# Patient Record
Sex: MALE | Race: WHITE | HISPANIC OR LATINO | Employment: OTHER | URBAN - METROPOLITAN AREA
[De-identification: names, ages, dates, MRNs, and addresses within clinical notes are randomized per-mention and may not be internally consistent; named-entity substitution may affect disease eponyms.]

---

## 2018-05-15 LAB
ALBUMIN SERPL BCP-MCNC: 4.1 G/DL (ref 3–5.2)
ALP SERPL-CCNC: 108 U/L (ref 43–122)
ALT SERPL W P-5'-P-CCNC: 40 U/L (ref 9–52)
ANION GAP SERPL CALCULATED.3IONS-SCNC: 9 MMOL/L (ref 5–14)
AST SERPL W P-5'-P-CCNC: 27 U/L (ref 17–59)
BILIRUB SERPL-MCNC: 0.7 MG/DL
BILIRUB UR QL STRIP: NEGATIVE MG/DL
BUN SERPL-MCNC: 12 MG/DL (ref 5–25)
CALCIUM SERPL-MCNC: 9.7 MG/DL (ref 8.4–10.2)
CHLORIDE SERPL-SCNC: 108 MEQ/L (ref 97–108)
CHOLEST SERPL-MCNC: 237 MG/DL
CHOLEST/HDLC SERPL: 5.6 {RATIO}
CLARITY UR: CLEAR
CO2 SERPL-SCNC: 26 MMOL/L (ref 22–30)
COLOR UR: NORMAL
CREATINE, SERUM (HISTORICAL): 0.97 MG/DL (ref 0.7–1.5)
CREATININE, RANDOM URINE (HISTORICAL): 149.2 MG/DL (ref 50–200)
DEPRECATED RDW RBC AUTO: 15 %
EGFR (HISTORICAL): >60 ML/MIN/1.73 M2
GLUCOSE SERPL-MCNC: 132 MG/DL (ref 70–99)
GLUCOSE UR STRIP-MCNC: NEGATIVE MG/DL
HCT VFR BLD AUTO: 49 % (ref 41–53)
HDLC SERPL-MCNC: 42 MG/DL
HGB BLD-MCNC: 16.5 G/DL (ref 13.5–17.5)
HGB UR QL STRIP.AUTO: NEGATIVE
KETONES UR STRIP-MCNC: NEGATIVE MG/DL
LDL/HDL RATIO (HISTORICAL): 3.6
LDLC SERPL CALC-MCNC: 152 MG/DL
LEUKOCYTE ESTERASE UR QL STRIP: NEGATIVE
MCH RBC QN AUTO: 31.7 PG (ref 26–34)
MCHC RBC AUTO-ENTMCNC: 33.6 % (ref 31–36)
MCV RBC AUTO: 94 FL (ref 80–100)
MICROALBUM.,U,RANDOM (HISTORICAL): <0.6 MG/DL
MICROALBUMIN/CREATININE RATIO (HISTORICAL): NORMAL
NITRITE UR QL STRIP: NEGATIVE
PH UR STRIP.AUTO: 6 [PH] (ref 4.5–8)
PLATELET # BLD AUTO: 172 K/MCL (ref 150–450)
POTASSIUM SERPL-SCNC: 5.1 MEQ/L (ref 3.6–5)
PROT UR STRIP-MCNC: NEGATIVE MG/DL
PSA (HISTORICAL): 0.37 NG/ML
RBC # BLD AUTO: 5.2 M/MCL (ref 4.5–5.9)
SODIUM SERPL-SCNC: 144 MEQ/L (ref 137–147)
SP GR UR STRIP.AUTO: 1.01 (ref 1–1.04)
TOTAL PROTEIN (HISTORICAL): 7.4 G/DL (ref 5.9–8.4)
TRIGL SERPL-MCNC: 217 MG/DL
UROBILINOGEN UR QL STRIP.AUTO: NEGATIVE MG/DL (ref 0–1)
VLDLC SERPL CALC-MCNC: 43 MG/DL (ref 0–40)
WBC # BLD AUTO: 7.7 K/MCL (ref 4.5–11)

## 2018-05-17 LAB
ANION GAP SERPL CALCULATED.3IONS-SCNC: 6 MMOL/L (ref 5–14)
BUN SERPL-MCNC: 11 MG/DL (ref 5–25)
CALCIUM SERPL-MCNC: 9.2 MG/DL (ref 8.4–10.2)
CHLORIDE SERPL-SCNC: 108 MEQ/L (ref 97–108)
CO2 SERPL-SCNC: 25 MMOL/L (ref 22–30)
CREATINE, SERUM (HISTORICAL): 0.93 MG/DL (ref 0.7–1.5)
EGFR (HISTORICAL): >60 ML/MIN/1.73 M2
EST. AVERAGE GLUCOSE BLD GHB EST-MCNC: 131 MG/DL
GLUCOSE SERPL-MCNC: 143 MG/DL (ref 70–99)
HBA1C MFR BLD HPLC: 6.2 %
POTASSIUM SERPL-SCNC: 4.8 MEQ/L (ref 3.6–5)
SODIUM SERPL-SCNC: 140 MEQ/L (ref 137–147)

## 2018-08-30 ENCOUNTER — APPOINTMENT (EMERGENCY)
Dept: RADIOLOGY | Facility: HOSPITAL | Age: 53
End: 2018-08-30
Payer: MEDICARE

## 2018-08-30 ENCOUNTER — HOSPITAL ENCOUNTER (EMERGENCY)
Facility: HOSPITAL | Age: 53
Discharge: HOME | End: 2018-08-30
Attending: EMERGENCY MEDICINE | Admitting: EMERGENCY MEDICINE
Payer: MEDICARE

## 2018-08-30 VITALS
OXYGEN SATURATION: 98 % | DIASTOLIC BLOOD PRESSURE: 68 MMHG | HEART RATE: 68 BPM | WEIGHT: 213.41 LBS | RESPIRATION RATE: 16 BRPM | SYSTOLIC BLOOD PRESSURE: 138 MMHG | TEMPERATURE: 97.2 F

## 2018-08-30 DIAGNOSIS — M25.511 ACUTE PAIN OF RIGHT SHOULDER: Primary | ICD-10-CM

## 2018-08-30 LAB
ANION GAP SERPL CALCULATED.3IONS-SCNC: 8 MMOL/L (ref 5–14)
BASOPHILS # BLD AUTO: 0.1 THOUSANDS/ΜL (ref 0–0.1)
BASOPHILS NFR BLD AUTO: 1 % (ref 0–1)
BUN SERPL-MCNC: 16 MG/DL (ref 5–25)
CALCIUM SERPL-MCNC: 9.5 MG/DL (ref 8.4–10.2)
CHLORIDE SERPL-SCNC: 106 MMOL/L (ref 97–108)
CO2 SERPL-SCNC: 27 MMOL/L (ref 22–30)
CREAT SERPL-MCNC: 0.87 MG/DL (ref 0.7–1.5)
EOSINOPHIL # BLD AUTO: 0.1 THOUSAND/ΜL (ref 0–0.4)
EOSINOPHIL NFR BLD AUTO: 2 % (ref 0–6)
ERYTHROCYTE [DISTWIDTH] IN BLOOD BY AUTOMATED COUNT: 14.9 %
GFR SERPL CREATININE-BSD FRML MDRD: 97 ML/MIN/1.73SQ M
GLUCOSE SERPL-MCNC: 133 MG/DL (ref 70–99)
HCT VFR BLD AUTO: 43.8 % (ref 41–53)
HGB BLD-MCNC: 14.7 G/DL (ref 13.5–17.5)
LYMPHOCYTES # BLD AUTO: 2.6 THOUSANDS/ΜL (ref 0.5–4)
LYMPHOCYTES NFR BLD AUTO: 35 % (ref 20–50)
MCH RBC QN AUTO: 32 PG (ref 26–34)
MCHC RBC AUTO-ENTMCNC: 33.5 G/DL (ref 31–36)
MCV RBC AUTO: 95 FL (ref 80–100)
MONOCYTES # BLD AUTO: 0.6 THOUSAND/ΜL (ref 0.2–0.9)
MONOCYTES NFR BLD AUTO: 8 % (ref 1–10)
NEUTROPHILS # BLD AUTO: 4 THOUSANDS/ΜL (ref 1.8–7.8)
NEUTS SEG NFR BLD AUTO: 55 % (ref 45–65)
PLATELET # BLD AUTO: 154 THOUSANDS/UL (ref 150–450)
PMV BLD AUTO: 10.3 FL (ref 8.9–12.7)
POTASSIUM SERPL-SCNC: 4 MMOL/L (ref 3.6–5)
RBC # BLD AUTO: 4.6 MILLION/UL (ref 4.5–5.9)
SODIUM SERPL-SCNC: 141 MMOL/L (ref 137–147)
TROPONIN I SERPL-MCNC: <0.01 NG/ML (ref 0–0.03)
WBC # BLD AUTO: 7.4 THOUSAND/UL (ref 4.5–11)

## 2018-08-30 PROCEDURE — 85025 COMPLETE CBC W/AUTO DIFF WBC: CPT | Performed by: EMERGENCY MEDICINE

## 2018-08-30 PROCEDURE — 84484 ASSAY OF TROPONIN QUANT: CPT | Performed by: EMERGENCY MEDICINE

## 2018-08-30 PROCEDURE — 93005 ELECTROCARDIOGRAM TRACING: CPT

## 2018-08-30 PROCEDURE — 80048 BASIC METABOLIC PNL TOTAL CA: CPT | Performed by: EMERGENCY MEDICINE

## 2018-08-30 PROCEDURE — 96374 THER/PROPH/DIAG INJ IV PUSH: CPT

## 2018-08-30 PROCEDURE — 36415 COLL VENOUS BLD VENIPUNCTURE: CPT | Performed by: EMERGENCY MEDICINE

## 2018-08-30 PROCEDURE — 99285 EMERGENCY DEPT VISIT HI MDM: CPT

## 2018-08-30 PROCEDURE — 71045 X-RAY EXAM CHEST 1 VIEW: CPT

## 2018-08-30 RX ORDER — GABAPENTIN 800 MG/1
TABLET ORAL 3 TIMES DAILY
COMMUNITY
Start: 2018-06-18 | End: 2019-01-16 | Stop reason: SDUPTHER

## 2018-08-30 RX ORDER — CYCLOBENZAPRINE HCL 10 MG
10 TABLET ORAL 3 TIMES DAILY PRN
Qty: 20 TABLET | Refills: 0 | Status: SHIPPED | OUTPATIENT
Start: 2018-08-30 | End: 2018-12-05 | Stop reason: SDUPTHER

## 2018-08-30 RX ORDER — KETOROLAC TROMETHAMINE 30 MG/ML
INJECTION, SOLUTION INTRAMUSCULAR; INTRAVENOUS
Status: COMPLETED
Start: 2018-08-30 | End: 2018-08-30

## 2018-08-30 RX ORDER — ATENOLOL 50 MG/1
TABLET ORAL EVERY 24 HOURS
COMMUNITY
Start: 2018-06-18 | End: 2019-01-16 | Stop reason: SDUPTHER

## 2018-08-30 RX ORDER — KETOROLAC TROMETHAMINE 30 MG/ML
30 INJECTION, SOLUTION INTRAMUSCULAR; INTRAVENOUS ONCE
Status: COMPLETED | OUTPATIENT
Start: 2018-08-30 | End: 2018-08-30

## 2018-08-30 RX ORDER — RANITIDINE 300 MG/1
TABLET ORAL EVERY 24 HOURS
COMMUNITY
Start: 2018-06-18 | End: 2019-03-08 | Stop reason: SDUPTHER

## 2018-08-30 RX ADMIN — KETOROLAC TROMETHAMINE 30 MG: 30 INJECTION, SOLUTION INTRAMUSCULAR; INTRAVENOUS at 16:59

## 2018-08-30 NOTE — ED PROVIDER NOTES
History  Chief Complaint   Patient presents with    Chest Pain     rt shoulder and chest pain for 3 days     Patient is a 77-year-old male who presents with a 3 day history of right shoulder pain that extends to his right chest   States woke up with the pain complaining of a a achy pain that he cannot quantify that is made worse with movement better with rest   Denies any shortness of breath, nausea, vomiting, dizziness  No medicine taken for denies any direct trauma to the area as well  No numbness or tingling in the right upper extremity  Patient is right-handed  Denies any previous pain the past just moved up from [facility] does not have a regular PCP here  Prior to Admission Medications   Prescriptions Last Dose Informant Patient Reported? Taking?   atenolol (TENORMIN) 50 mg tablet   Yes Yes   Sig: every 24 hours   gabapentin (NEURONTIN) 800 mg tablet   Yes Yes   Sig: 3 (three) times a day   ranitidine (ZANTAC) 300 MG tablet   Yes Yes   Sig: every 24 hours      Facility-Administered Medications: None       Past Medical History:   Diagnosis Date    Diverticulitis        History reviewed  No pertinent surgical history  History reviewed  No pertinent family history  I have reviewed and agree with the history as documented  Social History   Substance Use Topics    Smoking status: Current Every Day Smoker     Types: Cigars    Smokeless tobacco: Never Used    Alcohol use No      Comment: social        Review of Systems   Constitutional: Negative  HENT: Negative  Eyes: Negative  Respiratory: Negative  Cardiovascular: Positive for chest pain  Gastrointestinal: Negative  Endocrine: Negative  Genitourinary: Negative  Musculoskeletal: Positive for arthralgias  Negative for back pain  Skin: Negative  Allergic/Immunologic: Negative  Neurological: Negative  Hematological: Negative  Psychiatric/Behavioral: Negative      All other systems reviewed and are negative  Physical Exam  Physical Exam   Constitutional: He is oriented to person, place, and time  He appears well-developed and well-nourished  HENT:   Head: Normocephalic and atraumatic  Right Ear: External ear normal    Left Ear: External ear normal    Nose: Nose normal    Mouth/Throat: Oropharynx is clear and moist    Eyes: Conjunctivae and EOM are normal  Pupils are equal, round, and reactive to light  Neck: Normal range of motion  Neck supple  Cardiovascular: Normal rate, regular rhythm, normal heart sounds and intact distal pulses  Pulmonary/Chest: Effort normal and breath sounds normal    Abdominal: Soft  Bowel sounds are normal    Musculoskeletal: Normal range of motion  He exhibits tenderness  He exhibits no edema or deformity  Tenderness palpation over the right posterior deltoid  Full range of motion but pain with abduction and external rotation  No elbow or wrist pain  No clavicular tenderness palpation  No deformity  No point bony tenderness palpation  Neurological: He is alert and oriented to person, place, and time  Skin: Skin is warm and dry  Capillary refill takes less than 2 seconds  Nursing note and vitals reviewed        Vital Signs  ED Triage Vitals   Temperature Pulse Respirations Blood Pressure SpO2   08/30/18 1624 08/30/18 1624 08/30/18 1624 08/30/18 1624 08/30/18 1624   (!) 97 2 °F (36 2 °C) 62 16 141/65 98 %      Temp Source Heart Rate Source Patient Position - Orthostatic VS BP Location FiO2 (%)   08/30/18 1624 08/30/18 1624 08/30/18 1624 08/30/18 1624 --   Temporal Monitor Sitting Left arm       Pain Score       08/30/18 1659       Worst Possible Pain           Vitals:    08/30/18 1624   BP: 141/65   Pulse: 62   Patient Position - Orthostatic VS: Sitting       Visual Acuity      ED Medications  Medications   ketorolac (TORADOL) injection 30 mg (30 mg Intravenous Given 8/30/18 1659)       Diagnostic Studies  Results Reviewed     Procedure Component Value Units Date/Time    Troponin I [[id number]]  (Normal) Collected:  08/30/18 1654    Lab Status:  Final result Specimen:  Blood from Arm, Right Updated:  08/30/18 1732     Troponin I <0 01 ng/mL     Basic metabolic panel [[id number]]  (Abnormal) Collected:  08/30/18 1654    Lab Status:  Final result Specimen:  Blood from Arm, Right Updated:  08/30/18 1725     Sodium 141 mmol/L      Potassium 4 0 mmol/L      Chloride 106 mmol/L      CO2 27 mmol/L      ANION GAP 8 mmol/L      BUN 16 mg/dL      Creatinine 0 87 mg/dL      Glucose 133 (H) mg/dL      Calcium 9 5 mg/dL      eGFR 97 ml/min/1 73sq m     Narrative:         National Kidney Disease Education Program recommendations are as follows:  GFR calculation is accurate only with a steady state creatinine  Chronic Kidney disease less than 60 ml/min/1 73 sq  meters  Kidney failure less than 15 ml/min/1 73 sq  meters  CBC and differential [[id number]]  (Normal) Collected:  08/30/18 1654    Lab Status:  Final result Specimen:  Blood from Arm, Right Updated:  08/30/18 1710     WBC 7 40 Thousand/uL      RBC 4 60 Million/uL      Hemoglobin 14 7 g/dL      Hematocrit 43 8 %      MCV 95 fL      MCH 32 0 pg      MCHC 33 5 g/dL      RDW 14 9 %      MPV 10 3 fL      Platelets 175 Thousands/uL      Neutrophils Relative 55 %      Lymphocytes Relative 35 %      Monocytes Relative 8 %      Eosinophils Relative 2 %      Basophils Relative 1 %      Neutrophils Absolute 4 00 Thousands/µL      Lymphocytes Absolute 2 60 Thousands/µL      Monocytes Absolute 0 60 Thousand/µL      Eosinophils Absolute 0 10 Thousand/µL      Basophils Absolute 0 10 Thousands/µL                  XR chest portable   Final Result by Andrea Morel DO (08/30 1717)      No acute cardiopulmonary disease              Workstation performed: [id number]                    Procedures  Procedures       Phone Contacts  ED Phone Contact    ED Course  ED Course as of Aug 30 1745   Thu Aug 30, 2018   1743 Patient states mild appear in pain but still pain with movement of right upper extremity  Reviewed blood work x-ray and EKG  MDM  Number of Diagnoses or Management Options  Diagnosis management comments: Patient is a 12-year-old male with a 3 day history of right shoulder pain that radiates to the right chest   EKG blood work and chest x-ray all unremarkable  No signs of any cardiac damage  On pain worse with movement palpation  Most likely musculoskeletal this point will discharge with muscle relaxer follow up with [facility] due to need over local doctor now  Return to the ER for any concerns  Amount and/or Complexity of Data Reviewed  Clinical lab tests: ordered and reviewed  Tests in the radiology section of CPT®: ordered and reviewed  Tests in the medicine section of CPT®: reviewed and ordered      CritCare Time    Disposition  Final diagnoses:   Acute pain of right shoulder     Time reflects when diagnosis was documented in both MDM as applicable and the Disposition within this note     Time User Action Codes Description Comment    8/30/2018  5:44 PM Elias Daniel Add [[id number]] Acute pain of right shoulder       ED Disposition     ED Disposition Condition Comment    Discharge  [place] discharge to home/self care  Condition at discharge: Stable        Follow-up Information     Follow up With Specialties Details Why Contact Suraj REYNOSO  [address] [place] [address]  [phone]            Patient's Medications   Discharge Prescriptions    CYCLOBENZAPRINE (FLEXERIL) 10 MG TABLET    Take 1 tablet (10 mg total) by mouth 3 (three) times a day as needed for muscle spasms       Start Date: 8/30/2018 End Date: --       Order Dose: 10 mg       Quantity: 20 tablet    Refills: 0     No discharge procedures on file      ED Provider  Electronically Signed by           Davey Bobby MD  08/30/18 6622

## 2018-08-30 NOTE — DISCHARGE INSTRUCTIONS
Dolor de hombro, cuidados ambulatorios   INFORMACIÓN GENERAL:   El dolor de hombro  es un problema común y puede afectar nelia actividades diarias  El dolor puede ser causado por un problema dentro de cheney hombro  El dolor de hombro también puede ser causado por el dolor que se propaga hacia cheney hombro de otra parte de cheney cuerpo  Busque atención médica inmediata al presentar los siguientes síntomas:   · Dolor intenso    · Incapacidad de  cheney hombro o brazo    · Entumecimiento o cosquilleo en el hombro o en el brazo  El tratamiento para el dolor de hombro  puede incluir alguno de los siguientes:  · El acetaminofén  disminuye el dolor y la [place]  Está disponible sin receta médica  Pregunte la cantidad que debe heath y con qué frecuencia debe tomarlo  [address] [place] [address] Road  El acetaminofén puede causar daño al hígado si no se angeline correctamente  · Los antiiflamatorios no esteroideos (AINEs) ayudan a reducir inflamación y dolor o [place]  Natividad medicamento esta disponible con o sin stefanie receta médica  Los AINEs podrían causar sangrado estomacal o problemas en los riñones en [place]  Si usted esta tomando un anticoágulante, siempre  pregunte a cheney proveedor de adrian si los AINEs son seguros para usted  Antes de [employer], manju siempre la etiqueta de información y siga nelia indicaciones  · Stefanie inyección de esteroides  puede ayudar a disminuir el dolor y la inflamación  · Puede ser necesaria la cirugía  para el dolor crónico y por la pérdida de función  Controle nelia síntomas:   · Aplique hielo  sobre el hombro alecia 20 a 30 minutos cada 2 horas o según las indicaciones  Use stefanie compresa de hielo o coloque hielo tiffani en stefanie bolsa plástica y cúbrala con stefanie toalla  El hielo ayuda a prevenir el daño a los tejidos y disminuye la inflamación y el dolor  · Aplicar calor si el hielo no ayuda a nelia síntomas    Aplique calor sobre el hombro alecia 20 a 30 minutos cada 2 horas alecia los días indicados  El calor ayuda a disminuir el dolor y los espasmos musculares  · Acuda a rehabilitación o terapia ocupacional prema se le indique  Un fisioterapeuta le enseñará ejercicios para ayudar a mejorar el rango de movimiento y la fuerza con el fin de disminuir el dolor  Un terapeuta ocupacional le enseñará habilidades para ayudar con nelia actividades diarias  Prevenir el dolor de hombro:   · Estirar y fortalecer rodrigez hombro  Use la técnica apropiada alecia los ejercicios y deportes  · Limite las [address] prema se le indique  Trate de evitar los movimientos repetitivos por encima de la martha  Jannice Skeans a rodrigez consulta de control con rodrigez proveedor de adrian o con el ortopedista prema se le indique:  Anote nelia preguntas para que recuerde hacerlas alecia nelia citas médicas  ACUERDOS SOBRE RODRIGEZ CUIDADO:   Usted tiene el derecho de participar en la planificación de rodrigez cuidado  Aprenda todo lo que pueda sobre rodrigez condición y prema darle tratamiento  Discuta con nelia médicos nelia opciones de tratamiento para juntos decidir el cuidado que usted quiere recibir  Usted siempre tiene el derecho a rechazar rodrigez tratamiento  Esta información es sólo para uso en educación  Rodrigez intención no es darle un consejo médico sobre enfermedades o tratamientos  Colsulte con rodrigez Kandy Dodson farmacéutico antes de seguir cualquier régimen médico para saber si es seguro y efectivo para usted  © 2014 [address] is for End User's use only and may not be sold, redistributed or otherwise used for commercial purposes  All illustrations and images included in CareNotes® are the copyrighted property of A D A M , Inc  or Bashir Gonzales

## 2018-08-30 NOTE — ED PROCEDURE NOTE
PROCEDURE  ECG 12 Lead Documentation  Date/Time: 8/30/2018 4:34 PM  Performed by: Ranjana Saeed  Authorized by: Ranjana Saeed     Indications / Diagnosis:  Chest pain  ECG reviewed by me, the ED Provider: yes    Interpretation:     Interpretation: normal    Rate:     ECG rate:  57    ECG rate assessment: bradycardic    Rhythm:     Rhythm: sinus bradycardia    Ectopy:     Ectopy: none    QRS:     QRS axis:  Normal    QRS intervals:  Normal  Conduction:     Conduction: normal    ST segments:     ST segments:  Normal  T waves:     T waves: normal           Gemini Morales MD  08/30/18 [id number]

## 2018-08-31 LAB
ATRIAL RATE: 57 BPM
P AXIS: 49 DEGREES
PR INTERVAL: 158 MS
QRS AXIS: 8 DEGREES
QRSD INTERVAL: 86 MS
QT INTERVAL: 422 MS
QTC INTERVAL: 410 MS
T WAVE AXIS: 9 DEGREES
VENTRICULAR RATE: 57 BPM

## 2018-08-31 PROCEDURE — 93010 ELECTROCARDIOGRAM REPORT: CPT | Performed by: INTERNAL MEDICINE

## 2018-09-20 ENCOUNTER — APPOINTMENT (OUTPATIENT)
Dept: LAB | Facility: HOSPITAL | Age: 53
End: 2018-09-20
Payer: MEDICARE

## 2018-09-20 ENCOUNTER — TRANSCRIBE ORDERS (OUTPATIENT)
Dept: ADMINISTRATIVE | Facility: HOSPITAL | Age: 53
End: 2018-09-20

## 2018-09-20 DIAGNOSIS — E78.5 HYPERLIPIDEMIA, UNSPECIFIED HYPERLIPIDEMIA TYPE: Primary | ICD-10-CM

## 2018-09-20 DIAGNOSIS — R73.09 OTHER ABNORMAL GLUCOSE: ICD-10-CM

## 2018-09-20 DIAGNOSIS — E78.5 HYPERLIPIDEMIA, UNSPECIFIED HYPERLIPIDEMIA TYPE: ICD-10-CM

## 2018-09-20 LAB
CHOLEST SERPL-MCNC: 176 MG/DL
HDLC SERPL-MCNC: 39 MG/DL (ref 40–59)
LDLC SERPL CALC-MCNC: 102 MG/DL
NONHDLC SERPL-MCNC: 137 MG/DL
TRIGL SERPL-MCNC: 176 MG/DL

## 2018-09-20 PROCEDURE — 83036 HEMOGLOBIN GLYCOSYLATED A1C: CPT

## 2018-09-20 PROCEDURE — 36415 COLL VENOUS BLD VENIPUNCTURE: CPT

## 2018-09-20 PROCEDURE — 80061 LIPID PANEL: CPT

## 2018-09-21 LAB
EST. AVERAGE GLUCOSE BLD GHB EST-MCNC: 131 MG/DL
HBA1C MFR BLD: 6.2 % (ref 4.2–6.3)

## 2018-10-03 ENCOUNTER — OFFICE VISIT (OUTPATIENT)
Dept: FAMILY MEDICINE CLINIC | Facility: CLINIC | Age: 53
End: 2018-10-03
Payer: MEDICARE

## 2018-10-03 VITALS
WEIGHT: 222 LBS | HEART RATE: 101 BPM | OXYGEN SATURATION: 96 % | HEIGHT: 72 IN | SYSTOLIC BLOOD PRESSURE: 140 MMHG | BODY MASS INDEX: 30.07 KG/M2 | RESPIRATION RATE: 16 BRPM | TEMPERATURE: 97.8 F | DIASTOLIC BLOOD PRESSURE: 66 MMHG

## 2018-10-03 DIAGNOSIS — I10 ESSENTIAL HYPERTENSION: ICD-10-CM

## 2018-10-03 DIAGNOSIS — M25.511 CHRONIC PAIN OF BOTH SHOULDERS: ICD-10-CM

## 2018-10-03 DIAGNOSIS — Z23 NEED FOR VACCINATION: ICD-10-CM

## 2018-10-03 DIAGNOSIS — Z09 FOLLOW UP: Primary | ICD-10-CM

## 2018-10-03 DIAGNOSIS — E78.2 MIXED HYPERLIPIDEMIA: ICD-10-CM

## 2018-10-03 DIAGNOSIS — G47.09 OTHER INSOMNIA: ICD-10-CM

## 2018-10-03 DIAGNOSIS — G89.29 CHRONIC PAIN OF BOTH SHOULDERS: ICD-10-CM

## 2018-10-03 DIAGNOSIS — M25.512 CHRONIC PAIN OF BOTH SHOULDERS: ICD-10-CM

## 2018-10-03 PROBLEM — K21.9 GASTROESOPHAGEAL REFLUX DISEASE: Status: ACTIVE | Noted: 2018-05-14

## 2018-10-03 PROBLEM — N40.0 BENIGN PROSTATIC HYPERPLASIA: Status: ACTIVE | Noted: 2018-06-18

## 2018-10-03 PROBLEM — E78.5 HYPERLIPIDEMIA: Status: ACTIVE | Noted: 2018-05-14

## 2018-10-03 PROBLEM — Z87.19 HISTORY OF DIVERTICULITIS: Status: ACTIVE | Noted: 2018-05-14

## 2018-10-03 PROBLEM — F17.290 CIGAR SMOKER: Status: ACTIVE | Noted: 2018-05-14

## 2018-10-03 PROBLEM — E66.9 OBESITY: Status: ACTIVE | Noted: 2018-05-14

## 2018-10-03 PROBLEM — R73.03 PREDIABETES: Status: ACTIVE | Noted: 2018-05-18

## 2018-10-03 PROBLEM — Z87.442 HISTORY OF RENAL CALCULI: Status: ACTIVE | Noted: 2018-05-14

## 2018-10-03 PROBLEM — Z90.49 HISTORY OF HEMICOLECTOMY: Status: ACTIVE | Noted: 2018-05-14

## 2018-10-03 PROCEDURE — 99213 OFFICE O/P EST LOW 20 MIN: CPT | Performed by: FAMILY MEDICINE

## 2018-10-03 PROCEDURE — 90732 PPSV23 VACC 2 YRS+ SUBQ/IM: CPT | Performed by: FAMILY MEDICINE

## 2018-10-03 PROCEDURE — G0009 ADMIN PNEUMOCOCCAL VACCINE: HCPCS | Performed by: FAMILY MEDICINE

## 2018-10-03 RX ORDER — IBUPROFEN 600 MG/1
600 TABLET ORAL EVERY 8 HOURS PRN
Qty: 30 TABLET | Refills: 0 | Status: SHIPPED | OUTPATIENT
Start: 2018-10-03 | End: 2018-11-12 | Stop reason: SDUPTHER

## 2018-10-03 RX ORDER — UREA 10 %
1 LOTION (ML) TOPICAL
Qty: 30 TABLET | Refills: 2 | Status: SHIPPED | OUTPATIENT
Start: 2018-10-03 | End: 2019-01-16 | Stop reason: SDUPTHER

## 2018-10-03 RX ORDER — ATORVASTATIN CALCIUM 40 MG/1
40 TABLET, FILM COATED ORAL DAILY
Qty: 30 TABLET | Refills: 2 | Status: SHIPPED | OUTPATIENT
Start: 2018-10-03 | End: 2019-01-16 | Stop reason: SDUPTHER

## 2018-10-03 NOTE — ASSESSMENT & PLAN NOTE
Chronic pain due to old trauma, limiting ADLs  Continue Flexeril, Ibuprofen for pain  Refer for physical therapy

## 2018-10-03 NOTE — ASSESSMENT & PLAN NOTE
Started on Melatonin 1 mg 2 hours before sleep  Advised patient to develop habit to go to bed at the same time each night and get up at the same time each morning  -make sure the bedroom is quiet, dark, relaxing, and at a comfortable temperature -remove electronic devices, such as TVs, computers, and smart phones, from the bedroom  - avoid large meals, caffeine, and alcohol before bedtime   -get some exercise   Being physically active during the day can help fall asleep more easily at night, but don't exercise 1-2 hours before going to bed

## 2018-10-03 NOTE — ASSESSMENT & PLAN NOTE
Blood pressure currently well controlled with current antihypertensive therapy  No complaints of adverse effects, including visual changes, dizziness, headaches or syncope  Will continue current therapy  Encouraged diet and exercise regimen as tolerated

## 2018-10-03 NOTE — PATIENT INSTRUCTIONS
Insomnio   CUIDADO AMBULATORIO:   Insomnio  es colton afección que hace dificil conciliar el sueño o mantenerse dormido  La falta de sueño puede conllevar a problemas de atención o de la memoria alecia el día  Usted también podría estar de malhumor, deprimido, torpe o tener jhonathan de [place]  Pregúntele a cheney Cherylann Bimler vitaminas y minerales son adecuados para usted  · Suzanne síntomas no mejoran o Bernice Rico  · Reatha Lilo a usar drogas o alcohol para conciliar el sueño  · Usted tiene preguntas o inquietudes acerca de cheney condición o cuidado  Medicamentos:   · Medicamentos,  podrían ayudarle a dormir con más regularidad o ayudarlo a sentirse menos ansioso  · [place] suzanne medicamentos prema se le haya indicado  Consulte con cheney médico si usted serina que cheney medicamento no le está ayudando o si presenta efectos secundarios  Infórmele si es alérgico a cualquier medicamento  Mantenga colton lista actualizada de los [employer], las vitaminas y los productos herbales que angeline  Incluya los siguientes datos de los medicamentos: cantidad, frecuencia y motivo de administración  Traiga con usted la lista o los envases de la píldoras a suzanne citas de seguimiento  Lleve la lista de los medicamentos con usted en merari de colton emergencia  Qué puede hacer para mejorar el sueño:   · Establezca un horario para dormir  [place] le ayudará a formar colton rutina de sueño  Mantenga un registro de los patrones de sueño y cualquier problema para dormir que tenga  Lleve el registro a las citas de seguimiento con suzanne médicos  · No tome siestas  Las siestas podrían dificultarle que se quede dormido a la hora de acostarse por la noche  · Mantenga cheney habitación fresca, sin ruidos y [place]  [place] [facility] un ruido o latricia garcia prema el del ventilador, para que lo relaje  No utilice cheney cama para ninguna actividad que lo mantenga despierto  No manju ni rocio ejercicio, ni coma ni sandra televisión en cheney habitación       · [place] de la cama si no se [facility] dormido dentro de 20 minutos  Doree Ratel a otra habitación y rocio algo que lo relaje hasta que le de sueño  · Limite el consumo de cafeína, alcohol y de alimentos muy temprano en el día  Solo tome café en la mañana  No tome alcohol dentro de las 6 horas antes de [place]  No coma alimentos pesados emile antes de acostarse  · Realice actividad física con regularidad  El ejercicio diario podría ayudarlo a dormir mejor  No se ejercite dentro de las 4 horas antes de [place]  Acuda a nelia consultas de control con cheney médico según le indicaron  Cheney médico podría referirlo a terapia cognitiva conductual  Un terapeuta de la conducta podría ayudarlo a encontrar maneras de [place], de disminuir el estrés y de mejorar el sueño  Anote nelia preguntas para que se acuerde de hacerlas alecia nelia visitas  © 2017 [address] [facility] Information is for End User's use only and may not be sold, redistributed or otherwise used for commercial purposes  All illustrations and images included in CareNotes® are the copyrighted property of A D A M , Inc  or Bashir Gonzales  Esta información es sólo para uso en educación  Cheney intención no es darle un consejo médico sobre enfermedades o tratamientos  Colsulte con cheney Travis Jones farmacéutico antes de seguir cualquier régimen médico para saber si es seguro y efectivo para usted

## 2018-10-03 NOTE — PROGRESS NOTES
Assessment/Plan:    Hypertension  Blood pressure currently well controlled with current antihypertensive therapy  No complaints of adverse effects, including visual changes, dizziness, headaches or syncope  Will continue current therapy  Encouraged diet and exercise regimen as tolerated      Hyperlipidemia  Lipid panel reviewed with the patient  Continue Atorvastatin    Other insomnia  Started on Melatonin 1 mg 2 hours before sleep  Advised patient to develop habit to go to bed at the same time each night and get up at the same time each morning  -make sure the bedroom is quiet, dark, relaxing, and at a comfortable temperature -remove electronic devices, such as TVs, computers, and smart phones, from the bedroom  - avoid large meals, caffeine, and alcohol before bedtime   -get some exercise  Being physically active during the day can help fall asleep more easily at night, but don't exercise 1-2 hours before going to bed        Chronic pain of both shoulders  Chronic pain due to old trauma, limiting ADLs  Continue Flexeril, Ibuprofen for pain  Refer for physical therapy       Diagnoses and all orders for this visit:    Follow up    Need for vaccination  -     PNEUMOCOCCAL POLYSACCHARIDE VACCINE 23-VALENT =>1YO SQ IM  -     SYRINGE/SINGLE-DOSE VIAL: influenza vaccine, [id number], quadrivalent, 0 5 mL, preservative-free, for patients 3+ yr (FLUZONE)    Other insomnia  -     Discontinue: Melatonin 5 MG CAPS; Take 1 capsule (5 mg total) by mouth daily at bedtime  -     melatonin 1 mg; Take 1 tablet (1 mg total) by mouth daily at bedtime Take 2 hours before sleep    Essential hypertension    Mixed hyperlipidemia  -     atorvastatin (LIPITOR) 40 mg tablet; Take 1 tablet (40 mg total) by mouth daily    Chronic pain of both shoulders  -     Ambulatory referral to Physical Therapy; Future  -     ibuprofen (MOTRIN) 600 mg tablet;  Take 1 tablet (600 mg total) by mouth every 8 (eight) hours as needed for mild pain Subjective:      Patient ID: Ervin Case is a 52 y o  male  Patient presents to follow-up on chronic conditions including hypertension, hyperlipidemia, chronic pain in both shoulders more on the left  He also complains of insomnia  Shoulder Pain    The pain is present in the neck, left shoulder and right shoulder  This is a chronic problem  The current episode started more than 1 year ago  There has been a history of trauma  The problem occurs daily  The problem has been unchanged  The quality of the pain is described as aching  The pain is moderate  Pertinent negatives include no fever, joint locking, joint swelling, numbness or stiffness  He has tried NSAIDS and rest for the symptoms  The treatment provided no relief  The following portions of the patient's history were reviewed and updated as appropriate: allergies, current medications, past family history, past medical history, past social history, past surgical history and problem list     Review of Systems   Constitutional: Negative for chills, diaphoresis, fatigue and fever  HENT: Negative for congestion  Respiratory: Negative for chest tightness  Cardiovascular: Negative for chest pain, palpitations and leg swelling  Gastrointestinal: Negative for abdominal pain  Genitourinary: Negative for difficulty urinating and frequency  Musculoskeletal: Positive for arthralgias and neck pain  Negative for joint swelling, neck stiffness and stiffness  Skin: Negative for color change, pallor and rash  Neurological: Negative for dizziness, syncope, numbness and headaches  Psychiatric/Behavioral: Positive for sleep disturbance           Objective:      /66 (BP Location: Left arm, Patient Position: Sitting, Cuff Size: Adult)   Pulse 101   Temp 97 8 °F (36 6 °C) (Tympanic)   Resp 16   Ht 6' (1 829 m)   Wt 101 kg (222 lb)   SpO2 96%   BMI 30 11 kg/m²          Physical Exam   Constitutional: He is oriented to person, place, and time  He appears well-developed and well-nourished  No distress  HENT:   Head: Normocephalic and atraumatic  Eyes: Pupils are equal, round, and reactive to light  EOM are normal  No scleral icterus  Neck: Normal range of motion  Neck supple  Cardiovascular: Normal rate, regular rhythm and normal heart sounds  Exam reveals no gallop and no friction rub  No murmur heard  Pulmonary/Chest: Effort normal and breath sounds normal  No respiratory distress  He has no wheezes  He has no rales  He exhibits no tenderness  Abdominal: Soft  Bowel sounds are normal  He exhibits no distension  There is no tenderness  There is no rebound  Musculoskeletal: He exhibits no edema or deformity  Right shoulder: He exhibits decreased range of motion and tenderness  He exhibits no swelling, no effusion, no crepitus and no deformity  Left shoulder: He exhibits decreased range of motion and tenderness  He exhibits no swelling, no effusion, no crepitus and no deformity  Cervical back: He exhibits tenderness and spasm  Lymphadenopathy:     He has no cervical adenopathy  Neurological: He is alert and oriented to person, place, and time  Skin: Skin is warm and dry  No rash noted  No erythema  No pallor  Psychiatric: He has a normal mood and affect

## 2018-10-15 ENCOUNTER — PATIENT OUTREACH (OUTPATIENT)
Dept: FAMILY MEDICINE CLINIC | Facility: CLINIC | Age: 53
End: 2018-10-15

## 2018-10-15 ENCOUNTER — TELEPHONE (OUTPATIENT)
Dept: FAMILY MEDICINE CLINIC | Facility: CLINIC | Age: 53
End: 2018-10-15

## 2018-10-15 DIAGNOSIS — Z74.1 REQUIRES ASSISTANCE WITH ACTIVITIES OF DAILY LIVING (ADL): Primary | ICD-10-CM

## 2018-10-15 NOTE — TELEPHONE ENCOUNTER
Received the form for PA independent enrollment, spoke to the patient explaining that we don't have enough evidence and information to fill out the form at this point  Will follow up on the next appointment

## 2018-10-22 NOTE — PROGRESS NOTES
Dr Thao Powell consulted ADI ALVARADO regarding application for the Waiver program, as pt brought the form to Dr Thao Powell for services  In discussion with Dr Thao Powell, she asked if ADI ALVARADO had a list of diagnoses to get approval with the Waiver program     ADI ALVARADO provided education to Dr Thao Powell that there is no list for these diagnoses  Approval for in home nursing care is determined by PCP  PCP must determine if there is a clinical need based on diagnosis  PCP then completes form and the county determines if there are any qualifying diagnoses or needs for care  PCP can also opt to determine that pt does not meet criteria at this time  ADI ALVARADO is available for additional support as needed via social work ambulatory order

## 2018-11-12 DIAGNOSIS — G89.29 CHRONIC PAIN OF BOTH SHOULDERS: ICD-10-CM

## 2018-11-12 DIAGNOSIS — M25.512 CHRONIC PAIN OF BOTH SHOULDERS: ICD-10-CM

## 2018-11-12 DIAGNOSIS — M25.511 CHRONIC PAIN OF BOTH SHOULDERS: ICD-10-CM

## 2018-11-13 RX ORDER — IBUPROFEN 600 MG/1
TABLET ORAL
Qty: 30 TABLET | Refills: 0 | Status: SHIPPED | OUTPATIENT
Start: 2018-11-13 | End: 2019-07-22 | Stop reason: SDUPTHER

## 2018-12-05 ENCOUNTER — OFFICE VISIT (OUTPATIENT)
Dept: FAMILY MEDICINE CLINIC | Facility: CLINIC | Age: 53
End: 2018-12-05
Payer: MEDICARE

## 2018-12-05 VITALS
TEMPERATURE: 97.7 F | BODY MASS INDEX: 28.85 KG/M2 | SYSTOLIC BLOOD PRESSURE: 120 MMHG | WEIGHT: 213 LBS | HEART RATE: 87 BPM | RESPIRATION RATE: 16 BRPM | HEIGHT: 72 IN | DIASTOLIC BLOOD PRESSURE: 90 MMHG | OXYGEN SATURATION: 95 %

## 2018-12-05 DIAGNOSIS — M25.619 POSTTRAUMATIC STIFFNESS OF SHOULDER JOINT: ICD-10-CM

## 2018-12-05 DIAGNOSIS — G47.09 OTHER INSOMNIA: ICD-10-CM

## 2018-12-05 DIAGNOSIS — J30.9 ALLERGIC RHINITIS, UNSPECIFIED SEASONALITY, UNSPECIFIED TRIGGER: ICD-10-CM

## 2018-12-05 DIAGNOSIS — M25.511 CHRONIC PAIN OF BOTH SHOULDERS: Primary | ICD-10-CM

## 2018-12-05 DIAGNOSIS — M25.511 ACUTE PAIN OF RIGHT SHOULDER: ICD-10-CM

## 2018-12-05 DIAGNOSIS — G89.29 CHRONIC PAIN OF BOTH SHOULDERS: Primary | ICD-10-CM

## 2018-12-05 DIAGNOSIS — L20.84 INTRINSIC ECZEMA: ICD-10-CM

## 2018-12-05 DIAGNOSIS — R73.03 PREDIABETES: ICD-10-CM

## 2018-12-05 DIAGNOSIS — Z09 FOLLOW UP: ICD-10-CM

## 2018-12-05 DIAGNOSIS — M25.512 CHRONIC PAIN OF BOTH SHOULDERS: Primary | ICD-10-CM

## 2018-12-05 DIAGNOSIS — Z23 NEED FOR INFLUENZA VACCINATION: ICD-10-CM

## 2018-12-05 DIAGNOSIS — M54.2 CERVICALGIA: ICD-10-CM

## 2018-12-05 PROCEDURE — 99214 OFFICE O/P EST MOD 30 MIN: CPT | Performed by: FAMILY MEDICINE

## 2018-12-05 RX ORDER — HYDROXYZINE HYDROCHLORIDE 25 MG/1
25 TABLET, FILM COATED ORAL EVERY 6 HOURS PRN
Qty: 30 TABLET | Refills: 2 | Status: SHIPPED | OUTPATIENT
Start: 2018-12-05 | End: 2019-03-08 | Stop reason: ALTCHOICE

## 2018-12-05 RX ORDER — FLUTICASONE PROPIONATE 50 MCG
1 SPRAY, SUSPENSION (ML) NASAL DAILY
Qty: 16 G | Refills: 0 | Status: SHIPPED | OUTPATIENT
Start: 2018-12-05 | End: 2019-03-08 | Stop reason: SDUPTHER

## 2018-12-05 RX ORDER — CYCLOBENZAPRINE HCL 10 MG
10 TABLET ORAL 3 TIMES DAILY PRN
Qty: 20 TABLET | Refills: 0 | Status: SHIPPED | OUTPATIENT
Start: 2018-12-05 | End: 2019-01-16 | Stop reason: SDUPTHER

## 2018-12-05 RX ORDER — DIAPER,BRIEF,INFANT-TODD,DISP
EACH MISCELLANEOUS 2 TIMES DAILY
Qty: 30 G | Refills: 0 | Status: SHIPPED | OUTPATIENT
Start: 2018-12-05 | End: 2019-03-08 | Stop reason: SDUPTHER

## 2018-12-05 RX ORDER — NAPROXEN 500 MG/1
250 TABLET ORAL 2 TIMES DAILY WITH MEALS
Qty: 30 TABLET | Refills: 2 | Status: SHIPPED | OUTPATIENT
Start: 2018-12-05 | End: 2019-01-16 | Stop reason: SDUPTHER

## 2018-12-05 NOTE — ASSESSMENT & PLAN NOTE
Patient was refer her for physical therapy on the previous visit, but states that he was never called   Will follow up on referral  Naproxen ordered as patient states no relief with Ibuprofen

## 2018-12-05 NOTE — ASSESSMENT & PLAN NOTE
Patient states that he has been compliant with the melatonin and sleep hygiene, and sleep hygiene but still not able to fall asleep  Will order Atarax   Follow up in 2-3 months

## 2018-12-05 NOTE — PROGRESS NOTES
Assessment/Plan:    Other insomnia  Patient states that he has been compliant with the melatonin and sleep hygiene, and sleep hygiene but still not able to fall asleep  Will order Atarax   Follow up in 2-3 months      Chronic pain of both shoulders  Patient was refer her for physical therapy on the previous visit, but states that he was never called   Will follow up on referral  Naproxen ordered as patient states no relief with Ibuprofen    Prediabetes  The HgA1C is 6 2  Currently not on medications  Diabetic diet discussed with the patient, will continue to monitor       Diagnoses and all orders for this visit:    Chronic pain of both shoulders  -     naproxen (NAPROSYN) 500 mg tablet; Take 0 5 tablets (250 mg total) by mouth 2 (two) times a day with meals    Follow up  -     fluticasone (FLONASE) 50 mcg/act nasal spray; 1 spray into each nostril daily    Need for influenza vaccination  -     Cancel: influenza vaccine, [id number], quadrivalent, recombinant, PF, 0 5 mL, for patients 18 yr+ (FLUBLOK)    Posttraumatic stiffness of shoulder joint    Cervicalgia    Prediabetes    Intrinsic eczema  -     hydrocortisone 1 % ointment; Apply topically 2 (two) times a day    Allergic rhinitis, unspecified seasonality, unspecified trigger  -     fluticasone (FLONASE) 50 mcg/act nasal spray; 1 spray into each nostril daily    Acute pain of right shoulder  -     cyclobenzaprine (FLEXERIL) 10 mg tablet; Take 1 tablet (10 mg total) by mouth 3 (three) times a day as needed for muscle spasms    Other insomnia  -     hydrOXYzine HCL (ATARAX) 25 mg tablet; Take 1 tablet (25 mg total) by mouth every 6 (six) hours as needed for itching          Subjective:      Patient ID: Jose A Mak is a 52 y o  male  Patient presents to follow up on chronic pain in both shoulders, more on the right  He was sent for PT on the previous visit, but states that he hasn't got any calls  No improvement of pain  He also continue to experience insomnia  He denies any other complains at present including nausea, vomiting, diarrhea, chest pain or other complains  The following portions of the patient's history were reviewed and updated as appropriate: allergies, current medications, past family history, past medical history, past social history, past surgical history and problem list     Review of Systems   Constitutional: Negative for chills, diaphoresis, fatigue and fever  HENT: Negative for congestion, ear discharge, ear pain, mouth sores, rhinorrhea, sore throat and trouble swallowing  Eyes: Negative for photophobia, pain and discharge  Respiratory: Negative for cough, chest tightness, shortness of breath and wheezing  Cardiovascular: Negative for chest pain, palpitations and leg swelling  Gastrointestinal: Negative for abdominal distention, abdominal pain, blood in stool, constipation, diarrhea and nausea  Genitourinary: Negative for difficulty urinating and frequency  Musculoskeletal: Positive for arthralgias  Negative for joint swelling and neck stiffness  Skin: Negative for color change, pallor and rash  Neurological: Negative for dizziness, syncope, numbness and headaches  Psychiatric/Behavioral: Positive for sleep disturbance  Objective:      /90 (BP Location: Right arm, Patient Position: Sitting, Cuff Size: Adult)   Pulse 87   Temp 97 7 °F (36 5 °C) (Tympanic)   Resp 16   Ht 6' (1 829 m)   Wt 96 6 kg (213 lb)   SpO2 95%   BMI 28 89 kg/m²          Physical Exam   Constitutional: He appears well-developed and well-nourished  HENT:   Head: Normocephalic  Neck: Normal range of motion  Neck supple  Cardiovascular: Normal rate, regular rhythm and normal heart sounds  Pulmonary/Chest: Effort normal  No respiratory distress  Abdominal: Soft  Bowel sounds are normal  He exhibits no distension  Musculoskeletal: He exhibits tenderness (posterior surface of the right shoulder)   He exhibits no edema or deformity  Right shoulder: He exhibits decreased range of motion, spasm and decreased strength  He exhibits no bony tenderness, no swelling, no crepitus, no deformity and normal pulse  Cervical back: He exhibits pain and spasm  He exhibits no tenderness, no bony tenderness and no deformity

## 2018-12-05 NOTE — ASSESSMENT & PLAN NOTE
The HgA1C is 6 2  Currently not on medications  Diabetic diet discussed with the patient, will continue to monitor

## 2019-01-16 DIAGNOSIS — G47.09 OTHER INSOMNIA: ICD-10-CM

## 2019-01-16 DIAGNOSIS — M25.511 CHRONIC PAIN OF BOTH SHOULDERS: ICD-10-CM

## 2019-01-16 DIAGNOSIS — I10 ESSENTIAL HYPERTENSION: Primary | ICD-10-CM

## 2019-01-16 DIAGNOSIS — M25.511 ACUTE PAIN OF RIGHT SHOULDER: ICD-10-CM

## 2019-01-16 DIAGNOSIS — E78.2 MIXED HYPERLIPIDEMIA: ICD-10-CM

## 2019-01-16 DIAGNOSIS — M25.512 CHRONIC PAIN OF BOTH SHOULDERS: ICD-10-CM

## 2019-01-16 DIAGNOSIS — G89.29 CHRONIC PAIN OF BOTH SHOULDERS: ICD-10-CM

## 2019-01-16 RX ORDER — ATORVASTATIN CALCIUM 40 MG/1
40 TABLET, FILM COATED ORAL DAILY
Qty: 30 TABLET | Refills: 2 | Status: SHIPPED | OUTPATIENT
Start: 2019-01-16 | End: 2019-03-08 | Stop reason: SDUPTHER

## 2019-01-16 RX ORDER — UREA 10 %
1 LOTION (ML) TOPICAL
Qty: 30 TABLET | Refills: 2 | Status: SHIPPED | OUTPATIENT
Start: 2019-01-16 | End: 2019-03-08 | Stop reason: ALTCHOICE

## 2019-01-16 RX ORDER — ATENOLOL 50 MG/1
50 TABLET ORAL DAILY
Qty: 30 TABLET | Refills: 1 | Status: SHIPPED | OUTPATIENT
Start: 2019-01-16 | End: 2019-05-29 | Stop reason: SDUPTHER

## 2019-01-16 RX ORDER — CYCLOBENZAPRINE HCL 10 MG
10 TABLET ORAL 3 TIMES DAILY PRN
Qty: 20 TABLET | Refills: 0 | Status: SHIPPED | OUTPATIENT
Start: 2019-01-16 | End: 2019-03-08 | Stop reason: ALTCHOICE

## 2019-01-16 RX ORDER — GABAPENTIN 800 MG/1
800 TABLET ORAL 3 TIMES DAILY
Qty: 30 TABLET | Refills: 1 | Status: SHIPPED | OUTPATIENT
Start: 2019-01-16 | End: 2019-03-08 | Stop reason: SDUPTHER

## 2019-01-16 RX ORDER — NAPROXEN 500 MG/1
250 TABLET ORAL 2 TIMES DAILY WITH MEALS
Qty: 30 TABLET | Refills: 2 | Status: SHIPPED | OUTPATIENT
Start: 2019-01-16 | End: 2019-03-08 | Stop reason: SDUPTHER

## 2019-03-08 ENCOUNTER — OFFICE VISIT (OUTPATIENT)
Dept: FAMILY MEDICINE CLINIC | Facility: CLINIC | Age: 54
End: 2019-03-08

## 2019-03-08 VITALS
RESPIRATION RATE: 16 BRPM | WEIGHT: 221 LBS | DIASTOLIC BLOOD PRESSURE: 78 MMHG | TEMPERATURE: 98.2 F | HEART RATE: 108 BPM | SYSTOLIC BLOOD PRESSURE: 140 MMHG | OXYGEN SATURATION: 95 % | BODY MASS INDEX: 29.97 KG/M2

## 2019-03-08 DIAGNOSIS — M25.512 CHRONIC PAIN OF BOTH SHOULDERS: ICD-10-CM

## 2019-03-08 DIAGNOSIS — K21.9 GASTROESOPHAGEAL REFLUX DISEASE WITHOUT ESOPHAGITIS: ICD-10-CM

## 2019-03-08 DIAGNOSIS — Z87.442 HISTORY OF RENAL CALCULI: ICD-10-CM

## 2019-03-08 DIAGNOSIS — M25.619 POSTTRAUMATIC STIFFNESS OF SHOULDER JOINT: ICD-10-CM

## 2019-03-08 DIAGNOSIS — G47.09 OTHER INSOMNIA: Primary | ICD-10-CM

## 2019-03-08 DIAGNOSIS — Z09 FOLLOW UP: ICD-10-CM

## 2019-03-08 DIAGNOSIS — L20.84 INTRINSIC ECZEMA: ICD-10-CM

## 2019-03-08 DIAGNOSIS — M25.511 CHRONIC PAIN OF BOTH SHOULDERS: ICD-10-CM

## 2019-03-08 DIAGNOSIS — J30.9 ALLERGIC RHINITIS, UNSPECIFIED SEASONALITY, UNSPECIFIED TRIGGER: ICD-10-CM

## 2019-03-08 DIAGNOSIS — G89.29 CHRONIC PAIN OF BOTH SHOULDERS: ICD-10-CM

## 2019-03-08 DIAGNOSIS — E78.2 MIXED HYPERLIPIDEMIA: ICD-10-CM

## 2019-03-08 DIAGNOSIS — R06.83 SNORING: ICD-10-CM

## 2019-03-08 PROCEDURE — 99213 OFFICE O/P EST LOW 20 MIN: CPT | Performed by: FAMILY MEDICINE

## 2019-03-08 RX ORDER — TAMSULOSIN HYDROCHLORIDE 0.4 MG/1
1 CAPSULE ORAL EVERY 24 HOURS
COMMUNITY
Start: 2018-06-18 | End: 2019-03-08 | Stop reason: SDUPTHER

## 2019-03-08 RX ORDER — HYDROXYZINE 50 MG/1
50 TABLET, FILM COATED ORAL
Qty: 30 TABLET | Refills: 2 | Status: SHIPPED | OUTPATIENT
Start: 2019-03-08 | End: 2019-05-29 | Stop reason: SDUPTHER

## 2019-03-08 RX ORDER — TAMSULOSIN HYDROCHLORIDE 0.4 MG/1
0.4 CAPSULE ORAL
Qty: 30 CAPSULE | Refills: 3 | Status: SHIPPED | OUTPATIENT
Start: 2019-03-08 | End: 2019-05-29 | Stop reason: SDUPTHER

## 2019-03-08 RX ORDER — ATORVASTATIN CALCIUM 40 MG/1
40 TABLET, FILM COATED ORAL DAILY
Qty: 30 TABLET | Refills: 2 | Status: SHIPPED | OUTPATIENT
Start: 2019-03-08 | End: 2019-04-11 | Stop reason: SDUPTHER

## 2019-03-08 RX ORDER — METHOCARBAMOL 750 MG/1
750 TABLET, FILM COATED ORAL EVERY 8 HOURS SCHEDULED
Qty: 90 TABLET | Refills: 2 | Status: SHIPPED | OUTPATIENT
Start: 2019-03-08 | End: 2021-03-10

## 2019-03-08 RX ORDER — FLUTICASONE PROPIONATE 50 MCG
1 SPRAY, SUSPENSION (ML) NASAL DAILY
Qty: 16 G | Refills: 0 | Status: SHIPPED | OUTPATIENT
Start: 2019-03-08 | End: 2019-04-25 | Stop reason: SDUPTHER

## 2019-03-08 RX ORDER — DIAPER,BRIEF,INFANT-TODD,DISP
EACH MISCELLANEOUS 2 TIMES DAILY
Qty: 30 G | Refills: 0 | Status: SHIPPED | OUTPATIENT
Start: 2019-03-08 | End: 2020-03-24 | Stop reason: SDUPTHER

## 2019-03-08 RX ORDER — NAPROXEN 500 MG/1
250 TABLET ORAL 2 TIMES DAILY WITH MEALS
Qty: 30 TABLET | Refills: 2 | Status: SHIPPED | OUTPATIENT
Start: 2019-03-08 | End: 2020-03-02 | Stop reason: SDUPTHER

## 2019-03-08 RX ORDER — GABAPENTIN 800 MG/1
800 TABLET ORAL 3 TIMES DAILY
Qty: 30 TABLET | Refills: 1 | Status: SHIPPED | OUTPATIENT
Start: 2019-03-08 | End: 2019-04-25 | Stop reason: SDUPTHER

## 2019-03-08 RX ORDER — RANITIDINE 300 MG/1
300 TABLET ORAL EVERY 24 HOURS
Qty: 30 TABLET | Refills: 3 | Status: SHIPPED | OUTPATIENT
Start: 2019-03-08 | End: 2019-08-05 | Stop reason: SDUPTHER

## 2019-03-08 NOTE — ASSESSMENT & PLAN NOTE
Patient reports no improvement on Atarax 25 mg, he has trouble falling asleep and wakes up night, he is also noted to snore  Will increase the dose of Atarax to 50 mg  Will refer to Sleep Medicine to evaluate, rule out sleep apnea

## 2019-03-08 NOTE — PROGRESS NOTES
Assessment/Plan:    Other insomnia  Patient reports no improvement on Atarax 25 mg, he has trouble falling asleep and wakes up night, he is also noted to snore  Will increase the dose of Atarax to 50 mg  Will refer to Sleep Medicine to evaluate, rule out sleep apnea         Diagnoses and all orders for this visit:    Other insomnia  -     hydrOXYzine HCL (ATARAX) 50 mg tablet; Take 1 tablet (50 mg total) by mouth daily at bedtime  -     Ambulatory referral to Sleep Medicine; Future    Mixed hyperlipidemia  -     atorvastatin (LIPITOR) 40 mg tablet; Take 1 tablet (40 mg total) by mouth daily    Chronic pain of both shoulders  -     gabapentin (NEURONTIN) 800 mg tablet; Take 1 tablet (800 mg total) by mouth 3 (three) times a day  -     naproxen (NAPROSYN) 500 mg tablet; Take 0 5 tablets (250 mg total) by mouth 2 (two) times a day with meals    Intrinsic eczema  -     hydrocortisone 1 % ointment; Apply topically 2 (two) times a day    Follow up  -     fluticasone (FLONASE) 50 mcg/act nasal spray; 1 spray into each nostril daily    Allergic rhinitis, unspecified seasonality, unspecified trigger  -     fluticasone (FLONASE) 50 mcg/act nasal spray; 1 spray into each nostril daily    Gastroesophageal reflux disease without esophagitis  -     ranitidine (ZANTAC) 300 MG tablet; Take 1 tablet (300 mg total) by mouth every 24 hours    Posttraumatic stiffness of shoulder joint  -     methocarbamol (ROBAXIN) 750 mg tablet; Take 1 tablet (750 mg total) by mouth every 8 (eight) hours    History of renal calculi  -     tamsulosin (FLOMAX) 0 4 mg; Take 1 capsule (0 4 mg total) by mouth daily with dinner    Snoring  -     Ambulatory referral to Sleep Medicine; Future    Other orders  -     Discontinue: tamsulosin (FLOMAX) 0 4 mg; 1 capsule every 24 hours          Subjective:      Patient ID: Radha Tam is a 62 y o  male  Patient presented to follow up on insomnia   He states, he has no improvement on current medications and has trouble falling and staying asleep  He also continues to complains of chronic right shoulder pain that is partially relieved with current medications  Patient denies any other complains at present  He also requests refill of his medications  The following portions of the patient's history were reviewed and updated as appropriate: allergies, current medications, past family history, past medical history, past social history, past surgical history and problem list     Review of Systems   Constitutional: Negative for chills, diaphoresis, fatigue and fever  HENT: Negative for congestion, ear discharge, ear pain, mouth sores, rhinorrhea, sore throat and trouble swallowing  Eyes: Negative for photophobia, pain and discharge  Respiratory: Negative for cough, chest tightness, shortness of breath and wheezing  Cardiovascular: Negative for chest pain, palpitations and leg swelling  Gastrointestinal: Negative for abdominal distention, abdominal pain, blood in stool, constipation, diarrhea and nausea  Genitourinary: Negative for difficulty urinating and frequency  Musculoskeletal: Positive for arthralgias (shoulder pain)  Negative for joint swelling and neck stiffness  Skin: Negative for color change, pallor and rash  Neurological: Negative for dizziness, syncope, numbness and headaches  Psychiatric/Behavioral: Positive for decreased concentration and sleep disturbance  Objective:      /78 (BP Location: Left arm, Patient Position: Sitting, Cuff Size: Standard)   Pulse (!) 108   Temp 98 2 °F (36 8 °C)   Resp 16   Wt 100 kg (221 lb)   SpO2 95%   BMI 29 97 kg/m²          Physical Exam   Constitutional: He is oriented to person, place, and time  He appears well-developed and well-nourished  No distress  HENT:   Head: Normocephalic and atraumatic  Eyes: Pupils are equal, round, and reactive to light  EOM are normal    Neck: Normal range of motion  Neck supple     Cardiovascular: Normal rate, regular rhythm and normal heart sounds  Exam reveals no gallop and no friction rub  No murmur heard  Pulmonary/Chest: Effort normal and breath sounds normal  No respiratory distress  Abdominal: Soft  Bowel sounds are normal  He exhibits no distension  Musculoskeletal: He exhibits no edema, tenderness or deformity  Neurological: He is alert and oriented to person, place, and time  Skin: Skin is warm and dry  Psychiatric: He has a normal mood and affect

## 2019-04-11 DIAGNOSIS — E78.2 MIXED HYPERLIPIDEMIA: ICD-10-CM

## 2019-04-12 RX ORDER — ATORVASTATIN CALCIUM 40 MG/1
40 TABLET, FILM COATED ORAL DAILY
Qty: 90 TABLET | Refills: 1 | Status: SHIPPED | OUTPATIENT
Start: 2019-04-12 | End: 2019-08-05 | Stop reason: SDUPTHER

## 2019-04-25 ENCOUNTER — APPOINTMENT (EMERGENCY)
Dept: CT IMAGING | Facility: HOSPITAL | Age: 54
End: 2019-04-25
Payer: MEDICARE

## 2019-04-25 ENCOUNTER — TELEPHONE (OUTPATIENT)
Dept: FAMILY MEDICINE CLINIC | Facility: CLINIC | Age: 54
End: 2019-04-25

## 2019-04-25 ENCOUNTER — HOSPITAL ENCOUNTER (EMERGENCY)
Facility: HOSPITAL | Age: 54
Discharge: HOME | End: 2019-04-25
Attending: EMERGENCY MEDICINE | Admitting: EMERGENCY MEDICINE
Payer: MEDICARE

## 2019-04-25 VITALS
TEMPERATURE: 97.4 F | HEIGHT: 72 IN | SYSTOLIC BLOOD PRESSURE: 130 MMHG | OXYGEN SATURATION: 96 % | BODY MASS INDEX: 30.85 KG/M2 | HEART RATE: 76 BPM | RESPIRATION RATE: 18 BRPM | DIASTOLIC BLOOD PRESSURE: 66 MMHG | WEIGHT: 227.74 LBS

## 2019-04-25 DIAGNOSIS — G89.29 CHRONIC PAIN OF BOTH SHOULDERS: ICD-10-CM

## 2019-04-25 DIAGNOSIS — J30.9 ALLERGIC RHINITIS, UNSPECIFIED SEASONALITY, UNSPECIFIED TRIGGER: ICD-10-CM

## 2019-04-25 DIAGNOSIS — J34.9 SINUS DISEASE: Primary | ICD-10-CM

## 2019-04-25 DIAGNOSIS — R73.9 HYPERGLYCEMIA: ICD-10-CM

## 2019-04-25 DIAGNOSIS — M25.512 CHRONIC PAIN OF BOTH SHOULDERS: ICD-10-CM

## 2019-04-25 DIAGNOSIS — Z09 FOLLOW UP: ICD-10-CM

## 2019-04-25 DIAGNOSIS — M25.511 CHRONIC PAIN OF BOTH SHOULDERS: ICD-10-CM

## 2019-04-25 LAB
ANION GAP SERPL CALCULATED.3IONS-SCNC: 8 MMOL/L (ref 5–14)
APTT PPP: 28 SECONDS (ref 23–34)
BASOPHILS # BLD AUTO: 0.1 THOUSANDS/ΜL (ref 0–0.1)
BASOPHILS NFR BLD AUTO: 1 % (ref 0–1)
BUN SERPL-MCNC: 11 MG/DL (ref 5–25)
CALCIUM SERPL-MCNC: 9.4 MG/DL (ref 8.4–10.2)
CHLORIDE SERPL-SCNC: 105 MMOL/L (ref 97–108)
CO2 SERPL-SCNC: 24 MMOL/L (ref 22–30)
CREAT SERPL-MCNC: 0.69 MG/DL (ref 0.7–1.5)
EOSINOPHIL # BLD AUTO: 0.1 THOUSAND/ΜL (ref 0–0.4)
EOSINOPHIL NFR BLD AUTO: 2 % (ref 0–6)
ERYTHROCYTE [DISTWIDTH] IN BLOOD BY AUTOMATED COUNT: 14.4 %
GFR SERPL CREATININE-BSD FRML MDRD: 106 ML/MIN/1.73SQ M
GLUCOSE SERPL-MCNC: 210 MG/DL (ref 70–99)
HCT VFR BLD AUTO: 44.7 % (ref 41–53)
HGB BLD-MCNC: 15.2 G/DL (ref 13.5–17.5)
INR PPP: 0.95 (ref 0.89–1.1)
LYMPHOCYTES # BLD AUTO: 2.2 THOUSANDS/ΜL (ref 0.5–4)
LYMPHOCYTES NFR BLD AUTO: 29 % (ref 25–45)
MCH RBC QN AUTO: 31.6 PG (ref 26–34)
MCHC RBC AUTO-ENTMCNC: 34 G/DL (ref 31–36)
MCV RBC AUTO: 93 FL (ref 80–100)
MONOCYTES # BLD AUTO: 0.5 THOUSAND/ΜL (ref 0.2–0.9)
MONOCYTES NFR BLD AUTO: 6 % (ref 1–10)
NEUTROPHILS # BLD AUTO: 4.8 THOUSANDS/ΜL (ref 1.8–7.8)
NEUTS SEG NFR BLD AUTO: 63 % (ref 45–65)
PLATELET # BLD AUTO: 174 THOUSANDS/UL (ref 150–450)
PMV BLD AUTO: 9.4 FL (ref 8.9–12.7)
POTASSIUM SERPL-SCNC: 3.8 MMOL/L (ref 3.6–5)
PROTHROMBIN TIME: 10.1 SECONDS (ref 9.5–11.6)
RBC # BLD AUTO: 4.81 MILLION/UL (ref 4.5–5.9)
S PYO AG THROAT QL: NEGATIVE
SODIUM SERPL-SCNC: 137 MMOL/L (ref 137–147)
WBC # BLD AUTO: 7.6 THOUSAND/UL (ref 4.5–11)

## 2019-04-25 PROCEDURE — 70491 CT SOFT TISSUE NECK W/DYE: CPT

## 2019-04-25 PROCEDURE — 85610 PROTHROMBIN TIME: CPT | Performed by: EMERGENCY MEDICINE

## 2019-04-25 PROCEDURE — 87430 STREP A AG IA: CPT | Performed by: EMERGENCY MEDICINE

## 2019-04-25 PROCEDURE — 99285 EMERGENCY DEPT VISIT HI MDM: CPT

## 2019-04-25 PROCEDURE — 85025 COMPLETE CBC W/AUTO DIFF WBC: CPT | Performed by: EMERGENCY MEDICINE

## 2019-04-25 PROCEDURE — 36415 COLL VENOUS BLD VENIPUNCTURE: CPT | Performed by: EMERGENCY MEDICINE

## 2019-04-25 PROCEDURE — 85730 THROMBOPLASTIN TIME PARTIAL: CPT | Performed by: EMERGENCY MEDICINE

## 2019-04-25 PROCEDURE — 99284 EMERGENCY DEPT VISIT MOD MDM: CPT | Performed by: EMERGENCY MEDICINE

## 2019-04-25 PROCEDURE — 93005 ELECTROCARDIOGRAM TRACING: CPT

## 2019-04-25 PROCEDURE — 80048 BASIC METABOLIC PNL TOTAL CA: CPT | Performed by: EMERGENCY MEDICINE

## 2019-04-25 PROCEDURE — 70486 CT MAXILLOFACIAL W/O DYE: CPT

## 2019-04-25 RX ORDER — ASPIRIN 81 MG/1
81 TABLET ORAL DAILY
COMMUNITY
End: 2020-02-27 | Stop reason: SDUPTHER

## 2019-04-25 RX ORDER — FLUTICASONE PROPIONATE 50 MCG
1 SPRAY, SUSPENSION (ML) NASAL DAILY
Qty: 16 G | Refills: 0 | Status: SHIPPED | OUTPATIENT
Start: 2019-04-25 | End: 2020-02-27 | Stop reason: SDUPTHER

## 2019-04-25 RX ORDER — GABAPENTIN 800 MG/1
800 TABLET ORAL 3 TIMES DAILY
Qty: 30 TABLET | Refills: 0 | Status: SHIPPED | OUTPATIENT
Start: 2019-04-25 | End: 2019-05-29 | Stop reason: SDUPTHER

## 2019-04-25 RX ADMIN — IOHEXOL 100 ML: 350 INJECTION, SOLUTION INTRAVENOUS at 13:41

## 2019-04-26 LAB
ATRIAL RATE: 93 BPM
P AXIS: 22 DEGREES
PR INTERVAL: 152 MS
QRS AXIS: -6 DEGREES
QRSD INTERVAL: 78 MS
QT INTERVAL: 366 MS
QTC INTERVAL: 455 MS
T WAVE AXIS: 4 DEGREES
VENTRICULAR RATE: 93 BPM

## 2019-04-26 PROCEDURE — 93010 ELECTROCARDIOGRAM REPORT: CPT | Performed by: INTERNAL MEDICINE

## 2019-05-03 DIAGNOSIS — M25.511 CHRONIC PAIN OF BOTH SHOULDERS: ICD-10-CM

## 2019-05-03 DIAGNOSIS — E78.2 MIXED HYPERLIPIDEMIA: ICD-10-CM

## 2019-05-03 DIAGNOSIS — G89.29 CHRONIC PAIN OF BOTH SHOULDERS: ICD-10-CM

## 2019-05-03 DIAGNOSIS — M25.512 CHRONIC PAIN OF BOTH SHOULDERS: ICD-10-CM

## 2019-05-09 ENCOUNTER — TELEPHONE (OUTPATIENT)
Dept: FAMILY MEDICINE CLINIC | Facility: CLINIC | Age: 54
End: 2019-05-09

## 2019-05-10 ENCOUNTER — TELEPHONE (OUTPATIENT)
Dept: FAMILY MEDICINE CLINIC | Facility: CLINIC | Age: 54
End: 2019-05-10

## 2019-05-29 ENCOUNTER — OFFICE VISIT (OUTPATIENT)
Dept: FAMILY MEDICINE CLINIC | Facility: CLINIC | Age: 54
End: 2019-05-29

## 2019-05-29 VITALS
OXYGEN SATURATION: 97 % | HEIGHT: 72 IN | WEIGHT: 220.8 LBS | SYSTOLIC BLOOD PRESSURE: 130 MMHG | TEMPERATURE: 98.7 F | DIASTOLIC BLOOD PRESSURE: 86 MMHG | HEART RATE: 111 BPM | RESPIRATION RATE: 16 BRPM | BODY MASS INDEX: 29.91 KG/M2

## 2019-05-29 DIAGNOSIS — G47.09 OTHER INSOMNIA: Primary | ICD-10-CM

## 2019-05-29 DIAGNOSIS — I10 ESSENTIAL HYPERTENSION: ICD-10-CM

## 2019-05-29 DIAGNOSIS — Z87.442 HISTORY OF RENAL CALCULI: ICD-10-CM

## 2019-05-29 DIAGNOSIS — M25.511 CHRONIC PAIN OF BOTH SHOULDERS: ICD-10-CM

## 2019-05-29 DIAGNOSIS — M25.512 CHRONIC PAIN OF BOTH SHOULDERS: ICD-10-CM

## 2019-05-29 DIAGNOSIS — G89.29 CHRONIC PAIN OF BOTH SHOULDERS: ICD-10-CM

## 2019-05-29 PROCEDURE — 99213 OFFICE O/P EST LOW 20 MIN: CPT | Performed by: FAMILY MEDICINE

## 2019-05-29 RX ORDER — HYDROXYZINE 50 MG/1
50 TABLET, FILM COATED ORAL
Qty: 30 TABLET | Refills: 3 | Status: SHIPPED | OUTPATIENT
Start: 2019-05-29 | End: 2019-08-05 | Stop reason: SDUPTHER

## 2019-05-29 RX ORDER — ATENOLOL 50 MG/1
50 TABLET ORAL DAILY
Qty: 30 TABLET | Refills: 3 | Status: SHIPPED | OUTPATIENT
Start: 2019-05-29 | End: 2019-08-05 | Stop reason: SDUPTHER

## 2019-05-29 RX ORDER — TAMSULOSIN HYDROCHLORIDE 0.4 MG/1
0.4 CAPSULE ORAL
Qty: 30 CAPSULE | Refills: 3 | Status: SHIPPED | OUTPATIENT
Start: 2019-05-29 | End: 2020-03-24 | Stop reason: SDUPTHER

## 2019-05-29 RX ORDER — BENZONATATE 100 MG/1
1 CAPSULE ORAL 3 TIMES DAILY
COMMUNITY
Start: 2018-06-18 | End: 2019-06-06

## 2019-05-29 RX ORDER — GABAPENTIN 800 MG/1
800 TABLET ORAL 3 TIMES DAILY
Qty: 90 TABLET | Refills: 3 | Status: SHIPPED | OUTPATIENT
Start: 2019-05-29 | End: 2019-08-05 | Stop reason: SDUPTHER

## 2019-06-06 ENCOUNTER — HOSPITAL ENCOUNTER (EMERGENCY)
Facility: HOSPITAL | Age: 54
Discharge: HOME | End: 2019-06-06
Attending: EMERGENCY MEDICINE | Admitting: EMERGENCY MEDICINE
Payer: MEDICARE

## 2019-06-06 ENCOUNTER — APPOINTMENT (EMERGENCY)
Dept: RADIOLOGY | Facility: HOSPITAL | Age: 54
End: 2019-06-06
Payer: MEDICARE

## 2019-06-06 VITALS
RESPIRATION RATE: 18 BRPM | BODY MASS INDEX: 30.35 KG/M2 | HEART RATE: 73 BPM | TEMPERATURE: 98.2 F | OXYGEN SATURATION: 97 % | SYSTOLIC BLOOD PRESSURE: 109 MMHG | DIASTOLIC BLOOD PRESSURE: 68 MMHG | WEIGHT: 223.77 LBS

## 2019-06-06 DIAGNOSIS — M54.50 ACUTE LOW BACK PAIN: ICD-10-CM

## 2019-06-06 DIAGNOSIS — M54.9 BACK PAIN: Primary | ICD-10-CM

## 2019-06-06 PROCEDURE — 72100 X-RAY EXAM L-S SPINE 2/3 VWS: CPT

## 2019-06-06 PROCEDURE — 99283 EMERGENCY DEPT VISIT LOW MDM: CPT

## 2019-06-06 PROCEDURE — 99284 EMERGENCY DEPT VISIT MOD MDM: CPT | Performed by: EMERGENCY MEDICINE

## 2019-06-06 PROCEDURE — 96372 THER/PROPH/DIAG INJ SC/IM: CPT

## 2019-06-06 RX ORDER — KETOROLAC TROMETHAMINE 30 MG/ML
30 INJECTION, SOLUTION INTRAMUSCULAR; INTRAVENOUS ONCE
Status: COMPLETED | OUTPATIENT
Start: 2019-06-06 | End: 2019-06-06

## 2019-06-06 RX ADMIN — KETOROLAC TROMETHAMINE 30 MG: 30 INJECTION, SOLUTION INTRAMUSCULAR; INTRAVENOUS at 11:04

## 2019-06-13 ENCOUNTER — NURSE TRIAGE (OUTPATIENT)
Dept: PHYSICAL THERAPY | Facility: OTHER | Age: 54
End: 2019-06-13

## 2019-06-13 DIAGNOSIS — M54.50 ACUTE LEFT-SIDED LOW BACK PAIN WITHOUT SCIATICA: Primary | ICD-10-CM

## 2019-06-24 ENCOUNTER — EVALUATION (OUTPATIENT)
Dept: PHYSICAL THERAPY | Facility: CLINIC | Age: 54
End: 2019-06-24
Payer: MEDICARE

## 2019-06-24 VITALS — DIASTOLIC BLOOD PRESSURE: 90 MMHG | HEART RATE: 96 BPM | SYSTOLIC BLOOD PRESSURE: 146 MMHG | TEMPERATURE: 98.4 F

## 2019-06-24 DIAGNOSIS — M54.50 ACUTE LEFT-SIDED LOW BACK PAIN WITHOUT SCIATICA: Primary | ICD-10-CM

## 2019-06-24 PROCEDURE — 97110 THERAPEUTIC EXERCISES: CPT | Performed by: PHYSICAL THERAPIST

## 2019-06-24 PROCEDURE — 97161 PT EVAL LOW COMPLEX 20 MIN: CPT | Performed by: PHYSICAL THERAPIST

## 2019-06-26 ENCOUNTER — APPOINTMENT (OUTPATIENT)
Dept: PHYSICAL THERAPY | Facility: CLINIC | Age: 54
End: 2019-06-26
Payer: MEDICARE

## 2019-07-22 ENCOUNTER — HOSPITAL ENCOUNTER (EMERGENCY)
Facility: HOSPITAL | Age: 54
Discharge: HOME | End: 2019-07-22
Attending: EMERGENCY MEDICINE | Admitting: EMERGENCY MEDICINE
Payer: MEDICARE

## 2019-07-22 VITALS
WEIGHT: 220.68 LBS | HEART RATE: 100 BPM | OXYGEN SATURATION: 98 % | BODY MASS INDEX: 29.93 KG/M2 | DIASTOLIC BLOOD PRESSURE: 94 MMHG | SYSTOLIC BLOOD PRESSURE: 143 MMHG | TEMPERATURE: 98.3 F | RESPIRATION RATE: 18 BRPM

## 2019-07-22 DIAGNOSIS — K08.89 PAIN, DENTAL: Primary | ICD-10-CM

## 2019-07-22 DIAGNOSIS — K05.219 PERIODONTAL ABSCESS: ICD-10-CM

## 2019-07-22 PROCEDURE — 99283 EMERGENCY DEPT VISIT LOW MDM: CPT

## 2019-07-22 PROCEDURE — 99283 EMERGENCY DEPT VISIT LOW MDM: CPT | Performed by: PHYSICIAN ASSISTANT

## 2019-07-22 RX ORDER — CLINDAMYCIN HYDROCHLORIDE 150 MG/1
300 CAPSULE ORAL ONCE
Status: COMPLETED | OUTPATIENT
Start: 2019-07-22 | End: 2019-07-22

## 2019-07-22 RX ORDER — IBUPROFEN 400 MG/1
800 TABLET ORAL ONCE
Status: COMPLETED | OUTPATIENT
Start: 2019-07-22 | End: 2019-07-22

## 2019-07-22 RX ORDER — CLINDAMYCIN HYDROCHLORIDE 300 MG/1
300 CAPSULE ORAL
Qty: 30 CAPSULE | Refills: 0 | Status: SHIPPED | OUTPATIENT
Start: 2019-07-22 | End: 2019-08-01

## 2019-07-22 RX ORDER — IBUPROFEN 600 MG/1
600 TABLET ORAL EVERY 6 HOURS PRN
Qty: 30 TABLET | Refills: 0 | Status: SHIPPED | OUTPATIENT
Start: 2019-07-22 | End: 2020-12-01

## 2019-07-22 RX ADMIN — CLINDAMYCIN HYDROCHLORIDE 300 MG: 150 CAPSULE ORAL at 18:17

## 2019-07-22 RX ADMIN — IBUPROFEN 800 MG: 400 TABLET ORAL at 18:17

## 2019-07-22 NOTE — ED PROVIDER NOTES
History  Chief Complaint   Patient presents with    Dental Pain     3-4 month lower right tooth loose, began to hurt this morning,  This is a 63 y/o M who presents today for Lower molar discomfort and pain x 3-4 months  The patient reports increased discomfort today  He denies any fevers/chills  Reports noted swelling on L side  Did not call dentist        History provided by:  Patient  Dental Pain   Location:  Lower  Lower teeth location:  19/LL 1st molar  Quality:  Aching and constant  Severity:  Mild  Onset quality:  Sudden  Duration:  3 months  Timing:  Constant  Progression:  Worsening  Context: dental caries and poor dentition    Context: not abscess, cap still on, not crown fracture, not dental fracture, not enamel fracture, filling intact, not intrusion, not malocclusion, not recent dental surgery and not trauma    Relieved by:  NSAIDs  Worsened by:  Nothing  Ineffective treatments:  None tried  Associated symptoms: facial swelling and gum swelling    Associated symptoms: no congestion, no difficulty swallowing, no drooling, no facial pain, no fever, no headaches, no neck pain, no neck swelling, no oral bleeding, no oral lesions and no trismus    Risk factors: no alcohol problem, no cancer, no chewing tobacco use, no diabetes, no immunosuppression, sufficient dental care, no periodontal disease and no smoking        Prior to Admission Medications   Prescriptions Last Dose Informant Patient Reported?  Taking?   aspirin (ECOTRIN LOW STRENGTH) 81 mg EC tablet   Yes No   Sig: Take 81 mg by mouth daily   atenolol (TENORMIN) 50 mg tablet   No No   Sig: Take 1 tablet (50 mg total) by mouth daily   atorvastatin (LIPITOR) 40 mg tablet   No No   Sig: Take 1 tablet (40 mg total) by mouth daily   fluticasone (FLONASE) 50 mcg/act nasal spray   No No   Si spray into each nostril daily   gabapentin (NEURONTIN) 800 mg tablet   No No   Sig: Take 1 tablet (800 mg total) by mouth 3 (three) times a day   hydrOXYzine HCL (ATARAX) 50 mg tablet   No No   Sig: Take 1 tablet (50 mg total) by mouth daily at bedtime   hydrocortisone 1 % ointment   No No   Sig: Apply topically 2 (two) times a day   metFORMIN (GLUCOPHAGE) 500 mg tablet   Yes No   Si tablet Every 12 hours   methocarbamol (ROBAXIN) 750 mg tablet   No No   Sig: Take 1 tablet (750 mg total) by mouth every 8 (eight) hours   naproxen (NAPROSYN) 500 mg tablet   No No   Sig: Take 0 5 tablets (250 mg total) by mouth 2 (two) times a day with meals   ranitidine (ZANTAC) 300 MG tablet   No No   Sig: Take 1 tablet (300 mg total) by mouth every 24 hours   tamsulosin (FLOMAX) 0 4 mg   No No   Sig: Take 1 capsule (0 4 mg total) by mouth daily with dinner      Facility-Administered Medications: None       Past Medical History:   Diagnosis Date    Diverticulitis     Hypertension        Past Surgical History:   Procedure Laterality Date    COLON SURGERY      NECK SURGERY         History reviewed  No pertinent family history  I have reviewed and agree with the history as documented  Social History     Tobacco Use    Smoking status: Current Every Day Smoker     Types: Cigars    Smokeless tobacco: Never Used    Tobacco comment: 1 cigar/daily   Substance Use Topics    Alcohol use: Yes     Frequency: 2-3 times a week     Drinks per session: 5 or 6     Binge frequency: Less than monthly     Comment: socially    Drug use: No        Review of Systems   Constitutional: Negative  Negative for fever  HENT: Positive for facial swelling  Negative for congestion, drooling and mouth sores  Eyes: Negative  Respiratory: Negative  Cardiovascular: Negative  Gastrointestinal: Negative  Endocrine: Negative  Genitourinary: Negative  Musculoskeletal: Negative  Negative for neck pain  Skin: Negative  Allergic/Immunologic: Negative  Neurological: Negative  Negative for headaches  Hematological: Negative  Psychiatric/Behavioral: Negative          Physical Exam  Physical Exam   Constitutional: He appears well-developed and well-nourished  HENT:   Mouth/Throat: Uvula is midline  Noted swelling of L posterior column and gingiva  No abscess appreciated  Cardiovascular: Normal rate, regular rhythm and normal heart sounds  Pulmonary/Chest: Effort normal and breath sounds normal        Vital Signs  ED Triage Vitals [07/22/19 1734]   Temperature Pulse Respirations Blood Pressure SpO2   98 3 °F (36 8 °C) 100 18 143/94 98 %      Temp Source Heart Rate Source Patient Position - Orthostatic VS BP Location FiO2 (%)   Tympanic Monitor Sitting Left arm --      Pain Score       8           Vitals:    07/22/19 1734   BP: 143/94   Pulse: 100   Patient Position - Orthostatic VS: Sitting         Visual Acuity      ED Medications  Medications   clindamycin (CLEOCIN) capsule 300 mg (300 mg Oral Given 7/22/19 1817)   ibuprofen (MOTRIN) tablet 800 mg (800 mg Oral Given 7/22/19 1817)       Diagnostic Studies  Results Reviewed     None                 No orders to display              Procedures  Procedures       ED Course                               MDM  Number of Diagnoses or Management Options  Pain, dental:   Periodontal abscess:   Diagnosis management comments: D/c home with clindamycin and recommendations to follow up with dentist  No abscess appreciated on exam        Disposition  Final diagnoses:   Pain, dental   Periodontal abscess     Time reflects when diagnosis was documented in both MDM as applicable and the Disposition within this note     Time User Action Codes Description Comment    7/22/2019  6:06 PM Jessica BOURGEOIS Add [K08 89] Pain, dental     7/22/2019  6:06 PM Jeanie Prince Add [R20 304] Periodontal abscess       ED Disposition     ED Disposition Condition Date/Time Comment    Discharge Stable Mon Jul 22, 2019  6:06 PM Zaira Galicia discharge to home/self care              Follow-up Information     Follow up With Specialties Details Why Contact [address] Oral Surgery Call in 1 day Call tomorrow for an appointment [address] [facility]  [phone]            Discharge Medication List as of 7/22/2019  6:08 PM      START taking these medications    Details   clindamycin (CLEOCIN) 300 MG capsule Take 1 capsule (300 mg total) by mouth 3 (three) times a day for 10 days, Starting Mon 7/22/2019, Until Thu 8/1/2019, Print      ibuprofen (MOTRIN) 600 mg tablet Take 1 tablet (600 mg total) by mouth every 6 (six) hours as needed for moderate pain, Starting Mon 7/22/2019, Print         CONTINUE these medications which have NOT CHANGED    Details   aspirin (ECOTRIN LOW STRENGTH) 81 mg EC tablet Take 81 mg by mouth daily, Historical Med      atenolol (TENORMIN) 50 mg tablet Take 1 tablet (50 mg total) by mouth daily, Starting Wed 5/29/2019, Normal      atorvastatin (LIPITOR) 40 mg tablet Take 1 tablet (40 mg total) by mouth daily, Starting Fri 4/12/2019, Normal      fluticasone (FLONASE) 50 mcg/act nasal spray 1 spray into each nostril daily, Starting Thu 4/25/2019, Normal      gabapentin (NEURONTIN) 800 mg tablet Take 1 tablet (800 mg total) by mouth 3 (three) times a day, Starting Wed 5/29/2019, Normal      hydrocortisone 1 % ointment Apply topically 2 (two) times a day, Starting Fri 3/8/2019, Normal      hydrOXYzine HCL (ATARAX) 50 mg tablet Take 1 tablet (50 mg total) by mouth daily at bedtime, Starting Wed 5/29/2019, Normal      metFORMIN (GLUCOPHAGE) 500 mg tablet 1 tablet Every 12 hours, Starting Mon 6/18/2018, Historical Med      methocarbamol (ROBAXIN) 750 mg tablet Take 1 tablet (750 mg total) by mouth every 8 (eight) hours, Starting Fri 3/8/2019, Normal      naproxen (NAPROSYN) 500 mg tablet Take 0 5 tablets (250 mg total) by mouth 2 (two) times a day with meals, Starting Fri 3/8/2019, Normal      ranitidine (ZANTAC) 300 MG tablet Take 1 tablet (300 mg total) by mouth every 24 hours, Starting Fri 3/8/2019, Normal      tamsulosin (FLOMAX) 0 4 mg Take 1 capsule (0 4 mg total) by mouth daily with dinner, Starting Wed 5/29/2019, Normal           No discharge procedures on file      ED Provider  Electronically Signed by           King Garner PA-C  07/22/19 1958

## 2019-07-24 ENCOUNTER — APPOINTMENT (EMERGENCY)
Dept: CT IMAGING | Facility: HOSPITAL | Age: 54
End: 2019-07-24
Payer: MEDICARE

## 2019-07-24 ENCOUNTER — APPOINTMENT (EMERGENCY)
Dept: RADIOLOGY | Facility: HOSPITAL | Age: 54
End: 2019-07-24
Payer: MEDICARE

## 2019-07-24 ENCOUNTER — HOSPITAL ENCOUNTER (EMERGENCY)
Facility: HOSPITAL | Age: 54
Discharge: HOME | End: 2019-07-24
Attending: EMERGENCY MEDICINE | Admitting: EMERGENCY MEDICINE
Payer: MEDICARE

## 2019-07-24 VITALS
SYSTOLIC BLOOD PRESSURE: 142 MMHG | HEART RATE: 86 BPM | RESPIRATION RATE: 17 BRPM | OXYGEN SATURATION: 97 % | DIASTOLIC BLOOD PRESSURE: 85 MMHG | TEMPERATURE: 98.8 F

## 2019-07-24 DIAGNOSIS — R11.0 NAUSEA: ICD-10-CM

## 2019-07-24 DIAGNOSIS — R30.0 DYSURIA: ICD-10-CM

## 2019-07-24 DIAGNOSIS — R10.13 EPIGASTRIC PAIN: Primary | ICD-10-CM

## 2019-07-24 LAB
ALBUMIN SERPL BCP-MCNC: 3.9 G/DL (ref 3–5.2)
ALP SERPL-CCNC: 103 U/L (ref 43–122)
ALT SERPL W P-5'-P-CCNC: 38 U/L (ref 9–52)
ANION GAP SERPL CALCULATED.3IONS-SCNC: 8 MMOL/L (ref 5–14)
AST SERPL W P-5'-P-CCNC: 29 U/L (ref 17–59)
BASOPHILS # BLD AUTO: 0.1 THOUSANDS/ΜL (ref 0–0.1)
BASOPHILS NFR BLD AUTO: 1 % (ref 0–1)
BILIRUB SERPL-MCNC: 0.5 MG/DL
BILIRUB UR QL STRIP: NEGATIVE
BUN SERPL-MCNC: 6 MG/DL (ref 5–25)
CALCIUM SERPL-MCNC: 9 MG/DL (ref 8.4–10.2)
CHLORIDE SERPL-SCNC: 101 MMOL/L (ref 97–108)
CLARITY UR: CLEAR
CO2 SERPL-SCNC: 26 MMOL/L (ref 22–30)
COLOR UR: ABNORMAL
CREAT SERPL-MCNC: 0.6 MG/DL (ref 0.7–1.5)
EOSINOPHIL # BLD AUTO: 0.1 THOUSAND/ΜL (ref 0–0.4)
EOSINOPHIL NFR BLD AUTO: 1 % (ref 0–6)
ERYTHROCYTE [DISTWIDTH] IN BLOOD BY AUTOMATED COUNT: 14.4 %
GFR SERPL CREATININE-BSD FRML MDRD: 112 ML/MIN/1.73SQ M
GLUCOSE SERPL-MCNC: 200 MG/DL (ref 70–99)
GLUCOSE UR STRIP-MCNC: ABNORMAL MG/DL
HCT VFR BLD AUTO: 42.3 % (ref 41–53)
HGB BLD-MCNC: 14.4 G/DL (ref 13.5–17.5)
HGB UR QL STRIP.AUTO: NEGATIVE
KETONES UR STRIP-MCNC: NEGATIVE MG/DL
LEUKOCYTE ESTERASE UR QL STRIP: NEGATIVE
LIPASE SERPL-CCNC: 266 U/L (ref 23–300)
LYMPHOCYTES # BLD AUTO: 1.8 THOUSANDS/ΜL (ref 0.5–4)
LYMPHOCYTES NFR BLD AUTO: 16 % (ref 25–45)
MCH RBC QN AUTO: 31.2 PG (ref 26–34)
MCHC RBC AUTO-ENTMCNC: 34 G/DL (ref 31–36)
MCV RBC AUTO: 92 FL (ref 80–100)
MONOCYTES # BLD AUTO: 0.6 THOUSAND/ΜL (ref 0.2–0.9)
MONOCYTES NFR BLD AUTO: 5 % (ref 1–10)
NEUTROPHILS # BLD AUTO: 8.6 THOUSANDS/ΜL (ref 1.8–7.8)
NEUTS SEG NFR BLD AUTO: 78 % (ref 45–65)
NITRITE UR QL STRIP: NEGATIVE
NT-PROBNP SERPL-MCNC: 54.8 PG/ML (ref 0–299)
PH UR STRIP.AUTO: 6.5 [PH]
PLATELET # BLD AUTO: 161 THOUSANDS/UL (ref 150–450)
PMV BLD AUTO: 9.6 FL (ref 8.9–12.7)
POTASSIUM SERPL-SCNC: 3.9 MMOL/L (ref 3.6–5)
PROT SERPL-MCNC: 7.3 G/DL (ref 5.9–8.4)
PROT UR STRIP-MCNC: NEGATIVE MG/DL
RBC # BLD AUTO: 4.61 MILLION/UL (ref 4.5–5.9)
SODIUM SERPL-SCNC: 135 MMOL/L (ref 137–147)
SP GR UR STRIP.AUTO: 1.01 (ref 1–1.04)
TROPONIN I SERPL-MCNC: <0.01 NG/ML (ref 0–0.03)
UROBILINOGEN UA: NEGATIVE MG/DL
WBC # BLD AUTO: 11.2 THOUSAND/UL (ref 4.5–11)

## 2019-07-24 PROCEDURE — 80053 COMPREHEN METABOLIC PANEL: CPT | Performed by: PHYSICIAN ASSISTANT

## 2019-07-24 PROCEDURE — 83880 ASSAY OF NATRIURETIC PEPTIDE: CPT | Performed by: PHYSICIAN ASSISTANT

## 2019-07-24 PROCEDURE — 93005 ELECTROCARDIOGRAM TRACING: CPT

## 2019-07-24 PROCEDURE — 71046 X-RAY EXAM CHEST 2 VIEWS: CPT

## 2019-07-24 PROCEDURE — 83690 ASSAY OF LIPASE: CPT | Performed by: PHYSICIAN ASSISTANT

## 2019-07-24 PROCEDURE — 96374 THER/PROPH/DIAG INJ IV PUSH: CPT

## 2019-07-24 PROCEDURE — 36415 COLL VENOUS BLD VENIPUNCTURE: CPT | Performed by: PHYSICIAN ASSISTANT

## 2019-07-24 PROCEDURE — 81003 URINALYSIS AUTO W/O SCOPE: CPT | Performed by: PHYSICIAN ASSISTANT

## 2019-07-24 PROCEDURE — 99285 EMERGENCY DEPT VISIT HI MDM: CPT

## 2019-07-24 PROCEDURE — 99284 EMERGENCY DEPT VISIT MOD MDM: CPT | Performed by: PHYSICIAN ASSISTANT

## 2019-07-24 PROCEDURE — 96361 HYDRATE IV INFUSION ADD-ON: CPT

## 2019-07-24 PROCEDURE — 96375 TX/PRO/DX INJ NEW DRUG ADDON: CPT

## 2019-07-24 PROCEDURE — 74177 CT ABD & PELVIS W/CONTRAST: CPT

## 2019-07-24 PROCEDURE — 85025 COMPLETE CBC W/AUTO DIFF WBC: CPT | Performed by: PHYSICIAN ASSISTANT

## 2019-07-24 PROCEDURE — 84484 ASSAY OF TROPONIN QUANT: CPT | Performed by: PHYSICIAN ASSISTANT

## 2019-07-24 RX ORDER — POLYETHYLENE GLYCOL 3350 17 G/17G
17 POWDER, FOR SOLUTION ORAL ONCE
Status: COMPLETED | OUTPATIENT
Start: 2019-07-24 | End: 2019-07-24

## 2019-07-24 RX ORDER — SIMETHICONE 125 MG
125 TABLET,CHEWABLE ORAL EVERY 6 HOURS PRN
Qty: 30 TABLET | Refills: 0 | Status: SHIPPED | OUTPATIENT
Start: 2019-07-24 | End: 2019-08-05 | Stop reason: SDUPTHER

## 2019-07-24 RX ORDER — CEPHALEXIN 500 MG/1
500 CAPSULE ORAL EVERY 8 HOURS SCHEDULED
Qty: 30 CAPSULE | Refills: 0 | Status: SHIPPED | OUTPATIENT
Start: 2019-07-24 | End: 2019-08-03

## 2019-07-24 RX ORDER — POLYETHYLENE GLYCOL 3350 17 G/17G
17 POWDER, FOR SOLUTION ORAL DAILY
Qty: 14 EACH | Refills: 0 | Status: SHIPPED | OUTPATIENT
Start: 2019-07-24 | End: 2020-06-10 | Stop reason: SDUPTHER

## 2019-07-24 RX ORDER — KETOROLAC TROMETHAMINE 30 MG/ML
15 INJECTION, SOLUTION INTRAMUSCULAR; INTRAVENOUS ONCE
Status: COMPLETED | OUTPATIENT
Start: 2019-07-24 | End: 2019-07-24

## 2019-07-24 RX ORDER — ONDANSETRON 4 MG/1
4 TABLET, ORALLY DISINTEGRATING ORAL EVERY 8 HOURS PRN
Qty: 20 TABLET | Refills: 0 | Status: SHIPPED | OUTPATIENT
Start: 2019-07-24 | End: 2019-08-03

## 2019-07-24 RX ORDER — ONDANSETRON 2 MG/ML
4 INJECTION INTRAMUSCULAR; INTRAVENOUS ONCE
Status: COMPLETED | OUTPATIENT
Start: 2019-07-24 | End: 2019-07-24

## 2019-07-24 RX ADMIN — POLYETHYLENE GLYCOL 3350 17 G: 17 POWDER, FOR SOLUTION ORAL at 17:53

## 2019-07-24 RX ADMIN — IOHEXOL 100 ML: 350 INJECTION, SOLUTION INTRAVENOUS at 16:48

## 2019-07-24 RX ADMIN — ONDANSETRON 4 MG: 2 INJECTION, SOLUTION INTRAMUSCULAR; INTRAVENOUS at 16:09

## 2019-07-24 RX ADMIN — KETOROLAC TROMETHAMINE 15 MG: 30 INJECTION, SOLUTION INTRAMUSCULAR; INTRAVENOUS at 16:07

## 2019-07-24 RX ADMIN — SODIUM CHLORIDE 1000 ML: 0.9 INJECTION, SOLUTION INTRAVENOUS at 16:07

## 2019-07-24 NOTE — ED PROVIDER NOTES
History  Chief Complaint   Patient presents with    Abdominal Pain     Patient reports that he started taking an abx yesterday for a dental procedure and since then has had abd pain and diarrhea  Denies fevers or chills   Diarrhea     Patient is a 68-year-old male presents today with chief complaint of epigastric abdominal pain, chest pain and diarrhea  Patient reports that since yesterday he began having multiple episodes of watery diarrhea which is nonbloody in nature associated with some nausea  Patient denies any vomiting  Patient denies any fevers or chills and notes he did recently start antibiotic yesterday for dental infection  Patient denies any positive sick contacts  Patient reports a past medical history significant for hypertension, hyperlipidemia and reports he does smoke on a daily basis noting 1 cigar per day  Patient reports dysuria as well however denies any hematuria or flank pain  Patient reports he had a previous colectomy due to polyps  Patient denies any other abdominal surgical history  Abdominal Pain   Pain location:  Epigastric  Pain quality: aching    Pain radiates to:  Chest  Pain severity:  Moderate  Onset quality:  Gradual  Duration:  2 days  Timing:  Constant  Progression:  Unchanged  Chronicity:  New  Relieved by:  None tried  Worsened by:  Nothing  Ineffective treatments:  None tried  Associated symptoms: diarrhea and nausea    Associated symptoms: no chest pain, no chills, no dysuria, no fatigue, no fever, no hematuria, no shortness of breath, no sore throat and no vomiting        Prior to Admission Medications   Prescriptions Last Dose Informant Patient Reported?  Taking?   aspirin (ECOTRIN LOW STRENGTH) 81 mg EC tablet 7/23/2019 at Unknown time  Yes Yes   Sig: Take 81 mg by mouth daily   atenolol (TENORMIN) 50 mg tablet Not Taking at Unknown time  No No   Sig: Take 1 tablet (50 mg total) by mouth daily   Patient not taking: Reported on 7/24/2019   atorvastatin (LIPITOR) 40 mg tablet 2019 at Unknown time  No Yes   Sig: Take 1 tablet (40 mg total) by mouth daily   clindamycin (CLEOCIN) 300 MG capsule 2019 at Unknown time  No Yes   Sig: Take 1 capsule (300 mg total) by mouth 3 (three) times a day for 10 days   fluticasone (FLONASE) 50 mcg/act nasal spray 2019 at Unknown time  No Yes   Si spray into each nostril daily   gabapentin (NEURONTIN) 800 mg tablet 2019 at Unknown time  No Yes   Sig: Take 1 tablet (800 mg total) by mouth 3 (three) times a day   hydrOXYzine HCL (ATARAX) 50 mg tablet 2019 at Unknown time  No Yes   Sig: Take 1 tablet (50 mg total) by mouth daily at bedtime   hydrocortisone 1 % ointment 2019 at Unknown time  No Yes   Sig: Apply topically 2 (two) times a day   ibuprofen (MOTRIN) 600 mg tablet 2019 at Unknown time  No Yes   Sig: Take 1 tablet (600 mg total) by mouth every 6 (six) hours as needed for moderate pain   metFORMIN (GLUCOPHAGE) 500 mg tablet Not Taking at Unknown time  Yes No   Si tablet Every 12 hours   methocarbamol (ROBAXIN) 750 mg tablet Not Taking at Unknown time  No No   Sig: Take 1 tablet (750 mg total) by mouth every 8 (eight) hours   Patient not taking: Reported on 2019   naproxen (NAPROSYN) 500 mg tablet 2019 at Unknown time  No Yes   Sig: Take 0 5 tablets (250 mg total) by mouth 2 (two) times a day with meals   ranitidine (ZANTAC) 300 MG tablet 2019 at Unknown time  No Yes   Sig: Take 1 tablet (300 mg total) by mouth every 24 hours   tamsulosin (FLOMAX) 0 4 mg 2019 at Unknown time  No Yes   Sig: Take 1 capsule (0 4 mg total) by mouth daily with dinner      Facility-Administered Medications: None       Past Medical History:   Diagnosis Date    Diverticulitis     Hypertension        Past Surgical History:   Procedure Laterality Date    COLON SURGERY      NECK SURGERY         History reviewed  No pertinent family history    I have reviewed and agree with the history as documented  Social History     Tobacco Use    Smoking status: Current Every Day Smoker     Types: Cigars    Smokeless tobacco: Never Used    Tobacco comment: 1 cigar/daily   Substance Use Topics    Alcohol use: Yes     Frequency: 2-3 times a week     Drinks per session: 5 or 6     Binge frequency: Less than monthly     Comment: drinks only on weekends    Drug use: No        Review of Systems   Constitutional: Negative for chills, fatigue and fever  HENT: Negative for congestion, ear pain, rhinorrhea and sore throat  Eyes: Negative for redness  Respiratory: Negative for chest tightness and shortness of breath  Cardiovascular: Negative for chest pain and palpitations  Gastrointestinal: Positive for abdominal distention, abdominal pain, diarrhea and nausea  Negative for blood in stool and vomiting  Genitourinary: Negative for dysuria and hematuria  Musculoskeletal: Negative  Skin: Negative for rash  Neurological: Negative for dizziness, syncope, light-headedness and numbness  Physical Exam  Physical Exam   Constitutional: He is oriented to person, place, and time  He appears well-developed and well-nourished  HENT:   Head: Normocephalic and atraumatic  Eyes: Pupils are equal, round, and reactive to light  Neck: Normal range of motion  Cardiovascular: Normal rate, regular rhythm and normal heart sounds  Pulmonary/Chest: Effort normal and breath sounds normal    Abdominal: Soft  He exhibits distension  Bowel sounds are increased  There is tenderness in the right upper quadrant, epigastric area, suprapubic area and left upper quadrant  There is no guarding and no CVA tenderness  Lymphadenopathy:     He has no cervical adenopathy  Neurological: He is alert and oriented to person, place, and time  Skin: Skin is warm and dry  Capillary refill takes less than 2 seconds  Psychiatric: He has a normal mood and affect  Nursing note and vitals reviewed        Vital Signs  ED Triage Vitals [07/24/19 1538]   Temperature Pulse Respirations Blood Pressure SpO2   99 1 °F (37 3 °C) 93 16 155/89 98 %      Temp Source Heart Rate Source Patient Position - Orthostatic VS BP Location FiO2 (%)   Oral Monitor Lying Left arm --      Pain Score       8           Vitals:    07/24/19 1538 07/24/19 1739   BP: 155/89 142/85   Pulse: 93 86   Patient Position - Orthostatic VS: Lying Lying         Visual Acuity      ED Medications  Medications   polyethylene glycol (MIRALAX) packet 17 g (has no administration in time range)   sodium chloride 0 9 % bolus 1,000 mL (0 mL Intravenous Stopped 7/24/19 1735)   ondansetron (ZOFRAN) injection 4 mg (4 mg Intravenous Given 7/24/19 1609)   ketorolac (TORADOL) injection 15 mg (15 mg Intravenous Given 7/24/19 1607)   iohexol (OMNIPAQUE) 350 MG/ML injection (MULTI-DOSE) 100 mL (100 mL Intravenous Given 7/24/19 1648)       Diagnostic Studies  Results Reviewed     Procedure Component Value Units Date/Time    UA w Reflex to Microscopic w Reflex to Culture [[id number]]  (Abnormal) Collected:  07/24/19 1710    Lab Status:  Final result Specimen:  Urine, Clean Catch Updated:  07/24/19 1740     Color, UA Straw     Clarity, UA Clear     Specific Gravity, UA 1 010     pH, UA 6 5     Leukocytes, UA Negative     Nitrite, UA Negative     Protein, UA Negative mg/dl      Glucose,  (1/10%) mg/dl      Ketones, UA Negative mg/dl      Bilirubin, UA Negative     Blood, UA Negative     UROBILINOGEN UA Negative mg/dL     B-type natriuretic peptide [[id number]]  (Normal) Collected:  07/24/19 1601    Lab Status:  Final result Specimen:  Blood from Arm, Right Updated:  07/24/19 1641     NT-proBNP 54 8 pg/mL     Troponin I [[id number]]  (Normal) Collected:  07/24/19 1601    Lab Status:  Final result Specimen:  Blood from Arm, Right Updated:  07/24/19 1636     Troponin I <0 01 ng/mL     Lipase [[id number]]  (Normal) Collected:  07/24/19 1601    Lab Status:  Final result Specimen:  Blood from Arm, Right Updated:  07/24/19 1635     Lipase 266 u/L     Comprehensive metabolic panel [[id number]]  (Abnormal) Collected:  07/24/19 1601    Lab Status:  Final result Specimen:  Blood from Arm, Right Updated:  07/24/19 1635     Sodium 135 mmol/L      Potassium 3 9 mmol/L      Chloride 101 mmol/L      CO2 26 mmol/L      ANION GAP 8 mmol/L      BUN 6 mg/dL      Creatinine 0 60 mg/dL      Glucose 200 mg/dL      Calcium 9 0 mg/dL      AST 29 U/L      ALT 38 U/L      Alkaline Phosphatase 103 U/L      Total Protein 7 3 g/dL      Albumin 3 9 g/dL      Total Bilirubin 0 50 mg/dL      eGFR 112 ml/min/1 73sq m     Narrative:       National Kidney Disease Foundation guidelines for Chronic Kidney Disease (CKD):     Stage 1 with normal or high GFR (GFR > 90 mL/min/1 73 square meters)    Stage 2 Mild CKD (GFR = 60-89 mL/min/1 73 square meters)    Stage 3A Moderate CKD (GFR = 45-59 mL/min/1 73 square meters)    Stage 3B Moderate CKD (GFR = 30-44 mL/min/1 73 square meters)    Stage 4 Severe CKD (GFR = 15-29 mL/min/1 73 square meters)    Stage 5 End Stage CKD (GFR <15 mL/min/1 73 square meters)  Note: GFR calculation is accurate only with a steady state creatinine    CBC and differential [[id number]]  (Abnormal) Collected:  07/24/19 1601    Lab Status:  Final result Specimen:  Blood from Arm, Right Updated:  07/24/19 1620     WBC 11 20 Thousand/uL      RBC 4 61 Million/uL      Hemoglobin 14 4 g/dL      Hematocrit 42 3 %      MCV 92 fL      MCH 31 2 pg      MCHC 34 0 g/dL      RDW 14 4 %      MPV 9 6 fL      Platelets 601 Thousands/uL      Neutrophils Relative 78 %      Lymphocytes Relative 16 %      Monocytes Relative 5 %      Eosinophils Relative 1 %      Basophils Relative 1 %      Neutrophils Absolute 8 60 Thousands/µL      Lymphocytes Absolute 1 80 Thousands/µL      Monocytes Absolute 0 60 Thousand/µL      Eosinophils Absolute 0 10 Thousand/µL      Basophils Absolute 0 10 Thousands/µL                  CT abdomen pelvis with contrast   Final Result by Lisbeth Rojas DO (07/24 1718)      6 mm pulmonary nodule seen on series 2 image 1 may extend above the scope of this examination  No previous imaging of this region of the chest  Based on current Fleischner Society 2017 Guidelines on incidental pulmonary nodule, followup non-contrast CT    of the chest is recommended at 6-12 months from the initial examination and, if stable at that time, an additional followup is recommended for 18-24 months from the initial examination  Mild hepatic steatosis  Small simple cyst arising from the posterior midportion of the left kidney  Mild fecal stasis within the large bowel with no signs of bowel obstruction or inflammation  The study was marked in [facility] for immediate notification  Workstation performed: [id number]         XR chest 2 views   Final Result by Robbin Owens MD (07/24 1657)      No acute cardiopulmonary disease  Workstation performed: [id number]                    Procedures  ECG 12 Lead Documentation Only  Date/Time: 7/24/2019 3:58 PM  Performed by: Dora Chan PA-C  Authorized by: Dora Chan PA-C     Indications / Diagnosis:  Chest / epigastric  ECG reviewed by me, the ED Provider: yes    Patient location:  ED  Previous ECG:     Previous ECG:  Compared to current    Comparison ECG info:  4/26/19    Similarity:  No change    Comparison to cardiac monitor: Yes    Interpretation:     Interpretation: normal    Rate:     ECG rate:  90    ECG rate assessment: normal    Rhythm:     Rhythm: sinus rhythm    Ectopy:     Ectopy: none    QRS:     QRS axis:  Normal    QRS intervals:  Normal  Conduction:     Conduction: normal    ST segments:     ST segments:  Normal  T waves:     T waves: normal             ED Course  ED Course as of Jul 24 1747 Wed Jul 24, 2019   1734 Patient reports significant improvement in symptoms and reports he feels ready for discharge    Findings from the CT scan were reviewed with the patient who verbalized understanding of the need to follow up with the family care physician for follow up imaging of the lung                                  MDM  Number of Diagnoses or Management Options  Dysuria:   Epigastric pain:   Nausea:   Diagnosis management comments: Patient began clindamycin yesterday and had diarrhea with abdominal pain today reporting pain just prior to flatten  Will change clindamycin to Keflex as patient is reporting dysuria symptoms as well and will prescribed simethicone to help with potential gas pains  Fecal stasis noted on CT scan will prescribe MiraLax and Colace so as to soften the stool and Zofran to assist with nausea  Close follow-up encouraged, patient informed of 6 mm lung nodule which will require further imaging in approximately 12 months, patient verbalized understanding  Disposition  Final diagnoses:   Epigastric pain   Nausea   Dysuria     Time reflects when diagnosis was documented in both MDM as applicable and the Disposition within this note     Time User Action Codes Description Comment    7/24/2019  5:40 PM Rodney Banister Add [R10 13] Epigastric pain     7/24/2019  5:41 PM Rodney Banister Add [R11 0] Nausea     7/24/2019  5:47 PM Rodney Banister Add [R30 0] Dysuria       ED Disposition     ED Disposition Condition Date/Time Comment    Discharge Good Wed Jul 24, 2019  5:39 PM Cuco Emanuel discharge to home/self care              Follow-up Information     Follow up With Specialties Details Why [id number] Alexis Mistry MD Family Medicine Schedule an appointment as soon as possible for a visit in 2 days  [id number] [id number]            Patient's Medications   Discharge Prescriptions    CEPHALEXIN (KEFLEX) 500 MG CAPSULE    Take 1 capsule (500 mg total) by mouth every 8 (eight) hours for 10 days       Start Date: 7/24/2019 End Date: 8/3/2019       Order Dose: 500 mg       Quantity: 30 capsule    Refills: 0    DOCUSATE (COLACE) 60 MG/15ML SYRUP    Take 15 mL (60 mg total) by mouth daily       Start Date: 7/24/2019 End Date: --       Order Dose: 60 mg       Quantity: 100 mL    Refills: 0    ONDANSETRON (ZOFRAN-ODT) 4 MG DISINTEGRATING TABLET    Take 1 tablet (4 mg total) by mouth every 8 (eight) hours as needed for nausea or vomiting for up to 10 days       Start Date: 7/24/2019 End Date: 8/3/2019       Order Dose: 4 mg       Quantity: 20 tablet    Refills: 0    POLYETHYLENE GLYCOL (MIRALAX) 17 G PACKET    Take 17 g by mouth daily for 3 days       Start Date: 7/24/2019 End Date: 7/27/2019       Order Dose: 17 g       Quantity: 14 each    Refills: 0    SIMETHICONE (MYLICON) 010 MG CHEWABLE TABLET    Chew 1 tablet (125 mg total) every 6 (six) hours as needed for flatulence       Start Date: 7/24/2019 End Date: --       Order Dose: 125 mg       Quantity: 30 tablet    Refills: 0     No discharge procedures on file      ED Provider  Electronically Signed by           Cassandra Jhaveri PA-C  07/24/19 3850

## 2019-07-25 LAB
ATRIAL RATE: 90 BPM
P AXIS: 62 DEGREES
PR INTERVAL: 150 MS
QRS AXIS: 1 DEGREES
QRSD INTERVAL: 82 MS
QT INTERVAL: 374 MS
QTC INTERVAL: 457 MS
T WAVE AXIS: -6 DEGREES
VENTRICULAR RATE: 90 BPM

## 2019-07-25 PROCEDURE — 93010 ELECTROCARDIOGRAM REPORT: CPT | Performed by: INTERNAL MEDICINE

## 2019-08-05 ENCOUNTER — OFFICE VISIT (OUTPATIENT)
Dept: FAMILY MEDICINE CLINIC | Facility: CLINIC | Age: 54
End: 2019-08-05

## 2019-08-05 VITALS
WEIGHT: 219.8 LBS | DIASTOLIC BLOOD PRESSURE: 80 MMHG | BODY MASS INDEX: 29.77 KG/M2 | HEIGHT: 72 IN | RESPIRATION RATE: 16 BRPM | HEART RATE: 91 BPM | TEMPERATURE: 97.8 F | SYSTOLIC BLOOD PRESSURE: 110 MMHG | OXYGEN SATURATION: 96 %

## 2019-08-05 DIAGNOSIS — G47.09 OTHER INSOMNIA: Primary | ICD-10-CM

## 2019-08-05 DIAGNOSIS — E78.2 MIXED HYPERLIPIDEMIA: ICD-10-CM

## 2019-08-05 DIAGNOSIS — M25.511 CHRONIC PAIN OF BOTH SHOULDERS: ICD-10-CM

## 2019-08-05 DIAGNOSIS — I10 ESSENTIAL HYPERTENSION: ICD-10-CM

## 2019-08-05 DIAGNOSIS — M25.512 CHRONIC PAIN OF BOTH SHOULDERS: ICD-10-CM

## 2019-08-05 DIAGNOSIS — R10.13 EPIGASTRIC PAIN: ICD-10-CM

## 2019-08-05 DIAGNOSIS — R73.03 PREDIABETES: ICD-10-CM

## 2019-08-05 DIAGNOSIS — G89.29 CHRONIC PAIN OF BOTH SHOULDERS: ICD-10-CM

## 2019-08-05 DIAGNOSIS — Z87.442 HISTORY OF RENAL CALCULI: ICD-10-CM

## 2019-08-05 DIAGNOSIS — K21.9 GASTROESOPHAGEAL REFLUX DISEASE WITHOUT ESOPHAGITIS: ICD-10-CM

## 2019-08-05 PROCEDURE — 99213 OFFICE O/P EST LOW 20 MIN: CPT | Performed by: INTERNAL MEDICINE

## 2019-08-05 RX ORDER — RANITIDINE 300 MG/1
300 TABLET ORAL EVERY 24 HOURS
Qty: 30 TABLET | Refills: 3 | Status: SHIPPED | OUTPATIENT
Start: 2019-08-05 | End: 2020-02-27 | Stop reason: SDUPTHER

## 2019-08-05 RX ORDER — ATORVASTATIN CALCIUM 40 MG/1
40 TABLET, FILM COATED ORAL DAILY
Qty: 90 TABLET | Refills: 1 | Status: SHIPPED | OUTPATIENT
Start: 2019-08-05 | End: 2020-02-26 | Stop reason: SDUPTHER

## 2019-08-05 RX ORDER — GABAPENTIN 800 MG/1
800 TABLET ORAL 3 TIMES DAILY
Qty: 90 TABLET | Refills: 3 | Status: SHIPPED | OUTPATIENT
Start: 2019-08-05 | End: 2020-02-28 | Stop reason: SDUPTHER

## 2019-08-05 RX ORDER — HYDROXYZINE 50 MG/1
50 TABLET, FILM COATED ORAL
Qty: 30 TABLET | Refills: 3 | Status: SHIPPED | OUTPATIENT
Start: 2019-08-05 | End: 2019-12-02 | Stop reason: ALTCHOICE

## 2019-08-05 RX ORDER — SIMETHICONE 125 MG
125 TABLET,CHEWABLE ORAL EVERY 6 HOURS PRN
Qty: 30 TABLET | Refills: 1 | Status: SHIPPED | OUTPATIENT
Start: 2019-08-05 | End: 2020-03-24 | Stop reason: SDUPTHER

## 2019-08-05 RX ORDER — ATENOLOL 50 MG/1
50 TABLET ORAL DAILY
Qty: 90 TABLET | Refills: 3 | Status: SHIPPED | OUTPATIENT
Start: 2019-08-05 | End: 2020-02-26 | Stop reason: SDUPTHER

## 2019-08-05 NOTE — ASSESSMENT & PLAN NOTE
Patient continues to have episodes of insomnia with mild improvement on Atarax    He missed his appointment in July to Sleep Medicine due to being out of town  Instructed patient to schedule a new appointment to Sleep Medicine and call to cancel if not able to make instead of not showing to the appointment  Continue Atarax 50 mg  Sleep hygiene discussed with the patient

## 2019-08-05 NOTE — PROGRESS NOTES
Assessment/Plan:    Other insomnia  Patient continues to have episodes of insomnia with mild improvement on Atarax  He missed his appointment in July to Sleep Medicine due to being out of town  Instructed patient to schedule a new appointment to Sleep Medicine and call to cancel if not able to make instead of not showing to the appointment  Continue Atarax 50 mg  Sleep hygiene discussed with the patient       Diagnoses and all orders for this visit:    Other insomnia  -     hydrOXYzine HCL (ATARAX) 50 mg tablet; Take 1 tablet (50 mg total) by mouth daily at bedtime    Gastroesophageal reflux disease without esophagitis  -     ranitidine (ZANTAC) 300 MG tablet; Take 1 tablet (300 mg total) by mouth every 24 hours    History of renal calculi    Mixed hyperlipidemia  -     atorvastatin (LIPITOR) 40 mg tablet; Take 1 tablet (40 mg total) by mouth daily    Essential hypertension  -     atenolol (TENORMIN) 50 mg tablet; Take 1 tablet (50 mg total) by mouth daily    Epigastric pain  -     simethicone (MYLICON) 244 MG chewable tablet; Chew 1 tablet (125 mg total) every 6 (six) hours as needed for flatulence    Chronic pain of both shoulders  -     gabapentin (NEURONTIN) 800 mg tablet; Take 1 tablet (800 mg total) by mouth 3 (three) times a day    Prediabetes  -     metFORMIN (GLUCOPHAGE) 500 mg tablet; Take 1 tablet (500 mg total) by mouth daily with breakfast          Subjective:      Patient ID: Rich Joseph is a 62 y o  male  Patient presented to follow up on insomnia  He reports mild improvement in symptoms  Patient was sent to Sleep Medicine on the previous visit, but he missed apointment  No new complains at this visit  He also requests refill on his medications        The following portions of the patient's history were reviewed and updated as appropriate: allergies, current medications, past family history, past medical history, past social history, past surgical history and problem list     Review of Systems Constitutional: Negative for chills, diaphoresis, fatigue and fever  HENT: Negative for congestion, ear discharge, ear pain, mouth sores, rhinorrhea, sore throat and trouble swallowing  Eyes: Negative for photophobia, pain and discharge  Respiratory: Negative for cough, chest tightness, shortness of breath and wheezing  Cardiovascular: Negative for chest pain, palpitations and leg swelling  Gastrointestinal: Negative for abdominal distention, abdominal pain, blood in stool, constipation, diarrhea and nausea  Genitourinary: Negative for difficulty urinating and frequency  Musculoskeletal: Positive for arthralgias (shoulder pain)  Negative for joint swelling and neck stiffness  Skin: Negative for color change, pallor and rash  Neurological: Negative for dizziness, syncope, numbness and headaches  Psychiatric/Behavioral: Positive for decreased concentration and sleep disturbance  Objective:      /80 (BP Location: Left arm, Patient Position: Sitting, Cuff Size: Adult)   Pulse 91   Temp 97 8 °F (36 6 °C) (Tympanic)   Resp 16   Ht 6' (1 829 m)   Wt 99 7 kg (219 lb 12 8 oz)   SpO2 96%   BMI 29 81 kg/m²          Physical Exam   Constitutional: He is oriented to person, place, and time  He appears well-developed and well-nourished  No distress  HENT:   Head: Normocephalic and atraumatic  Cardiovascular: Normal rate, regular rhythm and normal heart sounds  Exam reveals no gallop and no friction rub  No murmur heard  Pulmonary/Chest: Effort normal and breath sounds normal  No respiratory distress  Abdominal: Soft  Bowel sounds are normal  He exhibits no distension  Musculoskeletal: He exhibits no edema or deformity  Neurological: He is alert and oriented to person, place, and time  Skin: Skin is warm and dry  Psychiatric: He has a normal mood and affect

## 2019-09-19 ENCOUNTER — HOSPITAL ENCOUNTER (EMERGENCY)
Facility: HOSPITAL | Age: 54
Discharge: HOME | End: 2019-09-19
Attending: EMERGENCY MEDICINE | Admitting: EMERGENCY MEDICINE
Payer: MEDICARE

## 2019-09-19 VITALS
DIASTOLIC BLOOD PRESSURE: 91 MMHG | SYSTOLIC BLOOD PRESSURE: 154 MMHG | RESPIRATION RATE: 15 BRPM | TEMPERATURE: 97.4 F | BODY MASS INDEX: 30.79 KG/M2 | HEART RATE: 90 BPM | OXYGEN SATURATION: 96 % | WEIGHT: 227 LBS

## 2019-09-19 DIAGNOSIS — K08.89 DENTALGIA: Primary | ICD-10-CM

## 2019-09-19 PROCEDURE — 99282 EMERGENCY DEPT VISIT SF MDM: CPT

## 2019-09-19 PROCEDURE — 96372 THER/PROPH/DIAG INJ SC/IM: CPT

## 2019-09-19 PROCEDURE — 99284 EMERGENCY DEPT VISIT MOD MDM: CPT | Performed by: PHYSICIAN ASSISTANT

## 2019-09-19 RX ORDER — KETOROLAC TROMETHAMINE 30 MG/ML
15 INJECTION, SOLUTION INTRAMUSCULAR; INTRAVENOUS ONCE
Status: COMPLETED | OUTPATIENT
Start: 2019-09-19 | End: 2019-09-19

## 2019-09-19 RX ORDER — NAPROXEN 500 MG/1
500 TABLET ORAL 2 TIMES DAILY WITH MEALS
Qty: 30 TABLET | Refills: 0 | Status: SHIPPED | OUTPATIENT
Start: 2019-09-19 | End: 2019-12-02 | Stop reason: SDUPTHER

## 2019-09-19 RX ORDER — PENICILLIN V POTASSIUM 500 MG/1
500 TABLET ORAL 4 TIMES DAILY
Qty: 28 TABLET | Refills: 0 | Status: SHIPPED | OUTPATIENT
Start: 2019-09-19 | End: 2019-09-26

## 2019-09-19 RX ADMIN — KETOROLAC TROMETHAMINE 15 MG: 30 INJECTION, SOLUTION INTRAMUSCULAR at 14:37

## 2019-09-19 NOTE — ED PROVIDER NOTES
History  Chief Complaint   Patient presents with    Dental Pain     dental pain to right upper molar x 1 month, pt c/o swelling to  upper lip     49-year-old male with past medical history of hypertension presenting for evaluation of toothache  Patient reports right lower toothache for the past month  He states that the tooth now feels loose and feels that he is having swelling on the right side of his face  Patient denies taking anything for pain at home  Denies any trismus or difficulty eating  He reports it has been a long time since he was last seen at the dentist   Denies any fevers chills or sweats  Prior to Admission Medications   Prescriptions Last Dose Informant Patient Reported?  Taking?   aspirin (ECOTRIN LOW STRENGTH) 81 mg EC tablet   Yes No   Sig: Take 81 mg by mouth daily   atenolol (TENORMIN) 50 mg tablet   No No   Sig: Take 1 tablet (50 mg total) by mouth daily   atorvastatin (LIPITOR) 40 mg tablet   No No   Sig: Take 1 tablet (40 mg total) by mouth daily   docusate (COLACE) 60 MG/15ML syrup   No No   Sig: Take 15 mL (60 mg total) by mouth daily   Patient not taking: Reported on 2019   fluticasone (FLONASE) 50 mcg/act nasal spray   No No   Si spray into each nostril daily   gabapentin (NEURONTIN) 800 mg tablet   No No   Sig: Take 1 tablet (800 mg total) by mouth 3 (three) times a day   hydrOXYzine HCL (ATARAX) 50 mg tablet   No No   Sig: Take 1 tablet (50 mg total) by mouth daily at bedtime   hydrocortisone 1 % ointment   No No   Sig: Apply topically 2 (two) times a day   ibuprofen (MOTRIN) 600 mg tablet   No No   Sig: Take 1 tablet (600 mg total) by mouth every 6 (six) hours as needed for moderate pain   metFORMIN (GLUCOPHAGE) 500 mg tablet   No No   Sig: Take 1 tablet (500 mg total) by mouth daily with breakfast   methocarbamol (ROBAXIN) 750 mg tablet   No No   Sig: Take 1 tablet (750 mg total) by mouth every 8 (eight) hours   Patient not taking: Reported on 2019 naproxen (NAPROSYN) 500 mg tablet   No No   Sig: Take 0 5 tablets (250 mg total) by mouth 2 (two) times a day with meals   Patient not taking: Reported on 8/5/2019   ondansetron (ZOFRAN-ODT) 4 mg disintegrating tablet   No No   Sig: Take 1 tablet (4 mg total) by mouth every 8 (eight) hours as needed for nausea or vomiting for up to 10 days   polyethylene glycol (MIRALAX) 17 g packet   No No   Sig: Take 17 g by mouth daily for 3 days   ranitidine (ZANTAC) 300 MG tablet   No No   Sig: Take 1 tablet (300 mg total) by mouth every 24 hours   simethicone (MYLICON) 428 MG chewable tablet   No No   Sig: Chew 1 tablet (125 mg total) every 6 (six) hours as needed for flatulence   tamsulosin (FLOMAX) 0 4 mg   No No   Sig: Take 1 capsule (0 4 mg total) by mouth daily with dinner   Patient not taking: Reported on 8/5/2019      Facility-Administered Medications: None       Past Medical History:   Diagnosis Date    Diverticulitis     Hypertension        Past Surgical History:   Procedure Laterality Date    COLON SURGERY      NECK SURGERY         History reviewed  No pertinent family history  I have reviewed and agree with the history as documented  Social History     Tobacco Use    Smoking status: Current Every Day Smoker     Types: Cigars    Smokeless tobacco: Never Used    Tobacco comment: 1 cigar/daily   Substance Use Topics    Alcohol use: Yes     Frequency: 2-3 times a week     Drinks per session: 5 or 6     Binge frequency: Less than monthly     Comment: drinks only on weekends    Drug use: Never        Review of Systems   All other systems reviewed and are negative  Physical Exam  Physical Exam   Constitutional: He is oriented to person, place, and time  He appears well-developed and well-nourished  No distress  HENT:   Head: Normocephalic and atraumatic  Mouth/Throat:       Eyes: Conjunctivae are normal    EOM grossly intact   Neck: Normal range of motion  Neck supple  No JVD present  Cardiovascular: Normal rate  Pulmonary/Chest: Effort normal    Abdominal: Soft  Musculoskeletal:   FROM, steady gait, cap refill brisk, strength and sensation grossly intact throughout   Neurological: He is alert and oriented to person, place, and time  Skin: Skin is warm and dry  Capillary refill takes less than 2 seconds  Psychiatric: He has a normal mood and affect  His behavior is normal    Nursing note and vitals reviewed  Vital Signs  ED Triage Vitals [09/19/19 1408]   Temperature Pulse Respirations Blood Pressure SpO2   (!) 97 4 °F (36 3 °C) 90 15 154/91 96 %      Temp Source Heart Rate Source Patient Position - Orthostatic VS BP Location FiO2 (%)   Tympanic Monitor Sitting Right arm --      Pain Score       9           Vitals:    09/19/19 1408   BP: 154/91   Pulse: 90   Patient Position - Orthostatic VS: Sitting         Visual Acuity      ED Medications  Medications   ketorolac (TORADOL) injection 15 mg (has no administration in time range)       Diagnostic Studies  Results Reviewed     None                 No orders to display              Procedures  Procedures       ED Course                               MDM  Number of Diagnoses or Management Options  Diagnosis management comments: 66-year-old male presenting for evaluation of right-sided toothache for the past month, will give pt IM toradol in the ED and send home with pcn, advised he will need to f/u with dentist as an outpatient, he has no overlying facial swelling or signs of cellulitis, afebrile, non toxic, no acute distress, f/u with pcp and dentist as an outpatient    strict return to ED precautions discussed  Pt verbalizes understanding and agrees with plan  Pt is stable for discharge    Portions of the record may have been created with voice recognition software  Occasional wrong word or "sound a like" substitutions may have occurred due to the inherent limitations of voice recognition software   Read the chart carefully and recognize, using context, where substitutions have occurred  Disposition  Final diagnoses:   Usman Gray     Time reflects when diagnosis was documented in both MDM as applicable and the Disposition within this note     Time User Action Codes Description Comment    9/19/2019  2:21 PM James Umanzor Nae [K08 [id number]] Usman Gray       ED Disposition     ED Disposition Condition Date/Time Comment    Discharge Stable Thu Sep 19, 2019  2:21 PM Rosana Paulino discharge to home/self care  Follow-up Information     Follow up With Specialties Details Why Contact Info Additional [address] Family Medicine Call in 1 day  [address], [address] [facility] [id number]  [address], [address], [address] [place], [place], [address] [place] [address]  Call in 1 day  [address] [address] [phone]           Patient's Medications   Discharge Prescriptions    NAPROXEN (NAPROSYN) 500 MG TABLET    Take 1 tablet (500 mg total) by mouth 2 (two) times a day with meals       Start Date: 9/19/2019 End Date: --       Order Dose: 500 mg       Quantity: 30 tablet    Refills: 0    PENICILLIN V POTASSIUM (VEETID) 500 MG TABLET    Take 1 tablet (500 mg total) by mouth 4 (four) times a day for 7 days       Start Date: 9/19/2019 End Date: 9/26/2019       Order Dose: 500 mg       Quantity: 28 tablet    Refills: 0     No discharge procedures on file      ED Provider  Electronically Signed by           Federica Hester PA-C  09/19/19 8997

## 2019-12-02 ENCOUNTER — OFFICE VISIT (OUTPATIENT)
Dept: FAMILY MEDICINE CLINIC | Facility: CLINIC | Age: 54
End: 2019-12-02

## 2019-12-02 VITALS
HEART RATE: 110 BPM | BODY MASS INDEX: 31.17 KG/M2 | RESPIRATION RATE: 16 BRPM | OXYGEN SATURATION: 96 % | DIASTOLIC BLOOD PRESSURE: 60 MMHG | SYSTOLIC BLOOD PRESSURE: 120 MMHG | WEIGHT: 230.1 LBS | HEIGHT: 72 IN | TEMPERATURE: 97.3 F

## 2019-12-02 DIAGNOSIS — R05.9 COUGH: ICD-10-CM

## 2019-12-02 DIAGNOSIS — Z23 NEED FOR VACCINATION: ICD-10-CM

## 2019-12-02 DIAGNOSIS — Z11.4 SCREENING FOR HIV (HUMAN IMMUNODEFICIENCY VIRUS): ICD-10-CM

## 2019-12-02 DIAGNOSIS — R73.03 PREDIABETES: ICD-10-CM

## 2019-12-02 DIAGNOSIS — E78.2 MIXED HYPERLIPIDEMIA: ICD-10-CM

## 2019-12-02 DIAGNOSIS — G47.09 OTHER INSOMNIA: Primary | ICD-10-CM

## 2019-12-02 DIAGNOSIS — Z74.8 ASSISTANCE WITH TRANSPORTATION: ICD-10-CM

## 2019-12-02 DIAGNOSIS — Z11.59 ENCOUNTER FOR HEPATITIS C SCREENING TEST FOR LOW RISK PATIENT: ICD-10-CM

## 2019-12-02 PROCEDURE — 99213 OFFICE O/P EST LOW 20 MIN: CPT | Performed by: FAMILY MEDICINE

## 2019-12-02 RX ORDER — DIPHENHYDRAMINE HCL 25 MG
25 TABLET ORAL
Qty: 30 TABLET | Refills: 4 | Status: SHIPPED | OUTPATIENT
Start: 2019-12-02 | End: 2020-03-24 | Stop reason: SDUPTHER

## 2019-12-02 RX ORDER — GUAIFENESIN/DEXTROMETHORPHAN 100-10MG/5
5 SYRUP ORAL 3 TIMES DAILY PRN
Qty: 118 ML | Refills: 0 | Status: SHIPPED | OUTPATIENT
Start: 2019-12-02 | End: 2020-12-01

## 2019-12-02 NOTE — ASSESSMENT & PLAN NOTE
Patient continues to have episodes of insomnia with mild improvement on Atarax   Patient states that he wants to try Benadryl as he was on it couple years ago in [place], he requests 50 mg dose, will start on 25 mg and reassess the need to adjust the dose  Sleep hygiene discussed with the patient  Referred to Sleep medicine for evaluation as he was not able to establish care with them based on previous referal

## 2019-12-02 NOTE — PROGRESS NOTES
Assessment/Plan:    Prediabetes  Last HgA1C from 9/2018 was 6 2  Diabetic diet discussed with patient, written instructions given to the patient  Will recheck HgA1C    Other insomnia  Patient continues to have episodes of insomnia with mild improvement on Atarax  Patient states that he wants to try Benadryl as he was on it couple years ago in [facility], he requests 50 mg dose, will start on 25 mg and reassess the need to adjust the dose  Sleep hygiene discussed with the patient  Referred to Sleep medicine for evaluation as he was not able to establish care with them based on previous referal       Diagnoses and all orders for this visit:    Other insomnia  -     diphenhydrAMINE (BENADRYL) 25 mg tablet; Take 1 tablet (25 mg total) by mouth daily at bedtime as needed for itching  -     Ambulatory referral to Sleep Medicine; Future  -     TSH, 3rd generation with Free T4 reflex; Future    Need for vaccination  -     influenza vaccine, [id number], quadrivalent, recombinant, PF, 0 5 mL, for patients 18 yr+ (FLUBLOK)    Assistance with transportation  -     Ambulatory referral to social work care management program; Future    Prediabetes  -     HEMOGLOBIN A1C W/ EAG ESTIMATION; Future    Screening for HIV (human immunodeficiency virus)  -     HIV 1/2 AG-AB combo; Future    Encounter for hepatitis C screening test for low risk patient  -     Hepatitis C antibody; Future    Mixed hyperlipidemia  -     Lipid panel; Future    Cough  -     dextromethorphan-guaiFENesin (ROBITUSSIN DM)  mg/5 mL syrup; Take 5 mL by mouth 3 (three) times a day as needed for cough          Subjective:      Patient ID: Saturnino Nyhan is a 62 y o  male  Patient presented to follow up on insomnia  He reports minimal improvement in symptoms  Patient was sent to Sleep Medicine on the previous visit, but he missed appointment and was not able to establish care  No new complains at this visit  He also requests refill on his medications        The following portions of the patient's history were reviewed and updated as appropriate: allergies, current medications, past family history, past medical history, past social history, past surgical history and problem list     Review of Systems   Constitutional: Negative for chills, diaphoresis, fatigue and fever  Eyes: Negative for photophobia, pain and discharge  Respiratory: Negative for cough, chest tightness, shortness of breath and wheezing  Cardiovascular: Negative for chest pain, palpitations and leg swelling  Gastrointestinal: Negative for abdominal distention, abdominal pain, blood in stool, constipation, diarrhea and nausea  Genitourinary: Negative for difficulty urinating and frequency  Musculoskeletal: Negative for joint swelling and neck stiffness  Skin: Negative for color change, pallor and rash  Neurological: Negative for dizziness, syncope, numbness and headaches  Psychiatric/Behavioral: Positive for decreased concentration and sleep disturbance  Objective:      /60 (BP Location: Left arm, Patient Position: Sitting, Cuff Size: Adult)   Pulse (!) 110   Temp (!) 97 3 °F (36 3 °C) (Tympanic)   Resp 16   Ht 6' (1 829 m)   Wt 104 kg (230 lb 1 6 oz)   SpO2 96%   BMI 31 21 kg/m²          Physical Exam   Constitutional: He is oriented to person, place, and time  He appears well-developed and well-nourished  No distress  HENT:   Head: Normocephalic and atraumatic  Cardiovascular: Normal rate, regular rhythm and normal heart sounds  Exam reveals no gallop and no friction rub  No murmur heard  Pulmonary/Chest: Effort normal and breath sounds normal  No respiratory distress  Abdominal: Soft  Bowel sounds are normal  He exhibits no distension  Musculoskeletal: He exhibits no edema or deformity  Neurological: He is alert and oriented to person, place, and time  Skin: Skin is warm and dry  Psychiatric: He has a normal mood and affect

## 2019-12-02 NOTE — PATIENT INSTRUCTIONS
Go for blood work on 12/03/19 on empty stomach    Lifestyle Medicine Tip Sheet    1  Eat predominantly less processed foods such as fast food, T V  dinners, and groves  2  Eat Close to [employer], [employer] or [employer]    3  Eat a predominantly plant based diet   a  Dark Leafy Greens  b  Fruits/Vegetables  c  Whole Grains: Whole wheat, barely, wheat berries, quinoa, steel cut oats, brown rice, whole wheat pasta  d  Legumes: kidney beans, nixon beans, white beans, black beans, garbanzo beans (chickpeas), lima beans (mature, dried), split peas, lentils, and edamame (green soybeans)      4  At least half of the plate should contain fruits or vegetables        5  Liquid should be predominantly water  (limit soda and juice)    6  Watch portion size  7  Foods you should avoid or limit?  - Fats - Specifically saturated and trans-fats  They are found in margarines, many fast foods, and some store-bought baked goods  Saturated and Trans-fats can raise your cholesterol level and your chance of getting heart disease    - When you cook, it's best to use no oils but if needed try to limit the amount of oil used as oil contains many calories per volume and is very unhealthy when heated during cooking    - Sugar -Limit or avoid sugar, sweets, and refined grains  Refined grains are found in white bread, white rice, most forms of pasta, and most packaged "snack" foods    - Try not to cook with salt and avoid  adding extra salt to your  meals   - Meat - Studies have shown that eating a lot of red meat and poultry can increase your risk of certain health problems, including heart disease, diabetes, obesity and cancer  So try to limit the intake of it  8  Practice good sleep hygiene by getting 7-9 hours of sleep a night    9  Daily exercise minimum of 30 minutes (walking around the block)    10  Socialization (friends and family)   - Explore your neighborhood   Go to the park, spend time at the [place]  - Consider taking a class or volunteering to connect with new people    If you are interested you can read more about healthy food choices at the following websites:  a  N[employer]rition[employer]  org  b  Home cooking recipes: https://ConsortiEX/  c  http://sheila info/  d  Familydoctor  org    Lifestyle Medicine Tip Sheet- Polish    11  Coma alimentos predominantemente menos procesados, prema comida rápida, cenas de televisión y tocino  12  Coma cerca de la naturaleza (mercados de agricultores, productos frescos o congelados)    13  Coma colton dieta predominantemente basada en plantas  a  Verdes frondosos oscuros pato   b  Frutas y vegetales  c   Granos integrales: nichelle integral, apenas, bayas de nichelle, quinua, virginia cortada en marti, arroz integral, pasta integral   d  Legumbres: frijoles, frijoles pintos, frijoles blancos, frijoles negros, garbanzos (garbanzos), frijoles lima (maduros, secos), arvejas, lentejas y edamame (frijoles de soya verdes)      15  Al menos la mitad del plato debe contener frutas o verduras  13  El líquido debe ser predominantemente agua (limite el refresco y [place])    12  Shala el tamaño de la porción  17  ¿Qué alimentos wagner evitar o limitar? - Grasas: Específicamente saturadas y grasas trans  Se encuentran en margarinas, muchas comidas rápidas y algunos productos horneados comprados en la joanna  Las grasas saturadas y grasas trans pueden elevar cheney nivel de colesterol y cheney probabilidad de contraer enfermedades cardíacas  - Cuando cocine, es mejor no usar aceites, annelise si es necesario, trate de limitar la cantidad de aceite utilizado, ya que el aceite contiene muchas calorías por volumen y es muy poco saludable cuando se calienta alecia la cocción   - Azúcar: limite o evite el azúcar, los dulces y los granos refinados   Los granos refinados se encuentran en el pan garcia, el arroz garcia, la [place] de las formas de pasta y la mayoría de los alimentos "aperitivos" envasados  - Intente no cocinar con jenni y evite agregar jenni adicional a nelia comidas  - Letty Andrea: los estudios callahan demostrado que comer mucha mikey Ross riesgo de ciertos problemas de [place], prema enfermedades cardíacas y cáncer  18  7-9 horas de sueño    19  Ejercicio diario mínimo de 30 minutos (caminando alrededor de la jean)    20  Socialización (amigos y familiares)    - Explora tu vecindario  Ve al parque, pasa tiempo en la biblioteca  Si está interesado, puede leer más sobre las opciones de alimentos saludables en los siguientes sitios web:  e  Nutritionfacts  org  f  Home cooking recipes: https://www gardeniaSensegonisaiah com/  g  http://sheila info/  h  Familydoctor  org

## 2019-12-11 ENCOUNTER — APPOINTMENT (EMERGENCY)
Dept: RADIOLOGY | Facility: HOSPITAL | Age: 54
End: 2019-12-11
Payer: MEDICARE

## 2019-12-11 ENCOUNTER — HOSPITAL ENCOUNTER (EMERGENCY)
Facility: HOSPITAL | Age: 54
Discharge: HOME | End: 2019-12-11
Attending: EMERGENCY MEDICINE | Admitting: EMERGENCY MEDICINE
Payer: MEDICARE

## 2019-12-11 VITALS
SYSTOLIC BLOOD PRESSURE: 127 MMHG | OXYGEN SATURATION: 95 % | BODY MASS INDEX: 30.32 KG/M2 | RESPIRATION RATE: 18 BRPM | WEIGHT: 223.55 LBS | DIASTOLIC BLOOD PRESSURE: 85 MMHG | TEMPERATURE: 96.8 F | HEART RATE: 85 BPM

## 2019-12-11 DIAGNOSIS — J40 BRONCHITIS: Primary | ICD-10-CM

## 2019-12-11 PROCEDURE — 99283 EMERGENCY DEPT VISIT LOW MDM: CPT

## 2019-12-11 PROCEDURE — 71046 X-RAY EXAM CHEST 2 VIEWS: CPT

## 2019-12-11 PROCEDURE — 99284 EMERGENCY DEPT VISIT MOD MDM: CPT | Performed by: EMERGENCY MEDICINE

## 2019-12-11 RX ORDER — PREDNISONE 20 MG/1
40 TABLET ORAL DAILY
Qty: 10 TABLET | Refills: 0 | Status: SHIPPED | OUTPATIENT
Start: 2019-12-11 | End: 2019-12-16

## 2019-12-11 RX ORDER — ALBUTEROL SULFATE 90 UG/1
2 AEROSOL, METERED RESPIRATORY (INHALATION) ONCE
Status: COMPLETED | OUTPATIENT
Start: 2019-12-11 | End: 2019-12-11

## 2019-12-11 RX ORDER — BENZONATATE 100 MG/1
100 CAPSULE ORAL ONCE
Status: COMPLETED | OUTPATIENT
Start: 2019-12-11 | End: 2019-12-11

## 2019-12-11 RX ORDER — BENZONATATE 100 MG/1
100 CAPSULE ORAL EVERY 8 HOURS
Qty: 21 CAPSULE | Refills: 0 | Status: SHIPPED | OUTPATIENT
Start: 2019-12-11 | End: 2020-03-24 | Stop reason: SDUPTHER

## 2019-12-11 RX ORDER — ALBUTEROL SULFATE 90 UG/1
1-2 AEROSOL, METERED RESPIRATORY (INHALATION) EVERY 6 HOURS PRN
Qty: 1 INHALER | Refills: 0 | Status: SHIPPED | OUTPATIENT
Start: 2019-12-11 | End: 2020-02-27 | Stop reason: SDUPTHER

## 2019-12-11 RX ADMIN — ALBUTEROL SULFATE 2 PUFF: 90 AEROSOL, METERED RESPIRATORY (INHALATION) at 13:23

## 2019-12-11 RX ADMIN — BENZONATATE 100 MG: 100 CAPSULE ORAL at 13:23

## 2019-12-11 NOTE — ED PROVIDER NOTES
History  Chief Complaint   Patient presents with    Cold Like Symptoms     1 weeks of cough and fever with head congestion with stuffy nose; wife sick for week also        History provided by:  Patient  Cough   Cough characteristics:  Non-productive  Sputum characteristics:  Nondescript  Severity:  Moderate  Onset quality:  Gradual  Timing:  Constant  Progression:  Worsening  Chronicity:  New  Relieved by:  Nothing  Worsened by:  Nothing  Ineffective treatments:  None tried  Associated symptoms: rhinorrhea, shortness of breath, sinus congestion and wheezing    Associated symptoms: no chest pain, no chills, no fever, no headaches, no myalgias, no rash and no sore throat        Prior to Admission Medications   Prescriptions Last Dose Informant Patient Reported?  Taking?   aspirin (ECOTRIN LOW STRENGTH) 81 mg EC tablet   Yes No   Sig: Take 81 mg by mouth daily   atenolol (TENORMIN) 50 mg tablet   No No   Sig: Take 1 tablet (50 mg total) by mouth daily   atorvastatin (LIPITOR) 40 mg tablet   No No   Sig: Take 1 tablet (40 mg total) by mouth daily   dextromethorphan-guaiFENesin (ROBITUSSIN DM)  mg/5 mL syrup   No No   Sig: Take 5 mL by mouth 3 (three) times a day as needed for cough   diphenhydrAMINE (BENADRYL) 25 mg tablet   No No   Sig: Take 1 tablet (25 mg total) by mouth daily at bedtime as needed for itching   docusate (COLACE) 60 MG/15ML syrup   No No   Sig: Take 15 mL (60 mg total) by mouth daily   Patient not taking: Reported on 2019   fluticasone (FLONASE) 50 mcg/act nasal spray   No No   Si spray into each nostril daily   gabapentin (NEURONTIN) 800 mg tablet   No No   Sig: Take 1 tablet (800 mg total) by mouth 3 (three) times a day   hydrocortisone 1 % ointment   No No   Sig: Apply topically 2 (two) times a day   ibuprofen (MOTRIN) 600 mg tablet   No No   Sig: Take 1 tablet (600 mg total) by mouth every 6 (six) hours as needed for moderate pain   metFORMIN (GLUCOPHAGE) 500 mg tablet   No No   Sig: Take 1 tablet (500 mg total) by mouth daily with breakfast   methocarbamol (ROBAXIN) 750 mg tablet   No No   Sig: Take 1 tablet (750 mg total) by mouth every 8 (eight) hours   naproxen (NAPROSYN) 500 mg tablet   No No   Sig: Take 0 5 tablets (250 mg total) by mouth 2 (two) times a day with meals   ondansetron (ZOFRAN-ODT) 4 mg disintegrating tablet   No No   Sig: Take 1 tablet (4 mg total) by mouth every 8 (eight) hours as needed for nausea or vomiting for up to 10 days   polyethylene glycol (MIRALAX) 17 g packet   No No   Sig: Take 17 g by mouth daily for 3 days   ranitidine (ZANTAC) 300 MG tablet   No No   Sig: Take 1 tablet (300 mg total) by mouth every 24 hours   simethicone (MYLICON) 731 MG chewable tablet   No No   Sig: Chew 1 tablet (125 mg total) every 6 (six) hours as needed for flatulence   tamsulosin (FLOMAX) 0 4 mg   No No   Sig: Take 1 capsule (0 4 mg total) by mouth daily with dinner      Facility-Administered Medications: None       Past Medical History:   Diagnosis Date    Diverticulitis     Hypertension        Past Surgical History:   Procedure Laterality Date    COLON SURGERY      NECK SURGERY         History reviewed  No pertinent family history  I have reviewed and agree with the history as documented  Social History     Tobacco Use    Smoking status: Current Every Day Smoker     Types: Cigars    Smokeless tobacco: Never Used    Tobacco comment: 1 cigar/daily   Substance Use Topics    Alcohol use: Yes     Frequency: 2-3 times a week     Drinks per session: 5 or 6     Binge frequency: Less than monthly     Comment: drinks only on weekends    Drug use: Never        Review of Systems   Constitutional: Negative for chills and fever  HENT: Positive for rhinorrhea  Negative for sore throat and trouble swallowing  Eyes: Negative for pain  Respiratory: Positive for cough, shortness of breath and wheezing  Negative for stridor      Cardiovascular: Negative for chest pain and leg swelling  Gastrointestinal: Negative for abdominal pain, diarrhea and nausea  Endocrine: Negative for polyuria  Genitourinary: Negative for dysuria, flank pain and urgency  Musculoskeletal: Negative for joint swelling, myalgias and neck stiffness  Skin: Negative for rash  Allergic/Immunologic: Negative for immunocompromised state  Neurological: Negative for dizziness, syncope, weakness, numbness and headaches  Psychiatric/Behavioral: Negative for confusion and suicidal ideas  All other systems reviewed and are negative  Physical Exam  Physical Exam   Constitutional: He is oriented to person, place, and time  He appears well-developed and well-nourished  HENT:   Head: Normocephalic and atraumatic  Eyes: Pupils are equal, round, and reactive to light  EOM are normal    Neck: Normal range of motion  Neck supple  Cardiovascular: Normal rate and regular rhythm  Exam reveals no friction rub  No murmur heard  Pulmonary/Chest: Breath sounds normal  No respiratory distress  He has no wheezes  He has no rales  Abdominal: Soft  Bowel sounds are normal  He exhibits no distension  There is no tenderness  Musculoskeletal: Normal range of motion  He exhibits no edema or tenderness  Neurological: He is alert and oriented to person, place, and time  Skin: Skin is warm  No rash noted  Psychiatric: He has a normal mood and affect  Nursing note and vitals reviewed        Vital Signs  ED Triage Vitals [12/11/19 1245]   Temperature Pulse Respirations Blood Pressure SpO2   (!) 96 8 °F (36 °C) 85 18 127/85 95 %      Temp Source Heart Rate Source Patient Position - Orthostatic VS BP Location FiO2 (%)   Tympanic Monitor Standing Left arm --      Pain Score       7           Vitals:    12/11/19 1245   BP: 127/85   Pulse: 85   Patient Position - Orthostatic VS: Standing         Visual Acuity      ED Medications  Medications   benzonatate (TESSALON PERLES) capsule 100 mg (100 mg Oral Given 12/11/19 1323)   albuterol (PROVENTIL HFA,VENTOLIN HFA) inhaler 2 puff (2 puffs Inhalation Given 12/11/19 1323)       Diagnostic Studies  Results Reviewed     None                 XR chest 2 views   ED Interpretation by Sp Villegas DO (12/11 1319)   NAD      Final Result by Karrie Burgess MD (12/11 2677)      No acute cardiopulmonary disease  Workstation performed: [id number]                    Procedures  Procedures         ED Course                               MDM  Number of Diagnoses or Management Options  Bronchitis: new and requires workup  Diagnosis management comments: A 78-year-old male who presents emergency department with noted symptoms of bronchitis  The patient has noted upper respiratory tract infection symptoms with noted rhinorrhea, cough, mild shortness of breath associated with walking  The patient has noted history of smoking  On examination he has as a mild expiratory wheeze  Chest x-ray performed shows viral bronchiolitis and bronchitis  Treatment as such with supportive care  Albuterol Tessalon Perles and steroids  Given strict instructions when to return back to the emergency department    No evidence of hypoxia not tachycardic in the emergency department resting comfortably    Pt re-examined and evaluated after testing and treatment  Spoke with the patient and feeling improved and sxs have resolved  Will discharge home with close f/u with pcp and instructed to return to the ED if sxs worsen or continue  Pt agrees with the plan for discharge and feels comfortable to go home with proper f/u  Advised to return for worsening or additional problems  Diagnostic tests were reviewed and questions answered  Diagnosis, care plan and treatment options were discussed  The patient understand instructions and will follow up as directed      Counseling: I had a detailed discussion with the patient and/or guardian regarding: the historical points, exam findings, and any diagnostic results supporting the discharge diagnosis, lab results, radiology results, discharge instructions reviewed with patient and/or family/caregiver and understanding was verbalized  Instructions given to return to the emergency department if symptoms worsen or persist, or if there are any questions or concerns that arise at home  All labs reviewed and utilized in the medical decision making process    All radiology studies independently viewed by me and interpreted by the radiologist        Amount and/or Complexity of Data Reviewed  Independent visualization of images, tracings, or specimens: yes          Disposition  Final diagnoses:   Bronchitis     Time reflects when diagnosis was documented in both MDM as applicable and the Disposition within this note     Time User Action Codes Description Comment    12/11/2019  1:32 PM Burgess Rausch Add [J40] Bronchitis       ED Disposition     ED Disposition Condition Date/Time Comment    Discharge Stable Wed Dec 11, 2019  1:32 PM Harjeet Moreno discharge to home/self care              Follow-up Information     Follow up With Specialties Details Why Contact Info Additional [address] Family Medicine Schedule an appointment as soon as possible for a visit   [address], [address] [facility] [id number]  [address], [address], [address] [place], [place], [address] [place] [address]          Discharge Medication List as of 12/11/2019  1:35 PM      START taking these medications    Details   albuterol (PROVENTIL HFA,VENTOLIN HFA) 90 mcg/act inhaler Inhale 1-2 puffs every 6 (six) hours as needed for wheezing, Starting Wed 12/11/2019, Normal      benzonatate (TESSALON PERLES) 100 mg capsule Take 1 capsule (100 mg total) by mouth every 8 (eight) hours, Starting Wed 12/11/2019, Normal      predniSONE 20 mg tablet Take 2 tablets (40 mg total) by mouth daily for 5 days, Starting Wed 12/11/2019, Until Mon 12/16/2019, Normal         CONTINUE these medications which have NOT CHANGED    Details   aspirin (ECOTRIN LOW STRENGTH) 81 mg EC tablet Take 81 mg by mouth daily, Historical Med      atenolol (TENORMIN) 50 mg tablet Take 1 tablet (50 mg total) by mouth daily, Starting Mon 8/5/2019, Normal      atorvastatin (LIPITOR) 40 mg tablet Take 1 tablet (40 mg total) by mouth daily, Starting Mon 8/5/2019, Normal      dextromethorphan-guaiFENesin (ROBITUSSIN DM)  mg/5 mL syrup Take 5 mL by mouth 3 (three) times a day as needed for cough, Starting Mon 12/2/2019, Normal      diphenhydrAMINE (BENADRYL) 25 mg tablet Take 1 tablet (25 mg total) by mouth daily at bedtime as needed for itching, Starting Mon 12/2/2019, Normal      docusate (COLACE) 60 MG/15ML syrup Take 15 mL (60 mg total) by mouth daily, Starting Wed 7/24/2019, Print      fluticasone (FLONASE) 50 mcg/act nasal spray 1 spray into each nostril daily, Starting Thu 4/25/2019, Normal      gabapentin (NEURONTIN) 800 mg tablet Take 1 tablet (800 mg total) by mouth 3 (three) times a day, Starting Mon 8/5/2019, Normal      hydrocortisone 1 % ointment Apply topically 2 (two) times a day, Starting Fri 3/8/2019, Normal      ibuprofen (MOTRIN) 600 mg tablet Take 1 tablet (600 mg total) by mouth every 6 (six) hours as needed for moderate pain, Starting Mon 7/22/2019, Print      metFORMIN (GLUCOPHAGE) 500 mg tablet Take 1 tablet (500 mg total) by mouth daily with breakfast, Starting Mon 8/5/2019, Normal      methocarbamol (ROBAXIN) 750 mg tablet Take 1 tablet (750 mg total) by mouth every 8 (eight) hours, Starting Fri 3/8/2019, Normal      naproxen (NAPROSYN) 500 mg tablet Take 0 5 tablets (250 mg total) by mouth 2 (two) times a day with meals, Starting Fri 3/8/2019, Normal      ondansetron (ZOFRAN-ODT) 4 mg disintegrating tablet Take 1 tablet (4 mg total) by mouth every 8 (eight) hours as needed for nausea or vomiting for up to 10 days, Starting HORACE Herrmann 7/24/2019, Until Sat 8/3/2019, Print      polyethylene glycol (MIRALAX) 17 g packet Take 17 g by mouth daily for 3 days, Starting Wed 7/24/2019, Until Sat 7/27/2019, Print      ranitidine (ZANTAC) 300 MG tablet Take 1 tablet (300 mg total) by mouth every 24 hours, Starting Mon 8/5/2019, Normal      simethicone (MYLICON) 059 MG chewable tablet Chew 1 tablet (125 mg total) every 6 (six) hours as needed for flatulence, Starting Mon 8/5/2019, Normal      tamsulosin (FLOMAX) 0 4 mg Take 1 capsule (0 4 mg total) by mouth daily with dinner, Starting Wed 5/29/2019, Normal           No discharge procedures on file      ED Provider  Electronically Signed by           Harsh Park DO  12/12/19 8889

## 2019-12-16 ENCOUNTER — PATIENT OUTREACH (OUTPATIENT)
Dept: FAMILY MEDICINE CLINIC | Facility: CLINIC | Age: 54
End: 2019-12-16

## 2019-12-16 DIAGNOSIS — Z74.8 ASSISTANCE NEEDED WITH TRANSPORTATION: Primary | ICD-10-CM

## 2019-12-17 NOTE — PROGRESS NOTES
ADI ALVARADO received a referral from patient's PCP regarding patient's need for transportation assistance and mental health resources  ADI ALVARADO contacted patient to assess  ADI ALVARADO communicated with patient in his native language, [place]  Patient reports challenges with lack of transportation to his medical appointments  Patient was agreeable to having a CHW conduct a home visit to assist with completion of a [place] application  Patient also reports his wife, who is also a [facility] patient, is  in need of transportation assistance  Patient also expressed interest in establishing mental health services in the community, specifically with Adrienne Boone  Patient requested the assistance of ADI ALVARADO with scheduling an appointment  ADI ALVARADO has agreed to schedule an appointment for patient  Patient states he is available for an appointment any day of the week after 10:00 AM  ADI ALVARADO will notify patient when appointment has been scheduled  ADI ALVARADO will remain available for additional assistance/support as needed

## 2019-12-19 ENCOUNTER — PATIENT OUTREACH (OUTPATIENT)
Dept: FAMILY MEDICINE CLINIC | Facility: CLINIC | Age: 54
End: 2019-12-19

## 2019-12-19 NOTE — PROGRESS NOTES
ADI ALVARADO contacted [facility] to schedule an appointment for patient  ADI ALVARADO informed patient would not be able to be scheduled as Medicare is not an accepted insurance  ADI ALVARADO understood  --    ADI ALVARADO contacted patient to inform him of information stated above  ADI ALVARADO communicated with patient in his native language, [place]  Patient was agreeable to being established with mental health at a different provider who accepts his insurance  ADI ALVARADO suggested [facility] as they accept Medicare, have [place] speaking therapists, and are in close proximity  Patient was agreeable to getting an appointment through [facility]  --    ADI ALVARADO contacted [facility] to schedule an intake appointment for patient  An appointment was successfully scheduled for Monday 12/23/19 at 11:00 AM  Patient instructed to bring his insurance card and ID     --    ADI ALVARADO contacted patient to inform him of appointment date and time listed above  Patient instructed to bring his insurance card and ID  Patient was also provided with the address and phone number for [facility]  Patient expressed gratitude  ADI ALVARADO will remain available for additional assistance/support as needed

## 2019-12-23 ENCOUNTER — PATIENT OUTREACH (OUTPATIENT)
Dept: FAMILY MEDICINE CLINIC | Facility: CLINIC | Age: 54
End: 2019-12-23

## 2019-12-23 NOTE — PROGRESS NOTES
Outgoing Call  12/23/2019    Community Health Worker called [facility] Cedeno regarding referral from Sherin JOSHI to assist patient with transportation assistance  Clinton Cedeno agrees services, and home visit was scheduled on 12/27/2019

## 2019-12-23 NOTE — PROGRESS NOTES
ADI ALVARADO received a voicemail from patient stating he was scheduled for an appointment today and would like a call back to reschedule  --    ADI ALVARADO contacted patient regarding voicemail that was left  ADI ALVARADO requested a phone call in return

## 2019-12-27 ENCOUNTER — PATIENT OUTREACH (OUTPATIENT)
Dept: FAMILY MEDICINE CLINIC | Facility: CLINIC | Age: 54
End: 2019-12-27

## 2019-12-30 NOTE — PROGRESS NOTES
Home Visit  12/27/2019    Community Health Worker went to visit [facility] at his Home to assist patient with [employer] Appilication; and [facility] wasn't home at the time scheduled  CHW called [facility] and tried to rescheduled a Home Visit, and [facility] wanted me to visit him at another address  CHW asked [facility] to call whenever he is available to scheduled a new Home Visit or see him at [facility]  No home visit scheduled

## 2020-01-02 DIAGNOSIS — R73.03 PREDIABETES: ICD-10-CM

## 2020-01-07 ENCOUNTER — PATIENT OUTREACH (OUTPATIENT)
Dept: FAMILY MEDICINE CLINIC | Facility: CLINIC | Age: 55
End: 2020-01-07

## 2020-01-09 NOTE — PROGRESS NOTES
Outgoing Call    CHW called [facility] to scheduled home visit  Hang Chavez did not answer his phone, CHW left voicemail asking [facility] Chavez to called back [phone]    CHW will mail Hang Chavez a letter on 1/17/2020 asking to contact me regarding a referral to assist with [place] application

## 2020-01-13 ENCOUNTER — HOSPITAL ENCOUNTER (EMERGENCY)
Facility: HOSPITAL | Age: 55
Discharge: HOME | End: 2020-01-13
Attending: EMERGENCY MEDICINE | Admitting: EMERGENCY MEDICINE
Payer: MEDICARE

## 2020-01-13 ENCOUNTER — APPOINTMENT (EMERGENCY)
Dept: CT IMAGING | Facility: HOSPITAL | Age: 55
End: 2020-01-13
Payer: MEDICARE

## 2020-01-13 ENCOUNTER — APPOINTMENT (EMERGENCY)
Dept: RADIOLOGY | Facility: HOSPITAL | Age: 55
End: 2020-01-13
Payer: MEDICARE

## 2020-01-13 VITALS
SYSTOLIC BLOOD PRESSURE: 121 MMHG | RESPIRATION RATE: 18 BRPM | BODY MASS INDEX: 29.45 KG/M2 | OXYGEN SATURATION: 97 % | HEART RATE: 74 BPM | TEMPERATURE: 97.3 F | DIASTOLIC BLOOD PRESSURE: 61 MMHG | WEIGHT: 217.15 LBS

## 2020-01-13 DIAGNOSIS — R42 DIZZINESS: Primary | ICD-10-CM

## 2020-01-13 LAB
ALBUMIN SERPL BCP-MCNC: 4.2 G/DL (ref 3–5.2)
ALP SERPL-CCNC: 98 U/L (ref 43–122)
ALT SERPL W P-5'-P-CCNC: 38 U/L (ref 9–52)
ANION GAP SERPL CALCULATED.3IONS-SCNC: 13 MMOL/L (ref 5–14)
AST SERPL W P-5'-P-CCNC: 27 U/L (ref 17–59)
BASOPHILS # BLD AUTO: 0 THOUSANDS/ΜL (ref 0–0.1)
BASOPHILS NFR BLD AUTO: 1 % (ref 0–1)
BILIRUB SERPL-MCNC: 0.5 MG/DL
BUN SERPL-MCNC: 11 MG/DL (ref 5–25)
CALCIUM SERPL-MCNC: 9.3 MG/DL (ref 8.4–10.2)
CHLORIDE SERPL-SCNC: 104 MMOL/L (ref 97–108)
CO2 SERPL-SCNC: 21 MMOL/L (ref 22–30)
CREAT SERPL-MCNC: 0.76 MG/DL (ref 0.7–1.5)
D DIMER PPP FEU-MCNC: 0.36 UG/ML FEU
EOSINOPHIL # BLD AUTO: 0.1 THOUSAND/ΜL (ref 0–0.4)
EOSINOPHIL NFR BLD AUTO: 1 % (ref 0–6)
ERYTHROCYTE [DISTWIDTH] IN BLOOD BY AUTOMATED COUNT: 15.2 %
GFR SERPL CREATININE-BSD FRML MDRD: 102 ML/MIN/1.73SQ M
GLUCOSE SERPL-MCNC: 223 MG/DL (ref 70–99)
HCT VFR BLD AUTO: 44 % (ref 41–53)
HGB BLD-MCNC: 14.9 G/DL (ref 13.5–17.5)
LYMPHOCYTES # BLD AUTO: 2.2 THOUSANDS/ΜL (ref 0.5–4)
LYMPHOCYTES NFR BLD AUTO: 30 % (ref 25–45)
MCH RBC QN AUTO: 30.9 PG (ref 26–34)
MCHC RBC AUTO-ENTMCNC: 33.9 G/DL (ref 31–36)
MCV RBC AUTO: 91 FL (ref 80–100)
MONOCYTES # BLD AUTO: 0.6 THOUSAND/ΜL (ref 0.2–0.9)
MONOCYTES NFR BLD AUTO: 8 % (ref 1–10)
NEUTROPHILS # BLD AUTO: 4.5 THOUSANDS/ΜL (ref 1.8–7.8)
NEUTS SEG NFR BLD AUTO: 61 % (ref 45–65)
PLATELET # BLD AUTO: 202 THOUSANDS/UL (ref 150–450)
PMV BLD AUTO: 10.2 FL (ref 8.9–12.7)
POTASSIUM SERPL-SCNC: 4.1 MMOL/L (ref 3.6–5)
PROT SERPL-MCNC: 8 G/DL (ref 5.9–8.4)
RBC # BLD AUTO: 4.82 MILLION/UL (ref 4.5–5.9)
SODIUM SERPL-SCNC: 138 MMOL/L (ref 137–147)
TROPONIN I SERPL-MCNC: <0.01 NG/ML (ref 0–0.03)
TROPONIN I SERPL-MCNC: <0.01 NG/ML (ref 0–0.03)
WBC # BLD AUTO: 7.4 THOUSAND/UL (ref 4.5–11)

## 2020-01-13 PROCEDURE — 70450 CT HEAD/BRAIN W/O DYE: CPT

## 2020-01-13 PROCEDURE — 71046 X-RAY EXAM CHEST 2 VIEWS: CPT

## 2020-01-13 PROCEDURE — 80053 COMPREHEN METABOLIC PANEL: CPT | Performed by: EMERGENCY MEDICINE

## 2020-01-13 PROCEDURE — 96360 HYDRATION IV INFUSION INIT: CPT

## 2020-01-13 PROCEDURE — 36415 COLL VENOUS BLD VENIPUNCTURE: CPT | Performed by: EMERGENCY MEDICINE

## 2020-01-13 PROCEDURE — 99285 EMERGENCY DEPT VISIT HI MDM: CPT

## 2020-01-13 PROCEDURE — 85025 COMPLETE CBC W/AUTO DIFF WBC: CPT | Performed by: EMERGENCY MEDICINE

## 2020-01-13 PROCEDURE — 99284 EMERGENCY DEPT VISIT MOD MDM: CPT | Performed by: EMERGENCY MEDICINE

## 2020-01-13 PROCEDURE — 84484 ASSAY OF TROPONIN QUANT: CPT | Performed by: EMERGENCY MEDICINE

## 2020-01-13 PROCEDURE — 93005 ELECTROCARDIOGRAM TRACING: CPT

## 2020-01-13 PROCEDURE — 96361 HYDRATE IV INFUSION ADD-ON: CPT

## 2020-01-13 PROCEDURE — 85379 FIBRIN DEGRADATION QUANT: CPT | Performed by: EMERGENCY MEDICINE

## 2020-01-13 RX ORDER — MECLIZINE HYDROCHLORIDE 25 MG/1
25 TABLET ORAL 3 TIMES DAILY PRN
Qty: 30 TABLET | Refills: 0 | Status: SHIPPED | OUTPATIENT
Start: 2020-01-13 | End: 2020-03-24 | Stop reason: SDUPTHER

## 2020-01-13 RX ORDER — MECLIZINE HCL 12.5 MG/1
25 TABLET ORAL ONCE
Status: COMPLETED | OUTPATIENT
Start: 2020-01-13 | End: 2020-01-13

## 2020-01-13 RX ORDER — AMOXICILLIN AND CLAVULANATE POTASSIUM 875; 125 MG/1; MG/1
1 TABLET, FILM COATED ORAL EVERY 12 HOURS
Qty: 20 TABLET | Refills: 0 | Status: SHIPPED | OUTPATIENT
Start: 2020-01-13 | End: 2020-01-23

## 2020-01-13 RX ADMIN — MECLIZINE HYDROCHLORIDE 25 MG: 12.5 TABLET ORAL at 11:15

## 2020-01-13 RX ADMIN — SODIUM CHLORIDE 1000 ML: 0.9 INJECTION, SOLUTION INTRAVENOUS at 11:29

## 2020-01-13 NOTE — ED PROVIDER NOTES
History  Chief Complaint   Patient presents with    Dizziness     Patient complaining of dizziness that started yesterday, also stating, "I'm losing my air", has cough and chest feels tight  61 yo male with no significant past medical history presents with dizziness x 1 day  The patient reports intermittent episodes of "the room spinning" since yesterday afternoon  No falls or syncopal events  He is also experiencing anterior chest tightness, a cough productive of "yellow" sputum, and mild exertional shortness of breath x 3-4 days  No LE swelling/pain  No hemoptysis  He denies N/V/D  No diaphoresis  No earache or headache  No other specific complaints  Prior to Admission Medications   Prescriptions Last Dose Informant Patient Reported? Taking?    albuterol (PROVENTIL HFA,VENTOLIN HFA) 90 mcg/act inhaler   No No   Sig: Inhale 1-2 puffs every 6 (six) hours as needed for wheezing   aspirin (ECOTRIN LOW STRENGTH) 81 mg EC tablet   Yes No   Sig: Take 81 mg by mouth daily   atenolol (TENORMIN) 50 mg tablet   No No   Sig: Take 1 tablet (50 mg total) by mouth daily   atorvastatin (LIPITOR) 40 mg tablet   No No   Sig: Take 1 tablet (40 mg total) by mouth daily   benzonatate (TESSALON PERLES) 100 mg capsule   No No   Sig: Take 1 capsule (100 mg total) by mouth every 8 (eight) hours   dextromethorphan-guaiFENesin (ROBITUSSIN DM)  mg/5 mL syrup   No No   Sig: Take 5 mL by mouth 3 (three) times a day as needed for cough   diphenhydrAMINE (BENADRYL) 25 mg tablet   No No   Sig: Take 1 tablet (25 mg total) by mouth daily at bedtime as needed for itching   docusate (COLACE) 60 MG/15ML syrup   No No   Sig: Take 15 mL (60 mg total) by mouth daily   Patient not taking: Reported on 2019   fluticasone (FLONASE) 50 mcg/act nasal spray   No No   Si spray into each nostril daily   gabapentin (NEURONTIN) 800 mg tablet   No No   Sig: Take 1 tablet (800 mg total) by mouth 3 (three) times a day   hydrocortisone 1 % ointment   No No   Sig: Apply topically 2 (two) times a day   ibuprofen (MOTRIN) 600 mg tablet   No No   Sig: Take 1 tablet (600 mg total) by mouth every 6 (six) hours as needed for moderate pain   metFORMIN (GLUCOPHAGE) 500 mg tablet   No No   Sig: take 1 tablet by mouth once daily WITH BREAKFAST   methocarbamol (ROBAXIN) 750 mg tablet   No No   Sig: Take 1 tablet (750 mg total) by mouth every 8 (eight) hours   naproxen (NAPROSYN) 500 mg tablet   No No   Sig: Take 0 5 tablets (250 mg total) by mouth 2 (two) times a day with meals   ondansetron (ZOFRAN-ODT) 4 mg disintegrating tablet   No No   Sig: Take 1 tablet (4 mg total) by mouth every 8 (eight) hours as needed for nausea or vomiting for up to 10 days   polyethylene glycol (MIRALAX) 17 g packet   No No   Sig: Take 17 g by mouth daily for 3 days   ranitidine (ZANTAC) 300 MG tablet   No No   Sig: Take 1 tablet (300 mg total) by mouth every 24 hours   simethicone (MYLICON) 153 MG chewable tablet   No No   Sig: Chew 1 tablet (125 mg total) every 6 (six) hours as needed for flatulence   tamsulosin (FLOMAX) 0 4 mg   No No   Sig: Take 1 capsule (0 4 mg total) by mouth daily with dinner      Facility-Administered Medications: None       Past Medical History:   Diagnosis Date    Diverticulitis     Hypertension        Past Surgical History:   Procedure Laterality Date    COLON SURGERY      NECK SURGERY         History reviewed  No pertinent family history  I have reviewed and agree with the history as documented  Social History     Tobacco Use    Smoking status: Current Every Day Smoker     Types: Cigars    Smokeless tobacco: Never Used    Tobacco comment: 1 cigar/daily   Substance Use Topics    Alcohol use: Yes     Frequency: 2-3 times a week     Drinks per session: 5 or 6     Binge frequency: Less than monthly     Comment: drinks only on weekends    Drug use: Never        Review of Systems   Constitutional: Negative for chills and fever     HENT: Positive for congestion, postnasal drip and rhinorrhea  Negative for sinus pressure, sinus pain and sore throat  Respiratory: Positive for cough, chest tightness and shortness of breath  Negative for wheezing  Cardiovascular: Positive for chest pain  Negative for palpitations  Gastrointestinal: Negative for abdominal pain, diarrhea, nausea and vomiting  Endocrine: Negative for cold intolerance and heat intolerance  Genitourinary: Negative for dysuria and flank pain  Musculoskeletal: Negative for back pain  Skin: Negative for rash  Allergic/Immunologic: Negative for immunocompromised state  Neurological: Positive for dizziness  Negative for syncope, light-headedness, numbness and headaches  Hematological: Negative for adenopathy  Psychiatric/Behavioral: The patient is not nervous/anxious  Physical Exam  Physical Exam   Constitutional: He is oriented to person, place, and time  He appears well-developed and well-nourished  No distress  HENT:   Head: Normocephalic and atraumatic  Eyes: Pupils are equal, round, and reactive to light  Conjunctivae and EOM are normal    Neck: Normal range of motion  Neck supple  Cardiovascular: Normal rate and regular rhythm  Pulmonary/Chest: Effort normal and breath sounds normal  No respiratory distress  Abdominal: Soft  He exhibits no distension  There is no tenderness  Musculoskeletal: Normal range of motion  He exhibits no edema  Neurological: He is alert and oriented to person, place, and time  He has normal strength and normal reflexes  No cranial nerve deficit or sensory deficit  He displays a negative Romberg sign  Skin: Skin is warm and dry  Psychiatric: He has a normal mood and affect         Vital Signs  ED Triage Vitals   Temperature Pulse Respirations Blood Pressure SpO2   01/13/20 1034 01/13/20 1034 01/13/20 1034 01/13/20 1034 01/13/20 1034   (!) 97 1 °F (36 2 °C) 84 18 123/86 97 %      Temp Source Heart Rate Source Patient Position - Orthostatic VS BP Location FiO2 (%)   01/13/20 1034 01/13/20 1034 01/13/20 1034 01/13/20 1034 --   Tympanic Monitor Sitting Left arm       Pain Score       01/13/20 1331       No Pain           Vitals:    01/13/20 1034 01/13/20 1331   BP: 123/86 121/61   Pulse: 84 74   Patient Position - Orthostatic VS: Sitting Lying         Visual Acuity  Visual Acuity      Most Recent Value   L Pupil Size (mm)  2   R Pupil Size (mm)  2          ED Medications  Medications   sodium chloride 0 9 % bolus 1,000 mL (0 mL Intravenous Stopped 1/13/20 1312)   meclizine (ANTIVERT) tablet 25 mg (25 mg Oral Given 1/13/20 1115)       Diagnostic Studies  Results Reviewed     Procedure Component Value Units Date/Time    Troponin I [[id number]]  (Normal) Collected:  01/13/20 1500    Lab Status:  Final result Specimen:  Blood from Arm, Right Updated:  01/13/20 1536     Troponin I <0 01 ng/mL     D-Dimer [[id number]]  (Normal) Collected:  01/13/20 1048    Lab Status:  Final result Specimen:  Blood from Arm, Right Updated:  01/13/20 1148     D-Dimer, Quant 0 36 ug/ml FEU     Troponin I [[id number]]  (Normal) Collected:  01/13/20 1048    Lab Status:  Final result Specimen:  Blood from Arm, Right Updated:  01/13/20 1145     Troponin I <0 01 ng/mL     CBC and differential [[id number]]  (Normal) Collected:  01/13/20 1048    Lab Status:  Final result Specimen:  Blood from Arm, Right Updated:  01/13/20 1138     WBC 7 40 Thousand/uL      RBC 4 82 Million/uL      Hemoglobin 14 9 g/dL      Hematocrit 44 0 %      MCV 91 fL      MCH 30 9 pg      MCHC 33 9 g/dL      RDW 15 2 %      MPV 10 2 fL      Platelets 178 Thousands/uL      Neutrophils Relative 61 %      Lymphocytes Relative 30 %      Monocytes Relative 8 %      Eosinophils Relative 1 %      Basophils Relative 1 %      Neutrophils Absolute 4 50 Thousands/µL      Lymphocytes Absolute 2 20 Thousands/µL      Monocytes Absolute 0 60 Thousand/µL      Eosinophils Absolute 0 10 Thousand/µL      Basophils Absolute 0 00 Thousands/µL     Comprehensive metabolic panel [[id number]]  (Abnormal) Collected:  01/13/20 1048    Lab Status:  Final result Specimen:  Blood from Arm, Right Updated:  01/13/20 1134     Sodium 138 mmol/L      Potassium 4 1 mmol/L      Chloride 104 mmol/L      CO2 21 mmol/L      ANION GAP 13 mmol/L      BUN 11 mg/dL      Creatinine 0 76 mg/dL      Glucose 223 mg/dL      Calcium 9 3 mg/dL      AST 27 U/L      ALT 38 U/L      Alkaline Phosphatase 98 U/L      Total Protein 8 0 g/dL      Albumin 4 2 g/dL      Total Bilirubin 0 50 mg/dL      eGFR 102 ml/min/1 73sq m     Narrative:       [place] guidelines for Chronic Kidney Disease (CKD):     Stage 1 with normal or high GFR (GFR > 90 mL/min/1 73 square meters)    Stage 2 Mild CKD (GFR = 60-89 mL/min/1 73 square meters)    Stage 3A Moderate CKD (GFR = 45-59 mL/min/1 73 square meters)    Stage 3B Moderate CKD (GFR = 30-44 mL/min/1 73 square meters)    Stage 4 Severe CKD (GFR = 15-29 mL/min/1 73 square meters)    Stage 5 End Stage CKD (GFR <15 mL/min/1 73 square meters)  Note: GFR calculation is accurate only with a steady state creatinine                 XR chest 2 views   Final Result by Nancy Ayala MD (01/13 1422)      No acute cardiopulmonary disease  Workstation performed: [id number]         CT head without contrast   Final Result by Susi Hester MD (01/13 1257)      No acute intracranial abnormality  Left mastoid effusion              Workstation performed: [id number]                    Procedures  ECG 12 Lead Documentation Only  Date/Time: 1/13/2020 10:54 AM  Performed by: Ryland Napoles MD  Authorized by: Ryland Napoles MD     Indications / Diagnosis:  Chest tightness, sob, dizziness  ECG reviewed by me, the ED Provider: yes    Patient location:  ED  Interpretation:     Interpretation: normal    Rate:     ECG rate:  75 bpm    ECG rate assessment: normal    Rhythm:     Rhythm: sinus rhythm Ectopy:     Ectopy: none    QRS:     QRS axis:  Normal  Conduction:     Conduction: normal    ST segments:     ST segments:  Normal  T waves:     T waves: normal               ED Course         HEART Risk Score      Most Recent Value   History  0 Filed at: 01/13/2020 1229   ECG  0 Filed at: 01/13/2020 1229   Age  1 Filed at: 01/13/2020 1229   Risk Factors  0 Filed at: 01/13/2020 1229   Troponin  0 Filed at: 01/13/2020 1229   Heart Score Risk Calculator   History  0 Filed at: 01/13/2020 1229   ECG  0 Filed at: 01/13/2020 1229   Age  1 Filed at: 01/13/2020 1229   Risk Factors  0 Filed at: 01/13/2020 1229   Troponin  0 Filed at: 01/13/2020 1229   HEART Score  1 Filed at: 01/13/2020 1229   HEART Score  1 Filed at: 01/13/2020 1229                            MDM  Number of Diagnoses or Management Options  Dizziness:   Diagnosis management comments: The patient is well appearing with a benign exam and stable vital signs  Unclear etiology of his symptoms  Will check EKG, CXR, basic labs, troponin, d dimer, and head CT  IVFs/Meclizine administered, will continue to monitor in the ED  16:00 All symptoms resolved  Workup only remarkable for a mastoid effusion  The patient says he feels "much better" and is requesting discharge  Plan for meclizine prn and a course of Augmentin for a possible sinusitis  The patient was instructed to follow up with his PCP later this week  He is agreeable to this plan  Strict return precautions provided         Amount and/or Complexity of Data Reviewed  Clinical lab tests: reviewed and ordered  Tests in the radiology section of CPT®: ordered and reviewed  Tests in the medicine section of CPT®: ordered and reviewed    Patient Progress  Patient progress: improved        Disposition  Final diagnoses:   Dizziness     Time reflects when diagnosis was documented in both MDM as applicable and the Disposition within this note     Time User Action Codes Description Comment    1/13/2020  4:03 PM Domonique Piña Add [R42] Dizziness       ED Disposition     ED Disposition Condition Date/Time Comment    Discharge Stable Mon Jan 13, 2020  4:03 PM Erika Kwon discharge to home/self care  Follow-up Information     Follow up With Specialties Details Why Contact Info    your family doctor  Schedule an appointment as soon as possible for a visit             Patient's Medications   Discharge Prescriptions    AMOXICILLIN-CLAVULANATE (AUGMENTIN) 875-125 MG PER TABLET    Take 1 tablet by mouth every 12 (twelve) hours for 10 days       Start Date: 1/13/2020 End Date: 1/23/2020       Order Dose: 1 tablet       Quantity: 20 tablet    Refills: 0    MECLIZINE (ANTIVERT) 25 MG TABLET    Take 1 tablet (25 mg total) by mouth 3 (three) times a day as needed for dizziness       Start Date: 1/13/2020 End Date: --       Order Dose: 25 mg       Quantity: 30 tablet    Refills: 0     No discharge procedures on file      ED Provider  Electronically Signed by           Amber Rosales MD  01/13/20 5906

## 2020-01-13 NOTE — ED NOTES
Pt up to the restroom with normal ambulation   No complaints at this time       Precious Morales, RN  01/13/20 6744

## 2020-01-14 ENCOUNTER — PATIENT OUTREACH (OUTPATIENT)
Dept: FAMILY MEDICINE CLINIC | Facility: CLINIC | Age: 55
End: 2020-01-14

## 2020-01-14 LAB
ATRIAL RATE: 75 BPM
P AXIS: 55 DEGREES
PR INTERVAL: 156 MS
QRS AXIS: 1 DEGREES
QRSD INTERVAL: 82 MS
QT INTERVAL: 388 MS
QTC INTERVAL: 433 MS
T WAVE AXIS: 17 DEGREES
VENTRICULAR RATE: 75 BPM

## 2020-01-14 PROCEDURE — 93010 ELECTROCARDIOGRAM REPORT: CPT | Performed by: INTERNAL MEDICINE

## 2020-01-16 NOTE — PROGRESS NOTES
Assessment/Plan:    Decreased hearing of left ear  -Patient has been experiencing hearing loss for several months  -Referral to ENT for evaluation of hearing loss    Disorder of middle ear or mastoid, left  -left mastoid effusion noted on CT on 1/13/2020  -Advised to take Augmentin as ordered from emergency room      Need for vaccination   I have recommended that this patient have a flu shot but he declines at this time  I have discussed the risks and benefits of this vaccination with him  The patient verbalizes understanding  Advised patient to complete blood work ordered previously by PCP  Patient is aware to RTC for worsening sx, fever, ear pain, or discharge  Return in about 4 weeks (around 2/14/2020) for FU hearing loss   Patient Instructions   Hearing Loss   WHAT YOU NEED TO KNOW:   Hearing loss means you have trouble hearing or you cannot hear at all in one or both ears  Hearing loss can happen suddenly or slowly over time  DISCHARGE INSTRUCTIONS:   Return to the emergency department if:   · You have fluid, pus, or blood leaking from your ear  · You have sudden, severe hearing loss  Contact your healthcare provider if:   · You have a fever  · You have ear pain that is getting worse  · You have ringing in your ears or dizziness that will not go away  · You have questions or concerns about your condition or care  Manage your hearing loss:   · Protect your hearing  Use ear plugs or ear protectors if you do activities that are very loud  These include using a lawnmower and power tools or going to a concert that has loud music  Use well-fitting foam earplugs that completely block your ear canal  Do not listen to loud music through headphones or earphones  · If you have hearing aids, wear them regularly  Talk to your healthcare provider if you are having trouble using your hearing aid  · Ask about cochlear implants    If hearing aids do not help you, talk to your healthcare provider  Cochlear implants may help you hear better  A cochlear implant is a tiny device that is put into your cochlea (part of your inner ear) during surgery  · Assistive listening devices  (ALDs) pic up sound and send it through earphones or a headset  ALDs can help you hear better when you are in a place with background noise  Examples include theaters, classrooms, or auditoriums  ALDs are also available for phones  ALDs can be used alone or with hearing aids or cochlear implants  · Tell people that you have hearing loss  Ask people to face you directly when they speak to you, and to slow down if they are speaking too fast  When you are in a group setting, sit in a location where you can clearly see the faces of the people who are speaking  Ask people not to speak loudly or shout when they are speaking to you  Try to talk with others in a quiet place  Background noise makes it harder for you to hear  · Pay close attention to your surroundings when you drive  Do not talk to people in your car while you are driving  Watch for problems on the road or approaching emergency vehicles  Follow up with your healthcare provider or audiologist as directed:  Write down your questions so you remember to ask them during your visits  © 2017 [employer] Information is for End User's use only and may not be sold, redistributed or otherwise used for commercial purposes  All illustrations and images included in CareNotes® are the copyrighted property of A D [employer] M [employer]  or Bashir Gonzales  The above information is an  only  It is not intended as medical advice for individual conditions or treatments  Talk to your doctor, nurse or pharmacist before following any medical regimen to see if it is safe and effective for you            Diagnoses and all orders for this visit:    Need for vaccination  -     Cancel: influenza vaccine, 5608-4040, quadrivalent, recombinant, PF, 0 5 mL, for patients 18 yr+ (FLUBLOK)    Decreased hearing of left ear  -     Ambulatory Referral to Otolaryngology; Future    Disorder of middle ear or mastoid, left  -     Ambulatory Referral to Otolaryngology; Future    Other orders  -     hydrOXYzine HCL (ATARAX) 50 mg tablet          Subjective:     Elray Homans is a 62 y o  male who  has a past medical history of Diverticulitis and Hypertension  who presented to the office today for follow up  HPI  Patient presents for follow up after emergency room visit on 1/13/2020 for dizziness  ACS workup was negative for cardiac issue  CT of head showed left mastoid effusion  Patient was given meclizine and discharged with antibx for tx of possible sinusitis  The patient reports today that he is not having any dizziness  He is experiencing decreased hearing to right left ear  He notes that he has been experiencing this for several months  He is unsure of exactly how long  He reports that he is having difficulty hearing higher pitched voices  He feels like he has constant "popping" in the left ear  He denies tinnitus, he denies ear drainage, or ear pain  He denies fevers  He denies congestion  He denies sinus pressure or pain  He does report that he has not taken the antibiotic given to him by the emergency department       The following portions of the patient's history were reviewed and updated as appropriate: allergies, current medications, past family history, past medical history, past social history, past surgical history and problem list     Current Outpatient Medications on File Prior to Visit   Medication Sig Dispense Refill    albuterol (PROVENTIL HFA,VENTOLIN HFA) 90 mcg/act inhaler Inhale 1-2 puffs every 6 (six) hours as needed for wheezing 1 Inhaler 0    amoxicillin-clavulanate (AUGMENTIN) 875-125 mg per tablet Take 1 tablet by mouth every 12 (twelve) hours for 10 days 20 tablet 0    aspirin (ECOTRIN LOW STRENGTH) 81 mg EC tablet Take 81 mg by mouth daily      atenolol (TENORMIN) 50 mg tablet Take 1 tablet (50 mg total) by mouth daily 90 tablet 3    atorvastatin (LIPITOR) 40 mg tablet Take 1 tablet (40 mg total) by mouth daily 90 tablet 1    benzonatate (TESSALON PERLES) 100 mg capsule Take 1 capsule (100 mg total) by mouth every 8 (eight) hours 21 capsule 0    dextromethorphan-guaiFENesin (ROBITUSSIN DM)  mg/5 mL syrup Take 5 mL by mouth 3 (three) times a day as needed for cough 118 mL 0    diphenhydrAMINE (BENADRYL) 25 mg tablet Take 1 tablet (25 mg total) by mouth daily at bedtime as needed for itching 30 tablet 4    docusate (COLACE) 60 MG/15ML syrup Take 15 mL (60 mg total) by mouth daily (Patient not taking: Reported on 8/5/2019) 100 mL 0    fluticasone (FLONASE) 50 mcg/act nasal spray 1 spray into each nostril daily 16 g 0    gabapentin (NEURONTIN) 800 mg tablet Take 1 tablet (800 mg total) by mouth 3 (three) times a day 90 tablet 3    hydrocortisone 1 % ointment Apply topically 2 (two) times a day 30 g 0    hydrOXYzine HCL (ATARAX) 50 mg tablet       ibuprofen (MOTRIN) 600 mg tablet Take 1 tablet (600 mg total) by mouth every 6 (six) hours as needed for moderate pain 30 tablet 0    meclizine (ANTIVERT) 25 mg tablet Take 1 tablet (25 mg total) by mouth 3 (three) times a day as needed for dizziness 30 tablet 0    metFORMIN (GLUCOPHAGE) 500 mg tablet take 1 tablet by mouth once daily WITH BREAKFAST 30 tablet 1    methocarbamol (ROBAXIN) 750 mg tablet Take 1 tablet (750 mg total) by mouth every 8 (eight) hours 90 tablet 2    naproxen (NAPROSYN) 500 mg tablet Take 0 5 tablets (250 mg total) by mouth 2 (two) times a day with meals 30 tablet 2    ondansetron (ZOFRAN-ODT) 4 mg disintegrating tablet Take 1 tablet (4 mg total) by mouth every 8 (eight) hours as needed for nausea or vomiting for up to 10 days 20 tablet 0    polyethylene glycol (MIRALAX) 17 g packet Take 17 g by mouth daily for 3 days 14 each 0    ranitidine (ZANTAC) 300 MG tablet Take 1 tablet (300 mg total) by mouth every 24 hours 30 tablet 3    simethicone (MYLICON) 137 MG chewable tablet Chew 1 tablet (125 mg total) every 6 (six) hours as needed for flatulence 30 tablet 1    tamsulosin (FLOMAX) 0 4 mg Take 1 capsule (0 4 mg total) by mouth daily with dinner 30 capsule 3     No current facility-administered medications on file prior to visit  Review of Systems   Constitutional: Negative for activity change, appetite change, chills, fatigue, fever and unexpected weight change  HENT: Positive for hearing loss  Negative for congestion, ear discharge, ear pain, facial swelling, nosebleeds, postnasal drip, rhinorrhea, sinus pain, sneezing, sore throat and trouble swallowing  Eyes: Negative for photophobia and visual disturbance  Respiratory: Negative for cough, chest tightness, shortness of breath and wheezing  Cardiovascular: Negative for chest pain, palpitations and leg swelling  Gastrointestinal: Negative for abdominal pain, constipation, nausea and vomiting  Genitourinary: Negative for decreased urine volume, difficulty urinating, dysuria, flank pain and urgency  Musculoskeletal: Negative for back pain and gait problem  Skin: Negative for rash and wound  Neurological: Negative for dizziness, seizures, syncope, weakness, numbness and headaches  Hematological: Negative for adenopathy  Does not bruise/bleed easily  Objective:    /68 (BP Location: Left arm, Patient Position: Sitting, Cuff Size: Large)   Pulse 84   Temp 97 6 °F (36 4 °C) (Temporal)   Resp 18   Ht 6' (1 829 m)   Wt 99 3 kg (219 lb)   SpO2 98%   BMI 29 70 kg/m²     Physical Exam   Constitutional: He is oriented to person, place, and time  He appears well-developed and well-nourished  No distress  HENT:   Head: Normocephalic and atraumatic  Right Ear: Tympanic membrane normal  No decreased hearing is noted  Left Ear: Tympanic membrane is injected  Decreased hearing is noted  Nose: Nose normal    Mouth/Throat: Oropharynx is clear and moist  No oropharyngeal exudate  Eyes: Pupils are equal, round, and reactive to light  EOM are normal    Neck: Normal range of motion  Neck supple  Cardiovascular: Normal rate, regular rhythm, normal heart sounds and intact distal pulses  No murmur heard  Pulmonary/Chest: Effort normal and breath sounds normal  No respiratory distress  He has no wheezes  Abdominal: Soft  Bowel sounds are normal  He exhibits no distension  There is no tenderness  Musculoskeletal: Normal range of motion  He exhibits no edema or deformity  Lymphadenopathy:     He has no cervical adenopathy  Neurological: He is alert and oriented to person, place, and time  He displays normal reflexes  No sensory deficit  Coordination normal    Skin: Skin is warm and dry  Capillary refill takes less than 2 seconds  No rash noted  He is not diaphoretic  Psychiatric: He has a normal mood and affect  His behavior is normal    Nursing note and vitals reviewed        RAMO Doe  01/19/20  11:49 AM

## 2020-01-17 ENCOUNTER — OFFICE VISIT (OUTPATIENT)
Dept: FAMILY MEDICINE CLINIC | Facility: CLINIC | Age: 55
End: 2020-01-17

## 2020-01-17 ENCOUNTER — PATIENT OUTREACH (OUTPATIENT)
Dept: FAMILY MEDICINE CLINIC | Facility: CLINIC | Age: 55
End: 2020-01-17

## 2020-01-17 VITALS
TEMPERATURE: 97.6 F | SYSTOLIC BLOOD PRESSURE: 120 MMHG | OXYGEN SATURATION: 98 % | HEIGHT: 72 IN | WEIGHT: 219 LBS | BODY MASS INDEX: 29.66 KG/M2 | RESPIRATION RATE: 18 BRPM | HEART RATE: 84 BPM | DIASTOLIC BLOOD PRESSURE: 68 MMHG

## 2020-01-17 DIAGNOSIS — H91.92 DECREASED HEARING OF LEFT EAR: ICD-10-CM

## 2020-01-17 DIAGNOSIS — Z23 NEED FOR VACCINATION: Primary | ICD-10-CM

## 2020-01-17 DIAGNOSIS — H74.92: ICD-10-CM

## 2020-01-17 PROCEDURE — 99214 OFFICE O/P EST MOD 30 MIN: CPT | Performed by: NURSE PRACTITIONER

## 2020-01-17 RX ORDER — HYDROXYZINE 50 MG/1
TABLET, FILM COATED ORAL
COMMUNITY
Start: 2020-01-02 | End: 2020-12-01

## 2020-01-17 NOTE — PROGRESS NOTES
Incoming Call  01/14/2020    CHW received phone call from [facility] on this day  Visit was scheduled on 01/17/2020 at 11:00am [facility]

## 2020-01-17 NOTE — PATIENT INSTRUCTIONS
Hearing Loss   WHAT YOU NEED TO KNOW:   Hearing loss means you have trouble hearing or you cannot hear at all in one or both ears  Hearing loss can happen suddenly or slowly over time  DISCHARGE INSTRUCTIONS:   Return to the emergency department if:   · You have fluid, pus, or blood leaking from your ear  · You have sudden, severe hearing loss  Contact your healthcare provider if:   · You have a fever  · You have ear pain that is getting worse  · You have ringing in your ears or dizziness that will not go away  · You have questions or concerns about your condition or care  Manage your hearing loss:   · Protect your hearing  Use ear plugs or ear protectors if you do activities that are very loud  These include using a lawnmower and power tools or going to a concert that has loud music  Use well-fitting foam earplugs that completely block your ear canal  Do not listen to loud music through headphones or earphones  · If you have hearing aids, wear them regularly  Talk to your healthcare provider if you are having trouble using your hearing aid  · Ask about cochlear implants  If hearing aids do not help you, talk to your healthcare provider  Cochlear implants may help you hear better  A cochlear implant is a tiny device that is put into your cochlea (part of your inner ear) during surgery  · Assistive listening devices  (ALDs) pic up sound and send it through earphones or a headset  ALDs can help you hear better when you are in a place with background noise  Examples include theaters, classrooms, or auditoriums  ALDs are also available for phones  ALDs can be used alone or with hearing aids or cochlear implants  · Tell people that you have hearing loss  Ask people to face you directly when they speak to you, and to slow down if they are speaking too fast  When you are in a group setting, sit in a location where you can clearly see the faces of the people who are speaking   Ask people not to speak loudly or shout when they are speaking to you  Try to talk with others in a quiet place  Background noise makes it harder for you to hear  · Pay close attention to your surroundings when you drive  Do not talk to people in your car while you are driving  Watch for problems on the road or approaching emergency vehicles  Follow up with your healthcare provider or audiologist as directed:  Write down your questions so you remember to ask them during your visits  © 2017 [facility] Information is for End User's use only and may not be sold, redistributed or otherwise used for commercial purposes  All illustrations and images included in CareNotes® are the copyrighted property of A D A M , Inc  or Bashir Christian  The above information is an  only  It is not intended as medical advice for individual conditions or treatments  Talk to your doctor, nurse or pharmacist before following any medical regimen to see if it is safe and effective for you

## 2020-01-19 NOTE — ASSESSMENT & PLAN NOTE
-left mastoid effusion noted on CT on 1/13/2020  -Advised to take Augmentin as ordered from emergency room

## 2020-01-19 NOTE — ASSESSMENT & PLAN NOTE
-Patient has been experiencing hearing loss for several months  -Referral to ENT for evaluation of hearing loss

## 2020-01-20 NOTE — PROGRESS NOTES
Office Visit  01/17/2020    Community Health Worker met with Kee Anton at the time of his appointment with PCP on this day regarding referral from Jesus Manuel JOSHI to assist patient with Jarod Rodriguez application  CHW called Jarod Rodriguez, [phone], application to start patient on 30 days MATP  CHW completed a [employer] application on this day; and explained Kee Anton about the process of this application, Kee Anton seen understood  Fernando asked CHW to help him to apply for [facility]  CHW completed a referral on this day, and also explained Kee Anton about long process and asked Kee Anton to work together and cooperate with CHW, Kee Anton seen understanding  CHW would send Physician Form to PCP  Kee Anton also asked CHW to schedule an appointment with a Therapist at [address] [place] [address] stated he is not sleeping well at night, and have not medications  He have not seen a therapist in a long time  CHW called [facility] and talked to a  about Fernando's concern, she suggested that Kee Anton should go to [facility] today, and they will give him an appointment  Kee Anton agreed to go on this day      Next meeting with Kee Anton is scheduled on 1/31/2020 at 11:00am

## 2020-01-31 ENCOUNTER — PATIENT OUTREACH (OUTPATIENT)
Dept: FAMILY MEDICINE CLINIC | Facility: CLINIC | Age: 55
End: 2020-01-31

## 2020-02-04 ENCOUNTER — TELEPHONE (OUTPATIENT)
Dept: FAMILY MEDICINE CLINIC | Facility: CLINIC | Age: 55
End: 2020-02-04

## 2020-02-04 NOTE — TELEPHONE ENCOUNTER
SIGNATURES NEEDED FOR [place] Independent Enrollment  FORM RECEIVED VIA FAX  WILL BE PLACED IN YOUR BIN AT ASSIGNED DELIVERY TIMES

## 2020-02-06 NOTE — TELEPHONE ENCOUNTER
Form was completed on 1/20 and given to [employer], she is currently involved in the case  No need to fill out the new form at this time

## 2020-02-10 NOTE — PROGRESS NOTES
Home Visit  01/31/2020    CHW visited [place] on this day  CHW called [place] to follow up with application  [place] has scheduled Physical Evaluation on Monday 02/03/2020  CHW called Independent Enrollment [place] to scheduled Intake Home Visit   CHW would be present at this visit by Zachariah Sullivan request     CHW and IEB visit would take place on February 18, 2020 from 9:00am to 11:00

## 2020-02-18 ENCOUNTER — PATIENT OUTREACH (OUTPATIENT)
Dept: FAMILY MEDICINE CLINIC | Facility: CLINIC | Age: 55
End: 2020-02-18

## 2020-02-21 NOTE — PROGRESS NOTES
Home Visit  02/18/2020    CHW visited [place] on this day to meet with IEB  [facility] had first visit with IEB and Intake was completed  Franklyn Sandifer was not approved for [place]  CHW called [place] to enroll [facility] does qualifies, (Confirmation Number [id number]) and would be effective on 03/01/2020  CHW will explain Kina and [place] how to scheduled rides for medical appointments  [place] would have 25 roundtrip at year  CHW completed Fenrando's renewal [employer] and will be send on the mail       Today CHW completed CAP Application for [place], returning appointment was scheduled on 02/21/2020 at 2:00pm at [address]     Next home visit was scheduled on 02/25/2020 at 1:30pm

## 2020-02-25 ENCOUNTER — PATIENT OUTREACH (OUTPATIENT)
Dept: FAMILY MEDICINE CLINIC | Facility: CLINIC | Age: 55
End: 2020-02-25

## 2020-02-26 ENCOUNTER — OFFICE VISIT (OUTPATIENT)
Dept: FAMILY MEDICINE CLINIC | Facility: CLINIC | Age: 55
End: 2020-02-26

## 2020-02-26 VITALS
OXYGEN SATURATION: 94 % | SYSTOLIC BLOOD PRESSURE: 138 MMHG | DIASTOLIC BLOOD PRESSURE: 76 MMHG | RESPIRATION RATE: 20 BRPM | HEIGHT: 72 IN | TEMPERATURE: 96.2 F | HEART RATE: 102 BPM | WEIGHT: 219 LBS | BODY MASS INDEX: 29.66 KG/M2

## 2020-02-26 DIAGNOSIS — K21.9 GASTROESOPHAGEAL REFLUX DISEASE WITHOUT ESOPHAGITIS: ICD-10-CM

## 2020-02-26 DIAGNOSIS — Z09 FOLLOW UP: ICD-10-CM

## 2020-02-26 DIAGNOSIS — M25.511 CHRONIC PAIN OF BOTH SHOULDERS: ICD-10-CM

## 2020-02-26 DIAGNOSIS — E78.2 MIXED HYPERLIPIDEMIA: ICD-10-CM

## 2020-02-26 DIAGNOSIS — J40 BRONCHITIS: ICD-10-CM

## 2020-02-26 DIAGNOSIS — J30.9 ALLERGIC RHINITIS, UNSPECIFIED SEASONALITY, UNSPECIFIED TRIGGER: ICD-10-CM

## 2020-02-26 DIAGNOSIS — M25.512 CHRONIC PAIN OF BOTH SHOULDERS: ICD-10-CM

## 2020-02-26 DIAGNOSIS — G89.29 CHRONIC PAIN OF BOTH SHOULDERS: ICD-10-CM

## 2020-02-26 DIAGNOSIS — M25.562 CHRONIC PAIN OF LEFT KNEE: Primary | ICD-10-CM

## 2020-02-26 DIAGNOSIS — R73.03 PREDIABETES: ICD-10-CM

## 2020-02-26 DIAGNOSIS — G89.29 CHRONIC PAIN OF LEFT KNEE: Primary | ICD-10-CM

## 2020-02-26 DIAGNOSIS — I10 ESSENTIAL HYPERTENSION: ICD-10-CM

## 2020-02-26 PROCEDURE — 99214 OFFICE O/P EST MOD 30 MIN: CPT | Performed by: NURSE PRACTITIONER

## 2020-02-26 PROCEDURE — 3078F DIAST BP <80 MM HG: CPT | Performed by: NURSE PRACTITIONER

## 2020-02-26 PROCEDURE — 3008F BODY MASS INDEX DOCD: CPT | Performed by: NURSE PRACTITIONER

## 2020-02-26 PROCEDURE — 3075F SYST BP GE 130 - 139MM HG: CPT | Performed by: NURSE PRACTITIONER

## 2020-02-26 PROCEDURE — 3008F BODY MASS INDEX DOCD: CPT | Performed by: FAMILY MEDICINE

## 2020-02-26 NOTE — PROGRESS NOTES
Assessment/Plan:    Chronic pain of left knee  Patient presents with left knee pain affecting gait and mobility   he was previously seen by orthopedics for this 1 week ago and given  A steroid injection   he reports that his pain is not decreased at all since having the injection   advised patient to contact the orthopedic office as soon as possible for return visit   will prescribe topical lidocaine p r n    advised that the patient wear a knee brace as needed to provide stability   apply ice to the affected area p r n  Order placed for left knee MRI         Hypertension  Blood pressure is currently at goal  Continue current regimen     Hyperlipidemia  Continue atorvastatin   Encouraged the patient to complete outstanding blood work including lipid panel     Prediabetes  Continue metformin   Encouraged to complete outstanding blood work including hemoglobin A1C    Gastroesophageal reflux disease  omeprazole daily  -Discussed diet and lifestyle interventions to improve sx including: avoidance of common triggers (chocolate, caffeine, tomatoes, citrus), eat small meals frequently, remain upright after meals       Refills provided for requested medications   Return in about 3 months (around 5/26/2020) for FU htn   Patient Instructions   Knee Pain   WHAT YOU NEED TO KNOW:   Knee pain may start suddenly, or it may be a long-term problem  You may have pain on the side, front, or back of your knee  You may have knee stiffness and swelling  You may hear popping sounds or feel like your knee is giving way or locking up as you walk  You may feel pain when you sit, stand, walk, or climb up and down stairs  Knee pain can be caused by conditions such as obesity, inflammation, or strains or tears in ligaments or tendons  DISCHARGE INSTRUCTIONS:   Follow up with your healthcare provider within 24 hours or as directed: You may need follow-up treatments, such as steroid injections to decrease pain   Write down your questions so you remember to ask them during your visits  Self-care:   · Rest  your knee so it can heal  Limit activities that increase your pain  · Ice  can help reduce swelling  Wrap ice in a towel and put it on your knee for as long and as often as directed  · Compression  with a brace or bandage can help reduce swelling  Use a brace or bandage only as directed  · Elevation  helps decrease pain and swelling  Elevate your knee while you are sitting or lying down  Prop your leg on pillows to keep your knee above the level of your heart  Medicines:   · NSAIDs  help decrease swelling and pain or fever  This medicine is available with or without a doctor's order  NSAIDs can cause stomach bleeding or kidney problems in certain people  If you take blood thinner medicine, always ask your healthcare provider if NSAIDs are safe for you  Always read the medicine label and follow directions  · Acetaminophen  decreases pain and fever  It is available without a doctor's order  Ask how much to take and when to take it  Follow directions  Acetaminophen can cause liver damage if not taken correctly  · Take your medicine as directed  Contact your healthcare provider if you think your medicine is not helping or if you have side effects  Tell him or her if you are allergic to any medicine  Keep a list of the medicines, vitamins, and herbs you take  Include the amounts, and when and why you take them  Bring the list or the pill bottles to follow-up visits  Carry your medicine list with you in case of an emergency  Exercise as directed: You may need to see a physical therapist or do recommended exercises to improve movement and decrease your pain  You may be directed to walk, swim, or ride a bike  Follow your exercise plan exactly as directed to avoid further injury  Contact your healthcare provider if:   · You have questions or concerns about your condition or care       Return to the emergency department if:   · Your pain is worse, even after treatment  · You cannot bend or straighten your leg completely  · The swelling around your knee does not go down even with treatment  · Your knee is painful and hot to the touch  © 2017 [address] [facility] Information is for End User's use only and may not be sold, redistributed or otherwise used for commercial purposes  All illustrations and images included in CareNotes® are the copyrighted property of A D A M , Inc  or Bashir Gonzales  The above information is an  only  It is not intended as medical advice for individual conditions or treatments  Talk to your doctor, nurse or pharmacist before following any medical regimen to see if it is safe and effective for you  Diagnoses and all orders for this visit:    Chronic pain of left knee  -     lidocaine (XYLOCAINE) 5 % ointment; Apply topically as needed for mild pain  -     Medial  Knee Brace  -     MRI knee left  wo contrast; Future    Essential hypertension  -     Discontinue: atenolol (TENORMIN) 50 mg tablet; Take 1 tablet (50 mg total) by mouth daily  -     atenolol (TENORMIN) 50 mg tablet; Take 1 tablet (50 mg total) by mouth daily    Mixed hyperlipidemia  -     Discontinue: atorvastatin (LIPITOR) 40 mg tablet; Take 1 tablet (40 mg total) by mouth daily  -     aspirin (ECOTRIN LOW STRENGTH) 81 mg EC tablet; Take 1 tablet (81 mg total) by mouth daily  -     atorvastatin (LIPITOR) 40 mg tablet; Take 1 tablet (40 mg total) by mouth daily    Bronchitis  -     albuterol (PROVENTIL HFA,VENTOLIN HFA) 90 mcg/act inhaler;  Inhale 1-2 puffs every 6 (six) hours as needed for wheezing    Follow up  -     fluticasone (FLONASE) 50 mcg/act nasal spray; 1 spray into each nostril daily    Allergic rhinitis, unspecified seasonality, unspecified trigger  -     fluticasone (FLONASE) 50 mcg/act nasal spray; 1 spray into each nostril daily    Chronic pain of both shoulders    Prediabetes  - metFORMIN (GLUCOPHAGE) 500 mg tablet; Take 1 tablet (500 mg total) by mouth daily with breakfast    Gastroesophageal reflux disease without esophagitis  -     Discontinue: ranitidine (ZANTAC) 300 MG tablet; Take 0 5 tablets (150 mg total) by mouth every 24 hours  -     omeprazole (PriLOSEC) 40 MG capsule; Take 1 capsule (40 mg total) by mouth daily    Other orders  -     Cancel: Ambulatory referral to Orthopedic Surgery; Future          Subjective:     Elray Homans is a 60 y o  male who  has a past medical history of Diverticulitis and Hypertension  who presented to the office today for knee pain  HPI  Patient presents today for evaluation of left knee pain  The pain is chronic  He is not having any numbness or tingling to the left leg or foot  He is not having weakness to the extremity or foot  He reports that the pain is to the medial and lateral joint lines  He rate the pain as severe and constant  He has not tried physical therapy  He is following with orthopedics of [facility] for this  He was seen last week on 2/17/2020 in the orthopedic office and was given a steroid injection to the left knee  He reports today that the pain is not decreased at all  He continues with  difficulty with ambulation secondary to pain  The patient had left knee x-ray done on 1/26/20 after a fall which revealed:     History: Left knee pain  4 views of the left knee  Bone mineral density is satisfactory  No acute fracture or malalignment  Joint spaces are grossly preserved  No significant joint effusion  Impression: No acute fracture or malalignment  He has not called the orthopedic office to discuss with provider there  He has a follow up appointment with ortho currently scheduled for 3/16/2020       The following portions of the patient's history were reviewed and updated as appropriate: allergies, current medications, past family history, past medical history, past social history, past surgical history and problem list     Current Outpatient Medications on File Prior to Visit   Medication Sig Dispense Refill    benzonatate (TESSALON PERLES) 100 mg capsule Take 1 capsule (100 mg total) by mouth every 8 (eight) hours 21 capsule 0    dextromethorphan-guaiFENesin (ROBITUSSIN DM)  mg/5 mL syrup Take 5 mL by mouth 3 (three) times a day as needed for cough 118 mL 0    diphenhydrAMINE (BENADRYL) 25 mg tablet Take 1 tablet (25 mg total) by mouth daily at bedtime as needed for itching 30 tablet 4    docusate (COLACE) 60 MG/15ML syrup Take 15 mL (60 mg total) by mouth daily (Patient not taking: Reported on 8/5/2019) 100 mL 0    gabapentin (NEURONTIN) 800 mg tablet Take 1 tablet (800 mg total) by mouth 3 (three) times a day 90 tablet 3    hydrocortisone 1 % ointment Apply topically 2 (two) times a day 30 g 0    hydrOXYzine HCL (ATARAX) 50 mg tablet       ibuprofen (MOTRIN) 600 mg tablet Take 1 tablet (600 mg total) by mouth every 6 (six) hours as needed for moderate pain 30 tablet 0    meclizine (ANTIVERT) 25 mg tablet Take 1 tablet (25 mg total) by mouth 3 (three) times a day as needed for dizziness 30 tablet 0    methocarbamol (ROBAXIN) 750 mg tablet Take 1 tablet (750 mg total) by mouth every 8 (eight) hours 90 tablet 2    naproxen (NAPROSYN) 500 mg tablet Take 0 5 tablets (250 mg total) by mouth 2 (two) times a day with meals 30 tablet 2    ondansetron (ZOFRAN-ODT) 4 mg disintegrating tablet Take 1 tablet (4 mg total) by mouth every 8 (eight) hours as needed for nausea or vomiting for up to 10 days 20 tablet 0    polyethylene glycol (MIRALAX) 17 g packet Take 17 g by mouth daily for 3 days 14 each 0    simethicone (MYLICON) 868 MG chewable tablet Chew 1 tablet (125 mg total) every 6 (six) hours as needed for flatulence 30 tablet 1    tamsulosin (FLOMAX) 0 4 mg Take 1 capsule (0 4 mg total) by mouth daily with dinner 30 capsule 3    [DISCONTINUED] albuterol (PROVENTIL HFA,VENTOLIN HFA) 90 mcg/act inhaler Inhale 1-2 puffs every 6 (six) hours as needed for wheezing 1 Inhaler 0    [DISCONTINUED] aspirin (ECOTRIN LOW STRENGTH) 81 mg EC tablet Take 81 mg by mouth daily      [DISCONTINUED] fluticasone (FLONASE) 50 mcg/act nasal spray 1 spray into each nostril daily 16 g 0    [DISCONTINUED] metFORMIN (GLUCOPHAGE) 500 mg tablet take 1 tablet by mouth once daily WITH BREAKFAST 30 tablet 1    [DISCONTINUED] ranitidine (ZANTAC) 300 MG tablet Take 1 tablet (300 mg total) by mouth every 24 hours 30 tablet 3     No current facility-administered medications on file prior to visit  Review of Systems   Constitutional: Positive for activity change  Negative for fatigue and fever  Respiratory: Negative for shortness of breath and wheezing  Cardiovascular: Negative for chest pain, palpitations and leg swelling  Musculoskeletal: Positive for arthralgias and gait problem  Skin: Negative for color change and wound  Neurological: Negative for dizziness and weakness  Objective:    /76 (BP Location: Left arm, Patient Position: Sitting, Cuff Size: Large)   Pulse 102   Temp (!) 96 2 °F (35 7 °C) (Temporal)   Resp 20   Ht 6' (1 829 m)   Wt 99 3 kg (219 lb)   SpO2 94%   BMI 29 70 kg/m²     Physical Exam   Constitutional: He is oriented to person, place, and time  He appears well-developed and well-nourished  No distress  HENT:   Head: Normocephalic and atraumatic  Eyes: Pupils are equal, round, and reactive to light  EOM are normal    Neck: Normal range of motion  Neck supple  Cardiovascular: Normal rate, regular rhythm, normal heart sounds and intact distal pulses  Pulmonary/Chest: Effort normal and breath sounds normal  No respiratory distress  He has no wheezes  Abdominal: Soft  Bowel sounds are normal  He exhibits no distension  There is no tenderness  Musculoskeletal:        Left knee: He exhibits decreased range of motion   He exhibits no swelling, no effusion, no ecchymosis, no laceration, no erythema, no LCL laxity and no MCL laxity  Tenderness found  Medial joint line and lateral joint line tenderness noted  Lymphadenopathy:     He has no cervical adenopathy  Neurological: He is alert and oriented to person, place, and time  Skin: Skin is warm and dry  Capillary refill takes less than 2 seconds  No rash noted  He is not diaphoretic  Psychiatric: He has a normal mood and affect  His behavior is normal    Nursing note and vitals reviewed        RAMO Cramer  02/27/20  11:47 AM

## 2020-02-26 NOTE — PATIENT INSTRUCTIONS

## 2020-02-27 PROBLEM — G89.29 CHRONIC PAIN OF LEFT KNEE: Status: ACTIVE | Noted: 2020-02-27

## 2020-02-27 PROBLEM — M25.562 CHRONIC PAIN OF LEFT KNEE: Status: ACTIVE | Noted: 2020-02-27

## 2020-02-27 RX ORDER — OMEPRAZOLE 40 MG/1
40 CAPSULE, DELAYED RELEASE ORAL DAILY
Qty: 90 CAPSULE | Refills: 2 | Status: SHIPPED | OUTPATIENT
Start: 2020-02-27 | End: 2020-03-20 | Stop reason: SDUPTHER

## 2020-02-27 RX ORDER — FLUTICASONE PROPIONATE 50 MCG
1 SPRAY, SUSPENSION (ML) NASAL DAILY
Qty: 16 G | Refills: 0 | Status: SHIPPED | OUTPATIENT
Start: 2020-02-27 | End: 2020-03-24 | Stop reason: SDUPTHER

## 2020-02-27 RX ORDER — ATENOLOL 50 MG/1
50 TABLET ORAL DAILY
Qty: 90 TABLET | Refills: 3 | Status: SHIPPED | OUTPATIENT
Start: 2020-02-27 | End: 2020-03-24 | Stop reason: SDUPTHER

## 2020-02-27 RX ORDER — RANITIDINE 300 MG/1
150 TABLET ORAL EVERY 24 HOURS
Qty: 30 TABLET | Refills: 3 | Status: SHIPPED | OUTPATIENT
Start: 2020-02-27 | End: 2020-02-27

## 2020-02-27 RX ORDER — ATENOLOL 50 MG/1
50 TABLET ORAL DAILY
Qty: 90 TABLET | Refills: 3 | Status: SHIPPED | OUTPATIENT
Start: 2020-02-27 | End: 2020-02-27 | Stop reason: SDUPTHER

## 2020-02-27 RX ORDER — ASPIRIN 81 MG/1
81 TABLET ORAL DAILY
Qty: 30 TABLET | Refills: 2 | Status: SHIPPED | OUTPATIENT
Start: 2020-02-27 | End: 2020-03-24 | Stop reason: SDUPTHER

## 2020-02-27 RX ORDER — ALBUTEROL SULFATE 90 UG/1
1-2 AEROSOL, METERED RESPIRATORY (INHALATION) EVERY 6 HOURS PRN
Qty: 1 INHALER | Refills: 0 | Status: SHIPPED | OUTPATIENT
Start: 2020-02-27 | End: 2020-03-24 | Stop reason: SDUPTHER

## 2020-02-27 RX ORDER — LIDOCAINE 50 MG/G
OINTMENT TOPICAL AS NEEDED
Qty: 35.44 G | Refills: 0 | Status: SHIPPED | OUTPATIENT
Start: 2020-02-27 | End: 2020-03-24 | Stop reason: SDUPTHER

## 2020-02-27 RX ORDER — ATORVASTATIN CALCIUM 40 MG/1
40 TABLET, FILM COATED ORAL DAILY
Qty: 90 TABLET | Refills: 1 | Status: SHIPPED | OUTPATIENT
Start: 2020-02-27 | End: 2020-03-24 | Stop reason: SDUPTHER

## 2020-02-27 RX ORDER — ATORVASTATIN CALCIUM 40 MG/1
40 TABLET, FILM COATED ORAL DAILY
Qty: 90 TABLET | Refills: 1 | Status: SHIPPED | OUTPATIENT
Start: 2020-02-27 | End: 2020-02-27 | Stop reason: SDUPTHER

## 2020-02-27 NOTE — ASSESSMENT & PLAN NOTE
Continue atorvastatin   Encouraged the patient to complete outstanding blood work including lipid panel

## 2020-02-27 NOTE — ASSESSMENT & PLAN NOTE
Patient presents with left knee pain affecting gait and mobility   he was previously seen by orthopedics for this 1 week ago and given  A steroid injection   he reports that his pain is not decreased at all since having the injection   advised patient to contact the orthopedic office as soon as possible for return visit   will prescribe topical lidocaine p r n    advised that the patient wear a knee brace as needed to provide stability   apply ice to the affected area p r n    Order placed for left knee MRI

## 2020-02-27 NOTE — ASSESSMENT & PLAN NOTE
omeprazole daily  -Discussed diet and lifestyle interventions to improve sx including: avoidance of common triggers (chocolate, caffeine, tomatoes, citrus), eat small meals frequently, remain upright after meals

## 2020-02-28 DIAGNOSIS — M25.511 CHRONIC PAIN OF BOTH SHOULDERS: ICD-10-CM

## 2020-02-28 DIAGNOSIS — G89.29 CHRONIC PAIN OF BOTH SHOULDERS: ICD-10-CM

## 2020-02-28 DIAGNOSIS — M25.512 CHRONIC PAIN OF BOTH SHOULDERS: ICD-10-CM

## 2020-02-28 RX ORDER — GABAPENTIN 800 MG/1
TABLET ORAL
Qty: 90 TABLET | Refills: 3 | Status: SHIPPED | OUTPATIENT
Start: 2020-02-28 | End: 2020-03-24 | Stop reason: SDUPTHER

## 2020-02-28 NOTE — PROGRESS NOTES
Home Visit  02/25/2020    CHW visited [place] on this day  Today Rad corbett has second visit from [employer]  CHW will call on next home visit to follow up with OLGA Leroy daisys got mail from [employer]  Jamar Derek was approved and [employer] payment will be $78 monthly  Rad corbett got mail from CAP program  CAP was approved and Fernando's gas bill payment will be $76 monthly  Today CHW call [facility] to apply for a Lever for [place] to establish credit to buy a house  Application was denied and answer would be send to [place] by mail  CHW suggested [place] to apply for a [employer] with Anne Beasleyl will go in person for the application  CHW gave Rad corbett information about [employer], CHW will enroll [place] on next home visit      Next Home Visit was scheduled on 03/05/2020 at 1:30pm

## 2020-03-02 DIAGNOSIS — M25.511 CHRONIC PAIN OF BOTH SHOULDERS: ICD-10-CM

## 2020-03-02 DIAGNOSIS — G89.29 CHRONIC PAIN OF BOTH SHOULDERS: ICD-10-CM

## 2020-03-02 DIAGNOSIS — M25.512 CHRONIC PAIN OF BOTH SHOULDERS: ICD-10-CM

## 2020-03-02 RX ORDER — NAPROXEN 500 MG/1
250 TABLET ORAL 2 TIMES DAILY WITH MEALS
Qty: 30 TABLET | Refills: 2 | Status: SHIPPED | OUTPATIENT
Start: 2020-03-02 | End: 2020-03-20 | Stop reason: SDUPTHER

## 2020-03-02 NOTE — TELEPHONE ENCOUNTER
Last OV was today but was cancelled  SW clerical, can you please attempt to contact pt and reschedule today's cancelled appt? Thanks so much

## 2020-03-09 ENCOUNTER — PATIENT OUTREACH (OUTPATIENT)
Dept: FAMILY MEDICINE CLINIC | Facility: CLINIC | Age: 55
End: 2020-03-09

## 2020-03-09 NOTE — PROGRESS NOTES
Outgoing Call  03/09/2020    CHW called Sohail Bernardo on this day, and home visit was rescheduled on 03/10/2020

## 2020-03-10 ENCOUNTER — PATIENT OUTREACH (OUTPATIENT)
Dept: FAMILY MEDICINE CLINIC | Facility: CLINIC | Age: 55
End: 2020-03-10

## 2020-03-13 NOTE — PROGRESS NOTES
Home Visit  03/10/2020    CHW visited [place] on this day  CHW called [employer] to follow up application  Application was approved by [employer]B and [employer]; [place] needs the financial approval from the [place]  CHW is working to obtain [employer] from [place]   This bank is charging [employer] $5 00 per page printed out  Fernando's needs 36 months from 2015, 2016 and 2017  [place] stated he can't afford it  CHW called Mr Catrina Spears from the [place] office to explained situation and left voicemail to call back CHW  CHW will ask Mr Catrina Spears for any other alternative Income Proof  [place] will go to [email or web address] and ask for bank statements from 2018 and 2019      Next home visit was scheduled on 03/19/2020

## 2020-03-20 DIAGNOSIS — G89.29 CHRONIC PAIN OF BOTH SHOULDERS: ICD-10-CM

## 2020-03-20 DIAGNOSIS — K21.9 GASTROESOPHAGEAL REFLUX DISEASE WITHOUT ESOPHAGITIS: ICD-10-CM

## 2020-03-20 DIAGNOSIS — M25.511 CHRONIC PAIN OF BOTH SHOULDERS: ICD-10-CM

## 2020-03-20 DIAGNOSIS — M25.512 CHRONIC PAIN OF BOTH SHOULDERS: ICD-10-CM

## 2020-03-20 RX ORDER — OMEPRAZOLE 40 MG/1
40 CAPSULE, DELAYED RELEASE ORAL DAILY
Qty: 90 CAPSULE | Refills: 2 | Status: SHIPPED | OUTPATIENT
Start: 2020-03-20 | End: 2020-03-24 | Stop reason: SDUPTHER

## 2020-03-20 RX ORDER — NAPROXEN 500 MG/1
250 TABLET ORAL 2 TIMES DAILY WITH MEALS
Qty: 30 TABLET | Refills: 2 | Status: SHIPPED | OUTPATIENT
Start: 2020-03-20 | End: 2020-03-24 | Stop reason: SDUPTHER

## 2020-03-20 NOTE — TELEPHONE ENCOUNTER
Pt stated that he needs the following meds:    ranitidine (ZANTAC) 300 MG tablet    naproxen (NAPROSYN) 500 mg tablet

## 2020-03-24 ENCOUNTER — TELEPHONE (OUTPATIENT)
Dept: FAMILY MEDICINE CLINIC | Facility: CLINIC | Age: 55
End: 2020-03-24

## 2020-03-24 ENCOUNTER — PATIENT OUTREACH (OUTPATIENT)
Dept: FAMILY MEDICINE CLINIC | Facility: CLINIC | Age: 55
End: 2020-03-24

## 2020-03-24 ENCOUNTER — TELEMEDICINE (OUTPATIENT)
Dept: FAMILY MEDICINE CLINIC | Facility: CLINIC | Age: 55
End: 2020-03-24

## 2020-03-24 DIAGNOSIS — K08.89 PAIN, DENTAL: Primary | ICD-10-CM

## 2020-03-24 DIAGNOSIS — K21.9 GASTROESOPHAGEAL REFLUX DISEASE WITHOUT ESOPHAGITIS: ICD-10-CM

## 2020-03-24 DIAGNOSIS — E78.2 MIXED HYPERLIPIDEMIA: ICD-10-CM

## 2020-03-24 DIAGNOSIS — J30.9 ALLERGIC RHINITIS, UNSPECIFIED SEASONALITY, UNSPECIFIED TRIGGER: ICD-10-CM

## 2020-03-24 DIAGNOSIS — Z09 FOLLOW UP: ICD-10-CM

## 2020-03-24 DIAGNOSIS — R11.0 NAUSEA: ICD-10-CM

## 2020-03-24 DIAGNOSIS — G89.29 CHRONIC PAIN OF LEFT KNEE: ICD-10-CM

## 2020-03-24 DIAGNOSIS — M25.511 CHRONIC PAIN OF BOTH SHOULDERS: ICD-10-CM

## 2020-03-24 DIAGNOSIS — Z87.442 HISTORY OF RENAL CALCULI: ICD-10-CM

## 2020-03-24 DIAGNOSIS — G47.09 OTHER INSOMNIA: ICD-10-CM

## 2020-03-24 DIAGNOSIS — R42 DIZZINESS: ICD-10-CM

## 2020-03-24 DIAGNOSIS — G89.29 CHRONIC PAIN OF BOTH SHOULDERS: ICD-10-CM

## 2020-03-24 DIAGNOSIS — R73.03 PREDIABETES: ICD-10-CM

## 2020-03-24 DIAGNOSIS — I10 ESSENTIAL HYPERTENSION: ICD-10-CM

## 2020-03-24 DIAGNOSIS — M25.619 POSTTRAUMATIC STIFFNESS OF SHOULDER JOINT: ICD-10-CM

## 2020-03-24 DIAGNOSIS — M25.562 CHRONIC PAIN OF LEFT KNEE: ICD-10-CM

## 2020-03-24 DIAGNOSIS — J40 BRONCHITIS: ICD-10-CM

## 2020-03-24 DIAGNOSIS — R10.13 EPIGASTRIC PAIN: ICD-10-CM

## 2020-03-24 DIAGNOSIS — L20.84 INTRINSIC ECZEMA: ICD-10-CM

## 2020-03-24 DIAGNOSIS — M25.512 CHRONIC PAIN OF BOTH SHOULDERS: ICD-10-CM

## 2020-03-24 PROCEDURE — 99443 PR PHYS/QHP TELEPHONE EVALUATION 21-30 MIN: CPT | Performed by: FAMILY MEDICINE

## 2020-03-24 RX ORDER — FLUTICASONE PROPIONATE 50 MCG
1 SPRAY, SUSPENSION (ML) NASAL DAILY
Qty: 16 G | Refills: 0 | Status: SHIPPED | OUTPATIENT
Start: 2020-03-24 | End: 2020-09-09 | Stop reason: SDUPTHER

## 2020-03-24 RX ORDER — MECLIZINE HYDROCHLORIDE 25 MG/1
25 TABLET ORAL 3 TIMES DAILY PRN
Qty: 30 TABLET | Refills: 0 | Status: SHIPPED | OUTPATIENT
Start: 2020-03-24 | End: 2020-12-01

## 2020-03-24 RX ORDER — ASPIRIN 81 MG/1
81 TABLET ORAL DAILY
Qty: 30 TABLET | Refills: 2 | Status: SHIPPED | OUTPATIENT
Start: 2020-03-24 | End: 2020-09-09 | Stop reason: SDUPTHER

## 2020-03-24 RX ORDER — CHLORHEXIDINE GLUCONATE 0.12 MG/ML
15 RINSE ORAL 2 TIMES DAILY
Qty: 120 ML | Refills: 0 | Status: SHIPPED | OUTPATIENT
Start: 2020-03-24 | End: 2020-09-09 | Stop reason: SDUPTHER

## 2020-03-24 RX ORDER — LIDOCAINE 50 MG/G
OINTMENT TOPICAL AS NEEDED
Qty: 35.44 G | Refills: 0 | Status: SHIPPED | OUTPATIENT
Start: 2020-03-24 | End: 2021-03-10 | Stop reason: SDUPTHER

## 2020-03-24 RX ORDER — NAPROXEN 500 MG/1
250 TABLET ORAL 2 TIMES DAILY WITH MEALS
Qty: 30 TABLET | Refills: 2 | Status: SHIPPED | OUTPATIENT
Start: 2020-03-24 | End: 2020-12-01

## 2020-03-24 RX ORDER — ATENOLOL 50 MG/1
50 TABLET ORAL DAILY
Qty: 90 TABLET | Refills: 3 | Status: SHIPPED | OUTPATIENT
Start: 2020-03-24 | End: 2020-09-09 | Stop reason: SDUPTHER

## 2020-03-24 RX ORDER — DIAPER,BRIEF,INFANT-TODD,DISP
EACH MISCELLANEOUS 2 TIMES DAILY
Qty: 30 G | Refills: 0 | Status: SHIPPED | OUTPATIENT
Start: 2020-03-24 | End: 2020-09-09 | Stop reason: SDUPTHER

## 2020-03-24 RX ORDER — SIMETHICONE 125 MG
125 TABLET,CHEWABLE ORAL EVERY 6 HOURS PRN
Qty: 30 TABLET | Refills: 1 | Status: SHIPPED | OUTPATIENT
Start: 2020-03-24 | End: 2020-12-01

## 2020-03-24 RX ORDER — OMEPRAZOLE 40 MG/1
40 CAPSULE, DELAYED RELEASE ORAL DAILY
Qty: 90 CAPSULE | Refills: 2 | Status: SHIPPED | OUTPATIENT
Start: 2020-03-24 | End: 2020-09-09 | Stop reason: SDUPTHER

## 2020-03-24 RX ORDER — GABAPENTIN 800 MG/1
800 TABLET ORAL 3 TIMES DAILY
Qty: 90 TABLET | Refills: 3 | Status: SHIPPED | OUTPATIENT
Start: 2020-03-24 | End: 2020-09-09 | Stop reason: SDUPTHER

## 2020-03-24 RX ORDER — ACETAMINOPHEN 325 MG/1
650 TABLET ORAL EVERY 6 HOURS PRN
Qty: 90 TABLET | Refills: 0 | Status: SHIPPED | OUTPATIENT
Start: 2020-03-24 | End: 2022-06-16

## 2020-03-24 RX ORDER — DIPHENHYDRAMINE HCL 25 MG
25 TABLET ORAL
Qty: 30 TABLET | Refills: 4 | Status: SHIPPED | OUTPATIENT
Start: 2020-03-24 | End: 2020-12-01

## 2020-03-24 RX ORDER — ATORVASTATIN CALCIUM 40 MG/1
40 TABLET, FILM COATED ORAL DAILY
Qty: 90 TABLET | Refills: 1 | Status: SHIPPED | OUTPATIENT
Start: 2020-03-24 | End: 2020-09-09 | Stop reason: SDUPTHER

## 2020-03-24 RX ORDER — ALBUTEROL SULFATE 90 UG/1
1-2 AEROSOL, METERED RESPIRATORY (INHALATION) EVERY 6 HOURS PRN
Qty: 1 INHALER | Refills: 0 | Status: SHIPPED | OUTPATIENT
Start: 2020-03-24 | End: 2020-09-09 | Stop reason: SDUPTHER

## 2020-03-24 RX ORDER — TAMSULOSIN HYDROCHLORIDE 0.4 MG/1
0.4 CAPSULE ORAL
Qty: 30 CAPSULE | Refills: 3 | Status: SHIPPED | OUTPATIENT
Start: 2020-03-24 | End: 2020-09-09 | Stop reason: SDUPTHER

## 2020-03-24 RX ORDER — AMOXICILLIN 500 MG/1
500 CAPSULE ORAL EVERY 8 HOURS SCHEDULED
Qty: 30 CAPSULE | Refills: 0 | Status: SHIPPED | OUTPATIENT
Start: 2020-03-24 | End: 2020-04-03

## 2020-03-24 RX ORDER — BENZONATATE 100 MG/1
100 CAPSULE ORAL EVERY 8 HOURS
Qty: 21 CAPSULE | Refills: 0 | Status: SHIPPED | OUTPATIENT
Start: 2020-03-24 | End: 2020-06-24 | Stop reason: ALTCHOICE

## 2020-03-24 NOTE — PROGRESS NOTES
Incoming Call  03/24/2020    CHW received a call from [place] ([facility]) on this day  [place]raine called to informed about Fernando's approval but still needs financial part approved  [place]raine recommended CHW to sent only bank statements [facility] and ask the Pending sale to [facility] for any others income resources  CHW will fax bank statements I already have from [employer] [place]      CHW had called [place] and gives him an update of the [facility]     Next Phone Rhina Georges was scheduled on 03/30/2020

## 2020-03-24 NOTE — PROGRESS NOTES
Virtual Regular Visit    Problem List Items Addressed This Visit        Digestive    Gastroesophageal reflux disease    Relevant Medications    omeprazole (PriLOSEC) 40 MG capsule       Respiratory    Allergic rhinitis    Relevant Medications    fluticasone (FLONASE) 50 mcg/act nasal spray    naproxen (NAPROSYN) 500 mg tablet       Cardiovascular and Mediastinum    Hypertension    Relevant Medications    atenolol (TENORMIN) 50 mg tablet       Musculoskeletal and Integument    Intrinsic eczema    Relevant Medications    hydrocortisone 1 % ointment    lidocaine (XYLOCAINE) 5 % ointment       Other    History of renal calculi    Relevant Medications    tamsulosin (FLOMAX) 0 4 mg    Hyperlipidemia    Relevant Medications    aspirin (ECOTRIN LOW STRENGTH) 81 mg EC tablet    atorvastatin (LIPITOR) 40 mg tablet    Prediabetes    Relevant Medications    metFORMIN (GLUCOPHAGE) 500 mg tablet    Other insomnia    Relevant Medications    diphenhydrAMINE (BENADRYL) 25 mg tablet    Chronic pain of both shoulders    Relevant Medications    gabapentin (NEURONTIN) 800 mg tablet    naproxen (NAPROSYN) 500 mg tablet    Posttraumatic stiffness of shoulder joint    Chronic pain of left knee    Relevant Medications    lidocaine (XYLOCAINE) 5 % ointment    acetaminophen (TYLENOL) 325 mg tablet    Other Relevant Orders    Ambulatory referral to Orthopedic Surgery      Other Visit Diagnoses     Pain, dental    -  Primary    Relevant Medications    amoxicillin (AMOXIL) 500 mg capsule    chlorhexidine (PERIDEX) 0 12 % solution    Bronchitis        Relevant Medications    albuterol (PROVENTIL HFA,VENTOLIN HFA) 90 mcg/act inhaler    diphenhydrAMINE (BENADRYL) 25 mg tablet    benzonatate (TESSALON PERLES) 100 mg capsule    fluticasone (FLONASE) 50 mcg/act nasal spray    Follow up        Relevant Medications    fluticasone (FLONASE) 50 mcg/act nasal spray    Dizziness        Relevant Medications    meclizine (ANTIVERT) 25 mg tablet Epigastric pain        Relevant Medications    simethicone (MYLICON) 379 MG chewable tablet    Nausea                   Reason for visit is medication refills  Encounter provider Arina Capellan, [facility]    [facility] [address] [address] [phone]      Recent Visits  No visits were found meeting these conditions  Showing recent visits within past 7 days and meeting all other requirements     Future Appointments  No visits were found meeting these conditions  Showing future appointments within next 150 days and meeting all other requirements        After connecting through telephone, the patient was identified by name and date of birth  Brittni Rodriguez was informed that this is a telemedicine visit and that the visit is being conducted through telephone which may not be secure and therefore, might not be HIPAA-compliant  My office door was closed  No one else was in the room  He acknowledged consent and understanding of privacy and security of the video platform  The patient has agreed to participate and understands they can discontinue the visit at any time  Subjective  Brittni Rodriguez is a 60 y o  male w/ PMH of GERD, allergic rhinitis, hypertension, BPH, chronic pain of both shoulders and left knee, and eczema  The patient has a COVID19 risk score of 0  The patient was last seen on 2/26/2020 for left knee pain s/p left knee arthrocentesis and steroid injection  He was directed to follow up with ortho and directed to try conservative measures for treatment of pain  He reports today that pain persists to the left knee  He has not followed up with orthopedics yet  He did not receive the previously ordered knee brace to provide knee stability  He is also reporting that he has dental pain tot he back of his right top jaw  He thinks he may have an abscess as he feels a tender lump   He denies fever, chills, difficulty with chewing or swallowing  He is eating and drinking as usual  He is not having any nausea or vomiting  He is not having difficulty with bowel or bladder       Past Medical History:   Diagnosis Date    Diverticulitis     Hypertension        Past Surgical History:   Procedure Laterality Date    COLON SURGERY      NECK SURGERY         Current Outpatient Medications   Medication Sig Dispense Refill    acetaminophen (TYLENOL) 325 mg tablet Take 2 tablets (650 mg total) by mouth every 6 (six) hours as needed for mild pain 90 tablet 0    albuterol (PROVENTIL HFA,VENTOLIN HFA) 90 mcg/act inhaler Inhale 1-2 puffs every 6 (six) hours as needed for wheezing 1 Inhaler 0    amoxicillin (AMOXIL) 500 mg capsule Take 1 capsule (500 mg total) by mouth every 8 (eight) hours for 10 days 30 capsule 0    aspirin (ECOTRIN LOW STRENGTH) 81 mg EC tablet Take 1 tablet (81 mg total) by mouth daily 30 tablet 2    atenolol (TENORMIN) 50 mg tablet Take 1 tablet (50 mg total) by mouth daily 90 tablet 3    atorvastatin (LIPITOR) 40 mg tablet Take 1 tablet (40 mg total) by mouth daily 90 tablet 1    benzonatate (TESSALON PERLES) 100 mg capsule Take 1 capsule (100 mg total) by mouth every 8 (eight) hours 21 capsule 0    chlorhexidine (PERIDEX) 0 12 % solution Apply 15 mL to the mouth or throat 2 (two) times a day 120 mL 0    dextromethorphan-guaiFENesin (ROBITUSSIN DM)  mg/5 mL syrup Take 5 mL by mouth 3 (three) times a day as needed for cough 118 mL 0    diphenhydrAMINE (BENADRYL) 25 mg tablet Take 1 tablet (25 mg total) by mouth daily at bedtime as needed for itching 30 tablet 4    docusate (COLACE) 60 MG/15ML syrup Take 15 mL (60 mg total) by mouth daily (Patient not taking: Reported on 8/5/2019) 100 mL 0    fluticasone (FLONASE) 50 mcg/act nasal spray 1 spray into each nostril daily 16 g 0    gabapentin (NEURONTIN) 800 mg tablet Take 1 tablet (800 mg total) by mouth 3 (three) times a day 90 tablet 3    hydrocortisone 1 % ointment Apply topically 2 (two) times a day 30 g 0    hydrOXYzine HCL (ATARAX) 50 mg tablet       ibuprofen (MOTRIN) 600 mg tablet Take 1 tablet (600 mg total) by mouth every 6 (six) hours as needed for moderate pain 30 tablet 0    lidocaine (XYLOCAINE) 5 % ointment Apply topically as needed for mild pain 35 44 g 0    meclizine (ANTIVERT) 25 mg tablet Take 1 tablet (25 mg total) by mouth 3 (three) times a day as needed for dizziness 30 tablet 0    metFORMIN (GLUCOPHAGE) 500 mg tablet Take 1 tablet (500 mg total) by mouth daily with breakfast 30 tablet 1    methocarbamol (ROBAXIN) 750 mg tablet Take 1 tablet (750 mg total) by mouth every 8 (eight) hours 90 tablet 2    naproxen (NAPROSYN) 500 mg tablet Take 0 5 tablets (250 mg total) by mouth 2 (two) times a day with meals 30 tablet 2    omeprazole (PriLOSEC) 40 MG capsule Take 1 capsule (40 mg total) by mouth daily 90 capsule 2    ondansetron (ZOFRAN-ODT) 4 mg disintegrating tablet Take 1 tablet (4 mg total) by mouth every 8 (eight) hours as needed for nausea or vomiting for up to 10 days 20 tablet 0    polyethylene glycol (MIRALAX) 17 g packet Take 17 g by mouth daily for 3 days 14 each 0    simethicone (MYLICON) 568 MG chewable tablet Chew 1 tablet (125 mg total) every 6 (six) hours as needed for flatulence 30 tablet 1    tamsulosin (FLOMAX) 0 4 mg Take 1 capsule (0 4 mg total) by mouth daily with dinner 30 capsule 3     No current facility-administered medications for this visit  No Known Allergies    Review of Systems   Constitutional: Negative for activity change, appetite change, chills, fatigue, fever and unexpected weight change  HENT: Positive for dental problem  Negative for congestion, ear pain, postnasal drip, sinus pressure, sinus pain, sore throat and trouble swallowing  Respiratory: Negative for cough, chest tightness and shortness of breath  Cardiovascular: Negative for chest pain, palpitations and leg swelling  Gastrointestinal: Negative for abdominal distention, blood in stool, constipation, diarrhea, nausea and vomiting  Genitourinary: Negative for difficulty urinating and flank pain  Musculoskeletal: Positive for arthralgias  Negative for gait problem, joint swelling and myalgias  Skin: Negative for rash and wound  Neurological: Negative for dizziness, speech difficulty and headaches  Gisella Moreno was seen today for virtual brief visit  Diagnoses and all orders for this visit:    Pain, dental  -     amoxicillin (AMOXIL) 500 mg capsule; Take 1 capsule (500 mg total) by mouth every 8 (eight) hours for 10 days  -     chlorhexidine (PERIDEX) 0 12 % solution; Apply 15 mL to the mouth or throat 2 (two) times a day    Bronchitis  -     albuterol (PROVENTIL HFA,VENTOLIN HFA) 90 mcg/act inhaler; Inhale 1-2 puffs every 6 (six) hours as needed for wheezing  -     benzonatate (TESSALON PERLES) 100 mg capsule; Take 1 capsule (100 mg total) by mouth every 8 (eight) hours    Mixed hyperlipidemia  -     aspirin (ECOTRIN LOW STRENGTH) 81 mg EC tablet; Take 1 tablet (81 mg total) by mouth daily  -     atorvastatin (LIPITOR) 40 mg tablet; Take 1 tablet (40 mg total) by mouth daily    Essential hypertension  -     atenolol (TENORMIN) 50 mg tablet; Take 1 tablet (50 mg total) by mouth daily    Other insomnia  -     diphenhydrAMINE (BENADRYL) 25 mg tablet; Take 1 tablet (25 mg total) by mouth daily at bedtime as needed for itching    Follow up  -     fluticasone (FLONASE) 50 mcg/act nasal spray; 1 spray into each nostril daily    Allergic rhinitis, unspecified seasonality, unspecified trigger  -     fluticasone (FLONASE) 50 mcg/act nasal spray; 1 spray into each nostril daily    Chronic pain of both shoulders  -     gabapentin (NEURONTIN) 800 mg tablet; Take 1 tablet (800 mg total) by mouth 3 (three) times a day  -     naproxen (NAPROSYN) 500 mg tablet;  Take 0 5 tablets (250 mg total) by mouth 2 (two) times a day with meals    Intrinsic eczema  -     hydrocortisone 1 % ointment; Apply topically 2 (two) times a day    Chronic pain of left knee  -     lidocaine (XYLOCAINE) 5 % ointment; Apply topically as needed for mild pain  -     Ambulatory referral to Orthopedic Surgery; Future  -     acetaminophen (TYLENOL) 325 mg tablet; Take 2 tablets (650 mg total) by mouth every 6 (six) hours as needed for mild pain    Dizziness  -     meclizine (ANTIVERT) 25 mg tablet; Take 1 tablet (25 mg total) by mouth 3 (three) times a day as needed for dizziness    Prediabetes  -     metFORMIN (GLUCOPHAGE) 500 mg tablet; Take 1 tablet (500 mg total) by mouth daily with breakfast    Posttraumatic stiffness of shoulder joint    Gastroesophageal reflux disease without esophagitis  -     omeprazole (PriLOSEC) 40 MG capsule; Take 1 capsule (40 mg total) by mouth daily    History of renal calculi  -     tamsulosin (FLOMAX) 0 4 mg; Take 1 capsule (0 4 mg total) by mouth daily with dinner    Epigastric pain  -     simethicone (MYLICON) 891 MG chewable tablet; Chew 1 tablet (125 mg total) every 6 (six) hours as needed for flatulence    Nausea      Medication refills for chronic issues and conditions sent to the patient's preferred pharmacy  Recommend that the patient follow up with orthopedics as his left knee pain has not improved since receiving the steroid injection on 2/17/2020  Will also reorder knee brace and advise that patient continue with naproxen bid with food and topical lidocaine to affected area PRN  He should continue elevating the extremity when at rest  For the dental pain I recommend that the patient take a course of amoxicillin as the dental clinics are not seeing patients currently except for emergencies  Will also send chlorhexidine mouthwash to be used BID   Discussed in depth with the patient that if he experiences fever or difficulty with swallowing or chewing I would like him to have in person evaluation and he should present to urgent care or ED  I spent 30 minutes with the patient during this visit

## 2020-03-26 ENCOUNTER — PATIENT OUTREACH (OUTPATIENT)
Dept: FAMILY MEDICINE CLINIC | Facility: CLINIC | Age: 55
End: 2020-03-26

## 2020-03-26 NOTE — PROGRESS NOTES
Outgoing Mail    CHW sent on this day bank statements to [facility]      CHW will follow up with financial part approval for [facility]

## 2020-04-08 ENCOUNTER — PATIENT OUTREACH (OUTPATIENT)
Dept: FAMILY MEDICINE CLINIC | Facility: CLINIC | Age: 55
End: 2020-04-08

## 2020-04-28 ENCOUNTER — TELEPHONE (OUTPATIENT)
Dept: FAMILY MEDICINE CLINIC | Facility: CLINIC | Age: 55
End: 2020-04-28

## 2020-04-28 DIAGNOSIS — R91.1 LUNG NODULE: Primary | ICD-10-CM

## 2020-05-05 ENCOUNTER — HOSPITAL ENCOUNTER (OUTPATIENT)
Dept: CT IMAGING | Facility: HOSPITAL | Age: 55
Discharge: HOME | End: 2020-05-05
Payer: COMMERCIAL

## 2020-05-05 DIAGNOSIS — R91.1 LUNG NODULE: ICD-10-CM

## 2020-05-05 PROCEDURE — 71250 CT THORAX DX C-: CPT

## 2020-05-06 ENCOUNTER — TELEPHONE (OUTPATIENT)
Dept: FAMILY MEDICINE CLINIC | Facility: CLINIC | Age: 55
End: 2020-05-06

## 2020-05-07 ENCOUNTER — PATIENT OUTREACH (OUTPATIENT)
Dept: FAMILY MEDICINE CLINIC | Facility: CLINIC | Age: 55
End: 2020-05-07

## 2020-05-14 ENCOUNTER — TELEPHONE (OUTPATIENT)
Dept: FAMILY MEDICINE CLINIC | Facility: CLINIC | Age: 55
End: 2020-05-14

## 2020-05-19 ENCOUNTER — PATIENT OUTREACH (OUTPATIENT)
Dept: FAMILY MEDICINE CLINIC | Facility: CLINIC | Age: 55
End: 2020-05-19

## 2020-05-26 ENCOUNTER — PATIENT OUTREACH (OUTPATIENT)
Dept: FAMILY MEDICINE CLINIC | Facility: CLINIC | Age: 55
End: 2020-05-26

## 2020-06-01 ENCOUNTER — PATIENT OUTREACH (OUTPATIENT)
Dept: FAMILY MEDICINE CLINIC | Facility: CLINIC | Age: 55
End: 2020-06-01

## 2020-06-09 ENCOUNTER — PATIENT OUTREACH (OUTPATIENT)
Dept: FAMILY MEDICINE CLINIC | Facility: CLINIC | Age: 55
End: 2020-06-09

## 2020-06-10 ENCOUNTER — TELEMEDICINE (OUTPATIENT)
Dept: FAMILY MEDICINE CLINIC | Facility: CLINIC | Age: 55
End: 2020-06-10

## 2020-06-10 DIAGNOSIS — R10.9 RIGHT SIDED ABDOMINAL PAIN: Primary | ICD-10-CM

## 2020-06-10 DIAGNOSIS — R10.13 EPIGASTRIC PAIN: ICD-10-CM

## 2020-06-10 DIAGNOSIS — E66.3 OVERWEIGHT WITH BODY MASS INDEX (BMI) OF 29 TO 29.9 IN ADULT: ICD-10-CM

## 2020-06-10 DIAGNOSIS — R91.8 MULTIPLE LUNG NODULES ON CT: ICD-10-CM

## 2020-06-10 PROCEDURE — 99213 OFFICE O/P EST LOW 20 MIN: CPT | Performed by: FAMILY MEDICINE

## 2020-06-10 RX ORDER — POLYETHYLENE GLYCOL 3350 17 G/17G
17 POWDER, FOR SOLUTION ORAL DAILY
Qty: 14 EACH | Refills: 0 | Status: SHIPPED | OUTPATIENT
Start: 2020-06-10 | End: 2020-06-10 | Stop reason: SDUPTHER

## 2020-06-10 RX ORDER — POLYETHYLENE GLYCOL 3350 17 G/17G
17 POWDER, FOR SOLUTION ORAL DAILY
Qty: 14 EACH | Refills: 0 | Status: SHIPPED | OUTPATIENT
Start: 2020-06-10 | End: 2021-04-16

## 2020-06-23 ENCOUNTER — TELEPHONE (OUTPATIENT)
Dept: FAMILY MEDICINE CLINIC | Facility: CLINIC | Age: 55
End: 2020-06-23

## 2020-06-24 DIAGNOSIS — R73.03 PREDIABETES: ICD-10-CM

## 2020-07-01 ENCOUNTER — PATIENT OUTREACH (OUTPATIENT)
Dept: FAMILY MEDICINE CLINIC | Facility: CLINIC | Age: 55
End: 2020-07-01

## 2020-07-10 ENCOUNTER — TELEPHONE (OUTPATIENT)
Dept: BARIATRICS | Facility: CLINIC | Age: 55
End: 2020-07-10

## 2020-07-10 NOTE — TELEPHONE ENCOUNTER
Call placed to patient [facility] to call our office regarding referral for [facility] consult appointment  Awaiting call back from patient to schedule appointment

## 2020-07-14 NOTE — TELEPHONE ENCOUNTER
Patient stated he doesn't have transportation at this time and will call back to schedule appointment

## 2020-08-06 ENCOUNTER — APPOINTMENT (EMERGENCY)
Dept: ULTRASOUND IMAGING | Facility: HOSPITAL | Age: 55
End: 2020-08-06
Payer: COMMERCIAL

## 2020-08-06 ENCOUNTER — APPOINTMENT (EMERGENCY)
Dept: CT IMAGING | Facility: HOSPITAL | Age: 55
End: 2020-08-06
Payer: COMMERCIAL

## 2020-08-06 ENCOUNTER — HOSPITAL ENCOUNTER (EMERGENCY)
Facility: HOSPITAL | Age: 55
Discharge: HOME | End: 2020-08-06
Attending: EMERGENCY MEDICINE
Payer: COMMERCIAL

## 2020-08-06 VITALS
SYSTOLIC BLOOD PRESSURE: 161 MMHG | OXYGEN SATURATION: 96 % | WEIGHT: 218.92 LBS | RESPIRATION RATE: 20 BRPM | BODY MASS INDEX: 29.69 KG/M2 | TEMPERATURE: 98.2 F | DIASTOLIC BLOOD PRESSURE: 114 MMHG | HEART RATE: 108 BPM

## 2020-08-06 DIAGNOSIS — N50.811 TESTICULAR PAIN, RIGHT: ICD-10-CM

## 2020-08-06 DIAGNOSIS — R11.0 NAUSEA: ICD-10-CM

## 2020-08-06 DIAGNOSIS — R73.9 HYPERGLYCEMIA: ICD-10-CM

## 2020-08-06 DIAGNOSIS — N20.0 NEPHROLITHIASIS: ICD-10-CM

## 2020-08-06 DIAGNOSIS — E87.1 HYPONATREMIA: Primary | ICD-10-CM

## 2020-08-06 DIAGNOSIS — N28.1 RENAL CYST: ICD-10-CM

## 2020-08-06 LAB
ALBUMIN SERPL BCP-MCNC: 4 G/DL (ref 3–5.2)
ALP SERPL-CCNC: 108 U/L (ref 43–122)
ALT SERPL W P-5'-P-CCNC: 31 U/L (ref 9–52)
ANION GAP SERPL CALCULATED.3IONS-SCNC: 8 MMOL/L (ref 5–14)
AST SERPL W P-5'-P-CCNC: 29 U/L (ref 17–59)
BASOPHILS # BLD AUTO: 0.1 THOUSANDS/ΜL (ref 0–0.1)
BASOPHILS NFR BLD AUTO: 1 % (ref 0–1)
BILIRUB SERPL-MCNC: 0.4 MG/DL
BILIRUB UR QL STRIP: NEGATIVE
BUN SERPL-MCNC: 15 MG/DL (ref 5–25)
CALCIUM SERPL-MCNC: 9.5 MG/DL (ref 8.4–10.2)
CHLORIDE SERPL-SCNC: 102 MMOL/L (ref 97–108)
CLARITY UR: CLEAR
CO2 SERPL-SCNC: 24 MMOL/L (ref 22–30)
COLOR UR: YELLOW
CREAT SERPL-MCNC: 0.79 MG/DL (ref 0.7–1.5)
EOSINOPHIL # BLD AUTO: 0.1 THOUSAND/ΜL (ref 0–0.4)
EOSINOPHIL NFR BLD AUTO: 1 % (ref 0–6)
ERYTHROCYTE [DISTWIDTH] IN BLOOD BY AUTOMATED COUNT: 14.3 %
GFR SERPL CREATININE-BSD FRML MDRD: 100 ML/MIN/1.73SQ M
GLUCOSE SERPL-MCNC: 231 MG/DL (ref 70–99)
GLUCOSE UR STRIP-MCNC: ABNORMAL MG/DL
HCT VFR BLD AUTO: 45.1 % (ref 41–53)
HGB BLD-MCNC: 15.5 G/DL (ref 13.5–17.5)
HGB UR QL STRIP.AUTO: NEGATIVE
KETONES UR STRIP-MCNC: NEGATIVE MG/DL
LEUKOCYTE ESTERASE UR QL STRIP: NEGATIVE
LIPASE SERPL-CCNC: 198 U/L (ref 23–300)
LYMPHOCYTES # BLD AUTO: 3.2 THOUSANDS/ΜL (ref 0.5–4)
LYMPHOCYTES NFR BLD AUTO: 32 % (ref 25–45)
MCH RBC QN AUTO: 32.3 PG (ref 26–34)
MCHC RBC AUTO-ENTMCNC: 34.3 G/DL (ref 31–36)
MCV RBC AUTO: 94 FL (ref 80–100)
MONOCYTES # BLD AUTO: 0.6 THOUSAND/ΜL (ref 0.2–0.9)
MONOCYTES NFR BLD AUTO: 6 % (ref 1–10)
NEUTROPHILS # BLD AUTO: 6 THOUSANDS/ΜL (ref 1.8–7.8)
NEUTS SEG NFR BLD AUTO: 60 % (ref 45–65)
NITRITE UR QL STRIP: NEGATIVE
PH UR STRIP.AUTO: 6 [PH]
PLATELET # BLD AUTO: 169 THOUSANDS/UL (ref 150–450)
PMV BLD AUTO: 10.2 FL (ref 8.9–12.7)
POTASSIUM SERPL-SCNC: 3.8 MMOL/L (ref 3.6–5)
PROT SERPL-MCNC: 7.4 G/DL (ref 5.9–8.4)
PROT UR STRIP-MCNC: NEGATIVE MG/DL
RBC # BLD AUTO: 4.8 MILLION/UL (ref 4.5–5.9)
SODIUM SERPL-SCNC: 134 MMOL/L (ref 137–147)
SP GR UR STRIP.AUTO: 1.02 (ref 1–1.04)
UROBILINOGEN UA: NEGATIVE MG/DL
WBC # BLD AUTO: 10.1 THOUSAND/UL (ref 4.5–11)

## 2020-08-06 PROCEDURE — 81003 URINALYSIS AUTO W/O SCOPE: CPT | Performed by: PHYSICIAN ASSISTANT

## 2020-08-06 PROCEDURE — 96372 THER/PROPH/DIAG INJ SC/IM: CPT

## 2020-08-06 PROCEDURE — 85025 COMPLETE CBC W/AUTO DIFF WBC: CPT | Performed by: PHYSICIAN ASSISTANT

## 2020-08-06 PROCEDURE — 36415 COLL VENOUS BLD VENIPUNCTURE: CPT | Performed by: PHYSICIAN ASSISTANT

## 2020-08-06 PROCEDURE — 74176 CT ABD & PELVIS W/O CONTRAST: CPT

## 2020-08-06 PROCEDURE — 83690 ASSAY OF LIPASE: CPT | Performed by: PHYSICIAN ASSISTANT

## 2020-08-06 PROCEDURE — 80053 COMPREHEN METABOLIC PANEL: CPT | Performed by: PHYSICIAN ASSISTANT

## 2020-08-06 PROCEDURE — G1004 CDSM NDSC: HCPCS

## 2020-08-06 PROCEDURE — 99285 EMERGENCY DEPT VISIT HI MDM: CPT | Performed by: PHYSICIAN ASSISTANT

## 2020-08-06 PROCEDURE — 96361 HYDRATE IV INFUSION ADD-ON: CPT

## 2020-08-06 PROCEDURE — 99285 EMERGENCY DEPT VISIT HI MDM: CPT

## 2020-08-06 PROCEDURE — 96376 TX/PRO/DX INJ SAME DRUG ADON: CPT

## 2020-08-06 PROCEDURE — 76870 US EXAM SCROTUM: CPT

## 2020-08-06 PROCEDURE — 96374 THER/PROPH/DIAG INJ IV PUSH: CPT

## 2020-08-06 RX ORDER — MORPHINE SULFATE 4 MG/ML
4 INJECTION, SOLUTION INTRAMUSCULAR; INTRAVENOUS ONCE
Status: COMPLETED | OUTPATIENT
Start: 2020-08-06 | End: 2020-08-06

## 2020-08-06 RX ORDER — ONDANSETRON 4 MG/1
4 TABLET, ORALLY DISINTEGRATING ORAL EVERY 6 HOURS PRN
Qty: 20 TABLET | Refills: 0 | Status: SHIPPED | OUTPATIENT
Start: 2020-08-06 | End: 2020-12-01

## 2020-08-06 RX ORDER — KETOROLAC TROMETHAMINE 30 MG/ML
1 INJECTION, SOLUTION INTRAMUSCULAR; INTRAVENOUS ONCE
Status: DISCONTINUED | OUTPATIENT
Start: 2020-08-06 | End: 2020-08-07 | Stop reason: HOSPADM

## 2020-08-06 RX ORDER — KETOROLAC TROMETHAMINE 10 MG/1
10 TABLET, FILM COATED ORAL EVERY 6 HOURS PRN
Qty: 20 TABLET | Refills: 0 | Status: SHIPPED | OUTPATIENT
Start: 2020-08-06 | End: 2020-12-01

## 2020-08-06 RX ORDER — KETOROLAC TROMETHAMINE 30 MG/ML
15 INJECTION, SOLUTION INTRAMUSCULAR; INTRAVENOUS ONCE
Status: COMPLETED | OUTPATIENT
Start: 2020-08-06 | End: 2020-08-06

## 2020-08-06 RX ADMIN — SODIUM CHLORIDE 1000 ML: 0.9 INJECTION, SOLUTION INTRAVENOUS at 19:56

## 2020-08-06 RX ADMIN — MORPHINE SULFATE 4 MG: 4 INJECTION INTRAVENOUS at 19:56

## 2020-08-06 RX ADMIN — MORPHINE SULFATE 4 MG: 4 INJECTION INTRAVENOUS at 20:55

## 2020-08-06 RX ADMIN — KETOROLAC TROMETHAMINE 15 MG: 30 INJECTION, SOLUTION INTRAMUSCULAR; INTRAVENOUS at 22:52

## 2020-08-06 NOTE — ED PROVIDER NOTES
History  Chief Complaint   Patient presents with    Flank Pain     flank pain that radiated to the right testicle hx of kidney stone x 3 years ago  IV zofran and 15 mg toradol IV administered via EMS     58-year-old male with past history of diverticulitis, hypertension, kidney stone presenting for evaluation of right-sided flank pain  Patient reports he has had right-sided flank pain radiating to the right testicle for the last 3 days  EMS gave 4mg zofran and 15 mg toradol prior to arrival  He reports the last time this occurred was 3 years ago when he was diagnosed with kidney stone  He states that this feels exactly the same  No testicular swelling or testicular pain  Reports nausea but no vomiting  States that he has had urinary frequency but only goes a small amount  No dysuria hematuria penile discharge diarrhea or constipation  No fevers chills or sweats  Prior to Admission Medications   Prescriptions Last Dose Informant Patient Reported?  Taking?   acetaminophen (TYLENOL) 325 mg tablet More than a month at Unknown time  No No   Sig: Take 2 tablets (650 mg total) by mouth every 6 (six) hours as needed for mild pain   albuterol (PROVENTIL HFA,VENTOLIN HFA) 90 mcg/act inhaler More than a month at Unknown time  No No   Sig: Inhale 1-2 puffs every 6 (six) hours as needed for wheezing   aspirin (ECOTRIN LOW STRENGTH) 81 mg EC tablet More than a month at Unknown time  No No   Sig: Take 1 tablet (81 mg total) by mouth daily   atenolol (TENORMIN) 50 mg tablet More than a month at Unknown time  No No   Sig: Take 1 tablet (50 mg total) by mouth daily   atorvastatin (LIPITOR) 40 mg tablet More than a month at Unknown time  No No   Sig: Take 1 tablet (40 mg total) by mouth daily   chlorhexidine (PERIDEX) 0 12 % solution   No No   Sig: Apply 15 mL to the mouth or throat 2 (two) times a day   dextromethorphan-guaiFENesin (ROBITUSSIN DM)  mg/5 mL syrup   No No   Sig: Take 5 mL by mouth 3 (three) times a day as needed for cough   diphenhydrAMINE (BENADRYL) 25 mg tablet   No No   Sig: Take 1 tablet (25 mg total) by mouth daily at bedtime as needed for itching   docusate (COLACE) 60 MG/15ML syrup   No No   Sig: Take 15 mL (60 mg total) by mouth daily   Patient not taking: Reported on 2019   fluticasone (FLONASE) 50 mcg/act nasal spray   No No   Si spray into each nostril daily   gabapentin (NEURONTIN) 800 mg tablet 2020 at Unknown time  No Yes   Sig: Take 1 tablet (800 mg total) by mouth 3 (three) times a day   hydrOXYzine HCL (ATARAX) 50 mg tablet More than a month at Unknown time  Yes No   hydrocortisone 1 % ointment   No No   Sig: Apply topically 2 (two) times a day   ibuprofen (MOTRIN) 600 mg tablet   No No   Sig: Take 1 tablet (600 mg total) by mouth every 6 (six) hours as needed for moderate pain   lidocaine (XYLOCAINE) 5 % ointment   No No   Sig: Apply topically as needed for mild pain   meclizine (ANTIVERT) 25 mg tablet More than a month at Unknown time  No No   Sig: Take 1 tablet (25 mg total) by mouth 3 (three) times a day as needed for dizziness   metFORMIN (GLUCOPHAGE) 500 mg tablet More than a month at Unknown time  No No   Sig: Take 1 tablet (500 mg total) by mouth daily with breakfast   methocarbamol (ROBAXIN) 750 mg tablet More than a month at Unknown time  No No   Sig: Take 1 tablet (750 mg total) by mouth every 8 (eight) hours   naproxen (NAPROSYN) 500 mg tablet   No No   Sig: Take 0 5 tablets (250 mg total) by mouth 2 (two) times a day with meals   omeprazole (PriLOSEC) 40 MG capsule More than a month at Unknown time  No No   Sig: Take 1 capsule (40 mg total) by mouth daily   ondansetron (ZOFRAN-ODT) 4 mg disintegrating tablet   No No   Sig: Take 1 tablet (4 mg total) by mouth every 8 (eight) hours as needed for nausea or vomiting for up to 10 days   polyethylene glycol (MIRALAX) 17 g packet   No No   Sig: Take 17 g by mouth daily for 3 days   simethicone (MYLICON) 468 MG chewable tablet   No No   Sig: Chew 1 tablet (125 mg total) every 6 (six) hours as needed for flatulence   tamsulosin (FLOMAX) 0 4 mg More than a month at Unknown time  No No   Sig: Take 1 capsule (0 4 mg total) by mouth daily with dinner      Facility-Administered Medications: None       Past Medical History:   Diagnosis Date    Diverticulitis     Hypertension        Past Surgical History:   Procedure Laterality Date    COLON SURGERY      NECK SURGERY         History reviewed  No pertinent family history  I have reviewed and agree with the history as documented  E-Cigarette/Vaping     E-Cigarette/Vaping Substances     Social History     Tobacco Use    Smoking status: Current Every Day Smoker     Types: Cigars    Smokeless tobacco: Never Used    Tobacco comment: 1 cigar/daily   Substance Use Topics    Alcohol use: Yes     Frequency: 2-3 times a week     Drinks per session: 5 or 6     Binge frequency: Less than monthly     Comment: drinks only on weekends    Drug use: Never       Review of Systems   All other systems reviewed and are negative  Physical Exam  Physical Exam  Vitals signs and nursing note reviewed  Constitutional:       General: He is not in acute distress  Appearance: He is well-developed  He is not ill-appearing, toxic-appearing or diaphoretic  Comments: Overweight, appears uncomfortable   HENT:      Head: Normocephalic and atraumatic  Eyes:      Conjunctiva/sclera: Conjunctivae normal       Comments: EOM grossly intact   Neck:      Musculoskeletal: Normal range of motion and neck supple  Vascular: No JVD  Cardiovascular:      Rate and Rhythm: Normal rate  Pulmonary:      Effort: Pulmonary effort is normal       Breath sounds: No stridor  No wheezing or rhonchi  Abdominal:      Palpations: Abdomen is soft  Tenderness: There is abdominal tenderness (RUQ tenderness, no RLQ tenderness)  There is right CVA tenderness     Genitourinary:     Comments: No testicular tenderness to palpation  Musculoskeletal:      Comments: FROM, steady gait, cap refill brisk, strength and sensation grossly intact throughout   Skin:     General: Skin is warm and dry  Capillary Refill: Capillary refill takes less than 2 seconds  Neurological:      Mental Status: He is alert and oriented to person, place, and time     Psychiatric:         Behavior: Behavior normal          Vital Signs  ED Triage Vitals [08/06/20 1940]   Temperature Pulse Respirations Blood Pressure SpO2   98 2 °F (36 8 °C) (!) 108 20 (!) 161/114 96 %      Temp Source Heart Rate Source Patient Position - Orthostatic VS BP Location FiO2 (%)   Tympanic Monitor Sitting Left arm --      Pain Score       8           Vitals:    08/06/20 1940   BP: (!) 161/114   Pulse: (!) 108   Patient Position - Orthostatic VS: Sitting         Visual Acuity      ED Medications  Medications   sodium chloride 0 9 % bolus 1,000 mL (0 mL Intravenous Stopped 8/6/20 2258)   morphine (PF) 4 mg/mL injection 4 mg (4 mg Intravenous Given 8/6/20 1956)   morphine (PF) 4 mg/mL injection 4 mg (4 mg Intravenous Given 8/6/20 2055)   ketorolac (TORADOL) injection 15 mg (15 mg Intramuscular Given 8/6/20 2252)       Diagnostic Studies  Results Reviewed     Procedure Component Value Units Date/Time    UA w Reflex to Microscopic w Reflex to Culture [[id number]]  (Abnormal) Collected:  08/06/20 2048    Lab Status:  Final result Specimen:  Urine, Other Updated:  08/06/20 2104     Color, UA Yellow     Clarity, UA Clear     Specific Stacy, UA 1 020     pH, UA 6 0     Leukocytes, UA Negative     Nitrite, UA Negative     Protein, UA Negative mg/dl      Glucose,  (1/4%) mg/dl      Ketones, UA Negative mg/dl      Bilirubin, UA Negative     Blood, UA Negative     UROBILINOGEN UA Negative mg/dL     Lipase [[id number]]  (Normal) Collected:  08/06/20 1949    Lab Status:  Final result Specimen:  Blood from Arm, Right Updated:  08/06/20 2012     Lipase 198 u/L Comprehensive metabolic panel [[id number]]  (Abnormal) Collected:  08/06/20 1949    Lab Status:  Final result Specimen:  Blood from Arm, Right Updated:  08/06/20 2012     Sodium 134 mmol/L      Potassium 3 8 mmol/L      Chloride 102 mmol/L      CO2 24 mmol/L      ANION GAP 8 mmol/L      BUN 15 mg/dL      Creatinine 0 79 mg/dL      Glucose 231 mg/dL      Calcium 9 5 mg/dL      AST 29 U/L      ALT 31 U/L      Alkaline Phosphatase 108 U/L      Total Protein 7 4 g/dL      Albumin 4 0 g/dL      Total Bilirubin 0 40 mg/dL      eGFR 100 ml/min/1 73sq m     Narrative:       National Kidney Disease Foundation guidelines for Chronic Kidney Disease (CKD):     Stage 1 with normal or high GFR (GFR > 90 mL/min/1 73 square meters)    Stage 2 Mild CKD (GFR = 60-89 mL/min/1 73 square meters)    Stage 3A Moderate CKD (GFR = 45-59 mL/min/1 73 square meters)    Stage 3B Moderate CKD (GFR = 30-44 mL/min/1 73 square meters)    Stage 4 Severe CKD (GFR = 15-29 mL/min/1 73 square meters)    Stage 5 End Stage CKD (GFR <15 mL/min/1 73 square meters)  Note: GFR calculation is accurate only with a steady state creatinine    CBC and differential [[id number]]  (Normal) Collected:  08/06/20 1949    Lab Status:  Final result Specimen:  Blood from Arm, Right Updated:  08/06/20 1956     WBC 10 10 Thousand/uL      RBC 4 80 Million/uL      Hemoglobin 15 5 g/dL      Hematocrit 45 1 %      MCV 94 fL      MCH 32 3 pg      MCHC 34 3 g/dL      RDW 14 3 %      MPV 10 2 fL      Platelets 498 Thousands/uL      Neutrophils Relative 60 %      Lymphocytes Relative 32 %      Monocytes Relative 6 %      Eosinophils Relative 1 %      Basophils Relative 1 %      Neutrophils Absolute 6 00 Thousands/µL      Lymphocytes Absolute 3 20 Thousands/µL      Monocytes Absolute 0 60 Thousand/µL      Eosinophils Absolute 0 10 Thousand/µL      Basophils Absolute 0 10 Thousands/µL                   scrotum and testicles   Final Result by Erna Sanchez MD (08/06 2230) No evidence of torsion  Asymmetric small size and heterogeneous appearance of left testicle consistent with patient's reported history of prior left testicular trauma  Workstation performed: [id number]         CT renal stone study abdomen pelvis wo contrast   Final Result by Carlitos Jean-Baptiste MD (08/06 2021)      No evidence of obstructive uropathy  Tiny left intrarenal calculus  Colonic diverticulosis without diverticulitis  Fatty liver               Workstation performed: [id number]                    Procedures  Procedures         ED Course  ED Course as of Aug 11 1312   Thu Aug 06, 2020   2035 Pt providing urine sample now, advised him of 3mm kidney stone, he is feeling better      2108 Pt now stating that he has R testicular pain and swelling, stating 'im always admitted for this pain!', I advised he has an nonobstructed 3mm stone, no uti, no need for admission      2120 Will sign out to NONI Stiles PA-C at this time pending scrotum u/s                                                MDM  Number of Diagnoses or Management Options  Hyperglycemia:   Hyponatremia:   Nausea:   Nephrolithiasis:   Renal cyst:         Disposition  Final diagnoses:   Hyponatremia   Hyperglycemia   Nausea   Renal cyst   Nephrolithiasis   Testicular pain, right     Time reflects when diagnosis was documented in both MDM as applicable and the Disposition within this note     Time User Action Codes Description Comment    8/6/2020  8:18 PM Tam Guise Add [E87 1] Hyponatremia     8/6/2020  8:25 PM Tam Guise Add [R73 9] Hyperglycemia     8/6/2020  8:25 PM Laura Angst [R11 0] Nausea     8/6/2020  8:25 PM Tam Guise Add [N28 1] Renal cyst     8/6/2020  8:25 PM Bronx Delay C Add [N20 0] Nephrolithiasis     8/6/2020  9:21 PM Tam Guise Add [N50 811] Testicular pain, right       ED Disposition     ED Disposition Condition Date/Time Comment    Discharge Stable Thu Aug 6, 2020 9:21 PM Clarita Chavez discharge to home/self care              Follow-up Information     Follow up With Specialties Details Why Contact Info Additional [address] Family Medicine Call in 1 day  [address], [address] [address]  [address], [address], [address] [place], [place], [address]          Discharge Medication List as of 8/6/2020  9:21 PM      START taking these medications    Details   ketorolac (TORADOL) 10 mg tablet Take 1 tablet (10 mg total) by mouth every 6 (six) hours as needed for moderate pain, Starting u 8/6/2020, Normal         CONTINUE these medications which have CHANGED    Details   ondansetron (ZOFRAN-ODT) 4 mg disintegrating tablet Take 1 tablet (4 mg total) by mouth every 6 (six) hours as needed for nausea or vomiting, Starting u 8/6/2020, Normal         CONTINUE these medications which have NOT CHANGED    Details   acetaminophen (TYLENOL) 325 mg tablet Take 2 tablets (650 mg total) by mouth every 6 (six) hours as needed for mild pain, Starting Tue 3/24/2020, Normal      albuterol (PROVENTIL HFA,VENTOLIN HFA) 90 mcg/act inhaler Inhale 1-2 puffs every 6 (six) hours as needed for wheezing, Starting Tue 3/24/2020, Normal      aspirin (ECOTRIN LOW STRENGTH) 81 mg EC tablet Take 1 tablet (81 mg total) by mouth daily, Starting Tue 3/24/2020, Normal      atenolol (TENORMIN) 50 mg tablet Take 1 tablet (50 mg total) by mouth daily, Starting Tue 3/24/2020, Normal      atorvastatin (LIPITOR) 40 mg tablet Take 1 tablet (40 mg total) by mouth daily, Starting Tue 3/24/2020, Normal      chlorhexidine (PERIDEX) 0 12 % solution Apply 15 mL to the mouth or throat 2 (two) times a day, Starting Tue 3/24/2020, Normal      dextromethorphan-guaiFENesin (ROBITUSSIN DM)  mg/5 mL syrup Take 5 mL by mouth 3 (three) times a day as needed for cough, Starting Mon 12/2/2019, Normal      diphenhydrAMINE (BENADRYL) 25 mg tablet Take 1 tablet (25 mg total) by mouth daily at bedtime as needed for itching, Starting Tue 3/24/2020, Normal      docusate (COLACE) 60 MG/15ML syrup Take 15 mL (60 mg total) by mouth daily, Starting Wed 7/24/2019, Print      fluticasone (FLONASE) 50 mcg/act nasal spray 1 spray into each nostril daily, Starting Tue 3/24/2020, Normal      gabapentin (NEURONTIN) 800 mg tablet Take 1 tablet (800 mg total) by mouth 3 (three) times a day, Starting Tue 3/24/2020, Normal      hydrocortisone 1 % ointment Apply topically 2 (two) times a day, Starting Tue 3/24/2020, Normal      hydrOXYzine HCL (ATARAX) 50 mg tablet Starting Thu 1/2/2020, Historical Med      ibuprofen (MOTRIN) 600 mg tablet Take 1 tablet (600 mg total) by mouth every 6 (six) hours as needed for moderate pain, Starting Mon 7/22/2019, Print      lidocaine (XYLOCAINE) 5 % ointment Apply topically as needed for mild pain, Starting Tue 3/24/2020, Normal      meclizine (ANTIVERT) 25 mg tablet Take 1 tablet (25 mg total) by mouth 3 (three) times a day as needed for dizziness, Starting Tue 3/24/2020, Print      metFORMIN (GLUCOPHAGE) 500 mg tablet Take 1 tablet (500 mg total) by mouth daily with breakfast, Starting Wed 6/24/2020, Normal      methocarbamol (ROBAXIN) 750 mg tablet Take 1 tablet (750 mg total) by mouth every 8 (eight) hours, Starting Fri 3/8/2019, Normal      naproxen (NAPROSYN) 500 mg tablet Take 0 5 tablets (250 mg total) by mouth 2 (two) times a day with meals, Starting Tue 3/24/2020, Normal      omeprazole (PriLOSEC) 40 MG capsule Take 1 capsule (40 mg total) by mouth daily, Starting Tue 3/24/2020, Normal      polyethylene glycol (MIRALAX) 17 g packet Take 17 g by mouth daily for 3 days, Starting Wed 6/10/2020, Until Sat 6/13/2020, Normal      simethicone (MYLICON) 442 MG chewable tablet Chew 1 tablet (125 mg total) every 6 (six) hours as needed for flatulence, Starting Tue 3/24/2020, Normal tamsulosin (FLOMAX) 0 4 mg Take 1 capsule (0 4 mg total) by mouth daily with dinner, Starting Tue 3/24/2020, Normal           No discharge procedures on file      PDMP Review     None          ED Provider  Electronically Signed by           Agnes Khoury PA-C  08/11/20 4579

## 2020-08-07 NOTE — ED NOTES
Patient transported to [address] [place] [address],[address], via kerline Ahmadi  08/06/20 7531

## 2020-08-07 NOTE — ED CARE HANDOFF
Emergency Department Sign Out Note        Sign out and transfer of care from [facility]  See Separate Emergency Department note  The patient, Mayela Rangel, was evaluated by the previous provider for abdominal and scrotal pain  Workup Completed:  Pending scrotal/ testicular US    ED Course / Workup Pending (followup): Workup benign in the emergency department  US shows no acute findings  Stable for discharge                             Procedures  MDM    Disposition  Final diagnoses:   Hyponatremia   Hyperglycemia   Nausea   Renal cyst   Nephrolithiasis   Testicular pain, right     Time reflects when diagnosis was documented in both MDM as applicable and the Disposition within this note     Time User Action Codes Description Comment    8/6/2020  8:18 PM Adriana Blotter Add [E87 1] Hyponatremia     8/6/2020  8:25 PM Eston Sheerer [R73 9] Hyperglycemia     8/6/2020  8:25 PM Adriana Blotter Add [R11 0] Nausea     8/6/2020  8:25 PM Adriana Blotter Add [N28 1] Renal cyst     8/6/2020  8:25 PM Adriana Blotter Add [N20 0] Nephrolithiasis     8/6/2020  9:21 PM Sweetie Ruiz Testicular pain, right       ED Disposition     ED Disposition Condition Date/Time Comment    Discharge Stable Thu Aug 6, 2020  9:21 PM Mayela Rangel discharge to home/self care              Follow-up Information     Follow up With Specialties Details Why Contact Info Additional [address] Family Medicine Call in 1 day  [address], [address] [facility] [phone]  [address], [address], [address] [place], [place], [address]        Patient's Medications   Discharge Prescriptions    KETOROLAC (TORADOL) 10 MG TABLET    Take 1 tablet (10 mg total) by mouth every 6 (six) hours as needed for moderate pain       Start Date: 8/6/2020  End Date: --       Order Dose: 10 mg Quantity: 20 tablet    Refills: 0    ONDANSETRON (ZOFRAN-ODT) 4 MG DISINTEGRATING TABLET    Take 1 tablet (4 mg total) by mouth every 6 (six) hours as needed for nausea or vomiting       Start Date: 8/6/2020  End Date: --       Order Dose: 4 mg       Quantity: 20 tablet    Refills: 0     No discharge procedures on file         ED Provider  Electronically Signed by     Piyush Brown PA-C  08/06/20 3289

## 2020-09-09 ENCOUNTER — OFFICE VISIT (OUTPATIENT)
Dept: FAMILY MEDICINE CLINIC | Facility: CLINIC | Age: 55
End: 2020-09-09

## 2020-09-09 VITALS
OXYGEN SATURATION: 96 % | TEMPERATURE: 98 F | HEART RATE: 98 BPM | RESPIRATION RATE: 18 BRPM | SYSTOLIC BLOOD PRESSURE: 128 MMHG | DIASTOLIC BLOOD PRESSURE: 86 MMHG | BODY MASS INDEX: 28.99 KG/M2 | HEIGHT: 72 IN | WEIGHT: 214 LBS

## 2020-09-09 DIAGNOSIS — J30.9 ALLERGIC RHINITIS, UNSPECIFIED SEASONALITY, UNSPECIFIED TRIGGER: ICD-10-CM

## 2020-09-09 DIAGNOSIS — L20.84 INTRINSIC ECZEMA: ICD-10-CM

## 2020-09-09 DIAGNOSIS — J40 BRONCHITIS: ICD-10-CM

## 2020-09-09 DIAGNOSIS — K08.89 PAIN, DENTAL: ICD-10-CM

## 2020-09-09 DIAGNOSIS — M25.511 CHRONIC PAIN OF BOTH SHOULDERS: ICD-10-CM

## 2020-09-09 DIAGNOSIS — I10 ESSENTIAL HYPERTENSION: ICD-10-CM

## 2020-09-09 DIAGNOSIS — E78.2 MIXED HYPERLIPIDEMIA: ICD-10-CM

## 2020-09-09 DIAGNOSIS — M25.512 CHRONIC PAIN OF BOTH SHOULDERS: ICD-10-CM

## 2020-09-09 DIAGNOSIS — K21.9 GASTROESOPHAGEAL REFLUX DISEASE WITHOUT ESOPHAGITIS: ICD-10-CM

## 2020-09-09 DIAGNOSIS — E11.9 TYPE 2 DIABETES MELLITUS WITHOUT COMPLICATION, WITHOUT LONG-TERM CURRENT USE OF INSULIN (HCC): ICD-10-CM

## 2020-09-09 DIAGNOSIS — Z87.442 HISTORY OF RENAL CALCULI: ICD-10-CM

## 2020-09-09 DIAGNOSIS — R73.03 PREDIABETES: ICD-10-CM

## 2020-09-09 DIAGNOSIS — Z09 FOLLOW UP: ICD-10-CM

## 2020-09-09 DIAGNOSIS — D22.9 CHANGE IN MULTIPLE NEVI: ICD-10-CM

## 2020-09-09 DIAGNOSIS — G89.29 CHRONIC PAIN OF BOTH SHOULDERS: ICD-10-CM

## 2020-09-09 DIAGNOSIS — N20.0 NEPHROLITHIASIS: Primary | ICD-10-CM

## 2020-09-09 PROBLEM — R10.9 RIGHT SIDED ABDOMINAL PAIN: Status: RESOLVED | Noted: 2020-06-10 | Resolved: 2020-09-09

## 2020-09-09 PROBLEM — G89.4 CHRONIC PAIN SYNDROME: Status: ACTIVE | Noted: 2020-09-09

## 2020-09-09 PROBLEM — E66.9 OBESITY: Status: RESOLVED | Noted: 2018-05-14 | Resolved: 2020-09-09

## 2020-09-09 LAB — SL AMB POCT HEMOGLOBIN AIC: 8.7 (ref ?–6.5)

## 2020-09-09 PROCEDURE — 3008F BODY MASS INDEX DOCD: CPT | Performed by: NURSE PRACTITIONER

## 2020-09-09 PROCEDURE — 3725F SCREEN DEPRESSION PERFORMED: CPT | Performed by: NURSE PRACTITIONER

## 2020-09-09 PROCEDURE — 3052F HG A1C>EQUAL 8.0%<EQUAL 9.0%: CPT | Performed by: NURSE PRACTITIONER

## 2020-09-09 PROCEDURE — 99214 OFFICE O/P EST MOD 30 MIN: CPT | Performed by: NURSE PRACTITIONER

## 2020-09-09 PROCEDURE — 83036 HEMOGLOBIN GLYCOSYLATED A1C: CPT | Performed by: NURSE PRACTITIONER

## 2020-09-09 RX ORDER — CHLORHEXIDINE GLUCONATE 0.12 MG/ML
15 RINSE ORAL 2 TIMES DAILY
Qty: 120 ML | Refills: 0 | Status: SHIPPED | OUTPATIENT
Start: 2020-09-09 | End: 2020-12-01

## 2020-09-09 RX ORDER — ATENOLOL 50 MG/1
50 TABLET ORAL DAILY
Qty: 90 TABLET | Refills: 3 | Status: SHIPPED | OUTPATIENT
Start: 2020-09-09 | End: 2021-02-17 | Stop reason: SDUPTHER

## 2020-09-09 RX ORDER — ATORVASTATIN CALCIUM 40 MG/1
40 TABLET, FILM COATED ORAL DAILY
Qty: 90 TABLET | Refills: 1 | Status: SHIPPED | OUTPATIENT
Start: 2020-09-09 | End: 2021-02-17 | Stop reason: SDUPTHER

## 2020-09-09 RX ORDER — TAMSULOSIN HYDROCHLORIDE 0.4 MG/1
0.4 CAPSULE ORAL
Qty: 30 CAPSULE | Refills: 3 | Status: SHIPPED | OUTPATIENT
Start: 2020-09-09 | End: 2021-06-10 | Stop reason: SDUPTHER

## 2020-09-09 RX ORDER — ALBUTEROL SULFATE 90 UG/1
1-2 AEROSOL, METERED RESPIRATORY (INHALATION) EVERY 6 HOURS PRN
Qty: 1 INHALER | Refills: 0 | Status: SHIPPED | OUTPATIENT
Start: 2020-09-09 | End: 2020-12-01 | Stop reason: SDUPTHER

## 2020-09-09 RX ORDER — FLUTICASONE PROPIONATE 50 MCG
1 SPRAY, SUSPENSION (ML) NASAL DAILY
Qty: 16 G | Refills: 0 | Status: SHIPPED | OUTPATIENT
Start: 2020-09-09 | End: 2021-06-10 | Stop reason: SDUPTHER

## 2020-09-09 RX ORDER — OMEPRAZOLE 40 MG/1
40 CAPSULE, DELAYED RELEASE ORAL DAILY
Qty: 90 CAPSULE | Refills: 2 | Status: SHIPPED | OUTPATIENT
Start: 2020-09-09 | End: 2021-03-10 | Stop reason: SDUPTHER

## 2020-09-09 RX ORDER — DIAPER,BRIEF,INFANT-TODD,DISP
EACH MISCELLANEOUS 2 TIMES DAILY
Qty: 30 G | Refills: 0 | Status: SHIPPED | OUTPATIENT
Start: 2020-09-09 | End: 2021-03-10 | Stop reason: SDUPTHER

## 2020-09-09 RX ORDER — ASPIRIN 81 MG/1
81 TABLET ORAL DAILY
Qty: 30 TABLET | Refills: 2 | Status: SHIPPED | OUTPATIENT
Start: 2020-09-09 | End: 2020-12-01 | Stop reason: SDUPTHER

## 2020-09-09 RX ORDER — GABAPENTIN 800 MG/1
800 TABLET ORAL 3 TIMES DAILY
Qty: 90 TABLET | Refills: 3 | Status: SHIPPED | OUTPATIENT
Start: 2020-09-09 | End: 2021-01-06 | Stop reason: SDUPTHER

## 2020-09-09 NOTE — ASSESSMENT & PLAN NOTE
Patient currently out of his medications  Will restart the patient on gabapentin 100 mg t i d  Patient can also continue to take naproxen b i d    Will send the patient diclofenac to use topically

## 2020-09-09 NOTE — PROGRESS NOTES
Assessment/Plan:    Gastroesophageal reflux disease   Stable   Continue omeprazole 40 mg daily      Allergic rhinitis   Reordered cetirizine and Flonase    Hypertension   Blood pressure noted to be significantly elevated in the ED recently on 08/06/2020    is controlled today in the office at 128/86- continue current regimen atenolol 50 mg daily        History of renal calculi   Noted to have a 3 mm left renal calculus on 08/06/2020   patient reports continuous recurrence of nephrolithiasis with difficulty passing   referral placed to Urology for further evaluation    Hyperlipidemia   Continue with atorvastatin 40 mg daily  Lipid panel ordered    Multiple lung nodules on CT   Due for repeat chest CT 05/2021    Type 2 diabetes mellitus without complication, without long-term current use of insulin ([facility] [address] )    Lab Results   Component Value Date    HGBA1C 8 7 (A) 09/09/2020      previous A1c was noted to be 6 2 when checked in 2018   patient was supposed to be taking metformin 500 mg daily that reports noncompliance with this   in-depth discussion importance of medication compliance and side effects of uncontrolled diabetes   at this time will restart the patient on metformin at 1000 mg b i d  Will order the patient a glucometer to check blood sugars once daily with fasting blood glucose level goal between     Change in multiple nevi   Referral to dermatology    Chronic pain syndrome   Patient currently out of his medications  Will restart the patient on gabapentin 100 mg t i d  Patient can also continue to take naproxen b i d  Will send the patient diclofenac to use topically        Return in about 3 months (around 12/9/2020) for Recheck blood pressure   Patient Instructions     Hypertension   AMBULATORY CARE:   Hypertension  is high blood pressure (BP)  Your BP is the force of your blood moving against the walls of your arteries  Normal BP is less than 120/80  Prehypertension is between 120/80 and 139/89  Hypertension is 140/90 or higher  Hypertension causes your BP to get so high that your heart has to work much harder than normal  This can damage your heart  You can control hypertension with a healthy lifestyle or medicines  A controlled blood pressure helps protect your organs, such as your heart, lungs, brain, and kidneys  Common symptoms include the following:   · Headache     · Blurred vision     · Chest pain     · Dizziness or weakness     · Trouble breathing    · Nosebleeds  Call 911 for any of the following:   · You have discomfort in your chest that feels like squeezing, pressure, fullness, or pain  · You become confused or have difficulty speaking  · You suddenly feel lightheaded or have trouble breathing  · You have pain or discomfort in your back, neck, jaw, stomach, or arm  Seek care immediately if:   · You have a severe headache or vision loss  · You have weakness in an arm or leg  Contact your healthcare provider if:   · You feel faint, dizzy, confused, or drowsy  · You have been taking your BP medicine and your BP is still higher than your healthcare provider says it should be  · You have questions or concerns about your condition or care  Treatment for hypertension  may include medicine to lower your BP and lower your cholesterol level  A low cholesterol level helps prevent heart disease and makes it easier to control your blood pressure  You may also need to make lifestyle changes  Take your medicine exactly as directed  Manage hypertension:  Talk with your healthcare provider about these and other ways to manage hypertension:  · Check your BP at home  Sit and rest for 5 minutes before you take your BP  Extend your arm and support it on a flat surface  Your arm should be at the same level as your heart  Follow the directions that came with your BP monitor  If possible, take at least 2 BP readings each time   Take your BP at least twice a day at the same times each day, such as morning and evening  Keep a record of your BP readings and bring it to your follow-up visits  Ask your healthcare provider what your BP should be  · Limit sodium (salt) as directed  Too much sodium can affect your fluid balance  Check labels to find low-sodium or no-salt-added foods  Some low-sodium foods use potassium salts for flavor  Too much potassium can also cause health problems  Your healthcare provider will tell you how much sodium and potassium are safe for you to have in a day  He or she may recommend that you limit sodium to 2,300 mg a day  · Follow the meal plan recommended by your healthcare provider  A dietitian or your provider can give you more information on low-sodium plans or the DASH (Dietary Approaches to Stop Hypertension) eating plan  The DASH plan is low in sodium, unhealthy fats, and total fat  It is high in potassium, calcium, and fiber  · Exercise to maintain a healthy weight  Exercise at least 30 minutes per day, on most days of the week  This will help decrease your blood pressure  Ask your healthcare provider about the best exercise plan for you  · Decrease stress  This may help lower your BP  Learn ways to relax, such as deep breathing or listening to music  · Limit alcohol  Women should limit alcohol to 1 drink a day  Men should limit alcohol to 2 drinks a day  A drink of alcohol is 12 ounces of beer, 5 ounces of wine, or 1½ ounces of liquor  · Do not smoke  Nicotine and other chemicals in cigarettes and cigars can increase your BP and also cause lung damage  Ask your healthcare provider for information if you currently smoke and need help to quit  E-cigarettes or smokeless tobacco still contain nicotine  Talk to your healthcare provider before you use these products  · Manage any other health conditions you have  Health conditions such as diabetes can increase your risk for hypertension   Follow your healthcare provider's instructions and take all your medicines as directed  Follow up with your healthcare provider as directed: You will need to return to have your BP checked and to have other lab tests done  Write down your questions so you remember to ask them during your visits  © 2017 [address] [employer] [address] [employer] Information is for End User's use only and may not be sold, redistributed or otherwise used for commercial purposes  All illustrations and images included in CareNotes® are the copyrighted property of [employer] M [employer]  or Bashir Gonzales  The above information is an  only  It is not intended as medical advice for individual conditions or treatments  Talk to your doctor, nurse or pharmacist before following any medical regimen to see if it is safe and effective for you  Diagnoses and all orders for this visit:    Nephrolithiasis  -     Ambulatory referral to Urology; Future    Bronchitis  -     albuterol (PROVENTIL HFA,VENTOLIN HFA) 90 mcg/act inhaler; Inhale 1-2 puffs every 6 (six) hours as needed for wheezing    Mixed hyperlipidemia  -     aspirin (ECOTRIN LOW STRENGTH) 81 mg EC tablet; Take 1 tablet (81 mg total) by mouth daily  -     atorvastatin (LIPITOR) 40 mg tablet; Take 1 tablet (40 mg total) by mouth daily  -     Ambulatory referral to complex care management program; Future    Essential hypertension  -     atenolol (TENORMIN) 50 mg tablet; Take 1 tablet (50 mg total) by mouth daily  -     Ambulatory referral to complex care management program; Future    Pain, dental  -     chlorhexidine (PERIDEX) 0 12 % solution;  Apply 15 mL to the mouth or throat 2 (two) times a day    Follow up  -     fluticasone (FLONASE) 50 mcg/act nasal spray; 1 spray into each nostril daily    Allergic rhinitis, unspecified seasonality, unspecified trigger  -     fluticasone (FLONASE) 50 mcg/act nasal spray; 1 spray into each nostril daily    Chronic pain of both shoulders  -     gabapentin (NEURONTIN) 800 mg tablet; Take 1 tablet (800 mg total) by mouth 3 (three) times a day    Intrinsic eczema  -     hydrocortisone 1 % ointment; Apply topically 2 (two) times a day    Prediabetes  -     POCT hemoglobin A1c  -     Discontinue: metFORMIN (GLUCOPHAGE) 500 mg tablet; Take 1 tablet (500 mg total) by mouth daily with breakfast    Gastroesophageal reflux disease without esophagitis  -     omeprazole (PriLOSEC) 40 MG capsule; Take 1 capsule (40 mg total) by mouth daily    History of renal calculi  -     tamsulosin (FLOMAX) 0 4 mg; Take 1 capsule (0 4 mg total) by mouth daily with dinner    Change in multiple nevi  -     Ambulatory referral to Dermatology; Future    Type 2 diabetes mellitus without complication, without long-term current use of insulin (HCC)  -     metFORMIN (GLUCOPHAGE) 1000 MG tablet; Take 1 tablet (1,000 mg total) by mouth 2 (two) times a day with meals  -     Ambulatory referral to complex care management program; Future  -     Glucometer  -     Glucometer test strips  -     Lancets          Subjective:     Ash Herrera is a 60 y o  male who  has a past medical history of Diverticulitis and Hypertension  who presented to the office today for ED follow up  The patient was seen in the emergency department on 08/06/2020 due to right flank pain radiating to the right testicle  Imaging noted a 3 mm calculus to the left kidney that was nonobstructing  Testicular ultrasound showed left testicle asymmetrical due to known history of trauma  The patient reports today that he does not believe he passed the stone yet  He does continue with some left back pain  He denies any difficulty with urination, hematuria, or fever  Patient also continues with chronic pain to the knees, shoulders, and back  He is currently out of his medication  The patient denies any chest pain, shortness of breath, dizziness, weakness, visual changes, paresthesias, abdominal pain, nausea, vomiting, diarrhea      The following portions of the patient's history were reviewed and updated as appropriate: allergies, current medications, past family history, past medical history, past social history, past surgical history and problem list     Current Outpatient Medications on File Prior to Visit   Medication Sig Dispense Refill    acetaminophen (TYLENOL) 325 mg tablet Take 2 tablets (650 mg total) by mouth every 6 (six) hours as needed for mild pain 90 tablet 0    dextromethorphan-guaiFENesin (ROBITUSSIN DM)  mg/5 mL syrup Take 5 mL by mouth 3 (three) times a day as needed for cough (Patient not taking: Reported on 9/9/2020) 118 mL 0    diphenhydrAMINE (BENADRYL) 25 mg tablet Take 1 tablet (25 mg total) by mouth daily at bedtime as needed for itching (Patient not taking: Reported on 9/9/2020) 30 tablet 4    docusate (COLACE) 60 MG/15ML syrup Take 15 mL (60 mg total) by mouth daily (Patient not taking: Reported on 8/5/2019) 100 mL 0    hydrOXYzine HCL (ATARAX) 50 mg tablet       ibuprofen (MOTRIN) 600 mg tablet Take 1 tablet (600 mg total) by mouth every 6 (six) hours as needed for moderate pain (Patient not taking: Reported on 9/9/2020) 30 tablet 0    ketorolac (TORADOL) 10 mg tablet Take 1 tablet (10 mg total) by mouth every 6 (six) hours as needed for moderate pain (Patient not taking: Reported on 9/9/2020) 20 tablet 0    lidocaine (XYLOCAINE) 5 % ointment Apply topically as needed for mild pain (Patient not taking: Reported on 9/9/2020) 35 44 g 0    meclizine (ANTIVERT) 25 mg tablet Take 1 tablet (25 mg total) by mouth 3 (three) times a day as needed for dizziness (Patient not taking: Reported on 9/9/2020) 30 tablet 0    methocarbamol (ROBAXIN) 750 mg tablet Take 1 tablet (750 mg total) by mouth every 8 (eight) hours (Patient not taking: Reported on 9/9/2020) 90 tablet 2    naproxen (NAPROSYN) 500 mg tablet Take 0 5 tablets (250 mg total) by mouth 2 (two) times a day with meals (Patient not taking: Reported on 9/9/2020) 30 tablet 2  ondansetron (ZOFRAN-ODT) 4 mg disintegrating tablet Take 1 tablet (4 mg total) by mouth every 6 (six) hours as needed for nausea or vomiting (Patient not taking: Reported on 9/9/2020) 20 tablet 0    polyethylene glycol (MIRALAX) 17 g packet Take 17 g by mouth daily for 3 days 14 each 0    simethicone (MYLICON) 798 MG chewable tablet Chew 1 tablet (125 mg total) every 6 (six) hours as needed for flatulence (Patient not taking: Reported on 9/9/2020) 30 tablet 1    [DISCONTINUED] albuterol (PROVENTIL HFA,VENTOLIN HFA) 90 mcg/act inhaler Inhale 1-2 puffs every 6 (six) hours as needed for wheezing (Patient not taking: Reported on 9/9/2020) 1 Inhaler 0    [DISCONTINUED] aspirin (ECOTRIN LOW STRENGTH) 81 mg EC tablet Take 1 tablet (81 mg total) by mouth daily (Patient not taking: Reported on 9/9/2020) 30 tablet 2    [DISCONTINUED] atenolol (TENORMIN) 50 mg tablet Take 1 tablet (50 mg total) by mouth daily (Patient not taking: Reported on 9/9/2020) 90 tablet 3    [DISCONTINUED] atorvastatin (LIPITOR) 40 mg tablet Take 1 tablet (40 mg total) by mouth daily (Patient not taking: Reported on 9/9/2020) 90 tablet 1    [DISCONTINUED] chlorhexidine (PERIDEX) 0 12 % solution Apply 15 mL to the mouth or throat 2 (two) times a day (Patient not taking: Reported on 9/9/2020) 120 mL 0    [DISCONTINUED] fluticasone (FLONASE) 50 mcg/act nasal spray 1 spray into each nostril daily (Patient not taking: Reported on 9/9/2020) 16 g 0    [DISCONTINUED] gabapentin (NEURONTIN) 800 mg tablet Take 1 tablet (800 mg total) by mouth 3 (three) times a day (Patient not taking: Reported on 9/9/2020) 90 tablet 3    [DISCONTINUED] hydrocortisone 1 % ointment Apply topically 2 (two) times a day (Patient not taking: Reported on 9/9/2020) 30 g 0    [DISCONTINUED] metFORMIN (GLUCOPHAGE) 500 mg tablet Take 1 tablet (500 mg total) by mouth daily with breakfast (Patient not taking: Reported on 9/9/2020) 30 tablet 1    [DISCONTINUED] omeprazole (PriLOSEC) 40 MG capsule Take 1 capsule (40 mg total) by mouth daily (Patient not taking: Reported on 9/9/2020) 90 capsule 2    [DISCONTINUED] tamsulosin (FLOMAX) 0 4 mg Take 1 capsule (0 4 mg total) by mouth daily with dinner (Patient not taking: Reported on 9/9/2020) 30 capsule 3     No current facility-administered medications on file prior to visit  Review of Systems   Constitutional: Negative for activity change, appetite change, chills, fatigue and fever  HENT: Negative for congestion and sore throat  Eyes: Negative for visual disturbance  Respiratory: Negative for cough and shortness of breath  Cardiovascular: Negative for chest pain and palpitations  Gastrointestinal: Negative for abdominal pain, diarrhea, nausea and vomiting  Genitourinary: Positive for flank pain  Negative for difficulty urinating, dysuria, frequency and hematuria  Musculoskeletal: Positive for arthralgias, back pain and myalgias  Negative for gait problem  Skin: Negative for rash  Neurological: Negative for dizziness and weakness  Objective:    /86 (BP Location: Right arm, Patient Position: Sitting, Cuff Size: Standard)   Pulse 98   Temp 98 °F (36 7 °C) (Temporal)   Resp 18   Ht 6' (1 829 m)   Wt 97 1 kg (214 lb)   SpO2 96%   BMI 29 02 kg/m²     Physical Exam  Vitals signs and nursing note reviewed  Constitutional:       General: He is not in acute distress  Appearance: He is well-developed  He is not diaphoretic  HENT:      Head: Normocephalic and atraumatic  Right Ear: External ear normal       Left Ear: External ear normal    Eyes:      General:         Right eye: No discharge  Left eye: No discharge  Conjunctiva/sclera: Conjunctivae normal    Neck:      Musculoskeletal: Normal range of motion and neck supple  Cardiovascular:      Rate and Rhythm: Normal rate and regular rhythm  Pulses: Normal pulses  Heart sounds: Normal heart sounds  Pulmonary:      Effort: Pulmonary effort is normal  No respiratory distress  Breath sounds: Normal breath sounds  No wheezing  Abdominal:      General: Bowel sounds are normal  There is no distension  Palpations: Abdomen is soft  Tenderness: There is no abdominal tenderness  There is left CVA tenderness (mild)  Musculoskeletal: Normal range of motion  General: No deformity  Lymphadenopathy:      Cervical: No cervical adenopathy  Skin:     General: Skin is warm and dry  Capillary Refill: Capillary refill takes less than 2 seconds  Findings: No rash  Neurological:      Mental Status: He is alert and oriented to person, place, and time  Mental status is at baseline     Psychiatric:         Mood and Affect: Mood normal          Behavior: Behavior normal          RAMO Herndon  09/09/20  4:34 PM

## 2020-09-09 NOTE — PATIENT INSTRUCTIONS
Hypertension   AMBULATORY CARE:   Hypertension  is high blood pressure (BP)  Your BP is the force of your blood moving against the walls of your arteries  Normal BP is less than 120/80  Prehypertension is between 120/80 and 139/89  Hypertension is 140/90 or higher  Hypertension causes your BP to get so high that your heart has to work much harder than normal  This can damage your heart  You can control hypertension with a healthy lifestyle or medicines  A controlled blood pressure helps protect your organs, such as your heart, lungs, brain, and kidneys  Common symptoms include the following:   · Headache     · Blurred vision     · Chest pain     · Dizziness or weakness     · Trouble breathing    · Nosebleeds  Call 911 for any of the following:   · You have discomfort in your chest that feels like squeezing, pressure, fullness, or pain  · You become confused or have difficulty speaking  · You suddenly feel lightheaded or have trouble breathing  · You have pain or discomfort in your back, neck, jaw, stomach, or arm  Seek care immediately if:   · You have a severe headache or vision loss  · You have weakness in an arm or leg  Contact your healthcare provider if:   · You feel faint, dizzy, confused, or drowsy  · You have been taking your BP medicine and your BP is still higher than your healthcare provider says it should be  · You have questions or concerns about your condition or care  Treatment for hypertension  may include medicine to lower your BP and lower your cholesterol level  A low cholesterol level helps prevent heart disease and makes it easier to control your blood pressure  You may also need to make lifestyle changes  Take your medicine exactly as directed  Manage hypertension:  Talk with your healthcare provider about these and other ways to manage hypertension:  · Check your BP at home  Sit and rest for 5 minutes before you take your BP   Extend your arm and support it on a flat surface  Your arm should be at the same level as your heart  Follow the directions that came with your BP monitor  If possible, take at least 2 BP readings each time  Take your BP at least twice a day at the same times each day, such as morning and evening  Keep a record of your BP readings and bring it to your follow-up visits  Ask your healthcare provider what your BP should be  · Limit sodium (salt) as directed  Too much sodium can affect your fluid balance  Check labels to find low-sodium or no-salt-added foods  Some low-sodium foods use potassium salts for flavor  Too much potassium can also cause health problems  Your healthcare provider will tell you how much sodium and potassium are safe for you to have in a day  He or she may recommend that you limit sodium to 2,300 mg a day  · Follow the meal plan recommended by your healthcare provider  A dietitian or your provider can give you more information on low-sodium plans or the DASH (Dietary Approaches to Stop Hypertension) eating plan  The DASH plan is low in sodium, unhealthy fats, and total fat  It is high in potassium, calcium, and fiber  · Exercise to maintain a healthy weight  Exercise at least 30 minutes per day, on most days of the week  This will help decrease your blood pressure  Ask your healthcare provider about the best exercise plan for you  · Decrease stress  This may help lower your BP  Learn ways to relax, such as deep breathing or listening to music  · Limit alcohol  Women should limit alcohol to 1 drink a day  Men should limit alcohol to 2 drinks a day  A drink of alcohol is 12 ounces of beer, 5 ounces of wine, or 1½ ounces of liquor  · Do not smoke  Nicotine and other chemicals in cigarettes and cigars can increase your BP and also cause lung damage  Ask your healthcare provider for information if you currently smoke and need help to quit  E-cigarettes or smokeless tobacco still contain nicotine  Talk to your healthcare provider before you use these products  · Manage any other health conditions you have  Health conditions such as diabetes can increase your risk for hypertension  Follow your healthcare provider's instructions and take all your medicines as directed  Follow up with your healthcare provider as directed: You will need to return to have your BP checked and to have other lab tests done  Write down your questions so you remember to ask them during your visits  © 2017 [address] [employer] Information is for End User's use only and may not be sold, redistributed or otherwise used for commercial purposes  All illustrations and images included in CareNotes® are the copyrighted property of [employer]  or Bashir Gonzales  The above information is an  only  It is not intended as medical advice for individual conditions or treatments  Talk to your doctor, nurse or pharmacist before following any medical regimen to see if it is safe and effective for you

## 2020-09-09 NOTE — ASSESSMENT & PLAN NOTE
Blood pressure noted to be significantly elevated in the ED recently on 08/06/2020    is controlled today in the office at 128/86- continue current regimen atenolol 50 mg daily

## 2020-09-09 NOTE — ASSESSMENT & PLAN NOTE
Noted to have a 3 mm left renal calculus on 08/06/2020   patient reports continuous recurrence of nephrolithiasis with difficulty passing   referral placed to Urology for further evaluation

## 2020-09-09 NOTE — ASSESSMENT & PLAN NOTE
Lab Results   Component Value Date    HGBA1C 8 7 (A) 09/09/2020      previous A1c was noted to be 6 2 when checked in 2018   patient was supposed to be taking metformin 500 mg daily that reports noncompliance with this   in-depth discussion importance of medication compliance and side effects of uncontrolled diabetes   at this time will restart the patient on metformin at 1000 mg b i d    Will order the patient a glucometer to check blood sugars once daily with fasting blood glucose level goal between

## 2020-09-10 ENCOUNTER — PATIENT OUTREACH (OUTPATIENT)
Dept: FAMILY MEDICINE CLINIC | Facility: CLINIC | Age: 55
End: 2020-09-10

## 2020-09-10 NOTE — PROGRESS NOTES
Called patient for the first time as RN CHRISTIANO, explained my role and he accepts my assistance  Patient could not talk today and asked that I call him back tomorrow  He did state he did not receive his glucometer/supplies from the pharmacy and asked me to check on this  I re-faxed the glucometer/supplies to patient's pharmacy, [facility] with fax confirmation  I will follow up with patient tomorrow  Chart reviewed

## 2020-09-11 ENCOUNTER — TELEPHONE (OUTPATIENT)
Dept: UROLOGY | Facility: MEDICAL CENTER | Age: 55
End: 2020-09-11

## 2020-09-11 ENCOUNTER — PATIENT OUTREACH (OUTPATIENT)
Dept: FAMILY MEDICINE CLINIC | Facility: CLINIC | Age: 55
End: 2020-09-11

## 2020-09-11 NOTE — TELEPHONE ENCOUNTER
Called and spoke to pt and set him up for next available with PA in [facility]  Pt confirmed his appointment but asked me to call him back on Monday to remind him so he can write it down      11/6 10:45 with Digna Asher at the [facility]

## 2020-09-11 NOTE — PROGRESS NOTES
Called patient to follow up  He states he has not picked up his glucometer from the pharmacy but plans on doing so today  He states he did not start metformin because he read the package insert and it stated not to take it if the patient is experiencing pain or nausea  Patient has both of these symptoms because of his kidney stones noting his pain level is currently 8/10 located in his back and flank area  Patient is taking the pain medications prescribed for him  Patient states he is taking his blood pressure medications and watches his salt intake  I reviewed with the patient the importance of cutting down on carbs  Patient reported he wants to lose weight which I encouraged, stating this will greatly affect his blood pressure and blood sugar  I asked that he try to start walking everyday 15-20 minutes and slowly increase as tolerated  Patient notes he drinks a lot of water and denies drinking soda  I will continue to support and educate on diet/exercise  I will follow up with patient next week to see if he obtained the glucometer and what his numbers are as well as doing his assessment  Patient was appreciative of the call

## 2020-09-11 NOTE — TELEPHONE ENCOUNTER
Please Triage -   New Patient-     What is the reason for the patients appointment? Nephrolithiasis     Do we accept the patient's insurance or is the patient Self-Pay? Provider:  Ivelisse Brizuela [facility] REP  Plan:   Member ID#: [id number]  Group#:  Subscriber:   Phone#:  Location:      Has the patient had any previous urologist(s)? no       Have patient records been requested? no       Has the patient had any outside testing done? no       What is the patients location preference for an office visit? [place]      Does the patient have a personal history of cancer? no      (If no cancer hx   ) Is the patient ok with seeing Advanced Practitioner? yes      Patient can be reached at : [phone]

## 2020-09-16 DIAGNOSIS — N50.819 TESTES PAIN: Primary | ICD-10-CM

## 2020-09-16 NOTE — PROGRESS NOTES
Pt states before he took the metformin he had dizziness, nausea, and felt unbalanced  So he was afraid to take it because in the directions it stated if he had those symptoms not to take medication  He states he has been taking though since a nurse told him to try it and see how he does, he states it is not giving him any problems  But,he has been having testicular pain and when he walks the pain gets worse on the left side  So he would like to know if it is still ok to take the medication

## 2020-09-17 ENCOUNTER — PATIENT OUTREACH (OUTPATIENT)
Dept: FAMILY MEDICINE CLINIC | Facility: CLINIC | Age: 55
End: 2020-09-17

## 2020-09-17 NOTE — PROGRESS NOTES
Called patient today for his assessment but this was deferred as we had a lengthy discussion on improving his diet and changes he can make  Patient seems very motivated to get healthy and reduce his blood sugars  Patient picked up his glucometer from the pharmacy and is checking his blood sugar; today's reading was 293  He states he is taking his medications as prescribed  Patient reported his diet as follows:  Breakfast is 2 cups of coffee with sugar; lunch consists of any of the following: rice/chicken/beans/pork/potatoes; snacks at night with chips or cake  I suggested some changes: reducing portion sizes of his carbs, stopping cakes/sweets, changing from white to brown rice, no sugar in coffee  I am sending the patient log sheets to keep track of his sugars as well as information on dietary changes  I asked the patient to check his feet every day for skin breakdown and to be sure his feet are completely dry especially between his toes before putting on socks  I also informed patient to have yearly eye exams and he reported he has an appointment next week  Patient states he goes to bed at 4am and wakes up by noon  He notes this is because his psych changed his medications  Patient states he has a follow up appointment 9/24  I asked that he inform his psych of his sleep patterns and hopefully meds can be adjusted so his sleep hygiene improves  Patient stated he had a headache at the time of this call but will not take anything for it because he does not want "so many medications" in his body  He states the pain is in the back of his head  I asked that if the pain worsens to go to the ED and he agreed  I will follow up next week with patient to see if he has made changes and to check his sugar levels  I will continue to support and educate  Patient was very appreciative of my call

## 2020-09-22 ENCOUNTER — HOSPITAL ENCOUNTER (OUTPATIENT)
Dept: ULTRASOUND IMAGING | Facility: HOSPITAL | Age: 55
Discharge: HOME | End: 2020-09-22
Payer: COMMERCIAL

## 2020-09-22 ENCOUNTER — PATIENT OUTREACH (OUTPATIENT)
Dept: FAMILY MEDICINE CLINIC | Facility: CLINIC | Age: 55
End: 2020-09-22

## 2020-09-22 DIAGNOSIS — N50.819 TESTES PAIN: ICD-10-CM

## 2020-09-22 PROCEDURE — 76870 US EXAM SCROTUM: CPT

## 2020-09-22 NOTE — PROGRESS NOTES
Called patient to follow up  He states he is doing "beautifully"  He reports his blood sugar today was 160 and I congratulated him on this  Patient notes he continues to make dietary changes and has been eating a lot more salad and fish  He has also increased his exercise by doing a lot of walking  I encouraged him to continue with these changes  Patient notes he had an eye exam today and was reportedly negative  Patient reports he had his ultrasound done earlier today  He also notes he saw his psychiatrist yesterday who prescribed something to help him sleep but he did not pick it up from the pharmacy yet  I will follow up with the patient next week to check on his sugars as well start his assessment  Patient was grateful for the call

## 2020-09-29 ENCOUNTER — PATIENT OUTREACH (OUTPATIENT)
Dept: FAMILY MEDICINE CLINIC | Facility: CLINIC | Age: 55
End: 2020-09-29

## 2020-09-29 NOTE — PROGRESS NOTES
Called patient for his assessment; the call went through but no one was on the other end  Will make further attempts to reach the patient

## 2020-09-30 ENCOUNTER — PATIENT OUTREACH (OUTPATIENT)
Dept: FAMILY MEDICINE CLINIC | Facility: CLINIC | Age: 55
End: 2020-09-30

## 2020-09-30 DIAGNOSIS — N20.0 NEPHROLITHIASIS: Primary | ICD-10-CM

## 2020-09-30 RX ORDER — TRAMADOL HYDROCHLORIDE 50 MG/1
50 TABLET ORAL 3 TIMES DAILY PRN
Qty: 9 TABLET | Refills: 0 | Status: SHIPPED | OUTPATIENT
Start: 2020-09-30 | End: 2020-12-01

## 2020-09-30 NOTE — PROGRESS NOTES
I called patient for his assessment today  He states he feels "beautiful" and was happy to report his blood sugar this morning was 139 and yesterday's reading was 136  I congratulated him and asked that he keep up the good work  Patient reports he believes he passed a kidney stone this morning as he had some bleeding and pain with urination  He notes he does not want to go to the hospital and would rather remove it himself using a Q-tip and Vaseline  Patient also reports pain in his testicles  Patient notes he is taking gabapentin and Tylenol for pain but this only provides little relief; will send note to PCP  Patient Uro appointment is not until November  I reminded patient of his Derm appointment on Friday, 10/2 which patient was unaware  I provided their phone number and address  I also reminded patient of upcoming PCP appointment on 10/12 but patient would like me to call him prior as another reminder

## 2020-09-30 NOTE — PROGRESS NOTES
I called the patient to inform him pain medications were sent to his pharmacy  Patient was very grateful

## 2020-10-09 ENCOUNTER — PATIENT OUTREACH (OUTPATIENT)
Dept: FAMILY MEDICINE CLINIC | Facility: CLINIC | Age: 55
End: 2020-10-09

## 2020-10-28 ENCOUNTER — PATIENT OUTREACH (OUTPATIENT)
Dept: FAMILY MEDICINE CLINIC | Facility: CLINIC | Age: 55
End: 2020-10-28

## 2020-10-30 ENCOUNTER — PATIENT OUTREACH (OUTPATIENT)
Dept: FAMILY MEDICINE CLINIC | Facility: CLINIC | Age: 55
End: 2020-10-30

## 2020-11-06 DIAGNOSIS — E11.9 TYPE 2 DIABETES MELLITUS WITHOUT COMPLICATION, WITHOUT LONG-TERM CURRENT USE OF INSULIN (HCC): Primary | ICD-10-CM

## 2020-11-06 RX ORDER — BLOOD-GLUCOSE METER
KIT MISCELLANEOUS
Qty: 100 EACH | Refills: 3 | Status: SHIPPED | OUTPATIENT
Start: 2020-11-06 | End: 2020-12-01 | Stop reason: SDUPTHER

## 2020-11-30 ENCOUNTER — PATIENT OUTREACH (OUTPATIENT)
Dept: FAMILY MEDICINE CLINIC | Facility: CLINIC | Age: 55
End: 2020-11-30

## 2020-11-30 DIAGNOSIS — N20.0 NEPHROLITHIASIS: ICD-10-CM

## 2020-11-30 RX ORDER — TRAMADOL HYDROCHLORIDE 50 MG/1
TABLET ORAL
Qty: 9 TABLET | OUTPATIENT
Start: 2020-11-30

## 2020-12-01 ENCOUNTER — OFFICE VISIT (OUTPATIENT)
Dept: FAMILY MEDICINE CLINIC | Facility: CLINIC | Age: 55
End: 2020-12-01

## 2020-12-01 VITALS
HEART RATE: 80 BPM | WEIGHT: 214 LBS | TEMPERATURE: 97.2 F | DIASTOLIC BLOOD PRESSURE: 82 MMHG | OXYGEN SATURATION: 98 % | HEIGHT: 72 IN | RESPIRATION RATE: 18 BRPM | SYSTOLIC BLOOD PRESSURE: 130 MMHG | BODY MASS INDEX: 28.99 KG/M2

## 2020-12-01 DIAGNOSIS — N20.0 NEPHROLITHIASIS: ICD-10-CM

## 2020-12-01 DIAGNOSIS — E11.9 ENCOUNTER FOR DIABETIC FOOT EXAM (HCC): ICD-10-CM

## 2020-12-01 DIAGNOSIS — E78.2 MIXED HYPERLIPIDEMIA: ICD-10-CM

## 2020-12-01 DIAGNOSIS — Z11.59 ENCOUNTER FOR HEPATITIS C SCREENING TEST FOR LOW RISK PATIENT: Primary | ICD-10-CM

## 2020-12-01 DIAGNOSIS — E11.9 TYPE 2 DIABETES MELLITUS WITHOUT COMPLICATION, WITHOUT LONG-TERM CURRENT USE OF INSULIN (HCC): ICD-10-CM

## 2020-12-01 DIAGNOSIS — Z78.9 NEED FOR FOLLOW-UP BY SOCIAL WORKER: ICD-10-CM

## 2020-12-01 DIAGNOSIS — Z11.4 ENCOUNTER FOR SCREENING FOR HIV: ICD-10-CM

## 2020-12-01 DIAGNOSIS — J40 BRONCHITIS: ICD-10-CM

## 2020-12-01 PROBLEM — R06.83 SNORING: Status: RESOLVED | Noted: 2019-03-08 | Resolved: 2020-12-01

## 2020-12-01 PROBLEM — E11.42 DIABETIC POLYNEUROPATHY ASSOCIATED WITH TYPE 2 DIABETES MELLITUS (HCC): Status: ACTIVE | Noted: 2020-12-01

## 2020-12-01 LAB — SL AMB POCT HEMOGLOBIN AIC: 8.1 (ref ?–6.5)

## 2020-12-01 PROCEDURE — 3052F HG A1C>EQUAL 8.0%<EQUAL 9.0%: CPT | Performed by: NURSE PRACTITIONER

## 2020-12-01 PROCEDURE — 3008F BODY MASS INDEX DOCD: CPT | Performed by: NURSE PRACTITIONER

## 2020-12-01 PROCEDURE — 99214 OFFICE O/P EST MOD 30 MIN: CPT | Performed by: NURSE PRACTITIONER

## 2020-12-01 PROCEDURE — 3079F DIAST BP 80-89 MM HG: CPT | Performed by: NURSE PRACTITIONER

## 2020-12-01 PROCEDURE — 3075F SYST BP GE 130 - 139MM HG: CPT | Performed by: NURSE PRACTITIONER

## 2020-12-01 PROCEDURE — 83036 HEMOGLOBIN GLYCOSYLATED A1C: CPT | Performed by: NURSE PRACTITIONER

## 2020-12-01 RX ORDER — BLOOD-GLUCOSE METER
KIT MISCELLANEOUS
COMMUNITY
Start: 2020-09-14 | End: 2020-12-01 | Stop reason: SDUPTHER

## 2020-12-01 RX ORDER — MELOXICAM 7.5 MG/1
7.5 TABLET ORAL DAILY
Qty: 30 TABLET | Refills: 0 | Status: SHIPPED | OUTPATIENT
Start: 2020-12-01 | End: 2021-03-10

## 2020-12-01 RX ORDER — LANCETS 28 GAUGE
EACH MISCELLANEOUS
Qty: 100 EACH | Refills: 5 | Status: SHIPPED | OUTPATIENT
Start: 2020-12-01

## 2020-12-01 RX ORDER — BLOOD-GLUCOSE METER
KIT MISCELLANEOUS DAILY
Qty: 1 EACH | Refills: 0 | Status: SHIPPED | OUTPATIENT
Start: 2020-12-01

## 2020-12-01 RX ORDER — TEMAZEPAM 30 MG/1
30 CAPSULE ORAL
COMMUNITY
Start: 2020-09-21 | End: 2022-05-25

## 2020-12-01 RX ORDER — ZOLPIDEM TARTRATE 10 MG/1
10 TABLET ORAL
COMMUNITY
Start: 2020-11-06

## 2020-12-01 RX ORDER — LANCETS 28 GAUGE
EACH MISCELLANEOUS
COMMUNITY
Start: 2020-09-14 | End: 2020-12-01 | Stop reason: SDUPTHER

## 2020-12-01 RX ORDER — ASPIRIN 81 MG/1
81 TABLET ORAL DAILY
Qty: 30 TABLET | Refills: 2 | Status: SHIPPED | OUTPATIENT
Start: 2020-12-01 | End: 2021-02-17 | Stop reason: SDUPTHER

## 2020-12-01 RX ORDER — BUPROPION HYDROCHLORIDE 75 MG/1
75 TABLET ORAL DAILY
COMMUNITY
Start: 2020-11-30 | End: 2021-10-27

## 2020-12-01 RX ORDER — BUSPIRONE HYDROCHLORIDE 15 MG/1
15 TABLET ORAL DAILY
COMMUNITY
Start: 2020-11-30 | End: 2022-06-16

## 2020-12-01 RX ORDER — BLOOD-GLUCOSE METER
KIT MISCELLANEOUS
Qty: 100 EACH | Refills: 3 | Status: SHIPPED | OUTPATIENT
Start: 2020-12-01 | End: 2020-12-17 | Stop reason: SDUPTHER

## 2020-12-01 RX ORDER — HYDROXYZINE PAMOATE 50 MG/1
50 CAPSULE ORAL
COMMUNITY
Start: 2020-09-21 | End: 2022-01-26

## 2020-12-01 RX ORDER — ALBUTEROL SULFATE 90 UG/1
1-2 AEROSOL, METERED RESPIRATORY (INHALATION) EVERY 6 HOURS PRN
Qty: 1 INHALER | Refills: 0 | Status: SHIPPED | OUTPATIENT
Start: 2020-12-01 | End: 2021-02-17 | Stop reason: SDUPTHER

## 2020-12-17 ENCOUNTER — PATIENT OUTREACH (OUTPATIENT)
Dept: FAMILY MEDICINE CLINIC | Facility: CLINIC | Age: 55
End: 2020-12-17

## 2020-12-17 DIAGNOSIS — E11.9 TYPE 2 DIABETES MELLITUS WITHOUT COMPLICATION, WITHOUT LONG-TERM CURRENT USE OF INSULIN (HCC): ICD-10-CM

## 2020-12-18 ENCOUNTER — PATIENT OUTREACH (OUTPATIENT)
Dept: FAMILY MEDICINE CLINIC | Facility: CLINIC | Age: 55
End: 2020-12-18

## 2020-12-18 DIAGNOSIS — U07.1 COVID-19: Primary | ICD-10-CM

## 2020-12-18 RX ORDER — BLOOD-GLUCOSE METER
KIT MISCELLANEOUS
Qty: 100 EACH | Refills: 3 | Status: SHIPPED | OUTPATIENT
Start: 2020-12-18 | End: 2021-02-17 | Stop reason: SDUPTHER

## 2020-12-23 ENCOUNTER — PATIENT OUTREACH (OUTPATIENT)
Dept: FAMILY MEDICINE CLINIC | Facility: CLINIC | Age: 55
End: 2020-12-23

## 2020-12-30 ENCOUNTER — PATIENT OUTREACH (OUTPATIENT)
Dept: FAMILY MEDICINE CLINIC | Facility: CLINIC | Age: 55
End: 2020-12-30

## 2021-01-06 ENCOUNTER — PATIENT OUTREACH (OUTPATIENT)
Dept: FAMILY MEDICINE CLINIC | Facility: CLINIC | Age: 56
End: 2021-01-06

## 2021-01-06 DIAGNOSIS — G89.29 CHRONIC PAIN OF BOTH SHOULDERS: ICD-10-CM

## 2021-01-06 DIAGNOSIS — E11.9 TYPE 2 DIABETES MELLITUS WITHOUT COMPLICATION, WITHOUT LONG-TERM CURRENT USE OF INSULIN (HCC): Primary | ICD-10-CM

## 2021-01-06 DIAGNOSIS — M25.511 CHRONIC PAIN OF BOTH SHOULDERS: ICD-10-CM

## 2021-01-06 DIAGNOSIS — M25.512 CHRONIC PAIN OF BOTH SHOULDERS: ICD-10-CM

## 2021-01-06 RX ORDER — GABAPENTIN 800 MG/1
800 TABLET ORAL 3 TIMES DAILY
Qty: 90 TABLET | Refills: 3 | Status: SHIPPED | OUTPATIENT
Start: 2021-01-06 | End: 2021-02-17 | Stop reason: SDUPTHER

## 2021-01-06 NOTE — PROGRESS NOTES
Called patient to follow up  He states he was visiting family in [place] over the holidays and this was the reason I could not reach him on previous outreaches  Patient reports his blood sugars are much improved with today's fasting reading of 164  He notes they have all been <200  He states he is watching diet and also checking his feet for skin breakdown daily  I congratulated him on doing a great job and asked that he continue  Patient is asking for medication refills; will submit to PCP  Patient notes meloxicam does not work for him  I reminded patient he is in need of a Podiatry appointment per last chart note  Patient had an eye exam 9/20,  received new eyeglasses and is not due until 9/21  I also reminded patient he needs labs drawn and to have them done at his convenience  Patient was grateful for this call  I will continue to follow

## 2021-01-29 ENCOUNTER — PATIENT OUTREACH (OUTPATIENT)
Dept: FAMILY MEDICINE CLINIC | Facility: CLINIC | Age: 56
End: 2021-01-29

## 2021-01-29 NOTE — PROGRESS NOTES
I called the patient to follow up  He states he is feeling well  He requested medication refills but after reviewing, he has refills remaining  I asked him to contact the pharmacy for the refills  Patient reports his blood sugars are stable 148-165  He asked how to improve them  I told him these numbers are encouraging and to continue strict watch of his carbs and try to incorporate some sort of exercise  Patient spoke in detail about issues he and his wife are having with his 32year old son  He told his son he is no longer welcome in his home  This is causing stress in the home  I offered supportive listening and encouraged walking/exercise to help with the stress  Patient was appreciative of this call  I will continue to follow

## 2021-02-17 ENCOUNTER — PATIENT OUTREACH (OUTPATIENT)
Dept: FAMILY MEDICINE CLINIC | Facility: CLINIC | Age: 56
End: 2021-02-17

## 2021-02-17 DIAGNOSIS — M25.511 CHRONIC PAIN OF BOTH SHOULDERS: ICD-10-CM

## 2021-02-17 DIAGNOSIS — E11.9 TYPE 2 DIABETES MELLITUS WITHOUT COMPLICATION, WITHOUT LONG-TERM CURRENT USE OF INSULIN (HCC): ICD-10-CM

## 2021-02-17 DIAGNOSIS — G89.29 CHRONIC PAIN OF BOTH SHOULDERS: ICD-10-CM

## 2021-02-17 DIAGNOSIS — J40 BRONCHITIS: ICD-10-CM

## 2021-02-17 DIAGNOSIS — M25.512 CHRONIC PAIN OF BOTH SHOULDERS: ICD-10-CM

## 2021-02-17 DIAGNOSIS — E78.2 MIXED HYPERLIPIDEMIA: ICD-10-CM

## 2021-02-17 DIAGNOSIS — I10 ESSENTIAL HYPERTENSION: ICD-10-CM

## 2021-02-17 RX ORDER — GABAPENTIN 800 MG/1
800 TABLET ORAL 3 TIMES DAILY
Qty: 90 TABLET | Refills: 3 | Status: SHIPPED | OUTPATIENT
Start: 2021-02-17 | End: 2021-06-02 | Stop reason: SDUPTHER

## 2021-02-17 RX ORDER — ASPIRIN 81 MG/1
81 TABLET ORAL DAILY
Qty: 30 TABLET | Refills: 2 | Status: CANCELLED | OUTPATIENT
Start: 2021-02-17

## 2021-02-17 RX ORDER — GABAPENTIN 800 MG/1
800 TABLET ORAL 3 TIMES DAILY
Qty: 90 TABLET | Refills: 3 | Status: CANCELLED | OUTPATIENT
Start: 2021-02-17

## 2021-02-17 RX ORDER — ASPIRIN 81 MG/1
81 TABLET ORAL DAILY
Qty: 30 TABLET | Refills: 2 | Status: SHIPPED | OUTPATIENT
Start: 2021-02-17 | End: 2021-04-15 | Stop reason: SDUPTHER

## 2021-02-17 RX ORDER — ATORVASTATIN CALCIUM 40 MG/1
40 TABLET, FILM COATED ORAL DAILY
Qty: 90 TABLET | Refills: 1 | Status: CANCELLED | OUTPATIENT
Start: 2021-02-17

## 2021-02-17 RX ORDER — ALBUTEROL SULFATE 90 UG/1
1-2 AEROSOL, METERED RESPIRATORY (INHALATION) EVERY 6 HOURS PRN
Qty: 1 INHALER | Refills: 0 | Status: SHIPPED | OUTPATIENT
Start: 2021-02-17 | End: 2021-06-10 | Stop reason: SDUPTHER

## 2021-02-17 RX ORDER — ATORVASTATIN CALCIUM 40 MG/1
40 TABLET, FILM COATED ORAL DAILY
Qty: 90 TABLET | Refills: 1 | Status: SHIPPED | OUTPATIENT
Start: 2021-02-17 | End: 2021-06-10 | Stop reason: SDUPTHER

## 2021-02-17 RX ORDER — ATENOLOL 50 MG/1
50 TABLET ORAL DAILY
Qty: 90 TABLET | Refills: 3 | Status: SHIPPED | OUTPATIENT
Start: 2021-02-17 | End: 2021-06-10 | Stop reason: SDUPTHER

## 2021-02-17 RX ORDER — BLOOD-GLUCOSE METER
KIT MISCELLANEOUS
Qty: 100 EACH | Refills: 3 | Status: SHIPPED | OUTPATIENT
Start: 2021-02-17 | End: 2022-01-06 | Stop reason: SDUPTHER

## 2021-03-10 ENCOUNTER — PATIENT OUTREACH (OUTPATIENT)
Dept: FAMILY MEDICINE CLINIC | Facility: CLINIC | Age: 56
End: 2021-03-10

## 2021-03-10 ENCOUNTER — HOSPITAL ENCOUNTER (OUTPATIENT)
Dept: RADIOLOGY | Facility: HOSPITAL | Age: 56
Discharge: HOME | End: 2021-03-10
Payer: COMMERCIAL

## 2021-03-10 ENCOUNTER — OFFICE VISIT (OUTPATIENT)
Dept: FAMILY MEDICINE CLINIC | Facility: CLINIC | Age: 56
End: 2021-03-10

## 2021-03-10 VITALS
HEIGHT: 72 IN | RESPIRATION RATE: 20 BRPM | HEART RATE: 85 BPM | OXYGEN SATURATION: 97 % | BODY MASS INDEX: 29.59 KG/M2 | TEMPERATURE: 97.9 F | DIASTOLIC BLOOD PRESSURE: 60 MMHG | WEIGHT: 218.5 LBS | SYSTOLIC BLOOD PRESSURE: 110 MMHG

## 2021-03-10 DIAGNOSIS — M54.2 CERVICALGIA: ICD-10-CM

## 2021-03-10 DIAGNOSIS — N20.0 NEPHROLITHIASIS: ICD-10-CM

## 2021-03-10 DIAGNOSIS — L20.84 INTRINSIC ECZEMA: ICD-10-CM

## 2021-03-10 DIAGNOSIS — E11.9 TYPE 2 DIABETES MELLITUS WITHOUT COMPLICATION, WITHOUT LONG-TERM CURRENT USE OF INSULIN (HCC): Primary | ICD-10-CM

## 2021-03-10 DIAGNOSIS — G47.00 INSOMNIA, UNSPECIFIED TYPE: ICD-10-CM

## 2021-03-10 DIAGNOSIS — M54.2 NECK PAIN: ICD-10-CM

## 2021-03-10 DIAGNOSIS — K21.9 GASTROESOPHAGEAL REFLUX DISEASE WITHOUT ESOPHAGITIS: ICD-10-CM

## 2021-03-10 DIAGNOSIS — G89.29 CHRONIC PAIN OF LEFT KNEE: ICD-10-CM

## 2021-03-10 DIAGNOSIS — E11.42 DIABETIC POLYNEUROPATHY ASSOCIATED WITH TYPE 2 DIABETES MELLITUS (HCC): ICD-10-CM

## 2021-03-10 DIAGNOSIS — M25.562 CHRONIC PAIN OF LEFT KNEE: ICD-10-CM

## 2021-03-10 DIAGNOSIS — E66.3 OVERWEIGHT WITH BODY MASS INDEX (BMI) OF 29 TO 29.9 IN ADULT: ICD-10-CM

## 2021-03-10 DIAGNOSIS — I10 ESSENTIAL HYPERTENSION: ICD-10-CM

## 2021-03-10 DIAGNOSIS — F33.9 DEPRESSION, RECURRENT (HCC): ICD-10-CM

## 2021-03-10 LAB — SL AMB POCT HEMOGLOBIN AIC: 7.7 (ref ?–6.5)

## 2021-03-10 PROCEDURE — 99214 OFFICE O/P EST MOD 30 MIN: CPT | Performed by: NURSE PRACTITIONER

## 2021-03-10 PROCEDURE — 3078F DIAST BP <80 MM HG: CPT | Performed by: NURSE PRACTITIONER

## 2021-03-10 PROCEDURE — 83036 HEMOGLOBIN GLYCOSYLATED A1C: CPT | Performed by: NURSE PRACTITIONER

## 2021-03-10 PROCEDURE — 3051F HG A1C>EQUAL 7.0%<8.0%: CPT | Performed by: NURSE PRACTITIONER

## 2021-03-10 PROCEDURE — 72050 X-RAY EXAM NECK SPINE 4/5VWS: CPT

## 2021-03-10 PROCEDURE — 3074F SYST BP LT 130 MM HG: CPT | Performed by: NURSE PRACTITIONER

## 2021-03-10 PROCEDURE — 3008F BODY MASS INDEX DOCD: CPT | Performed by: NURSE PRACTITIONER

## 2021-03-10 RX ORDER — OMEPRAZOLE 40 MG/1
40 CAPSULE, DELAYED RELEASE ORAL DAILY
Qty: 90 CAPSULE | Refills: 2 | Status: SHIPPED | OUTPATIENT
Start: 2021-03-10 | End: 2021-06-10 | Stop reason: SDUPTHER

## 2021-03-10 RX ORDER — BACLOFEN 10 MG/1
10 TABLET ORAL 3 TIMES DAILY
Qty: 90 TABLET | Refills: 0 | Status: SHIPPED | OUTPATIENT
Start: 2021-03-10 | End: 2021-06-10 | Stop reason: SDUPTHER

## 2021-03-10 RX ORDER — LIDOCAINE 50 MG/G
OINTMENT TOPICAL AS NEEDED
Qty: 35.44 G | Refills: 0 | Status: SHIPPED | OUTPATIENT
Start: 2021-03-10 | End: 2022-01-26

## 2021-03-10 RX ORDER — MELOXICAM 15 MG/1
15 TABLET ORAL DAILY
Qty: 30 TABLET | Refills: 1 | Status: SHIPPED | OUTPATIENT
Start: 2021-03-10 | End: 2021-06-10 | Stop reason: SDUPTHER

## 2021-03-10 RX ORDER — DIAPER,BRIEF,INFANT-TODD,DISP
EACH MISCELLANEOUS 2 TIMES DAILY
Qty: 30 G | Refills: 0 | Status: SHIPPED | OUTPATIENT
Start: 2021-03-10 | End: 2021-10-27 | Stop reason: SDUPTHER

## 2021-03-10 NOTE — ASSESSMENT & PLAN NOTE
Lab Results   Component Value Date    HGBA1C 7 7 (A) 03/10/2021     Improved as compared to last check - continue with metformin 1,000 mg bid and Tradjenta 5 mg daily

## 2021-03-10 NOTE — ASSESSMENT & PLAN NOTE
Lab Results   Component Value Date    HGBA1C 7 7 (A) 03/10/2021     To lower extremities R>L  Continue with gabapentin 800 mg tid

## 2021-03-10 NOTE — PROGRESS NOTES
Assessment/Plan:    Type 2 diabetes mellitus without complication, without long-term current use of insulin (HCC)    Lab Results   Component Value Date    HGBA1C 7 7 (A) 03/10/2021     Improved as compared to last check - continue with metformin 1,000 mg bid and Tradjenta 5 mg daily     Gastroesophageal reflux disease  Continue with daily omeprazole   -Discussed diet and lifestyle interventions to improve sx including: avoidance of common triggers (chocolate, caffeine, tomatoes, citrus), eat small meals frequently, remain upright after meals       Diabetic polyneuropathy associated with type 2 diabetes mellitus ([facility] [address] )    Lab Results   Component Value Date    HGBA1C 7 7 (A) 03/10/2021     To lower extremities R>L  Continue with gabapentin 800 mg tid     Hypertension  Blood pressure controlled in the office today at 110/60  Current regimen: atenolol 50 mg daily    consider the addition of lisinopril at next visit     Cervicalgia  Exacerbation of chronic neck pain - check xray   Take meloxicam, baclofen     Overweight with body mass index (BMI) of 29 to 29 9 in adult  -Encouraged diet and lifestyle changes: decrease processed foods (cakes, cookies, chips, soda), decrease total carbohydrate intake, decrease fried/fatty foods, increase fruits and vegetables, increase lean proteins (chicken, turkey), increase healthy fats (avocado, fish, nuts), drink plenty of water (at least four 16 oz bottles per day)      Sohail Limondavis was seen today for follow-up and medication refill  Diagnoses and all orders for this visit:    Type 2 diabetes mellitus without complication, without long-term current use of insulin (HCC)  -     POCT hemoglobin A1c  -     IRIS Diabetic eye exam  -     metFORMIN (GLUCOPHAGE) 1000 MG tablet;  Take 1 tablet (1,000 mg total) by mouth 2 (two) times a day with meals  -     linaGLIPtin 5 MG TABS; Take 5 mg by mouth daily    Depression, recurrent (HCC)    Diabetic polyneuropathy associated with type 2 diabetes mellitus ([address] )    Gastroesophageal reflux disease without esophagitis  -     omeprazole (PriLOSEC) 40 MG capsule; Take 1 capsule (40 mg total) by mouth daily    Nephrolithiasis  -     meloxicam (MOBIC) 15 mg tablet; Take 1 tablet (15 mg total) by mouth daily    Chronic pain of left knee  -     lidocaine (XYLOCAINE) 5 % ointment; Apply topically as needed for mild pain    Intrinsic eczema  -     hydrocortisone 1 % ointment; Apply topically 2 (two) times a day    Neck pain  -     Cancel: XR spine cervical 2 or 3 vw injury; Future  -     baclofen 10 mg tablet; Take 1 tablet (10 mg total) by mouth 3 (three) times a day    Insomnia, unspecified type  -     Ambulatory referral to Sleep Medicine; Future    Overweight with body mass index (BMI) of 29 to 29 9 in adult    Cervicalgia    Essential hypertension        Return in about 3 months (around 6/10/2021) for Recheck  Patient Instructions     Heart Healthy Diet   WHAT YOU NEED TO KNOW:   A heart healthy diet is an eating plan low in unhealthy fats and sodium (salt)  The plan is high in healthy fats and fiber  A heart healthy diet helps improve your cholesterol levels and lowers your risk for heart disease and stroke  A dietitian will teach you how to read and understand food labels  DISCHARGE INSTRUCTIONS:   Heart healthy diet guidelines to follow:   · Choose foods that contain healthy fats  ? Unsaturated fats  include monounsaturated and polyunsaturated fats  Unsaturated fat is found in foods such as soybean, canola, olive, corn, and safflower oils  It is also found in soft tub margarine that is made with liquid vegetable oil  ? Omega-3 fat  is found in certain fish, such as salmon, tuna, and trout, and in walnuts and flaxseed  Eat fish high in omega-3 fats at least 2 times a week  · Get 20 to 30 grams of fiber each day  Fruits, vegetables, whole-grain foods, and legumes (cooked beans) are good sources of fiber           · Limit or do not have unhealthy fats  ? Cholesterol  is found in animal foods, such as eggs and lobster, and in dairy products made from whole milk  Limit cholesterol to less than 200 mg each day  ? Saturated fat  is found in meats, such as groves and hamburger  It is also found in chicken or turkey skin, whole milk, and butter  Limit saturated fat to less than 7% of your total daily calories  ? Trans fat  is found in packaged foods, such as potato chips and cookies  It is also in hard margarine, some fried foods, and shortening  Do not eat foods that contain trans fats  · Limit sodium as directed  You may be told to limit sodium to 2,000 to 2,300 mg each day  Choose low-sodium or no-salt-added foods  Add little or no salt to food you prepare  Use herbs and spices in place of salt  Include the following in your heart healthy plan:  Ask your dietitian or healthcare provider how many servings to have from each of the following food groups:  · Grains:      ? Whole-wheat breads, cereals, and pastas, and brown rice    ? Low-fat, low-sodium crackers and chips    · Vegetables:      ? Broccoli, green beans, green peas, and spinach    ? Collards, kale, and lima beans    ? Carrots, sweet potatoes, tomatoes, and peppers    ? Canned vegetables with no salt added    · Fruits:      ? Bananas, peaches, pears, and pineapple    ? Grapes, raisins, and dates    ? Oranges, tangerines, grapefruit, orange juice, and grapefruit juice    ? Apricots, mangoes, melons, and papaya    ? Raspberries and strawberries    ? Canned fruit with no added sugar    · Low-fat dairy:      ? Nonfat (skim) milk, 1% milk, and low-fat almond, cashew, or soy milks fortified with calcium    ? Low-fat cheese, regular or frozen yogurt, and cottage cheese    · Meats and proteins:      ? Lean cuts of beef and pork (loin, leg, round), skinless chicken and turkey    ?  Legumes, soy products, egg whites, or nuts    Limit or do not include the following in your heart healthy plan:   · Unhealthy fats and oils:      ? Whole or 2% milk, cream cheese, sour cream, or cheese    ? High-fat cuts of beef (T-bone steaks, ribs), chicken or turkey with skin, and organ meats such as liver    ? Butter, stick margarine, shortening, and cooking oils such as coconut or palm oil    · Foods and liquids high in sodium:      ? Packaged foods, such as frozen dinners, cookies, macaroni and cheese, and cereals with more than 300 mg of sodium per serving    ? Vegetables with added sodium, such as instant potatoes, vegetables with added sauces, or regular canned vegetables    ? Cured or smoked meats, such as hot dogs, groves, and sausage    ? High-sodium ketchup, barbecue sauce, salad dressing, pickles, olives, soy sauce, or miso    · Foods and liquids high in sugar:      ? Candy, cake, cookies, pies, or doughnuts    ? Soft drinks (soda), sports drinks, or sweetened tea    ? Canned or dry mixes for cakes, soups, sauces, or gravies    Other healthy heart guidelines:   · Do not smoke  Nicotine and other chemicals in cigarettes and cigars can cause lung and heart damage  Ask your healthcare provider for information if you currently smoke and need help to quit  E-cigarettes or smokeless tobacco still contain nicotine  Talk to your healthcare provider before you use these products  · Limit or do not drink alcohol as directed  Alcohol can damage your heart and raise your blood pressure  Your healthcare provider may give you specific daily and weekly limits  The general recommended limit is 1 drink a day for women 21 or older and for men 72 or older  Do not have more than 3 drinks in a day or 7 in a week  The recommended limit is 2 drinks a day for men 24to 59years of age  Do not have more than 4 drinks in a day or 14 in a week  A drink of alcohol is 12 ounces of beer, 5 ounces of wine, or 1½ ounces of liquor  · Exercise regularly    Exercise can help you maintain a healthy weight and improve your blood pressure and cholesterol levels  Regular exercise can also decrease your risk for heart problems  Ask your healthcare provider about the best exercise plan for you  Do not start an exercise program without asking your healthcare provider  Follow up with your doctor or cardiologist as directed:  Write down your questions so you remember to ask them during your visits  © Copyright [address] Information is for End User's use only and may not be sold, redistributed or otherwise used for commercial purposes  All illustrations and images included in CareNotes® are the copyrighted property of A D A M , [employer]  or [address] [place], [place] [address]khan[address]   The above information is an  only  It is not intended as medical advice for individual conditions or treatments  Talk to your doctor, nurse or pharmacist before following any medical regimen to see if it is safe and effective for you  Subjective:     Anne Marie Silva is a 60 y o  male who  has a past medical history of Diverticulitis and Hypertension  who presented to the office today for follow up  Patient states that for one week he has been having neck pain worse on the left  He does have hx of injury to the neck in the past but no new injury or trauma  He does have full ROM but notices some tenderness in certain directions  He also c/o occasional numbness and tingling down the left arm       The following portions of the patient's history were reviewed and updated as appropriate: allergies, current medications, past family history, past medical history, past social history, past surgical history and problem list     Current Outpatient Medications on File Prior to Visit   Medication Sig Dispense Refill    acetaminophen (TYLENOL) 325 mg tablet Take 2 tablets (650 mg total) by mouth every 6 (six) hours as needed for mild pain 90 tablet 0    albuterol (PROVENTIL HFA,VENTOLIN HFA) 90 mcg/act inhaler Inhale 1-2 puffs every 6 (six) hours as needed for wheezing 1 Inhaler 0    aspirin (ECOTRIN LOW STRENGTH) 81 mg EC tablet Take 1 tablet (81 mg total) by mouth daily 30 tablet 2    atenolol (TENORMIN) 50 mg tablet Take 1 tablet (50 mg total) by mouth daily 90 tablet 3    atorvastatin (LIPITOR) 40 mg tablet Take 1 tablet (40 mg total) by mouth daily 90 tablet 1    Blood Glucose Monitoring Suppl (FreeStyle Lite) SHANELL Use daily Use as directed 1 each 0    buPROPion (WELLBUTRIN) 75 mg tablet       busPIRone (BUSPAR) 15 mg tablet       fluticasone (FLONASE) 50 mcg/act nasal spray 1 spray into each nostril daily 16 g 0    gabapentin (NEURONTIN) 800 mg tablet Take 1 tablet (800 mg total) by mouth 3 (three) times a day 90 tablet 3    glucose blood (FREESTYLE LITE) test strip Testing sugars once a day 100 each 3    hydrOXYzine pamoate (VISTARIL) 50 mg capsule Take 50 mg by mouth daily at bedtime      Lancets (freestyle) lancets Daily 100 each 5    tamsulosin (FLOMAX) 0 4 mg Take 1 capsule (0 4 mg total) by mouth daily with dinner 30 capsule 3    temazepam (RESTORIL) 30 mg capsule Take 30 mg by mouth daily at bedtime      zolpidem (AMBIEN) 10 mg tablet Take 10 mg by mouth daily at bedtime      [DISCONTINUED] hydrocortisone 1 % ointment Apply topically 2 (two) times a day 30 g 0    [DISCONTINUED] linaGLIPtin 5 MG TABS Take 5 mg by mouth daily 30 tablet 1    [DISCONTINUED] meloxicam (MOBIC) 7 5 mg tablet Take 1 tablet (7 5 mg total) by mouth daily 30 tablet 0    [DISCONTINUED] metFORMIN (GLUCOPHAGE) 1000 MG tablet Take 1 tablet (1,000 mg total) by mouth 2 (two) times a day with meals 60 tablet 3    [DISCONTINUED] omeprazole (PriLOSEC) 40 MG capsule Take 1 capsule (40 mg total) by mouth daily 90 capsule 2    polyethylene glycol (MIRALAX) 17 g packet Take 17 g by mouth daily for 3 days 14 each 0    [DISCONTINUED] lidocaine (XYLOCAINE) 5 % ointment Apply topically as needed for mild pain (Patient not taking: Reported on 9/9/2020) 35 44 g 0    [DISCONTINUED] methocarbamol (ROBAXIN) 750 mg tablet Take 1 tablet (750 mg total) by mouth every 8 (eight) hours (Patient not taking: Reported on 9/9/2020) 90 tablet 2     No current facility-administered medications on file prior to visit  Review of Systems   Constitutional: Negative for chills and fever  HENT: Negative for ear pain and sore throat  Eyes: Negative for pain and visual disturbance  Respiratory: Negative for cough and shortness of breath  Cardiovascular: Negative for chest pain and palpitations  Gastrointestinal: Negative for abdominal pain and vomiting  Genitourinary: Negative for dysuria and hematuria  Musculoskeletal: Positive for arthralgias, myalgias and neck pain  Negative for back pain  Skin: Negative for color change and rash  Neurological: Positive for numbness  Negative for seizures and syncope  All other systems reviewed and are negative  BMI Counseling: Body mass index is 29 63 kg/m²  The BMI is above normal  Nutrition recommendations include decreasing portion sizes and consuming healthier snacks  Exercise recommendations include exercising 3-5 times per week  Objective:    /60 (BP Location: Right arm, Patient Position: Sitting, Cuff Size: Large)   Pulse 85   Temp 97 9 °F (36 6 °C) (Temporal)   Resp 20   Ht 6' (1 829 m)   Wt 99 1 kg (218 lb 8 oz)   SpO2 97%   BMI 29 63 kg/m²     Physical Exam  Vitals signs and nursing note reviewed  Constitutional:       General: He is not in acute distress  Appearance: He is well-developed  He is not diaphoretic  HENT:      Head: Normocephalic and atraumatic  Right Ear: External ear normal       Left Ear: External ear normal    Eyes:      General:         Right eye: No discharge  Left eye: No discharge  Conjunctiva/sclera: Conjunctivae normal       Pupils: Pupils are equal, round, and reactive to light  Neck:      Musculoskeletal: Normal range of motion and neck supple  Cardiovascular:      Rate and Rhythm: Normal rate and regular rhythm  Pulses: Normal pulses  Heart sounds: Normal heart sounds  Pulmonary:      Effort: Pulmonary effort is normal  No respiratory distress  Breath sounds: Normal breath sounds  No wheezing  Abdominal:      General: Bowel sounds are normal  There is no distension  Palpations: Abdomen is soft  Tenderness: There is no abdominal tenderness  Musculoskeletal: Normal range of motion  General: No deformity  Cervical back: He exhibits tenderness  He exhibits normal range of motion  Back:    Lymphadenopathy:      Cervical: No cervical adenopathy  Skin:     General: Skin is warm and dry  Capillary Refill: Capillary refill takes less than 2 seconds  Findings: No rash  Neurological:      Mental Status: He is alert and oriented to person, place, and time  Sensory: No sensory deficit        Coordination: Coordination normal       Deep Tendon Reflexes: Reflexes normal    Psychiatric:         Mood and Affect: Mood normal          Behavior: Behavior normal          RAMO Bright  03/10/21  4:59 PM

## 2021-03-10 NOTE — PATIENT INSTRUCTIONS
Heart Healthy Diet   WHAT YOU NEED TO KNOW:   A heart healthy diet is an eating plan low in unhealthy fats and sodium (salt)  The plan is high in healthy fats and fiber  A heart healthy diet helps improve your cholesterol levels and lowers your risk for heart disease and stroke  A dietitian will teach you how to read and understand food labels  DISCHARGE INSTRUCTIONS:   Heart healthy diet guidelines to follow:   · Choose foods that contain healthy fats  ? Unsaturated fats  include monounsaturated and polyunsaturated fats  Unsaturated fat is found in foods such as soybean, canola, olive, corn, and safflower oils  It is also found in soft tub margarine that is made with liquid vegetable oil  ? Omega-3 fat  is found in certain fish, such as salmon, tuna, and trout, and in walnuts and flaxseed  Eat fish high in omega-3 fats at least 2 times a week  · Get 20 to 30 grams of fiber each day  Fruits, vegetables, whole-grain foods, and legumes (cooked beans) are good sources of fiber  · Limit or do not have unhealthy fats  ? Cholesterol  is found in animal foods, such as eggs and lobster, and in dairy products made from whole milk  Limit cholesterol to less than 200 mg each day  ? Saturated fat  is found in meats, such as groves and hamburger  It is also found in chicken or turkey skin, whole milk, and butter  Limit saturated fat to less than 7% of your total daily calories  ? Trans fat  is found in packaged foods, such as potato chips and cookies  It is also in hard margarine, some fried foods, and shortening  Do not eat foods that contain trans fats  · Limit sodium as directed  You may be told to limit sodium to 2,000 to 2,300 mg each day  Choose low-sodium or no-salt-added foods  Add little or no salt to food you prepare  Use herbs and spices in place of salt         Include the following in your heart healthy plan:  Ask your dietitian or healthcare provider how many servings to have from each of the following food groups:  · Grains:      ? Whole-wheat breads, cereals, and pastas, and brown rice    ? Low-fat, low-sodium crackers and chips    · Vegetables:      ? Broccoli, green beans, green peas, and spinach    ? Collards, kale, and lima beans    ? Carrots, sweet potatoes, tomatoes, and peppers    ? Canned vegetables with no salt added    · Fruits:      ? Bananas, peaches, pears, and pineapple    ? Grapes, raisins, and dates    ? Oranges, tangerines, grapefruit, orange juice, and grapefruit juice    ? Apricots, mangoes, melons, and papaya    ? Raspberries and strawberries    ? Canned fruit with no added sugar    · Low-fat dairy:      ? Nonfat (skim) milk, 1% milk, and low-fat almond, cashew, or soy milks fortified with calcium    ? Low-fat cheese, regular or frozen yogurt, and cottage cheese    · Meats and proteins:      ? Lean cuts of beef and pork (loin, leg, round), skinless chicken and turkey    ? Legumes, soy products, egg whites, or nuts    Limit or do not include the following in your heart healthy plan:   · Unhealthy fats and oils:      ? Whole or 2% milk, cream cheese, sour cream, or cheese    ? High-fat cuts of beef (T-bone steaks, ribs), chicken or turkey with skin, and organ meats such as liver    ? Butter, stick margarine, shortening, and cooking oils such as coconut or palm oil    · Foods and liquids high in sodium:      ? Packaged foods, such as frozen dinners, cookies, macaroni and cheese, and cereals with more than 300 mg of sodium per serving    ? Vegetables with added sodium, such as instant potatoes, vegetables with added sauces, or regular canned vegetables    ? Cured or smoked meats, such as hot dogs, groves, and sausage    ? High-sodium ketchup, barbecue sauce, salad dressing, pickles, olives, soy sauce, or miso    · Foods and liquids high in sugar:      ? Candy, cake, cookies, pies, or doughnuts    ? Soft drinks (soda), sports drinks, or sweetened tea    ?  Canned or dry mixes for cakes, soups, sauces, or gravies    Other healthy heart guidelines:   · Do not smoke  Nicotine and other chemicals in cigarettes and cigars can cause lung and heart damage  Ask your healthcare provider for information if you currently smoke and need help to quit  E-cigarettes or smokeless tobacco still contain nicotine  Talk to your healthcare provider before you use these products  · Limit or do not drink alcohol as directed  Alcohol can damage your heart and raise your blood pressure  Your healthcare provider may give you specific daily and weekly limits  The general recommended limit is 1 drink a day for women 21 or older and for men 72 or older  Do not have more than 3 drinks in a day or 7 in a week  The recommended limit is 2 drinks a day for men 24to 59years of age  Do not have more than 4 drinks in a day or 14 in a week  A drink of alcohol is 12 ounces of beer, 5 ounces of wine, or 1½ ounces of liquor  · Exercise regularly  Exercise can help you maintain a healthy weight and improve your blood pressure and cholesterol levels  Regular exercise can also decrease your risk for heart problems  Ask your healthcare provider about the best exercise plan for you  Do not start an exercise program without asking your healthcare provider  Follow up with your doctor or cardiologist as directed:  Write down your questions so you remember to ask them during your visits  © Copyright [address] Information is for End User's use only and may not be sold, redistributed or otherwise used for commercial purposes  All illustrations and images included in CareNotes® are the copyrighted property of A D A M [employer]  or [address] [place] [address]  The above information is an  only  It is not intended as medical advice for individual conditions or treatments  Talk to your doctor, nurse or pharmacist before following any medical regimen to see if it is safe and effective for you

## 2021-03-10 NOTE — ASSESSMENT & PLAN NOTE
-Encouraged diet and lifestyle changes: decrease processed foods (cakes, cookies, chips, soda), decrease total carbohydrate intake, decrease fried/fatty foods, increase fruits and vegetables, increase lean proteins (chicken, turkey), increase healthy fats (avocado, fish, nuts), drink plenty of water (at least four 16 oz bottles per day)

## 2021-03-10 NOTE — ASSESSMENT & PLAN NOTE
Blood pressure controlled in the office today at 110/60  Current regimen: atenolol 50 mg daily    consider the addition of lisinopril at next visit

## 2021-03-10 NOTE — PROGRESS NOTES
I met the patient after his PCP appointment today so he can put a face with the voice when I call him on my outreaches  He stated the appointment today went well  I provided blood sugar log sheets for him as well the Diabetes Zone Tool sheet  I will continue to follow

## 2021-03-10 NOTE — PROGRESS NOTES
I called the patient to remind him of his PCP appointment this morning and he plans on attending  I will continue to follow

## 2021-03-10 NOTE — ASSESSMENT & PLAN NOTE
Continue with daily omeprazole   -Discussed diet and lifestyle interventions to improve sx including: avoidance of common triggers (chocolate, caffeine, tomatoes, citrus), eat small meals frequently, remain upright after meals

## 2021-03-16 ENCOUNTER — TELEPHONE (OUTPATIENT)
Dept: UROLOGY | Facility: CLINIC | Age: 56
End: 2021-03-16

## 2021-03-16 NOTE — TELEPHONE ENCOUNTER
Called and left VM for pt for him to call back and schedule for next available     Pt is a new pt with [facility]

## 2021-03-31 DIAGNOSIS — M47.812 OSTEOARTHRITIS OF CERVICAL SPINE, UNSPECIFIED SPINAL OSTEOARTHRITIS COMPLICATION STATUS: ICD-10-CM

## 2021-03-31 DIAGNOSIS — M54.2 CERVICALGIA: Primary | ICD-10-CM

## 2021-04-12 NOTE — TELEPHONE ENCOUNTER
Another attempt to contact patient, letter sent asking patient to contact our office to schedule an appointment

## 2021-04-14 ENCOUNTER — PATIENT OUTREACH (OUTPATIENT)
Dept: FAMILY MEDICINE CLINIC | Facility: CLINIC | Age: 56
End: 2021-04-14

## 2021-04-15 ENCOUNTER — PATIENT OUTREACH (OUTPATIENT)
Dept: FAMILY MEDICINE CLINIC | Facility: CLINIC | Age: 56
End: 2021-04-15

## 2021-04-15 DIAGNOSIS — E78.2 MIXED HYPERLIPIDEMIA: ICD-10-CM

## 2021-04-15 RX ORDER — ASPIRIN 81 MG/1
81 TABLET ORAL DAILY
Qty: 30 TABLET | Refills: 3 | Status: SHIPPED | OUTPATIENT
Start: 2021-04-15 | End: 2021-06-10 | Stop reason: SDUPTHER

## 2021-04-15 NOTE — PROGRESS NOTES
I called the patient to follow up  He states he is feeling "beautiful"  He reports his blood sugars are stable  His fasting sugar yesterday was 98  He notes he was vomiting and not feeling well  He was able to eat oatmeal and coffee which brought his sugar up to 138 and he felt better  Today he feels well without complaints  The patient states he continues watching his diet and checking his feet daily for skin breakdown  He denies any issues at the current time  I congratulated him on decreasing his A1C and asking him to continue on this path  The patient states he is not interested in receiving the Covid vaccine  He is requesting a medication refill; will submit to PCP  I reminded the patient of his NeuroSurg appointment tomorrow at 230  I will continue to follow

## 2021-04-16 ENCOUNTER — CONSULT (OUTPATIENT)
Dept: NEUROSURGERY | Facility: CLINIC | Age: 56
End: 2021-04-16
Payer: COMMERCIAL

## 2021-04-16 VITALS
WEIGHT: 218 LBS | SYSTOLIC BLOOD PRESSURE: 114 MMHG | DIASTOLIC BLOOD PRESSURE: 53 MMHG | RESPIRATION RATE: 16 BRPM | HEART RATE: 71 BPM | HEIGHT: 72 IN | TEMPERATURE: 97.4 F | BODY MASS INDEX: 29.53 KG/M2

## 2021-04-16 DIAGNOSIS — M47.812 OSTEOARTHRITIS OF CERVICAL SPINE, UNSPECIFIED SPINAL OSTEOARTHRITIS COMPLICATION STATUS: ICD-10-CM

## 2021-04-16 DIAGNOSIS — M54.2 CERVICALGIA: ICD-10-CM

## 2021-04-16 DIAGNOSIS — M62.81 MUSCLE WEAKNESS (GENERALIZED): Primary | ICD-10-CM

## 2021-04-16 DIAGNOSIS — M54.12 RADICULOPATHY, CERVICAL: ICD-10-CM

## 2021-04-16 PROBLEM — R29.898 WEAKNESS OF BOTH HANDS: Status: ACTIVE | Noted: 2021-04-16

## 2021-04-16 PROCEDURE — 3074F SYST BP LT 130 MM HG: CPT | Performed by: PHYSICIAN ASSISTANT

## 2021-04-16 PROCEDURE — 3008F BODY MASS INDEX DOCD: CPT | Performed by: PHYSICIAN ASSISTANT

## 2021-04-16 PROCEDURE — 3008F BODY MASS INDEX DOCD: CPT | Performed by: NURSE PRACTITIONER

## 2021-04-16 PROCEDURE — 99203 OFFICE O/P NEW LOW 30 MIN: CPT | Performed by: PHYSICIAN ASSISTANT

## 2021-04-16 PROCEDURE — 3078F DIAST BP <80 MM HG: CPT | Performed by: PHYSICIAN ASSISTANT

## 2021-04-16 NOTE — PROGRESS NOTES
Neurosurgery Office Note  Kojo Mao 60 y o  male MRN: [id number]      Assessment/Plan     Cervicalgia  Patient presents as new patient for workup of neck pain  · Patient complaining of severe left-sided neck pain with bilateral arm paresthesias, weakness in his hands and dropping objects     Imaging:   · Cervical spine x-rays 3/10/2021:  Anatomical  Alignment maintained without subluxation  Multilevel degenerative changes most noted at C4-5 and C5-6 with height loss and anterior osteophytes  Plan:  · Continue monitor for any new or progressive neurological symptoms   · Discussed etiology for neck pain to include musculoskeletal and osteoarthritis  Patient does have pain in his left shoulder which may be a C5 radiculopathy versus shoulder etiology  · Given significant weakness in the patient's script along with complains of dropping objects, MRI cervical spine without contrast was ordered  · We discussed consideration for further conservative management to include additional physical therapy and pain management  · Recommend continuing gabapentin which is providing some improvement his pain  · Discuss consideration for muscle relaxers but patient states these have not helped in the past   · Will plan outpatient after MRI completed to review image and further delineate plan of care  Left shoulder pain  Patient admits to severe left shoulder pain over the last 4-5 months  · Given shoulder pain with passive range of motion, would recommend further evaluation with orthopedics  Weakness of both hands  See plan as above       Diagnoses and all orders for this visit:    Muscle weakness (generalized)  -     MRI cervical spine without contrast; Future  -     Ambulatory referral to Physical Therapy; Future    Cervicalgia  -     Ambulatory referral to Neurosurgery  -     MRI cervical spine without contrast; Future  -     Ambulatory referral to Physical Therapy;  Future    Osteoarthritis of cervical spine, unspecified spinal osteoarthritis complication status  -     Ambulatory referral to Neurosurgery  -     MRI cervical spine without contrast; Future  -     Ambulatory referral to Physical Therapy; Future    Radiculopathy, cervical  -     MRI cervical spine without contrast; Future  -     Ambulatory referral to Physical Therapy; Future          CHIEF COMPLAINT    Chief Complaint   Patient presents with   Lungodora Frieda [address]  X RAY SL 3/10/21       HISTORY    History of Present Illness      58-year-old male with HTN, HLD, DM with A1C 7 7 ramin presents today as a new patient workup for neck pain  Patient admits to history of cervical fractures C3, C4 and C5 (possibly 25 years ago) for which he describes treatment with medications a soft collar  He denies any surgical intervention required  Patient complains of significant left neck and shoulder pain over the last 4-5 months  He states he was quickly trying to cross the street and caught himself against a wall with outstretched arms and his head turned laterally  He states that set time he had noted significant constant neck pain rated 9/10 with radiation into his left shoulder  Denies any radicular pain below the shoulder but does note paresthesias diffusely throughout his bilateral upper extremities  He states he is unable to carry any heavy objects and has been dropping things better lytes such as coffee cups  He denies any additional difficulty with fine motor function of his hands but does note hand weakness  Admits to difficulty walking which may be related to chronic left knee problems possibly gout for which he had injections prior  He admits to unsteadiness with ambulation but does not use an assistive device  He denies any current bowel or bladder incontinence  Patient describes a fall over 10 years ago injuring his right shoulder for which he ahd conservative management including PT       REVIEW OF SYSTEMS    Review of Systems Constitutional: Positive for activity change (unable to perform usual activities)  HENT: Negative  Eyes: Negative  Respiratory: Negative  Cardiovascular: Negative  Gastrointestinal: Negative  Endocrine: Negative  Genitourinary: Negative  Hx of kidney stone   Musculoskeletal: Positive for gait problem (left leg) and neck pain (radiating to L>R shoulder and arm)  Skin: Negative  Allergic/Immunologic: Negative  Neurological: Positive for numbness (n/t almost everywhere in his body, mainly arms and legs)  Hematological: Negative  Psychiatric/Behavioral: Negative  All other systems reviewed and are negative          Meds/Allergies     Current Outpatient Medications   Medication Sig Dispense Refill    acetaminophen (TYLENOL) 325 mg tablet Take 2 tablets (650 mg total) by mouth every 6 (six) hours as needed for mild pain 90 tablet 0    albuterol (PROVENTIL HFA,VENTOLIN HFA) 90 mcg/act inhaler Inhale 1-2 puffs every 6 (six) hours as needed for wheezing 1 Inhaler 0    aspirin (ECOTRIN LOW STRENGTH) 81 mg EC tablet Take 1 tablet (81 mg total) by mouth daily 30 tablet 3    atenolol (TENORMIN) 50 mg tablet Take 1 tablet (50 mg total) by mouth daily 90 tablet 3    atorvastatin (LIPITOR) 40 mg tablet Take 1 tablet (40 mg total) by mouth daily 90 tablet 1    baclofen 10 mg tablet Take 1 tablet (10 mg total) by mouth 3 (three) times a day 90 tablet 0    Blood Glucose Monitoring Suppl (FreeStyle Lite) SHANELL Use daily Use as directed 1 each 0    buPROPion (WELLBUTRIN) 75 mg tablet Take 75 mg by mouth daily       busPIRone (BUSPAR) 15 mg tablet Take 15 mg by mouth daily       fluticasone (FLONASE) 50 mcg/act nasal spray 1 spray into each nostril daily 16 g 0    gabapentin (NEURONTIN) 800 mg tablet Take 1 tablet (800 mg total) by mouth 3 (three) times a day 90 tablet 3    glucose blood (FREESTYLE LITE) test strip Testing sugars once a day 100 each 3    hydrocortisone 1 % ointment Apply topically 2 (two) times a day 30 g 0    hydrOXYzine pamoate (VISTARIL) 50 mg capsule Take 50 mg by mouth daily at bedtime      Lancets (freestyle) lancets Daily 100 each 5    lidocaine (XYLOCAINE) 5 % ointment Apply topically as needed for mild pain 35 44 g 0    linaGLIPtin 5 MG TABS Take 5 mg by mouth daily 30 tablet 1    meloxicam (MOBIC) 15 mg tablet Take 1 tablet (15 mg total) by mouth daily 30 tablet 1    metFORMIN (GLUCOPHAGE) 1000 MG tablet Take 1 tablet (1,000 mg total) by mouth 2 (two) times a day with meals (Patient taking differently: Take 1,000 mg by mouth daily with breakfast ) 60 tablet 3    omeprazole (PriLOSEC) 40 MG capsule Take 1 capsule (40 mg total) by mouth daily 90 capsule 2    tamsulosin (FLOMAX) 0 4 mg Take 1 capsule (0 4 mg total) by mouth daily with dinner 30 capsule 3    temazepam (RESTORIL) 30 mg capsule Take 30 mg by mouth daily at bedtime      zolpidem (AMBIEN) 10 mg tablet Take 10 mg by mouth daily at bedtime       No current facility-administered medications for this visit  No Known Allergies    PAST HISTORY    Past Medical History:   Diagnosis Date    Diverticulitis     Hypertension     Neck pain        Past Surgical History:   Procedure Laterality Date    COLON SURGERY      NECK SURGERY         Social History     Tobacco Use    Smoking status: Current Every Day Smoker     Types: Cigars    Smokeless tobacco: Never Used    Tobacco comment: 1 cigar/daily   Substance Use Topics    Alcohol use: Yes     Frequency: 2-3 times a week     Drinks per session: 5 or 6     Binge frequency: Less than monthly     Comment: drinks only on weekends    Drug use: Never       History reviewed  No pertinent family history  Above history personally reviewed  EXAM    Vitals:Blood pressure 114/53, pulse 71, temperature (!) 97 4 °F (36 3 °C), temperature source Probe, resp  rate 16, height 6' (1 829 m), weight 98 9 kg (218 lb)  ,Body mass index is 29 57 kg/m²  Physical Exam  Constitutional:       General: He is not in acute distress  Appearance: Normal appearance  He is well-developed  He is not ill-appearing  HENT:      Head: Normocephalic and atraumatic  Right Ear: External ear normal       Left Ear: External ear normal       Nose: Nose normal       Mouth/Throat:      Mouth: Mucous membranes are moist       Pharynx: Oropharynx is clear  Eyes:      General: No scleral icterus  Right eye: No discharge  Left eye: No discharge  Extraocular Movements: EOM normal       Conjunctiva/sclera: Conjunctivae normal    Neck:      Musculoskeletal: Muscular tenderness (c5 and L>R paraspinal region  ) present  Cardiovascular:      Rate and Rhythm: Normal rate  Pulmonary:      Effort: Pulmonary effort is normal  No respiratory distress  Abdominal:      General: There is no distension  Musculoskeletal:         General: Tenderness present  Skin:     General: Skin is warm and dry  Neurological:      Mental Status: He is alert  Deep Tendon Reflexes:      Reflex Scores:       Bicep reflexes are 2+ on the right side and 2+ on the left side  Brachioradialis reflexes are 2+ on the right side and 2+ on the left side  Psychiatric:         Mood and Affect: Mood normal          Speech: Speech normal          Behavior: Behavior normal          Thought Content: Thought content normal          Judgment: Judgment normal          Neurologic Exam     Mental Status   Follows 3 step commands  Attention: normal  Concentration: normal    Speech: speech is normal   Level of consciousness: alert  Knowledge: good  Normal comprehension  Cranial Nerves     CN III, IV, VI   Extraocular motions are normal    Nystagmus: none   Upgaze: normal  Conjugate gaze: present    CN VII   Facial expression full, symmetric       CN VIII   Hearing: intact    CN XI   Right trapezius strength: normal  Left trapezius strength: normal    CN XII   Tongue: not atrophic  Fasciculations: absent    Motor Exam   Muscle bulk: normal  Overall muscle tone: normal    Strength   Strength 5/5 except as noted   4/5 bilateral  IO 4+/5 bilateral  Shoulder abduction 4/5     Sensory Exam   Light touch normal      Gait, Coordination, and Reflexes     Tremor   Resting tremor: absent  Intention tremor: absent  Action tremor: absent    Reflexes   Right brachioradialis: 2+  Left brachioradialis: 2+  Right biceps: 2+  Left biceps: 2+  Right Stock: absent  Left Stock: absent  Right ankle clonus: absent  Left ankle clonus: absent        MEDICAL DECISION MAKING    Imaging Studies:     Cervical spine x-rays 3/2021    I have personally reviewed pertinent reports     and I have personally reviewed pertinent films in PACS

## 2021-04-16 NOTE — ASSESSMENT & PLAN NOTE
Patient admits to severe left shoulder pain over the last 4-5 months  · Given shoulder pain with passive range of motion, would recommend further evaluation with orthopedics

## 2021-04-16 NOTE — ASSESSMENT & PLAN NOTE
Patient presents as new patient for workup of neck pain  · Patient complaining of severe left-sided neck pain with bilateral arm paresthesias, weakness in his hands and dropping objects     Imaging:   · Cervical spine x-rays 3/10/2021:  Anatomical  Alignment maintained without subluxation  Multilevel degenerative changes most noted at C4-5 and C5-6 with height loss and anterior osteophytes  Plan:  · Continue monitor for any new or progressive neurological symptoms   · Discussed etiology for neck pain to include musculoskeletal and osteoarthritis  Patient does have pain in his left shoulder which may be a C5 radiculopathy versus shoulder etiology  · Given significant weakness in the patient's script along with complains of dropping objects, MRI cervical spine without contrast was ordered  · We discussed consideration for further conservative management to include additional physical therapy and pain management  · Recommend continuing gabapentin which is providing some improvement his pain  · Discuss consideration for muscle relaxers but patient states these have not helped in the past   · Will plan outpatient after MRI completed to review image and further delineate plan of care

## 2021-05-13 ENCOUNTER — PATIENT OUTREACH (OUTPATIENT)
Dept: FAMILY MEDICINE CLINIC | Facility: CLINIC | Age: 56
End: 2021-05-13

## 2021-05-13 NOTE — PROGRESS NOTES
Called the patient to follow up but no option to leave a message  I will continue further outreach attempts

## 2021-05-18 ENCOUNTER — PATIENT OUTREACH (OUTPATIENT)
Dept: FAMILY MEDICINE CLINIC | Facility: CLINIC | Age: 56
End: 2021-05-18

## 2021-05-18 NOTE — PROGRESS NOTES
I called the patient to follow up but received voicemail  Message was left asking for a return call  I will continue further outreach attempts

## 2021-05-21 ENCOUNTER — PATIENT OUTREACH (OUTPATIENT)
Dept: FAMILY MEDICINE CLINIC | Facility: CLINIC | Age: 56
End: 2021-05-21

## 2021-05-21 NOTE — PROGRESS NOTES
I called the patient to follow up but his voicemail was full  This was my third unsuccessful attempt in reaching the patient  I will be mailing him the 'unable to reach' letter

## 2021-05-25 ENCOUNTER — PATIENT OUTREACH (OUTPATIENT)
Dept: FAMILY MEDICINE CLINIC | Facility: CLINIC | Age: 56
End: 2021-05-25

## 2021-05-25 NOTE — LETTER
Fecha: 05/25/21    Estimado/a Kevin Fox:  Mi nombre es Carin Guerrero un enfermera registrada administrador [facility]  No pude comunicarme con usted y me gustaría programar un horario para que hablemos por teléfono  Me dedico a ayudar a los pacientes que tienen afecciones médicas complejas a obtener la atención que necesitan  [place] comprende a pacientes que pueden estar en el hospital o en colton maddi de emergencias  Si tiene preguntas, no dude en llamarme  Espero cheney llamado  Atentamente    Carin Ramírez, [facility]  [phone] [address] [facility]

## 2021-06-02 ENCOUNTER — PATIENT OUTREACH (OUTPATIENT)
Dept: FAMILY MEDICINE CLINIC | Facility: CLINIC | Age: 56
End: 2021-06-02

## 2021-06-02 ENCOUNTER — OFFICE VISIT (OUTPATIENT)
Dept: FAMILY MEDICINE CLINIC | Facility: CLINIC | Age: 56
End: 2021-06-02

## 2021-06-02 VITALS
TEMPERATURE: 97.6 F | SYSTOLIC BLOOD PRESSURE: 110 MMHG | RESPIRATION RATE: 16 BRPM | HEART RATE: 64 BPM | OXYGEN SATURATION: 99 % | HEIGHT: 72 IN | DIASTOLIC BLOOD PRESSURE: 60 MMHG | BODY MASS INDEX: 29.53 KG/M2 | WEIGHT: 218 LBS

## 2021-06-02 DIAGNOSIS — L72.0 EPIDERMAL CYST: Primary | ICD-10-CM

## 2021-06-02 DIAGNOSIS — M25.512 CHRONIC PAIN OF BOTH SHOULDERS: ICD-10-CM

## 2021-06-02 DIAGNOSIS — M25.511 CHRONIC PAIN OF BOTH SHOULDERS: ICD-10-CM

## 2021-06-02 DIAGNOSIS — G89.29 CHRONIC PAIN OF BOTH SHOULDERS: ICD-10-CM

## 2021-06-02 PROCEDURE — 3725F SCREEN DEPRESSION PERFORMED: CPT | Performed by: FAMILY MEDICINE

## 2021-06-02 PROCEDURE — 99213 OFFICE O/P EST LOW 20 MIN: CPT | Performed by: FAMILY MEDICINE

## 2021-06-02 RX ORDER — GABAPENTIN 800 MG/1
800 TABLET ORAL 3 TIMES DAILY
Qty: 90 TABLET | Refills: 3 | Status: SHIPPED | OUTPATIENT
Start: 2021-06-02 | End: 2021-08-25 | Stop reason: SDUPTHER

## 2021-06-02 NOTE — PROGRESS NOTES
Assessment/Plan:    Epidermal cyst  Will send patient to general surgery, per his request        Diagnoses and all orders for this visit:    Epidermal cyst          Subjective:      Patient ID: Aurelia Hugo is a 60 y o  male  This is a very pleasant 53-year-old gentleman who presents today as a sick visit  Patient is concerned with a mass he noticed on his left upper extremity  This is concerning the patient he would like it to be further evaluated, ideally removed and sent to pathology  Patient denies of any personal or family history of cancer  Patient denies any alarm symptoms including unintentional weight loss, or night sweats  Patient is reassured that this is most likely benign either a lipoma or epidural cyst however he would like it removed as that it causes him pain at times and also causing him stress  The following portions of the patient's history were reviewed and updated as appropriate: allergies, current medications, past family history, past medical history, past social history, past surgical history and problem list     Review of Systems   Constitutional: Negative for chills and fever  HENT: Negative for congestion and sore throat  Eyes: Negative for visual disturbance  Respiratory: Negative for shortness of breath and wheezing  Cardiovascular: Negative for chest pain and palpitations  Gastrointestinal: Negative for nausea and vomiting  Endocrine: Negative for polydipsia and polyphagia  Genitourinary: Negative for discharge and dysuria  Musculoskeletal: Negative for arthralgias and myalgias  Skin: Negative for rash and wound  Mass in arm   Neurological: Negative for syncope and facial asymmetry  Psychiatric/Behavioral: Negative for agitation           Objective:      /60 (BP Location: Left arm, Patient Position: Sitting, Cuff Size: Standard)   Pulse 64   Temp 97 6 °F (36 4 °C) (Temporal)   Resp 16   Ht 6' (1 829 m)   Wt 98 9 kg (218 lb)   SpO2 99%   BMI 29 57 kg/m²          Physical Exam  Vitals signs reviewed  Constitutional:       General: He is not in acute distress  Appearance: He is well-developed  HENT:      Head: Normocephalic and atraumatic  Eyes:      General:         Right eye: No discharge  Left eye: No discharge  Conjunctiva/sclera: Conjunctivae normal       Pupils: Pupils are equal, round, and reactive to light  Neck:      Musculoskeletal: Normal range of motion and neck supple  Cardiovascular:      Rate and Rhythm: Normal rate and regular rhythm  Heart sounds: Normal heart sounds  No murmur  No friction rub  No gallop  Pulmonary:      Effort: Pulmonary effort is normal       Breath sounds: Normal breath sounds  Chest:      Chest wall: No tenderness  Abdominal:      General: Bowel sounds are normal       Palpations: Abdomen is soft  Tenderness: There is no abdominal tenderness  Musculoskeletal: Normal range of motion  General: No tenderness or deformity  Skin:     General: Skin is warm and dry  Findings: Lesion present  No erythema or rash  Comments: 3 mm x 2 mm soft mobile mass noted on the dorsal aspect of patient's upper arm slightly below his deltoid   Neurological:      Mental Status: He is alert and oriented to person, place, and time     Psychiatric:         Behavior: Behavior normal

## 2021-06-02 NOTE — PROGRESS NOTES
I returned the patient's call  He stated he has bumps on both arms  He is unsure what they are and would like them checked; sent a note to clerical to see if patient can be scheduled for a same day appointment  The patient states he received my calls in the past but could not answer because he was driving  He did state his blood sugar this morning was 160 but this was after coffee with sugar  The patient is aware of his PCP appointment next week and plans on attending; I will follow up with him afterwards  The patient is also requesting a medication refill; will submit to PCP

## 2021-06-09 ENCOUNTER — PATIENT OUTREACH (OUTPATIENT)
Dept: FAMILY MEDICINE CLINIC | Facility: CLINIC | Age: 56
End: 2021-06-09

## 2021-06-09 NOTE — PROGRESS NOTES
I called the patient to remind him of his PCP appointment for tomorrow with time confirmed; he plans on attending  At his request I will call him on Monday to remind him of his General Surgeon appointment on 6/15

## 2021-06-10 ENCOUNTER — OFFICE VISIT (OUTPATIENT)
Dept: FAMILY MEDICINE CLINIC | Facility: CLINIC | Age: 56
End: 2021-06-10

## 2021-06-10 VITALS
RESPIRATION RATE: 20 BRPM | TEMPERATURE: 98.1 F | HEIGHT: 72 IN | WEIGHT: 222.3 LBS | OXYGEN SATURATION: 97 % | HEART RATE: 61 BPM | DIASTOLIC BLOOD PRESSURE: 64 MMHG | BODY MASS INDEX: 30.11 KG/M2 | SYSTOLIC BLOOD PRESSURE: 116 MMHG

## 2021-06-10 DIAGNOSIS — J30.9 ALLERGIC RHINITIS, UNSPECIFIED SEASONALITY, UNSPECIFIED TRIGGER: ICD-10-CM

## 2021-06-10 DIAGNOSIS — M25.532 PAIN IN BOTH WRISTS: ICD-10-CM

## 2021-06-10 DIAGNOSIS — E11.9 TYPE 2 DIABETES MELLITUS WITHOUT COMPLICATION, WITHOUT LONG-TERM CURRENT USE OF INSULIN (HCC): Primary | ICD-10-CM

## 2021-06-10 DIAGNOSIS — Z09 FOLLOW UP: ICD-10-CM

## 2021-06-10 DIAGNOSIS — M54.2 NECK PAIN: ICD-10-CM

## 2021-06-10 DIAGNOSIS — M25.531 PAIN IN BOTH WRISTS: ICD-10-CM

## 2021-06-10 DIAGNOSIS — Z87.442 HISTORY OF RENAL CALCULI: ICD-10-CM

## 2021-06-10 DIAGNOSIS — E78.2 MIXED HYPERLIPIDEMIA: ICD-10-CM

## 2021-06-10 DIAGNOSIS — N20.0 NEPHROLITHIASIS: ICD-10-CM

## 2021-06-10 DIAGNOSIS — N20.0 KIDNEY STONES: ICD-10-CM

## 2021-06-10 DIAGNOSIS — J40 BRONCHITIS: ICD-10-CM

## 2021-06-10 DIAGNOSIS — I10 ESSENTIAL HYPERTENSION: ICD-10-CM

## 2021-06-10 DIAGNOSIS — K21.9 GASTROESOPHAGEAL REFLUX DISEASE WITHOUT ESOPHAGITIS: ICD-10-CM

## 2021-06-10 DIAGNOSIS — R91.8 LUNG NODULE, MULTIPLE: ICD-10-CM

## 2021-06-10 LAB — SL AMB POCT HEMOGLOBIN AIC: 6.9 (ref ?–6.5)

## 2021-06-10 PROCEDURE — 3044F HG A1C LEVEL LT 7.0%: CPT | Performed by: NURSE PRACTITIONER

## 2021-06-10 PROCEDURE — 3044F HG A1C LEVEL LT 7.0%: CPT | Performed by: SURGERY

## 2021-06-10 PROCEDURE — 99214 OFFICE O/P EST MOD 30 MIN: CPT | Performed by: NURSE PRACTITIONER

## 2021-06-10 PROCEDURE — 83036 HEMOGLOBIN GLYCOSYLATED A1C: CPT | Performed by: NURSE PRACTITIONER

## 2021-06-10 RX ORDER — ATENOLOL 50 MG/1
50 TABLET ORAL DAILY
Qty: 90 TABLET | Refills: 3 | Status: SHIPPED | OUTPATIENT
Start: 2021-06-10 | End: 2022-01-06 | Stop reason: SDUPTHER

## 2021-06-10 RX ORDER — ATORVASTATIN CALCIUM 40 MG/1
40 TABLET, FILM COATED ORAL DAILY
Qty: 90 TABLET | Refills: 1 | Status: SHIPPED | OUTPATIENT
Start: 2021-06-10 | End: 2022-01-20 | Stop reason: SDUPTHER

## 2021-06-10 RX ORDER — FLUTICASONE PROPIONATE 50 MCG
1 SPRAY, SUSPENSION (ML) NASAL DAILY
Qty: 16 G | Refills: 0 | Status: SHIPPED | OUTPATIENT
Start: 2021-06-10 | End: 2022-06-16 | Stop reason: SDUPTHER

## 2021-06-10 RX ORDER — OMEPRAZOLE 40 MG/1
40 CAPSULE, DELAYED RELEASE ORAL DAILY
Qty: 90 CAPSULE | Refills: 2 | Status: SHIPPED | OUTPATIENT
Start: 2021-06-10 | End: 2022-01-26 | Stop reason: SDUPTHER

## 2021-06-10 RX ORDER — MELOXICAM 15 MG/1
15 TABLET ORAL DAILY
Qty: 30 TABLET | Refills: 1 | Status: SHIPPED | OUTPATIENT
Start: 2021-06-10 | End: 2022-05-25

## 2021-06-10 RX ORDER — ASPIRIN 81 MG/1
81 TABLET ORAL DAILY
Qty: 30 TABLET | Refills: 3 | Status: SHIPPED | OUTPATIENT
Start: 2021-06-10 | End: 2021-10-27 | Stop reason: SDUPTHER

## 2021-06-10 RX ORDER — TAMSULOSIN HYDROCHLORIDE 0.4 MG/1
0.4 CAPSULE ORAL
Qty: 30 CAPSULE | Refills: 3 | Status: SHIPPED | OUTPATIENT
Start: 2021-06-10 | End: 2022-06-16 | Stop reason: SDUPTHER

## 2021-06-10 RX ORDER — ALBUTEROL SULFATE 90 UG/1
1-2 AEROSOL, METERED RESPIRATORY (INHALATION) EVERY 6 HOURS PRN
Qty: 18 G | Refills: 1 | Status: SHIPPED | OUTPATIENT
Start: 2021-06-10 | End: 2022-01-26 | Stop reason: SDUPTHER

## 2021-06-10 RX ORDER — BACLOFEN 10 MG/1
10 TABLET ORAL 3 TIMES DAILY
Qty: 90 TABLET | Refills: 0 | Status: SHIPPED | OUTPATIENT
Start: 2021-06-10 | End: 2022-06-16
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
## 2021-06-10 NOTE — PATIENT INSTRUCTIONS
Hypertension and Diabetes   WHAT YOU NEED TO KNOW:   Hypertension is high blood pressure (BP)  Hypertension is common in persons with diabetes  This type of hypertension is called secondary hypertension  A normal BP is 119/79 or lower  You can control hypertension and diabetes with a healthy lifestyle, or a combination of lifestyle and medicine  Controlled BP and blood sugar levels help prevent certain complications from diabetes  Examples include retinopathy (eye damage) and kidney damage  DISCHARGE INSTRUCTIONS:   Call or have someone call your local emergency number (911 in the [address] [facility]) for any of the following: You have any of the following signs of a heart attack:   · Squeezing, pressure, or pain in your chest    · You may  also have any of the following:     ? Discomfort or pain in your back, neck, jaw, stomach, or arm    ? Shortness of breath    ? Nausea or vomiting    ? Lightheadedness or a sudden cold sweat  You have any of the following signs of a stroke:   · Numbness or drooping on one side of your face     · Weakness in an arm or leg    · Confusion or difficulty speaking    · Dizziness, a severe headache, or vision loss    Return to the emergency department if:   · You have a severe headache or vision loss  · You feel faint, dizzy, confused, or drowsy  Call your doctor or diabetes care team if:   · You have been taking your BP medicine and your BP is still higher than your healthcare provider says it should be  · You have questions or concerns about your condition or care  Medicines: You may  need any of the following:  · Medicine  may be used to help lower your BP  You may need more than one type of medicine  Take the medicine exactly as directed  · Diuretics  help decrease extra fluid that collects in your body  This will help lower your BP  You may urinate more often while you take this medicine  · Cholesterol medicine  helps lower your cholesterol level   A low cholesterol level helps prevent heart disease and makes it easier to control your BP  · Take your medicine as directed  Contact your healthcare provider if you think your medicine is not helping or if you have side effects  Tell him or her if you are allergic to any medicine  Keep a list of the medicines, vitamins, and herbs you take  Include the amounts, and when and why you take them  Bring the list or the pill bottles to follow-up visits  Carry your medicine list with you in case of an emergency  Manage hypertension and diabetes:  Talk with your healthcare provider about these and other ways to manage hypertension and diabetes:  · Check your BP at home  Avoid smoking, caffeine, and exercise at least 30 minutes before checking your BP  Sit and rest for 5 minutes before you take your blood pressure  Extend your arm and support it on a flat surface  Your arm should be at the same level as your heart  Follow the directions that came with your BP monitor  Check your BP 2 times, 1 minute apart, before you take your medicine in the morning  Also check your BP before your evening meal  Keep a record of your readings and bring it to your follow-up visits  Ask your healthcare provider what your BP should be  · Check your blood sugar level at home  Follow your healthcare provider's instructions and check your blood sugar level as directed  Keep a record of your blood sugar level readings and bring it to your follow-up visits  Ask your healthcare provider what your blood sugar levels should be  · Manage any other health conditions you have  Health conditions such as kidney disease, thyroid disease, or adrenal gland disorder can increase your BP and blood sugar levels  Follow your healthcare provider's instructions and take all your medicines as directed      Lifestyle changes you can make:  Talk with your healthcare provider about these and other lifestyle changes for hypertension and diabetes:  · Limit sodium (salt) as directed  Too much sodium can affect your fluid balance  Check labels to find low-sodium or no-salt-added foods  Some low-sodium foods use potassium salts for flavor  Too much potassium can also cause health problems  Your healthcare provider will tell you how much sodium and potassium are safe for you to have in a day  He or she may recommend that you limit sodium to 2,300 mg a day  · Follow the meal plan recommended by your healthcare provider  A dietitian or your provider can help you create healthy meal plans  The plans will help you control sodium, carbohydrates, and fats in your meals  This can help you control both your blood sugar and BP levels  The plans usually include eating more fruits, vegetables, and low-fat dairy products  Your provider may talk to you about a Mediterranean style and Dietary Approaches to Stop Hypertension (DASH) eating plans  These eating plans can help you with weight loss and lowering your cholesterol  · Get regular physical activity  Physical activity can help decrease your blood sugar level  It can also help to decrease your risk for heart disease and help you lose weight  Adults should have moderate intensity physical activity for at least 150 minutes every week  Spread the amount of activity over at least 3 days a week  Do not skip more than 2 days in a row  Children should get at least 60 minutes of moderate physical activity on most days of the week  Examples of moderate physical activity include brisk walking, running, and swimming  Do not sit for longer than 30 minutes  Work with your healthcare provider to create a plan for physical activity  · Decrease stress  This may help lower your BP  Learn ways to relax, such as deep breathing or listening to music  Yoga and meditation may also help  Talk to your healthcare provider about ways to decrease stress  · Know the risks if you choose to drink alcohol    Alcohol can cause your blood sugar levels to be low if you use insulin  Alcohol can cause high blood sugar and BP levels, and weight gain if you drink too much  Women 21 years or older and men 72 years or older should limit alcohol to 1 drink a day  Men aged 24 to 59 years should limit alcohol to 2 drinks a day  A drink of alcohol is 12 ounces of beer, 5 ounces of wine, or 1½ ounces of liquor  · Do not smoke  Nicotine and other chemicals in cigarettes and cigars can increase your BP and make your blood sugar levels harder to control  Ask your healthcare provider for information if you currently smoke and need help to quit  E-cigarettes or smokeless tobacco still contain nicotine  Talk to your healthcare provider before you use these products  Follow up with your healthcare provider as directed: You will need to return to have your BP checked  You will also need other lab tests done, including an A1C to monitor your overall blood sugar control  Write down your questions so you remember to ask them during your visits  © Copyright [address] Information is for End User's use only and may not be sold, redistributed or otherwise used for commercial purposes  All illustrations and images included in CareNotes® are the copyrighted property of A D A M , Inc  or [facility] Saira Miller   The above information is an  only  It is not intended as medical advice for individual conditions or treatments  Talk to your doctor, nurse or pharmacist before following any medical regimen to see if it is safe and effective for you

## 2021-06-10 NOTE — ASSESSMENT & PLAN NOTE
Lab Results   Component Value Date    HGBA1C 6 9 (A) 06/10/2021     Improved, continue with current regimen metformin 1,000 bid Tradjenta 5 mg daily  Encouraged to continue with diet and lifestyle changes to further improve glucose control   Continue with asa, statin

## 2021-06-10 NOTE — ASSESSMENT & PLAN NOTE
Ct chest on 5/2020 showed:   IMPRESSION:  Several right lung nodules, largest measuring 4 mm  Based on current Fleischner Society 2017 Guidelines on incidental pulmonary nodule, because the patient is considered high risk for lung cancer, 12 month follow-up non-contrast chest CT is recommended      Discussed with patient and repeat CT ordered today

## 2021-06-14 ENCOUNTER — PATIENT OUTREACH (OUTPATIENT)
Dept: FAMILY MEDICINE CLINIC | Facility: CLINIC | Age: 56
End: 2021-06-14

## 2021-06-14 NOTE — PROGRESS NOTES
I called the patient to remind him of his General Surgery appointment for tomorrow with time confirmed; he was aware and plans on attending  He was currently at the hospital with is wife who is being seen  He asked about the wrist brace that was ordered for him recently  I called the pharmacy and was told they cannot bill for this; the patient would need to pay out of pocket or the order can faxed to a DME  The order was faxed to [facility] and the patient informed  I will continue to follow

## 2021-06-15 ENCOUNTER — TELEPHONE (OUTPATIENT)
Dept: FAMILY MEDICINE CLINIC | Facility: CLINIC | Age: 56
End: 2021-06-15

## 2021-06-15 NOTE — TELEPHONE ENCOUNTER
SIGNATURES NEEDED FOR [employer] assured  FORM RECEIVED VIA FAX  WILL BE PLACED IN FORM BIN FOR MA PICKUP      Case # [id number]

## 2021-06-15 NOTE — TELEPHONE ENCOUNTER
Form picked up by clinical 06/15/21  Patient will be notified within 5 business days of completion of forms/if we are unable to complete

## 2021-06-17 ENCOUNTER — CONSULT (OUTPATIENT)
Dept: SURGERY | Facility: CLINIC | Age: 56
End: 2021-06-17
Payer: COMMERCIAL

## 2021-06-17 VITALS
DIASTOLIC BLOOD PRESSURE: 72 MMHG | TEMPERATURE: 98.2 F | HEIGHT: 72 IN | HEART RATE: 54 BPM | SYSTOLIC BLOOD PRESSURE: 122 MMHG | BODY MASS INDEX: 30.07 KG/M2 | WEIGHT: 222 LBS

## 2021-06-17 DIAGNOSIS — L72.0 EPIDERMAL CYST: ICD-10-CM

## 2021-06-17 DIAGNOSIS — R22.33 ARM MASS, BILATERAL: Primary | ICD-10-CM

## 2021-06-17 PROCEDURE — 3078F DIAST BP <80 MM HG: CPT | Performed by: SURGERY

## 2021-06-17 PROCEDURE — 3074F SYST BP LT 130 MM HG: CPT | Performed by: SURGERY

## 2021-06-17 PROCEDURE — 3008F BODY MASS INDEX DOCD: CPT | Performed by: SURGERY

## 2021-06-17 PROCEDURE — 99214 OFFICE O/P EST MOD 30 MIN: CPT | Performed by: SURGERY

## 2021-06-17 PROCEDURE — 3008F BODY MASS INDEX DOCD: CPT | Performed by: NURSE PRACTITIONER

## 2021-06-17 RX ORDER — BUPROPION HYDROCHLORIDE 150 MG/1
150 TABLET, EXTENDED RELEASE ORAL EVERY MORNING
COMMUNITY
Start: 2021-05-18 | End: 2022-01-26

## 2021-06-17 RX ORDER — CLONIDINE HYDROCHLORIDE 0.2 MG/1
0.2 TABLET ORAL
COMMUNITY
Start: 2021-05-18

## 2021-06-17 NOTE — PROGRESS NOTES
Assessment/Plan:    Bilateral upper extremity intramuscular masses, could be bilateral upper extremity intramuscular lipomas versus other pathology  Given how deep and within the muscle it is will check an ultrasound of bilateral upper extremities to better characterize the masses  Unclear if these are intramuscular abnormalities verses other pathology  If the ultrasound does show separate mass will have him return to the office to discuss surgical excision under anesthesia  No problem-specific Assessment & Plan notes found for this encounter  Diagnoses and all orders for this visit:    Arm mass, bilateral  -     US MSK limited; Future    Epidermal cyst  -     Ambulatory referral to General Surgery    Other orders  -     buPROPion (ZYBAN) 150 MG 12 hr tablet; Take 150 mg by mouth every morning  -     cloNIDine (CATAPRES) 0 2 mg tablet; Take 0 2 mg by mouth daily at bedtime          Subjective:      Patient ID: Dominic Barrios is a 60 y o  male  He presents with bilateral upper extremity masses that he says her painful  He says they have been present for some time, cause pain mainly at night when he is lying on his side to sleep  He says that he suffered a work injury 25 years ago of his right shoulder and now has limited movement of his right shoulder  He has not pursued physical therapy or any other avenues for evaluation and does not want to pursue any sports medicine orthopedic evaluation at this time  He would like these masses excised  A chart review was performed and previous primary care visit notes were reviewed  All applicable imaging studies were reviewed and images were reviewed personally  All applicable laboratory studies were reviewed personally  Care everywhere review was performed if  available and all pertinent notes were reviewed      The following portions of the patient's history were reviewed and updated as appropriate:   He  has a past medical history of Diverticulitis, Hypertension, and Neck pain  He   Patient Active Problem List    Diagnosis Date Noted    Epidermal cyst 06/02/2021    Weakness of both hands 04/16/2021    Depression, recurrent ([facility] ) 03/10/2021    Diabetic polyneuropathy associated with type 2 diabetes mellitus ([facility] ) 12/01/2020    Change in multiple nevi 09/09/2020    Type 2 diabetes mellitus without complication, without long-term current use of insulin ([place] [address] ) 09/09/2020    Chronic pain syndrome 09/09/2020    Overweight with body mass index (BMI) of 29 to 29 9 in adult 06/10/2020    Multiple lung nodules on CT 06/10/2020    Chronic pain of left knee 02/27/2020    Disorder of middle ear or mastoid, left 01/17/2020    Decreased hearing of left ear 01/17/2020    Cervicalgia 12/05/2018    Posttraumatic stiffness of shoulder joint 12/05/2018    Intrinsic eczema 12/05/2018    Allergic rhinitis 12/05/2018    Other insomnia 10/03/2018    Left shoulder pain 10/03/2018    Benign prostatic hyperplasia 06/18/2018    Cigar smoker 05/14/2018    Gastroesophageal reflux disease 05/14/2018    History of diverticulitis 05/14/2018    History of renal calculi 05/14/2018    History of hemicolectomy 05/14/2018    Hypertension 05/14/2018    Hyperlipidemia 05/14/2018     He  has a past surgical history that includes Colon surgery and Neck surgery  His family history is not on file  He  reports that he has been smoking cigars  He has never used smokeless tobacco  He reports current alcohol use  He reports that he does not use drugs    Current Outpatient Medications   Medication Sig Dispense Refill    acetaminophen (TYLENOL) 325 mg tablet Take 2 tablets (650 mg total) by mouth every 6 (six) hours as needed for mild pain 90 tablet 0    albuterol (PROVENTIL HFA,VENTOLIN HFA) 90 mcg/act inhaler Inhale 1-2 puffs every 6 (six) hours as needed for wheezing 18 g 1    aspirin (ECOTRIN LOW STRENGTH) 81 mg EC tablet Take 1 tablet (81 mg total) by mouth daily 30 tablet 3    atenolol (TENORMIN) 50 mg tablet Take 1 tablet (50 mg total) by mouth daily 90 tablet 3    atorvastatin (LIPITOR) 40 mg tablet Take 1 tablet (40 mg total) by mouth daily 90 tablet 1    baclofen 10 mg tablet Take 1 tablet (10 mg total) by mouth 3 (three) times a day 90 tablet 0    Blood Glucose Monitoring Suppl (FreeStyle Lite) SHANELL Use daily Use as directed 1 each 0    buPROPion (WELLBUTRIN) 75 mg tablet Take 75 mg by mouth daily       buPROPion (ZYBAN) 150 MG 12 hr tablet Take 150 mg by mouth every morning      busPIRone (BUSPAR) 15 mg tablet Take 15 mg by mouth daily       cloNIDine (CATAPRES) 0 2 mg tablet Take 0 2 mg by mouth daily at bedtime      fluticasone (FLONASE) 50 mcg/act nasal spray 1 spray into each nostril daily 16 g 0    gabapentin (NEURONTIN) 800 mg tablet Take 1 tablet (800 mg total) by mouth 3 (three) times a day 90 tablet 3    glucose blood (FREESTYLE LITE) test strip Testing sugars once a day 100 each 3    hydrocortisone 1 % ointment Apply topically 2 (two) times a day 30 g 0    hydrOXYzine pamoate (VISTARIL) 50 mg capsule Take 50 mg by mouth daily at bedtime      Lancets (freestyle) lancets Daily 100 each 5    lidocaine (XYLOCAINE) 5 % ointment Apply topically as needed for mild pain 35 44 g 0    linaGLIPtin 5 MG TABS Take 5 mg by mouth daily 30 tablet 1    meloxicam (MOBIC) 15 mg tablet Take 1 tablet (15 mg total) by mouth daily 30 tablet 1    metFORMIN (GLUCOPHAGE) 1000 MG tablet Take 1 tablet (1,000 mg total) by mouth 2 (two) times a day with meals 60 tablet 3    Misc   Devices (Wrist Brace) MISC Use daily 2 each 0    omeprazole (PriLOSEC) 40 MG capsule Take 1 capsule (40 mg total) by mouth daily 90 capsule 2    tamsulosin (FLOMAX) 0 4 mg Take 1 capsule (0 4 mg total) by mouth daily with dinner 30 capsule 3    temazepam (RESTORIL) 30 mg capsule Take 30 mg by mouth daily at bedtime      zolpidem (AMBIEN) 10 mg tablet Take 10 mg by mouth daily at bedtime       No current facility-administered medications for this visit  He has No Known Allergies       Review of Systems   Musculoskeletal: Positive for arthralgias and myalgias  All other systems reviewed and are negative  Objective:      /72 (BP Location: Left arm, Patient Position: Sitting, Cuff Size: Adult)   Pulse (!) 54   Temp 98 2 °F (36 8 °C) (Tympanic)   Ht 6' (1 829 m)   Wt 101 kg (222 lb)   BMI 30 11 kg/m²          Physical Exam  Vitals reviewed  Constitutional:       Appearance: Normal appearance  He is obese  HENT:      Head: Normocephalic and atraumatic  Eyes:      Extraocular Movements: Extraocular movements intact  Conjunctiva/sclera: Conjunctivae normal       Pupils: Pupils are equal, round, and reactive to light  Cardiovascular:      Rate and Rhythm: Normal rate and regular rhythm  Pulmonary:      Effort: Pulmonary effort is normal  No respiratory distress  Breath sounds: Normal breath sounds  Abdominal:      General: Abdomen is flat  There is no distension  Palpations: Abdomen is soft  Musculoskeletal:      Right shoulder: Tenderness present  Decreased range of motion  Right upper arm: Tenderness present  Left upper arm: Tenderness present  Arms:       Cervical back: Normal range of motion and neck supple  Skin:     General: Skin is warm and dry  Neurological:      General: No focal deficit present  Mental Status: He is alert and oriented to person, place, and time  Psychiatric:         Mood and Affect: Mood normal          Behavior: Behavior normal          Thought Content:  Thought content normal          Judgment: Judgment normal

## 2021-06-18 ENCOUNTER — HOSPITAL ENCOUNTER (OUTPATIENT)
Dept: ULTRASOUND IMAGING | Facility: HOSPITAL | Age: 56
Discharge: HOME | End: 2021-06-18
Attending: SURGERY
Payer: COMMERCIAL

## 2021-06-18 ENCOUNTER — TELEPHONE (OUTPATIENT)
Dept: UROLOGY | Facility: MEDICAL CENTER | Age: 56
End: 2021-06-18

## 2021-06-18 DIAGNOSIS — R22.33 ARM MASS, BILATERAL: ICD-10-CM

## 2021-06-18 PROCEDURE — 76882 US LMTD JT/FCL EVL NVASC XTR: CPT

## 2021-06-18 NOTE — TELEPHONE ENCOUNTER
Patient has been contacted several times since August 2020 to schedule an appointment for CT showing nonobstructing 3 mm stone on the left       Patient can be scheduled next available with AP

## 2021-06-18 NOTE — TELEPHONE ENCOUNTER
Please Triage - [place]  New Patient-     What is the reason for the patients appointment? Mariah Caro from [facility] called stating patient referred for kidney stones  Patient can be contacted directly     Imaging/Lab Results:in epic      Do we accept the patient's insurance or is the patient Self-Pay? Provider & Plan: [employer]   Member ID#:       Has the patient had any previous urologist(s)?no        Have patient records been requested?in epic       Has the patient had any outside testing done?in [facility]      Does the patient have a personal history of cancer?no     Patient can be reached at :[phone] (L)

## 2021-06-22 ENCOUNTER — TELEPHONE (OUTPATIENT)
Dept: SURGERY | Facility: CLINIC | Age: 56
End: 2021-06-22

## 2021-06-24 ENCOUNTER — HOSPITAL ENCOUNTER (OUTPATIENT)
Dept: CT IMAGING | Facility: HOSPITAL | Age: 56
Discharge: HOME | End: 2021-06-24
Payer: COMMERCIAL

## 2021-06-24 DIAGNOSIS — R91.8 LUNG NODULE, MULTIPLE: ICD-10-CM

## 2021-06-24 PROCEDURE — 71250 CT THORAX DX C-: CPT

## 2021-06-25 ENCOUNTER — PATIENT OUTREACH (OUTPATIENT)
Dept: FAMILY MEDICINE CLINIC | Facility: CLINIC | Age: 56
End: 2021-06-25

## 2021-06-25 NOTE — PROGRESS NOTES
I received a call from the patient stating he was at [place] with his wife for her appointment  He reports his left knee is swollen  I asked that he ask the staff if he could schedule a same day appointment to have his knee examined and he agreed

## 2021-06-28 ENCOUNTER — APPOINTMENT (EMERGENCY)
Dept: RADIOLOGY | Facility: HOSPITAL | Age: 56
End: 2021-06-28
Payer: COMMERCIAL

## 2021-06-28 ENCOUNTER — HOSPITAL ENCOUNTER (EMERGENCY)
Facility: HOSPITAL | Age: 56
Discharge: HOME | End: 2021-06-28
Attending: EMERGENCY MEDICINE
Payer: COMMERCIAL

## 2021-06-28 VITALS
HEART RATE: 75 BPM | WEIGHT: 222.44 LBS | OXYGEN SATURATION: 99 % | SYSTOLIC BLOOD PRESSURE: 109 MMHG | RESPIRATION RATE: 18 BRPM | DIASTOLIC BLOOD PRESSURE: 65 MMHG | BODY MASS INDEX: 30.17 KG/M2 | TEMPERATURE: 96.6 F

## 2021-06-28 DIAGNOSIS — M25.562 LEFT KNEE PAIN: Primary | ICD-10-CM

## 2021-06-28 PROCEDURE — 73562 X-RAY EXAM OF KNEE 3: CPT

## 2021-06-28 PROCEDURE — 99283 EMERGENCY DEPT VISIT LOW MDM: CPT

## 2021-06-28 PROCEDURE — 96372 THER/PROPH/DIAG INJ SC/IM: CPT

## 2021-06-28 PROCEDURE — 99284 EMERGENCY DEPT VISIT MOD MDM: CPT | Performed by: EMERGENCY MEDICINE

## 2021-06-28 RX ORDER — KETOROLAC TROMETHAMINE 30 MG/ML
30 INJECTION, SOLUTION INTRAMUSCULAR; INTRAVENOUS ONCE
Status: COMPLETED | OUTPATIENT
Start: 2021-06-28 | End: 2021-06-28

## 2021-06-28 RX ORDER — NAPROXEN 500 MG/1
500 TABLET ORAL 2 TIMES DAILY WITH MEALS
Qty: 14 TABLET | Refills: 0 | Status: SHIPPED | OUTPATIENT
Start: 2021-06-28 | End: 2022-01-26

## 2021-06-28 RX ADMIN — KETOROLAC TROMETHAMINE 30 MG: 30 INJECTION, SOLUTION INTRAMUSCULAR; INTRAVENOUS at 13:56

## 2021-06-28 NOTE — ED PROVIDER NOTES
History  Chief Complaint   Patient presents with    Knee Pain     Pt states left knee pain and swelling began last Wednesday     60year-old gentleman presents with complaint of left knee pain and swelling  He states that he has had this occur several times in the past but is unsure what was going on with those episodes  He does report that at some point he has had injections in that knee provided by his primary care clinician  He states that the knee was dramatically swollen several days ago and that has since been improving  He continues to have some ongoing pain complaints  He denies any new trauma or injury  Pain is worse with flexion of the knee and palpation of the area and also with attempts at ambulation  Knee Pain  Location:  Knee  Injury: no    Knee location:  L knee  Pain details:     Quality:  Dull and aching    Radiates to:  Does not radiate    Severity:  Moderate    Onset quality:  Gradual    Timing:  Constant    Progression:  Partially resolved  Chronicity:  Recurrent  Dislocation: no    Prior injury to area:  Yes  Relieved by:  Nothing  Worsened by:  Bearing weight and flexion  Ineffective treatments:  None tried  Associated symptoms: stiffness and swelling    Associated symptoms: no back pain, no decreased ROM, no fatigue, no fever, no itching, no muscle weakness, no neck pain, no numbness and no tingling        Prior to Admission Medications   Prescriptions Last Dose Informant Patient Reported? Taking? Blood Glucose Monitoring Suppl (FreeStyle Lite) SHANELL  Self No No   Sig: Use daily Use as directed   Lancets (freestyle) lancets  Self No No   Sig: Daily   Misc   Devices (Wrist Brace) MISC   No No   Sig: Use daily   acetaminophen (TYLENOL) 325 mg tablet  Self No No   Sig: Take 2 tablets (650 mg total) by mouth every 6 (six) hours as needed for mild pain   albuterol (PROVENTIL HFA,VENTOLIN HFA) 90 mcg/act inhaler   No No   Sig: Inhale 1-2 puffs every 6 (six) hours as needed for wheezing aspirin (ECOTRIN LOW STRENGTH) 81 mg EC tablet   No No   Sig: Take 1 tablet (81 mg total) by mouth daily   atenolol (TENORMIN) 50 mg tablet   No No   Sig: Take 1 tablet (50 mg total) by mouth daily   atorvastatin (LIPITOR) 40 mg tablet   No No   Sig: Take 1 tablet (40 mg total) by mouth daily   baclofen 10 mg tablet   No No   Sig: Take 1 tablet (10 mg total) by mouth 3 (three) times a day   buPROPion (WELLBUTRIN) 75 mg tablet  Self Yes No   Sig: Take 75 mg by mouth daily    buPROPion (ZYBAN) 150 MG 12 hr tablet   Yes No   Sig: Take 150 mg by mouth every morning   busPIRone (BUSPAR) 15 mg tablet  Self Yes No   Sig: Take 15 mg by mouth daily    cloNIDine (CATAPRES) 0 2 mg tablet   Yes No   Sig: Take 0 2 mg by mouth daily at bedtime   fluticasone (FLONASE) 50 mcg/act nasal spray   No No   Si spray into each nostril daily   gabapentin (NEURONTIN) 800 mg tablet   No No   Sig: Take 1 tablet (800 mg total) by mouth 3 (three) times a day   glucose blood (FREESTYLE LITE) test strip  Self No No   Sig: Testing sugars once a day   hydrOXYzine pamoate (VISTARIL) 50 mg capsule  Self Yes No   Sig: Take 50 mg by mouth daily at bedtime   hydrocortisone 1 % ointment  Self No No   Sig: Apply topically 2 (two) times a day   lidocaine (XYLOCAINE) 5 % ointment  Self No No   Sig: Apply topically as needed for mild pain   linaGLIPtin 5 MG TABS   No No   Sig: Take 5 mg by mouth daily   meloxicam (MOBIC) 15 mg tablet   No No   Sig: Take 1 tablet (15 mg total) by mouth daily   metFORMIN (GLUCOPHAGE) 1000 MG tablet   No No   Sig: Take 1 tablet (1,000 mg total) by mouth 2 (two) times a day with meals   omeprazole (PriLOSEC) 40 MG capsule   No No   Sig: Take 1 capsule (40 mg total) by mouth daily   tamsulosin (FLOMAX) 0 4 mg   No No   Sig: Take 1 capsule (0 4 mg total) by mouth daily with dinner   temazepam (RESTORIL) 30 mg capsule  Self Yes No   Sig: Take 30 mg by mouth daily at bedtime   zolpidem (AMBIEN) 10 mg tablet  Self Yes No   Sig: Take 10 mg by mouth daily at bedtime      Facility-Administered Medications: None       Past Medical History:   Diagnosis Date    Diverticulitis     Hypertension     Neck pain        Past Surgical History:   Procedure Laterality Date    COLON SURGERY      NECK SURGERY         History reviewed  No pertinent family history  I have reviewed and agree with the history as documented  E-Cigarette/Vaping    E-Cigarette Use Never User      E-Cigarette/Vaping Substances     Social History     Tobacco Use    Smoking status: Current Every Day Smoker     Packs/day: 0 25     Types: Cigarettes, Cigars    Smokeless tobacco: Never Used    Tobacco comment: 1 cigar/daily   Vaping Use    Vaping Use: Never used   Substance Use Topics    Alcohol use: Yes     Comment: drinks only on weekends    Drug use: Never       Review of Systems   Constitutional: Negative for fatigue and fever  Musculoskeletal: Positive for arthralgias, gait problem, joint swelling and stiffness  Negative for back pain, myalgias and neck pain  Skin: Negative for color change, itching, rash and wound  Neurological: Negative for weakness and numbness  Hematological: Does not bruise/bleed easily  All other systems reviewed and are negative  Physical Exam  Physical Exam  Vitals and nursing note reviewed  Constitutional:       General: He is not in acute distress  Appearance: Normal appearance  He is well-developed  He is not ill-appearing, toxic-appearing or diaphoretic  HENT:      Head: Normocephalic and atraumatic  Right Ear: External ear normal       Left Ear: External ear normal       Nose: Nose normal    Eyes:      General: No scleral icterus  Pulmonary:      Effort: Pulmonary effort is normal  No respiratory distress  Musculoskeletal:      Cervical back: Normal range of motion and neck supple  Left knee: Swelling present  No erythema, ecchymosis, bony tenderness or crepitus  Tenderness (diffusely) present   Normal alignment and normal patellar mobility  Skin:     General: Skin is warm and dry  Capillary Refill: Capillary refill takes less than 2 seconds  Neurological:      General: No focal deficit present  Mental Status: He is alert and oriented to person, place, and time  Sensory: No sensory deficit  Motor: No weakness  Psychiatric:         Mood and Affect: Mood normal          Behavior: Behavior normal          Vital Signs  ED Triage Vitals [06/28/21 1332]   Temperature Pulse Respirations Blood Pressure SpO2   (!) 96 6 °F (35 9 °C) 75 18 109/65 99 %      Temp Source Heart Rate Source Patient Position - Orthostatic VS BP Location FiO2 (%)   Tympanic Monitor Sitting Left arm --      Pain Score       9           Vitals:    06/28/21 1332   BP: 109/65   Pulse: 75   Patient Position - Orthostatic VS: Sitting         Visual Acuity      ED Medications  Medications   ketorolac (TORADOL) injection 30 mg (30 mg Intramuscular Given 6/28/21 1356)       Diagnostic Studies  Results Reviewed     None                 XR knee 3 views left non injury   Final Result by Leanna Figueredo MD (06/28 1536)      No acute osseous abnormality  Degenerative changes as described  Workstation performed: [id number]                    Procedures  Procedures         ED Course  ED Course as of Jun 29 0850   Mon Jun 28, 2021   1446 Patient is now refusing crutches and is simply requesting an Ace wrap  SBIRT 22yo+      Most Recent Value   SBIRT (24 yo +)   In order to provide better care to our patients, we are screening all of our patients for alcohol and drug use  Would it be okay to ask you these screening questions?   No Filed at: 06/28/2021 1430                    MDM    Disposition  Final diagnoses:   Left knee pain     Time reflects when diagnosis was documented in both MDM as applicable and the Disposition within this note     Time User Action Codes Description Comment    6/28/2021 1:51 PM Kalyn Ma Nae [H87 [id number]] Left knee pain       ED Disposition     ED Disposition Condition Date/Time Comment    Discharge Stable Mon Jun 28, 2021  2:47 PM Lucile Meckel discharge to home/self care              Follow-up Information     Follow up With Specialties Details Why Contact Info Additional [address]  If symptoms worsen [address]  [address]  [address]  [address] [facility], [address], [address] [facility], [facility], [place], [address]   [address], [address] [facility], Nurse Practitioner Call   [address]  [facility]  [address]  [phone]             Discharge Medication List as of 6/28/2021  2:48 PM      START taking these medications    Details   naproxen (NAPROSYN) 500 mg tablet Take 1 tablet (500 mg total) by mouth 2 (two) times a day with meals for 14 doses, Starting Mon 6/28/2021, Until Mon 7/5/2021, Print         CONTINUE these medications which have NOT CHANGED    Details   acetaminophen (TYLENOL) 325 mg tablet Take 2 tablets (650 mg total) by mouth every 6 (six) hours as needed for mild pain, Starting Tue 3/24/2020, Normal      albuterol (PROVENTIL HFA,VENTOLIN HFA) 90 mcg/act inhaler Inhale 1-2 puffs every 6 (six) hours as needed for wheezing, Starting Thu 6/10/2021, Normal      aspirin (ECOTRIN LOW STRENGTH) 81 mg EC tablet Take 1 tablet (81 mg total) by mouth daily, Starting Thu 6/10/2021, Normal      atenolol (TENORMIN) 50 mg tablet Take 1 tablet (50 mg total) by mouth daily, Starting Thu 6/10/2021, Normal      atorvastatin (LIPITOR) 40 mg tablet Take 1 tablet (40 mg total) by mouth daily, Starting Thu 6/10/2021, Normal      baclofen 10 mg tablet Take 1 tablet (10 mg total) by mouth 3 (three) times a day, Starting Thu 6/10/2021, Normal      Blood Glucose Monitoring Suppl (FreeStyle Lite) SHANELL Use daily Use as directed, Starting Tue 12/1/2020, Normal      buPROPion (WELLBUTRIN) 75 mg tablet Take 75 mg by mouth daily , Starting Mon 11/30/2020, Historical Med      buPROPion (ZYBAN) 150 MG 12 hr tablet Take 150 mg by mouth every morning, Starting Tue 5/18/2021, Historical Med      busPIRone (BUSPAR) 15 mg tablet Take 15 mg by mouth daily , Starting Mon 11/30/2020, Historical Med      cloNIDine (CATAPRES) 0 2 mg tablet Take 0 2 mg by mouth daily at bedtime, Starting Tue 5/18/2021, Historical Med      fluticasone (FLONASE) 50 mcg/act nasal spray 1 spray into each nostril daily, Starting Thu 6/10/2021, Normal      gabapentin (NEURONTIN) 800 mg tablet Take 1 tablet (800 mg total) by mouth 3 (three) times a day, Starting Wed 6/2/2021, Normal      glucose blood (FREESTYLE LITE) test strip Testing sugars once a day, Normal      hydrocortisone 1 % ointment Apply topically 2 (two) times a day, Starting Wed 3/10/2021, Normal      hydrOXYzine pamoate (VISTARIL) 50 mg capsule Take 50 mg by mouth daily at bedtime, Starting Mon 9/21/2020, Historical Med      Lancets (freestyle) lancets Daily, Normal      lidocaine (XYLOCAINE) 5 % ointment Apply topically as needed for mild pain, Starting Wed 3/10/2021, Normal      linaGLIPtin 5 MG TABS Take 5 mg by mouth daily, Starting Thu 6/10/2021, Normal      meloxicam (MOBIC) 15 mg tablet Take 1 tablet (15 mg total) by mouth daily, Starting Thu 6/10/2021, Normal      metFORMIN (GLUCOPHAGE) 1000 MG tablet Take 1 tablet (1,000 mg total) by mouth 2 (two) times a day with meals, Starting Thu 6/10/2021, Normal      Misc   Devices (Wrist Brace) MISC Use daily, Starting Thu 6/10/2021, Normal      omeprazole (PriLOSEC) 40 MG capsule Take 1 capsule (40 mg total) by mouth daily, Starting Thu 6/10/2021, Normal      tamsulosin (FLOMAX) 0 4 mg Take 1 capsule (0 4 mg total) by mouth daily with dinner, Starting Thu 6/10/2021, Normal      temazepam (RESTORIL) 30 mg capsule Take 30 mg by mouth daily at bedtime, Starting Mon 9/21/2020, Historical Med      zolpidem (AMBIEN) 10 mg tablet Take 10 mg by mouth daily at bedtime, Starting Fri 11/6/2020, Historical Med           No discharge procedures on file      PDMP Review     None          ED Provider  Electronically Signed by           Betty Syed DO  06/29/21 5391

## 2021-06-28 NOTE — ED NOTES
Pt states he doesn't need crutches and is asking for an ace wrap instead       Elisabet Augustin RN  06/28/21 0566

## 2021-06-29 ENCOUNTER — PATIENT OUTREACH (OUTPATIENT)
Dept: FAMILY MEDICINE CLINIC | Facility: CLINIC | Age: 56
End: 2021-06-29

## 2021-06-29 ENCOUNTER — OFFICE VISIT (OUTPATIENT)
Dept: SURGERY | Facility: CLINIC | Age: 56
End: 2021-06-29
Payer: COMMERCIAL

## 2021-06-29 VITALS
TEMPERATURE: 97.7 F | SYSTOLIC BLOOD PRESSURE: 112 MMHG | BODY MASS INDEX: 30.07 KG/M2 | WEIGHT: 222 LBS | HEIGHT: 72 IN | HEART RATE: 74 BPM | DIASTOLIC BLOOD PRESSURE: 68 MMHG

## 2021-06-29 DIAGNOSIS — M79.602 PAIN IN BOTH UPPER EXTREMITIES: Primary | ICD-10-CM

## 2021-06-29 DIAGNOSIS — M79.601 PAIN IN BOTH UPPER EXTREMITIES: Primary | ICD-10-CM

## 2021-06-29 PROCEDURE — 3008F BODY MASS INDEX DOCD: CPT | Performed by: SURGERY

## 2021-06-29 PROCEDURE — 3074F SYST BP LT 130 MM HG: CPT | Performed by: SURGERY

## 2021-06-29 PROCEDURE — 99213 OFFICE O/P EST LOW 20 MIN: CPT | Performed by: SURGERY

## 2021-06-29 PROCEDURE — 3078F DIAST BP <80 MM HG: CPT | Performed by: SURGERY

## 2021-06-29 PROCEDURE — 3008F BODY MASS INDEX DOCD: CPT | Performed by: NURSE PRACTITIONER

## 2021-06-29 RX ORDER — HYDROXYZINE 50 MG/1
TABLET, FILM COATED ORAL
COMMUNITY
Start: 2021-06-28 | End: 2021-08-31 | Stop reason: SDUPTHER

## 2021-06-29 NOTE — PROGRESS NOTES
I called the patient to follow up after his ED visit  He states he continues with pain and swelling of his knee and notes the medication he was given "helped a little bit"  He denies any injury to the knee or falling  The patient stated he has an appointment today for the masses in his arms and could not talk any longer  I will continue to follow

## 2021-06-29 NOTE — PROGRESS NOTES
Assessment/Plan:   ultrasound reviewed and showed no abnormality  Likely the areas he is referring to hypertrophied muscle as there were no deeper masses within the muscle identified on ultrasound  Discussed possible remedies including NSAIDs and heating pad as well as repositioning while sleeping  No surgical intervention necessary  No problem-specific Assessment & Plan notes found for this encounter  Diagnoses and all orders for this visit:    Pain in both upper extremities    Other orders  -     hydrOXYzine HCL (ATARAX) 50 mg tablet          Subjective:      Patient ID: Lian Leal is a 60 y o  male  He returns to discuss the results of his ultrasound  He says that he can still feel the lumps in his arms and says that the left-sided arm is painful after he sleeps on it for long period of time he will have some burning type pain  Discussed the ultrasound returned as normal there were no abnormalities identified  Discussed that likely the bulge is he is feeling   Arehypertrophied muscle  The following portions of the patient's history were reviewed and updated as appropriate: allergies, current medications, past family history, past medical history, past social history, past surgical history and problem list     Review of Systems   Musculoskeletal: Positive for joint swelling  All other systems reviewed and are negative  Objective:      /68 (BP Location: Left arm, Patient Position: Sitting, Cuff Size: Large)   Pulse 74   Temp 97 7 °F (36 5 °C) (Tympanic)   Ht 6' (1 829 m)   Wt 101 kg (222 lb)   BMI 30 11 kg/m²          Physical Exam  Vitals reviewed  Constitutional:       Appearance: Normal appearance  He is obese  Eyes:      Extraocular Movements: Extraocular movements intact  Conjunctiva/sclera: Conjunctivae normal       Pupils: Pupils are equal, round, and reactive to light  Pulmonary:      Effort: Pulmonary effort is normal  No respiratory distress  Breath sounds: Normal breath sounds  Musculoskeletal:         General: Swelling and tenderness present  Right upper arm: Tenderness present  Left upper arm: Tenderness present  Cervical back: Normal range of motion and neck supple  Left knee: Swelling present  Comments:  Tenderness over bilateral biceps, area of concern does not have any subcutaneous abnormality   Skin:     General: Skin is warm and dry  Neurological:      General: No focal deficit present  Mental Status: He is alert and oriented to person, place, and time  Psychiatric:         Mood and Affect: Mood normal          Behavior: Behavior normal          Thought Content:  Thought content normal          Judgment: Judgment normal

## 2021-07-07 ENCOUNTER — PATIENT OUTREACH (OUTPATIENT)
Dept: FAMILY MEDICINE CLINIC | Facility: CLINIC | Age: 56
End: 2021-07-07

## 2021-07-07 NOTE — PROGRESS NOTES
I called the patient to follow up  He reports his blood sugars are stable with this morning's reading of 166  He checks his feet daily for skin breakdown and currently denies any ulcerations  The patient continues with knee pain to the point he feels unsteady going up and down stairs  He notes the knee at times will become swollen but denies any swelling now  I asked if the knee becomes edematous again to call the office as he may need it aspirated  The patient is asking if a knee brace would be beneficial  He had knee x-rays performed at a recent ED visit on 6/28  The patient notes the lumps in his arms do not require surgery  He notes they continue to be painful and he is tolerating the pain  The patient is requesting a back brace  He reports he has chronic back pain  He currently takes gabapentin and will contact the pharmacy for a refill  The patient states he received a call from a nurse stating she will be coming to his home and he was unsure about this  I informed him this could possibly be from his insurance company but to confirm if he receives another call or if someone shows up  I will continue to follow

## 2021-07-13 ENCOUNTER — PATIENT OUTREACH (OUTPATIENT)
Dept: FAMILY MEDICINE CLINIC | Facility: CLINIC | Age: 56
End: 2021-07-13

## 2021-08-02 ENCOUNTER — TELEPHONE (OUTPATIENT)
Dept: NEUROSURGERY | Facility: CLINIC | Age: 56
End: 2021-08-02

## 2021-08-12 ENCOUNTER — PATIENT OUTREACH (OUTPATIENT)
Dept: FAMILY MEDICINE CLINIC | Facility: CLINIC | Age: 56
End: 2021-08-12

## 2021-08-12 NOTE — PROGRESS NOTES
I called the patient to follow up  He states he is doing well except for leg pain  He notes he is wearing a knee brace to support him when he walks  He states he can only walk short distances  The patient reports a fasting blood sugar from yesterday of 162  He notes he is watching his diet and no longer eating candy  He states he is checking his feet daily for skin breakdown  The patient has a c-spine MRI appointment on Monday, 8/16 and he requests a reminder call for this  I will continue to follow

## 2021-08-16 ENCOUNTER — PATIENT OUTREACH (OUTPATIENT)
Dept: FAMILY MEDICINE CLINIC | Facility: CLINIC | Age: 56
End: 2021-08-16

## 2021-08-16 NOTE — PROGRESS NOTES
I called the patient to remind him of his MRI appointment this morning and Ortho appointment tomorrow with times provided  The patient is due for his AWV next month per PCP notes; will send note to clerical to schedule  I will continue to follow

## 2021-08-17 ENCOUNTER — OFFICE VISIT (OUTPATIENT)
Dept: OBGYN CLINIC | Facility: MEDICAL CENTER | Age: 56
End: 2021-08-17
Payer: COMMERCIAL

## 2021-08-17 VITALS
HEIGHT: 72 IN | SYSTOLIC BLOOD PRESSURE: 112 MMHG | HEART RATE: 69 BPM | BODY MASS INDEX: 29.8 KG/M2 | WEIGHT: 220 LBS | DIASTOLIC BLOOD PRESSURE: 65 MMHG

## 2021-08-17 DIAGNOSIS — M25.562 CHRONIC PAIN OF LEFT KNEE: Primary | ICD-10-CM

## 2021-08-17 DIAGNOSIS — G89.29 CHRONIC PAIN OF LEFT KNEE: Primary | ICD-10-CM

## 2021-08-17 DIAGNOSIS — M17.12 PRIMARY OSTEOARTHRITIS OF LEFT KNEE: ICD-10-CM

## 2021-08-17 PROCEDURE — 99204 OFFICE O/P NEW MOD 45 MIN: CPT | Performed by: STUDENT IN AN ORGANIZED HEALTH CARE EDUCATION/TRAINING PROGRAM

## 2021-08-17 NOTE — PROGRESS NOTES
1  Chronic pain of left knee  MRI knee left  wo contrast    CANCELED: Knee Sleeves   2  Primary osteoarthritis of left knee  MRI knee left  wo contrast    Durable Medical Equipment    CANCELED: Knee Sleeves     Orders Placed This Encounter   Procedures    Durable Medical Equipment    MRI knee left  wo contrast        Imaging Studies (I personally reviewed images in PACS and report):    Prior imagin  X-ray left knee 2021: Mild osteoarthritis with narrowing of the medial tibiofemoral joint and osteophytic changes noted  Otherwise no acute osseous abnormalities  Small joint effusion  IMPRESSION:  Chronic left knee pain - patient reports has been ongoing issue for over a year but worsening in the past 2 months without a precipitating injury  Primary osteoarthritis of the left knee (DDx: meniscal/ligamentous injury)    Other factors:  Type 2 diabetes - last A1c was 6 9 on 06/10/2021  BMI 29    Interventions: cortisone injection of his knee by [facility] on 2021 (pt reportedly had no relief); Reports no relief from home exercises given during that time as well (has not attended formal PT though); no relief with NSAIDs, acetaminophen    PLAN:  - Clinical exam and radiographic imaging reviewed with patient today, with impression as per above  I have discussed with the patient the pathophysiology of this diagnosis and reviewed how the examination correlates with this diagnosis  - Due to patient's reported chronicity of pain worsening in the past 2 months without a precipitating injury in addition to no relief with interventions as noted above I have ordered a MRI of his left knee without contrast for further evaluation  I discussed that based on results of the MRI will determine treatment going forward  -  In the interim I prescribed him a brace: Short hinged knee brace sport w/open pop L; to be used while active  -  I recommended continued home exercises    I did offer formal PT referral but patient prefers to do his home exercises at this time  Return for Follow up after MRI  CHIEF COMPLAINT:  Left knee pain    HPI:  Hung Christianson is a 60 y o  male  who presents for       Visit 8/17/2021 :  Initial evaluation of left knee pain: Ongoing for approximately 1 years but worsening in past 2 months without a precipitating injury  Patient had previously been seen for this issue on 06/28/2021 in the ER and had imaging done as noted above  In addition, after reviewing prior documentation from [address] on 02/07/2020 complaining of left knee pain, he was given cortisone injection  He was also referred to physical therapy and given home exercises (patient states not seeing formal PT)  There was a plan to have him get an MRI to rule out medial meniscus tear if he did not improve  Currently, he reports pain over the medial aspect of his knee that he describes as aching/sharp, constant, non-radiating  Reports that it will intermittently swell/bruise  Reports decreased sensation over the medial aspect of his knee  Reports his knee would initially click/pop during range of motion but this has resolved  Reports knee pain would be aggravated with ascending stairs, prolonged walking/standing, and climbing ladders  In regards to pain control he has been taking naproxen and gabapentin without relief  He states having no relief from cortisone injection given on 02/07/2020 by [facility]  Reports using an OTC brace but would prefer another brace as he feels his knee would "give out " Denies sensation of knee getting stuck in extension  Reports full knee ROM  States he has not seen formal physical therapy for this issue but will have been given home exercises back on 02/07/2020 and reports compliance with these exercises without relief of his knee pain  Review of Systems   Constitutional: Negative for chills, diaphoresis and fever  Respiratory: Negative for cough and shortness of breath  Gastrointestinal: Negative for abdominal pain, nausea and vomiting  Musculoskeletal:        As per HPI   Skin: Negative for rash  Neurological:        As per HPI         Medical, Surgical, Family, and Social History    Past Medical History:   Diagnosis Date    Diverticulitis     Hypertension     Neck pain      Past Surgical History:   Procedure Laterality Date    COLON SURGERY      NECK SURGERY       Social History   Social History     Substance and Sexual Activity   Alcohol Use Yes    Comment: drinks only on weekends     Social History     Substance and Sexual Activity   Drug Use Never     Social History     Tobacco Use   Smoking Status Current Every Day Smoker    Packs/day: 0 25    Types: Cigarettes, Cigars   Smokeless Tobacco Never Used   Tobacco Comment    1 cigar/daily     History reviewed  No pertinent family history  No Known Allergies       Physical Exam  /65   Pulse 69   Ht 6' (1 829 m)   Wt 99 8 kg (220 lb)   BMI 29 84 kg/m²     Constitutional:  see vital signs  Gen: well-developed, normocephalic/atraumatic, well-groomed  Eyes: No inflammation or discharge of conjunctiva or lids; sclera clear   Pharynx: no inflammation, lesion, or mass of lips  Neck: supple, no masses, non-distended  MSK: no inflammation, lesion, mass, or clubbing of nails and digits except for other than mentioned below  SKIN: no visible rashes or skin lesions  Pulmonary/Chest: Effort normal  No respiratory distress     NEURO: cranial nerves grossly intact  PSYCH:  Alert and oriented to person, place, and time; recent and remote memory intact; mood normal, no depression, anxiety, or agitation, judgment and insight good and intact     Ortho Exam  Left Knee Exam:  Erythema: no  Swelling: +1  Increased Warmth: no  Tenderness: +MJL  ROM: 0-130  ension: negative  Patellar Grind: negative  Lachman's: negative  Anterior Drawer: negative  Varus laxity: negative  Valgus laxity: negative  Debra: negative for clicking, but aggravates medial knee pain  Thessaly Test: negative    LE NV Exam: +2 DP/PT pulses   Sensation intact to light touch L2-S1 bilaterally     Right hip ROM demonstrates no pain actively or passively    No calf tenderness to palpation

## 2021-08-24 ENCOUNTER — APPOINTMENT (EMERGENCY)
Dept: NON INVASIVE DIAGNOSTICS | Facility: HOSPITAL | Age: 56
End: 2021-08-24
Payer: COMMERCIAL

## 2021-08-24 ENCOUNTER — HOSPITAL ENCOUNTER (EMERGENCY)
Facility: HOSPITAL | Age: 56
Discharge: HOME | End: 2021-08-24
Attending: EMERGENCY MEDICINE
Payer: COMMERCIAL

## 2021-08-24 ENCOUNTER — APPOINTMENT (EMERGENCY)
Dept: RADIOLOGY | Facility: HOSPITAL | Age: 56
End: 2021-08-24
Payer: COMMERCIAL

## 2021-08-24 VITALS
WEIGHT: 222.66 LBS | TEMPERATURE: 95.9 F | OXYGEN SATURATION: 99 % | RESPIRATION RATE: 16 BRPM | SYSTOLIC BLOOD PRESSURE: 113 MMHG | BODY MASS INDEX: 30.2 KG/M2 | DIASTOLIC BLOOD PRESSURE: 67 MMHG | HEART RATE: 68 BPM

## 2021-08-24 DIAGNOSIS — S93.402A SPRAIN OF LEFT ANKLE, UNSPECIFIED LIGAMENT, INITIAL ENCOUNTER: ICD-10-CM

## 2021-08-24 DIAGNOSIS — M71.22 SYNOVIAL CYST OF LEFT POPLITEAL SPACE: Primary | ICD-10-CM

## 2021-08-24 PROCEDURE — 93971 EXTREMITY STUDY: CPT

## 2021-08-24 PROCEDURE — 73630 X-RAY EXAM OF FOOT: CPT

## 2021-08-24 PROCEDURE — 73590 X-RAY EXAM OF LOWER LEG: CPT

## 2021-08-24 PROCEDURE — 99284 EMERGENCY DEPT VISIT MOD MDM: CPT | Performed by: PHYSICIAN ASSISTANT

## 2021-08-24 PROCEDURE — 99284 EMERGENCY DEPT VISIT MOD MDM: CPT

## 2021-08-24 RX ORDER — IBUPROFEN 600 MG/1
600 TABLET ORAL EVERY 6 HOURS PRN
Qty: 30 TABLET | Refills: 0 | Status: SHIPPED | OUTPATIENT
Start: 2021-08-24 | End: 2022-01-26

## 2021-08-24 NOTE — ED NOTES
Pt's L knee and ankle were wrapped w/ an ace bandage per request and order by Bertha Holloway, VERNON Flores RN  08/24/21 8456

## 2021-08-24 NOTE — ED PROVIDER NOTES
History  Chief Complaint   Patient presents with    Leg Swelling     left leg pain and swelling from knee to ankle for a few months     Pt is seeing orthopedist for left knee problem,  Pt ih getting and mri of left knee , pt has knee brace,  Pt now with lower leg ankle and foot pain and swelling for several days no trauma to lower leg       Leg Pain  Location:  Leg  Time since incident:  1 week  Injury: no    Leg location:  L leg  Pain details:     Quality:  Aching    Radiates to:  Does not radiate    Severity:  Mild    Onset quality:  Gradual    Duration:  1 week    Timing:  Constant    Progression:  Worsening  Chronicity:  New  Dislocation: no    Foreign body present:  No foreign bodies  Prior injury to area:  No  Relieved by:  Nothing  Worsened by:  Nothing  Ineffective treatments:  None tried  Associated symptoms: no back pain    Risk factors: no concern for non-accidental trauma        Prior to Admission Medications   Prescriptions Last Dose Informant Patient Reported? Taking? Blood Glucose Monitoring Suppl (FreeStyle Lite) SHANELL  Self No No   Sig: Use daily Use as directed   Lancets (freestyle) lancets  Self No No   Sig: Daily   Misc   Devices (Wrist Brace) MISC   No No   Sig: Use daily   acetaminophen (TYLENOL) 325 mg tablet  Self No No   Sig: Take 2 tablets (650 mg total) by mouth every 6 (six) hours as needed for mild pain   albuterol (PROVENTIL HFA,VENTOLIN HFA) 90 mcg/act inhaler   No No   Sig: Inhale 1-2 puffs every 6 (six) hours as needed for wheezing   aspirin (ECOTRIN LOW STRENGTH) 81 mg EC tablet   No No   Sig: Take 1 tablet (81 mg total) by mouth daily   atenolol (TENORMIN) 50 mg tablet   No No   Sig: Take 1 tablet (50 mg total) by mouth daily   atorvastatin (LIPITOR) 40 mg tablet   No No   Sig: Take 1 tablet (40 mg total) by mouth daily   baclofen 10 mg tablet   No No   Sig: Take 1 tablet (10 mg total) by mouth 3 (three) times a day   buPROPion (WELLBUTRIN) 75 mg tablet  Self Yes No   Sig: Take 75 mg by mouth daily    buPROPion (ZYBAN) 150 MG 12 hr tablet   Yes No   Sig: Take 150 mg by mouth every morning   busPIRone (BUSPAR) 15 mg tablet  Self Yes No   Sig: Take 15 mg by mouth daily    cloNIDine (CATAPRES) 0 2 mg tablet   Yes No   Sig: Take 0 2 mg by mouth daily at bedtime   fluticasone (FLONASE) 50 mcg/act nasal spray   No No   Si spray into each nostril daily   gabapentin (NEURONTIN) 800 mg tablet   No No   Sig: Take 1 tablet (800 mg total) by mouth 3 (three) times a day   glucose blood (FREESTYLE LITE) test strip  Self No No   Sig: Testing sugars once a day   hydrOXYzine HCL (ATARAX) 50 mg tablet   Yes No   hydrOXYzine pamoate (VISTARIL) 50 mg capsule  Self Yes No   Sig: Take 50 mg by mouth daily at bedtime   hydrocortisone 1 % ointment  Self No No   Sig: Apply topically 2 (two) times a day   lidocaine (XYLOCAINE) 5 % ointment  Self No No   Sig: Apply topically as needed for mild pain   linaGLIPtin 5 MG TABS   No No   Sig: Take 5 mg by mouth daily   meloxicam (MOBIC) 15 mg tablet   No No   Sig: Take 1 tablet (15 mg total) by mouth daily   metFORMIN (GLUCOPHAGE) 1000 MG tablet   No No   Sig: Take 1 tablet (1,000 mg total) by mouth 2 (two) times a day with meals   naproxen (NAPROSYN) 500 mg tablet   No No   Sig: Take 1 tablet (500 mg total) by mouth 2 (two) times a day with meals for 14 doses   omeprazole (PriLOSEC) 40 MG capsule   No No   Sig: Take 1 capsule (40 mg total) by mouth daily   tamsulosin (FLOMAX) 0 4 mg   No No   Sig: Take 1 capsule (0 4 mg total) by mouth daily with dinner   temazepam (RESTORIL) 30 mg capsule  Self Yes No   Sig: Take 30 mg by mouth daily at bedtime   zolpidem (AMBIEN) 10 mg tablet  Self Yes No   Sig: Take 10 mg by mouth daily at bedtime      Facility-Administered Medications: None       Past Medical History:   Diagnosis Date    Diverticulitis     Hypertension     Neck pain        Past Surgical History:   Procedure Laterality Date    COLON SURGERY      NECK SURGERY History reviewed  No pertinent family history  I have reviewed and agree with the history as documented  E-Cigarette/Vaping    E-Cigarette Use Never User      E-Cigarette/Vaping Substances     Social History     Tobacco Use    Smoking status: Current Every Day Smoker     Packs/day: 0 25     Types: Cigarettes, Cigars    Smokeless tobacco: Never Used    Tobacco comment: 1 cigar/daily   Vaping Use    Vaping Use: Never used   Substance Use Topics    Alcohol use: Yes     Comment: drinks only on weekends    Drug use: Never       Review of Systems   Constitutional: Negative  HENT: Negative  Eyes: Negative  Respiratory: Negative  Cardiovascular: Negative  Gastrointestinal: Negative  Endocrine: Negative  Genitourinary: Negative  Musculoskeletal: Negative  Negative for back pain  Skin: Negative  Allergic/Immunologic: Negative  Neurological: Negative  Hematological: Negative  Psychiatric/Behavioral: Negative  All other systems reviewed and are negative  Physical Exam  Physical Exam  Vitals and nursing note reviewed  Constitutional:       Appearance: Normal appearance  He is normal weight  HENT:      Head: Normocephalic and atraumatic  Right Ear: Tympanic membrane, ear canal and external ear normal       Left Ear: Tympanic membrane, ear canal and external ear normal       Nose: Nose normal       Mouth/Throat:      Mouth: Mucous membranes are moist       Pharynx: Oropharynx is clear  Eyes:      Extraocular Movements: Extraocular movements intact  Conjunctiva/sclera: Conjunctivae normal       Pupils: Pupils are equal, round, and reactive to light  Cardiovascular:      Rate and Rhythm: Normal rate and regular rhythm  Pulses: Normal pulses  Heart sounds: Normal heart sounds  Pulmonary:      Effort: Pulmonary effort is normal       Breath sounds: Normal breath sounds  Abdominal:      General: Abdomen is flat   Bowel sounds are normal  Palpations: Abdomen is soft  Musculoskeletal:         General: Normal range of motion  Cervical back: Normal range of motion and neck supple  Comments: Left knee minor pain  Left calf minor swelling no erythema distal ankle medial and lateral malleolus tender and swelling  Left foot swelling toes from  dp pulse strong    Skin:     General: Skin is warm  Capillary Refill: Capillary refill takes less than 2 seconds  Neurological:      General: No focal deficit present  Mental Status: He is alert  Vital Signs  ED Triage Vitals   Temperature Pulse Respirations Blood Pressure SpO2   08/24/21 1205 08/24/21 1205 08/24/21 1205 08/24/21 1206 08/24/21 1205   (!) 95 9 °F (35 5 °C) 68 16 113/67 99 %      Temp Source Heart Rate Source Patient Position - Orthostatic VS BP Location FiO2 (%)   08/24/21 1205 08/24/21 1205 08/24/21 1205 08/24/21 1205 --   Tympanic Monitor Sitting Left arm       Pain Score       08/24/21 1205       8           Vitals:    08/24/21 1205 08/24/21 1206   BP:  113/67   Pulse: 68    Patient Position - Orthostatic VS: Sitting          Visual Acuity      ED Medications  Medications - No data to display    Diagnostic Studies  Results Reviewed     None                 XR tibia fibula 2 views LEFT    (Results Pending)   VAS lower limb venous duplex study, unilateral/limited    (Results Pending)   XR foot 3+ vw left    (Results Pending)              Procedures  Procedures         ED Course                             SBIRT 22yo+      Most Recent Value   SBIRT (25 yo +)   In order to provide better care to our patients, we are screening all of our patients for alcohol and drug use  Would it be okay to ask you these screening questions?   No Filed at: 08/24/2021 1213                    [facility]    Disposition  Final diagnoses:   Synovial cyst of left popliteal space   Sprain of left ankle, unspecified ligament, initial encounter     Time reflects when diagnosis was documented in both MDM as applicable and the Disposition within this note     Time User Action Codes Description Comment    8/24/2021  1:56 PM Maynor Winston  Add [M71 22] Synovial cyst of left popliteal space     8/24/2021  1:57 PM Maynor Winston  Add [S93 402A] Sprain of left ankle, unspecified ligament, initial encounter       ED Disposition     ED Disposition Condition Date/Time Comment    Discharge Stable Tue Aug 24, 2021  1:56 PM Concepcion Kohler discharge to home/self care              Follow-up Information     Follow up With Specialties Details Why Contact Info Additional Information    [address] [facility]   [address] [facility] [address]  [facility] [address], [address] [place] [address], [id number] [phone]          Discharge Medication List as of 8/24/2021  1:58 PM      START taking these medications    Details   ibuprofen (MOTRIN) 600 mg tablet Take 1 tablet (600 mg total) by mouth every 6 (six) hours as needed for mild pain, Starting Tue 8/24/2021, Print         CONTINUE these medications which have NOT CHANGED    Details   acetaminophen (TYLENOL) 325 mg tablet Take 2 tablets (650 mg total) by mouth every 6 (six) hours as needed for mild pain, Starting Tue 3/24/2020, Normal      albuterol (PROVENTIL HFA,VENTOLIN HFA) 90 mcg/act inhaler Inhale 1-2 puffs every 6 (six) hours as needed for wheezing, Starting Thu 6/10/2021, Normal      aspirin (ECOTRIN LOW STRENGTH) 81 mg EC tablet Take 1 tablet (81 mg total) by mouth daily, Starting Thu 6/10/2021, Normal      atenolol (TENORMIN) 50 mg tablet Take 1 tablet (50 mg total) by mouth daily, Starting Thu 6/10/2021, Normal      atorvastatin (LIPITOR) 40 mg tablet Take 1 tablet (40 mg total) by mouth daily, Starting Thu 6/10/2021, Normal      baclofen 10 mg tablet Take 1 tablet (10 mg total) by mouth 3 (three) times a day, Starting Thu 6/10/2021, Normal      Blood Glucose Monitoring Suppl (FreeStyle Lite) SHANELL Use daily Use as directed, Starting Tue 12/1/2020, Normal      buPROPion (WELLBUTRIN) 75 mg tablet Take 75 mg by mouth daily , Starting Mon 11/30/2020, Historical Med      buPROPion (ZYBAN) 150 MG 12 hr tablet Take 150 mg by mouth every morning, Starting Tue 5/18/2021, Historical Med      busPIRone (BUSPAR) 15 mg tablet Take 15 mg by mouth daily , Starting Mon 11/30/2020, Historical Med      cloNIDine (CATAPRES) 0 2 mg tablet Take 0 2 mg by mouth daily at bedtime, Starting Tue 5/18/2021, Historical Med      fluticasone (FLONASE) 50 mcg/act nasal spray 1 spray into each nostril daily, Starting Thu 6/10/2021, Normal      gabapentin (NEURONTIN) 800 mg tablet Take 1 tablet (800 mg total) by mouth 3 (three) times a day, Starting Wed 6/2/2021, Normal      glucose blood (FREESTYLE LITE) test strip Testing sugars once a day, Normal      hydrocortisone 1 % ointment Apply topically 2 (two) times a day, Starting Wed 3/10/2021, Normal      hydrOXYzine HCL (ATARAX) 50 mg tablet Starting Mon 6/28/2021, Historical Med      hydrOXYzine pamoate (VISTARIL) 50 mg capsule Take 50 mg by mouth daily at bedtime, Starting Mon 9/21/2020, Historical Med      Lancets (freestyle) lancets Daily, Normal      lidocaine (XYLOCAINE) 5 % ointment Apply topically as needed for mild pain, Starting Wed 3/10/2021, Normal      linaGLIPtin 5 MG TABS Take 5 mg by mouth daily, Starting Thu 6/10/2021, Normal      meloxicam (MOBIC) 15 mg tablet Take 1 tablet (15 mg total) by mouth daily, Starting Thu 6/10/2021, Normal      metFORMIN (GLUCOPHAGE) 1000 MG tablet Take 1 tablet (1,000 mg total) by mouth 2 (two) times a day with meals, Starting Thu 6/10/2021, Normal      Misc   Devices (Wrist Brace) MISC Use daily, Starting Thu 6/10/2021, Normal      naproxen (NAPROSYN) 500 mg tablet Take 1 tablet (500 mg total) by mouth 2 (two) times a day with meals for 14 doses, Starting Mon 6/28/2021, Until Mon 7/5/2021, Print      omeprazole (PriLOSEC) 40 MG capsule Take 1 capsule (40 mg total) by mouth daily, Starting Thu 6/10/2021, Normal      tamsulosin (FLOMAX) 0 4 mg Take 1 capsule (0 4 mg total) by mouth daily with dinner, Starting Thu 6/10/2021, Normal      temazepam (RESTORIL) 30 mg capsule Take 30 mg by mouth daily at bedtime, Starting Mon 9/21/2020, Historical Med      zolpidem (AMBIEN) 10 mg tablet Take 10 mg by mouth daily at bedtime, Starting Fri 11/6/2020, Historical Med           No discharge procedures on file      PDMP Review     None          ED Provider  Electronically Signed by           Olivia Boyce PA-C  08/24/21 9211

## 2021-08-25 ENCOUNTER — PATIENT OUTREACH (OUTPATIENT)
Dept: FAMILY MEDICINE CLINIC | Facility: CLINIC | Age: 56
End: 2021-08-25

## 2021-08-25 DIAGNOSIS — M25.512 CHRONIC PAIN OF BOTH SHOULDERS: ICD-10-CM

## 2021-08-25 DIAGNOSIS — G89.29 CHRONIC PAIN OF BOTH SHOULDERS: ICD-10-CM

## 2021-08-25 DIAGNOSIS — M25.511 CHRONIC PAIN OF BOTH SHOULDERS: ICD-10-CM

## 2021-08-25 PROCEDURE — 93971 EXTREMITY STUDY: CPT | Performed by: SURGERY

## 2021-08-25 NOTE — PROGRESS NOTES
I called Central Scheduling confirming the patient's 2 upcoming MRI appointments: 8/26 C-spine and 8/27 knee  I called the patient to follow up after his recent ED visit  He states he continues with knee pain/swelling and Ibuprofen that was prescribed does not provide relief  The patient requests gabapentin refill; will submit to PCP  The patient notes he continues to wear the knee brace  The patient reports he had x-rays and an ultrasound which revealed "2 lumps" (synovial cysts)  The patient states he was told he would need the fluid aspirated and an injection  I reminded the patient of his 2 upcoming MRI appointments which he plans on attending  I will call the patient next week for other appointment reminders  Chart reviewed

## 2021-08-26 ENCOUNTER — HOSPITAL ENCOUNTER (OUTPATIENT)
Dept: MRI IMAGING | Facility: HOSPITAL | Age: 56
Discharge: HOME | End: 2021-08-26
Payer: COMMERCIAL

## 2021-08-26 DIAGNOSIS — M54.12 RADICULOPATHY, CERVICAL: ICD-10-CM

## 2021-08-26 DIAGNOSIS — M54.2 CERVICALGIA: ICD-10-CM

## 2021-08-26 DIAGNOSIS — M47.812 OSTEOARTHRITIS OF CERVICAL SPINE, UNSPECIFIED SPINAL OSTEOARTHRITIS COMPLICATION STATUS: ICD-10-CM

## 2021-08-26 DIAGNOSIS — M62.81 MUSCLE WEAKNESS (GENERALIZED): ICD-10-CM

## 2021-08-26 PROCEDURE — G1004 CDSM NDSC: HCPCS

## 2021-08-26 PROCEDURE — 72141 MRI NECK SPINE W/O DYE: CPT

## 2021-08-26 RX ORDER — GABAPENTIN 800 MG/1
800 TABLET ORAL 3 TIMES DAILY
Qty: 90 TABLET | Refills: 3 | Status: SHIPPED | OUTPATIENT
Start: 2021-08-26 | End: 2022-01-20 | Stop reason: SDUPTHER

## 2021-08-27 ENCOUNTER — HOSPITAL ENCOUNTER (OUTPATIENT)
Dept: MRI IMAGING | Facility: HOSPITAL | Age: 56
Discharge: HOME | End: 2021-08-27
Payer: COMMERCIAL

## 2021-08-27 DIAGNOSIS — G89.29 CHRONIC PAIN OF LEFT KNEE: ICD-10-CM

## 2021-08-27 DIAGNOSIS — M25.562 CHRONIC PAIN OF LEFT KNEE: ICD-10-CM

## 2021-08-27 DIAGNOSIS — M17.12 PRIMARY OSTEOARTHRITIS OF LEFT KNEE: ICD-10-CM

## 2021-08-27 PROCEDURE — G1004 CDSM NDSC: HCPCS

## 2021-08-27 PROCEDURE — 73721 MRI JNT OF LWR EXTRE W/O DYE: CPT

## 2021-08-30 ENCOUNTER — PATIENT OUTREACH (OUTPATIENT)
Dept: FAMILY MEDICINE CLINIC | Facility: CLINIC | Age: 56
End: 2021-08-30

## 2021-08-30 NOTE — ASSESSMENT & PLAN NOTE
Patient presents for further evaluation and workup of neck pain  · Patient complaining of severe right-sided neck pain with bilateral hand paresthesias     Imaging reviewed personally and reviewed with Dr Price Meza:   · Cervical spine x-rays 3/10/2021:  Anatomical  Alignment maintained without subluxation  Multilevel degenerative changes most noted at C4-5 and C5-6 with height loss and anterior osteophytes  · MRI cervical spine wo 8/26/21:Spondylotic degenerative changes are noted as described above most significantly at C4-5 where there is moderate central stenosis  No cord compression or cord signal abnormality  Plan:  · Reviewed imaging with patient  · Continue monitor for any new or progressive neurological symptoms   · Discussed etiology for neck pain to include musculoskeletal and osteoarthritis  Patient does have pain in his right shoulder which may be a C5 radiculopathy versus shoulder etiology  · Dr Price Meza  Discussed with patient that his pain and symptoms is likely a pulled trapezius muscle  · No neurosurgical intervention warranted at this time  · Recommended stretches which were shown to patient as well as deep tissue massage  · Discuss consideration for muscle relaxers but patient states these have not helped in the past   · Patient will follow-up prn or if symptoms worsen  · Patient made aware if he develops any new or worsening neck pain, arm pain, arm weakness, paresthesias, difficulty with fine motor skills, or ambulatory dysfunction seek care sooner  · Patient made aware to contact Neurosurgery with any further questions or concerns

## 2021-08-30 NOTE — PROGRESS NOTES
I called the patient to remind him of his Neurosurg appointment for tomorrow and his Ortho appointment on Friday, 9/3; he was aware and plans on attending both  The patient thought he had a PCP appointment this afternoon but I do not see anything scheduled  The patient would like an appointment scheduled; will send a note to the clerical team     I will continue to follow

## 2021-08-31 ENCOUNTER — OFFICE VISIT (OUTPATIENT)
Dept: NEUROSURGERY | Facility: CLINIC | Age: 56
End: 2021-08-31
Payer: COMMERCIAL

## 2021-08-31 VITALS
SYSTOLIC BLOOD PRESSURE: 112 MMHG | BODY MASS INDEX: 30.07 KG/M2 | DIASTOLIC BLOOD PRESSURE: 72 MMHG | TEMPERATURE: 97.7 F | HEART RATE: 68 BPM | HEIGHT: 72 IN | WEIGHT: 222 LBS

## 2021-08-31 DIAGNOSIS — M54.2 CERVICALGIA: Primary | ICD-10-CM

## 2021-08-31 PROCEDURE — 99214 OFFICE O/P EST MOD 30 MIN: CPT | Performed by: NURSE PRACTITIONER

## 2021-08-31 PROCEDURE — 3008F BODY MASS INDEX DOCD: CPT | Performed by: NURSE PRACTITIONER

## 2021-08-31 PROCEDURE — 3078F DIAST BP <80 MM HG: CPT | Performed by: NURSE PRACTITIONER

## 2021-08-31 PROCEDURE — 3074F SYST BP LT 130 MM HG: CPT | Performed by: NURSE PRACTITIONER

## 2021-08-31 NOTE — PROGRESS NOTES
Neurosurgery Office Note  Shilpa Ramachandran 60 y o  male MRN: [id number]      Assessment/Plan     Cervicalgia  Patient presents for further evaluation and workup of neck pain  · Patient complaining of severe right-sided neck pain with bilateral hand paresthesias     Imaging reviewed personally and reviewed with Dr Nathaly Winslow:   · Cervical spine x-rays 3/10/2021:  Anatomical  Alignment maintained without subluxation  Multilevel degenerative changes most noted at C4-5 and C5-6 with height loss and anterior osteophytes  · MRI cervical spine wo 8/26/21:Spondylotic degenerative changes are noted as described above most significantly at C4-5 where there is moderate central stenosis  No cord compression or cord signal abnormality  Plan:  · Reviewed imaging with patient  · Continue monitor for any new or progressive neurological symptoms   · Discussed etiology for neck pain to include musculoskeletal and osteoarthritis  Patient does have pain in his right shoulder which may be a C5 radiculopathy versus shoulder etiology  · Dr Nathaly Winslow  Discussed with patient that his pain and symptoms is likely a pulled trapezius muscle  · No neurosurgical intervention warranted at this time  · Recommended stretches which were shown to patient as well as deep tissue massage  · Discuss consideration for muscle relaxers but patient states these have not helped in the past   · Patient will follow-up prn or if symptoms worsen  · Patient made aware if he develops any new or worsening neck pain, arm pain, arm weakness, paresthesias, difficulty with fine motor skills, or ambulatory dysfunction seek care sooner  · Patient made aware to contact Neurosurgery with any further questions or concerns  There are no diagnoses linked to this encounter          CHIEF COMPLAINT    Chief Complaint   Patient presents with    Follow-up    Neck Pain     Radiculopathy, cervical       HISTORY    History of Present Illness     60y o  year old male with past medical history significant for hypertension, hyperlipidemia, diabetes, GERD, BPH, and depression  Patient seen in outpatient office today for further evaluation workup of neck pain and to review MRI cervical spine  Of note patient has history of cervical fractures C3, C4, and C5, patient states injury was possibly 25 years ago for which he describes treatment with medication and soft collar  He denies any surgical intervention required  Patient continues to complain of significant right neck and shoulder pain over the last 8 months  Patient states he was quickly trying to cross the street and caught himself against a wall with outstretched arms and his head turned laterally  He states at that time he noted significant constant neck pain  Patient also reports he was "drunk" and loading up but truck lifting heavy objects and noticed neck and right shoulder pain  Patient reports his pain has continued since  Patient currently rates his neck and right shoulder pain 8/10 which is sharp and constant  Patient reports his right neck pain radiates into his right posterior shoulder and shoulder blade  Patient reports range of motion of his right arm as well as certain movements of his neck will worsen his pain  Patient reports his current pain regimen helps with his pain  He denies any radicular pain below the shoulder but does note numbness in bilateral palms  Patient does report he is unable to carry heavy objects secondary to pain but denies any difficulty with fine motor skills  Patient does admit to having difficulty walking secondary to left knee injury  He denies use of assistive devices  He also reports dizziness at times which has been ongoing  He denies any headaches, blurry vision, chest pain, shortness of breath, abdominal pain, nausea, vomiting, diarrhea, no problems bowel or bladder, no new weakness or numbness/tingling  Patient denies any recent falls or traumas    Patient states he has not seen physical therapy in many years which made his pain and symptoms worse  Patient denies seeing pain management or receiving injections in his neck in the past   Patient also reports a fall over 10 years ago injuring his right shoulder  Imaging was reviewed with patient by myself and by [facility]  patient does have some spondylitic degenerative changes most significantly at C4-5  After reviewing case and examining patient, Dr Suhas Steiner believes patient has likely pulled his right trapezius muscle  He recommended stretches which were showed to patient as well as deep tissue massage  Patient will follow-up prn or if symptoms worsen  Patient made aware to contact Neurosurgery with any further questions or concerns  Of note, patient is following with Orthopedics for left knee injury  HPI    See Discussion    REVIEW OF SYSTEMS    Review of Systems   Constitutional: Positive for activity change (unable to perform usual activities)  HENT: Negative  Eyes: Negative  Respiratory: Negative  Cardiovascular: Negative  Gastrointestinal: Negative  Endocrine: Negative  Genitourinary: Negative  Hx of kidney stone   Musculoskeletal: Positive for arthralgias (left knee and ankle x2 months), back pain, gait problem (left leg) and neck pain (radiating to L>R shoulder and arm)  Skin: Negative  Allergic/Immunologic: Negative  Neurological: Positive for numbness (n/t almost everywhere in his body, mainly arms and legs)  Hematological: Negative  Psychiatric/Behavioral: Negative  All other systems reviewed and are negative  ROS reviewed with patient and agree and changes were made as needed      Meds/Allergies     Current Outpatient Medications   Medication Sig Dispense Refill    acetaminophen (TYLENOL) 325 mg tablet Take 2 tablets (650 mg total) by mouth every 6 (six) hours as needed for mild pain 90 tablet 0    albuterol (PROVENTIL HFA,VENTOLIN HFA) 90 mcg/act inhaler Inhale 1-2 puffs every 6 (six) hours as needed for wheezing 18 g 1    aspirin (ECOTRIN LOW STRENGTH) 81 mg EC tablet Take 1 tablet (81 mg total) by mouth daily 30 tablet 3    atenolol (TENORMIN) 50 mg tablet Take 1 tablet (50 mg total) by mouth daily 90 tablet 3    atorvastatin (LIPITOR) 40 mg tablet Take 1 tablet (40 mg total) by mouth daily 90 tablet 1    baclofen 10 mg tablet Take 1 tablet (10 mg total) by mouth 3 (three) times a day 90 tablet 0    Blood Glucose Monitoring Suppl (FreeStyle Lite) SHANELL Use daily Use as directed 1 each 0    buPROPion (WELLBUTRIN) 75 mg tablet Take 75 mg by mouth daily       buPROPion (ZYBAN) 150 MG 12 hr tablet Take 150 mg by mouth every morning      busPIRone (BUSPAR) 15 mg tablet Take 15 mg by mouth daily       cloNIDine (CATAPRES) 0 2 mg tablet Take 0 2 mg by mouth daily at bedtime      fluticasone (FLONASE) 50 mcg/act nasal spray 1 spray into each nostril daily 16 g 0    gabapentin (NEURONTIN) 800 mg tablet Take 1 tablet (800 mg total) by mouth 3 (three) times a day 90 tablet 3    glucose blood (FREESTYLE LITE) test strip Testing sugars once a day 100 each 3    hydrocortisone 1 % ointment Apply topically 2 (two) times a day 30 g 0    hydrOXYzine pamoate (VISTARIL) 50 mg capsule Take 50 mg by mouth daily at bedtime      ibuprofen (MOTRIN) 600 mg tablet Take 1 tablet (600 mg total) by mouth every 6 (six) hours as needed for mild pain 30 tablet 0    Lancets (freestyle) lancets Daily 100 each 5    lidocaine (XYLOCAINE) 5 % ointment Apply topically as needed for mild pain 35 44 g 0    linaGLIPtin 5 MG TABS Take 5 mg by mouth daily 30 tablet 1    meloxicam (MOBIC) 15 mg tablet Take 1 tablet (15 mg total) by mouth daily 30 tablet 1    metFORMIN (GLUCOPHAGE) 1000 MG tablet Take 1 tablet (1,000 mg total) by mouth 2 (two) times a day with meals 60 tablet 3    Misc   Devices (Wrist Brace) MISC Use daily 2 each 0    omeprazole (PriLOSEC) 40 MG capsule Take 1 capsule (40 mg total) by mouth daily 90 capsule 2    tamsulosin (FLOMAX) 0 4 mg Take 1 capsule (0 4 mg total) by mouth daily with dinner 30 capsule 3    temazepam (RESTORIL) 30 mg capsule Take 30 mg by mouth daily at bedtime      zolpidem (AMBIEN) 10 mg tablet Take 10 mg by mouth daily at bedtime      naproxen (NAPROSYN) 500 mg tablet Take 1 tablet (500 mg total) by mouth 2 (two) times a day with meals for 14 doses 14 tablet 0     No current facility-administered medications for this visit  No Known Allergies    PAST HISTORY    Past Medical History:   Diagnosis Date    Diverticulitis     Hypertension     Neck pain        Past Surgical History:   Procedure Laterality Date    COLON SURGERY      NECK SURGERY         Social History     Tobacco Use    Smoking status: Current Every Day Smoker     Packs/day: 0 25     Types: Cigarettes, Cigars    Smokeless tobacco: Never Used    Tobacco comment: 1 cigar/daily   Vaping Use    Vaping Use: Never used   Substance Use Topics    Alcohol use: Yes     Comment: drinks only on weekends    Drug use: Never       History reviewed  No pertinent family history  Above history personally reviewed  EXAM    Vitals:Blood pressure 112/72, pulse 68, temperature 97 7 °F (36 5 °C), temperature source Temporal, height 6' (1 829 m), weight 101 kg (222 lb)  ,Body mass index is 30 11 kg/m²  Physical Exam  Vitals reviewed  Constitutional:       General: He is awake  He is not in acute distress  Appearance: Normal appearance  He is not ill-appearing  HENT:      Head: Normocephalic and atraumatic  Eyes:      Extraocular Movements: Extraocular movements intact and EOM normal       Conjunctiva/sclera: Conjunctivae normal       Pupils: Pupils are equal, round, and reactive to light  Neck:     Cardiovascular:      Rate and Rhythm: Normal rate  Pulmonary:      Effort: Pulmonary effort is normal  No respiratory distress  Chest:      Chest wall: No tenderness     Abdominal: General: There is no distension  Palpations: Abdomen is soft  Tenderness: There is no abdominal tenderness  Musculoskeletal:      Cervical back: Normal range of motion and neck supple  Tenderness present  Spinous process tenderness and muscular tenderness present  Comments: ROM WNL except limited to LLE 2/2 knee pain   Skin:     General: Skin is warm and dry  Neurological:      Mental Status: He is alert and oriented to person, place, and time  Coordination: Finger-Nose-Finger Test normal       Gait: Gait is intact  Deep Tendon Reflexes:      Reflex Scores:       Tricep reflexes are 2+ on the right side and 2+ on the left side  Bicep reflexes are 2+ on the right side and 2+ on the left side  Brachioradialis reflexes are 2+ on the right side and 2+ on the left side  Patellar reflexes are 2+ on the right side  Psychiatric:         Attention and Perception: Attention and perception normal          Mood and Affect: Mood and affect normal          Speech: Speech normal          Behavior: Behavior normal  Behavior is cooperative  Thought Content: Thought content normal          Cognition and Memory: Cognition and memory normal          Judgment: Judgment normal          Neurologic Exam     Mental Status   Oriented to person, place, and time  Follows 2 step commands  Attention: normal  Concentration: normal    Speech: speech is normal   Level of consciousness: alert  Knowledge: good  Able to perform simple calculations  Able to name object  Able to repeat  Normal comprehension  Cranial Nerves     CN III, IV, VI   Pupils are equal, round, and reactive to light  Extraocular motions are normal    CN III: no CN III palsy  CN VI: no CN VI palsy  Nystagmus: none   Diplopia: none  Conjugate gaze: present    CN V   Facial sensation intact  CN VII   Facial expression full, symmetric       CN VIII   CN VIII normal    Hearing: intact    CN IX, X   CN IX normal      CN XI   CN XI normal      CN XII   CN XII normal      Motor Exam   Muscle bulk: normal  Overall muscle tone: normal  Right arm pronator drift: absent  Left arm pronator drift: absent    Strength   Strength 5/5 except as noted  B/L deltoid 4/5 with giveaway weakness secondary to pain  R  4/5    B/L IOs 4+/5     Unable to assess LLE 2/2 pain and knee injury but patient able to ambulate without difficulty         Sensory Exam   Light touch normal    Proprioception normal    JPS and DST intact      Gait, Coordination, and Reflexes     Gait  Gait: normal    Coordination   Finger to nose coordination: normal    Tremor   Resting tremor: absent  Intention tremor: absent  Action tremor: absent    Reflexes   Right brachioradialis: 2+  Left brachioradialis: 2+  Right biceps: 2+  Left biceps: 2+  Right triceps: 2+  Left triceps: 2+  Right patellar: 2+  Right Stock: absent  Left Stock: absent  Right ankle clonus: absent  Left ankle clonus: absent Unable to assess left lower extremity reflexes secondary to knee injury and pain  MEDICAL DECISION MAKING    Imaging Studies:     XR tibia fibula 2 views LEFT    Result Date: 8/25/2021  Narrative: LEFT TIBIA AND FIBULA INDICATION:   pain  COMPARISON:  None VIEWS:  XR TIBIA FIBULA 2 VW LEFT FINDINGS: There is no acute fracture or dislocation  Calcaneal spur(s) noted  No lytic or blastic osseous lesion  Soft tissues are unremarkable  Impression: No acute osseous abnormality  Workstation performed: [id number]     XR foot 3+ vw left    Result Date: 8/25/2021  Narrative: LEFT FOOT INDICATION:   pain  COMPARISON:  None VIEWS:  XR FOOT 3+ VW LEFT FINDINGS: There is no acute fracture or dislocation  Calcaneal spur(s) noted  No lytic or blastic osseous lesion  Soft tissues are unremarkable  Impression: No acute osseous abnormality   Workstation performed: [id number]     MRI cervical spine without contrast    Result Date: 8/30/2021  Narrative: MRI CERVICAL SPINE WITHOUT CONTRAST INDICATION: M54 2: Cervicalgia M47 812: Spondylosis without myelopathy or radiculopathy, cervical region M62 81: Muscle weakness (generalized) M54 12: Radiculopathy, cervical region  COMPARISON:  None  TECHNIQUE:  Sagittal T1, sagittal T2, sagittal inversion recovery, axial T2, axial  2D merge IMAGE QUALITY:  Diagnostic FINDINGS: ALIGNMENT:  Normal alignment of the cervical spine  No compression fracture  No subluxation  No scoliosis  MARROW SIGNAL:  Normal marrow signal is identified within the visualized bony structures  No discrete marrow lesion  CERVICAL AND VISUALIZED THORACIC CORD:  Normal signal within the visualized cord  PREVERTEBRAL AND PARASPINAL SOFT TISSUES:  Normal  VISUALIZED POSTERIOR FOSSA:  The visualized posterior fossa demonstrates no abnormal signal  CERVICAL DISC SPACES: C2-C3:  Normal  C3-C4:  Small marginal osteophytes  No significant central or foraminal narrowing  C4-C5:  Moderate to severe facet hypertrophy  Degenerative disc osteophyte complex with marginal osteophytes results in moderate central stenosis  Mild left greater than right foraminal narrowing  C5-C6:  Small marginal osteophyte on the right  Minimal right foraminal narrowing  Central canal patent  C6-C7:  Mild facet hypertrophy  Small marginal osteophyte are noted  Mild central stenosis without foraminal narrowing  C7-T1:  Normal  UPPER THORACIC DISC SPACES:  Normal      Impression: Spondylotic degenerative changes are noted as described above most significantly at C4-5 where there is moderate central stenosis  No cord compression or cord signal abnormality  Workstation performed: [id number]     MRI knee left  wo contrast    Result Date: 8/30/2021  Narrative: MRI LEFT KNEE INDICATION:   M25 562: Pain in left knee G89 29: Other chronic pain M17 12: Unilateral primary osteoarthritis, left knee   COMPARISON:  Left knee radiographs 6/28/2021 TECHNIQUE:    MR sequences were obtained of the left knee including:  Localizer, axial T2 fat sat, coronal T1/T2 fat sat, sagittal PD/T2 fat sat  Gadolinium was not used  FINDINGS: SUBCUTANEOUS TISSUES: Normal  JOINT EFFUSION: There is a moderate joint effusion  BAKER'S CYST: There is a moderate Baker's cyst  MENISCI: Complex tear of the medial meniscus posterior horn and body with a horizontal tibial-surface component in the posterior horn (image #401/18) and a near-complete versus complete radial tear of the body (image #201/14)  Intact lateral meniscus  CRUCIATE LIGAMENTS: Intact  EXTENSOR APPARATUS: Intact  COLLATERAL LIGAMENTS: Intact  ARTICULAR SURFACES: Medial tibiofemoral compartment: Mild osteoarthritis, evidenced by partial-thickness articular cartilage defects and small marginal osteophytes on both sides of the joint  Lateral tibiofemoral compartment: Mild osteoarthritis, evidenced by partial-thickness articular cartilage defects and small marginal osteophytes on both sides of the joint  Patellofemoral compartment: Mild osteoarthritis, evidenced by partial-thickness articular cartilage defects on both sides of the joint and small patellar marginal osteophytes  BONES: Small subchondral insufficiency fracture on the medial femoral condyle (image #501/15)  Otherwise normal marrow signal  MUSCULATURE: Intact  Impression: Complex tear of the medial meniscus posterior horn and body with a horizontal tibial-surface component in the posterior horn (image #401/18) and a near-complete versus complete radial tear of the body (image #201/14)  Small subchondral insufficiency fracture on the medial femoral condyle  Mild tricompartmental osteoarthritis    Moderate knee joint effusion and moderate-sized Baker's cyst  Workstation performed: [id number]     VAS lower limb venous duplex study, unilateral/limited    Result Date: 8/25/2021  Narrative:  [facility] REPORT [facility]INICAL: Indications: Patient presents with Left lower extremity pain and swelling in knee and ankle    CONCLUSION: Impression: RIGHT LOWER LIMB LIMITED: Evaluation shows no evidence of thrombus in the common femoral vein  Doppler evaluation shows a normal response to augmentation maneuvers  LEFT LOWER LIMB: No evidence of acute or chronic deep vein thrombosis No evidence of superficial thrombophlebitis noted  Doppler evaluation shows a normal response to augmentation maneuvers  Popliteal, posterior tibial and anterior tibial arterial Doppler waveforms are triphasic  There is a well defined hypoechoic non-vascularized cystic-type structure noted in the popliteal fossa  SIGNATURE: Electronically Signed by: Emmanuelle Bowman MD, [address] on 2021-08-25 03:29:53 PM      I have personally reviewed pertinent reports     and I have personally reviewed pertinent films in PACS

## 2021-09-02 ENCOUNTER — VBI (OUTPATIENT)
Dept: ADMINISTRATIVE | Facility: OTHER | Age: 56
End: 2021-09-02

## 2021-09-07 NOTE — PROGRESS NOTES
FIRST ATTEMPT 09/07/21:     Called pt and No Answer  Left a Message for PT to contact office to Schedule for the DM PROJECT  Will try again Later and Tomorrow 09/08/21

## 2021-09-08 NOTE — PROGRESS NOTES
SECOND ATTEMPT 09/08/21:      Called pt AGAIN and No Answer  Left another Message for PT to contact office to Schedule for the DM PROJECT  Will try again Later and Tomorrow 09/09/21

## 2021-09-09 NOTE — PROGRESS NOTES
THIRD AND FINAL ATTEMPT 09/09/21:    Called pt again today, and still wasn't able to reach the pt  Left another detailed message of the reason of OUR / MY call      Sent a letter for pt to contact office to schedule appt for DM PROGRAM

## 2021-09-10 DIAGNOSIS — E11.9 TYPE 2 DIABETES MELLITUS WITHOUT COMPLICATION, WITHOUT LONG-TERM CURRENT USE OF INSULIN (HCC): ICD-10-CM

## 2021-09-10 RX ORDER — LINAGLIPTIN 5 MG/1
TABLET, FILM COATED ORAL
Qty: 30 TABLET | Refills: 1 | Status: SHIPPED | OUTPATIENT
Start: 2021-09-10 | End: 2022-01-26 | Stop reason: SDUPTHER

## 2021-09-17 ENCOUNTER — PATIENT OUTREACH (OUTPATIENT)
Dept: FAMILY MEDICINE CLINIC | Facility: CLINIC | Age: 56
End: 2021-09-17

## 2021-09-17 NOTE — PROGRESS NOTES
I received a call from the patient stating he is having severe pain of his leg and requests a gabapentin refill  A refill was placed 8/26 with 3 refills  The patient stated he was taking 2 pills twice a day instead of 1 tid; will send note to PCP  He did note that PCP told him he could take 2 pills at night if needed  I will continue to follow

## 2021-09-21 ENCOUNTER — PATIENT OUTREACH (OUTPATIENT)
Dept: FAMILY MEDICINE CLINIC | Facility: CLINIC | Age: 56
End: 2021-09-21

## 2021-09-21 NOTE — PROGRESS NOTES
I called the patient but received voicemail  Message was left reminding him of his Ortho appointment for tomorrow at 21 [phone]  I will continue further outreach

## 2021-09-21 NOTE — PROGRESS NOTES
The patient returned my call  I again reminded him of his Ortho appointment for tomorrow at 21 [phone] and he plans on attending  I will continue to follow

## 2021-09-22 ENCOUNTER — OFFICE VISIT (OUTPATIENT)
Dept: OBGYN CLINIC | Facility: MEDICAL CENTER | Age: 56
End: 2021-09-22
Payer: COMMERCIAL

## 2021-09-22 VITALS
WEIGHT: 217 LBS | DIASTOLIC BLOOD PRESSURE: 72 MMHG | HEIGHT: 71 IN | SYSTOLIC BLOOD PRESSURE: 120 MMHG | HEART RATE: 77 BPM | BODY MASS INDEX: 30.38 KG/M2

## 2021-09-22 DIAGNOSIS — M17.12 PRIMARY OSTEOARTHRITIS OF LEFT KNEE: ICD-10-CM

## 2021-09-22 DIAGNOSIS — S83.232D COMPLEX TEAR OF MEDIAL MENISCUS OF LEFT KNEE AS CURRENT INJURY, SUBSEQUENT ENCOUNTER: ICD-10-CM

## 2021-09-22 DIAGNOSIS — M25.562 CHRONIC PAIN OF LEFT KNEE: Primary | ICD-10-CM

## 2021-09-22 DIAGNOSIS — G89.29 CHRONIC PAIN OF LEFT KNEE: Primary | ICD-10-CM

## 2021-09-22 PROCEDURE — 99213 OFFICE O/P EST LOW 20 MIN: CPT | Performed by: STUDENT IN AN ORGANIZED HEALTH CARE EDUCATION/TRAINING PROGRAM

## 2021-09-22 NOTE — PROGRESS NOTES
1  Chronic pain of left knee  Ambulatory referral to Orthopedic Surgery   2  Complex tear of medial meniscus of left knee as current injury, subsequent encounter  Ambulatory referral to Orthopedic Surgery   3  Primary osteoarthritis of left knee  Ambulatory referral to Orthopedic Surgery     Orders Placed This Encounter   Procedures    Ambulatory referral to Orthopedic Surgery        Imaging Studies (I personally reviewed images in PACS and report):      MRI left knee 08/27/2021:  Complex tear of the medial meniscus posterior horn and body with a horizontal tibial-surface component in the posterior horn (image #[id number]) and a near-complete versus complete radial tear of the body (image #[id number])     Small subchondral insufficiency fracture on the medial femoral condyle      Mild tricompartmental osteoarthritis        Moderate knee joint effusion and moderate-sized Baker's cyst     IMPRESSION:  Chronic left knee pain - patient reports has been ongoing issue for over a year but worsening in the past 2 months without a precipitating injury  -  MRI noting complex tear of the medial meniscus,  Subchondral insufficiency fracture of the medial femoral condyle, tricompartmental osteoarthritis      Other factors:  Type 2 diabetes - last A1c was 6 9 on 06/10/2021  BMI 30     Interventions: cortisone injection of his knee by [facility] on 02/07/2021 (pt reportedly had no relief); Reports no relief from home exercises given during that time as well (has not attended formal PT though); no relief with NSAIDs, acetaminophen    PLAN:     Clinical exam and radiographic imaging reviewed with patient today, with impression as per above  I have discussed with the patient the pathophysiology of this diagnosis and reviewed how the examination correlates with this diagnosis        MRI reviewed as noted above noting a complex medial meniscus tear; at this time patient does not wish to pursue viscosupplementation injection and wants to see a surgeon which is reasonable  Referral placed orthopedic surgery     The in the interim, he can continue using his left knee brace as well as p r n  use of NSAIDs, acetaminophen, icing 20 minutes on/ off  Return for   Follow-up with orthopedic surgery for further treatment options  CHIEF COMPLAINT:   follow-up left knee pain and MRI review    HPI:  Olesya Mattson is a 60 y o  male  who presents for       Visit  09/22/2021 :   follow-up left knee pain and MRI review:  MRI reviewed as noted above  Patient continues to report medial and posterior knee pain that is worse with activities  Describes pain as aching/sharp, constant, and worse when transitioning from sitting to standing or with ambulation  He reports swelling of his knee by the end of the day  He has been compliant with his left brace which provides support per patient  After reviewing his MRI, patient prefers to pursue surgical intervention for this issue  Regards to pain control, he continues to use NSAIDs, acetaminophen, gabapentin which provides minimal to no relief of his knee pain  Review of Systems   Constitutional: Negative for chills, diaphoresis and fever  Respiratory: Negative for cough and shortness of breath  Gastrointestinal: Negative for abdominal pain, nausea and vomiting  Musculoskeletal:        As per HPI   Skin: Negative for rash     Neurological:        As per HPI         Medical, Surgical, Family, and Social History    Past Medical History:   Diagnosis Date    Diverticulitis     Hypertension     Neck pain      Past Surgical History:   Procedure Laterality Date    COLON SURGERY      NECK SURGERY       Social History   Social History     Substance and Sexual Activity   Alcohol Use Yes    Comment: drinks only on weekends     Social History     Substance and Sexual Activity   Drug Use Never     Social History     Tobacco Use   Smoking Status Current Every Day Smoker    Packs/day: 0 25    Types: Cigarettes, Cigars   Smokeless Tobacco Never Used   Tobacco Comment    1 cigar/daily     History reviewed  No pertinent family history  No Known Allergies       Physical Exam  /72   Pulse 77   Ht 5' 11" (1 803 m)   Wt 98 4 kg (217 lb)   BMI 30 27 kg/m²     Constitutional:  see vital signs  Gen: well-developed, normocephalic/atraumatic, well-groomed  Eyes: No inflammation or discharge of conjunctiva or lids; sclera clear   Pharynx: no inflammation, lesion, or mass of lips  Neck: supple, no masses, non-distended  MSK: no inflammation, lesion, mass, or clubbing of nails and digits except for other than mentioned below  SKIN: no visible rashes or skin lesions  Pulmonary/Chest: Effort normal  No respiratory distress       Procedures

## 2021-09-23 ENCOUNTER — OFFICE VISIT (OUTPATIENT)
Dept: OBGYN CLINIC | Facility: MEDICAL CENTER | Age: 56
End: 2021-09-23
Payer: COMMERCIAL

## 2021-09-23 VITALS
WEIGHT: 217.6 LBS | BODY MASS INDEX: 30.46 KG/M2 | HEART RATE: 78 BPM | DIASTOLIC BLOOD PRESSURE: 95 MMHG | SYSTOLIC BLOOD PRESSURE: 128 MMHG | HEIGHT: 71 IN

## 2021-09-23 DIAGNOSIS — M17.12 PRIMARY OSTEOARTHRITIS OF LEFT KNEE: ICD-10-CM

## 2021-09-23 DIAGNOSIS — G89.29 CHRONIC PAIN OF LEFT KNEE: ICD-10-CM

## 2021-09-23 DIAGNOSIS — M25.562 CHRONIC PAIN OF LEFT KNEE: ICD-10-CM

## 2021-09-23 DIAGNOSIS — S83.232D COMPLEX TEAR OF MEDIAL MENISCUS OF LEFT KNEE AS CURRENT INJURY, SUBSEQUENT ENCOUNTER: ICD-10-CM

## 2021-09-23 PROCEDURE — 3008F BODY MASS INDEX DOCD: CPT | Performed by: ORTHOPAEDIC SURGERY

## 2021-09-23 PROCEDURE — 99213 OFFICE O/P EST LOW 20 MIN: CPT | Performed by: ORTHOPAEDIC SURGERY

## 2021-09-23 PROCEDURE — 3074F SYST BP LT 130 MM HG: CPT | Performed by: ORTHOPAEDIC SURGERY

## 2021-09-23 PROCEDURE — 3080F DIAST BP >= 90 MM HG: CPT | Performed by: ORTHOPAEDIC SURGERY

## 2021-09-23 NOTE — PROGRESS NOTES
Ortho Sports Medicine Knee New Patient Visit     Assesment:   60 y o  male left knee medial meniscus tear and medial joint moderate osteoarthritis    Plan:    Conservative treatment:    Ice to knee for 20 minutes at least 1-2 times daily  Hinged knee brace ordered  Let pain guide gradual return activities  Viscosupplementation was offered to the patient today however he is not interested in any injections at this time  I would like him to follow-up with Dr Shivam Quezada  To discuss possible left knee arthroplasty  If he would like to move forward with meniscus surgery after he speaks with Dr Shivam Quezada, he may return to my office and we will consent for surgery  I explained to the patient that he does have a medial meniscus tear but he also has a moderate amount of medial joint cartilage wear on his MRI and his meniscus is likely not all of his pain  I feel a medial meniscectomy may relieve some pain but he is interested in considering a final solution for his knee pain  He can discussed risks and benefits to arthroplasty and if he is requesting to undergo a meniscectomy and understands or may not be full relief of his pain we can discuss this further after his office visit with Dr Edwin Romero: All imaging from today was reviewed by myself and explained to the patient  Injection:    No Injection planned at this time  Surgery:     No surgery is recommended at this point, continue with conservative treatment plan as noted  Follow up:    Return if symptoms worsen or fail to improve, for Recheck with Dr Shivam Quezada  Chief Complaint   Patient presents with    Left Knee - Pain       History of Present Illness: 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, referred to me by Dr Abbey Davis, seen in clinic for consultation of left knee pain  Pain is located posterior  The patient rates the pain as a 8/10  The pain has been present for 1 a year        The patient sustained an injury  A little over a year ago  The mechanism of injury was   Stepping up on a curb and felt a painful crack  The pain is characterized as sharp, stabbing  The pain is present at all times  Pain is improved by rest, bracing and gabapentin  Pain is aggravated by weight bearing and walking  Symptoms include pain  The patient has tried rest,  Activity modification, ice, bracing and injection  He had no relief of his symptoms with cortisone injection which was provided to him in February 2021 [facility]        Knee Surgical History:  None    Past Medical, Social and Family History:  Past Medical History:   Diagnosis Date    Diverticulitis     Hypertension     Neck pain      Past Surgical History:   Procedure Laterality Date    COLON SURGERY      NECK SURGERY       No Known Allergies  Current Outpatient Medications on File Prior to Visit   Medication Sig Dispense Refill    acetaminophen (TYLENOL) 325 mg tablet Take 2 tablets (650 mg total) by mouth every 6 (six) hours as needed for mild pain 90 tablet 0    albuterol (PROVENTIL HFA,VENTOLIN HFA) 90 mcg/act inhaler Inhale 1-2 puffs every 6 (six) hours as needed for wheezing 18 g 1    aspirin (ECOTRIN LOW STRENGTH) 81 mg EC tablet Take 1 tablet (81 mg total) by mouth daily 30 tablet 3    atenolol (TENORMIN) 50 mg tablet Take 1 tablet (50 mg total) by mouth daily 90 tablet 3    atorvastatin (LIPITOR) 40 mg tablet Take 1 tablet (40 mg total) by mouth daily 90 tablet 1    baclofen 10 mg tablet Take 1 tablet (10 mg total) by mouth 3 (three) times a day 90 tablet 0    Blood Glucose Monitoring Suppl (FreeStyle Lite) SHANELL Use daily Use as directed 1 each 0    buPROPion (WELLBUTRIN) 75 mg tablet Take 75 mg by mouth daily       buPROPion (ZYBAN) 150 MG 12 hr tablet Take 150 mg by mouth every morning      busPIRone (BUSPAR) 15 mg tablet Take 15 mg by mouth daily       cloNIDine (CATAPRES) 0 2 mg tablet Take 0 2 mg by mouth daily at bedtime      fluticasone (FLONASE) 50 mcg/act nasal spray 1 spray into each nostril daily 16 g 0    gabapentin (NEURONTIN) 800 mg tablet Take 1 tablet (800 mg total) by mouth 3 (three) times a day 90 tablet 3    glucose blood (FREESTYLE LITE) test strip Testing sugars once a day 100 each 3    hydrocortisone 1 % ointment Apply topically 2 (two) times a day 30 g 0    hydrOXYzine pamoate (VISTARIL) 50 mg capsule Take 50 mg by mouth daily at bedtime      ibuprofen (MOTRIN) 600 mg tablet Take 1 tablet (600 mg total) by mouth every 6 (six) hours as needed for mild pain 30 tablet 0    Lancets (freestyle) lancets Daily 100 each 5    lidocaine (XYLOCAINE) 5 % ointment Apply topically as needed for mild pain 35 44 g 0    meloxicam (MOBIC) 15 mg tablet Take 1 tablet (15 mg total) by mouth daily 30 tablet 1    metFORMIN (GLUCOPHAGE) 1000 MG tablet Take 1 tablet (1,000 mg total) by mouth 2 (two) times a day with meals 60 tablet 3    Misc  Devices (Wrist Brace) MISC Use daily 2 each 0    omeprazole (PriLOSEC) 40 MG capsule Take 1 capsule (40 mg total) by mouth daily 90 capsule 2    tamsulosin (FLOMAX) 0 4 mg Take 1 capsule (0 4 mg total) by mouth daily with dinner 30 capsule 3    temazepam (RESTORIL) 30 mg capsule Take 30 mg by mouth daily at bedtime      Tradjenta 5 MG TABS take 1 tablet by mouth once daily 30 tablet 1    zolpidem (AMBIEN) 10 mg tablet Take 10 mg by mouth daily at bedtime      naproxen (NAPROSYN) 500 mg tablet Take 1 tablet (500 mg total) by mouth 2 (two) times a day with meals for 14 doses 14 tablet 0     No current facility-administered medications on file prior to visit       Social History     Socioeconomic History    Marital status: /Civil Union     Spouse name: Not on file    Number of children: Not on file    Years of education: Not on file    Highest education level: Not on file   Occupational History    Not on file   Tobacco Use    Smoking status: Current Every Day Smoker Packs/day: 0 25     Types: Cigarettes, Cigars    Smokeless tobacco: Never Used    Tobacco comment: 1 cigar/daily   Vaping Use    Vaping Use: Never used   Substance and Sexual Activity    Alcohol use: Yes     Comment: drinks only on weekends    Drug use: Never    Sexual activity: Not on file   Other Topics Concern    Not on file   Social History Narrative    Not on file     Social Determinants of Health     Financial Resource Strain:     Difficulty of Paying Living Expenses:    Food Insecurity:     Worried About Running Out of Food in the Last Year:     Ran Out of Food in the Last Year:    Transportation Needs:     Lack of Transportation (Medical):  Lack of Transportation (Non-Medical):    Physical Activity:     Days of Exercise per Week:     Minutes of Exercise per Session:    Stress:     Feeling of Stress :    Social Connections:     Frequency of Communication with Friends and Family:     Frequency of Social Gatherings with Friends and Family:     Attends Yazidism Services:     Active Member of Clubs or Organizations:     Attends Club or Organization Meetings:     Marital Status:    Intimate Partner Violence:     Fear of Current or Ex-Partner:     Emotionally Abused:     Physically Abused:     Sexually Abused:          I have reviewed the past medical, surgical, social and family history, medications and allergies as documented in the EMR  Review of systems: ROS is negative other than that noted in the HPI  Constitutional: Negative for fatigue and fever  HENT: Negative for sore throat  Respiratory: Negative for shortness of breath  Cardiovascular: Negative for chest pain  Gastrointestinal: Negative for abdominal pain  Endocrine: Negative for cold intolerance and heat intolerance  Genitourinary: Negative for flank pain  Musculoskeletal: Negative for back pain  Skin: Negative for rash  Allergic/Immunologic: Negative for immunocompromised state     Neurological: Negative for dizziness  Psychiatric/Behavioral: Negative for agitation  Physical Exam:    Blood pressure 128/95, pulse 78, height 5' 11" (1 803 m), weight 98 7 kg (217 lb 9 6 oz)  General/Constitutional: NAD, well developed, well nourished  HENT: Normocephalic, atraumatic  CV: Intact distal pulses, regular rate  Resp: No respiratory distress or labored breathing  Lymphatic: No lymphadenopathy palpated  Neuro: Alert and Oriented x 3, no focal deficits  Psych: Normal mood, normal affect, normal judgement, normal behavior  Skin: Warm, dry, no rashes, no erythema      Knee Exam (focused): RIGHT LEFT   ROM:   0-130 0-120   Palpation: Effusion negative negative     MJL tenderness Negative Positive     LJL tenderness Negative Negative   Meniscus: Miquel Negative Positive    Apley's Compression Negative Negative   Instability: Varus stable stable     Valgus stable stable   Special Tests: Lachman Negative Negative     Posterior drawer Negative Negative     Anterior drawer Negative Negative     Pivot shift not tested not tested     Dial not tested not tested   Patella: Palpation no tenderness no tenderness     Mobility 1/4 1/4     Apprehension Negative Negative   Other: Single leg 1/4 squat not tested not tested      LE NV Exam: +2 DP/PT pulses bilaterally  Sensation intact to light touch L2-S1 bilaterally     Bilateral hip ROM demonstrates no pain actively or passively    No calf tenderness to palpation bilaterally    Knee Imaging    X-rays of the left knee were reviewed, which demonstrate  mild osteoarthritis  I have reviewed the radiology report and agree with their impression  MRI of the left knee were reviewed, which demonstrate  A complex medial meniscus tear and moderate osteoarthritis  I have reviewed the radiology report and agree with their impression        Scribe Attestation    I,:  Sary Vargas am acting as a scribe while in the presence of the attending physician :       I,: Misty Daugherty DO personally performed the services described in this documentation    as scribed in my presence :

## 2021-09-23 NOTE — LETTER
September 23, 2021     Jairo Carrillo MD  [place] [address]    Patient: Girish Madison   YOB: 1965   Date of Visit: 9/23/2021       Dear Dr Huan Morel: Thank you for referring Shnae Mccormick to me for evaluation  Below are my notes for this consultation  If you have questions, please do not hesitate to call me  I look forward to following your patient along with you  Sincerely,        Itzel Alfaro DO        CC: RAMO Villareal DO  9/23/2021  3:51 PM  Sign when Signing Visit  Ortho Sports Medicine Knee New Patient Visit     Assesment:   60 y o  male left knee medial meniscus tear and medial joint moderate osteoarthritis    Plan:    Conservative treatment:    Ice to knee for 20 minutes at least 1-2 times daily  Hinged knee brace ordered  Let pain guide gradual return activities  Viscosupplementation was offered to the patient today however he is not interested in any injections at this time  I would like him to follow-up with Dr Bette Salas  To discuss possible left knee arthroplasty  If he would like to move forward with meniscus surgery after he speaks with Dr Bette Salas, he may return to my office and we will consent for surgery  I explained to the patient that he does have a medial meniscus tear but he also has a moderate amount of medial joint cartilage wear on his MRI and his meniscus is likely not all of his pain  I feel a medial meniscectomy may relieve some pain but he is interested in considering a final solution for his knee pain  He can discussed risks and benefits to arthroplasty and if he is requesting to undergo a meniscectomy and understands or may not be full relief of his pain we can discuss this further after his office visit with Dr Una Morales: All imaging from today was reviewed by myself and explained to the patient  Injection:    No Injection planned at this time        Surgery:     No surgery is recommended at this point, continue with conservative treatment plan as noted  Follow up:    Return if symptoms worsen or fail to improve, for Recheck with Dr Bette Salas  Chief Complaint   Patient presents with    Left Knee - Pain       History of Present Illness: 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, referred to me by Dr Huan Morel, seen in clinic for consultation of left knee pain  Pain is located posterior  The patient rates the pain as a 8/10  The pain has been present for 1 a year  The patient sustained an injury  A little over a year ago  The mechanism of injury was   Stepping up on a curb and felt a painful crack  The pain is characterized as sharp, stabbing  The pain is present at all times  Pain is improved by rest, bracing and gabapentin  Pain is aggravated by weight bearing and walking  Symptoms include pain  The patient has tried rest,  Activity modification, ice, bracing and injection  He had no relief of his symptoms with cortisone injection which was provided to him in February 2021 at [facility]        Knee Surgical History:  None    Past Medical, Social and Family History:  Past Medical History:   Diagnosis Date    Diverticulitis     Hypertension     Neck pain      Past Surgical History:   Procedure Laterality Date    COLON SURGERY      NECK SURGERY       No Known Allergies  Current Outpatient Medications on File Prior to Visit   Medication Sig Dispense Refill    acetaminophen (TYLENOL) 325 mg tablet Take 2 tablets (650 mg total) by mouth every 6 (six) hours as needed for mild pain 90 tablet 0    albuterol (PROVENTIL HFA,VENTOLIN HFA) 90 mcg/act inhaler Inhale 1-2 puffs every 6 (six) hours as needed for wheezing 18 g 1    aspirin (ECOTRIN LOW STRENGTH) 81 mg EC tablet Take 1 tablet (81 mg total) by mouth daily 30 tablet 3    atenolol (TENORMIN) 50 mg tablet Take 1 tablet (50 mg total) by mouth daily 90 tablet 3    atorvastatin (LIPITOR) 40 mg tablet Take 1 tablet (40 mg total) by mouth daily 90 tablet 1    baclofen 10 mg tablet Take 1 tablet (10 mg total) by mouth 3 (three) times a day 90 tablet 0    Blood Glucose Monitoring Suppl (FreeStyle Lite) SHANELL Use daily Use as directed 1 each 0    buPROPion (WELLBUTRIN) 75 mg tablet Take 75 mg by mouth daily       buPROPion (ZYBAN) 150 MG 12 hr tablet Take 150 mg by mouth every morning      busPIRone (BUSPAR) 15 mg tablet Take 15 mg by mouth daily       cloNIDine (CATAPRES) 0 2 mg tablet Take 0 2 mg by mouth daily at bedtime      fluticasone (FLONASE) 50 mcg/act nasal spray 1 spray into each nostril daily 16 g 0    gabapentin (NEURONTIN) 800 mg tablet Take 1 tablet (800 mg total) by mouth 3 (three) times a day 90 tablet 3    glucose blood (FREESTYLE LITE) test strip Testing sugars once a day 100 each 3    hydrocortisone 1 % ointment Apply topically 2 (two) times a day 30 g 0    hydrOXYzine pamoate (VISTARIL) 50 mg capsule Take 50 mg by mouth daily at bedtime      ibuprofen (MOTRIN) 600 mg tablet Take 1 tablet (600 mg total) by mouth every 6 (six) hours as needed for mild pain 30 tablet 0    Lancets (freestyle) lancets Daily 100 each 5    lidocaine (XYLOCAINE) 5 % ointment Apply topically as needed for mild pain 35 44 g 0    meloxicam (MOBIC) 15 mg tablet Take 1 tablet (15 mg total) by mouth daily 30 tablet 1    metFORMIN (GLUCOPHAGE) 1000 MG tablet Take 1 tablet (1,000 mg total) by mouth 2 (two) times a day with meals 60 tablet 3    Misc   Devices (Wrist Brace) MISC Use daily 2 each 0    omeprazole (PriLOSEC) 40 MG capsule Take 1 capsule (40 mg total) by mouth daily 90 capsule 2    tamsulosin (FLOMAX) 0 4 mg Take 1 capsule (0 4 mg total) by mouth daily with dinner 30 capsule 3    temazepam (RESTORIL) 30 mg capsule Take 30 mg by mouth daily at bedtime      Tradjenta 5 MG TABS take 1 tablet by mouth once daily 30 tablet 1    zolpidem (AMBIEN) 10 mg tablet Take 10 mg by mouth daily at bedtime      naproxen (NAPROSYN) 500 mg tablet Take 1 tablet (500 mg total) by mouth 2 (two) times a day with meals for 14 doses 14 tablet 0     No current facility-administered medications on file prior to visit  Social History     Socioeconomic History    Marital status: /Civil Union     Spouse name: Not on file    Number of children: Not on file    Years of education: Not on file    Highest education level: Not on file   Occupational History    Not on file   Tobacco Use    Smoking status: Current Every Day Smoker     Packs/day: 0 25     Types: Cigarettes, Cigars    Smokeless tobacco: Never Used    Tobacco comment: 1 cigar/daily   Vaping Use    Vaping Use: Never used   Substance and Sexual Activity    Alcohol use: Yes     Comment: drinks only on weekends    Drug use: Never    Sexual activity: Not on file   Other Topics Concern    Not on file   Social History Narrative    Not on file     Social Determinants of Health     Financial Resource Strain:     Difficulty of Paying Living Expenses:    Food Insecurity:     Worried About Running Out of Food in the Last Year:     [place] of Food in the Last Year:    Transportation Needs:     Lack of Transportation (Medical):  Lack of Transportation (Non-Medical):    Physical Activity:     Days of Exercise per Week:     Minutes of Exercise per Session:    Stress:     Feeling of Stress :    Social Connections:     Frequency of Communication with Friends and Family:     Frequency of Social Gatherings with Friends and Family:     Attends Yazidi Services:     Active Member of Clubs or Organizations:     Attends Club or Organization Meetings:     Marital Status:    Intimate Partner Violence:     Fear of Current or Ex-Partner:     Emotionally Abused:     Physically Abused:     Sexually Abused:          I have reviewed the past medical, surgical, social and family history, medications and allergies as documented in the EMR      Review of systems: ROS is negative other than that noted in the HPI  Constitutional: Negative for fatigue and fever  HENT: Negative for sore throat  Respiratory: Negative for shortness of breath  Cardiovascular: Negative for chest pain  Gastrointestinal: Negative for abdominal pain  Endocrine: Negative for cold intolerance and heat intolerance  Genitourinary: Negative for flank pain  Musculoskeletal: Negative for back pain  Skin: Negative for rash  Allergic/Immunologic: Negative for immunocompromised state  Neurological: Negative for dizziness  Psychiatric/Behavioral: Negative for agitation  Physical Exam:    Blood pressure 128/95, pulse 78, height 5' 11" (1 803 m), weight 98 7 kg (217 lb 9 6 oz)  General/Constitutional: NAD, well developed, well nourished  HENT: Normocephalic, atraumatic  CV: Intact distal pulses, regular rate  Resp: No respiratory distress or labored breathing  Lymphatic: No lymphadenopathy palpated  Neuro: Alert and Oriented x 3, no focal deficits  Psych: Normal mood, normal affect, normal judgement, normal behavior  Skin: Warm, dry, no rashes, no erythema      Knee Exam (focused): RIGHT LEFT   ROM:   0-130 0-120   Palpation: Effusion negative negative     MJL tenderness Negative Positive     LJL tenderness Negative Negative   Meniscus:  Miquel Negative Positive    Apley's Compression Negative Negative   Instability: Varus stable stable     Valgus stable stable   Special Tests: Lachman Negative Negative     Posterior drawer Negative Negative     Anterior drawer Negative Negative     Pivot shift not tested not tested     Dial not tested not tested   Patella: Palpation no tenderness no tenderness     Mobility 1/4 1/4     Apprehension Negative Negative   Other: Single leg 1/4 squat not tested not tested      LE NV Exam: +2 DP/PT pulses bilaterally  Sensation intact to light touch L2-S1 bilaterally     Bilateral hip ROM demonstrates no pain actively or passively    No calf tenderness to palpation bilaterally    Knee Imaging    X-rays of the left knee were reviewed, which demonstrate  mild osteoarthritis  I have reviewed the radiology report and agree with their impression  MRI of the left knee were reviewed, which demonstrate  A complex medial meniscus tear and moderate osteoarthritis  I have reviewed the radiology report and agree with their impression        Scribe Attestation    I,:  Consuela Castleman am acting as a scribe while in the presence of the attending physician :       I,:  Claribel Stringer DO personally performed the services described in this documentation    as scribed in my presence :

## 2021-10-07 ENCOUNTER — PATIENT OUTREACH (OUTPATIENT)
Dept: FAMILY MEDICINE CLINIC | Facility: CLINIC | Age: 56
End: 2021-10-07

## 2021-10-11 ENCOUNTER — APPOINTMENT (OUTPATIENT)
Dept: RADIOLOGY | Facility: MEDICAL CENTER | Age: 56
End: 2021-10-11
Payer: COMMERCIAL

## 2021-10-11 ENCOUNTER — OFFICE VISIT (OUTPATIENT)
Dept: OBGYN CLINIC | Facility: MEDICAL CENTER | Age: 56
End: 2021-10-11
Payer: COMMERCIAL

## 2021-10-11 VITALS
BODY MASS INDEX: 31.08 KG/M2 | HEIGHT: 71 IN | DIASTOLIC BLOOD PRESSURE: 69 MMHG | SYSTOLIC BLOOD PRESSURE: 109 MMHG | WEIGHT: 222 LBS | HEART RATE: 65 BPM

## 2021-10-11 DIAGNOSIS — S83.232D COMPLEX TEAR OF MEDIAL MENISCUS OF LEFT KNEE AS CURRENT INJURY, SUBSEQUENT ENCOUNTER: ICD-10-CM

## 2021-10-11 DIAGNOSIS — E66.3 OVERWEIGHT: Primary | ICD-10-CM

## 2021-10-11 DIAGNOSIS — F17.200 SMOKER: ICD-10-CM

## 2021-10-11 DIAGNOSIS — M17.12 PRIMARY OSTEOARTHRITIS OF LEFT KNEE: ICD-10-CM

## 2021-10-11 PROCEDURE — 73560 X-RAY EXAM OF KNEE 1 OR 2: CPT

## 2021-10-11 PROCEDURE — 99214 OFFICE O/P EST MOD 30 MIN: CPT | Performed by: ORTHOPAEDIC SURGERY

## 2021-10-11 PROCEDURE — 3008F BODY MASS INDEX DOCD: CPT | Performed by: NURSE PRACTITIONER

## 2021-10-11 PROCEDURE — 3008F BODY MASS INDEX DOCD: CPT | Performed by: ORTHOPAEDIC SURGERY

## 2021-10-20 ENCOUNTER — PATIENT OUTREACH (OUTPATIENT)
Dept: FAMILY MEDICINE CLINIC | Facility: CLINIC | Age: 56
End: 2021-10-20

## 2021-10-21 ENCOUNTER — PATIENT OUTREACH (OUTPATIENT)
Dept: FAMILY MEDICINE CLINIC | Facility: CLINIC | Age: 56
End: 2021-10-21

## 2021-10-25 ENCOUNTER — OFFICE VISIT (OUTPATIENT)
Dept: DENTISTRY | Facility: CLINIC | Age: 56
End: 2021-10-25

## 2021-10-25 VITALS — TEMPERATURE: 96.6 F | DIASTOLIC BLOOD PRESSURE: 84 MMHG | SYSTOLIC BLOOD PRESSURE: 118 MMHG | HEART RATE: 73 BPM

## 2021-10-25 DIAGNOSIS — K05.313: Primary | ICD-10-CM

## 2021-10-25 DIAGNOSIS — Z01.21 ENCOUNTER FOR DENTAL EXAMINATION AND CLEANING WITH ABNORMAL FINDINGS: ICD-10-CM

## 2021-10-25 PROCEDURE — D7140 EXTRACTION, ERUPTED TOOTH OR EXPOSED ROOT (ELEVATION AND/OR FORCEPS REMOVAL): HCPCS | Performed by: DENTIST

## 2021-10-25 PROCEDURE — D0140 LIMITED ORAL EVALUATION - PROBLEM FOCUSED: HCPCS | Performed by: DENTIST

## 2021-10-25 PROCEDURE — D0220 INTRAORAL - PERIAPICAL FIRST RADIOGRAPHIC IMAGE: HCPCS | Performed by: DENTIST

## 2021-10-26 ENCOUNTER — PATIENT OUTREACH (OUTPATIENT)
Dept: FAMILY MEDICINE CLINIC | Facility: CLINIC | Age: 56
End: 2021-10-26

## 2021-10-27 ENCOUNTER — OFFICE VISIT (OUTPATIENT)
Dept: FAMILY MEDICINE CLINIC | Facility: CLINIC | Age: 56
End: 2021-10-27

## 2021-10-27 VITALS
RESPIRATION RATE: 16 BRPM | HEART RATE: 73 BPM | BODY MASS INDEX: 30.27 KG/M2 | TEMPERATURE: 97.6 F | SYSTOLIC BLOOD PRESSURE: 100 MMHG | DIASTOLIC BLOOD PRESSURE: 60 MMHG | OXYGEN SATURATION: 96 % | WEIGHT: 217 LBS

## 2021-10-27 DIAGNOSIS — Z72.0 TOBACCO ABUSE: ICD-10-CM

## 2021-10-27 DIAGNOSIS — R10.30 LOWER ABDOMINAL PAIN: ICD-10-CM

## 2021-10-27 DIAGNOSIS — E78.2 MIXED HYPERLIPIDEMIA: ICD-10-CM

## 2021-10-27 DIAGNOSIS — E11.9 TYPE 2 DIABETES MELLITUS WITHOUT COMPLICATION, WITHOUT LONG-TERM CURRENT USE OF INSULIN (HCC): Primary | ICD-10-CM

## 2021-10-27 DIAGNOSIS — L20.84 INTRINSIC ECZEMA: ICD-10-CM

## 2021-10-27 DIAGNOSIS — R07.89 CHEST TIGHTNESS: ICD-10-CM

## 2021-10-27 DIAGNOSIS — Z12.11 COLON CANCER SCREENING: ICD-10-CM

## 2021-10-27 DIAGNOSIS — I10 PRIMARY HYPERTENSION: ICD-10-CM

## 2021-10-27 PROCEDURE — 83036 HEMOGLOBIN GLYCOSYLATED A1C: CPT | Performed by: NURSE PRACTITIONER

## 2021-10-27 PROCEDURE — 3078F DIAST BP <80 MM HG: CPT | Performed by: NURSE PRACTITIONER

## 2021-10-27 PROCEDURE — 99214 OFFICE O/P EST MOD 30 MIN: CPT | Performed by: NURSE PRACTITIONER

## 2021-10-27 PROCEDURE — 3074F SYST BP LT 130 MM HG: CPT | Performed by: NURSE PRACTITIONER

## 2021-10-27 RX ORDER — ARIPIPRAZOLE 10 MG/1
10 TABLET ORAL
COMMUNITY
Start: 2021-09-30

## 2021-10-27 RX ORDER — HYDROXYZINE 50 MG/1
50 TABLET, FILM COATED ORAL
COMMUNITY
Start: 2021-09-30

## 2021-10-27 RX ORDER — DIAPER,BRIEF,INFANT-TODD,DISP
EACH MISCELLANEOUS 2 TIMES DAILY
Qty: 30 G | Refills: 0 | Status: SHIPPED | OUTPATIENT
Start: 2021-10-27 | End: 2022-06-16 | Stop reason: SDUPTHER

## 2021-10-27 RX ORDER — ASPIRIN 81 MG/1
81 TABLET ORAL DAILY
Qty: 30 TABLET | Refills: 3 | Status: SHIPPED | OUTPATIENT
Start: 2021-10-27 | End: 2022-01-20 | Stop reason: SDUPTHER

## 2021-10-27 RX ORDER — NICOTINE 21 MG/24HR
1 PATCH, TRANSDERMAL 24 HOURS TRANSDERMAL EVERY 24 HOURS
Qty: 28 PATCH | Refills: 0 | Status: SHIPPED | OUTPATIENT
Start: 2021-10-27 | End: 2022-05-25

## 2021-10-27 RX ORDER — BUPROPION HYDROCHLORIDE 150 MG/1
150 TABLET, EXTENDED RELEASE ORAL EVERY MORNING
COMMUNITY
Start: 2021-09-30

## 2021-10-28 PROBLEM — Z72.0 TOBACCO ABUSE: Status: ACTIVE | Noted: 2021-10-28

## 2021-10-28 LAB — SL AMB POCT HEMOGLOBIN AIC: 7.5 (ref ?–6.5)

## 2021-10-28 PROCEDURE — 3051F HG A1C>EQUAL 7.0%<8.0%: CPT | Performed by: NURSE PRACTITIONER

## 2021-10-28 PROCEDURE — 3051F HG A1C>EQUAL 7.0%<8.0%: CPT | Performed by: ORTHOPAEDIC SURGERY

## 2021-11-18 ENCOUNTER — VBI (OUTPATIENT)
Dept: ADMINISTRATIVE | Facility: OTHER | Age: 56
End: 2021-11-18

## 2021-12-02 ENCOUNTER — PATIENT OUTREACH (OUTPATIENT)
Dept: FAMILY MEDICINE CLINIC | Facility: CLINIC | Age: 56
End: 2021-12-02

## 2021-12-03 ENCOUNTER — HOSPITAL ENCOUNTER (OUTPATIENT)
Dept: ULTRASOUND IMAGING | Facility: HOSPITAL | Age: 56
Discharge: HOME | End: 2021-12-03
Payer: COMMERCIAL

## 2021-12-03 ENCOUNTER — APPOINTMENT (OUTPATIENT)
Dept: LAB | Facility: HOSPITAL | Age: 56
End: 2021-12-03
Payer: COMMERCIAL

## 2021-12-03 DIAGNOSIS — E11.9 TYPE 2 DIABETES MELLITUS WITHOUT COMPLICATION, WITHOUT LONG-TERM CURRENT USE OF INSULIN (HCC): ICD-10-CM

## 2021-12-03 DIAGNOSIS — R10.30 LOWER ABDOMINAL PAIN: ICD-10-CM

## 2021-12-03 LAB
ALBUMIN SERPL BCP-MCNC: 3.9 G/DL (ref 3–5.2)
ALP SERPL-CCNC: 96 U/L (ref 43–122)
ALT SERPL W P-5'-P-CCNC: 42 U/L
ANION GAP SERPL CALCULATED.3IONS-SCNC: 3 MMOL/L (ref 5–14)
AST SERPL W P-5'-P-CCNC: 31 U/L (ref 17–59)
BASOPHILS # BLD AUTO: 0 THOUSANDS/ΜL (ref 0–0.1)
BASOPHILS NFR BLD AUTO: 1 % (ref 0–1)
BILIRUB SERPL-MCNC: 0.39 MG/DL
BUN SERPL-MCNC: 14 MG/DL (ref 5–25)
CALCIUM SERPL-MCNC: 9.3 MG/DL (ref 8.4–10.2)
CHLORIDE SERPL-SCNC: 105 MMOL/L (ref 97–108)
CHOLEST SERPL-MCNC: 184 MG/DL
CO2 SERPL-SCNC: 32 MMOL/L (ref 22–30)
CREAT SERPL-MCNC: 0.91 MG/DL (ref 0.7–1.5)
EOSINOPHIL # BLD AUTO: 0.1 THOUSAND/ΜL (ref 0–0.4)
EOSINOPHIL NFR BLD AUTO: 2 % (ref 0–6)
ERYTHROCYTE [DISTWIDTH] IN BLOOD BY AUTOMATED COUNT: 14.5 %
GFR SERPL CREATININE-BSD FRML MDRD: 93 ML/MIN/1.73SQ M
GLUCOSE P FAST SERPL-MCNC: 286 MG/DL (ref 70–99)
HCT VFR BLD AUTO: 44.4 % (ref 41–53)
HDLC SERPL-MCNC: 32 MG/DL
HGB BLD-MCNC: 14.7 G/DL (ref 13.5–17.5)
LDLC SERPL CALC-MCNC: 93 MG/DL
LYMPHOCYTES # BLD AUTO: 2.5 THOUSANDS/ΜL (ref 0.5–4)
LYMPHOCYTES NFR BLD AUTO: 35 % (ref 25–45)
MCH RBC QN AUTO: 30.3 PG (ref 26–34)
MCHC RBC AUTO-ENTMCNC: 33.1 G/DL (ref 31–36)
MCV RBC AUTO: 92 FL (ref 80–100)
MONOCYTES # BLD AUTO: 0.4 THOUSAND/ΜL (ref 0.2–0.9)
MONOCYTES NFR BLD AUTO: 5 % (ref 1–10)
NEUTROPHILS # BLD AUTO: 4 THOUSANDS/ΜL (ref 1.8–7.8)
NEUTS SEG NFR BLD AUTO: 57 % (ref 45–65)
NONHDLC SERPL-MCNC: 152 MG/DL
PLATELET # BLD AUTO: 162 THOUSANDS/UL (ref 150–450)
PMV BLD AUTO: 10.9 FL (ref 8.9–12.7)
POTASSIUM SERPL-SCNC: 4.2 MMOL/L (ref 3.6–5)
PROT SERPL-MCNC: 7.5 G/DL (ref 5.9–8.4)
RBC # BLD AUTO: 4.85 MILLION/UL (ref 4.5–5.9)
SODIUM SERPL-SCNC: 140 MMOL/L (ref 137–147)
TRIGL SERPL-MCNC: 294 MG/DL
TSH SERPL DL<=0.05 MIU/L-ACNC: 0.65 UIU/ML (ref 0.47–4.68)
WBC # BLD AUTO: 7 THOUSAND/UL (ref 4.5–11)

## 2021-12-03 PROCEDURE — 85025 COMPLETE CBC W/AUTO DIFF WBC: CPT

## 2021-12-03 PROCEDURE — 80061 LIPID PANEL: CPT

## 2021-12-03 PROCEDURE — 80053 COMPREHEN METABOLIC PANEL: CPT

## 2021-12-03 PROCEDURE — 36415 COLL VENOUS BLD VENIPUNCTURE: CPT

## 2021-12-03 PROCEDURE — 76705 ECHO EXAM OF ABDOMEN: CPT

## 2021-12-03 PROCEDURE — 84443 ASSAY THYROID STIM HORMONE: CPT

## 2021-12-14 ENCOUNTER — OFFICE VISIT (OUTPATIENT)
Dept: DENTISTRY | Facility: CLINIC | Age: 56
End: 2021-12-14

## 2021-12-14 VITALS — TEMPERATURE: 96 F

## 2021-12-14 DIAGNOSIS — Z01.21 ENCOUNTER FOR DENTAL EXAMINATION AND CLEANING WITH ABNORMAL FINDINGS: Primary | ICD-10-CM

## 2021-12-14 PROCEDURE — D0150 COMPREHENSIVE ORAL EVALUATION - NEW OR ESTABLISHED PATIENT: HCPCS | Performed by: DENTIST

## 2021-12-14 PROCEDURE — D0210 INTRAORAL - COMPLETE SERIES OF RADIOGRAPHIC IMAGES: HCPCS | Performed by: DENTIST

## 2021-12-16 ENCOUNTER — OFFICE VISIT (OUTPATIENT)
Dept: DENTISTRY | Facility: CLINIC | Age: 56
End: 2021-12-16

## 2021-12-16 VITALS — HEART RATE: 67 BPM | TEMPERATURE: 96.7 F | SYSTOLIC BLOOD PRESSURE: 114 MMHG | DIASTOLIC BLOOD PRESSURE: 78 MMHG

## 2021-12-16 DIAGNOSIS — K05.6 PERIODONTAL DISEASE: ICD-10-CM

## 2021-12-16 DIAGNOSIS — Z01.20 ENCOUNTER FOR DENTAL EXAMINATION: Primary | ICD-10-CM

## 2021-12-16 PROCEDURE — D0220 INTRAORAL - PERIAPICAL FIRST RADIOGRAPHIC IMAGE: HCPCS

## 2021-12-16 PROCEDURE — D0230 INTRAORAL - PERIAPICAL EACH ADDITIONAL RADIOGRAPHIC IMAGE: HCPCS

## 2021-12-16 RX ORDER — AMOXICILLIN 500 MG/1
500 CAPSULE ORAL EVERY 8 HOURS SCHEDULED
Qty: 21 CAPSULE | Refills: 0 | Status: SHIPPED | OUTPATIENT
Start: 2021-12-16 | End: 2021-12-23

## 2021-12-30 ENCOUNTER — PATIENT OUTREACH (OUTPATIENT)
Dept: FAMILY MEDICINE CLINIC | Facility: CLINIC | Age: 56
End: 2021-12-30

## 2022-01-06 ENCOUNTER — PATIENT OUTREACH (OUTPATIENT)
Dept: FAMILY MEDICINE CLINIC | Facility: CLINIC | Age: 57
End: 2022-01-06

## 2022-01-06 DIAGNOSIS — E11.9 TYPE 2 DIABETES MELLITUS WITHOUT COMPLICATION, WITHOUT LONG-TERM CURRENT USE OF INSULIN (HCC): ICD-10-CM

## 2022-01-06 DIAGNOSIS — I10 ESSENTIAL HYPERTENSION: ICD-10-CM

## 2022-01-06 RX ORDER — ATENOLOL 50 MG/1
50 TABLET ORAL DAILY
Qty: 90 TABLET | Refills: 3 | Status: ON HOLD | OUTPATIENT
Start: 2022-01-06 | End: 2022-05-25 | Stop reason: SDUPTHER

## 2022-01-06 RX ORDER — BLOOD-GLUCOSE METER
KIT MISCELLANEOUS
Qty: 100 EACH | Refills: 3 | Status: SHIPPED | OUTPATIENT
Start: 2022-01-06

## 2022-01-06 NOTE — PROGRESS NOTES
I called the patient but received voicemail  Message was left stating his refills were sent to the pharmacy and he can get his Covid test at the drive through on [address]

## 2022-01-06 NOTE — PROGRESS NOTES
I received a call from the patient stating he has symptoms of a cough, fever, sore throat for 2 days  He still has sense of smell and taste  He denies being around anyone with Covid  He states his wife is also ill  The patient notes he received his flu shot  The patient also needs medication refills which I will submit to the PCP  The patient has not been checking his blood sugars because he ran out of test strips  I will continue to follow

## 2022-01-20 ENCOUNTER — PATIENT OUTREACH (OUTPATIENT)
Dept: FAMILY MEDICINE CLINIC | Facility: CLINIC | Age: 57
End: 2022-01-20

## 2022-01-20 DIAGNOSIS — G89.29 CHRONIC PAIN OF BOTH SHOULDERS: ICD-10-CM

## 2022-01-20 DIAGNOSIS — M25.512 CHRONIC PAIN OF BOTH SHOULDERS: ICD-10-CM

## 2022-01-20 DIAGNOSIS — M25.511 CHRONIC PAIN OF BOTH SHOULDERS: ICD-10-CM

## 2022-01-20 DIAGNOSIS — E78.2 MIXED HYPERLIPIDEMIA: ICD-10-CM

## 2022-01-20 DIAGNOSIS — E11.9 TYPE 2 DIABETES MELLITUS WITHOUT COMPLICATION, WITHOUT LONG-TERM CURRENT USE OF INSULIN (HCC): ICD-10-CM

## 2022-01-20 NOTE — PROGRESS NOTES
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

## 2022-01-21 RX ORDER — ATORVASTATIN CALCIUM 40 MG/1
40 TABLET, FILM COATED ORAL DAILY
Qty: 90 TABLET | Refills: 1 | Status: SHIPPED | OUTPATIENT
Start: 2022-01-21 | End: 2022-06-16 | Stop reason: SDUPTHER

## 2022-01-21 RX ORDER — ASPIRIN 81 MG/1
81 TABLET ORAL DAILY
Qty: 30 TABLET | Refills: 3 | Status: SHIPPED | OUTPATIENT
Start: 2022-01-21 | End: 2022-04-29 | Stop reason: SDUPTHER

## 2022-01-21 RX ORDER — GABAPENTIN 800 MG/1
800 TABLET ORAL 3 TIMES DAILY
Qty: 90 TABLET | Refills: 3 | Status: SHIPPED | OUTPATIENT
Start: 2022-01-21 | End: 2022-05-10

## 2022-01-25 ENCOUNTER — PATIENT OUTREACH (OUTPATIENT)
Dept: FAMILY MEDICINE CLINIC | Facility: CLINIC | Age: 57
End: 2022-01-25

## 2022-01-25 NOTE — PROGRESS NOTES
I called the patient but received voicemail  Message was left reminding him of his PCP appointment for tomorrow at [address] WILSON will continue further outreach

## 2022-01-26 ENCOUNTER — OFFICE VISIT (OUTPATIENT)
Dept: FAMILY MEDICINE CLINIC | Facility: CLINIC | Age: 57
End: 2022-01-26

## 2022-01-26 VITALS
DIASTOLIC BLOOD PRESSURE: 84 MMHG | SYSTOLIC BLOOD PRESSURE: 130 MMHG | OXYGEN SATURATION: 97 % | TEMPERATURE: 98.2 F | WEIGHT: 224.2 LBS | RESPIRATION RATE: 18 BRPM | BODY MASS INDEX: 31.27 KG/M2 | HEART RATE: 83 BPM

## 2022-01-26 DIAGNOSIS — E11.42 DIABETIC POLYNEUROPATHY ASSOCIATED WITH TYPE 2 DIABETES MELLITUS (HCC): ICD-10-CM

## 2022-01-26 DIAGNOSIS — Z11.59 ENCOUNTER FOR HEPATITIS C SCREENING TEST FOR LOW RISK PATIENT: ICD-10-CM

## 2022-01-26 DIAGNOSIS — J40 BRONCHITIS: ICD-10-CM

## 2022-01-26 DIAGNOSIS — J42 CHRONIC BRONCHITIS, UNSPECIFIED CHRONIC BRONCHITIS TYPE (HCC): ICD-10-CM

## 2022-01-26 DIAGNOSIS — Z11.4 SCREENING FOR HIV (HUMAN IMMUNODEFICIENCY VIRUS): ICD-10-CM

## 2022-01-26 DIAGNOSIS — K21.9 GASTROESOPHAGEAL REFLUX DISEASE WITHOUT ESOPHAGITIS: ICD-10-CM

## 2022-01-26 DIAGNOSIS — Z12.5 SCREENING FOR PROSTATE CANCER: ICD-10-CM

## 2022-01-26 DIAGNOSIS — F33.9 DEPRESSION, RECURRENT (HCC): ICD-10-CM

## 2022-01-26 DIAGNOSIS — E11.9 TYPE 2 DIABETES MELLITUS WITHOUT COMPLICATION, WITHOUT LONG-TERM CURRENT USE OF INSULIN (HCC): Primary | ICD-10-CM

## 2022-01-26 DIAGNOSIS — R05.3 CHRONIC COUGH: ICD-10-CM

## 2022-01-26 DIAGNOSIS — I10 PRIMARY HYPERTENSION: ICD-10-CM

## 2022-01-26 LAB — SL AMB POCT HEMOGLOBIN AIC: 9.5 (ref ?–6.5)

## 2022-01-26 PROCEDURE — 83036 HEMOGLOBIN GLYCOSYLATED A1C: CPT | Performed by: NURSE PRACTITIONER

## 2022-01-26 PROCEDURE — U0003 INFECTIOUS AGENT DETECTION BY NUCLEIC ACID (DNA OR RNA); SEVERE ACUTE RESPIRATORY SYNDROME CORONAVIRUS 2 (SARS-COV-2) (CORONAVIRUS DISEASE [COVID-19]), AMPLIFIED PROBE TECHNIQUE, MAKING USE OF HIGH THROUGHPUT TECHNOLOGIES AS DESCRIBED BY CMS-2020-01-R: HCPCS | Performed by: NURSE PRACTITIONER

## 2022-01-26 PROCEDURE — U0005 INFEC AGEN DETEC AMPLI PROBE: HCPCS | Performed by: NURSE PRACTITIONER

## 2022-01-26 PROCEDURE — 99214 OFFICE O/P EST MOD 30 MIN: CPT | Performed by: NURSE PRACTITIONER

## 2022-01-26 RX ORDER — LINAGLIPTIN 5 MG/1
5 TABLET, FILM COATED ORAL DAILY
Qty: 30 TABLET | Refills: 1 | Status: SHIPPED | OUTPATIENT
Start: 2022-01-26 | End: 2022-03-23

## 2022-01-26 RX ORDER — OMEPRAZOLE 40 MG/1
40 CAPSULE, DELAYED RELEASE ORAL DAILY
Qty: 90 CAPSULE | Refills: 2 | Status: SHIPPED | OUTPATIENT
Start: 2022-01-26 | End: 2022-06-16 | Stop reason: SDUPTHER

## 2022-01-26 RX ORDER — ALBUTEROL SULFATE 90 UG/1
1-2 AEROSOL, METERED RESPIRATORY (INHALATION) EVERY 6 HOURS PRN
Qty: 18 G | Refills: 1 | Status: SHIPPED | OUTPATIENT
Start: 2022-01-26 | End: 2022-06-16 | Stop reason: SDUPTHER

## 2022-01-26 NOTE — LETTER
January 26, 2022     Patient: Robinson William   YOB: 1965   Date of Visit: 1/26/2022       To Whom it May Concern:    Ervin Manpreet was seen in my clinic on 1/26/2022  He has a past medical history of hypertension and diabetes and is on medication for both  If you have any questions or concerns, please don't hesitate to call           Sincerely,          RAMO Miles        CC: No Recipients

## 2022-01-26 NOTE — ASSESSMENT & PLAN NOTE
Lab Results   Component Value Date    HGBA1C 9 5 (A) 01/26/2022     A1c has increased further - patient has not been taking metformin twice per day- discussed importance of medication compliance   Patient to begin taking metformin 1,000 mg bid and start Lisa Blackburn

## 2022-01-26 NOTE — PROGRESS NOTES
Assessment/Plan:    Type 2 diabetes mellitus without complication, without long-term current use of insulin (HCC)    Lab Results   Component Value Date    HGBA1C 9 5 (A) 01/26/2022     A1c has increased further - patient has not been taking metformin twice per day- discussed importance of medication compliance   Patient to begin taking metformin 1,000 mg bid and start Tradjenta     Hypertension  Blood pressure w/in goal   Continue with atenolol 50 mg daily     Chronic bronchitis ([facility] [address] )  Continue with albuterol PRN  Encourage smoking cessation     Tico Wang was seen today for follow-up and needs letter to see doctors  Diagnoses and all orders for this visit:    Type 2 diabetes mellitus without complication, without long-term current use of insulin ([facility] [address] )  -     POCT hemoglobin A1c  -     Ambulatory Referral to Ophthalmology; Future  -     linaGLIPtin (Tradjenta) 5 MG TABS; Take 5 mg by mouth daily  -     Lipid panel; Future    Diabetic polyneuropathy associated with type 2 diabetes mellitus (HCC)    Primary hypertension    Bronchitis  -     albuterol (PROVENTIL HFA,VENTOLIN HFA) 90 mcg/act inhaler; Inhale 1-2 puffs every 6 (six) hours as needed for wheezing    Gastroesophageal reflux disease without esophagitis  -     omeprazole (PriLOSEC) 40 MG capsule; Take 1 capsule (40 mg total) by mouth daily    Screening for prostate cancer  -     PSA, total and free; Future    Screening for HIV (human immunodeficiency virus)  -     HIV 1/2 Antigen/Antibody (4th Generation) w Reflex [facility]; Future    Encounter for hepatitis C screening test for low risk patient  -     Hepatitis C antibody; Future    Depression, recurrent (HCC)    Chronic cough  -     COVID Only- Office Collect    Chronic bronchitis, unspecified chronic bronchitis type ([facility] [address] )            Return in about 3 months (around 4/26/2022) for diabetes         Patient Instructions     Diabetes and Nutrition   AMBULATORY CARE:   Nutrition plans  help with healthy eating patterns that improve health  Nutrition plans and regular exercise help keep your blood sugar levels steady  They also help delay or prevent complications of diabetes, such as diabetic kidney disease  Call your local emergency number [phone] in the [address]) if:   · You have any of the following signs of a heart attack:      ? Squeezing, pressure, or pain in your chest    ? You may  also have any of the following:     § Discomfort or pain in your back, neck, jaw, stomach, or arm    § Shortness of breath    § Nausea or vomiting    § Lightheadedness or a sudden cold sweat      Seek care immediately if:   · You have a low blood sugar level and it does not improve with treatment  Symptoms are trouble thinking, a pounding heartbeat, and sweating  · Your blood sugar level is above 240 mg/dL and does not come down within 15 minutes of treatment  · You have ketones in your blood or urine  · You have nausea or are vomiting and cannot keep any food or liquid down  · You have blurred or double vision  · Your breath has a fruity, sweet smell, or your breathing is shallow  Call your doctor or diabetes care team if:   · Your blood sugar levels are higher than your target goals  · You often have low blood sugar levels  · You have trouble coping with diabetes, or you feel anxious or depressed  · You have questions or concerns about your condition or care  A dietitian will help you create a nutrition plan  to meet your needs and your family's needs  The goal is for you to reach or maintain healthy weight, blood sugar, blood pressure, and lipid levels  You should meet with the dietitian at least 1 time each year   You will learn the following:  · How food affects your blood sugar levels    · How to create healthy eating habits    · How to make food choices based on your activity level, weight, and glucose levels    · How your favorite foods may fit into your plan    · How to keep track of carbohydrates    · Correct portion sizes for each food    · Changes you can make to your plan if you get pregnant or are breastfeeding    What you can do before you meet with the dietitian:   · Do not skip meals  The goal is to keep your blood sugar level steady  Blood sugar levels may drop too low if you have received insulin and do not eat  · Eat more high-fiber foods, such as fresh or frozen fruits and vegetables, whole-grain breads, and beans  Fiber helps control or lower blood sugar and cholesterol levels  Choose whole fruits instead of fruit juice as much as possible  Sugar may be added to juice, and fiber may be removed  · Choose heart-healthy fats  Foods high in heart-healthy fats include olive oil, nuts, avocados, and fatty fish, such as salmon and tuna  Foods high in unhealthy fats include red meat, full-fat dairy products, and soft margarine  Unhealthy fats can increase your risk for heart disease, increase bad cholesterol, and lower good cholesterol  · Choose complex carbohydrates  Foods with complex carbohydrates include brown rice, whole-grain breads and cereals, and cooked beans  Foods with simple carbohydrates include white bread, white rice, most cold cereals, and snack foods  Your plan will include the amount of carbohydrate to have at one time or in a day  Your blood sugar level can get too high if you eat too much carbohydrate at one time  Blood sugar levels do not spike as high or drop as quickly with complex carbohydrates as with simple carbohydrates  Choose complex carbohydrates whenever possible  · Have less sodium (salt)  The risk for high blood pressure (BP) increases with high-sodium foods  Limit high-sodium foods, such as soy sauce, potato chips, and canned soup  Do not add salt to food you cook  Limit your use of table salt  Read labels to have no more than 2,300 milligrams of sodium in one day  · Limit artificial sweeteners    These may be found in food or drinks, such as diet soft drinks or other low-calorie beverages  Artificial sweeteners are low in calories  They may help you lower your overall calories and carbohydrates  It is important not to have more calories from other foods to make up for the calories saved  Artificial sweeteners do not have any nutrition  Eat whole foods and drink water as much as possible  Your plan may include beverages with artificial sweeteners for a short time  These can help you transition from high-sugar beverages to water  · Use the plate method for each meal   This method can help you eat the right amount of carbohydrates and keep your blood sugar levels under control  ? Draw an imaginary line down the middle of a 9-inch dinner plate  On one side, draw another line to divide that section in half  Your plate will have one large section and 2 small sections  ? Fill the largest section with non-starchy vegetables  These include broccoli, spinach, cucumbers, peppers, cauliflower, and tomatoes  ? Add a starch to one of the small sections  Starches include pasta, rice, whole-grain bread, tortillas, corn, potatoes, and beans  ? Add meat or another source of protein to the other small section  Examples include chicken or turkey without skin, fish, lean beef or pork, low-fat cheese, tofu, and eggs  ? Add dairy products or fruit next to your plate if your meal plan allows  Examples of dairy include skim or 1% milk and low-fat yogurt  If you do not drink milk or eat dairy products, you may be able to add another serving of starchy food instead  ? Have a low-calorie or calorie-free drink with your meal  Examples include water or unsweetened tea or coffee  Know the risks if you choose to drink alcohol:  Alcohol can cause hypoglycemia (low blood sugar level), especially if you use insulin  Alcohol can cause high blood sugar and BP levels, and weight gain if you drink too much   Women 21 years or older and men 72 years or older should limit alcohol to 1 drink a day  Men aged 24 to 59 years should limit alcohol to 2 drinks a day  A drink of alcohol is 12 ounces of beer, 5 ounces of wine, or 1½ ounces of liquor  Hypoglycemia can happen hours after you drink alcohol  Check your blood sugar level for several hours after you drink alcohol  Have a source of fast-acting carbohydrates with you in case your level goes too low  You need immediate care if you have signs or symptoms of hypoglycemia, such as sweating, confusion, or fainting  Maintain a healthy weight:  A healthy weight can help you control your diabetes  You can maintain a healthy weight with a nutrition plan and exercise  Ask your healthcare provider how much you should weigh  Ask him or her to help you create a weight loss plan if you are overweight  Together you can set weight loss and maintenance goals  Follow up with your diabetes team as directed:  Write down your questions so you remember to ask them during your visits  © [employer] 2021 Information is for End User's use only and may not be sold, redistributed or otherwise used for commercial purposes  All illustrations and images included in CareNotes® are the copyrighted property of A D A M , Inc  or [facility] [place] [employer]   The above information is an  only  It is not intended as medical advice for individual conditions or treatments  Talk to your doctor, nurse or pharmacist before following any medical regimen to see if it is safe and effective for you  Subjective:     Marcio Liang is a 59 y o  male who  has a past medical history of Diverticulitis, Hypertension, and Neck pain  who presented to the office today for follow up of chronic conditions   Reports that he has been unable to go see ortho or dental because he has a chronic cough and needs COVID test        The following portions of the patient's history were reviewed and updated as appropriate: allergies, current medications, past family history, past medical history, past social history, past surgical history and problem list     Current Outpatient Medications on File Prior to Visit   Medication Sig Dispense Refill    acetaminophen (TYLENOL) 325 mg tablet Take 2 tablets (650 mg total) by mouth every 6 (six) hours as needed for mild pain 90 tablet 0    ARIPiprazole (ABILIFY) 10 mg tablet Take 10 mg by mouth daily at bedtime      aspirin (ECOTRIN LOW STRENGTH) 81 mg EC tablet Take 1 tablet (81 mg total) by mouth daily 30 tablet 3    atenolol (TENORMIN) 50 mg tablet Take 1 tablet (50 mg total) by mouth daily 90 tablet 3    atorvastatin (LIPITOR) 40 mg tablet Take 1 tablet (40 mg total) by mouth daily 90 tablet 1    baclofen 10 mg tablet Take 1 tablet (10 mg total) by mouth 3 (three) times a day 90 tablet 0    Blood Glucose Monitoring Suppl (FreeStyle Lite) SHANELL Use daily Use as directed 1 each 0    buPROPion (WELLBUTRIN SR) 150 mg 12 hr tablet Take 150 mg by mouth every morning      buPROPion (ZYBAN) 150 MG 12 hr tablet Take 150 mg by mouth every morning      busPIRone (BUSPAR) 15 mg tablet Take 15 mg by mouth daily       cloNIDine (CATAPRES) 0 2 mg tablet Take 0 2 mg by mouth daily at bedtime      diclofenac sodium (VOLTAREN) 50 mg EC tablet Take 1 tablet (50 mg total) by mouth 2 (two) times a day as needed (knee pain) 60 tablet 1    fluticasone (FLONASE) 50 mcg/act nasal spray 1 spray into each nostril daily 16 g 0    gabapentin (NEURONTIN) 800 mg tablet Take 1 tablet (800 mg total) by mouth 3 (three) times a day 90 tablet 3    glucose blood (FREESTYLE LITE) test strip Testing sugars once a day 100 each 3    hydrocortisone 1 % ointment Apply topically 2 (two) times a day 30 g 0    hydrOXYzine HCL (ATARAX) 50 mg tablet Take 50 mg by mouth daily at bedtime      hydrOXYzine pamoate (VISTARIL) 50 mg capsule Take 50 mg by mouth daily at bedtime      ibuprofen (MOTRIN) 600 mg tablet Take 1 tablet (600 mg total) by mouth every 6 (six) hours as needed for mild pain 30 tablet 0    Lancets (freestyle) lancets Daily 100 each 5    lidocaine (XYLOCAINE) 5 % ointment Apply topically as needed for mild pain 35 44 g 0    meloxicam (MOBIC) 15 mg tablet Take 1 tablet (15 mg total) by mouth daily 30 tablet 1    metFORMIN (GLUCOPHAGE) 1000 MG tablet Take 1 tablet (1,000 mg total) by mouth 2 (two) times a day with meals 60 tablet 3    Misc  Devices (Wrist Brace) MISC Use daily 2 each 0    naproxen (NAPROSYN) 500 mg tablet Take 1 tablet (500 mg total) by mouth 2 (two) times a day with meals for 14 doses 14 tablet 0    nicotine (NICODERM CQ) 21 mg/24 hr TD 24 hr patch Place 1 patch on the skin every 24 hours 28 patch 0    tamsulosin (FLOMAX) 0 4 mg Take 1 capsule (0 4 mg total) by mouth daily with dinner 30 capsule 3    temazepam (RESTORIL) 30 mg capsule Take 30 mg by mouth daily at bedtime      zolpidem (AMBIEN) 10 mg tablet Take 10 mg by mouth daily at bedtime      [DISCONTINUED] albuterol (PROVENTIL HFA,VENTOLIN HFA) 90 mcg/act inhaler Inhale 1-2 puffs every 6 (six) hours as needed for wheezing 18 g 1    [DISCONTINUED] omeprazole (PriLOSEC) 40 MG capsule Take 1 capsule (40 mg total) by mouth daily 90 capsule 2    [DISCONTINUED] Tradjenta 5 MG TABS take 1 tablet by mouth once daily 30 tablet 1     No current facility-administered medications on file prior to visit  Review of Systems   Constitutional: Negative for chills and fever  HENT: Negative for ear pain and sore throat  Eyes: Negative for pain and visual disturbance  Respiratory: Negative for cough and shortness of breath  Cardiovascular: Negative for chest pain and palpitations  Gastrointestinal: Negative for abdominal pain and vomiting  Genitourinary: Negative for dysuria and hematuria  Musculoskeletal: Positive for arthralgias  Negative for back pain  Skin: Negative for color change and rash  Neurological: Negative for seizures and syncope     All other systems reviewed and are negative  BMI Counseling: Body mass index is 31 27 kg/m²  The BMI is above normal  Nutrition recommendations include decreasing portion sizes, encouraging healthy choices of fruits and vegetables, decreasing fast food intake, consuming healthier snacks and limiting drinks that contain sugar  Rationale for BMI follow-up plan is due to patient being overweight or obese  Objective:    /84 (BP Location: Left arm, Patient Position: Sitting, Cuff Size: Standard)   Pulse 83   Temp 98 2 °F (36 8 °C) (Temporal)   Resp 18   Wt 102 kg (224 lb 3 2 oz)   SpO2 97%   BMI 31 27 kg/m²     Physical Exam  Vitals and nursing note reviewed  Constitutional:       General: He is not in acute distress  Appearance: He is well-developed  He is not diaphoretic  HENT:      Head: Normocephalic and atraumatic  Right Ear: External ear normal       Left Ear: External ear normal    Eyes:      Conjunctiva/sclera: Conjunctivae normal       Pupils: Pupils are equal, round, and reactive to light  Cardiovascular:      Rate and Rhythm: Normal rate and regular rhythm  Pulses: Normal pulses  Dorsalis pedis pulses are 2+ on the right side and 2+ on the left side  Heart sounds: Normal heart sounds  Pulmonary:      Effort: Pulmonary effort is normal  No respiratory distress  Breath sounds: Normal breath sounds  No wheezing  Abdominal:      General: Bowel sounds are normal  There is no distension  Palpations: Abdomen is soft  Tenderness: There is no abdominal tenderness  Musculoskeletal:      Cervical back: Normal range of motion and neck supple  Right lower leg: No edema  Left lower leg: No edema  Feet:      Right foot:      Skin integrity: No ulcer, skin breakdown, erythema, warmth, callus or dry skin  Left foot:      Skin integrity: No ulcer, skin breakdown, erythema, warmth, callus or dry skin     Lymphadenopathy:      Cervical: No cervical adenopathy  Skin:     General: Skin is warm and dry  Capillary Refill: Capillary refill takes less than 2 seconds  Findings: No rash  Neurological:      General: No focal deficit present  Mental Status: He is alert and oriented to person, place, and time     Psychiatric:         Mood and Affect: Mood normal          Behavior: Behavior normal          RAMO Mackey  01/26/22  1:22 PM

## 2022-01-26 NOTE — PATIENT INSTRUCTIONS
Diabetes and Nutrition   AMBULATORY CARE:   Nutrition plans  help with healthy eating patterns that improve health  Nutrition plans and regular exercise help keep your blood sugar levels steady  They also help delay or prevent complications of diabetes, such as diabetic kidney disease  Call your local emergency number [phone] in the [address]) if:   · You have any of the following signs of a heart attack:      ? Squeezing, pressure, or pain in your chest    ? You may  also have any of the following:     § Discomfort or pain in your back, neck, jaw, stomach, or arm    § Shortness of breath    § Nausea or vomiting    § Lightheadedness or a sudden cold sweat      Seek care immediately if:   · You have a low blood sugar level and it does not improve with treatment  Symptoms are trouble thinking, a pounding heartbeat, and sweating  · Your blood sugar level is above 240 mg/dL and does not come down within 15 minutes of treatment  · You have ketones in your blood or urine  · You have nausea or are vomiting and cannot keep any food or liquid down  · You have blurred or double vision  · Your breath has a fruity, sweet smell, or your breathing is shallow  Call your doctor or diabetes care team if:   · Your blood sugar levels are higher than your target goals  · You often have low blood sugar levels  · You have trouble coping with diabetes, or you feel anxious or depressed  · You have questions or concerns about your condition or care  A dietitian will help you create a nutrition plan  to meet your needs and your family's needs  The goal is for you to reach or maintain healthy weight, blood sugar, blood pressure, and lipid levels  You should meet with the dietitian at least 1 time each year   You will learn the following:  · How food affects your blood sugar levels    · How to create healthy eating habits    · How to make food choices based on your activity level, weight, and glucose levels    · How your favorite foods may fit into your plan    · How to keep track of carbohydrates    · Correct portion sizes for each food    · Changes you can make to your plan if you get pregnant or are breastfeeding    What you can do before you meet with the dietitian:   · Do not skip meals  The goal is to keep your blood sugar level steady  Blood sugar levels may drop too low if you have received insulin and do not eat  · Eat more high-fiber foods, such as fresh or frozen fruits and vegetables, whole-grain breads, and beans  Fiber helps control or lower blood sugar and cholesterol levels  Choose whole fruits instead of fruit juice as much as possible  Sugar may be added to juice, and fiber may be removed  · Choose heart-healthy fats  Foods high in heart-healthy fats include olive oil, nuts, avocados, and fatty fish, such as salmon and tuna  Foods high in unhealthy fats include red meat, full-fat dairy products, and soft margarine  Unhealthy fats can increase your risk for heart disease, increase bad cholesterol, and lower good cholesterol  · Choose complex carbohydrates  Foods with complex carbohydrates include brown rice, whole-grain breads and cereals, and cooked beans  Foods with simple carbohydrates include white bread, white rice, most cold cereals, and snack foods  Your plan will include the amount of carbohydrate to have at one time or in a day  Your blood sugar level can get too high if you eat too much carbohydrate at one time  Blood sugar levels do not spike as high or drop as quickly with complex carbohydrates as with simple carbohydrates  Choose complex carbohydrates whenever possible  · Have less sodium (salt)  The risk for high blood pressure (BP) increases with high-sodium foods  Limit high-sodium foods, such as soy sauce, potato chips, and canned soup  Do not add salt to food you cook  Limit your use of table salt  Read labels to have no more than 2,300 milligrams of sodium in one day  · Limit artificial sweeteners  These may be found in food or drinks, such as diet soft drinks or other low-calorie beverages  Artificial sweeteners are low in calories  They may help you lower your overall calories and carbohydrates  It is important not to have more calories from other foods to make up for the calories saved  Artificial sweeteners do not have any nutrition  Eat whole foods and drink water as much as possible  Your plan may include beverages with artificial sweeteners for a short time  These can help you transition from high-sugar beverages to water  · Use the plate method for each meal   This method can help you eat the right amount of carbohydrates and keep your blood sugar levels under control  ? Draw an imaginary line down the middle of a 9-inch dinner plate  On one side, draw another line to divide that section in half  Your plate will have one large section and 2 small sections  ? Fill the largest section with non-starchy vegetables  These include broccoli, spinach, cucumbers, peppers, cauliflower, and tomatoes  ? Add a starch to one of the small sections  Starches include pasta, rice, whole-grain bread, tortillas, corn, potatoes, and beans  ? Add meat or another source of protein to the other small section  Examples include chicken or turkey without skin, fish, lean beef or pork, low-fat cheese, tofu, and eggs  ? Add dairy products or fruit next to your plate if your meal plan allows  Examples of dairy include skim or 1% milk and low-fat yogurt  If you do not drink milk or eat dairy products, you may be able to add another serving of starchy food instead  ? Have a low-calorie or calorie-free drink with your meal  Examples include water or unsweetened tea or coffee  Know the risks if you choose to drink alcohol:  Alcohol can cause hypoglycemia (low blood sugar level), especially if you use insulin   Alcohol can cause high blood sugar and BP levels, and weight gain if you drink too much  Women 21 years or older and men 72 years or older should limit alcohol to 1 drink a day  Men aged 24 to 59 years should limit alcohol to 2 drinks a day  A drink of alcohol is 12 ounces of beer, 5 ounces of wine, or 1½ ounces of liquor  Hypoglycemia can happen hours after you drink alcohol  Check your blood sugar level for several hours after you drink alcohol  Have a source of fast-acting carbohydrates with you in case your level goes too low  You need immediate care if you have signs or symptoms of hypoglycemia, such as sweating, confusion, or fainting  Maintain a healthy weight:  A healthy weight can help you control your diabetes  You can maintain a healthy weight with a nutrition plan and exercise  Ask your healthcare provider how much you should weigh  Ask him or her to help you create a weight loss plan if you are overweight  Together you can set weight loss and maintenance goals  Follow up with your diabetes team as directed:  Write down your questions so you remember to ask them during your visits  © Copyright [employer] 2021 Information is for End User's use only and may not be sold, redistributed or otherwise used for commercial purposes  All illustrations and images included in CareNotes® are the copyrighted property of [employer] Inc  or [facility] Saira Miller   The above information is an  only  It is not intended as medical advice for individual conditions or treatments  Talk to your doctor, nurse or pharmacist before following any medical regimen to see if it is safe and effective for you

## 2022-01-27 LAB — SARS-COV-2 RNA RESP QL NAA+PROBE: NEGATIVE

## 2022-02-01 ENCOUNTER — CLINICAL SUPPORT (OUTPATIENT)
Dept: NUTRITION | Facility: HOSPITAL | Age: 57
End: 2022-02-01
Attending: ORTHOPAEDIC SURGERY
Payer: MEDICARE

## 2022-02-01 DIAGNOSIS — E11.9 TYPE 2 DIABETES MELLITUS WITHOUT COMPLICATION, WITHOUT LONG-TERM CURRENT USE OF INSULIN (HCC): ICD-10-CM

## 2022-02-01 DIAGNOSIS — E66.3 OVERWEIGHT: ICD-10-CM

## 2022-02-01 PROCEDURE — 97802 MEDICAL NUTRITION INDIV IN: CPT | Performed by: DIETITIAN, REGISTERED

## 2022-02-01 NOTE — PROGRESS NOTES
Initial Nutrition Assessment Form    Patient Name: Ash Herrera    YOB: 1965    Sex: Male     Assessment Date: 2/1/2022  Start Time: 10:50 Stop Time: 11:35 Total Minutes: 39     Data:  Present at session: self and wife   Parent/Patient Concerns:  "doctor sent me here, I dont know"   Medical Dx/Reason for Referral:  E66 3 Overweight    Pt has T2DM, requested referral adjustment from ordering provider which has not yet occurred      Past Medical History:   Diagnosis Date    Diverticulitis     Hypertension     Neck pain        Current Outpatient Medications   Medication Sig Dispense Refill    acetaminophen (TYLENOL) 325 mg tablet Take 2 tablets (650 mg total) by mouth every 6 (six) hours as needed for mild pain 90 tablet 0    albuterol (PROVENTIL HFA,VENTOLIN HFA) 90 mcg/act inhaler Inhale 1-2 puffs every 6 (six) hours as needed for wheezing 18 g 1    ARIPiprazole (ABILIFY) 10 mg tablet Take 10 mg by mouth daily at bedtime      aspirin (ECOTRIN LOW STRENGTH) 81 mg EC tablet Take 1 tablet (81 mg total) by mouth daily 30 tablet 3    atenolol (TENORMIN) 50 mg tablet Take 1 tablet (50 mg total) by mouth daily 90 tablet 3    atorvastatin (LIPITOR) 40 mg tablet Take 1 tablet (40 mg total) by mouth daily 90 tablet 1    baclofen 10 mg tablet Take 1 tablet (10 mg total) by mouth 3 (three) times a day 90 tablet 0    Blood Glucose Monitoring Suppl (FreeStyle Lite) SHANELL Use daily Use as directed 1 each 0    buPROPion (WELLBUTRIN SR) 150 mg 12 hr tablet Take 150 mg by mouth every morning      busPIRone (BUSPAR) 15 mg tablet Take 15 mg by mouth daily       cloNIDine (CATAPRES) 0 2 mg tablet Take 0 2 mg by mouth daily at bedtime      diclofenac sodium (VOLTAREN) 50 mg EC tablet Take 1 tablet (50 mg total) by mouth 2 (two) times a day as needed (knee pain) 60 tablet 1    fluticasone (FLONASE) 50 mcg/act nasal spray 1 spray into each nostril daily 16 g 0    gabapentin (NEURONTIN) 800 mg tablet Take 1 tablet (800 mg total) by mouth 3 (three) times a day 90 tablet 3    glucose blood (FREESTYLE LITE) test strip Testing sugars once a day 100 each 3    hydrocortisone 1 % ointment Apply topically 2 (two) times a day 30 g 0    hydrOXYzine HCL (ATARAX) 50 mg tablet Take 50 mg by mouth daily at bedtime      Lancets (freestyle) lancets Daily 100 each 5    linaGLIPtin (Tradjenta) 5 MG TABS Take 5 mg by mouth daily 30 tablet 1    meloxicam (MOBIC) 15 mg tablet Take 1 tablet (15 mg total) by mouth daily 30 tablet 1    metFORMIN (GLUCOPHAGE) 1000 MG tablet Take 1 tablet (1,000 mg total) by mouth 2 (two) times a day with meals 60 tablet 3    Misc  Devices (Wrist Brace) MISC Use daily 2 each 0    nicotine (NICODERM CQ) 21 mg/24 hr TD 24 hr patch Place 1 patch on the skin every 24 hours 28 patch 0    omeprazole (PriLOSEC) 40 MG capsule Take 1 capsule (40 mg total) by mouth daily 90 capsule 2    tamsulosin (FLOMAX) 0 4 mg Take 1 capsule (0 4 mg total) by mouth daily with dinner 30 capsule 3    temazepam (RESTORIL) 30 mg capsule Take 30 mg by mouth daily at bedtime      zolpidem (AMBIEN) 10 mg tablet Take 10 mg by mouth daily at bedtime       No current facility-administered medications for this visit  Additional Meds/Supplements:  none   Special Learning Needs:  none   Height:  6'0"   Weight:      224# at home    224 3# today in office    No set weight to obtain, would just like to lose belly fat Wt Readings from Last 10 Encounters:   01/26/22 102 kg (224 lb 3 2 oz)   10/27/21 98 4 kg (217 lb)   10/11/21 101 kg (222 lb)   09/23/21 98 7 kg (217 lb 9 6 oz)   09/22/21 98 4 kg (217 lb)   08/31/21 101 kg (222 lb)   08/24/21 101 kg (222 lb 10 6 oz)   08/17/21 99 8 kg (220 lb)   06/29/21 101 kg (222 lb)   06/28/21 101 kg (222 lb 7 1 oz)     Estimated body mass index is 31 27 kg/m² as calculated from the following:    Height as of 10/11/21: 5' 11" (1 803 m)  Weight as of 1/26/22: 102 kg (224 lb 3 2 oz)     Recent Weight Change: []Yes     [x]No  Amount:  weight has been stable throughout quarantine      Energy Needs: No calculations performed for this visit   No Known Allergies  NKFA   Social History     Substance and Sexual Activity   Alcohol Use Yes    Comment: drinks only on weekends      Alcohol: only on weekends- no preference on drinks   Social History     Tobacco Use   Smoking Status Current Every Day Smoker    Packs/day: 0 25    Types: Cigarettes, Cigars   Smokeless Tobacco Never Used   Tobacco Comment    1 cigar/daily       Who shops? patient and spouse; [employer]; price right   Who cooks? spouse   Exercise:  none   Prior Counseling? []Yes     [x]No  When:      Why:         Diet Hx:  Breakfast:    Once in a while; wakes up at around 11A    Scrambled eggs  Oatmeal  Pancakes  [place] with ham and cheese (italian bread)  Coffee- milk and sugar (1-1 5 spoons) Diet:   Lunch:    No fastfood  Sometimes will have leftovers  Not specific on what he eats    No set time         Dinner:    Rice and beans  Pork chops, chicken, beef  Lasagna  Big variety      no set time     No snacks in between meals    Beverages: water, no soda, iced tea, juice (no particular kind)  Sleep Habits: goes to sleep very late 3-4A and stays in bed until around 11A           Nutrition Diagnosis:   Excess carbohydrate intake  related to Food and nutrition related knowledge deficit concerning appropriate amount of carbohydrate intake as  evidenced by Hemoglobin A1C >6%       Medical Nutrition Therapy Intervention:  [x]Individualized Meal Plan: 45-75g CHO at each meal []Understanding Lab Values   []Basic Pathophysiology of Disease []Food/Medication Interactions   []Food Diary [x]Exercise: Recommend 150 minutes of moderate intensity exercise per week (or 30 minutes of moderate intensity exercise x5 days per week)   [x]Lifestyle/Behavior Modification Techniques:  Reviewed WFPB diet: Choose fiber-filled, nutrient-dense whole plant foods to fill half to 3/4 of your plate, including Vegetables and fruits, Legumes, whole grains, nuts/seeds; Limit/avoid sugary drinks/beverages, processed meats and snacks, red meats/poultry/eggs/high-fat dairy    Healthier food options: Increase fruits and vegetables; try new food items at least 12-16 times to assess acceptance; remove unhealthy snack items from the home so fruits and vegetables and other healthy items are available as snack choices  Mindful eating techniques: slowing down rate of eating to 30 minutes per meal; waiting 20 minutes and drinking a glass of water before getting 2nd helping of food item; focus on protein source and vegetable when getting 2nd helping vs carbohydrate  Portion control: Measure portions as often as possible for accurate calorie counting using measuring spoons/cups as able, use hands to estimate portions if kitchen utensils are not available; download EBIQUOUS for calorie tracking; half of plate should be nonstarchy vegetables; reduce CHO servings to 1/3-1/4 cup  [x]Medication, Mechanism of Action: still taking Metformin BID   [x]Label Reading: label reading for CHO intake [x]Self Blood Glucose Monitoring: has not been checking blood sugar for the past 1 month due to inability to afford testing strips  They used to be free and are now costing him around $13, which he does not have extra income for; -180, usually over 200  [x]Weight/BMI Goals: no set weight loss goal, only wishes to reduce the size of his abdomen []Other -    Other Notes/Assessment:       Comprehension: []Excellent  []Very Good  []Good  [x]Fair   []Poor    Receptivity: []Excellent  []Very Good  [x]Good  []Fair   []Poor    Expected Compliance: []Excellent  []Very Good  []Good  [x]Fair   []Poor        Goals:  1  Abel Hernandez will reduce his portion sizes of CHO foods to 1/2-1c per serving   2  Abel Hernandez will reduce his frequency of potatoes at mealtimes, switching to a higher fiber CHO as discussed with RD   3   Abel Hernandez will attempt to reduce his mindless eating habits in front of the TV by pre-portioning his movie snacks before sitting down  4  Vincent Cool will begin to test his BG at least 3x daily  5  Vincent Cool will reduce his A1c to 9% by next bloodwork in three months time       No follow-ups on file    Labs:  CMP  Lab Results   Component Value Date     05/17/2018    K 4 2 12/03/2021     12/03/2021    CO2 32 (H) 12/03/2021    ANIONGAP 6 05/17/2018    BUN 14 12/03/2021    CREATININE 0 91 12/03/2021    GLUCOSE 143 (H) 05/17/2018    GLUF 286 (H) 12/03/2021    CALCIUM 9 3 12/03/2021    AST 31 12/03/2021    ALT 42 12/03/2021    ALKPHOS 96 12/03/2021    PROT 7 4 05/15/2018    BILITOT 0 7 05/15/2018    EGFR 93 12/03/2021       BMP  Lab Results   Component Value Date    GLUCOSE 143 (H) 05/17/2018    CALCIUM 9 3 12/03/2021     05/17/2018    K 4 2 12/03/2021    CO2 32 (H) 12/03/2021     12/03/2021    BUN 14 12/03/2021    CREATININE 0 91 12/03/2021       Lipids  Lab Results   Component Value Date    CHOL 237 (H) 05/15/2018     Lab Results   Component Value Date    HDL 32 (L) 12/03/2021    HDL 39 (L) 09/20/2018    HDL 42 05/15/2018     Lab Results   Component Value Date    LDLCALC 93 12/03/2021    LDLCALC 102 09/20/2018    LDLCALC 152 (H) 05/15/2018     Lab Results   Component Value Date    TRIG 294 (H) 12/03/2021    TRIG 176 (H) 09/20/2018    TRIG 217 (H) 05/15/2018     No results found for: CHOLHDL    Hemoglobin A1C  Lab Results   Component Value Date    HGBA1C 9 5 (A) 01/26/2022       Fasting Glucose  Lab Results   Component Value Date    GLUF 286 (H) 12/03/2021       Insulin     Thyroid  No results found for: TSH, L0UKJCV, M6COVXN, THYROIDAB    Hepatic Function Panel  Lab Results   Component Value Date    ALT 42 12/03/2021    AST 31 12/03/2021    ALKPHOS 96 12/03/2021    BILITOT 0 7 05/15/2018       Celiac Disease Antibody Panel  No results found for: ENDOMYSIAL IGA, GLIADIN IGA, GLIADIN IGG, IGA, TISSUE TRANSGLUT AB, TTG IGA Iron  No results found for: IRON, TIBC, FERRITIN         [unfilled]  [address] [facility]  [address] [facility] [id number] [phone]

## 2022-02-11 ENCOUNTER — OFFICE VISIT (OUTPATIENT)
Dept: OBGYN CLINIC | Facility: MEDICAL CENTER | Age: 57
End: 2022-02-11
Payer: COMMERCIAL

## 2022-02-11 VITALS
WEIGHT: 226 LBS | HEIGHT: 71 IN | DIASTOLIC BLOOD PRESSURE: 66 MMHG | TEMPERATURE: 97.8 F | SYSTOLIC BLOOD PRESSURE: 103 MMHG | HEART RATE: 67 BPM | BODY MASS INDEX: 31.64 KG/M2

## 2022-02-11 DIAGNOSIS — M17.12 PRIMARY OSTEOARTHRITIS OF LEFT KNEE: Primary | ICD-10-CM

## 2022-02-11 PROCEDURE — 99213 OFFICE O/P EST LOW 20 MIN: CPT | Performed by: ORTHOPAEDIC SURGERY

## 2022-02-11 RX ORDER — DICLOFENAC SODIUM 75 MG/1
75 TABLET, DELAYED RELEASE ORAL 2 TIMES DAILY
Qty: 60 TABLET | Refills: 0 | Status: SHIPPED | OUTPATIENT
Start: 2022-02-11 | End: 2022-05-25

## 2022-02-11 NOTE — PROGRESS NOTES
Assessment/Plan:  1  Primary osteoarthritis of left knee      Orders Placed This Encounter   Procedures    HEMOGLOBIN A1C W/ EAG ESTIMATION     Left knee DJD    Patient is not currently a candidate for total knee arthroplasty or intra-articular corticosteroid injection at this time due to his uncontrolled diabetes with his hemoglobin A1c of 9 5 on 1/26/22  Patient understands and will be more compliant with his diabetes medications  Patient was provided with a hinged knee brace in the office today  Prescription for diclofenac  Follow-up in three months for repeat evaluation  Hemoglobin A1c lab work was ordered to be drawn prior to his appointment in three months  Return in about 3 months (around 5/11/2022)  I answered all of the patient's questions during the visit and provided education of the patient's condition during the visit  The patient verbalized understanding of the information given and agrees with the plan  This note was dictated using [employer] software  It may contain errors including improperly dictated words  Please contact physician directly for any questions  Subjective   Chief Complaint:  Left knee pain    HPI  Jody Chen is a 59 y o  male who presents for follow up with regards to his left knee DJD  He was last seen on 10/11/21  At that time patient declined any intra-articular corticosteroid injection  He was interested in surgical intervention however his diabetes needed better control  He returns to the office today with persistent left knee pain  Pain is generalized in nature  Pain is worse with prolonged standing and walking  He notes associated stiffness and swelling  He denies any weakness or instability  He has taken meloxicam in the past without relief  He currently takes gabapentin with mild improvement  No numbness or tingling  No fevers or chills  Patient did recently have a hemoglobin A1c level drawn 1/26/22 which was 9 5      Review of Systems  ROS:    See HPI for musculoskeletal review  All other systems reviewed are negative     History:  Past Medical History:   Diagnosis Date    Diverticulitis     Hypertension     Neck pain      Past Surgical History:   Procedure Laterality Date    COLON SURGERY      NECK SURGERY       Social History   Social History     Substance and Sexual Activity   Alcohol Use Yes    Comment: drinks only on weekends     Social History     Substance and Sexual Activity   Drug Use Never     Social History     Tobacco Use   Smoking Status Current Every Day Smoker    Packs/day: 0 25    Types: Cigarettes, Cigars   Smokeless Tobacco Never Used   Tobacco Comment    1 cigar/daily     Family History: No family history on file      Current Outpatient Medications on File Prior to Visit   Medication Sig Dispense Refill    acetaminophen (TYLENOL) 325 mg tablet Take 2 tablets (650 mg total) by mouth every 6 (six) hours as needed for mild pain 90 tablet 0    albuterol (PROVENTIL HFA,VENTOLIN HFA) 90 mcg/act inhaler Inhale 1-2 puffs every 6 (six) hours as needed for wheezing 18 g 1    ARIPiprazole (ABILIFY) 10 mg tablet Take 10 mg by mouth daily at bedtime      aspirin (ECOTRIN LOW STRENGTH) 81 mg EC tablet Take 1 tablet (81 mg total) by mouth daily 30 tablet 3    atenolol (TENORMIN) 50 mg tablet Take 1 tablet (50 mg total) by mouth daily 90 tablet 3    atorvastatin (LIPITOR) 40 mg tablet Take 1 tablet (40 mg total) by mouth daily 90 tablet 1    baclofen 10 mg tablet Take 1 tablet (10 mg total) by mouth 3 (three) times a day 90 tablet 0    Blood Glucose Monitoring Suppl (FreeStyle Lite) SHANELL Use daily Use as directed 1 each 0    buPROPion (WELLBUTRIN SR) 150 mg 12 hr tablet Take 150 mg by mouth every morning      busPIRone (BUSPAR) 15 mg tablet Take 15 mg by mouth daily       cloNIDine (CATAPRES) 0 2 mg tablet Take 0 2 mg by mouth daily at bedtime      diclofenac sodium (VOLTAREN) 50 mg EC tablet Take 1 tablet (50 mg total) by mouth 2 (two) times a day as needed (knee pain) 60 tablet 1    fluticasone (FLONASE) 50 mcg/act nasal spray 1 spray into each nostril daily 16 g 0    gabapentin (NEURONTIN) 800 mg tablet Take 1 tablet (800 mg total) by mouth 3 (three) times a day 90 tablet 3    glucose blood (FREESTYLE LITE) test strip Testing sugars once a day 100 each 3    hydrocortisone 1 % ointment Apply topically 2 (two) times a day 30 g 0    hydrOXYzine HCL (ATARAX) 50 mg tablet Take 50 mg by mouth daily at bedtime      Lancets (freestyle) lancets Daily 100 each 5    linaGLIPtin (Tradjenta) 5 MG TABS Take 5 mg by mouth daily 30 tablet 1    meloxicam (MOBIC) 15 mg tablet Take 1 tablet (15 mg total) by mouth daily 30 tablet 1    metFORMIN (GLUCOPHAGE) 1000 MG tablet Take 1 tablet (1,000 mg total) by mouth 2 (two) times a day with meals 60 tablet 3    Misc  Devices (Wrist Brace) MISC Use daily 2 each 0    nicotine (NICODERM CQ) 21 mg/24 hr TD 24 hr patch Place 1 patch on the skin every 24 hours 28 patch 0    omeprazole (PriLOSEC) 40 MG capsule Take 1 capsule (40 mg total) by mouth daily 90 capsule 2    tamsulosin (FLOMAX) 0 4 mg Take 1 capsule (0 4 mg total) by mouth daily with dinner 30 capsule 3    temazepam (RESTORIL) 30 mg capsule Take 30 mg by mouth daily at bedtime      zolpidem (AMBIEN) 10 mg tablet Take 10 mg by mouth daily at bedtime       No current facility-administered medications on file prior to visit  No Known Allergies     Objective     /66   Pulse 67   Temp 97 8 °F (36 6 °C)   Ht 5' 11" (1 803 m)   Wt 103 kg (226 lb)   BMI 31 52 kg/m²      PE:  AAOx 3  WDWN  Hearing intact, no drainage from eyes  no audible wheezing  no abdominal distension  LE compartments soft, skin intact    Ortho Exam:  left Knee:   Skin intact  No gross deformity  No erythema ecchymosis or swelling  Trace effusion  No joint line tenderness  Range of motion 0-120 without pain  Stable to varus and valgus stress  Stable Lachman test   Negative posterior drawer test   Negative Miquel's test   Sensation is intact distally  Skin warm and well perfused

## 2022-02-15 DIAGNOSIS — M17.12 PRIMARY OSTEOARTHRITIS OF LEFT KNEE: Primary | ICD-10-CM

## 2022-02-28 ENCOUNTER — PATIENT OUTREACH (OUTPATIENT)
Dept: FAMILY MEDICINE CLINIC | Facility: CLINIC | Age: 57
End: 2022-02-28

## 2022-02-28 NOTE — PROGRESS NOTES
I called the patient to follow up  He states he has been doing well  He notes he has not been checking his blood sugars because he is being charged $13 for the test strips  He states he is watching his diet and taking his medications as prescribed which I reviewed with him  He notes he is checking his feet daily and currently denies any skin breakdown  The patient also reports he has cut down on his smoking and stopped drinking for 2 months, both of which I congratulated him on  I called the pharmacy regarding the cost of the test strips and was told this is part of the patient's deductible  I called the patient back but received voicemail  Message was left informing him of this and if he cannot pay I asked him to call me back  I will continue to follow

## 2022-03-23 DIAGNOSIS — E11.9 TYPE 2 DIABETES MELLITUS WITHOUT COMPLICATION, WITHOUT LONG-TERM CURRENT USE OF INSULIN (HCC): ICD-10-CM

## 2022-03-23 RX ORDER — LINAGLIPTIN 5 MG/1
TABLET, FILM COATED ORAL
Qty: 30 TABLET | Refills: 1 | Status: SHIPPED | OUTPATIENT
Start: 2022-03-23 | End: 2022-05-16

## 2022-03-30 ENCOUNTER — VBI (OUTPATIENT)
Dept: ADMINISTRATIVE | Facility: OTHER | Age: 57
End: 2022-03-30

## 2022-04-01 ENCOUNTER — PATIENT OUTREACH (OUTPATIENT)
Dept: FAMILY MEDICINE CLINIC | Facility: CLINIC | Age: 57
End: 2022-04-01

## 2022-04-01 NOTE — PROGRESS NOTES
I called the patient to follow up  He states he is doing well  He notes his blood sugars are running ~240  I informed him we would like them lower and he responded if they go any lower he gets "agitated"  He states he is watching his diet and taking his medications as prescribed  He will be picking up more test strips and his ASA refill at the pharmacy  The patient notes he continues checking his feet daily and denies any skin breakdown  The patient reports he continues to abstain from drinking and is down to 3 cigarettes per day; I congratulated him on both  I reminded the patient of his GI appointment on Monday but he would like to change the time; phone number provided  The patient will call with any questions or concerns  I will continue to follow

## 2022-04-04 ENCOUNTER — CONSULT (OUTPATIENT)
Dept: GASTROENTEROLOGY | Facility: CLINIC | Age: 57
End: 2022-04-04
Payer: MEDICARE

## 2022-04-04 VITALS
WEIGHT: 214.2 LBS | TEMPERATURE: 96.4 F | HEART RATE: 70 BPM | BODY MASS INDEX: 29.87 KG/M2 | DIASTOLIC BLOOD PRESSURE: 65 MMHG | SYSTOLIC BLOOD PRESSURE: 108 MMHG

## 2022-04-04 DIAGNOSIS — K76.0 HEPATIC STEATOSIS: Primary | ICD-10-CM

## 2022-04-04 DIAGNOSIS — K21.9 CHRONIC GERD: ICD-10-CM

## 2022-04-04 DIAGNOSIS — Z12.11 COLON CANCER SCREENING: ICD-10-CM

## 2022-04-04 DIAGNOSIS — R79.89 ABNORMAL LFTS: ICD-10-CM

## 2022-04-04 PROCEDURE — 99204 OFFICE O/P NEW MOD 45 MIN: CPT | Performed by: INTERNAL MEDICINE

## 2022-04-04 NOTE — PROGRESS NOTES
[facility] - Outpatient Consultation  Erika Kwon 59 y o  male MRN: [id number]  Encounter: [id number]          ASSESSMENT AND PLAN:      53-year-old with diabetes, hypertension, GERD, abdominal pain presents for colon cancer screening evaluation  1  Colon cancer screening    Patient had inadequate bowel prep during procedure in 2018  Repeat was recommended in 3 years  He is due for colonoscopy  Patient agreeable to get procedure done  Will order procedure with 2 day prep  2  Chronic GERD   Patient has multiple risk factors for Barretts  He needs EGD for Larsen's screening as well as to investigate abdominal pain  Patient agreeable to get procedure done  EGD ordered  3  Left lower quadrant abdominal pain   Unsure of etiology  Could be due to constipation, diverticulitis, peptic ulcer disease, H pylori infection  Will investigate with EGD and colonoscopy  4  Abnormal LFTs  5  Hepatic steatosis     Patient has mild elevation of transaminases  He has multiple risk factors for nonalcoholic fatty liver disease  Hepatitis-C screening test is pending  Will check hepatitis a and B status as well along with elastography  Further management plan based on investigation results  6  Preventative healthcare   Hepatitis-C antibodies pending  RTC 4 months     Jose Ingram MD  Gastroenterology Fellow  [address]  Date: April 4, 2022    - Ambulatory referral to Gastroenterology  - bisacodyl (DULCOLAX) 5 mg EC tablet; Take 4 tablets (20 mg total) by mouth once for 1 dose Colonoscopy prep  Dispense: 4 tablet; Refill: 0  - polyethylene glycol (GOLYTELY) 4000 mL solution; Take 4,000 mL by mouth once for 1 dose Colonoscopy prep  Dispense: 4000 mL; Refill: 0  - Hepatitis B surface antibody; Future  - Hepatitis B surface antigen; Future  - Hepatitis A antibody, total; Future  - Hepatitis B core antibody, total; Future  - US elastography;  Future  - EGD; Future  - Colonoscopy; Future      ______________________________________________________________________    HPI:  61-year-old with diabetes, hypertension, GERD, abdominal pain presents for colon cancer screening evaluation  Patient denies any nausea, vomiting  He has left lower quadrant abdominal pain intermittently for the last 1-2 months  He says he has had heartburn intermittently for years  He takes omeprazole 40 mg daily as needed to control the symptoms  He denies any constipation, diarrhea or blood in stools  Patient reports weight loss about 6 lb in the last 1-2 months she attributes to lifestyle modification including exercise and watching his diet  He is trying to get knee surgery so needs to lose weight prior to getting the procedure done  No family history of colon cancer  He takes alcohol occasionally and is a current smoker  He had colonoscopy in 2018 which was aborted due to poor bowel prep and repeat was recommended in 3 years  Cecum was reached during the procedure  Labs from December 2021 shows CBC normal, ALT 42 chronically elevated other LFTs normal     CT in 2020 showed hepatic steatosis  REVIEW OF SYSTEMS:    CONSTITUTIONAL: Denies any fever, chills, rigors, and weight loss  HEENT: No earache or tinnitus  Denies hearing loss or visual disturbances  CARDIOVASCULAR: No chest pain or palpitations  RESPIRATORY: Denies any cough, hemoptysis, shortness of breath or dyspnea on exertion  GASTROINTESTINAL: As noted in the History of Present Illness  GENITOURINARY: No problems with urination  Denies any hematuria or dysuria  NEUROLOGIC: No dizziness or vertigo, denies headaches  MUSCULOSKELETAL: Denies any muscle or joint pain  SKIN: Denies skin rashes or itching  ENDOCRINE: Denies excessive thirst  Denies intolerance to heat or cold  PSYCHOSOCIAL: Denies depression or anxiety  Denies any recent memory loss         Historical Information   Past Medical History:   Diagnosis Date    Diverticulitis     Hypertension     Neck pain      Past Surgical History:   Procedure Laterality Date    COLON SURGERY      NECK SURGERY       Social History   Social History     Substance and Sexual Activity   Alcohol Use Yes    Comment: drinks only on weekends     Social History     Substance and Sexual Activity   Drug Use Never     Social History     Tobacco Use   Smoking Status Current Every Day Smoker    Packs/day: 0 25    Types: Cigarettes, Cigars   Smokeless Tobacco Never Used   Tobacco Comment    1 cigar/daily     History reviewed  No pertinent family history  Meds/Allergies       Current Outpatient Medications:     acetaminophen (TYLENOL) 325 mg tablet    albuterol (PROVENTIL HFA,VENTOLIN HFA) 90 mcg/act inhaler    ARIPiprazole (ABILIFY) 10 mg tablet    aspirin (ECOTRIN LOW STRENGTH) 81 mg EC tablet    atenolol (TENORMIN) 50 mg tablet    atorvastatin (LIPITOR) 40 mg tablet    baclofen 10 mg tablet    Blood Glucose Monitoring Suppl (FreeStyle Lite) SHNAELL    buPROPion (WELLBUTRIN SR) 150 mg 12 hr tablet    busPIRone (BUSPAR) 15 mg tablet    cloNIDine (CATAPRES) 0 2 mg tablet    diclofenac (VOLTAREN) 75 mg EC tablet    diclofenac sodium (VOLTAREN) 50 mg EC tablet    fluticasone (FLONASE) 50 mcg/act nasal spray    gabapentin (NEURONTIN) 800 mg tablet    glucose blood (FREESTYLE LITE) test strip    hydrocortisone 1 % ointment    hydrOXYzine HCL (ATARAX) 50 mg tablet    Lancets (freestyle) lancets    meloxicam (MOBIC) 15 mg tablet    metFORMIN (GLUCOPHAGE) 1000 MG tablet    Misc   Devices (Wrist Brace) MISC    nicotine (NICODERM CQ) 21 mg/24 hr TD 24 hr patch    omeprazole (PriLOSEC) 40 MG capsule    tamsulosin (FLOMAX) 0 4 mg    temazepam (RESTORIL) 30 mg capsule    Tradjenta 5 MG TABS    zolpidem (AMBIEN) 10 mg tablet    bisacodyl (DULCOLAX) 5 mg EC tablet    polyethylene glycol (GOLYTELY) 4000 mL solution    No Known Allergies        Objective Blood pressure 108/65, pulse 70, temperature (!) 96 4 °F (35 8 °C), weight 97 2 kg (214 lb 3 2 oz)  Body mass index is 29 87 kg/m²  PHYSICAL EXAM:      General Appearance:   Alert, cooperative, no distress   HEENT:   Normocephalic, atraumatic, anicteric      Neck:  Supple, symmetrical, trachea midline   Lungs:   Clear to auscultation bilaterally; no rales, rhonchi or wheezing; respirations unlabored    Heart[de-identified]   Regular rate and rhythm; no murmur, rub, or gallop  Abdomen:   Soft, non-tender, non-distended; normal bowel sounds; no masses, no organomegaly    Genitalia:   Deferred    Rectal:   Deferred    Extremities:  No cyanosis, clubbing or edema    Pulses:  2+ and symmetric    Skin:  No jaundice, rashes, or lesions    Lymph nodes:  No palpable cervical lymphadenopathy        Lab Results:   No visits with results within 1 Day(s) from this visit  Latest known visit with results is:   Office Visit on 01/26/2022   Component Date Value    Hemoglobin A1C 01/26/2022 9 5*    SARS-CoV-2 01/26/2022 Negative          Radiology Results:   No results found

## 2022-04-04 NOTE — PATIENT INSTRUCTIONS
Scheduled date of Colon/EGD (as of today): 10/03/2022  Physician performing: Dr Mu Hopper  Location of procedure: [facility]  Bowel prep reviewed with patient: Daniela/Dulcolax  Instructions reviewed with patient by: MA  Clearances: SOPHIE diabetic

## 2022-04-29 ENCOUNTER — PATIENT OUTREACH (OUTPATIENT)
Dept: FAMILY MEDICINE CLINIC | Facility: CLINIC | Age: 57
End: 2022-04-29

## 2022-04-29 DIAGNOSIS — E78.2 MIXED HYPERLIPIDEMIA: ICD-10-CM

## 2022-04-29 NOTE — PROGRESS NOTES
I called the patient to remind him of his PCP appointment on Monday, 5/2 at [address]; he plans on attending  He is requesting a medication refill which I will submit to the PCP  I will continue to follow

## 2022-05-02 RX ORDER — ASPIRIN 81 MG/1
81 TABLET ORAL DAILY
Qty: 30 TABLET | Refills: 3 | Status: SHIPPED | OUTPATIENT
Start: 2022-05-02 | End: 2022-06-16 | Stop reason: SDUPTHER

## 2022-05-10 DIAGNOSIS — M25.511 CHRONIC PAIN OF BOTH SHOULDERS: ICD-10-CM

## 2022-05-10 DIAGNOSIS — G89.29 CHRONIC PAIN OF BOTH SHOULDERS: ICD-10-CM

## 2022-05-10 DIAGNOSIS — M25.512 CHRONIC PAIN OF BOTH SHOULDERS: ICD-10-CM

## 2022-05-10 RX ORDER — GABAPENTIN 800 MG/1
TABLET ORAL
Qty: 90 TABLET | Refills: 3 | Status: SHIPPED | OUTPATIENT
Start: 2022-05-10 | End: 2022-06-16 | Stop reason: SDUPTHER

## 2022-05-12 ENCOUNTER — PATIENT OUTREACH (OUTPATIENT)
Dept: FAMILY MEDICINE CLINIC | Facility: CLINIC | Age: 57
End: 2022-05-12

## 2022-05-12 NOTE — PROGRESS NOTES
I called the patient but received voicemail  Message was left reminding him of his Ortho appointment for tomorrow at 130pm and his ultrasound appointment 5/18 @0800  I will continue further outreach

## 2022-05-16 DIAGNOSIS — E11.9 TYPE 2 DIABETES MELLITUS WITHOUT COMPLICATION, WITHOUT LONG-TERM CURRENT USE OF INSULIN (HCC): ICD-10-CM

## 2022-05-16 RX ORDER — LINAGLIPTIN 5 MG/1
TABLET, FILM COATED ORAL
Qty: 30 TABLET | Refills: 1 | Status: SHIPPED | OUTPATIENT
Start: 2022-05-16 | End: 2022-06-16 | Stop reason: SDUPTHER

## 2022-05-24 ENCOUNTER — HOSPITAL ENCOUNTER (INPATIENT)
Facility: HOSPITAL | Age: 57
LOS: 1 days | Discharge: HOME | DRG: 149 | End: 2022-05-25
Attending: EMERGENCY MEDICINE | Admitting: FAMILY MEDICINE
Payer: MEDICARE

## 2022-05-24 ENCOUNTER — APPOINTMENT (EMERGENCY)
Dept: RADIOLOGY | Facility: HOSPITAL | Age: 57
DRG: 149 | End: 2022-05-24
Payer: MEDICARE

## 2022-05-24 ENCOUNTER — APPOINTMENT (EMERGENCY)
Dept: CT IMAGING | Facility: HOSPITAL | Age: 57
DRG: 149 | End: 2022-05-24
Payer: MEDICARE

## 2022-05-24 DIAGNOSIS — R26.2 AMBULATORY DYSFUNCTION: ICD-10-CM

## 2022-05-24 DIAGNOSIS — R42 DIZZINESS: Primary | ICD-10-CM

## 2022-05-24 DIAGNOSIS — I10 ESSENTIAL HYPERTENSION: ICD-10-CM

## 2022-05-24 DIAGNOSIS — R42 VERTIGO: ICD-10-CM

## 2022-05-24 DIAGNOSIS — E11.9 TYPE 2 DIABETES MELLITUS WITHOUT COMPLICATION, WITHOUT LONG-TERM CURRENT USE OF INSULIN (HCC): ICD-10-CM

## 2022-05-24 LAB
ALBUMIN SERPL BCP-MCNC: 3.9 G/DL (ref 3–5.2)
ALP SERPL-CCNC: 90 U/L (ref 43–122)
ALT SERPL W P-5'-P-CCNC: 33 U/L
ANION GAP SERPL CALCULATED.3IONS-SCNC: 8 MMOL/L (ref 5–14)
AST SERPL W P-5'-P-CCNC: 24 U/L (ref 17–59)
ATRIAL RATE: 63 BPM
BASOPHILS # BLD AUTO: 0.05 THOUSANDS/ΜL (ref 0–0.1)
BASOPHILS NFR BLD AUTO: 1 % (ref 0–1)
BILIRUB SERPL-MCNC: 0.56 MG/DL
BILIRUB UR QL STRIP: NEGATIVE
BUN SERPL-MCNC: 8 MG/DL (ref 5–25)
CALCIUM SERPL-MCNC: 9.1 MG/DL (ref 8.4–10.2)
CARDIAC TROPONIN I PNL SERPL HS: 3 NG/L (ref 8–18)
CHLORIDE SERPL-SCNC: 108 MMOL/L (ref 97–108)
CLARITY UR: CLEAR
CO2 SERPL-SCNC: 25 MMOL/L (ref 22–30)
COLOR UR: NORMAL
CREAT SERPL-MCNC: 0.82 MG/DL (ref 0.7–1.5)
EOSINOPHIL # BLD AUTO: 0.28 THOUSAND/ΜL (ref 0–0.61)
EOSINOPHIL NFR BLD AUTO: 3 % (ref 0–6)
ERYTHROCYTE [DISTWIDTH] IN BLOOD BY AUTOMATED COUNT: 15.7 % (ref 11.6–15.1)
EST. AVERAGE GLUCOSE BLD GHB EST-MCNC: 137 MG/DL
FLUAV RNA RESP QL NAA+PROBE: NEGATIVE
FLUBV RNA RESP QL NAA+PROBE: NEGATIVE
GFR SERPL CREATININE-BSD FRML MDRD: 96 ML/MIN/1.73SQ M
GLUCOSE SERPL-MCNC: 159 MG/DL (ref 70–99)
GLUCOSE SERPL-MCNC: 183 MG/DL (ref 65–140)
GLUCOSE SERPL-MCNC: 84 MG/DL (ref 65–140)
GLUCOSE SERPL-MCNC: 91 MG/DL (ref 65–140)
GLUCOSE UR STRIP-MCNC: NEGATIVE MG/DL
HBA1C MFR BLD: 6.4 %
HCT VFR BLD AUTO: 43.4 % (ref 36.5–49.3)
HGB BLD-MCNC: 14.5 G/DL (ref 12–17)
HGB UR QL STRIP.AUTO: NEGATIVE
IMM GRANULOCYTES # BLD AUTO: 0.04 THOUSAND/UL (ref 0–0.2)
IMM GRANULOCYTES NFR BLD AUTO: 1 % (ref 0–2)
KETONES UR STRIP-MCNC: NEGATIVE MG/DL
LEUKOCYTE ESTERASE UR QL STRIP: NEGATIVE
LYMPHOCYTES # BLD AUTO: 2.13 THOUSANDS/ΜL (ref 0.6–4.47)
LYMPHOCYTES NFR BLD AUTO: 25 % (ref 14–44)
MAGNESIUM SERPL-MCNC: 2 MG/DL (ref 1.6–2.3)
MCH RBC QN AUTO: 30.9 PG (ref 26.8–34.3)
MCHC RBC AUTO-ENTMCNC: 33.4 G/DL (ref 31.4–37.4)
MCV RBC AUTO: 93 FL (ref 82–98)
MONOCYTES # BLD AUTO: 0.51 THOUSAND/ΜL (ref 0.17–1.22)
MONOCYTES NFR BLD AUTO: 6 % (ref 4–12)
NEUTROPHILS # BLD AUTO: 5.38 THOUSANDS/ΜL (ref 1.85–7.62)
NEUTS SEG NFR BLD AUTO: 64 % (ref 43–75)
NITRITE UR QL STRIP: NEGATIVE
NRBC BLD AUTO-RTO: 0 /100 WBCS
NT-PROBNP SERPL-MCNC: 120 PG/ML (ref 0–299)
P AXIS: 19 DEGREES
PH UR STRIP.AUTO: 7 [PH]
PLATELET # BLD AUTO: 159 THOUSANDS/UL (ref 149–390)
PMV BLD AUTO: 12.3 FL (ref 8.9–12.7)
POTASSIUM SERPL-SCNC: 3.9 MMOL/L (ref 3.6–5)
PR INTERVAL: 162 MS
PROT SERPL-MCNC: 7.2 G/DL (ref 5.9–8.4)
PROT UR STRIP-MCNC: NEGATIVE MG/DL
QRS AXIS: 14 DEGREES
QRSD INTERVAL: 84 MS
QT INTERVAL: 426 MS
QTC INTERVAL: 435 MS
RBC # BLD AUTO: 4.69 MILLION/UL (ref 3.88–5.62)
RSV RNA RESP QL NAA+PROBE: NEGATIVE
SARS-COV-2 RNA RESP QL NAA+PROBE: NEGATIVE
SODIUM SERPL-SCNC: 141 MMOL/L (ref 137–147)
SP GR UR STRIP.AUTO: 1.01 (ref 1–1.04)
T WAVE AXIS: 28 DEGREES
TSH SERPL DL<=0.05 MIU/L-ACNC: 0.46 UIU/ML (ref 0.45–4.5)
UROBILINOGEN UA: NEGATIVE MG/DL
VENTRICULAR RATE: 63 BPM
WBC # BLD AUTO: 8.39 THOUSAND/UL (ref 4.31–10.16)

## 2022-05-24 PROCEDURE — 82948 REAGENT STRIP/BLOOD GLUCOSE: CPT

## 2022-05-24 PROCEDURE — 80053 COMPREHEN METABOLIC PANEL: CPT | Performed by: EMERGENCY MEDICINE

## 2022-05-24 PROCEDURE — 70450 CT HEAD/BRAIN W/O DYE: CPT

## 2022-05-24 PROCEDURE — 0241U HB NFCT DS VIR RESP RNA 4 TRGT: CPT | Performed by: EMERGENCY MEDICINE

## 2022-05-24 PROCEDURE — 36415 COLL VENOUS BLD VENIPUNCTURE: CPT | Performed by: EMERGENCY MEDICINE

## 2022-05-24 PROCEDURE — 83735 ASSAY OF MAGNESIUM: CPT | Performed by: EMERGENCY MEDICINE

## 2022-05-24 PROCEDURE — 82746 ASSAY OF FOLIC ACID SERUM: CPT | Performed by: STUDENT IN AN ORGANIZED HEALTH CARE EDUCATION/TRAINING PROGRAM

## 2022-05-24 PROCEDURE — 84443 ASSAY THYROID STIM HORMONE: CPT | Performed by: STUDENT IN AN ORGANIZED HEALTH CARE EDUCATION/TRAINING PROGRAM

## 2022-05-24 PROCEDURE — 93005 ELECTROCARDIOGRAM TRACING: CPT

## 2022-05-24 PROCEDURE — 96361 HYDRATE IV INFUSION ADD-ON: CPT

## 2022-05-24 PROCEDURE — 99285 EMERGENCY DEPT VISIT HI MDM: CPT | Performed by: EMERGENCY MEDICINE

## 2022-05-24 PROCEDURE — 85025 COMPLETE CBC W/AUTO DIFF WBC: CPT | Performed by: EMERGENCY MEDICINE

## 2022-05-24 PROCEDURE — 82607 VITAMIN B-12: CPT | Performed by: STUDENT IN AN ORGANIZED HEALTH CARE EDUCATION/TRAINING PROGRAM

## 2022-05-24 PROCEDURE — 84484 ASSAY OF TROPONIN QUANT: CPT | Performed by: EMERGENCY MEDICINE

## 2022-05-24 PROCEDURE — 93010 ELECTROCARDIOGRAM REPORT: CPT | Performed by: INTERNAL MEDICINE

## 2022-05-24 PROCEDURE — 99285 EMERGENCY DEPT VISIT HI MDM: CPT

## 2022-05-24 PROCEDURE — 96360 HYDRATION IV INFUSION INIT: CPT

## 2022-05-24 PROCEDURE — 71045 X-RAY EXAM CHEST 1 VIEW: CPT

## 2022-05-24 PROCEDURE — 83880 ASSAY OF NATRIURETIC PEPTIDE: CPT | Performed by: EMERGENCY MEDICINE

## 2022-05-24 PROCEDURE — 83036 HEMOGLOBIN GLYCOSYLATED A1C: CPT | Performed by: STUDENT IN AN ORGANIZED HEALTH CARE EDUCATION/TRAINING PROGRAM

## 2022-05-24 PROCEDURE — G1004 CDSM NDSC: HCPCS

## 2022-05-24 RX ORDER — ASPIRIN 81 MG/1
81 TABLET ORAL DAILY
Status: DISCONTINUED | OUTPATIENT
Start: 2022-05-24 | End: 2022-05-25 | Stop reason: HOSPADM

## 2022-05-24 RX ORDER — ALBUTEROL SULFATE 90 UG/1
2 AEROSOL, METERED RESPIRATORY (INHALATION) EVERY 6 HOURS PRN
Status: DISCONTINUED | OUTPATIENT
Start: 2022-05-24 | End: 2022-05-25 | Stop reason: HOSPADM

## 2022-05-24 RX ORDER — GABAPENTIN 400 MG/1
800 CAPSULE ORAL 3 TIMES DAILY
Status: DISCONTINUED | OUTPATIENT
Start: 2022-05-24 | End: 2022-05-25 | Stop reason: HOSPADM

## 2022-05-24 RX ORDER — FLUTICASONE PROPIONATE 50 MCG
1 SPRAY, SUSPENSION (ML) NASAL DAILY
Status: DISCONTINUED | OUTPATIENT
Start: 2022-05-24 | End: 2022-05-24

## 2022-05-24 RX ORDER — FLUTICASONE PROPIONATE 50 MCG
2 SPRAY, SUSPENSION (ML) NASAL DAILY
Status: DISCONTINUED | OUTPATIENT
Start: 2022-05-24 | End: 2022-05-25 | Stop reason: HOSPADM

## 2022-05-24 RX ORDER — NICOTINE 21 MG/24HR
1 PATCH, TRANSDERMAL 24 HOURS TRANSDERMAL DAILY
Status: DISCONTINUED | OUTPATIENT
Start: 2022-05-24 | End: 2022-05-25 | Stop reason: HOSPADM

## 2022-05-24 RX ORDER — MECLIZINE HCL 12.5 MG/1
50 TABLET ORAL ONCE
Status: COMPLETED | OUTPATIENT
Start: 2022-05-24 | End: 2022-05-24

## 2022-05-24 RX ORDER — ARIPIPRAZOLE 10 MG/1
10 TABLET ORAL
Status: DISCONTINUED | OUTPATIENT
Start: 2022-05-24 | End: 2022-05-25 | Stop reason: HOSPADM

## 2022-05-24 RX ORDER — ATENOLOL 50 MG/1
50 TABLET ORAL DAILY
Status: DISCONTINUED | OUTPATIENT
Start: 2022-05-25 | End: 2022-05-25

## 2022-05-24 RX ORDER — CLONIDINE HYDROCHLORIDE 0.1 MG/1
0.2 TABLET ORAL
Status: DISCONTINUED | OUTPATIENT
Start: 2022-05-24 | End: 2022-05-25 | Stop reason: HOSPADM

## 2022-05-24 RX ORDER — MECLIZINE HCL 12.5 MG/1
25 TABLET ORAL EVERY 8 HOURS SCHEDULED
Status: DISCONTINUED | OUTPATIENT
Start: 2022-05-24 | End: 2022-05-25 | Stop reason: HOSPADM

## 2022-05-24 RX ORDER — INSULIN LISPRO 100 [IU]/ML
1-6 INJECTION, SOLUTION INTRAVENOUS; SUBCUTANEOUS
Status: DISCONTINUED | OUTPATIENT
Start: 2022-05-24 | End: 2022-05-25 | Stop reason: HOSPADM

## 2022-05-24 RX ORDER — SODIUM CHLORIDE 9 MG/ML
75 INJECTION, SOLUTION INTRAVENOUS CONTINUOUS
Status: DISCONTINUED | OUTPATIENT
Start: 2022-05-24 | End: 2022-05-25

## 2022-05-24 RX ORDER — TAMSULOSIN HYDROCHLORIDE 0.4 MG/1
0.4 CAPSULE ORAL
Status: DISCONTINUED | OUTPATIENT
Start: 2022-05-24 | End: 2022-05-25 | Stop reason: HOSPADM

## 2022-05-24 RX ORDER — PANTOPRAZOLE SODIUM 40 MG/1
40 TABLET, DELAYED RELEASE ORAL
Status: DISCONTINUED | OUTPATIENT
Start: 2022-05-25 | End: 2022-05-25 | Stop reason: HOSPADM

## 2022-05-24 RX ORDER — ZOLPIDEM TARTRATE 5 MG/1
10 TABLET ORAL
Status: DISCONTINUED | OUTPATIENT
Start: 2022-05-24 | End: 2022-05-25 | Stop reason: HOSPADM

## 2022-05-24 RX ORDER — INSULIN LISPRO 100 [IU]/ML
1-6 INJECTION, SOLUTION INTRAVENOUS; SUBCUTANEOUS
Status: DISCONTINUED | OUTPATIENT
Start: 2022-05-24 | End: 2022-05-24

## 2022-05-24 RX ORDER — ATORVASTATIN CALCIUM 40 MG/1
40 TABLET, FILM COATED ORAL DAILY
Status: DISCONTINUED | OUTPATIENT
Start: 2022-05-25 | End: 2022-05-25 | Stop reason: HOSPADM

## 2022-05-24 RX ADMIN — METFORMIN HYDROCHLORIDE 1000 MG: 500 TABLET, FILM COATED ORAL at 16:28

## 2022-05-24 RX ADMIN — MECLIZINE 25 MG: 12.5 TABLET ORAL at 21:23

## 2022-05-24 RX ADMIN — TAMSULOSIN HYDROCHLORIDE 0.4 MG: 0.4 CAPSULE ORAL at 16:28

## 2022-05-24 RX ADMIN — SODIUM CHLORIDE 75 ML/HR: 0.9 INJECTION, SOLUTION INTRAVENOUS at 16:55

## 2022-05-24 RX ADMIN — SODIUM CHLORIDE 1000 ML: 0.9 INJECTION, SOLUTION INTRAVENOUS at 10:16

## 2022-05-24 RX ADMIN — MECLIZINE 50 MG: 12.5 TABLET ORAL at 10:20

## 2022-05-24 RX ADMIN — GABAPENTIN 800 MG: 400 CAPSULE ORAL at 21:16

## 2022-05-24 RX ADMIN — CLONIDINE HYDROCHLORIDE 0.2 MG: 0.1 TABLET ORAL at 21:18

## 2022-05-24 RX ADMIN — ASPIRIN 81 MG: 81 TABLET, COATED ORAL at 16:28

## 2022-05-24 RX ADMIN — NICOTINE 1 PATCH: 14 PATCH, EXTENDED RELEASE TRANSDERMAL at 16:28

## 2022-05-24 RX ADMIN — ARIPIPRAZOLE 10 MG: 10 TABLET ORAL at 21:16

## 2022-05-24 RX ADMIN — MECLIZINE 25 MG: 12.5 TABLET ORAL at 16:28

## 2022-05-24 RX ADMIN — GABAPENTIN 800 MG: 400 CAPSULE ORAL at 16:29

## 2022-05-24 RX ADMIN — ZOLPIDEM TARTRATE 10 MG: 5 TABLET ORAL at 21:16

## 2022-05-24 RX ADMIN — FLUTICASONE PROPIONATE 2 SPRAY: 50 SPRAY, METERED NASAL at 16:29

## 2022-05-24 NOTE — ASSESSMENT & PLAN NOTE
Admx with worsening room spinning sensation with onset 2 weeks prior associated with nausea and intermittent blurred vision  Workup benign bar sinusitis including MRI Brain:   1) White matter changes suggestive of chronic microangiopathy  No acute intracranial  pathology  2) Fairly extensive paranasal sinus disease with frothy secretions seen in the maxillary sinuses  Acute sinusitis should be considered in the right clinical setting       Symptoms subsided and patient was mobilizing without discomfort/visual changes/nausea with meclizine and flonase for medical management    - Meclizine 25 mg Q8H   - Reduce Atenolol to 25mg po qd from tomorrow  - Flonase BID  - O/P Vestibular rehab/PT

## 2022-05-24 NOTE — H&P
History and Physical - Neri Olson    Patient Information: Moisés Raya 59 y o  male MRN: [id number]  Unit/Bed#: 7T [facility] 713[facility]01 Encounter: [id number]  Admitting Physician: Neville Morales MD  PCP: RAMO Chang  Date of Admission:  05/24/22    Assessment and Plan    * Vertigo  Assessment & Plan  Patient with worsening room spinning sensation with onset 2 weeks prior  Ddx: BPPV, electrolyte disturbance, metabolic induced vertigo (diabetes, hypothyroidism), anemia  Less likely neurological given no focal deficits, hearing loss, or vomiting  Less likely cardiac in nature given no pre-syncopal or syncopal attacks  BP stable and not on the lower side ruling out orthostatic hypotension at this time  - Will obtain labs: CBC, CMP, HbA1c, TSH, b12, folate, vitamin D def, lipid panel   - Order Meclizine 25 mg Q8H  - Up with assistance / fall precautions in place  - Gentle hydration with IVF NS @ 75 cc/hr   - Advised to refrain from sudden positional changes and take time when switching from a seated to a standing position    - Will assess patient with Jefferson Sender maneuver in AM  - If all labs come back reassuring, consider outpatient follow up with ENT and/or vestibular rehabilitation exercises and PT     - Will consider neurology consult if symptoms worsen or persist despite therapy and overnight monitoring     Type 2 diabetes mellitus without complication, without long-term current use of insulin [facility])  Assessment & Plan  Lab Results   Component Value Date    HGBA1C 9 5 (A) 01/26/2022       Recent Labs     05/24/22  0950 05/24/22  1544   POCGLU 183* 84       Blood Sugar Average: Last 72 hrs:  (P) 133 5     Uncontrolled  Most recent A1c increased to 9 5 from 7 5 on prior (10/28/21)  Patient reports to only taking Metformin once daily, despite being counseled by PCP at last visit (1/26/22) to take it BID   Patient also initiated on Tradjenta 5 mg QD at last PCP visit - reports compliance  Patient with associated diabetic peripheral neuropathy to lower extremities (R>L) managed with gabapentin 800 mg tid    - Continue Metformin at 1000 mg BID with meals, patient counseled on proper dose and use  - Continue Gabapentin   - ISS with ACCU checks QID  - F/u A1C  - Diabetic diet    Hypertension  Assessment & Plan  Blood Pressure: 142/91 , hovering above goal  Managed at home on Atenolol 50 mg QD    - Will continue home med    Hyperlipidemia  Assessment & Plan  Managed on Lipitor 40 mg QD  Last lipid panel over 6 months ago    - Will repeat lipid panel at this time  - Continue home med    Tobacco abuse  Assessment & Plan  Patient with 40+ pack year smoking history  Currently admits to smoking 5 cigarettes per day  Patient in pre-contemplative stage of cessation     - smoking cessation counseling   - NRT provided: 14 mg/24 hr TD patch QD    Depression, recurrent ([facility] [address] )  Assessment & Plan  Managed at home on Buspar 165 mg QD however patient reports non-compliance since running out one month prior  Patient reports associated insomnia managed on Abilify 10 mg QHS, Ambien 10 mg QHS, and Clonidine 0 2 mg HS  Currently denies SI/HI/AH/VH    - Continue Abilify, Ambien, and Clonidine at bedtime   - Patient to follow up with psychiatry/PCP for management of depression    Gastroesophageal reflux disease  Assessment & Plan  Managed at home on Omeprazole 40 mg Qd    - Continue with hospital formulary equivalent, Protonix 40 mg QD     Benign prostatic hyperplasia  Assessment & Plan  Managed on home Flomax 0 4 mg QD  - will continue       VTE Prophylaxis: Patient does not meet criteria for DVT prophylaxis with pharmacotherapy  Code Status: Level 1 - Full Code  Anticipated Length of Stay:  Patient will be admitted on an Observation basis with an anticipated length of stay of  less than 2 midnights       Justification for Hospital Stay: Dizziness non-responsive to therapy  Total Time for Visit, including Counseling / Coordination of Care: 45 mins  Greater than 50% of this total time spent on direct patient counseling and coordination of care  Chief Complaint:     Chief Complaint   Patient presents with    Dizziness     States has been having dizziness off and on for the past few months-hasn't been able to check his blood sugars because he has no strips-thinks it might be related to this   Shoulder Pain     States left shoulder pain for months     History of Present Illness:    Aubree Garvin is a 59 y o  male w/ a PMH of uncontrolled DM, GERD, HTN, HLD, renal stones, and Tobacco abuse who presents with  dizziness described as "room spinning around him" sensation with onset 2 weeks prior  Patient reports intermittent episodes since onset  Also reports associated orthopnea and postural lightheadedness with onset of vertigo  Patient reports symptoms worsened this morning, which prompted him to present to ED    Patient reports associated blurry vison and reports visit with eye doctor yesterday  Patient also reports to runnning out of most of his medication approximatley one month prior  Tobacco hx: began smoking 1 ppd at age 13 a ppd, recently 5 cut down to 5 cigarettes per day  Also admits to smoking cigars for 20 years  ED course: head CT negative for intracranial abnormalities, positive for signs of sinusitis, labs grossly normal    Patient was admitted to [facility] FM service for overnight monitoring and neuro checks in the setting of intractable dizziness  Review of Systems:  Review of Systems   Constitutional: Negative for chills and fever  HENT: Negative for congestion, rhinorrhea and sinus pressure  Dysphagia with onset 2 weeks ago   Eyes: Positive for visual disturbance (blurry vision with onset this morning)  Respiratory: Positive for shortness of breath (mild with onset 2 weeks ago)  Negative for chest tightness and wheezing      Cardiovascular: Negative for chest pain and palpitations  Gastrointestinal: Positive for nausea (with onset this am)  Negative for abdominal pain, constipation, diarrhea and vomiting  Endocrine: Negative for polyuria  Genitourinary: Negative for difficulty urinating, dysuria, frequency, hematuria and urgency  Musculoskeletal: Negative for myalgias  Skin: Negative for rash  Neurological: Positive for dizziness (as per hpi), tremors, light-headedness and headaches (mild, yesterday evening, now resolved)  Negative for syncope and weakness  Psychiatric/Behavioral: Negative for dysphoric mood  Past Medical and Surgical History:   Past Medical History:   Diagnosis Date    Diabetes mellitus ([facility] [address] )     Diverticulitis     Hypertension     Neck pain      Past Surgical History:   Procedure Laterality Date    COLON SURGERY      NECK SURGERY       Meds/Allergies: Allergies: No Known Allergies  Prior to Admission Medications   Prescriptions Last Dose Informant Patient Reported? Taking? ARIPiprazole (ABILIFY) 10 mg tablet More than a month at Unknown time  Yes No   Sig: Take 10 mg by mouth daily at bedtime   Blood Glucose Monitoring Suppl (FreeStyle Lite) SHANELL Unknown at Unknown time Self No No   Sig: Use daily Use as directed   Lancets (freestyle) lancets Unknown at Unknown time Self No No   Sig: Daily   Misc   Devices (Wrist Brace) MISC Unknown at Unknown time Self No No   Sig: Use daily   Tradjenta 5 MG TABS 5/24/2022 at Unknown time  No Yes   Sig: take 1 tablet by mouth once daily   acetaminophen (TYLENOL) 325 mg tablet Not Taking at Unknown time Self No No   Sig: Take 2 tablets (650 mg total) by mouth every 6 (six) hours as needed for mild pain   Patient not taking: Reported on 5/24/2022   albuterol (PROVENTIL HFA,VENTOLIN HFA) 90 mcg/act inhaler More than a month at Unknown time  No No   Sig: Inhale 1-2 puffs every 6 (six) hours as needed for wheezing   aspirin (ECOTRIN LOW STRENGTH) 81 mg EC tablet More than a month at Unknown time  No No Sig: Take 1 tablet (81 mg total) by mouth daily   atenolol (TENORMIN) 50 mg tablet 2022 at Unknown time  No Yes   Sig: Take 1 tablet (50 mg total) by mouth daily   atorvastatin (LIPITOR) 40 mg tablet 2022 at Unknown time  No Yes   Sig: Take 1 tablet (40 mg total) by mouth daily   baclofen 10 mg tablet More than a month at Unknown time Self No No   Sig: Take 1 tablet (10 mg total) by mouth 3 (three) times a day   bisacodyl (DULCOLAX) 5 mg EC tablet   No No   Sig: Take 4 tablets (20 mg total) by mouth once for 1 dose Colonoscopy prep   buPROPion (WELLBUTRIN SR) 150 mg 12 hr tablet More than a month at Unknown time  Yes No   Sig: Take 150 mg by mouth every morning   busPIRone (BUSPAR) 15 mg tablet More than a month at Unknown time Self Yes No   Sig: Take 15 mg by mouth daily    cloNIDine (CATAPRES) 0 2 mg tablet More than a month at Unknown time Self Yes No   Sig: Take 0 2 mg by mouth daily at bedtime   diclofenac (VOLTAREN) 75 mg EC tablet Not Taking at Unknown time  No No   Sig: Take 1 tablet (75 mg total) by mouth 2 (two) times a day   Patient not taking: Reported on 2022   diclofenac sodium (VOLTAREN) 50 mg EC tablet Not Taking at Unknown time  No No   Sig: Take 1 tablet (50 mg total) by mouth 2 (two) times a day as needed (knee pain)   Patient not taking: Reported on 2022   fluticasone (FLONASE) 50 mcg/act nasal spray Past Month at Unknown time Self No Yes   Si spray into each nostril daily   gabapentin (NEURONTIN) 800 mg tablet 2022 at Unknown time  No Yes   Sig: take 1 tablet by mouth three times a day   glucose blood (FREESTYLE LITE) test strip Unknown at Unknown time  No No   Sig: Testing sugars once a day   hydrOXYzine HCL (ATARAX) 50 mg tablet More than a month at Unknown time  Yes No   Sig: Take 50 mg by mouth daily at bedtime   hydrocortisone 1 % ointment Unknown at Unknown time  No No   Sig: Apply topically 2 (two) times a day   meloxicam (MOBIC) 15 mg tablet Not Taking at Unknown time Self No No   Sig: Take 1 tablet (15 mg total) by mouth daily   Patient not taking: Reported on 5/24/2022   metFORMIN (GLUCOPHAGE) 1000 MG tablet 5/24/2022 at Unknown time  No Yes   Sig: take 1 tablet by mouth twice a day with meals   nicotine (NICODERM CQ) 21 mg/24 hr TD 24 hr patch Not Taking at Unknown time  No No   Sig: Place 1 patch on the skin every 24 hours   Patient not taking: Reported on 5/24/2022   omeprazole (PriLOSEC) 40 MG capsule More than a month at Unknown time  No No   Sig: Take 1 capsule (40 mg total) by mouth daily   polyethylene glycol (GOLYTELY) 4000 mL solution   No No   Sig: Take 4,000 mL by mouth once for 1 dose Colonoscopy prep   tamsulosin (FLOMAX) 0 4 mg Past Month at Unknown time Self No Yes   Sig: Take 1 capsule (0 4 mg total) by mouth daily with dinner   temazepam (RESTORIL) 30 mg capsule Not Taking at Unknown time Self Yes No   Sig: Take 30 mg by mouth daily at bedtime   Patient not taking: Reported on 5/24/2022   zolpidem (AMBIEN) 10 mg tablet 5/23/2022 at Unknown time Self Yes Yes   Sig: Take 10 mg by mouth daily at bedtime      Facility-Administered Medications: None     Social History:     Social History     Socioeconomic History    Marital status: /Civil Union     Spouse name: Not on file    Number of children: Not on file    Years of education: Not on file    Highest education level: Not on file   Occupational History    Not on file   Tobacco Use    Smoking status: Current Every Day Smoker     Packs/day: 0 25     Types: Cigarettes, Cigars    Smokeless tobacco: Never Used    Tobacco comment: 1 cigar/daily   Vaping Use    Vaping Use: Never used   Substance and Sexual Activity    Alcohol use: Yes     Comment: drinks only on weekends    Drug use: Never    Sexual activity: Not on file   Other Topics Concern    Not on file   Social History Narrative    Not on file     Social Determinants of Health     Financial Resource Strain: High Risk    Difficulty of Paying Living Expenses: Hard   Food Insecurity: Food Insecurity Present    Worried About Running Out of Food in the Last Year: Sometimes true    [place] of Food in the Last Year: Sometimes true   Transportation Needs: No Transportation Needs    Lack of Transportation (Medical): No    Lack of Transportation (Non-Medical): No   Physical Activity: Not on file   Stress: Not on file   Social Connections: Not on file   Intimate Partner Violence: Not on file   Housing Stability: Not on file     Patient Pre-hospital Living Situation: home  Patient Pre-hospital Level of Mobility: ambulates  Patient Pre-hospital Diet Restrictions: none    Family History:  History reviewed  No pertinent family history  Physical Exam:   Vitals:   Blood Pressure: 137/89 (05/24/22 2118)  Pulse: 55 (05/24/22 2118)  Temperature: 98 8 °F (37 1 °C) (05/24/22 1531)  Temp Source: Temporal (05/24/22 1531)  Respirations: 18 (05/24/22 2118)  Height: 6' (182 9 cm) (05/24/22 1436)  Weight - Scale: 95 kg (209 lb 7 oz) (05/24/22 1436)  SpO2: 98 % (05/24/22 2118)    Physical Exam  Constitutional:       Appearance: Normal appearance  HENT:      Right Ear: External ear normal       Left Ear: External ear normal       Nose: Nose normal       Mouth/Throat:      Mouth: Mucous membranes are dry  Pharynx: Oropharynx is clear  Eyes:      Extraocular Movements: Extraocular movements intact  Conjunctiva/sclera: Conjunctivae normal       Pupils: Pupils are equal, round, and reactive to light  Cardiovascular:      Rate and Rhythm: Normal rate and regular rhythm  Pulses: Normal pulses  Heart sounds: Normal heart sounds  Pulmonary:      Effort: Pulmonary effort is normal       Breath sounds: Normal breath sounds  Abdominal:      General: Bowel sounds are normal  There is no distension  Palpations: Abdomen is soft  There is no mass  Tenderness: There is no abdominal tenderness     Musculoskeletal:         General: Normal range of motion  Cervical back: Normal range of motion  Right lower leg: No edema  Left lower leg: No edema  Skin:     General: Skin is warm and dry  Capillary Refill: Capillary refill takes less than 2 seconds  Neurological:      General: No focal deficit present  Mental Status: He is alert and oriented to person, place, and time  Cranial Nerves: No cranial nerve deficit  Motor: No weakness  Psychiatric:         Behavior: Behavior normal        Lab Results: I have personally reviewed pertinent reports  Results from last 7 days   Lab Units 05/24/22  1013   WBC Thousand/uL 8 39   HEMOGLOBIN g/dL 14 5   HEMATOCRIT % 43 4   PLATELETS Thousands/uL 159   NEUTROS PCT % 64   LYMPHS PCT % 25   MONOS PCT % 6   EOS PCT % 3     Results from last 7 days   Lab Units 05/24/22  1013   POTASSIUM mmol/L 3 9   CHLORIDE mmol/L 108   CO2 mmol/L 25   BUN mg/dL 8   CREATININE mg/dL 0 82   CALCIUM mg/dL 9 1   ALK PHOS U/L 90   ALT U/L 33   AST U/L 24   EGFR ml/min/1 73sq m 96   MAGNESIUM mg/dL 2 0                     Results from last 7 days   Lab Units 05/24/22  1013   NT-PRO BNP pg/mL 120      Results from last 7 days   Lab Units 05/24/22  1332   COLOR UA  Straw   CLARITY UA  Clear   SPEC GRAV UA  1 010   PH UA  7 0   LEUKOCYTES UA  Negative   NITRITE UA  Negative   GLUCOSE UA mg/dl Negative   KETONES UA mg/dl Negative   BILIRUBIN UA  Negative   BLOOD UA  Negative             Imaging: I have personally reviewed pertinent reports  CT head without contrast    Result Date: 5/24/2022  Narrative: CT BRAIN - WITHOUT CONTRAST INDICATION:   headache, dizziness  COMPARISON:  1/13/2020  TECHNIQUE:  CT examination of the brain was performed  In addition to axial images, sagittal and coronal 2D reformatted images were created and submitted for interpretation  Radiation dose length product (DLP) for this visit:  798 mGy-cm     This examination, like all CT scans performed in the [facility], was performed utilizing techniques to minimize radiation dose exposure, including the use of iterative reconstruction and automated exposure control  IMAGE QUALITY:  Diagnostic  FINDINGS: PARENCHYMA:  No intracranial mass, mass effect or midline shift  No CT signs of acute infarction  No acute parenchymal hemorrhage  VENTRICLES AND EXTRA-AXIAL SPACES:  Normal for the patient's age  VISUALIZED ORBITS AND PARANASAL SINUSES:  There is partially visualized mucosal thickening and fluid in the maxillary sinuses right greater than left  Mucosal thickening and trace fluid also seen in the left sphenoid sinus  There is mild mucosal thickening of the ethmoid sinuses bilaterally  There is a small amount of fluid in the frontal sinuses bilaterally  There are some opacified inferior right mastoid air cells  CALVARIUM AND EXTRACRANIAL SOFT TISSUES:  Normal      Impression: No acute intracranial hemorrhage or mass effect  Acute on chronic sinusitis as above  Right mastoid effusion   Workstation performed: [id number]       EKG, Pathology, and Other Studies Reviewed on Admission:   EKG  Result Date: 05/24/22  Impression:  Normal EKG    Entire H&P was discussed with Dr Rhiannon Temple who agreed to what is noted above    Kelly Harris MD  05/24/22  9:49 PM

## 2022-05-24 NOTE — ASSESSMENT & PLAN NOTE
Managed at home on Buspar 165 mg QD however patient reports non-compliance since running out one month prior  Patient reports associated insomnia managed on Abilify 10 mg QHS, Ambien 10 mg QHS, and Clonidine 0 2 mg HS  Currently denies SI/HI/AH/VH    - Continue Abilify, Ambien, and Clonidine at bedtime   - Patient to follow up with psychiatry/PCP for management of depression

## 2022-05-24 NOTE — ASSESSMENT & PLAN NOTE
Patient with 40+ pack year smoking history  Currently admits to smoking 5 cigarettes per day    Patient in pre-contemplative stage of cessation     - Smoking cessation counseling   - NRT provided: 14 mg/24 hr TD patch QD declined at d/c

## 2022-05-24 NOTE — PLAN OF CARE
Problem: Potential for Falls  Goal: Patient will remain free of falls  Description: INTERVENTIONS:  - Educate patient/family on patient safety including physical limitations  - Instruct patient to call for assistance with activity   - Consult OT/PT to assist with strengthening/mobility   - Keep Call bell within reach  - Keep bed low and locked with side rails adjusted as appropriate  - Keep care items and personal belongings within reach  - Initiate and maintain comfort rounds  - Make Fall Risk Sign visible to staff  - Apply yellow socks and bracelet for high fall risk patients  - Consider moving patient to room near nurses station  5/24/2022 1530 by Mike Nagy RN  Outcome: Progressing  5/24/2022 1529 by Mike Nagy RN  Outcome: Progressing     Problem: PAIN - ADULT  Goal: Verbalizes/displays adequate comfort level or baseline comfort level  Description: Interventions:  - Encourage patient to monitor pain and request assistance  - Assess pain using appropriate pain scale  - Administer analgesics based on type and severity of pain and evaluate response  - Implement non-pharmacological measures as appropriate and evaluate response  - Consider cultural and social influences on pain and pain management  - Notify physician/advanced practitioner if interventions unsuccessful or patient reports new pain  Outcome: Progressing     Problem: INFECTION - ADULT  Goal: Absence or prevention of progression during hospitalization  Description: INTERVENTIONS:  - Assess and monitor for signs and symptoms of infection  - Monitor lab/diagnostic results  - Monitor all insertion sites, i e  indwelling lines, tubes, and drains  - Monitor endotracheal if appropriate and nasal secretions for changes in amount and color  - [place] appropriate cooling/warming therapies per order  - Administer medications as ordered  - Instruct and encourage patient and family to use good hand hygiene technique  - Identify and instruct in appropriate isolation precautions for identified infection/condition  Outcome: Progressing  Goal: Absence of fever/infection during neutropenic period  Description: INTERVENTIONS:  - Monitor WBC    Outcome: Progressing     Problem: SAFETY ADULT  Goal: Patient will remain free of falls  Description: INTERVENTIONS:  - Educate patient/family on patient safety including physical limitations  - Instruct patient to call for assistance with activity   - Consult OT/PT to assist with strengthening/mobility   - Keep Call bell within reach  - Keep bed low and locked with side rails adjusted as appropriate  - Keep care items and personal belongings within reach  - Initiate and maintain comfort rounds  - Make Fall Risk Sign visible to staff  - Apply yellow socks and bracelet for high fall risk patients  - Consider moving patient to room near nurses station  5/24/2022 1530 by Zechariah Tirado RN  Outcome: Progressing  5/24/2022 1529 by Zechariah Tirado RN  Outcome: Progressing  Goal: Maintain or return to baseline ADL function  Description: INTERVENTIONS:  -  Assess patient's ability to carry out ADLs; assess patient's baseline for ADL function and identify physical deficits which impact ability to perform ADLs (bathing, care of mouth/teeth, toileting, grooming, dressing, etc )  - Assess/evaluate cause of self-care deficits   - Assess range of motion  - Assess patient's mobility; develop plan if impaired  - Assess patient's need for assistive devices and provide as appropriate  - Encourage maximum independence but intervene and supervise when necessary  - Involve family in performance of ADLs  - Assess for home care needs following discharge   - Consider OT consult to assist with ADL evaluation and planning for discharge  - Provide patient education as appropriate  Outcome: Progressing  Goal: Maintains/Returns to pre admission functional level  Description: INTERVENTIONS:  - Perform BMAT or MOVE assessment daily    - Set and communicate daily mobility goal to care team and patient/family/caregiver  - Collaborate with rehabilitation services on mobility goals if consulted  - Out of bed for toileting  - Record patient progress and toleration of activity level   Outcome: Progressing     Problem: DISCHARGE PLANNING  Goal: Discharge to home or other facility with appropriate resources  Description: INTERVENTIONS:  - Identify barriers to discharge w/patient and caregiver  - Arrange for needed discharge resources and transportation as appropriate  - Identify discharge learning needs (meds, wound care, etc )  - Arrange for interpretive services to assist at discharge as needed  - Refer to Case Management Department for coordinating discharge planning if the patient needs post-hospital services based on physician/advanced practitioner order or complex needs related to functional status, cognitive ability, or social support system  Outcome: Progressing     Problem: Knowledge Deficit  Goal: Patient/family/caregiver demonstrates understanding of disease process, treatment plan, medications, and discharge instructions  Description: Complete learning assessment and assess knowledge base    Interventions:  - Provide teaching at level of understanding  - Provide teaching via preferred learning methods  Outcome: Progressing     Problem: NEUROSENSORY - ADULT  Goal: Achieves stable or improved neurological status  Description: INTERVENTIONS  - Monitor and report changes in neurological status  - Monitor vital signs such as temperature, blood pressure, glucose, and any other labs ordered   - Initiate measures to prevent increased intracranial pressure  - Monitor for seizure activity and implement precautions if appropriate      Outcome: Progressing     Problem: METABOLIC, FLUID AND ELECTROLYTES - ADULT  Goal: Glucose maintained within target range  Description: INTERVENTIONS:  - Monitor Blood Glucose as ordered  - Assess for signs and symptoms of hyperglycemia and hypoglycemia  - Administer ordered medications to maintain glucose within target range  - Assess nutritional intake and initiate nutrition service referral as needed  Outcome: Progressing     Problem: MUSCULOSKELETAL - ADULT  Goal: Maintain or return mobility to safest level of function  Description: INTERVENTIONS:  - Assess patient's ability to carry out ADLs; assess patient's baseline for ADL function and identify physical deficits which impact ability to perform ADLs (bathing, care of mouth/teeth, toileting, grooming, dressing, etc )  - Assess/evaluate cause of self-care deficits   - Assess range of motion  - Assess patient's mobility  - Assess patient's need for assistive devices and provide as appropriate  - Encourage maximum independence but intervene and supervise when necessary  - Involve family in performance of ADLs  - Assess for home care needs following discharge   - Consider OT consult to assist with ADL evaluation and planning for discharge  - Provide patient education as appropriate  Outcome: Progressing

## 2022-05-24 NOTE — ED PROVIDER NOTES
History  Chief Complaint   Patient presents with    Dizziness     States has been having dizziness off and on for the past few months-hasn't been able to check his blood sugars because he has no strips-thinks it might be related to this   Shoulder Pain     States left shoulder pain for months     61-year-old male, in addition Tongan-speaking, presents emergency room for concerns of dizziness which she describes the rooms running around him  The changes vision, neck pain or headache  No chest pain or shortness of breath  Does admit to being sick recently  Denies of stroke  Denies be on anticoagulation  He thinks that sugars maybe off as he has not been testing them at home  Movement is making it worse, holding still makes it slightly better  Denies any falls  Symptoms have been present for approximately 24 hours  Prior to Admission Medications   Prescriptions Last Dose Informant Patient Reported? Taking? ARIPiprazole (ABILIFY) 10 mg tablet More than a month at Unknown time  Yes No   Sig: Take 10 mg by mouth daily at bedtime   Blood Glucose Monitoring Suppl (FreeStyle Lite) SHANELL Unknown at Unknown time Self No No   Sig: Use daily Use as directed   Lancets (freestyle) lancets Unknown at Unknown time Self No No   Sig: Daily   Misc   Devices (Wrist Brace) MISC Unknown at Unknown time Self No No   Sig: Use daily   Tradjenta 5 MG TABS 5/24/2022 at Unknown time  No Yes   Sig: take 1 tablet by mouth once daily   acetaminophen (TYLENOL) 325 mg tablet Not Taking at Unknown time Self No No   Sig: Take 2 tablets (650 mg total) by mouth every 6 (six) hours as needed for mild pain   Patient not taking: Reported on 5/24/2022   albuterol (PROVENTIL HFA,VENTOLIN HFA) 90 mcg/act inhaler More than a month at Unknown time  No No   Sig: Inhale 1-2 puffs every 6 (six) hours as needed for wheezing   aspirin (ECOTRIN LOW STRENGTH) 81 mg EC tablet More than a month at Unknown time  No No   Sig: Take 1 tablet (81 mg total) by mouth daily   atenolol (TENORMIN) 50 mg tablet 2022 at Unknown time  No Yes   Sig: Take 1 tablet (50 mg total) by mouth daily   atorvastatin (LIPITOR) 40 mg tablet 2022 at Unknown time  No Yes   Sig: Take 1 tablet (40 mg total) by mouth daily   baclofen 10 mg tablet More than a month at Unknown time Self No No   Sig: Take 1 tablet (10 mg total) by mouth 3 (three) times a day   bisacodyl (DULCOLAX) 5 mg EC tablet   No No   Sig: Take 4 tablets (20 mg total) by mouth once for 1 dose Colonoscopy prep   buPROPion (WELLBUTRIN SR) 150 mg 12 hr tablet More than a month at Unknown time  Yes No   Sig: Take 150 mg by mouth every morning   busPIRone (BUSPAR) 15 mg tablet More than a month at Unknown time Self Yes No   Sig: Take 15 mg by mouth daily    cloNIDine (CATAPRES) 0 2 mg tablet More than a month at Unknown time Self Yes No   Sig: Take 0 2 mg by mouth daily at bedtime   diclofenac (VOLTAREN) 75 mg EC tablet Not Taking at Unknown time  No No   Sig: Take 1 tablet (75 mg total) by mouth 2 (two) times a day   Patient not taking: Reported on 2022   diclofenac sodium (VOLTAREN) 50 mg EC tablet Not Taking at Unknown time  No No   Sig: Take 1 tablet (50 mg total) by mouth 2 (two) times a day as needed (knee pain)   Patient not taking: Reported on 2022   fluticasone (FLONASE) 50 mcg/act nasal spray Past Month at Unknown time Self No Yes   Si spray into each nostril daily   gabapentin (NEURONTIN) 800 mg tablet 2022 at Unknown time  No Yes   Sig: take 1 tablet by mouth three times a day   glucose blood (FREESTYLE LITE) test strip Unknown at Unknown time  No No   Sig: Testing sugars once a day   hydrOXYzine HCL (ATARAX) 50 mg tablet More than a month at Unknown time  Yes No   Sig: Take 50 mg by mouth daily at bedtime   hydrocortisone 1 % ointment Unknown at Unknown time  No No   Sig: Apply topically 2 (two) times a day   meloxicam (MOBIC) 15 mg tablet Not Taking at Unknown time Self No No Sig: Take 1 tablet (15 mg total) by mouth daily   Patient not taking: Reported on 5/24/2022   metFORMIN (GLUCOPHAGE) 1000 MG tablet 5/24/2022 at Unknown time  No Yes   Sig: take 1 tablet by mouth twice a day with meals   nicotine (NICODERM CQ) 21 mg/24 hr TD 24 hr patch Not Taking at Unknown time  No No   Sig: Place 1 patch on the skin every 24 hours   Patient not taking: Reported on 5/24/2022   omeprazole (PriLOSEC) 40 MG capsule More than a month at Unknown time  No No   Sig: Take 1 capsule (40 mg total) by mouth daily   polyethylene glycol (GOLYTELY) 4000 mL solution   No No   Sig: Take 4,000 mL by mouth once for 1 dose Colonoscopy prep   tamsulosin (FLOMAX) 0 4 mg Past Month at Unknown time Self No Yes   Sig: Take 1 capsule (0 4 mg total) by mouth daily with dinner   temazepam (RESTORIL) 30 mg capsule Not Taking at Unknown time Self Yes No   Sig: Take 30 mg by mouth daily at bedtime   Patient not taking: Reported on 5/24/2022   zolpidem (AMBIEN) 10 mg tablet 5/23/2022 at Unknown time Self Yes Yes   Sig: Take 10 mg by mouth daily at bedtime      Facility-Administered Medications: None       Past Medical History:   Diagnosis Date    [facility]     Diverticulitis     Hypertension     Neck pain        Past Surgical History:   Procedure Laterality Date    COLON SURGERY      NECK SURGERY         History reviewed  No pertinent family history  I have reviewed and agree with the history as documented  E-Cigarette/Vaping    E-Cigarette Use Never User      E-Cigarette/Vaping Substances     Social History     Tobacco Use    Smoking status: Current Every Day Smoker     Packs/day: 0 25     Types: Cigarettes, Cigars    Smokeless tobacco: Never Used    Tobacco comment: 1 cigar/daily   Vaping Use    Vaping Use: Never used   Substance Use Topics    Alcohol use: Yes     Comment: drinks only on weekends    Drug use: Never       Review of Systems   Constitutional: Negative    Negative for chills and fever    HENT: Negative  Negative for rhinorrhea, sore throat, trouble swallowing and voice change  Eyes: Negative  Negative for pain and visual disturbance  Respiratory: Negative  Negative for cough, shortness of breath and wheezing  Cardiovascular: Negative  Negative for chest pain and palpitations  Gastrointestinal: Negative for abdominal pain, diarrhea, nausea and vomiting  Genitourinary: Negative  Negative for dysuria and frequency  Musculoskeletal: Negative  Negative for neck pain and neck stiffness  Skin: Negative  Negative for rash  Neurological: Positive for dizziness  Negative for facial asymmetry, speech difficulty, weakness, light-headedness and numbness  Physical Exam  Physical Exam  Vitals and nursing note reviewed  Constitutional:       General: He is not in acute distress  Appearance: He is well-developed  HENT:      Head: Normocephalic and atraumatic  Eyes:      Conjunctiva/sclera: Conjunctivae normal       Pupils: Pupils are equal, round, and reactive to light  Neck:      Trachea: No tracheal deviation  Cardiovascular:      Rate and Rhythm: Normal rate and regular rhythm  Pulmonary:      Effort: Pulmonary effort is normal  No respiratory distress  Breath sounds: Normal breath sounds  No wheezing or rales  Abdominal:      General: Bowel sounds are normal  There is no distension  Palpations: Abdomen is soft  Tenderness: There is no abdominal tenderness  There is no guarding or rebound  Musculoskeletal:         General: No tenderness or deformity  Normal range of motion  Cervical back: Normal range of motion and neck supple  Skin:     General: Skin is warm and dry  Capillary Refill: Capillary refill takes less than 2 seconds  Findings: No rash  Neurological:      General: No focal deficit present  Mental Status: He is alert and oriented to person, place, and time  GCS: GCS eye subscore is 4   GCS verbal subscore is 5  GCS motor subscore is 6  Cranial Nerves: Cranial nerves are intact  Sensory: Sensation is intact  Motor: Motor function is intact  Coordination: Coordination is intact  Romberg sign negative  Gait: Gait is intact     Psychiatric:         Behavior: Behavior normal          Vital Signs  ED Triage Vitals [05/24/22 0947]   Temperature Pulse Respirations Blood Pressure SpO2   98 3 °F (36 8 °C) 62 20 146/85 98 %      Temp Source Heart Rate Source Patient Position - Orthostatic VS BP Location FiO2 (%)   Oral Monitor Lying Left arm --      Pain Score       10 - Worst Possible Pain           Vitals:    05/24/22 1430 05/24/22 1433 05/24/22 1436 05/24/22 1531   BP: 142/89 145/88 144/89 142/91   Pulse: 55   (!) 52   Patient Position - Orthostatic VS: Lying - Orthostatic VS Sitting - Orthostatic VS Standing for 3 minutes - Orthostatic VS Lying         Visual Acuity  Visual Acuity    Flowsheet Row Most Recent Value   L Pupil Size (mm) 3   R Pupil Size (mm) 3   L Pupil Shape Round   R Pupil Shape Round          ED Medications  Medications   albuterol (PROVENTIL HFA,VENTOLIN HFA) inhaler 2 puff (has no administration in time range)   ARIPiprazole (ABILIFY) tablet 10 mg (has no administration in time range)   aspirin (ECOTRIN LOW STRENGTH) EC tablet 81 mg (has no administration in time range)   atenolol (TENORMIN) tablet 50 mg (has no administration in time range)   atorvastatin (LIPITOR) tablet 40 mg (has no administration in time range)   cloNIDine (CATAPRES) tablet 0 2 mg (has no administration in time range)   gabapentin (NEURONTIN) capsule 800 mg (has no administration in time range)   metFORMIN (GLUCOPHAGE) tablet 1,000 mg (has no administration in time range)   pantoprazole (PROTONIX) EC tablet 40 mg (has no administration in time range)   tamsulosin (FLOMAX) capsule 0 4 mg (has no administration in time range)   zolpidem (AMBIEN) tablet 10 mg (has no administration in time range) nicotine (NICODERM CQ) 14 mg/24hr TD 24 hr patch 1 patch (has no administration in time range)   meclizine (ANTIVERT) tablet 25 mg (has no administration in time range)   sodium chloride 0 9 % infusion (has no administration in time range)   fluticasone (FLONASE) 50 mcg/act nasal spray 2 spray (has no administration in time range)   insulin lispro (HumaLOG) 100 units/mL subcutaneous injection 1-6 Units (1 Units Subcutaneous Not Given 5/24/22 3018)   sodium chloride 0 9 % bolus 1,000 mL (0 mL Intravenous Stopped 5/24/22 1259)   meclizine (ANTIVERT) tablet 50 mg (50 mg Oral Given 5/24/22 1020)       Diagnostic Studies  Results Reviewed     Procedure Component Value Units Date/Time    TSH, 3rd generation with Free T4 reflex [[id number]] Collected: 05/24/22 1013    Lab Status: In process Specimen: Blood from Arm, Right Updated: 05/24/22 1522    UA (URINE) with reflex to Scope [[id number]]  (Normal) Collected: 05/24/22 1332    Lab Status: Final result Specimen: Urine, Clean Catch Updated: 05/24/22 1342     Color, UA Straw     Clarity, UA Clear     Specific Gravity, UA 1 010     pH, UA 7 0     Leukocytes, UA Negative     Nitrite, UA Negative     Protein, UA Negative mg/dl      Glucose, UA Negative mg/dl      Ketones, UA Negative mg/dl      Bilirubin, UA Negative     Blood, UA Negative     UROBILINOGEN UA Negative mg/dL     COVID/FLU/RSV - 2 hour TAT [[id number]]  (Normal) Collected: 05/24/22 1013    Lab Status: Final result Specimen: Nares from Nasopharyngeal Swab Updated: 05/24/22 1117     SARS-CoV-2 Negative     INFLUENZA A PCR Negative     INFLUENZA B PCR Negative     RSV PCR Negative    Narrative:      FOR PEDIATRIC PATIENTS - copy/paste COVID Guidelines URL to browser: [email or web address] [email or web address]    SARS-CoV-2 assay is a Nucleic Acid Amplification assay intended for the  qualitative detection of nucleic acid from SARS-CoV-2 in nasopharyngeal  swabs   Results are for the presumptive identification of SARS-CoV-2 RNA  Positive results are indicative of infection with SARS-CoV-2, the virus  causing COVID-19, but do not rule out bacterial infection or co-infection  with other viruses  Laboratories within the [place] and its  territories are required to report all positive results to the appropriate  public health authorities  Negative results do not preclude SARS-CoV-2  infection and should not be used as the sole basis for treatment or other  patient management decisions  Negative results must be combined with  clinical observations, patient history, and epidemiological information  This test has not been FDA cleared or approved  This test has been authorized by FDA under an Emergency Use Authorization  (EUA)  This test is only authorized for the duration of time the  declaration that circumstances exist justifying the authorization of the  emergency use of an in vitro diagnostic tests for detection of SARS-CoV-2  virus and/or diagnosis of COVID-19 infection under section 564(b)(1) of  the Act, 21 U  S C  212QTP-6(U)(0), unless the authorization is terminated  or revoked sooner  The test has been validated but independent review by FDA  and CLIA is pending  Test performed using [employer] GeneXpert: This RT-PCR assay targets N2,  a region unique to SARS-CoV-2  A conserved region in the E-gene was chosen  for pan-Sarbecovirus detection which includes SARS-CoV-2      High Sensitivity Troponin I Random [[id number]]  (Abnormal) Collected: 05/24/22 1013    Lab Status: Final result Specimen: Blood from Arm, Right Updated: 05/24/22 1103     HS TnI random 3 ng/L     NT-BNP PRO [[id number]]  (Normal) Collected: 05/24/22 1013    Lab Status: Final result Specimen: Blood from Arm, Right Updated: 05/24/22 1100     NT-proBNP 120 pg/mL     Magnesium [[id number]]  (Normal) Collected: 05/24/22 1013    Lab Status: Final result Specimen: Blood from Arm, Right Updated: 05/24/22 1054     Magnesium 2 0 mg/dL     Comprehensive metabolic panel [[id number]]  (Abnormal) Collected: 05/24/22 1013    Lab Status: Final result Specimen: Blood from Arm, Right Updated: 05/24/22 1054     Sodium 141 mmol/L      Potassium 3 9 mmol/L      Chloride 108 mmol/L      CO2 25 mmol/L      ANION GAP 8 mmol/L      BUN 8 mg/dL      Creatinine 0 82 mg/dL      Glucose 159 mg/dL      Calcium 9 1 mg/dL      AST 24 U/L      ALT 33 U/L      Alkaline Phosphatase 90 U/L      Total Protein 7 2 g/dL      Albumin 3 9 g/dL      Total Bilirubin 0 56 mg/dL      eGFR 96 ml/min/1 73sq m     Narrative:      National Kidney Disease Foundation guidelines for Chronic Kidney Disease (CKD):     Stage 1 with normal or high GFR (GFR > 90 mL/min/1 73 square meters)    Stage 2 Mild CKD (GFR = 60-89 mL/min/1 73 square meters)    Stage 3A Moderate CKD (GFR = 45-59 mL/min/1 73 square meters)    Stage 3B Moderate CKD (GFR = 30-44 mL/min/1 73 square meters)    Stage 4 Severe CKD (GFR = 15-29 mL/min/1 73 square meters)    Stage 5 End Stage CKD (GFR <15 mL/min/1 73 square meters)  Note: GFR calculation is accurate only with a steady state creatinine    CBC and differential [[id number]]  (Abnormal) Collected: 05/24/22 1013    Lab Status: Final result Specimen: Blood from Arm, Right Updated: 05/24/22 1037     WBC 8 39 Thousand/uL      RBC 4 69 Million/uL      Hemoglobin 14 5 g/dL      Hematocrit 43 4 %      MCV 93 fL      MCH 30 9 pg      MCHC 33 4 g/dL      RDW 15 7 %      MPV 12 3 fL      Platelets 461 Thousands/uL      nRBC 0 /100 WBCs      Neutrophils Relative 64 %      Immat GRANS % 1 %      Lymphocytes Relative 25 %      Monocytes Relative 6 %      Eosinophils Relative 3 %      Basophils Relative 1 %      Neutrophils Absolute 5 38 Thousands/µL      Immature Grans Absolute 0 04 Thousand/uL      Lymphocytes Absolute 2 13 Thousands/µL      Monocytes Absolute 0 51 Thousand/µL      Eosinophils Absolute 0 28 Thousand/µL      Basophils Absolute 0 05 Thousands/µL Fingerstick Glucose (POCT) [[id number]]  (Abnormal) Collected: 05/24/22 0950    Lab Status: Final result Updated: 05/24/22 0951     POC Glucose 183 mg/dl                  CT head without contrast   Final Result by Jamar Mirza MD (05/24 1101)      No acute intracranial hemorrhage or mass effect  Acute on chronic sinusitis as above  Right mastoid effusion  Workstation performed: [id number]         XR chest 1 view portable   Final Result by Keiko Carey MD (05/24 9077)      No acute cardiopulmonary disease  Workstation performed: [id number]                    Procedures  Procedures         ED Course  ED Course as of 05/24/22 1608   Tue May 24, 2022   1024 Procedure Note: EKG  Date/Time: 05/24/22 10:24 AM   Performed by: Radha Wise  Authorized by: Radha Wise  ECG interpreted by me, the ED Provider: yes   The EKG demonstrates:  Rate 63  Rhythm sinus  QTc 435  No ST elevations/depressions       1120 CT head without contrast                               SBIRT 22yo+    Flowsheet Row Most Recent Value   SBIRT (23 yo +)    In order to provide better care to our patients, we are screening all of our patients for alcohol and drug use  Would it be okay to ask you these screening questions? No Filed at: 05/24/2022 1016                    MDM  Number of Diagnoses or Management Options  Ambulatory dysfunction  Dizziness  Diagnosis management comments: Patient with persistent dizziness, testing okay, plan for admission further evaluation        Disposition  Final diagnoses:   Dizziness   Ambulatory dysfunction     Time reflects when diagnosis was documented in both MDM as applicable and the Disposition within this note     Time User Action Codes Description Comment    5/24/2022  1:43 PM Elma Poor Add [R42] Dizziness     5/24/2022  1:43 PM Elma Poor Add [R26 2] Ambulatory dysfunction       ED Disposition     ED Disposition   Admit    Condition   Stable    Date/Time Tue May 24, 2022 6144    Comment   Case was discussed with [facility] and the patient's admission status was agreed to be Admission Status: observation status to the service of Dr Shelly Cooper   Follow-up Information    None         Current Discharge Medication List      CONTINUE these medications which have NOT CHANGED    Details   atenolol (TENORMIN) 50 mg tablet Take 1 tablet (50 mg total) by mouth daily  Qty: 90 tablet, Refills: 3    Associated Diagnoses: Essential hypertension      atorvastatin (LIPITOR) 40 mg tablet Take 1 tablet (40 mg total) by mouth daily  Qty: 90 tablet, Refills: 1    Associated Diagnoses: Mixed hyperlipidemia      fluticasone (FLONASE) 50 mcg/act nasal spray 1 spray into each nostril daily  Qty: 16 g, Refills: 0    Associated Diagnoses: Follow up;  Allergic rhinitis, unspecified seasonality, unspecified trigger      gabapentin (NEURONTIN) 800 mg tablet take 1 tablet by mouth three times a day  Qty: 90 tablet, Refills: 3    Associated Diagnoses: Chronic pain of both shoulders      metFORMIN (GLUCOPHAGE) 1000 MG tablet take 1 tablet by mouth twice a day with meals  Qty: 60 tablet, Refills: 3    Associated Diagnoses: Type 2 diabetes mellitus without complication, without long-term current use of insulin ([facility])      tamsulosin (FLOMAX) 0 4 mg Take 1 capsule (0 4 mg total) by mouth daily with dinner  Qty: 30 capsule, Refills: 3    Associated Diagnoses: History of renal calculi      Tradjenta 5 MG TABS take 1 tablet by mouth once daily  Qty: 30 tablet, Refills: 1    Associated Diagnoses: Type 2 diabetes mellitus without complication, without long-term current use of insulin ([facility])      zolpidem (AMBIEN) 10 mg tablet Take 10 mg by mouth daily at bedtime      acetaminophen (TYLENOL) 325 mg tablet Take 2 tablets (650 mg total) by mouth every 6 (six) hours as needed for mild pain  Qty: 90 tablet, Refills: 0    Associated Diagnoses: Chronic pain of left knee      albuterol (PROVENTIL HFA,VENTOLIN HFA) 90 mcg/act inhaler Inhale 1-2 puffs every 6 (six) hours as needed for wheezing  Qty: 18 g, Refills: 1    Comments: Substitution to a formulary equivalent within the same pharmaceutical class is authorized  Associated Diagnoses: Bronchitis      ARIPiprazole (ABILIFY) 10 mg tablet Take 10 mg by mouth daily at bedtime      aspirin (ECOTRIN LOW STRENGTH) 81 mg EC tablet Take 1 tablet (81 mg total) by mouth daily  Qty: 30 tablet, Refills: 3    Associated Diagnoses: Mixed hyperlipidemia      baclofen 10 mg tablet Take 1 tablet (10 mg total) by mouth 3 (three) times a day  Qty: 90 tablet, Refills: 0    Associated Diagnoses: Neck pain      bisacodyl (DULCOLAX) 5 mg EC tablet Take 4 tablets (20 mg total) by mouth once for 1 dose Colonoscopy prep  Qty: 4 tablet, Refills: 0    Associated Diagnoses: Colon cancer screening; Hepatic steatosis;  Abnormal LFTs; Chronic GERD      Blood Glucose Monitoring Suppl (FreeStyle Lite) SHANELL Use daily Use as directed  Qty: 1 each, Refills: 0    Associated Diagnoses: Type 2 diabetes mellitus without complication, without long-term current use of insulin ([facility])      buPROPion (WELLBUTRIN SR) 150 mg 12 hr tablet Take 150 mg by mouth every morning      busPIRone (BUSPAR) 15 mg tablet Take 15 mg by mouth daily       cloNIDine (CATAPRES) 0 2 mg tablet Take 0 2 mg by mouth daily at bedtime      !! diclofenac (VOLTAREN) 75 mg EC tablet Take 1 tablet (75 mg total) by mouth 2 (two) times a day  Qty: 60 tablet, Refills: 0    Associated Diagnoses: Primary osteoarthritis of left knee      !! diclofenac sodium (VOLTAREN) 50 mg EC tablet Take 1 tablet (50 mg total) by mouth 2 (two) times a day as needed (knee pain)  Qty: 60 tablet, Refills: 1    Associated Diagnoses: Primary osteoarthritis of left knee      glucose blood (FREESTYLE LITE) test strip Testing sugars once a day  Qty: 100 each, Refills: 3    Associated Diagnoses: Type 2 diabetes mellitus without complication, without long-term current use of insulin (HCC)      hydrocortisone 1 % ointment Apply topically 2 (two) times a day  Qty: 30 g, Refills: 0    Associated Diagnoses: Intrinsic eczema      hydrOXYzine HCL (ATARAX) 50 mg tablet Take 50 mg by mouth daily at bedtime      Lancets (freestyle) lancets Daily  Qty: 100 each, Refills: 5    Associated Diagnoses: Type 2 diabetes mellitus without complication, without long-term current use of insulin ([facility])      meloxicam (MOBIC) 15 mg tablet Take 1 tablet (15 mg total) by mouth daily  Qty: 30 tablet, Refills: 1    Associated Diagnoses: Nephrolithiasis      Misc  Devices (Wrist Brace) MISC Use daily  Qty: 2 each, Refills: 0    Associated Diagnoses: Pain in both wrists      nicotine (NICODERM CQ) 21 mg/24 hr TD 24 hr patch Place 1 patch on the skin every 24 hours  Qty: 28 patch, Refills: 0    Associated Diagnoses: Tobacco abuse      omeprazole (PriLOSEC) 40 MG capsule Take 1 capsule (40 mg total) by mouth daily  Qty: 90 capsule, Refills: 2    Associated Diagnoses: Gastroesophageal reflux disease without esophagitis      polyethylene glycol (GOLYTELY) 4000 mL solution Take 4,000 mL by mouth once for 1 dose Colonoscopy prep  Qty: 4000 mL, Refills: 0    Associated Diagnoses: Colon cancer screening; Hepatic steatosis; Abnormal LFTs; Chronic GERD      temazepam (RESTORIL) 30 mg capsule Take 30 mg by mouth daily at bedtime       !! - Potential duplicate medications found  Please discuss with provider  No discharge procedures on file      PDMP Review     None          ED Provider  Electronically Signed by           Kenneth Bruner DO  05/24/22 9257

## 2022-05-24 NOTE — ASSESSMENT & PLAN NOTE
Blood Pressure: 107/66 at discharge  Managed at home on Atenolol 50 mg QD   Clonidine 0 2mg po HS    - Reduce Atenolol to 25mg PO QD due to Bradycardia (52-64bpm whilst inpt)  - PCP f/up

## 2022-05-24 NOTE — ASSESSMENT & PLAN NOTE
Lab Results   Component Value Date    HGBA1C 6 4 (H) 05/24/2022       Recent Labs     05/24/22  0950 05/24/22  1544 05/24/22  2030 05/25/22  0542   POCGLU 183* 84 91 123       Blood Sugar Average: Last 72 hrs:  (P) 133 5     - Continue home Tradjenta 5 mg QD on discharge  - Metformin at 1000 mg ER QD from day after discharge in view of contrast load  - Continue Gabapentin   - PCP to f/up

## 2022-05-24 NOTE — ED NOTES
Connected pt to cardiac monitor and pulse ox        [address]  05/24/22 1002

## 2022-05-24 NOTE — ASSESSMENT & PLAN NOTE
Managed at home on Omeprazole 40 mg Qd    - Continue with hospital formulary equivalent, Protonix 40 mg QD

## 2022-05-25 ENCOUNTER — APPOINTMENT (OUTPATIENT)
Dept: MRI IMAGING | Facility: HOSPITAL | Age: 57
DRG: 149 | End: 2022-05-25
Payer: MEDICARE

## 2022-05-25 VITALS
SYSTOLIC BLOOD PRESSURE: 107 MMHG | RESPIRATION RATE: 20 BRPM | BODY MASS INDEX: 28.28 KG/M2 | WEIGHT: 208.78 LBS | HEART RATE: 63 BPM | TEMPERATURE: 97.8 F | OXYGEN SATURATION: 95 % | DIASTOLIC BLOOD PRESSURE: 66 MMHG | HEIGHT: 72 IN

## 2022-05-25 PROBLEM — R42 VERTIGO: Status: RESOLVED | Noted: 2022-05-24 | Resolved: 2022-05-25

## 2022-05-25 LAB
25(OH)D3 SERPL-MCNC: 22 NG/ML (ref 30–100)
ATRIAL RATE: 56 BPM
CHOLEST SERPL-MCNC: 134 MG/DL
FOLATE SERPL-MCNC: 9.7 NG/ML (ref 3.1–17.5)
GLUCOSE SERPL-MCNC: 103 MG/DL (ref 65–140)
GLUCOSE SERPL-MCNC: 123 MG/DL (ref 65–140)
GLUCOSE SERPL-MCNC: 181 MG/DL (ref 65–140)
HDLC SERPL-MCNC: 28 MG/DL
LDLC SERPL CALC-MCNC: 61 MG/DL
NONHDLC SERPL-MCNC: 106 MG/DL
P AXIS: 15 DEGREES
PR INTERVAL: 170 MS
QRS AXIS: -3 DEGREES
QRSD INTERVAL: 86 MS
QT INTERVAL: 470 MS
QTC INTERVAL: 453 MS
T WAVE AXIS: 2 DEGREES
TRIGL SERPL-MCNC: 226 MG/DL
VENTRICULAR RATE: 56 BPM
VIT B12 SERPL-MCNC: 398 PG/ML (ref 100–900)

## 2022-05-25 PROCEDURE — G1004 CDSM NDSC: HCPCS

## 2022-05-25 PROCEDURE — NC001 PR NO CHARGE: Performed by: FAMILY MEDICINE

## 2022-05-25 PROCEDURE — 93005 ELECTROCARDIOGRAM TRACING: CPT

## 2022-05-25 PROCEDURE — 82306 VITAMIN D 25 HYDROXY: CPT | Performed by: STUDENT IN AN ORGANIZED HEALTH CARE EDUCATION/TRAINING PROGRAM

## 2022-05-25 PROCEDURE — A9585 GADOBUTROL INJECTION: HCPCS

## 2022-05-25 PROCEDURE — 82948 REAGENT STRIP/BLOOD GLUCOSE: CPT

## 2022-05-25 PROCEDURE — 70553 MRI BRAIN STEM W/O & W/DYE: CPT

## 2022-05-25 PROCEDURE — 93010 ELECTROCARDIOGRAM REPORT: CPT | Performed by: INTERNAL MEDICINE

## 2022-05-25 PROCEDURE — 80061 LIPID PANEL: CPT | Performed by: STUDENT IN AN ORGANIZED HEALTH CARE EDUCATION/TRAINING PROGRAM

## 2022-05-25 PROCEDURE — 99222 1ST HOSP IP/OBS MODERATE 55: CPT | Performed by: FAMILY MEDICINE

## 2022-05-25 RX ORDER — MECLIZINE HYDROCHLORIDE 25 MG/1
25 TABLET ORAL EVERY 8 HOURS SCHEDULED
Qty: 30 TABLET | Refills: 0 | Status: SHIPPED | OUTPATIENT
Start: 2022-05-25 | End: 2022-06-16 | Stop reason: SDUPTHER

## 2022-05-25 RX ORDER — SODIUM CHLORIDE 9 MG/ML
75 INJECTION, SOLUTION INTRAVENOUS CONTINUOUS
Status: DISCONTINUED | OUTPATIENT
Start: 2022-05-25 | End: 2022-05-25 | Stop reason: HOSPADM

## 2022-05-25 RX ORDER — ATENOLOL 25 MG/1
25 TABLET ORAL DAILY
Qty: 30 TABLET | Refills: 1 | Status: SHIPPED | OUTPATIENT
Start: 2022-05-25 | End: 2022-06-16

## 2022-05-25 RX ORDER — METFORMIN HYDROCHLORIDE 500 MG/1
1000 TABLET, EXTENDED RELEASE ORAL
Start: 2022-05-25 | End: 2022-06-16 | Stop reason: SDUPTHER

## 2022-05-25 RX ORDER — ONDANSETRON 4 MG/1
4 TABLET, ORALLY DISINTEGRATING ORAL EVERY 6 HOURS PRN
Status: DISCONTINUED | OUTPATIENT
Start: 2022-05-25 | End: 2022-05-25 | Stop reason: HOSPADM

## 2022-05-25 RX ORDER — ATENOLOL 25 MG/1
25 TABLET ORAL DAILY
Status: DISCONTINUED | OUTPATIENT
Start: 2022-05-26 | End: 2022-05-25

## 2022-05-25 RX ADMIN — SODIUM CHLORIDE 75 ML/HR: 0.9 INJECTION, SOLUTION INTRAVENOUS at 09:44

## 2022-05-25 RX ADMIN — GADOBUTROL 10 ML: 604.72 INJECTION INTRAVENOUS at 10:46

## 2022-05-25 RX ADMIN — GABAPENTIN 800 MG: 400 CAPSULE ORAL at 08:25

## 2022-05-25 RX ADMIN — MECLIZINE 25 MG: 12.5 TABLET ORAL at 14:08

## 2022-05-25 RX ADMIN — NICOTINE 1 PATCH: 14 PATCH, EXTENDED RELEASE TRANSDERMAL at 08:25

## 2022-05-25 RX ADMIN — ASPIRIN 81 MG: 81 TABLET, COATED ORAL at 08:24

## 2022-05-25 RX ADMIN — PANTOPRAZOLE SODIUM 40 MG: 40 TABLET, DELAYED RELEASE ORAL at 05:54

## 2022-05-25 RX ADMIN — MECLIZINE 25 MG: 12.5 TABLET ORAL at 05:54

## 2022-05-25 RX ADMIN — ATENOLOL 50 MG: 50 TABLET ORAL at 08:24

## 2022-05-25 RX ADMIN — FLUTICASONE PROPIONATE 2 SPRAY: 50 SPRAY, METERED NASAL at 08:25

## 2022-05-25 RX ADMIN — GABAPENTIN 800 MG: 400 CAPSULE ORAL at 16:01

## 2022-05-25 RX ADMIN — INSULIN LISPRO 1 UNITS: 100 INJECTION, SOLUTION INTRAVENOUS; SUBCUTANEOUS at 16:01

## 2022-05-25 RX ADMIN — SODIUM CHLORIDE 75 ML/HR: 0.9 INJECTION, SOLUTION INTRAVENOUS at 05:54

## 2022-05-25 RX ADMIN — METFORMIN HYDROCHLORIDE 1000 MG: 500 TABLET, FILM COATED ORAL at 08:24

## 2022-05-25 RX ADMIN — TAMSULOSIN HYDROCHLORIDE 0.4 MG: 0.4 CAPSULE ORAL at 16:01

## 2022-05-25 RX ADMIN — ATORVASTATIN CALCIUM 40 MG: 40 TABLET, FILM COATED ORAL at 08:24

## 2022-05-25 NOTE — PLAN OF CARE
Problem: Potential for Falls  Goal: Patient will remain free of falls  Description: INTERVENTIONS:  - Educate patient/family on patient safety including physical limitations  - Instruct patient to call for assistance with activity   - Consult OT/PT to assist with strengthening/mobility   - Keep Call bell within reach  - Keep bed low and locked with side rails adjusted as appropriate  - Keep care items and personal belongings within reach  - Initiate and maintain comfort rounds  - Make Fall Risk Sign visible to staff  - Apply yellow socks and bracelet for high fall risk patients  - Consider moving patient to room near nurses station  Outcome: Progressing     Problem: PAIN - ADULT  Goal: Verbalizes/displays adequate comfort level or baseline comfort level  Description: Interventions:  - Encourage patient to monitor pain and request assistance  - Assess pain using appropriate pain scale  - Administer analgesics based on type and severity of pain and evaluate response  - Implement non-pharmacological measures as appropriate and evaluate response  - Consider cultural and social influences on pain and pain management  - Notify physician/advanced practitioner if interventions unsuccessful or patient reports new pain  Outcome: Progressing     Problem: INFECTION - ADULT  Goal: Absence or prevention of progression during hospitalization  Description: INTERVENTIONS:  - Assess and monitor for signs and symptoms of infection  - Monitor lab/diagnostic results  - Monitor all insertion sites, i e  indwelling lines, tubes, and drains  - Monitor endotracheal if appropriate and nasal secretions for changes in amount and color  - [place] appropriate cooling/warming therapies per order  - Administer medications as ordered  - Instruct and encourage patient and family to use good hand hygiene technique  - Identify and instruct in appropriate isolation precautions for identified infection/condition  Outcome: Progressing  Goal: Absence of fever/infection during neutropenic period  Description: INTERVENTIONS:  - Monitor WBC    Outcome: Progressing     Problem: SAFETY ADULT  Goal: Patient will remain free of falls  Description: INTERVENTIONS:  - Educate patient/family on patient safety including physical limitations  - Instruct patient to call for assistance with activity   - Consult OT/PT to assist with strengthening/mobility   - Keep Call bell within reach  - Keep bed low and locked with side rails adjusted as appropriate  - Keep care items and personal belongings within reach  - Initiate and maintain comfort rounds  - Make Fall Risk Sign visible to staff  - Apply yellow socks and bracelet for high fall risk patients  - Consider moving patient to room near nurses station  Outcome: Progressing  Goal: Maintain or return to baseline ADL function  Description: INTERVENTIONS:  -  Assess patient's ability to carry out ADLs; assess patient's baseline for ADL function and identify physical deficits which impact ability to perform ADLs (bathing, care of mouth/teeth, toileting, grooming, dressing, etc )  - Assess/evaluate cause of self-care deficits   - Assess range of motion  - Assess patient's mobility; develop plan if impaired  - Assess patient's need for assistive devices and provide as appropriate  - Encourage maximum independence but intervene and supervise when necessary  - Involve family in performance of ADLs  - Assess for home care needs following discharge   - Consider OT consult to assist with ADL evaluation and planning for discharge  - Provide patient education as appropriate  Outcome: Progressing  Goal: Maintains/Returns to pre admission functional level  Description: INTERVENTIONS:  - Perform BMAT or MOVE assessment daily    - Set and communicate daily mobility goal to care team and patient/family/caregiver     - Collaborate with rehabilitation services on mobility goals if consulted  - Out of bed for toileting  - Record patient progress and toleration of activity level   Outcome: Progressing     Problem: DISCHARGE PLANNING  Goal: Discharge to home or other facility with appropriate resources  Description: INTERVENTIONS:  - Identify barriers to discharge w/patient and caregiver  - Arrange for needed discharge resources and transportation as appropriate  - Identify discharge learning needs (meds, wound care, etc )  - Arrange for interpretive services to assist at discharge as needed  - Refer to Case Management Department for coordinating discharge planning if the patient needs post-hospital services based on physician/advanced practitioner order or complex needs related to functional status, cognitive ability, or social support system  Outcome: Progressing     Problem: Knowledge Deficit  Goal: Patient/family/caregiver demonstrates understanding of disease process, treatment plan, medications, and discharge instructions  Description: Complete learning assessment and assess knowledge base    Interventions:  - Provide teaching at level of understanding  - Provide teaching via preferred learning methods  Outcome: Progressing     Problem: NEUROSENSORY - ADULT  Goal: Achieves stable or improved neurological status  Description: INTERVENTIONS  - Monitor and report changes in neurological status  - Monitor vital signs such as temperature, blood pressure, glucose, and any other labs ordered   - Initiate measures to prevent increased intracranial pressure  - Monitor for seizure activity and implement precautions if appropriate      Outcome: Progressing     Problem: METABOLIC, FLUID AND ELECTROLYTES - ADULT  Goal: Glucose maintained within target range  Description: INTERVENTIONS:  - Monitor Blood Glucose as ordered  - Assess for signs and symptoms of hyperglycemia and hypoglycemia  - Administer ordered medications to maintain glucose within target range  - Assess nutritional intake and initiate nutrition service referral as needed  Outcome: Progressing     Problem: MUSCULOSKELETAL - ADULT  Goal: Maintain or return mobility to safest level of function  Description: INTERVENTIONS:  - Assess patient's ability to carry out ADLs; assess patient's baseline for ADL function and identify physical deficits which impact ability to perform ADLs (bathing, care of mouth/teeth, toileting, grooming, dressing, etc )  - Assess/evaluate cause of self-care deficits   - Assess range of motion  - Assess patient's mobility  - Assess patient's need for assistive devices and provide as appropriate  - Encourage maximum independence but intervene and supervise when necessary  - Involve family in performance of ADLs  - Assess for home care needs following discharge   - Consider OT consult to assist with ADL evaluation and planning for discharge  - Provide patient education as appropriate  Outcome: Progressing

## 2022-05-25 NOTE — DISCHARGE SUMMARY
Discharge Summary - Neri Olson    Patient Information: Tabby Brannon 59 y o  male MRN: [id number]  Unit/Bed#: 7T [facility] 713-01 Encounter: [id number]    Discharging Physician / Practitioner: Rhiannon Temple MD  PCP: Brittney Solomon  Admission Date:   Admission Orders (From admission, onward)     Ordered        05/25/22 1456  Inpatient Admission  Once            05/24/22 1343  Place in Observation  Once                    Discharge Date:5/25/22  Reason for Admission: Dizziness and nausea   Discharge Diagnoses:   Principal Problem (Resolved):    Vertigo  Active Problems:    Benign prostatic hyperplasia    Gastroesophageal reflux disease    Hypertension    Hyperlipidemia    Type 2 diabetes mellitus without complication, without long-term current use of insulin (HCC)    Depression, recurrent (HCC)    Tobacco abuse    * Vertigo-resolved as of 5/25/2022  Assessment & Plan  Admx with worsening room spinning sensation with onset 2 weeks prior associated with nausea and intermittent blurred vision  Workup benign bar sinusitis including MRI Brain:   1) White matter changes suggestive of chronic microangiopathy  No acute intracranial  pathology  2) Fairly extensive paranasal sinus disease with frothy secretions seen in the maxillary sinuses  Acute sinusitis should be considered in the right clinical setting  Symptoms subsided and patient was mobilizing without discomfort/visual changes/nausea with meclizine and flonase for medical management    - Meclizine 25 mg Q8H   - Reduce Atenolol to 25mg po qd from tomorrow  - Flonase BID  - O/P Vestibular rehab/PT    Tobacco abuse  Assessment & Plan  Patient with 40+ pack year smoking history  Currently admits to smoking 5 cigarettes per day    Patient in pre-contemplative stage of cessation     - Smoking cessation counseling   - NRT provided: 14 mg/24 hr TD patch QD declined at d/c    Depression, recurrent ([address] )  Assessment & Plan  Managed at home on Buspar 165 mg QD however patient reports non-compliance since running out one month prior  Patient reports associated insomnia managed on Abilify 10 mg QHS, Ambien 10 mg QHS, and Clonidine 0 2 mg HS  Currently denies SI/HI/AH/VH    - Continue Abilify, Ambien, and Clonidine at bedtime   - Patient to follow up with psychiatry/PCP for management of depression    Type 2 diabetes mellitus without complication, without long-term current use of insulin [facility])  Assessment & Plan  Lab Results   Component Value Date    HGBA1C 6 4 (H) 05/24/2022       Recent Labs     05/24/22  0950 05/24/22  1544 05/24/22  2030 05/25/22  0542   POCGLU 183* 84 91 123       Blood Sugar Average: Last 72 hrs:  (P) 133 5     - Continue home Tradjenta 5 mg QD on discharge  - Metformin at 1000 mg ER QD from day after discharge in view of contrast load  - Continue Gabapentin   - PCP to f/up    Hyperlipidemia  Assessment & Plan  Managed on Lipitor 40 mg QD    LDL 61 (5/25)    - Continue home med    Hypertension  Assessment & Plan  Blood Pressure: 107/66 at discharge  Managed at home on Atenolol 50 mg QD  Clonidine 0 2mg po HS    - Reduce Atenolol to 25mg PO QD due to Bradycardia (52-64bpm whilst inpt)  - PCP f/up    Gastroesophageal reflux disease  Assessment & Plan  Managed at home on Omeprazole 40 mg Qd    - Continue with hospital formulary equivalent, Protonix 40 mg QD     Benign prostatic hyperplasia  Assessment & Plan  Managed on home Flomax 0 4 mg QD         Consultations During Hospital Stay:  · Nil    Procedures Performed:   · Nil    Significant Findings / Test Results:   MRI Brain:    1) White matter changes suggestive of chronic microangiopathy  No acute intracranial  pathology    2) Fairly extensive paranasal sinus disease with frothy secretions seen in the maxillary  sinuses  Acute sinusitis should be considered in the right clinical setting      Incidental Findings:   · Vit D, (25-OH) 22 0  · HbA1c 6 4%    Test Results Pending at Discharge (will require follow up): · Nil     Outpatient Tests Requested:  · Vestibular REHAB    Outpatient follow-up Requested:   PCP    Complications:  None    Hospital Course: Dali Champion is a 59 y o  male w/ a PMH of uncontrolled DM, GERD, HTN, HLD, renal stones, and Tobacco abuse who presented 5/24/22 with  dizziness described as "room spinning around him" sensation with onset 2 weeks prior to the ED  Patient reported intermittent episodes since onset  Also reports associated orthopnea and postural lightheadedness with onset of vertigo     Patient reports associated blurry vison and reports visit with eye doctor yesterday  Patient also reports to runnning out of most of his medication approximatley one month prior      ED course: head CT negative for intracranial abnormalities, positive for signs of sinusitis, labs grossly normal    Patient was admitted to [facility] service for overnight monitoring and neuro checks in the setting of intractable dizziness  An MRI brain was performed in the AM due to ongoing dizziness with associated blurred vision  This was reported with no acute intracranial pathology  Though extensive paranasal sinus disease with frothy secretions seen in the maxillary sinuses was noted  [place] was managed with meclizine and flonase which decreased his symptoms  He felt well in himself later in the day of day 1 hospitilzations and was keen to discharge  In view of his lack of symptoms and benign workup we are therefore happy to discharge [place] home to f/up with his PCP and vestibular rehab  He will continue meclizine for now with flonase      Condition at Discharge: stable   Discharge Day Visit / Exam:   Vitals: Blood Pressure: 107/66 (05/25/22 1529)  Pulse: 63 (05/25/22 1529)  Temperature: 97 8 °F (36 6 °C) (05/25/22 1529)  Temp Source: Temporal (05/25/22 1529)  Respirations: 20 (05/25/22 1529)  Height: 6' (182 9 cm) (05/24/22 1436)  Weight - Scale: 94 7 kg (208 lb 12 4 oz) (05/25/22 0556)  SpO2: 95 % (05/25/22 1529)  Exam:   Physical Exam  Vitals and nursing note reviewed  Constitutional:       General: He is not in acute distress  Appearance: Normal appearance  He is not ill-appearing, toxic-appearing or diaphoretic  HENT:      Head: Normocephalic and atraumatic  Right Ear: External ear normal       Left Ear: External ear normal       Nose: Nose normal       Mouth/Throat:      Mouth: Mucous membranes are moist       Pharynx: No oropharyngeal exudate or posterior oropharyngeal erythema  Eyes:      General: No visual field deficit or scleral icterus  Right eye: No discharge  Left eye: No discharge  Conjunctiva/sclera: Conjunctivae normal    Cardiovascular:      Rate and Rhythm: Normal rate and regular rhythm  Pulses: Normal pulses  Heart sounds: Normal heart sounds  No murmur heard  Pulmonary:      Effort: Pulmonary effort is normal  No respiratory distress  Abdominal:      General: Bowel sounds are normal       Palpations: Abdomen is soft  Tenderness: There is no abdominal tenderness  Musculoskeletal:         General: Normal range of motion  Cervical back: Normal range of motion and neck supple  Right lower leg: No edema  Left lower leg: No edema  Skin:     General: Skin is warm  Findings: No rash  Neurological:      General: No focal deficit present  Mental Status: He is alert and oriented to person, place, and time  Cranial Nerves: No facial asymmetry  Gait: Gait is intact  Gait normal       Comments: CN 2-12 grossly intact  Normal vision reported   Psychiatric:         Mood and Affect: Mood normal        Discussion with Family: Wife present with patient at time of discharge  Discharge instructions/Information to patient and family:   See after visit summary for information provided to patient and family      Discharge Medications:    CHANGES:    Reduced Atenolol to (TENORMIN) tablet 25 mg   Reduce Metformin XR 1000mg po QD        ARIPiprazole (ABILIFY) tablet 10 mg  Dose: 10 mg  Freq: Daily at bedtime Route: PO  Start: 05/24/22 2200             aspirin (ECOTRIN LOW STRENGTH) EC tablet 81 mg  Dose: 81 mg  Freq: Daily Route: PO  Start: 05/24/22 1445   Admin Instructions:   Do Not Crush - Enteric coated  atorvastatin (LIPITOR) tablet 40 mg  Dose: 40 mg  Freq: Daily Route: PO  Start: 05/25/22 0900             cloNIDine (CATAPRES) tablet 0 2 mg  Dose: 0 2 mg  Freq: Daily at bedtime Route: PO  Start: 05/24/22 2200   Admin Instructions:   CAUTION:LOOK ALIKE SOUND ALIKE MEDICATION  Order specific questions:   Hold for systolic blood pressure less than (mmHg) 110             fluticasone (FLONASE) 50 mcg/act nasal spray 2 spray  Dose: 2 spray  Freq: Daily Route: EACH NARE  Start: 05/24/22 1545   Admin Instructions:   LOOK ALIKE SOUND ALIKE MED             gabapentin (NEURONTIN) capsule 800 mg  Dose: 800 mg  Freq: 3 times daily Route: PO  Start: 05/24/22 1600   Admin Instructions:   LOOK ALIKE SOUND ALIKE MED                       meclizine (ANTIVERT) tablet 25 mg  Dose: 25 mg  Freq: Every 8 hours scheduled Route: PO  Start: 05/24/22 1530                       pantoprazole (PROTONIX) EC tablet 40 mg  Dose: 40 mg  Freq: Daily (early morning) Route: PO  Start: 05/25/22 0600   Admin Instructions:   Swallow whole; do not crush, chew or split  LOOK ALIKE SOUND ALIKE MED             tamsulosin (FLOMAX) capsule 0 4 mg  Dose: 0 4 mg  Freq: Daily with dinner Route: PO  Start: 05/24/22 1630   Admin Instructions:   Swallow whole; do not crush, chew, or open             zolpidem (AMBIEN) tablet 10 mg  Dose: 10 mg  Freq: Daily at bedtime Route: PO  Start: 05/24/22 2200                Provisions for Follow-Up Care:  See after visit summary for information related to follow-up care and any pertinent home health orders        Disposition:     Home    For Discharges to [facility] SNF:   · Not Applicable to this Patient - Not Applicable to this Patient    Planned Readmission:Nil     Discharge Statement:  I spent 60 minutes discharging the patient  This time was spent on the day of discharge  I had direct contact with the patient on the day of discharge  Greater than 50% of the total time was spent examining patient, answering all patient questions, arranging and discussing plan of care with patient as well as directly providing post-discharge instructions  Additional time then spent on discharge activities      ** Please Note: This note has been constructed using a voice recognition system **    Gerhardt Forward, MD  05/25/22  4:35 PM

## 2022-05-25 NOTE — NURSING NOTE
All belongings returned to pt  IV removed  Pt in no distress and has no complaints at this time  Education and paperwork reviewed with pt and spouse both with verbal acknowledgment of understanding   Pt escorted down with PCA walking

## 2022-05-25 NOTE — UTILIZATION REVIEW
Initial Clinical Review    Pt initially admitted as Observation on 5/24  Changed to Inpatient on 5/25  Requiring continued stay for ongoing workup  Admission: Date/Time/Statement:   Admission Orders (From admission, onward)     Ordered        05/25/22 1456  Inpatient Admission  Once            05/24/22 1343  Place in Observation  Once                      Orders Placed This Encounter   Procedures    Inpatient Admission     Standing Status:   Standing     Number of Occurrences:   1     Order Specific Question:   Level of Care     Answer:   Med Surg [16]     Order Specific Question:   Estimated length of stay     Answer:   Not Applicable     Comments:   1 more night     ED Arrival Information     Expected   -    Arrival   5/24/2022 09:39    Acuity   Urgent            Means of arrival   Walk-In    Escorted by   Self    Service   Hospitalist    Admission type   Urgent            Arrival complaint   dizziness           Chief Complaint   Patient presents with    Dizziness     States has been having dizziness off and on for the past few months-hasn't been able to check his blood sugars because he has no strips-thinks it might be related to this   Shoulder Pain     States left shoulder pain for months       Initial Presentation: 59 y o  male who presented self from home to [place] [address] [facility]  Admitted in observation status for evaluation and treatment of Vertigo  PMHx: DM, GERD, HTN, HLD  Presented w/ dizziness described as "room spinning" for past 2 weeks intermittently  Reports associated orthopnea and postural lightheadedness  Notes running out of all his meds about 1 month ago  On exam, dry mucous membranes  Plan: meclizine q8h, IVF, per hallpike maneuver assessment, SSI w/ accuchecks, resume home meds  5/25 Changed to Inpatient Status  Pt endorsing dizziness w/ blurred vision and nausea with positional changes  Exam unremarkable  Plan: MRI brain  Avoid sudden positional changes   Continue meclizine, home meds, SSI w/ accuchecks  After MRI, pt was stable and discharged to home/self-care   ED Triage Vitals [05/24/22 0947]   Temperature Pulse Respirations Blood Pressure SpO2   98 3 °F (36 8 °C) 62 20 146/85 98 %      Temp Source Heart Rate Source Patient Position - Orthostatic VS BP Location FiO2 (%)   Oral Monitor Lying Left arm --      Pain Score       10 - Worst Possible Pain          Wt Readings from Last 1 Encounters:   05/25/22 94 7 kg (208 lb 12 4 oz)     Additional Vital Signs:   Date/Time Temp Pulse Resp BP MAP (mmHg) SpO2 O2 Device Patient Position - Orthostatic VS   05/25/22 1529 97 8 °F (36 6 °C) 63 20 107/66 75 95 % None (Room air) Lying   05/25/22 0932 -- 62 -- 104/74 -- -- -- Standing - Orthostatic VS   05/25/22 0930 -- 57 -- 139/85 -- -- -- Sitting - Orthostatic VS   05/25/22 0927 -- 55 -- 133/82 -- -- -- Lying - Orthostatic VS   05/25/22 0824 -- 54 Abnormal  -- 128/88 -- -- -- --   05/25/22 0722 96 9 °F (36 1 °C) Abnormal  52 Abnormal  20 106/67 75 96 % None (Room air) Lying   05/24/22 2259 97 5 °F (36 4 °C) 53 Abnormal  18 123/75 88 95 % None (Room air) Lying   05/24/22 2118 -- 55 18 137/89 97 98 % None (Room air) Lying   05/24/22 1531 98 8 °F (37 1 °C) 52 Abnormal  18 142/91 103 96 % None (Room air) Lying   05/24/22 1436 -- -- -- 144/89 114 96 % None (Room air) Standing for 3 minutes - Orthostatic VS   05/24/22 1433 -- -- -- 145/88 104 -- -- Sitting - Orthostatic VS   05/24/22 1430 97 1 °F (36 2 °C) Abnormal  55 18 142/89 102 98 % None (Room air) Lying - Orthostatic VS   05/24/22 1333 -- 55 16 140/92 -- 98 % None (Room air) Lying   05/24/22 1222 -- 55 16 132/84 -- 98 % None (Room air) Lying         Pertinent Labs/Diagnostic Test Results:   MRI brain w wo contrast   Final Result by Yessenia 6, DO (05/25 1127)      White matter changes suggestive of chronic microangiopathy  No acute intracranial pathology        Fairly extensive paranasal sinus disease with frothy secretions seen in the maxillary sinuses  Acute sinusitis should be considered in the right clinical setting  Workstation performed: [id number]         CT head without contrast   Final Result by Bal Guerrero MD (05/24 1101)      No acute intracranial hemorrhage or mass effect  Acute on chronic sinusitis as above  Right mastoid effusion  Workstation performed: [id number]         XR chest 1 view portable   Final Result by Trena Villavicencio MD (05/24 1357)      No acute cardiopulmonary disease                    Workstation performed: [id number]           Results from last 7 days   Lab Units 05/24/22  1013   SARS-COV-2  Negative     Results from last 7 days   Lab Units 05/24/22  1013   WBC Thousand/uL 8 39   HEMOGLOBIN g/dL 14 5   HEMATOCRIT % 43 4   PLATELETS Thousands/uL 159   NEUTROS ABS Thousands/µL 5 38         Results from last 7 days   Lab Units 05/24/22  1013   SODIUM mmol/L 141   POTASSIUM mmol/L 3 9   CHLORIDE mmol/L 108   CO2 mmol/L 25   ANION GAP mmol/L 8   BUN mg/dL 8   CREATININE mg/dL 0 82   EGFR ml/min/1 73sq m 96   CALCIUM mg/dL 9 1   MAGNESIUM mg/dL 2 0     Results from last 7 days   Lab Units 05/24/22  1013   AST U/L 24   ALT U/L 33   ALK PHOS U/L 90   TOTAL PROTEIN g/dL 7 2   ALBUMIN g/dL 3 9   TOTAL BILIRUBIN mg/dL 0 56     Results from last 7 days   Lab Units 05/25/22  1552 05/25/22  1113 05/25/22  0542 05/24/22  2030 05/24/22  1544 05/24/22  0950   POC GLUCOSE mg/dl 181* 103 123 91 84 183*     Results from last 7 days   Lab Units 05/24/22  1013   GLUCOSE RANDOM mg/dL 159*         Results from last 7 days   Lab Units 05/24/22  1013   HEMOGLOBIN A1C % 6 4*   EAG mg/dl 137     Results from last 7 days   Lab Units 05/24/22  1013   TSH 3RD GENERATON uIU/mL 0 463     Results from last 7 days   Lab Units 05/24/22  1013   NT-PRO BNP pg/mL 120     Results from last 7 days   Lab Units 05/24/22  1332   CLARITY UA  Clear   COLOR UA  Straw   SPEC GRAV UA  1 010   PH UA  7 0   GLUCOSE UA mg/dl Negative   KETONES UA mg/dl Negative   BLOOD UA  Negative   PROTEIN UA mg/dl Negative   NITRITE UA  Negative   BILIRUBIN UA  Negative   UROBILINOGEN UA mg/dL Negative   LEUKOCYTES UA  Negative     Results from last 7 days   Lab Units 05/24/22  1013   INFLUENZA A PCR  Negative   INFLUENZA B PCR  Negative   RSV PCR  Negative       ED Treatment:   Medication Administration from 05/24/2022 0939 to 05/24/2022 1434       Date/Time Order Dose Route Action     05/24/2022 1016 sodium chloride 0 9 % bolus 1,000 mL 1,000 mL Intravenous New Bag     05/24/2022 1020 meclizine (ANTIVERT) tablet 50 mg 50 mg Oral Given        Past Medical History:   Diagnosis Date    Diabetes mellitus ([place] [address] )     Diverticulitis     Hypertension     Neck pain      Present on Admission:   Benign prostatic hyperplasia   Depression, recurrent ([place] [address] )   Gastroesophageal reflux disease   Hyperlipidemia   Hypertension   Type 2 diabetes mellitus without complication, without long-term current use of insulin ([facility])   Tobacco abuse      Admitting Diagnosis: Dizziness [R42]  Ambulatory dysfunction [R26 2]  Age/Sex: 59 y o  male  Admission Orders:  Consistent Carbohydrate Diet  Accu-checks ACHS  Fall precautions  DW  I&O      Scheduled Medications:  ARIPiprazole, 10 mg, Oral, HS  aspirin, 81 mg, Oral, Daily  atenolol, 50 mg, Oral, Daily  atorvastatin, 40 mg, Oral, Daily  cloNIDine, 0 2 mg, Oral, HS  fluticasone, 2 spray, Each Nare, Daily  gabapentin, 800 mg, Oral, TID  insulin lispro, 1-6 Units, Subcutaneous, 4x Daily (with meals and at bedtime)  meclizine, 25 mg, Oral, Q8H MAYCO  metFORMIN, 1,000 mg, Oral, BID With Meals  nicotine, 1 patch, Transdermal, Daily  pantoprazole, 40 mg, Oral, Early Morning  tamsulosin, 0 4 mg, Oral, Daily With Dinner  zolpidem, 10 mg, Oral, HS     Continuous IV Infusions:   sodium chloride 0 9 %, 75 mL/hr, Intravenous, Continuous     PRN Meds:  albuterol, 2 puff, Inhalation, Q6H PRN  ondansetron, 4 mg, Oral, Q6H PRN      Network Utilization Review Department  ATTENTION: Please call with any questions or concerns to [phone] and carefully listen to the prompts so that you are directed to the right person  All voicemails are confidential   Kalyn Castaneda all requests for admission clinical reviews, approved or denied determinations and any other requests to dedicated fax number below belonging to the campus where the patient is receiving treatment   List of dedicated fax numbers for the Facilities:  [address] DENIALS (Administrative/Medical Necessity) [phone]   [address] (Maternity/NICU/Pediatrics) [phone]   [address] [place] [address]  [address] [place] [address] [address] [place] [address] [address] [facility] [place] [address] [phone]

## 2022-05-25 NOTE — PLAN OF CARE
Problem: Potential for Falls  Goal: Patient will remain free of falls  Description: INTERVENTIONS:  - Educate patient/family on patient safety including physical limitations  - Instruct patient to call for assistance with activity   - Consult OT/PT to assist with strengthening/mobility   - Keep Call bell within reach  - Keep bed low and locked with side rails adjusted as appropriate  - Keep care items and personal belongings within reach  - Initiate and maintain comfort rounds  - Make Fall Risk Sign visible to staff  - Apply yellow socks and bracelet for high fall risk patients  - Consider moving patient to room near nurses station  Outcome: Progressing     Problem: PAIN - ADULT  Goal: Verbalizes/displays adequate comfort level or baseline comfort level  Description: Interventions:  - Encourage patient to monitor pain and request assistance  - Assess pain using appropriate pain scale  - Administer analgesics based on type and severity of pain and evaluate response  - Implement non-pharmacological measures as appropriate and evaluate response  - Consider cultural and social influences on pain and pain management  - Notify physician/advanced practitioner if interventions unsuccessful or patient reports new pain  Outcome: Progressing     Problem: INFECTION - ADULT  Goal: Absence or prevention of progression during hospitalization  Description: INTERVENTIONS:  - Assess and monitor for signs and symptoms of infection  - Monitor lab/diagnostic results  - Monitor all insertion sites, i e  indwelling lines, tubes, and drains  - Monitor endotracheal if appropriate and nasal secretions for changes in amount and color  - [place] appropriate cooling/warming therapies per order  - Administer medications as ordered  - Instruct and encourage patient and family to use good hand hygiene technique  - Identify and instruct in appropriate isolation precautions for identified infection/condition  Outcome: Progressing  Goal: Absence of fever/infection during neutropenic period  Description: INTERVENTIONS:  - Monitor WBC    Outcome: Progressing     Problem: SAFETY ADULT  Goal: Patient will remain free of falls  Description: INTERVENTIONS:  - Educate patient/family on patient safety including physical limitations  - Instruct patient to call for assistance with activity   - Consult OT/PT to assist with strengthening/mobility   - Keep Call bell within reach  - Keep bed low and locked with side rails adjusted as appropriate  - Keep care items and personal belongings within reach  - Initiate and maintain comfort rounds  - Make Fall Risk Sign visible to staff  - Apply yellow socks and bracelet for high fall risk patients  - Consider moving patient to room near nurses station  Outcome: Progressing  Goal: Maintain or return to baseline ADL function  Description: INTERVENTIONS:  -  Assess patient's ability to carry out ADLs; assess patient's baseline for ADL function and identify physical deficits which impact ability to perform ADLs (bathing, care of mouth/teeth, toileting, grooming, dressing, etc )  - Assess/evaluate cause of self-care deficits   - Assess range of motion  - Assess patient's mobility; develop plan if impaired  - Assess patient's need for assistive devices and provide as appropriate  - Encourage maximum independence but intervene and supervise when necessary  - Involve family in performance of ADLs  - Assess for home care needs following discharge   - Consider OT consult to assist with ADL evaluation and planning for discharge  - Provide patient education as appropriate  Outcome: Progressing  Goal: Maintains/Returns to pre admission functional level  Description: INTERVENTIONS:  - Perform BMAT or MOVE assessment daily    - Set and communicate daily mobility goal to care team and patient/family/caregiver     - Collaborate with rehabilitation services on mobility goals if consulted  - Out of bed for toileting  - Record patient progress and toleration of activity level   Outcome: Progressing     Problem: DISCHARGE PLANNING  Goal: Discharge to home or other facility with appropriate resources  Description: INTERVENTIONS:  - Identify barriers to discharge w/patient and caregiver  - Arrange for needed discharge resources and transportation as appropriate  - Identify discharge learning needs (meds, wound care, etc )  - Arrange for interpretive services to assist at discharge as needed  - Refer to Case Management Department for coordinating discharge planning if the patient needs post-hospital services based on physician/advanced practitioner order or complex needs related to functional status, cognitive ability, or social support system  Outcome: Progressing     Problem: Knowledge Deficit  Goal: Patient/family/caregiver demonstrates understanding of disease process, treatment plan, medications, and discharge instructions  Description: Complete learning assessment and assess knowledge base    Interventions:  - Provide teaching at level of understanding  - Provide teaching via preferred learning methods  Outcome: Progressing     Problem: NEUROSENSORY - ADULT  Goal: Achieves stable or improved neurological status  Description: INTERVENTIONS  - Monitor and report changes in neurological status  - Monitor vital signs such as temperature, blood pressure, glucose, and any other labs ordered   - Initiate measures to prevent increased intracranial pressure  - Monitor for seizure activity and implement precautions if appropriate      Outcome: Progressing     Problem: METABOLIC, FLUID AND ELECTROLYTES - ADULT  Goal: Glucose maintained within target range  Description: INTERVENTIONS:  - Monitor Blood Glucose as ordered  - Assess for signs and symptoms of hyperglycemia and hypoglycemia  - Administer ordered medications to maintain glucose within target range  - Assess nutritional intake and initiate nutrition service referral as needed  Outcome: Progressing     Problem: MUSCULOSKELETAL - ADULT  Goal: Maintain or return mobility to safest level of function  Description: INTERVENTIONS:  - Assess patient's ability to carry out ADLs; assess patient's baseline for ADL function and identify physical deficits which impact ability to perform ADLs (bathing, care of mouth/teeth, toileting, grooming, dressing, etc )  - Assess/evaluate cause of self-care deficits   - Assess range of motion  - Assess patient's mobility  - Assess patient's need for assistive devices and provide as appropriate  - Encourage maximum independence but intervene and supervise when necessary  - Involve family in performance of ADLs  - Assess for home care needs following discharge   - Consider OT consult to assist with ADL evaluation and planning for discharge  - Provide patient education as appropriate  Outcome: Progressing

## 2022-05-25 NOTE — PROGRESS NOTES
Progress Note    Michele Bedoya 59 y o  male MRN: [id number]  Unit/Bed#: 7T [facility] 713-01 Encounter: [id number]  Admitting Physician: Clover Veronica MD  PCP: RAMO Escobar  Date of Admission:  5/24/2022  9:44 AM    Assessment and Plan  * Vertigo  Assessment & Plan  Ongoing symptoms overnight  Associated with nausea and blurred vision  Tolerating PO diet  No headache  Unable to assess patient with Laddie Leal maneuver in AM due to symx burden at rest  In view of persisting symx and high risk pt will r/o posterior infarct with imaging   Bradycardia at 58bpm (was given Atenolol this AM)    - D/w Neuroradiology who recommend MRI Brain +/- contrast  - Order Meclizine 25 mg Q8H and Add Zofran 4mg s/l q6hrly PRN  - Up with assistance / fall precautions in place  - Gentle hydration with IVF NS @ 75 cc/hr   - Advised to refrain from sudden positional changes and take time when switching from a seated to a standing position    - Reduce Atenolol to 25mg po qd from tomorrow    Tobacco abuse  Assessment & Plan  Patient with 40+ pack year smoking history  Currently admits to smoking 5 cigarettes per day    Patient in pre-contemplative stage of cessation     - Smoking cessation counseling   - NRT provided: 14 mg/24 hr TD patch QD    Depression, recurrent ([address] )  Assessment & Plan  Managed at home on Buspar 165 mg QD however patient reports non-compliance since running out one month prior  Patient reports associated insomnia managed on Abilify 10 mg QHS, Ambien 10 mg QHS, and Clonidine 0 2 mg HS  Currently denies SI/HI/AH/VH    - Continue Abilify, Ambien, and Clonidine at bedtime   - Patient to follow up with psychiatry/PCP for management of depression    Type 2 diabetes mellitus without complication, without long-term current use of insulin [facility])  Assessment & Plan  Lab Results   Component Value Date    HGBA1C 6 4 (H) 05/24/2022       Recent Labs     05/24/22  0950 05/24/22  1544 05/24/22  2030 05/25/22  0542   POCGLU 183* 84 91 123       Blood Sugar Average: Last 72 hrs:  (P) 133 5   Controlled  Patient reports to only taking Metformin once daily, despite being counseled by PCP at last visit (1/26/22) to take it BID  PCP initiated Tradjenta 5 mg QD recently - reports compliance  Patient with associated diabetic peripheral neuropathy to lower extremities (R>L) managed with gabapentin 800 mg tid    - Hold Metformin at 1000 mg BID with meals in view of contrast load today  - Continue Gabapentin   - ISS with ACCU checks QID  - Diabetic diet    Hyperlipidemia  Assessment & Plan  Managed on Lipitor 40 mg QD    LDL 61 (5/25)    - Continue home med    Hypertension  Assessment & Plan  Blood Pressure: 142/91 , hovering above goal  Managed at home on Atenolol 50 mg QD    - Continue home med    Gastroesophageal reflux disease  Assessment & Plan  Managed at home on Omeprazole 40 mg Qd    - Continue with hospital formulary equivalent, Protonix 40 mg QD     Benign prostatic hyperplasia  Assessment & Plan  Managed on home Flomax 0 4 mg QD    - Continue home regime    VTE Pharmacologic Prophylaxis:   Pharmacologic: Pharmacologic VTE Prophylaxis contraindicated due to VTE score = 2  Mechanical VTE Prophylaxis in Place: Yes  Patient Centered Rounds: I have performed bedside rounds with nursing staff today  Discussions with Specialists or Other Care Team Provider: Radiology   Education and Discussions with Family / Patient: Nil at patients request  Time Spent for Care: 20 minutes  More than 50% of total time spent on counseling and coordination of care as described above  Current Length of Stay: 0 day(s)  Current Patient Status: Observation   Certification Statement: The patient will continue to require additional inpatient hospital stay due to Pending further workup  Discharge Plan: Pending  Code Status: Level 1 - Full Code    Subjective:   Reviewed at bedside  Sat up in bed endorsing dizziness with blurred vision with nausea   Had mobilized to the bathroom and eaten small breakfast earlier  Denies headache, palpitations, weakness    Objective:   Vitals:   Temp (24hrs), Av 6 °F (36 4 °C), Min:96 9 °F (36 1 °C), Max:98 8 °F (37 1 °C)    Temp:  [96 9 °F (36 1 °C)-98 8 °F (37 1 °C)] 96 9 °F (36 1 °C)  HR:  [52-62] 62  Resp:  [18-20] 20  BP: (104-145)/(67-91) 104/74  SpO2:  [95 %-98 %] 96 %  Body mass index is 28 32 kg/m²  Input and Output Summary (last 24 hours): Intake/Output Summary (Last 24 hours) at 2022 1416  Last data filed at 2022 1223  Gross per 24 hour   Intake 2460 5 ml   Output --   Net 2460 5 ml     Physical Exam:   Physical Exam  Vitals and nursing note reviewed  Constitutional:       General: He is not in acute distress  Appearance: Normal appearance  He is not ill-appearing, toxic-appearing or diaphoretic  HENT:      Head: Normocephalic and atraumatic  Right Ear: External ear normal       Left Ear: External ear normal       Nose: Nose normal       Mouth/Throat:      Mouth: Mucous membranes are moist       Pharynx: No oropharyngeal exudate or posterior oropharyngeal erythema  Eyes:      General: No visual field deficit or scleral icterus  Right eye: No discharge  Left eye: No discharge  Conjunctiva/sclera: Conjunctivae normal    Cardiovascular:      Rate and Rhythm: Normal rate and regular rhythm  Pulses: Normal pulses  Heart sounds: Normal heart sounds  No murmur heard  Pulmonary:      Effort: Pulmonary effort is normal  No respiratory distress  Abdominal:      General: Bowel sounds are normal       Palpations: Abdomen is soft  Tenderness: There is no abdominal tenderness  Musculoskeletal:         General: Normal range of motion  Cervical back: Normal range of motion and neck supple  Right lower leg: No edema  Left lower leg: No edema  Skin:     General: Skin is warm  Findings: No rash  Neurological:      General: No focal deficit present  Mental Status: He is alert and oriented to person, place, and time  Cranial Nerves: No facial asymmetry  Gait: Gait is intact  Gait normal       Comments: CN 2-12 grossly intact  Blurred vision reported without nystagmus present   Psychiatric:         Mood and Affect: Mood normal      Additional Data:   Labs:  Results from last 7 days   Lab Units 05/24/22  1013   WBC Thousand/uL 8 39   HEMOGLOBIN g/dL 14 5   HEMATOCRIT % 43 4   PLATELETS Thousands/uL 159   NEUTROS PCT % 64   LYMPHS PCT % 25   MONOS PCT % 6   EOS PCT % 3     Results from last 7 days   Lab Units 05/24/22  1013   POTASSIUM mmol/L 3 9   CHLORIDE mmol/L 108   CO2 mmol/L 25   BUN mg/dL 8   CREATININE mg/dL 0 82   CALCIUM mg/dL 9 1   ALK PHOS U/L 90   ALT U/L 33   AST U/L 24         Results from last 7 days   Lab Units 05/25/22  1113 05/25/22  0542 05/24/22  2030 05/24/22  1544 05/24/22  0950   POC GLUCOSE mg/dl 103 123 91 84 183*     Results from last 7 days   Lab Units 05/24/22  1013   HEMOGLOBIN A1C % 6 4*     * I Have Reviewed All Lab Data Listed Above  * Additional Pertinent Lab Tests Reviewed:  All Labs Within Last 24 Hours Reviewed    Imaging:  Imaging Reports Reviewed Today Include: CT Head 5/24  Imaging Personally Reviewed by Myself Includes:  Nil    Recent Cultures (last 7 days):       Last 24 Hours Medication List:   Current Facility-Administered Medications   Medication Dose Route Frequency Provider Last Rate    albuterol  2 puff Inhalation Q6H PRN Axel Gates MD      ARIPiprazole  10 mg Oral HS Axel Gates MD      aspirin  81 mg Oral Daily Axel Gates MD      atorvastatin  40 mg Oral Daily Axel Gates MD      cloNIDine  0 2 mg Oral HS Axel Gates MD      fluticasone  2 spray Each Nare Daily Axel Gates MD      gabapentin  800 mg Oral TID Axel Gates MD      insulin lispro  1-6 Units Subcutaneous 4x Daily (with meals and at bedtime) Reji Beltran MD      meclizine  25 mg Oral [facility] Dee Conklin MD      nicotine  1 patch Transdermal Daily Dee Conklin MD      ondansetron  4 mg Oral Q6H PRN Dee Conklin MD      pantoprazole  40 mg Oral Early Morning Dee Conklin MD      sodium chloride  75 mL/hr Intravenous Continuous Dee Conklin MD 75 mL/hr (05/25/22 0944)   Julieann Frankel tamsulosin  0 4 mg Oral Daily With Ahmet Lees MD      zolpidem  10 mg Oral HS Dee Conklin MD          ** Please Note: Dictation voice to text software may have been used in the creation of this document   Aurelia Heredia MD  05/25/22  2:16 PM

## 2022-05-26 ENCOUNTER — TRANSITIONAL CARE MANAGEMENT (OUTPATIENT)
Dept: FAMILY MEDICINE CLINIC | Facility: CLINIC | Age: 57
End: 2022-05-26

## 2022-05-26 NOTE — UTILIZATION REVIEW
Inpatient Admission Authorization Request   NOTIFICATION OF INPATIENT ADMISSION/INPATIENT AUTHORIZATION REQUEST   SERVICING FACILITY:   [address] [place] [address]  Noreen Good [address] , [address]  Tax ID: [id number]  NPI: [id number]  Place of Service: Inpatient [facility]  [address]  Place of Service Code: 24     ATTENDING PROVIDER:  Attending Name and NPI#: Josie Simon, Gunner Darrius Mullen [[id number]]  Address: Noreen Good  , [address]  Phone: [phone]     UTILIZATION REVIEW CONTACT:  B[facility]ess [facility], Utilization   Network Utilization Review Department  Phone: [phone]  Fax [phone]  Email: [email or web address]     PHYSICIAN ADVISORY SERVICES:  FOR JHCW-[id number] REVIEW - MEDICAL NECESSITY DENIAL  Phone: [phone]  Fax: [phone]  Email: [email or web address]     TYPE OF REQUEST:  Inpatient Status     ADMISSION INFORMATION:  ADMISSION DATE/TIME: 5/25/22  2:56 PM  PATIENT DIAGNOSIS CODE/DESCRIPTION:  Dizziness [R42]  Ambulatory dysfunction [R26 2]  DISCHARGE DATE/TIME: 5/25/2022  4:40 PM   IMPORTANT INFORMATION:  Please contact the [facility] directly with any questions or concerns regarding this request  Department voicemails are confidential     Send requests for admission clinical reviews, concurrent reviews, approvals, and administrative denials due to lack of clinical to fax [phone]

## 2022-05-27 ENCOUNTER — PATIENT OUTREACH (OUTPATIENT)
Dept: FAMILY MEDICINE CLINIC | Facility: CLINIC | Age: 57
End: 2022-05-27

## 2022-05-27 NOTE — PROGRESS NOTES
I called the patient but received voicemail  Message was left stating I will back next week  Chart reviewed

## 2022-05-31 ENCOUNTER — PATIENT OUTREACH (OUTPATIENT)
Dept: FAMILY MEDICINE CLINIC | Facility: CLINIC | Age: 57
End: 2022-05-31

## 2022-05-31 NOTE — PROGRESS NOTES
I called the patient, someone answered and then the call ended  I am mailing the 'unable to reach' letter and await response

## 2022-05-31 NOTE — LETTER
Fecha: 05/31/22    Estimado/a Harsh Fox:  Mi nombre es Ashtyn Guerrero un enfermera registrada administrador [facility]  No pude comunicarme con usted y me gustaría programar un horario para que hablemos por teléfono  Me dedico a ayudar a los pacientes que tienen afecciones médicas complejas a obtener la atención que necesitan  [place] comprende a pacientes que pueden estar en el hospital o en colton maddi de emergencias  Si tiene preguntas, no dude en llamarme  Espero cheney llamado  Atentamente    Ashtyn Prince  [address] [place] [address] Avenue

## 2022-06-09 ENCOUNTER — TELEPHONE (OUTPATIENT)
Dept: FAMILY MEDICINE CLINIC | Facility: CLINIC | Age: 57
End: 2022-06-09

## 2022-06-09 NOTE — TELEPHONE ENCOUNTER
[facility] caremark / Prescriber Response Form received on 06/09/22  to be completed by PCP  Copy made and placed in PCP folder  Forms to be delivered to PCP mailbox at assigned time        NOTE: a blank copy has been made and placed at  copy bin, under the letter T

## 2022-06-16 ENCOUNTER — OFFICE VISIT (OUTPATIENT)
Dept: FAMILY MEDICINE CLINIC | Facility: CLINIC | Age: 57
End: 2022-06-16

## 2022-06-16 VITALS
DIASTOLIC BLOOD PRESSURE: 62 MMHG | WEIGHT: 205.9 LBS | OXYGEN SATURATION: 99 % | RESPIRATION RATE: 18 BRPM | HEIGHT: 72 IN | TEMPERATURE: 97.5 F | HEART RATE: 66 BPM | BODY MASS INDEX: 27.89 KG/M2 | SYSTOLIC BLOOD PRESSURE: 100 MMHG

## 2022-06-16 DIAGNOSIS — M25.511 CHRONIC PAIN OF BOTH SHOULDERS: ICD-10-CM

## 2022-06-16 DIAGNOSIS — N43.40 SPERMATOCELE: ICD-10-CM

## 2022-06-16 DIAGNOSIS — F17.200 ENCOUNTER FOR SCREENING FOR MALIGNANT NEOPLASM OF LUNG IN CURRENT SMOKER WITH 30 PACK YEAR HISTORY OR GREATER: ICD-10-CM

## 2022-06-16 DIAGNOSIS — M25.512 CHRONIC PAIN OF BOTH SHOULDERS: ICD-10-CM

## 2022-06-16 DIAGNOSIS — Z12.2 ENCOUNTER FOR SCREENING FOR LUNG CANCER: ICD-10-CM

## 2022-06-16 DIAGNOSIS — E11.9 TYPE 2 DIABETES MELLITUS WITHOUT COMPLICATION, WITHOUT LONG-TERM CURRENT USE OF INSULIN (HCC): ICD-10-CM

## 2022-06-16 DIAGNOSIS — J30.9 ALLERGIC RHINITIS, UNSPECIFIED SEASONALITY, UNSPECIFIED TRIGGER: ICD-10-CM

## 2022-06-16 DIAGNOSIS — J40 BRONCHITIS: ICD-10-CM

## 2022-06-16 DIAGNOSIS — I10 PRIMARY HYPERTENSION: ICD-10-CM

## 2022-06-16 DIAGNOSIS — E78.2 MIXED HYPERLIPIDEMIA: ICD-10-CM

## 2022-06-16 DIAGNOSIS — Z87.442 HISTORY OF RENAL CALCULI: ICD-10-CM

## 2022-06-16 DIAGNOSIS — Z09 ENCOUNTER FOR FOLLOW-UP: ICD-10-CM

## 2022-06-16 DIAGNOSIS — G89.29 CHRONIC PAIN OF BOTH SHOULDERS: ICD-10-CM

## 2022-06-16 DIAGNOSIS — L20.84 INTRINSIC ECZEMA: ICD-10-CM

## 2022-06-16 DIAGNOSIS — Z12.5 PROSTATE CANCER SCREENING: ICD-10-CM

## 2022-06-16 DIAGNOSIS — Z11.59 ENCOUNTER FOR HEPATITIS C SCREENING TEST FOR LOW RISK PATIENT: ICD-10-CM

## 2022-06-16 DIAGNOSIS — R42 VERTIGO: ICD-10-CM

## 2022-06-16 DIAGNOSIS — Z00.00 MEDICARE ANNUAL WELLNESS VISIT, INITIAL: Primary | ICD-10-CM

## 2022-06-16 DIAGNOSIS — Z13.6 ENCOUNTER FOR ABDOMINAL AORTIC ANEURYSM (AAA) SCREENING: ICD-10-CM

## 2022-06-16 DIAGNOSIS — K21.9 GASTROESOPHAGEAL REFLUX DISEASE WITHOUT ESOPHAGITIS: ICD-10-CM

## 2022-06-16 DIAGNOSIS — Z12.2 ENCOUNTER FOR SCREENING FOR MALIGNANT NEOPLASM OF LUNG IN CURRENT SMOKER WITH 30 PACK YEAR HISTORY OR GREATER: ICD-10-CM

## 2022-06-16 PROBLEM — D22.9 CHANGE IN MULTIPLE NEVI: Status: RESOLVED | Noted: 2020-09-09 | Resolved: 2022-06-16

## 2022-06-16 PROBLEM — G47.09 OTHER INSOMNIA: Status: RESOLVED | Noted: 2018-10-03 | Resolved: 2022-06-16

## 2022-06-16 PROBLEM — F17.290 CIGAR SMOKER: Status: RESOLVED | Noted: 2018-05-14 | Resolved: 2022-06-16

## 2022-06-16 PROBLEM — H74.92: Status: RESOLVED | Noted: 2020-01-17 | Resolved: 2022-06-16

## 2022-06-16 PROBLEM — E66.3 OVERWEIGHT WITH BODY MASS INDEX (BMI) OF 29 TO 29.9 IN ADULT: Status: RESOLVED | Noted: 2020-06-10 | Resolved: 2022-06-16

## 2022-06-16 PROBLEM — R29.898 WEAKNESS OF BOTH HANDS: Status: RESOLVED | Noted: 2021-04-16 | Resolved: 2022-06-16

## 2022-06-16 PROCEDURE — G0439 PPPS, SUBSEQ VISIT: HCPCS | Performed by: NURSE PRACTITIONER

## 2022-06-16 RX ORDER — GABAPENTIN 800 MG/1
800 TABLET ORAL 3 TIMES DAILY
Qty: 90 TABLET | Refills: 2 | Status: SHIPPED | OUTPATIENT
Start: 2022-06-16

## 2022-06-16 RX ORDER — ATORVASTATIN CALCIUM 40 MG/1
40 TABLET, FILM COATED ORAL DAILY
Qty: 90 TABLET | Refills: 1 | Status: SHIPPED | OUTPATIENT
Start: 2022-06-16

## 2022-06-16 RX ORDER — OMEPRAZOLE 40 MG/1
40 CAPSULE, DELAYED RELEASE ORAL DAILY
Qty: 90 CAPSULE | Refills: 2 | Status: SHIPPED | OUTPATIENT
Start: 2022-06-16

## 2022-06-16 RX ORDER — TAMSULOSIN HYDROCHLORIDE 0.4 MG/1
0.4 CAPSULE ORAL
Qty: 90 CAPSULE | Refills: 1 | Status: SHIPPED | OUTPATIENT
Start: 2022-06-16

## 2022-06-16 RX ORDER — ALBUTEROL SULFATE 90 UG/1
1-2 AEROSOL, METERED RESPIRATORY (INHALATION) EVERY 6 HOURS PRN
Qty: 18 G | Refills: 1 | Status: SHIPPED | OUTPATIENT
Start: 2022-06-16

## 2022-06-16 RX ORDER — METFORMIN HYDROCHLORIDE 500 MG/1
1000 TABLET, EXTENDED RELEASE ORAL
Qty: 180 TABLET | Refills: 1 | Status: SHIPPED | OUTPATIENT
Start: 2022-06-16

## 2022-06-16 RX ORDER — LINAGLIPTIN 5 MG/1
5 TABLET, FILM COATED ORAL DAILY
Qty: 90 TABLET | Refills: 1 | Status: SHIPPED | OUTPATIENT
Start: 2022-06-16

## 2022-06-16 RX ORDER — FLUTICASONE PROPIONATE 50 MCG
1 SPRAY, SUSPENSION (ML) NASAL DAILY
Qty: 16 G | Refills: 0 | Status: SHIPPED | OUTPATIENT
Start: 2022-06-16

## 2022-06-16 RX ORDER — DIAPER,BRIEF,INFANT-TODD,DISP
EACH MISCELLANEOUS 2 TIMES DAILY
Qty: 30 G | Refills: 0 | Status: SHIPPED | OUTPATIENT
Start: 2022-06-16

## 2022-06-16 RX ORDER — ASPIRIN 81 MG/1
81 TABLET ORAL DAILY
Qty: 30 TABLET | Refills: 3 | Status: SHIPPED | OUTPATIENT
Start: 2022-06-16

## 2022-06-16 RX ORDER — LISINOPRIL 10 MG/1
10 TABLET ORAL DAILY
Qty: 30 TABLET | Refills: 0 | Status: SHIPPED | OUTPATIENT
Start: 2022-06-16 | End: 2022-07-14

## 2022-06-16 RX ORDER — MECLIZINE HYDROCHLORIDE 25 MG/1
25 TABLET ORAL EVERY 8 HOURS SCHEDULED
Qty: 30 TABLET | Refills: 0 | Status: SHIPPED | OUTPATIENT
Start: 2022-06-16

## 2022-06-16 NOTE — PATIENT INSTRUCTIONS
Medicare Preventive Visit Patient Instructions  Thank you for completing your Welcome to Medicare Visit or Medicare Annual Wellness Visit today  Your next wellness visit will be due in one year (6/17/2023)  The screening/preventive services that you may require over the next 5-10 years are detailed below  Some tests may not apply to you based off risk factors and/or age  Screening tests ordered at today's visit but not completed yet may show as past due  Also, please note that scanned in results may not display below  Preventive Screenings:  Service Recommendations Previous Testing/Comments   Colorectal Cancer Screening  · Colonoscopy    · Fecal Occult Blood Test (FOBT)/Fecal Immunochemical Test (FIT)  · Fecal DNA/Cologuard Test  · Flexible Sigmoidoscopy Age: 54-65 years old   Colonoscopy: every 10 years (May be performed more frequently if at higher risk)  OR  FOBT/FIT: every 1 year  OR  Cologuard: every 3 years  OR  Sigmoidoscopy: every 5 years  Screening may be recommended earlier than age 48 if at higher risk for colorectal cancer  Also, an individualized decision between you and your healthcare provider will decide whether screening between the ages of 74-80 would be appropriate   Colonoscopy: 06/05/2018  FOBT/FIT: Not on file  Cologuard: Not on file  Sigmoidoscopy: Not on file    Screening Current     Prostate Cancer Screening Individualized decision between patient and health care provider in men between ages of 53-78   Medicare will cover every 12 months beginning on the day after your 50th birthday PSA: 0 37 NG/ML           Hepatitis C Screening Once for adults born between 80 and 1965  More frequently in patients at high risk for Hepatitis C Hep C Antibody: Not on file        Diabetes Screening 1-2 times per year if you're at risk for diabetes or have pre-diabetes Fasting glucose: 286 mg/dL   A1C: 6 4 %    Screening Not Indicated  History Diabetes   Cholesterol Screening Once every 5 years if you don't have a lipid disorder  May order more often based on risk factors  Lipid panel: 05/25/2022    Screening Not Indicated  History Lipid Disorder      Other Preventive Screenings Covered by Medicare:  1  Abdominal Aortic Aneurysm (AAA) Screening: covered once if your at risk  You're considered to be at risk if you have a family history of AAA or a male between the age of 73-68 who smoking at least 100 cigarettes in your lifetime  2  Lung Cancer Screening: covers low dose CT scan once per year if you meet all of the following conditions: (1) Age 50-69; (2) No signs or symptoms of lung cancer; (3) Current smoker or have quit smoking within the last 15 years; (4) You have a tobacco smoking history of at least 30 pack years (packs per day x number of years you smoked); (5) You get a written order from a healthcare provider  3  Glaucoma Screening: covered annually if you're considered high risk: (1) You have diabetes OR (2) Family history of glaucoma OR (3)  aged 48 and older OR (3)  American aged 72 and older  3  Osteoporosis Screening: covered every 2 years if you meet one of the following conditions: (1) Have a vertebral abnormality; (2) On glucocorticoid therapy for more than 3 months; (3) Have primary hyperparathyroidism; (4) On osteoporosis medications and need to assess response to drug therapy  5  HIV Screening: covered annually if you're between the age of 12-76  Also covered annually if you are younger than 13 and older than 72 with risk factors for HIV infection  For pregnant patients, it is covered up to 3 times per pregnancy      Immunizations:  Immunization Recommendations   Influenza Vaccine Annual influenza vaccination during flu season is recommended for all persons aged >= 6 months who do not have contraindications   Pneumococcal Vaccine (Prevnar and Pneumovax)  * Prevnar = PCV13  * Pneumovax = PPSV23 Adults 25-60 years old: 1-3 doses may be recommended based on certain risk factors  Adults 72 years old: Prevnar (PCV13) vaccine recommended followed by Pneumovax (PPSV23) vaccine  If already received PPSV23 since turning 65, then PCV13 recommended at least one year after PPSV23 dose  Hepatitis B Vaccine 3 dose series if at intermediate or high risk (ex: diabetes, end stage renal disease, liver disease)   Tetanus (Td) Vaccine - COST NOT COVERED BY MEDICARE PART B Following completion of primary series, a booster dose should be given every 10 years to maintain immunity against tetanus  Td may also be given as tetanus wound prophylaxis  Tdap Vaccine - COST NOT COVERED BY MEDICARE PART B Recommended at least once for all adults  For pregnant patients, recommended with each pregnancy  Shingles Vaccine (Shingrix) - COST NOT COVERED BY MEDICARE PART B  2 shot series recommended in those aged 48 and above     Health Maintenance Due:      Topic Date Due    Hepatitis C Screening  Never done    HIV Screening  Never done    Colorectal Cancer Screening  06/05/2021     Immunizations Due:      Topic Date Due    COVID-19 Vaccine (1) Never done    Pneumococcal Vaccine: Pediatrics (0 to 5 Years) and At-Risk Patients (6 to 59 Years) (2 - PCV) 10/03/2019    Influenza Vaccine (Season Ended) 09/01/2022     Advance Directives   What are advance directives? Advance directives are legal documents that state your wishes and plans for medical care  These plans are made ahead of time in case you lose your ability to make decisions for yourself  Advance directives can apply to any medical decision, such as the treatments you want, and if you want to donate organs  What are the types of advance directives? There are many types of advance directives, and each state has rules about how to use them  You may choose a combination of any of the following:  · Living will: This is a written record of the treatment you want   You can also choose which treatments you do not want, which to limit, and which to stop at a certain time  This includes surgery, medicine, IV fluid, and tube feedings  · Durable power of  for healthcare [place] [facility]): This is a written record that states who you want to make healthcare choices for you when you are unable to make them for yourself  This person, called a proxy, is usually a family member or a friend  You may choose more than 1 proxy  · Do not resuscitate (DNR) order:  A DNR order is used in case your heart stops beating or you stop breathing  It is a request not to have certain forms of treatment, such as CPR  A DNR order may be included in other types of advance directives  · Medical directive: This covers the care that you want if you are in a coma, near death, or unable to make decisions for yourself  You can list the treatments you want for each condition  Treatment may include pain medicine, surgery, blood transfusions, dialysis, IV or tube feedings, and a ventilator (breathing machine)  · Values history: This document has questions about your views, beliefs, and how you feel and think about life  This information can help others choose the care that you would choose  Why are advance directives important? An advance directive helps you control your care  Although spoken wishes may be used, it is better to have your wishes written down  Spoken wishes can be misunderstood, or not followed  Treatments may be given even if you do not want them  An advance directive may make it easier for your family to make difficult choices about your care  Cigarette Smoking and Your Health   Risks to your health if you smoke:  Nicotine and other chemicals found in tobacco damage every cell in your body  Even if you are a light smoker, you have an increased risk for cancer, heart disease, and lung disease  If you are pregnant or have diabetes, smoking increases your risk for complications     Benefits to your health if you stop smoking:   · You decrease respiratory symptoms such as coughing, wheezing, and shortness of breath  · You reduce your risk for cancers of the lung, mouth, throat, kidney, bladder, pancreas, stomach, and cervix  If you already have cancer, you increase the benefits of chemotherapy  You also reduce your risk for cancer returning or a second cancer from developing  · You reduce your risk for heart disease, blood clots, heart attack, and stroke  · You reduce your risk for lung infections, and diseases such as pneumonia, asthma, chronic bronchitis, and emphysema  · Your circulation improves  More oxygen can be delivered to your body  If you have diabetes, you lower your risk for complications, such as kidney, artery, and eye diseases  You also lower your risk for nerve damage  Nerve damage can lead to amputations, poor vision, and blindness  · You improve your body's ability to heal and to fight infections  For more information and support to stop smoking:   · [employer]  Phone: 8- 809 - [phone]  Web Address: [employer]  Weight Management   Why it is important to manage your weight:  Being overweight increases your risk of health conditions such as heart disease, high blood pressure, type 2 diabetes, and certain types of cancer  It can also increase your risk for osteoarthritis, sleep apnea, and other respiratory problems  Aim for a slow, steady weight loss  Even a small amount of weight loss can lower your risk of health problems  How to lose weight safely:  A safe and healthy way to lose weight is to eat fewer calories and get regular exercise  You can lose up about 1 pound a week by decreasing the number of calories you eat by 500 calories each day  Healthy meal plan for weight management:  A healthy meal plan includes a variety of foods, contains fewer calories, and helps you stay healthy  A healthy meal plan includes the following:  · Eat whole-grain foods more often  A healthy meal plan should contain fiber   Fiber is the part of grains, fruits, and vegetables that is not broken down by your body  Whole-grain foods are healthy and provide extra fiber in your diet  Some examples of whole-grain foods are whole-wheat breads and pastas, oatmeal, brown rice, and bulgur  · Eat a variety of vegetables every day  Include dark, leafy greens such as spinach, kale, jasyon greens, and mustard greens  Eat yellow and orange vegetables such as carrots, sweet potatoes, and winter squash  · Eat a variety of fruits every day  Choose fresh or canned fruit (canned in its own juice or light syrup) instead of juice  Fruit juice has very little or no fiber  · Eat low-fat dairy foods  Drink fat-free (skim) milk or 1% milk  Eat fat-free yogurt and low-fat cottage cheese  Try low-fat cheeses such as mozzarella and other reduced-fat cheeses  · Choose meat and other protein foods that are low in fat  Choose beans or other legumes such as split peas or lentils  Choose fish, skinless poultry (chicken or turkey), or lean cuts of red meat (beef or pork)  Before you cook meat or poultry, cut off any visible fat  · Use less fat and oil  Try baking foods instead of frying them  Add less fat, such as margarine, sour cream, regular salad dressing and mayonnaise to foods  Eat fewer high-fat foods  Some examples of high-fat foods include french fries, doughnuts, ice cream, and cakes  · Eat fewer sweets  Limit foods and drinks that are high in sugar  This includes candy, cookies, regular soda, and sweetened drinks  Exercise:  Exercise at least 30 minutes per day on most days of the week  Some examples of exercise include walking, biking, dancing, and swimming  You can also fit in more physical activity by taking the stairs instead of the elevator or parking farther away from stores  Ask your healthcare provider about the best exercise plan for you        © Copyright [employer] 2018 Information is for End User's use only and may not be sold, redistributed or otherwise used for commercial purposes   All illustrations and images included in CareNotes® are the copyrighted property of A [employer] A M , Inc  or [facility] Saira Guajardo

## 2022-06-16 NOTE — PROGRESS NOTES
Assessment and Plan:     Problem List Items Addressed This Visit        Digestive    Gastroesophageal reflux disease    Relevant Medications    omeprazole (PriLOSEC) 40 MG capsule       Endocrine    Type 2 diabetes mellitus without complication, without long-term current use of insulin (HCC)    Relevant Medications    metFORMIN (GLUCOPHAGE-XR) 500 mg 24 hr tablet    linaGLIPtin (Tradjenta) 5 MG TABS    Other Relevant Orders    Microalbumin / creatinine urine ratio       Respiratory    Allergic rhinitis    Relevant Medications    fluticasone (FLONASE) 50 mcg/act nasal spray       Cardiovascular and Mediastinum    Hypertension     D/C atenolol and start lisinopril   RTC in 2 weeks for BP check            Relevant Medications    lisinopril (ZESTRIL) 10 mg tablet       Musculoskeletal and Integument    Intrinsic eczema    Relevant Medications    hydrocortisone 1 % ointment       Other    History of renal calculi    Relevant Medications    tamsulosin (FLOMAX) 0 4 mg    Hyperlipidemia    Relevant Medications    atorvastatin (LIPITOR) 40 mg tablet    aspirin (ECOTRIN LOW STRENGTH) 81 mg EC tablet      Other Visit Diagnoses     Medicare annual wellness visit, initial    -  Primary    Spermatocele        Relevant Orders    Ambulatory Referral to Urology    US scrotum and testicles    Encounter for abdominal aortic aneurysm (AAA) screening        Relevant Orders    US abdominal aorta screening aaa    Encounter for screening for malignant neoplasm of lung in current smoker with 30 pack year history or greater        Relevant Orders    US abdominal aorta screening aaa    CT lung screening program    Encounter for screening for lung cancer        Relevant Orders    CT lung screening program    Encounter for hepatitis C screening test for low risk patient        Prostate cancer screening        Chronic pain of both shoulders        Relevant Medications    gabapentin (NEURONTIN) 800 mg tablet    Other Relevant Orders Ambulatory Referral to Pain Management    Bronchitis        Relevant Medications    albuterol (PROVENTIL HFA,VENTOLIN HFA) 90 mcg/act inhaler    fluticasone (FLONASE) 50 mcg/act nasal spray    Encounter for follow-up        Relevant Medications    fluticasone (FLONASE) 50 mcg/act nasal spray    Vertigo        Relevant Medications    meclizine (ANTIVERT) 25 mg tablet    Other Relevant Orders    Ambulatory Referral to Physical Therapy        Patient will contact complex care RN to review his medications that he has at home, he is not sure if psych made any changes        Preventive health issues were discussed with patient, and age appropriate screening tests were ordered as noted in patient's After Visit Summary  Personalized health advice and appropriate referrals for health education or preventive services given if needed, as noted in patient's After Visit Summary  History of Present Illness:     Patient presents for a Medicare Wellness Visit    -Reports ongoing testicular pain  Previously had u/s showed right spermatocele and was referred to urology but did not go      -also reports that he is depressed due to his son being addicted to drugs  He had to kick his son out and now his son is homeless  Reports that his son was stealing from him  Son has 6 children and patient is not allowed to have contact with them because they were taken by [employer]  Patient has not discussed this with his therapist or psychiatrist         Patient Care Team:  Indra Murray as PCP - General (Family Medicine)  Suresh Nguyen, RN as Lead OP Care Mgr     Review of Systems:     Review of Systems   Constitutional: Negative for chills and fever  HENT: Negative for ear pain and sore throat  Eyes: Negative for pain and visual disturbance  Respiratory: Negative for cough and shortness of breath  Cardiovascular: Negative for chest pain and palpitations  Gastrointestinal: Negative for abdominal pain and vomiting  Genitourinary: Positive for testicular pain  Negative for dysuria and hematuria  Musculoskeletal: Positive for arthralgias, back pain and myalgias  Skin: Negative for color change and rash  Neurological: Positive for dizziness  Negative for seizures and syncope  Psychiatric/Behavioral: Positive for dysphoric mood  Negative for self-injury and suicidal ideas  All other systems reviewed and are negative  Problem List:     Patient Active Problem List   Diagnosis    Benign prostatic hyperplasia    Gastroesophageal reflux disease    History of diverticulitis    History of renal calculi    History of hemicolectomy    Hypertension    Hyperlipidemia    Left shoulder pain    Cervicalgia    Posttraumatic stiffness of shoulder joint    Intrinsic eczema    Allergic rhinitis    Decreased hearing of left ear    Chronic pain of left knee    Multiple lung nodules on CT    Type 2 diabetes mellitus without complication, without long-term current use of insulin (HCC)    Chronic pain syndrome    Diabetic polyneuropathy associated with type 2 diabetes mellitus (HCC)    Depression, recurrent (HCC)    Epidermal cyst    Tobacco abuse    Chronic bronchitis (HCC)      Past Medical and Surgical History:     Past Medical History:   Diagnosis Date    Diabetes mellitus ([facility] [address] )     Diverticulitis     Hypertension     Neck pain      Past Surgical History:   Procedure Laterality Date    COLON SURGERY      NECK SURGERY        Family History:     No family history on file     Social History:     Social History     Socioeconomic History    Marital status: /Civil Union     Spouse name: None    Number of children: None    Years of education: None    Highest education level: None   Occupational History    None   Tobacco Use    Smoking status: Current Every Day Smoker     Packs/day: 0 25     Types: Cigarettes, Cigars    Smokeless tobacco: Never Used    Tobacco comment: 1 cigar/daily   Vaping Use  Vaping Use: Never used   Substance and Sexual Activity    Alcohol use: Yes     Comment: drinks only on weekends    Drug use: Never    Sexual activity: None   Other Topics Concern    None   Social History Narrative    None     Social Determinants of Health     Financial Resource Strain: High Risk    Difficulty of Paying Living Expenses: Hard   Food Insecurity: Food Insecurity Present    Worried About Running Out of Food in the Last Year: Sometimes true    [place] of Food in the Last Year: Sometimes true   Transportation Needs: No Transportation Needs    Lack of Transportation (Medical): No    Lack of Transportation (Non-Medical):  No   Physical Activity: Not on file   Stress: Not on file   Social Connections: Not on file   Intimate Partner Violence: Not on file   Housing Stability: Not on file      Medications and Allergies:     Current Outpatient Medications   Medication Sig Dispense Refill    albuterol (PROVENTIL HFA,VENTOLIN HFA) 90 mcg/act inhaler Inhale 1-2 puffs every 6 (six) hours as needed for wheezing 18 g 1    aspirin (ECOTRIN LOW STRENGTH) 81 mg EC tablet Take 1 tablet (81 mg total) by mouth daily 30 tablet 3    atorvastatin (LIPITOR) 40 mg tablet Take 1 tablet (40 mg total) by mouth daily 90 tablet 1    fluticasone (FLONASE) 50 mcg/act nasal spray 1 spray into each nostril daily 16 g 0    gabapentin (NEURONTIN) 800 mg tablet Take 1 tablet (800 mg total) by mouth 3 (three) times a day 90 tablet 2    hydrocortisone 1 % ointment Apply topically 2 (two) times a day 30 g 0    linaGLIPtin (Tradjenta) 5 MG TABS Take 5 mg by mouth daily 90 tablet 1    lisinopril (ZESTRIL) 10 mg tablet Take 1 tablet (10 mg total) by mouth daily 30 tablet 0    meclizine (ANTIVERT) 25 mg tablet Take 1 tablet (25 mg total) by mouth every 8 (eight) hours 30 tablet 0    metFORMIN (GLUCOPHAGE-XR) 500 mg 24 hr tablet Take 2 tablets (1,000 mg total) by mouth daily with dinner 180 tablet 1    omeprazole (PriLOSEC) 40 MG capsule Take 1 capsule (40 mg total) by mouth daily 90 capsule 2    tamsulosin (FLOMAX) 0 4 mg Take 1 capsule (0 4 mg total) by mouth daily with dinner 90 capsule 1    ARIPiprazole (ABILIFY) 10 mg tablet Take 10 mg by mouth daily at bedtime      Blood Glucose Monitoring Suppl (FreeStyle Lite) SHANELL Use daily Use as directed 1 each 0    buPROPion (WELLBUTRIN SR) 150 mg 12 hr tablet Take 150 mg by mouth every morning      cloNIDine (CATAPRES) 0 2 mg tablet Take 0 2 mg by mouth daily at bedtime      glucose blood (FREESTYLE LITE) test strip Testing sugars once a day 100 each 3    hydrOXYzine HCL (ATARAX) 50 mg tablet Take 50 mg by mouth daily at bedtime      Lancets (freestyle) lancets Daily 100 each 5    Misc  Devices (Wrist Brace) MISC Use daily 2 each 0    zolpidem (AMBIEN) 10 mg tablet Take 10 mg by mouth daily at bedtime       No current facility-administered medications for this visit  No Known Allergies   Immunizations:     Immunization History   Administered Date(s) Administered    Pneumococcal Polysaccharide PPV23 10/03/2018    Tdap 05/14/2018      Health Maintenance:         Topic Date Due    Hepatitis C Screening  Never done    HIV Screening  Never done    Colorectal Cancer Screening  06/05/2021         Topic Date Due    COVID-19 Vaccine (1) Never done    Pneumococcal Vaccine: Pediatrics (0 to 5 Years) and At-Risk Patients (6 to 59 Years) (2 - PCV) 10/03/2019    Influenza Vaccine (Season Ended) 09/01/2022      Medicare Screening Tests and Risk Assessments:         Health Risk Assessment:   Patient rates overall health as good  Patient feels that their physical health rating is slightly worse  Patient is very satisfied with their life  Eyesight was rated as slightly worse  Hearing was rated as slightly worse  Patient feels that their emotional and mental health rating is slightly better  Patients states they are often angry   Patient states they are never, rarely unusually tired/fatigued  Pain experienced in the last 7 days has been a lot  Patient's pain rating has been 8/10  Patient states that he has experienced weight loss or gain in last 6 months  Fall Risk Screening: In the past year, patient has experienced: no history of falling in past year      Home Safety:  Patient has trouble with stairs inside or outside of their home  Patient has working smoke alarms and has working carbon monoxide detector  Home safety hazards include: none  Nutrition:   Current diet is Low Cholesterol and Limited junk food  Medications:   Patient is currently taking over-the-counter supplements  OTC medications include: see medication list  Patient is able to manage medications  Activities of Daily Living (ADLs)/Instrumental Activities of Daily Living (IADLs):   Walk and transfer into and out of bed and chair?: Yes  Dress and groom yourself?: Yes    Bathe or shower yourself?: Yes    Feed yourself?  Yes  Do your laundry/housekeeping?: No  Manage your money, pay your bills and track your expenses?: No  Make your own meals?: No    Do your own shopping?: No    Previous Hospitalizations:   Any hospitalizations or ED visits within the last 12 months?: Yes    How many hospitalizations have you had in the last year?: 3-4    PREVENTIVE SCREENINGS      Cardiovascular Screening:    General: Screening Not Indicated and History Lipid Disorder      Diabetes Screening:     General: Screening Not Indicated and History Diabetes      Colorectal Cancer Screening:     General: Screening Current      Prostate Cancer Screening:    General: Risks and Benefits Discussed    Due for: PSA      Osteoporosis Screening:    General: Screening Not Indicated      Abdominal Aortic Aneurysm (AAA) Screening:    Risk factors include: tobacco use        General: Risks and Benefits Discussed    Due for: Screening AAA Ultrasound      Lung Cancer Screening:     General: Risks and Benefits Discussed    Due for: Low Dose CT (LDCT)      Hepatitis C Screening:    General: Risks and Benefits Discussed    Hep C Screening Accepted: Yes      Screening, Brief Intervention, and Referral to Treatment (SBIRT)    Screening  Typical number of drinks in a day: 0  Typical number of drinks in a week: 8  Interpretation: Low risk drinking behavior  Single Item Drug Screening:  How often have you used an illegal drug (including marijuana) or a prescription medication for non-medical reasons in the past year? never    Single Item Drug Screen Score: 0  Interpretation: Negative screen for possible drug use disorder    No exam data present     Physical Exam:     /62 (BP Location: Left arm, Patient Position: Sitting, Cuff Size: Standard)   Pulse 66   Temp 97 5 °F (36 4 °C) (Temporal)   Resp 18   Ht 6' (1 829 m)   Wt 93 4 kg (205 lb 14 4 oz)   SpO2 99%   BMI 27 93 kg/m²     Physical Exam  Vitals and nursing note reviewed  Constitutional:       General: He is not in acute distress  Appearance: Normal appearance  He is not diaphoretic  HENT:      Head: Normocephalic and atraumatic  Right Ear: Tympanic membrane and external ear normal       Left Ear: Tympanic membrane and external ear normal       Nose: Nose normal       Mouth/Throat:      Mouth: Mucous membranes are moist    Eyes:      General:         Right eye: No discharge  Left eye: No discharge  Extraocular Movements: Extraocular movements intact  Conjunctiva/sclera: Conjunctivae normal       Pupils: Pupils are equal, round, and reactive to light  Cardiovascular:      Rate and Rhythm: Normal rate and regular rhythm  Pulses: Normal pulses  Heart sounds: Normal heart sounds  Pulmonary:      Effort: Pulmonary effort is normal  No respiratory distress  Breath sounds: Normal breath sounds  Abdominal:      General: Abdomen is flat  Bowel sounds are normal       Palpations: Abdomen is soft  Tenderness: There is no abdominal tenderness  Musculoskeletal:         General: No swelling  Right shoulder: Tenderness present  Decreased range of motion  Normal strength  Left shoulder: Tenderness present  Decreased range of motion  Normal strength  Cervical back: Normal range of motion and neck supple  Right knee: Tenderness present  Left knee: Tenderness present  Right lower leg: No edema  Left lower leg: No edema  Skin:     General: Skin is warm and dry  Neurological:      General: No focal deficit present  Mental Status: He is alert and oriented to person, place, and time  Psychiatric:         Mood and Affect: Mood is depressed           Behavior: Behavior normal           RAMO Hua

## 2022-06-20 ENCOUNTER — PATIENT OUTREACH (OUTPATIENT)
Dept: FAMILY MEDICINE CLINIC | Facility: CLINIC | Age: 57
End: 2022-06-20

## 2022-06-20 NOTE — PROGRESS NOTES
I returned the patient's call after he left me a message stating he received my UTR letter  I received his voicemail and left a message stating I will call him at another time

## 2022-06-30 ENCOUNTER — CLINICAL SUPPORT (OUTPATIENT)
Dept: FAMILY MEDICINE CLINIC | Facility: CLINIC | Age: 57
End: 2022-06-30

## 2022-06-30 ENCOUNTER — TELEPHONE (OUTPATIENT)
Dept: UROLOGY | Facility: MEDICAL CENTER | Age: 57
End: 2022-06-30

## 2022-06-30 VITALS — DIASTOLIC BLOOD PRESSURE: 64 MMHG | SYSTOLIC BLOOD PRESSURE: 100 MMHG

## 2022-06-30 DIAGNOSIS — I10 PRIMARY HYPERTENSION: Primary | ICD-10-CM

## 2022-06-30 NOTE — TELEPHONE ENCOUNTER
Please Triage  New Patient    What is the reason for the patients appointment? [facility] called to schedule NP appt for Spermatocele  Pt stated that he has difficulty urinating and gross hematuria sometime  Pt also had abdominal pain  What office location does the patient prefer? [place]    Imaging/Lab Results: in Epic    Do we accept the patient's insurance or is the patient Self-Pay? Insurance Provider:[employer]  Plan Type/Number:  Member ID#: Has the patient had any previous Urologist(s)? no    Have patient records been requested? If not are records showing in Epic: in epic    Has the patient had any outside testing done? In [facility]    Does the patient have a personal history of cancer?  No     Pt can be reached at [phone] or [phone] pt needs

## 2022-06-30 NOTE — TELEPHONE ENCOUNTER
Contacted patient with [facility], [phone] as referral stated patient needs   Patient was able to speak English and interpretation not needed  Patient states he is not having any current pain and he denies blood in the urine and difficulty urinating  States his main issue is that he gets kidney stones that he sometimes has issues passing  Patient offered an appointment in the [facility] office due to soonest availability, however patient states he only wants an appointment in [facility] or [place]  Patient scheduled for next available NP appointment with Esther Garcia in [place] on 8/4/22  He was provided with office location

## 2022-07-06 ENCOUNTER — APPOINTMENT (EMERGENCY)
Dept: RADIOLOGY | Facility: HOSPITAL | Age: 57
End: 2022-07-06
Payer: MEDICARE

## 2022-07-06 ENCOUNTER — PATIENT OUTREACH (OUTPATIENT)
Dept: FAMILY MEDICINE CLINIC | Facility: CLINIC | Age: 57
End: 2022-07-06

## 2022-07-06 ENCOUNTER — HOSPITAL ENCOUNTER (EMERGENCY)
Facility: HOSPITAL | Age: 57
Discharge: HOME | End: 2022-07-06
Attending: EMERGENCY MEDICINE
Payer: MEDICARE

## 2022-07-06 VITALS
WEIGHT: 209.44 LBS | SYSTOLIC BLOOD PRESSURE: 114 MMHG | BODY MASS INDEX: 28.4 KG/M2 | DIASTOLIC BLOOD PRESSURE: 69 MMHG | RESPIRATION RATE: 18 BRPM | TEMPERATURE: 98.2 F | HEART RATE: 67 BPM | OXYGEN SATURATION: 96 %

## 2022-07-06 DIAGNOSIS — M62.838 TRAPEZIUS MUSCLE SPASM: Primary | ICD-10-CM

## 2022-07-06 PROCEDURE — 96372 THER/PROPH/DIAG INJ SC/IM: CPT

## 2022-07-06 PROCEDURE — 73030 X-RAY EXAM OF SHOULDER: CPT

## 2022-07-06 PROCEDURE — 99284 EMERGENCY DEPT VISIT MOD MDM: CPT | Performed by: PHYSICIAN ASSISTANT

## 2022-07-06 PROCEDURE — 99283 EMERGENCY DEPT VISIT LOW MDM: CPT

## 2022-07-06 RX ORDER — LIDOCAINE 50 MG/G
1 PATCH TOPICAL ONCE
Status: DISCONTINUED | OUTPATIENT
Start: 2022-07-06 | End: 2022-07-06 | Stop reason: HOSPADM

## 2022-07-06 RX ORDER — LIDOCAINE HYDROCHLORIDE 20 MG/ML
JELLY TOPICAL AS NEEDED
Qty: 30 ML | Refills: 0 | Status: SHIPPED | OUTPATIENT
Start: 2022-07-06

## 2022-07-06 RX ORDER — ACETAMINOPHEN 500 MG
500 TABLET ORAL EVERY 6 HOURS PRN
Qty: 30 TABLET | Refills: 0 | Status: SHIPPED | OUTPATIENT
Start: 2022-07-06

## 2022-07-06 RX ORDER — NAPROXEN 500 MG/1
500 TABLET ORAL 2 TIMES DAILY WITH MEALS
Qty: 20 TABLET | Refills: 0 | Status: SHIPPED | OUTPATIENT
Start: 2022-07-06 | End: 2022-08-05

## 2022-07-06 RX ORDER — KETOROLAC TROMETHAMINE 30 MG/ML
15 INJECTION, SOLUTION INTRAMUSCULAR; INTRAVENOUS ONCE
Status: COMPLETED | OUTPATIENT
Start: 2022-07-06 | End: 2022-07-06

## 2022-07-06 RX ORDER — METHOCARBAMOL 500 MG/1
500 TABLET, FILM COATED ORAL 2 TIMES DAILY
Qty: 20 TABLET | Refills: 0 | Status: SHIPPED | OUTPATIENT
Start: 2022-07-06 | End: 2022-10-13 | Stop reason: SDUPTHER

## 2022-07-06 RX ADMIN — KETOROLAC TROMETHAMINE 15 MG: 30 INJECTION, SOLUTION INTRAMUSCULAR; INTRAVENOUS at 17:57

## 2022-07-06 RX ADMIN — LIDOCAINE 1 PATCH: 50 PATCH TOPICAL at 17:57

## 2022-07-06 NOTE — PROGRESS NOTES
I called the patient but there was no answer and no option to leave a message  I will continue further outreach

## 2022-07-06 NOTE — ED PROVIDER NOTES
History  Chief Complaint   Patient presents with    Neck Pain     Pt  Reports neck and left shoulder pain x4 months  Pt  Reports worsening pain with movement and feeling a 'popping'      Patient is a 66-year-old right-handed male presents today for evaluation of left-sided neck pain  Patient reports his neck pain extends into his left shoulder and into the spine  Patient denies any numbness, tingling, weakness, paresthesias, paralysis, rashes, skin changes to the area pain  Patient also reports he has not had any traumatic exacerbating events denies any falls or motor vehicle accidents  Patient reports that [place] many years ago he was injured at work and broke his neck however is unsure of where he had a fracture  Patient reports he has been struggling with chronic aching pain in his neck and back since  Patient denies having any relief with any medications he has taken to alleviate his symptoms including gabapentin and flexeril  History provided by:  Patient   used: No    Neck Pain  Pain location:  L side  Quality:  Aching and cramping  Pain radiates to:  L scapula and L shoulder  Pain severity:  Moderate  Pain is:  Same all the time  Onset quality:  Gradual  Duration:  4 months  Timing:  Intermittent  Progression:  Waxing and waning  Chronicity:  Chronic  Relieved by:  Nothing  Worsened by:  Twisting and bending  Ineffective treatments:  Muscle relaxants  Associated symptoms: no chest pain, no fever and no numbness        Prior to Admission Medications   Prescriptions Last Dose Informant Patient Reported? Taking? ARIPiprazole (ABILIFY) 10 mg tablet   Yes No   Sig: Take 10 mg by mouth daily at bedtime   Blood Glucose Monitoring Suppl (FreeStyle Lite) SHANELL  Self No No   Sig: Use daily Use as directed   Lancets (freestyle) lancets  Self No No   Sig: Daily   Misc   Devices (Wrist Brace) MISC  Self No No   Sig: Use daily   albuterol (PROVENTIL HFA,VENTOLIN HFA) 90 mcg/act inhaler   No No   Sig: Inhale 1-2 puffs every 6 (six) hours as needed for wheezing   aspirin (ECOTRIN LOW STRENGTH) 81 mg EC tablet   No No   Sig: Take 1 tablet (81 mg total) by mouth daily   atorvastatin (LIPITOR) 40 mg tablet   No No   Sig: Take 1 tablet (40 mg total) by mouth daily   buPROPion (WELLBUTRIN SR) 150 mg 12 hr tablet   Yes No   Sig: Take 150 mg by mouth every morning   cloNIDine (CATAPRES) 0 2 mg tablet  Self Yes No   Sig: Take 0 2 mg by mouth daily at bedtime   fluticasone (FLONASE) 50 mcg/act nasal spray   No No   Si spray into each nostril daily   gabapentin (NEURONTIN) 800 mg tablet   No No   Sig: Take 1 tablet (800 mg total) by mouth 3 (three) times a day   glucose blood (FREESTYLE LITE) test strip   No No   Sig: Testing sugars once a day   hydrOXYzine HCL (ATARAX) 50 mg tablet   Yes No   Sig: Take 50 mg by mouth daily at bedtime   hydrocortisone 1 % ointment   No No   Sig: Apply topically 2 (two) times a day   linaGLIPtin (Tradjenta) 5 MG TABS   No No   Sig: Take 5 mg by mouth daily   lisinopril (ZESTRIL) 10 mg tablet   No No   Sig: Take 1 tablet (10 mg total) by mouth daily   meclizine (ANTIVERT) 25 mg tablet   No No   Sig: Take 1 tablet (25 mg total) by mouth every 8 (eight) hours   metFORMIN (GLUCOPHAGE-XR) 500 mg 24 hr tablet   No No   Sig: Take 2 tablets (1,000 mg total) by mouth daily with dinner   omeprazole (PriLOSEC) 40 MG capsule   No No   Sig: Take 1 capsule (40 mg total) by mouth daily   tamsulosin (FLOMAX) 0 4 mg   No No   Sig: Take 1 capsule (0 4 mg total) by mouth daily with dinner   zolpidem (AMBIEN) 10 mg tablet  Self Yes No   Sig: Take 10 mg by mouth daily at bedtime      Facility-Administered Medications: None       Past Medical History:   Diagnosis Date    Diabetes mellitus ([facility] [address] )     Diverticulitis     Hypertension     Neck pain        Past Surgical History:   Procedure Laterality Date    COLON SURGERY      NECK SURGERY         History reviewed  No pertinent family history    I have reviewed and agree with the history as documented  E-Cigarette/Vaping    E-Cigarette Use Never User      E-Cigarette/Vaping Substances     Social History     Tobacco Use    Smoking status: Current Every Day Smoker     Packs/day: 0 25     Types: Cigarettes, Cigars    Smokeless tobacco: Never Used    Tobacco comment: 1 cigar/daily   Vaping Use    Vaping Use: Never used   Substance Use Topics    Alcohol use: Yes     Comment: drinks only on weekends    Drug use: Never       Review of Systems   Constitutional: Negative for chills, fatigue and fever  HENT: Negative for congestion, ear pain, rhinorrhea and sore throat  Eyes: Negative for redness  Respiratory: Negative for chest tightness and shortness of breath  Cardiovascular: Negative for chest pain and palpitations  Gastrointestinal: Negative for abdominal pain, nausea and vomiting  Genitourinary: Negative for dysuria and hematuria  Musculoskeletal: Positive for neck pain  Skin: Negative for rash  Neurological: Negative for dizziness, syncope, light-headedness and numbness  Physical Exam  Physical Exam  Neck:        Comments: No midline spinal tenderness, deformities, crepitus, step-off, skin changes noted to the area of pain  Equal  strength and upper extremity sensation b/l, 2+ radial pulses b/l  Musculoskeletal:      Cervical back: Normal range of motion  No rigidity  Muscular tenderness present  No pain with movement or spinous process tenderness           Vital Signs  ED Triage Vitals   Temperature Pulse Respirations Blood Pressure SpO2   07/06/22 1726 07/06/22 1726 07/06/22 1726 07/06/22 1733 07/06/22 1726   98 2 °F (36 8 °C) 67 18 114/69 96 %      Temp Source Heart Rate Source Patient Position - Orthostatic VS BP Location FiO2 (%)   07/06/22 1726 07/06/22 1726 07/06/22 1733 07/06/22 1733 --   Oral Monitor Sitting Left arm       Pain Score       07/06/22 1726       9           Vitals:    07/06/22 1726 07/06/22 1733 BP:  114/69   Pulse: 67    Patient Position - Orthostatic VS:  Sitting         Visual Acuity      ED Medications  Medications   lidocaine (LIDODERM) 5 % patch 1 patch (1 patch Topical Medication Applied 7/6/22 1757)   ketorolac (TORADOL) injection 15 mg (15 mg Intramuscular Given 7/6/22 1757)       Diagnostic Studies  Results Reviewed     None                 XR shoulder 2+ views LEFT   ED Interpretation by Stephanie Bang PA-C (07/06 1819)   No acute fractures or dislocations visualized  Procedures  Procedures         ED Course  ED Course as of 07/06/22 1820 Wed Jul 06, 2022 1819 Imaging review done by myself and preliminary results were discussed with the patient and family members  Discussion was had with patient and family members that this read is preliminary and therefore discrepancies between this read and the final read by the radiologist are possible  Patient and family members were informed that any discrepancies found by would be relayed by phone call  Patient was reexamined at this time and informed of laboratory and/or imaging results and was found to be stable for discharge  Return to emergency department criteria was reviewed with the patient who verbalized understanding and was agreeable to discharge and the treatment plan at this time  SBIRT 22yo+    Flowsheet Row Most Recent Value   SBIRT (23 yo +)    In order to provide better care to our patients, we are screening all of our patients for alcohol and drug use  Would it be okay to ask you these screening questions? No Filed at: 07/06/2022 1736                    MDM  Number of Diagnoses or Management Options  Diagnosis management comments: All imaging and/or lab testing discussed with patient, strict return to ED precautions discussed  Patient recommended to follow up promptly with appropriate outpatient provider  Patient and/or family members verbalizes understanding and agrees with plan  Patient is stable for discharge      Portions of the record may have been created with voice recognition software  Occasional wrong word or "sound a like" substitutions may have occurred due to the inherent limitations of voice recognition software  Read the chart carefully and recognize, using context, where substitutions have occurred  Disposition  Final diagnoses:   Trapezius muscle spasm     Time reflects when diagnosis was documented in both MDM as applicable and the Disposition within this note     Time User Action Codes Description Comment    7/6/2022  5:47 PM Robetr Thacker Nae [V67 [id number]] Trapezius muscle spasm       ED Disposition     ED Disposition   Discharge    Condition   Good    Date/Time   Wed Jul 6, 2022  5:47 PM    [facility] discharge to home/self care                 Follow-up Information     Follow up With Specialties Details Why Contact Info Additional Information    [facility] Physical Therapy Schedule an appointment as soon as possible for a visit in 1 day  [phone]          Patient's Medications   Discharge Prescriptions    ACETAMINOPHEN (TYLENOL) 500 MG TABLET    Take 1 tablet (500 mg total) by mouth every 6 (six) hours as needed for mild pain       Start Date: 7/6/2022  End Date: --       Order Dose: 500 mg       Quantity: 30 tablet    Refills: 0    LIDOCAINE (XYLOCAINE) 2 % TOPICAL GEL    Apply topically as needed for mild pain       Start Date: 7/6/2022  End Date: --       Order Dose: --       Quantity: 30 mL    Refills: 0    METHOCARBAMOL (ROBAXIN) 500 MG TABLET    Take 1 tablet (500 mg total) by mouth 2 (two) times a day       Start Date: 7/6/2022  End Date: --       Order Dose: 500 mg       Quantity: 20 tablet    Refills: 0    NAPROXEN (EC NAPROSYN) 500 MG EC TABLET    Take 1 tablet (500 mg total) by mouth 2 (two) times a day with meals       Start Date: 7/6/2022  End Date: 7/6/2023       Order Dose: 500 mg Quantity: 20 tablet    Refills: 0           PDMP Review     None          ED Provider  Electronically Signed by           Mihai Gandara PA-C  07/06/22 0392

## 2022-07-06 NOTE — Clinical Note
Shruthi Davies was seen and treated in our emergency department on 7/6/2022  Diagnosis:     [place]  may return to work on return date  He may return on this date: 07/08/2022         If you have any questions or concerns, please don't hesitate to call        Jing Palomo PA-C    ______________________________           _______________          _______________  Hospital Representative                              Date                                Time

## 2022-07-08 ENCOUNTER — TELEPHONE (OUTPATIENT)
Dept: PHYSICAL THERAPY | Facility: OTHER | Age: 57
End: 2022-07-08

## 2022-07-11 ENCOUNTER — PATIENT OUTREACH (OUTPATIENT)
Dept: FAMILY MEDICINE CLINIC | Facility: CLINIC | Age: 57
End: 2022-07-11

## 2022-07-11 NOTE — PROGRESS NOTES
I called the patient but received voicemail  Message was left asking for a return call  I will continue further outreach  Chart reviewed

## 2022-07-13 DIAGNOSIS — I10 PRIMARY HYPERTENSION: ICD-10-CM

## 2022-07-14 RX ORDER — LISINOPRIL 10 MG/1
TABLET ORAL
Qty: 30 TABLET | Refills: 0 | Status: SHIPPED | OUTPATIENT
Start: 2022-07-14 | End: 2022-08-16 | Stop reason: SDUPTHER

## 2022-07-21 ENCOUNTER — TELEPHONE (OUTPATIENT)
Dept: PHYSICAL THERAPY | Facility: OTHER | Age: 57
End: 2022-07-21

## 2022-08-04 ENCOUNTER — OFFICE VISIT (OUTPATIENT)
Dept: UROLOGY | Facility: AMBULATORY SURGERY CENTER | Age: 57
End: 2022-08-04
Payer: MEDICARE

## 2022-08-04 VITALS
HEIGHT: 72 IN | OXYGEN SATURATION: 98 % | DIASTOLIC BLOOD PRESSURE: 72 MMHG | BODY MASS INDEX: 27.77 KG/M2 | SYSTOLIC BLOOD PRESSURE: 118 MMHG | HEART RATE: 88 BPM | WEIGHT: 205 LBS

## 2022-08-04 DIAGNOSIS — Z12.5 SCREENING FOR PROSTATE CANCER: Primary | ICD-10-CM

## 2022-08-04 DIAGNOSIS — N43.40 SPERMATOCELE: ICD-10-CM

## 2022-08-04 DIAGNOSIS — R31.0 GROSS HEMATURIA: ICD-10-CM

## 2022-08-04 LAB
BACTERIA UR QL AUTO: ABNORMAL /HPF
BILIRUB UR QL STRIP: NEGATIVE
CLARITY UR: CLEAR
COLOR UR: ABNORMAL
GLUCOSE UR STRIP-MCNC: NEGATIVE MG/DL
HGB UR QL STRIP.AUTO: NEGATIVE
KETONES UR STRIP-MCNC: NEGATIVE MG/DL
LEUKOCYTE ESTERASE UR QL STRIP: NEGATIVE
MUCOUS THREADS UR QL AUTO: ABNORMAL
NITRITE UR QL STRIP: NEGATIVE
NON-SQ EPI CELLS URNS QL MICRO: ABNORMAL /HPF
PH UR STRIP.AUTO: 6.5 [PH]
PROT UR STRIP-MCNC: ABNORMAL MG/DL
RBC #/AREA URNS AUTO: ABNORMAL /HPF
SL AMB  POCT GLUCOSE, UA: NORMAL
SL AMB LEUKOCYTE ESTERASE,UA: NORMAL
SL AMB POCT BILIRUBIN,UA: NORMAL
SL AMB POCT BLOOD,UA: NORMAL
SL AMB POCT CLARITY,UA: CLEAR
SL AMB POCT COLOR,UA: YELLOW
SL AMB POCT KETONES,UA: NORMAL
SL AMB POCT NITRITE,UA: NORMAL
SL AMB POCT PH,UA: 6.5
SL AMB POCT SPECIFIC GRAVITY,UA: 1.02
SL AMB POCT URINE PROTEIN: NORMAL
SL AMB POCT UROBILINOGEN: 0.2
SP GR UR STRIP.AUTO: 1.02 (ref 1–1.03)
UROBILINOGEN UR STRIP-ACNC: <2 MG/DL
WBC #/AREA URNS AUTO: ABNORMAL /HPF

## 2022-08-04 PROCEDURE — 88112 CYTOPATH CELL ENHANCE TECH: CPT | Performed by: PATHOLOGY

## 2022-08-04 PROCEDURE — 81002 URINALYSIS NONAUTO W/O SCOPE: CPT | Performed by: NURSE PRACTITIONER

## 2022-08-04 PROCEDURE — 81001 URINALYSIS AUTO W/SCOPE: CPT | Performed by: NURSE PRACTITIONER

## 2022-08-04 PROCEDURE — 99203 OFFICE O/P NEW LOW 30 MIN: CPT | Performed by: NURSE PRACTITIONER

## 2022-08-04 PROCEDURE — 87086 URINE CULTURE/COLONY COUNT: CPT | Performed by: NURSE PRACTITIONER
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
## 2022-08-05 ENCOUNTER — CONSULT (OUTPATIENT)
Dept: PAIN MEDICINE | Facility: CLINIC | Age: 57
End: 2022-08-05
Payer: MEDICARE

## 2022-08-05 VITALS
BODY MASS INDEX: 27.77 KG/M2 | HEIGHT: 72 IN | SYSTOLIC BLOOD PRESSURE: 120 MMHG | WEIGHT: 205 LBS | DIASTOLIC BLOOD PRESSURE: 70 MMHG

## 2022-08-05 DIAGNOSIS — M25.512 CHRONIC PAIN OF BOTH SHOULDERS: ICD-10-CM

## 2022-08-05 DIAGNOSIS — M50.120 CERVICAL DISC DISORDER WITH RADICULOPATHY OF MID-CERVICAL REGION: ICD-10-CM

## 2022-08-05 DIAGNOSIS — G89.4 CHRONIC PAIN SYNDROME: Primary | ICD-10-CM

## 2022-08-05 DIAGNOSIS — G89.29 CHRONIC PAIN OF BOTH SHOULDERS: ICD-10-CM

## 2022-08-05 DIAGNOSIS — M19.012 ARTHRITIS OF LEFT ACROMIOCLAVICULAR JOINT: ICD-10-CM

## 2022-08-05 DIAGNOSIS — M25.511 CHRONIC PAIN OF BOTH SHOULDERS: ICD-10-CM

## 2022-08-05 PROCEDURE — 99204 OFFICE O/P NEW MOD 45 MIN: CPT | Performed by: PHYSICAL MEDICINE & REHABILITATION

## 2022-08-05 RX ORDER — DICLOFENAC SODIUM 75 MG/1
75 TABLET, DELAYED RELEASE ORAL 2 TIMES DAILY
Qty: 60 TABLET | Refills: 1 | Status: SHIPPED | OUTPATIENT
Start: 2022-08-05

## 2022-08-05 NOTE — PROGRESS NOTES
Assessment:  1  Chronic pain syndrome    2  Chronic pain of both shoulders    3  Arthritis of left acromioclavicular joint    4  Cervical disc disorder with radiculopathy of mid-cervical region        Plan:  Mr Coy Keita is a pleasant 75-year-old male who presents for initial evaluation regarding chronic neck and left shoulder pain  During today's evaluation he is demonstrating clinical and diagnostic evidence of pain that is likely multifactorial in nature with majority the pain appears to be generating [facility] with x-ray evidence of mild osteoarthritic changes as well as cervical radiculopathy from the neck into the left arm with MRI evidence of multilevel degenerative disc disease and varying degrees of foraminal narrowing  At this time interventional approaches would be beneficial and warranted  As such we will   1  Plan for left TRISR [facility] joint injection under ultrasound guidance  2  Will also plan for cervical epidural steroid injection under fluoro guidance  We will space out these procedures greater than 14 days apart  3  Will start the patient on diclofenac 75 mg twice a day for acute inflammatory changes   4  Complete risks and benefits including bleeding, infection, tissue reaction, nerve injury and allergic reaction were discussed  The approach was demonstrated using models and literature was provided  Verbal and written consent was obtained  History of Present Illness:    Estela Cantu is a 59 y o  male who presents to [facility] for initial evaluation of the above stated pain complaints  The patient has a past medical and chronic pain history as outlined in the assessment section  Today patient reports 7 months duration of left-sided neck and shoulder pain  Patient denies any significant inciting event or recent trauma  Today reports severe pain rated 8/10 and interfering with daily activities    Pain is nearly constant 60-95% of the time that is present throughout the day and night  Describes symptoms as sharp, throbbing pain  Also reports upper extremity weakness as well as dropping objects  Does not use any durable medical equipment for ambulation  Symptoms are unchanged with position  Has had no significant relief with heat  Admits to tobacco and alcohol use  Currently using gabapentin with minimal relief  Presents today for initial evaluation  Review of Systems:    Review of Systems   Constitutional: Positive for activity change and fatigue  HENT: Negative  Eyes: Negative  Respiratory: Positive for chest tightness and shortness of breath  Cardiovascular: Positive for leg swelling  Gastrointestinal: Negative  Genitourinary: Positive for decreased urine volume, difficulty urinating, penile pain and testicular pain  Kidney stone   Musculoskeletal: Positive for back pain, joint swelling, neck pain and neck stiffness  Skin: Negative  Allergic/Immunologic: Negative  Neurological: Positive for dizziness, weakness, light-headedness and numbness  Hematological: Negative  Psychiatric/Behavioral: Positive for dysphoric mood and sleep disturbance  The patient is nervous/anxious            Patient Active Problem List   Diagnosis    Benign prostatic hyperplasia    Gastroesophageal reflux disease    History of diverticulitis    History of renal calculi    History of hemicolectomy    Hypertension    Hyperlipidemia    Left shoulder pain    Cervicalgia    Posttraumatic stiffness of shoulder joint    Intrinsic eczema    Allergic rhinitis    Decreased hearing of left ear    Chronic pain of left knee    Multiple lung nodules on CT    Type 2 diabetes mellitus without complication, without long-term current use of insulin (HCC)    Chronic pain syndrome    Diabetic polyneuropathy associated with type 2 diabetes mellitus (HCC)    Depression, recurrent (HCC)    Epidermal cyst    Tobacco abuse    Chronic bronchitis ([address] ) Past Medical History:   Diagnosis Date    Diabetes mellitus ([facility] [address] )     Diverticulitis     Hypertension     Neck pain        Past Surgical History:   Procedure Laterality Date    COLON SURGERY      NECK SURGERY         History reviewed  No pertinent family history      Social History     Occupational History    Not on file   Tobacco Use    Smoking status: Current Every Day Smoker     Packs/day: 0 25     Types: Cigarettes, Cigars    Smokeless tobacco: Never Used    Tobacco comment: 1 cigar/daily   Vaping Use    Vaping Use: Never used   Substance and Sexual Activity    Alcohol use: Yes     Comment: drinks only on weekends    Drug use: Never    Sexual activity: Not on file         Current Outpatient Medications:     diclofenac (VOLTAREN) 75 mg EC tablet, Take 1 tablet (75 mg total) by mouth 2 (two) times a day, Disp: 60 tablet, Rfl: 1    acetaminophen (TYLENOL) 500 mg tablet, Take 1 tablet (500 mg total) by mouth every 6 (six) hours as needed for mild pain, Disp: 30 tablet, Rfl: 0    albuterol (PROVENTIL HFA,VENTOLIN HFA) 90 mcg/act inhaler, Inhale 1-2 puffs every 6 (six) hours as needed for wheezing, Disp: 18 g, Rfl: 1    ARIPiprazole (ABILIFY) 10 mg tablet, Take 10 mg by mouth daily at bedtime, Disp: , Rfl:     aspirin (ECOTRIN LOW STRENGTH) 81 mg EC tablet, Take 1 tablet (81 mg total) by mouth daily, Disp: 30 tablet, Rfl: 3    atorvastatin (LIPITOR) 40 mg tablet, Take 1 tablet (40 mg total) by mouth daily, Disp: 90 tablet, Rfl: 1    Blood Glucose Monitoring Suppl (FreeStyle Lite) SHANELL, Use daily Use as directed, Disp: 1 each, Rfl: 0    buPROPion (WELLBUTRIN SR) 150 mg 12 hr tablet, Take 150 mg by mouth every morning, Disp: , Rfl:     cloNIDine (CATAPRES) 0 2 mg tablet, Take 0 2 mg by mouth daily at bedtime, Disp: , Rfl:     fluticasone (FLONASE) 50 mcg/act nasal spray, 1 spray into each nostril daily, Disp: 16 g, Rfl: 0    gabapentin (NEURONTIN) 800 mg tablet, Take 1 tablet (800 mg total) by mouth 3 (three) times a day, Disp: 90 tablet, Rfl: 2    glucose blood (FREESTYLE LITE) test strip, Testing sugars once a day, Disp: 100 each, Rfl: 3    hydrocortisone 1 % ointment, Apply topically 2 (two) times a day, Disp: 30 g, Rfl: 0    hydrOXYzine HCL (ATARAX) 50 mg tablet, Take 50 mg by mouth daily at bedtime, Disp: , Rfl:     Lancets (freestyle) lancets, Daily, Disp: 100 each, Rfl: 5    lidocaine (XYLOCAINE) 2 % topical gel, Apply topically as needed for mild pain, Disp: 30 mL, Rfl: 0    linaGLIPtin (Tradjenta) 5 MG TABS, Take 5 mg by mouth daily, Disp: 90 tablet, Rfl: 1    lisinopril (ZESTRIL) 10 mg tablet, take 1 tablet by mouth once daily, Disp: 30 tablet, Rfl: 0    meclizine (ANTIVERT) 25 mg tablet, Take 1 tablet (25 mg total) by mouth every 8 (eight) hours, Disp: 30 tablet, Rfl: 0    metFORMIN (GLUCOPHAGE-XR) 500 mg 24 hr tablet, Take 2 tablets (1,000 mg total) by mouth daily with dinner, Disp: 180 tablet, Rfl: 1    methocarbamol (ROBAXIN) 500 mg tablet, Take 1 tablet (500 mg total) by mouth 2 (two) times a day, Disp: 20 tablet, Rfl: 0    Misc  Devices (Wrist Brace) MISC, Use daily, Disp: 2 each, Rfl: 0    omeprazole (PriLOSEC) 40 MG capsule, Take 1 capsule (40 mg total) by mouth daily, Disp: 90 capsule, Rfl: 2    tamsulosin (FLOMAX) 0 4 mg, Take 1 capsule (0 4 mg total) by mouth daily with dinner, Disp: 90 capsule, Rfl: 1    zolpidem (AMBIEN) 10 mg tablet, Take 10 mg by mouth daily at bedtime, Disp: , Rfl:     No Known Allergies    Physical Exam:    /70   Ht 6' (1 829 m)   Wt 93 kg (205 lb)   BMI 27 80 kg/m²     Constitutional: normal, well developed, well nourished, alert, in no distress and non-toxic and no overt pain behavior    Eyes: anicteric  HEENT: grossly intact  Neck: supple, symmetric, trachea midline and no masses   Pulmonary:even and unlabored  Cardiovascular:No edema or pitting edema present  Skin:Normal without rashes or lesions and well hydrated  Psychiatric:Mood and affect appropriate  Neurologic:Cranial Nerves II-XII grossly intact  Musculoskeletal:normal gait, tenderness to palpation left-sided cervical paraspinals and left anterolateral shoulder, decreased active and passive range of motion with cervical flexion, extension, left side bending, left rotation as well as decreased range of motion with shoulder forward flexion and abduction, MMT 5/5 bilateral upper extremities, sensation decreased to light touch in patchy distribution left upper extremity, positive scarf test left shoulder, positive Spurling test left sided with radicular pain into the left arm, positive Neer's and Lyn left shoulder    Imaging  US guidance    (Results Pending)   FL spine and pain procedure    (Results Pending)       Orders Placed This Encounter   Procedures    US guidance    FL spine and pain procedure

## 2022-08-06 LAB — BACTERIA UR CULT: NORMAL

## 2022-08-11 ENCOUNTER — TELEPHONE (OUTPATIENT)
Dept: PAIN MEDICINE | Facility: CLINIC | Age: 57
End: 2022-08-11

## 2022-08-16 ENCOUNTER — PATIENT OUTREACH (OUTPATIENT)
Dept: FAMILY MEDICINE CLINIC | Facility: CLINIC | Age: 57
End: 2022-08-16

## 2022-08-16 DIAGNOSIS — I10 PRIMARY HYPERTENSION: ICD-10-CM

## 2022-08-16 NOTE — PROGRESS NOTES
I called the patient to remind him of his PCP appointment for tomorrow at 1pm; he plans on attending  He stated he took his last lisinopril yesterday and is in need of a refill; request submitted to PCP  The patient also provided an alternative number to reach him; noted in demographics  I will continue to follow

## 2022-08-17 RX ORDER — LISINOPRIL 10 MG/1
10 TABLET ORAL DAILY
Qty: 30 TABLET | Refills: 2 | Status: SHIPPED | OUTPATIENT
Start: 2022-08-17 | End: 2022-10-13 | Stop reason: SDUPTHER

## 2022-09-02 ENCOUNTER — OFFICE VISIT (OUTPATIENT)
Dept: FAMILY MEDICINE CLINIC | Facility: CLINIC | Age: 57
End: 2022-09-02

## 2022-09-02 ENCOUNTER — OFFICE VISIT (OUTPATIENT)
Dept: DENTISTRY | Facility: CLINIC | Age: 57
End: 2022-09-02

## 2022-09-02 VITALS — SYSTOLIC BLOOD PRESSURE: 118 MMHG | HEART RATE: 88 BPM | TEMPERATURE: 98 F | DIASTOLIC BLOOD PRESSURE: 78 MMHG

## 2022-09-02 VITALS
HEART RATE: 84 BPM | HEIGHT: 72 IN | BODY MASS INDEX: 27.9 KG/M2 | OXYGEN SATURATION: 95 % | RESPIRATION RATE: 18 BRPM | DIASTOLIC BLOOD PRESSURE: 68 MMHG | TEMPERATURE: 97.6 F | SYSTOLIC BLOOD PRESSURE: 122 MMHG | WEIGHT: 206 LBS

## 2022-09-02 DIAGNOSIS — E11.42 DIABETIC POLYNEUROPATHY ASSOCIATED WITH TYPE 2 DIABETES MELLITUS (HCC): ICD-10-CM

## 2022-09-02 DIAGNOSIS — R09.82 POST-NASAL DRIP: ICD-10-CM

## 2022-09-02 DIAGNOSIS — Z09 ENCOUNTER FOR FOLLOW-UP: ICD-10-CM

## 2022-09-02 DIAGNOSIS — J30.9 ALLERGIC RHINITIS, UNSPECIFIED SEASONALITY, UNSPECIFIED TRIGGER: ICD-10-CM

## 2022-09-02 DIAGNOSIS — G89.29 CHRONIC DENTAL PAIN: Primary | ICD-10-CM

## 2022-09-02 DIAGNOSIS — E11.9 TYPE 2 DIABETES MELLITUS WITHOUT COMPLICATION, WITHOUT LONG-TERM CURRENT USE OF INSULIN (HCC): Primary | ICD-10-CM

## 2022-09-02 DIAGNOSIS — I10 PRIMARY HYPERTENSION: ICD-10-CM

## 2022-09-02 DIAGNOSIS — K21.9 GASTROESOPHAGEAL REFLUX DISEASE WITHOUT ESOPHAGITIS: ICD-10-CM

## 2022-09-02 DIAGNOSIS — M25.512 CHRONIC LEFT SHOULDER PAIN: ICD-10-CM

## 2022-09-02 DIAGNOSIS — G89.29 CHRONIC LEFT SHOULDER PAIN: ICD-10-CM

## 2022-09-02 DIAGNOSIS — K08.9 CHRONIC DENTAL PAIN: Primary | ICD-10-CM

## 2022-09-02 LAB — SL AMB POCT HEMOGLOBIN AIC: 6.6 (ref ?–6.5)

## 2022-09-02 PROCEDURE — 96372 THER/PROPH/DIAG INJ SC/IM: CPT | Performed by: NURSE PRACTITIONER

## 2022-09-02 PROCEDURE — 83036 HEMOGLOBIN GLYCOSYLATED A1C: CPT | Performed by: NURSE PRACTITIONER

## 2022-09-02 PROCEDURE — D0220 INTRAORAL - PERIAPICAL FIRST RADIOGRAPHIC IMAGE: HCPCS | Performed by: DENTIST

## 2022-09-02 PROCEDURE — D0270 BITEWING - SINGLE RADIOGRAPHIC IMAGE: HCPCS | Performed by: DENTIST

## 2022-09-02 PROCEDURE — D0120 PERIODIC ORAL EVALUATION - ESTABLISHED PATIENT: HCPCS | Performed by: DENTIST

## 2022-09-02 PROCEDURE — 99214 OFFICE O/P EST MOD 30 MIN: CPT | Performed by: NURSE PRACTITIONER

## 2022-09-02 RX ORDER — BUPROPION HYDROCHLORIDE 200 MG/1
200 TABLET, EXTENDED RELEASE ORAL EVERY MORNING
COMMUNITY
Start: 2022-08-24

## 2022-09-02 RX ORDER — PANTOPRAZOLE SODIUM 40 MG/1
40 TABLET, DELAYED RELEASE ORAL DAILY
Qty: 90 TABLET | Refills: 0 | Status: SHIPPED | OUTPATIENT
Start: 2022-09-02 | End: 2022-10-13

## 2022-09-02 RX ORDER — KETOROLAC TROMETHAMINE 30 MG/ML
30 INJECTION, SOLUTION INTRAMUSCULAR; INTRAVENOUS ONCE
Status: COMPLETED | OUTPATIENT
Start: 2022-09-02 | End: 2022-09-02

## 2022-09-02 RX ORDER — METHYLPREDNISOLONE 4 MG/1
TABLET ORAL
Qty: 21 EACH | Refills: 0 | Status: SHIPPED | OUTPATIENT
Start: 2022-09-02

## 2022-09-02 RX ORDER — FLUTICASONE PROPIONATE 50 MCG
1 SPRAY, SUSPENSION (ML) NASAL DAILY
Qty: 16 G | Refills: 0 | Status: SHIPPED | OUTPATIENT
Start: 2022-09-02

## 2022-09-02 RX ADMIN — KETOROLAC TROMETHAMINE 30 MG: 30 INJECTION, SOLUTION INTRAMUSCULAR; INTRAVENOUS at 12:00

## 2022-09-02 NOTE — ASSESSMENT & PLAN NOTE
Lab Results   Component Value Date    HGBA1C 6 6 (A) 09/02/2022     Continue metformin XR 1,000 mg daily and Tradjenta 5 mg daily

## 2022-09-02 NOTE — DENTAL PROCEDURE DETAILS
Kenneth Byrd presents for a Periodic exam  Verbal consent for treatment given in addition to the forms  Reviewed health history - Patient is ASA II  Consents signed: Yes     Perio: Generalized, previously Tx planned for SRPs (was approved but )  Pain Scale: 2  Caries Assessment: High  Radiographs:     Oral Hygiene instruction reviewed and given  Recommended Hygiene recall visits with the [facility]  Treatment Plan:  1  Infection control: Extraction non-restorable #19, discussed need for completion of RCT #7 (patient has no pain or swelling, no pain on palpation or percussion, class 1 mobility, 1 PA taken showing possible cystic lesion at apex)  2  Periodontal therapy: ScRp  3  Caries control: as charted  4  Occlusal evaluation: discuss upper partial  5  Case Difficulty Type 2  Prognosis is Good  Referrals needed: No    Patient reports broken #19 is painful to cheek, loose debris cleaned from #19, IRM temporary placed to cover sharp edges  Patient understands this is only temporary and may fall out, and that tooth need to be extracted      Next Visit:  2022 [place]

## 2022-09-02 NOTE — PROGRESS NOTES
Assessment/Plan:    Type 2 diabetes mellitus without complication, without long-term current use of insulin ([facility])    Lab Results   Component Value Date    HGBA1C 6 6 (A) 09/02/2022     Continue metformin XR 1,000 mg daily and Tradjenta 5 mg daily     Diabetic polyneuropathy associated with type 2 diabetes mellitus ([facility] )    Lab Results   Component Value Date    HGBA1C 6 6 (A) 09/02/2022     Continue gabapentin 800 mg tid     Hypertension  BP within goal   Continue lisinopril 10 mg daily     Crispin Pierre was seen today for follow-up  Diagnoses and all orders for this visit:    Type 2 diabetes mellitus without complication, without long-term current use of insulin ([facility])  -     POCT hemoglobin A1c    Encounter for follow-up  -     fluticasone (FLONASE) 50 mcg/act nasal spray; 1 spray into each nostril daily    Allergic rhinitis, unspecified seasonality, unspecified trigger  -     fluticasone (FLONASE) 50 mcg/act nasal spray; 1 spray into each nostril daily    Post-nasal drip  -     fluticasone (FLONASE) 50 mcg/act nasal spray; 1 spray into each nostril daily    Chronic left shoulder pain  -     methylPREDNISolone 4 MG tablet therapy pack; Use as directed on package  -     ketorolac (TORADOL) injection 30 mg    Gastroesophageal reflux disease without esophagitis  -     pantoprazole (PROTONIX) 40 mg tablet; Take 1 tablet (40 mg total) by mouth daily    Diabetic polyneuropathy associated with type 2 diabetes mellitus ([facility] )    Primary hypertension        Return in about 4 months (around 1/2/2023)  Subjective:     Mayela Rangel is a 59 y o  male who  has a past medical history of Diabetes mellitus ([facility] ), Diverticulitis, Hypertension, and Neck pain  who presented to the office today for follow up       The following portions of the patient's history were reviewed and updated as appropriate: allergies, current medications, past family history, past medical history, past social history, past surgical history and problem list     Current Outpatient Medications on File Prior to Visit   Medication Sig Dispense Refill    acetaminophen (TYLENOL) 500 mg tablet Take 1 tablet (500 mg total) by mouth every 6 (six) hours as needed for mild pain 30 tablet 0    albuterol (PROVENTIL HFA,VENTOLIN HFA) 90 mcg/act inhaler Inhale 1-2 puffs every 6 (six) hours as needed for wheezing 18 g 1    ARIPiprazole (ABILIFY) 10 mg tablet Take 10 mg by mouth daily at bedtime      aspirin (ECOTRIN LOW STRENGTH) 81 mg EC tablet Take 1 tablet (81 mg total) by mouth daily 30 tablet 3    atorvastatin (LIPITOR) 40 mg tablet Take 1 tablet (40 mg total) by mouth daily 90 tablet 1    Blood Glucose Monitoring Suppl (FreeStyle Lite) SHANELL Use daily Use as directed 1 each 0    buPROPion (WELLBUTRIN SR) 200 MG 12 hr tablet Take 200 mg by mouth every morning      cloNIDine (CATAPRES) 0 2 mg tablet Take 0 2 mg by mouth daily at bedtime      diclofenac (VOLTAREN) 75 mg EC tablet Take 1 tablet (75 mg total) by mouth 2 (two) times a day 60 tablet 1    gabapentin (NEURONTIN) 800 mg tablet Take 1 tablet (800 mg total) by mouth 3 (three) times a day 90 tablet 2    glucose blood (FREESTYLE LITE) test strip Testing sugars once a day 100 each 3    hydrocortisone 1 % ointment Apply topically 2 (two) times a day 30 g 0    hydrOXYzine HCL (ATARAX) 50 mg tablet Take 50 mg by mouth daily at bedtime      Lancets (freestyle) lancets Daily 100 each 5    lidocaine (XYLOCAINE) 2 % topical gel Apply topically as needed for mild pain 30 mL 0    linaGLIPtin (Tradjenta) 5 MG TABS Take 5 mg by mouth daily 90 tablet 1    lisinopril (ZESTRIL) 10 mg tablet Take 1 tablet (10 mg total) by mouth daily 30 tablet 2    meclizine (ANTIVERT) 25 mg tablet Take 1 tablet (25 mg total) by mouth every 8 (eight) hours 30 tablet 0    metFORMIN (GLUCOPHAGE-XR) 500 mg 24 hr tablet Take 2 tablets (1,000 mg total) by mouth daily with dinner 180 tablet 1    methocarbamol (ROBAXIN) 500 mg tablet Take 1 tablet (500 mg total) by mouth 2 (two) times a day 20 tablet 0    Misc  Devices (Wrist Brace) MISC Use daily 2 each 0    tamsulosin (FLOMAX) 0 4 mg Take 1 capsule (0 4 mg total) by mouth daily with dinner 90 capsule 1    zolpidem (AMBIEN) 10 mg tablet Take 10 mg by mouth daily at bedtime      [DISCONTINUED] buPROPion (WELLBUTRIN SR) 150 mg 12 hr tablet Take 150 mg by mouth every morning      [DISCONTINUED] fluticasone (FLONASE) 50 mcg/act nasal spray 1 spray into each nostril daily 16 g 0    [DISCONTINUED] omeprazole (PriLOSEC) 40 MG capsule Take 1 capsule (40 mg total) by mouth daily 90 capsule 2     No current facility-administered medications on file prior to visit  Review of Systems   Constitutional: Negative for chills and fever  HENT: Negative for ear pain and sore throat  Eyes: Negative for pain and visual disturbance  Respiratory: Negative for cough and shortness of breath  Cardiovascular: Negative for chest pain and palpitations  Gastrointestinal: Negative for abdominal pain and vomiting  Genitourinary: Positive for testicular pain  Negative for dysuria and hematuria  Musculoskeletal: Positive for arthralgias, back pain and myalgias  Skin: Negative for color change and rash  Neurological: Positive for dizziness  Negative for seizures and syncope  Psychiatric/Behavioral: Positive for dysphoric mood  Negative for self-injury and suicidal ideas  All other systems reviewed and are negative  Objective:    /68 (BP Location: Left arm, Patient Position: Sitting, Cuff Size: Adult)   Pulse 84   Temp 97 6 °F (36 4 °C) (Temporal)   Resp 18   Ht 6' (1 829 m)   Wt 93 4 kg (206 lb)   SpO2 95%   BMI 27 94 kg/m²     Physical Exam  Vitals and nursing note reviewed  Constitutional:       General: He is not in acute distress  Appearance: He is well-developed  He is not diaphoretic  HENT:      Head: Normocephalic and atraumatic        Right Ear: External ear normal       Left Ear: External ear normal    Eyes:      Conjunctiva/sclera: Conjunctivae normal       Pupils: Pupils are equal, round, and reactive to light  Cardiovascular:      Rate and Rhythm: Normal rate and regular rhythm  Pulses: Normal pulses  Dorsalis pedis pulses are 2+ on the right side and 2+ on the left side  Heart sounds: Normal heart sounds  Pulmonary:      Effort: Pulmonary effort is normal  No respiratory distress  Breath sounds: Normal breath sounds  No wheezing  Abdominal:      General: Bowel sounds are normal  There is no distension  Palpations: Abdomen is soft  Tenderness: There is no abdominal tenderness  Musculoskeletal:      Left shoulder: Tenderness present  Decreased range of motion  Decreased strength  Cervical back: Normal range of motion and neck supple  Right lower leg: No edema  Left lower leg: No edema  Feet:      Right foot:      Skin integrity: No ulcer, skin breakdown, erythema, warmth, callus or dry skin  Left foot:      Skin integrity: No ulcer, skin breakdown, erythema, warmth, callus or dry skin  Lymphadenopathy:      Cervical: No cervical adenopathy  Skin:     General: Skin is warm and dry  Capillary Refill: Capillary refill takes less than 2 seconds  Findings: No rash  Neurological:      General: No focal deficit present  Mental Status: He is alert and oriented to person, place, and time     Psychiatric:         Mood and Affect: Mood normal          Behavior: Behavior normal          Dahinda Place [facility]  09/02/22  5:35 PM

## 2022-09-06 ENCOUNTER — OFFICE VISIT (OUTPATIENT)
Dept: DENTISTRY | Facility: CLINIC | Age: 57
End: 2022-09-06

## 2022-09-06 VITALS — DIASTOLIC BLOOD PRESSURE: 79 MMHG | SYSTOLIC BLOOD PRESSURE: 132 MMHG | TEMPERATURE: 98 F | HEART RATE: 94 BPM

## 2022-09-06 DIAGNOSIS — K02.9 DENTAL CARIES: Primary | ICD-10-CM

## 2022-09-06 PROCEDURE — D0191 ASSESSMENT OF A PATIENT: HCPCS | Performed by: DENTIST

## 2022-09-06 NOTE — PROGRESS NOTES
Extraction #19 - NOT COMPLETED    Fernando Bonilla is a 58 yo male that  presented to clinic for Ext #19  [address], patient denies any changes  Significant Findings include:  · GERD  · HTN  · Type 2DM (A1C = 6 6 as of 9/2/22)  · Smoker  · Rx: Steroids (methylprednisone)  · Rx: daily aspirin 81mg    Obtained a direct and personal consent  Risks and complications were explained  Pt agreed and consented  Consent scanned in [facility] center  Pre-Op BP: 132/79mmHg  ASA III (uncontrolled A1C / 6 6 as of 9/2/22)  Pain = none  Translation = none needed    Administered 2 carps 2% Lidocaine via DAYNE block   1 3 carps 4% articaine via DAYNE block and local infiltration    Could not get pt numb  Had to reschedule ext appt in the interest of time  At time of dismissal DB of tooth did not get numb  Pt felt cheek getting big and lip numbness only in left corner of mouth  POI given  Patient was dismissed stable and ambulatory      NV: ext #19 unrestorable (Dr Eulalio Flores) - 80min

## 2022-09-12 ENCOUNTER — HOSPITAL ENCOUNTER (OUTPATIENT)
Dept: ULTRASOUND IMAGING | Facility: HOSPITAL | Age: 57
Discharge: HOME | End: 2022-09-12
Payer: MEDICARE

## 2022-09-12 DIAGNOSIS — R31.0 GROSS HEMATURIA: ICD-10-CM

## 2022-09-12 PROCEDURE — 76770 US EXAM ABDO BACK WALL COMP: CPT

## 2022-09-19 ENCOUNTER — TELEPHONE (OUTPATIENT)
Dept: GASTROENTEROLOGY | Facility: CLINIC | Age: 57
End: 2022-09-19

## 2022-09-19 NOTE — TELEPHONE ENCOUNTER
LMOM for pt to confirm colon/EGD scheduled for Monday, 10/3/22, at [facility]  Advised GI lab will call Friday with arrival time  Provided callback if pt has any questions or concerns

## 2022-09-30 ENCOUNTER — TELEPHONE (OUTPATIENT)
Dept: GASTROENTEROLOGY | Facility: MEDICAL CENTER | Age: 57
End: 2022-09-30

## 2022-09-30 ENCOUNTER — PATIENT OUTREACH (OUTPATIENT)
Dept: FAMILY MEDICINE CLINIC | Facility: CLINIC | Age: 57
End: 2022-09-30

## 2022-09-30 NOTE — PROGRESS NOTES
I called the patient but received voicemail  Message was left reminding him of his GI procedures on Monday, 10/3 at 0800 and his Uro appointment 10/5 at 11:15  I will continue further outreach

## 2022-10-02 DIAGNOSIS — M25.511 CHRONIC PAIN OF BOTH SHOULDERS: ICD-10-CM

## 2022-10-02 DIAGNOSIS — G89.29 CHRONIC PAIN OF BOTH SHOULDERS: ICD-10-CM

## 2022-10-02 DIAGNOSIS — M25.512 CHRONIC PAIN OF BOTH SHOULDERS: ICD-10-CM

## 2022-10-02 RX ORDER — SODIUM CHLORIDE 9 MG/ML
100 INJECTION, SOLUTION INTRAVENOUS CONTINUOUS
Status: CANCELLED | OUTPATIENT
Start: 2022-10-02

## 2022-10-03 ENCOUNTER — ANESTHESIA EVENT (OUTPATIENT)
Dept: GASTROENTEROLOGY | Facility: HOSPITAL | Age: 57
End: 2022-10-03

## 2022-10-03 ENCOUNTER — HOSPITAL ENCOUNTER (OUTPATIENT)
Dept: GASTROENTEROLOGY | Facility: HOSPITAL | Age: 57
Setting detail: OUTPATIENT SURGERY
Discharge: HOME | End: 2022-10-03
Attending: INTERNAL MEDICINE | Admitting: INTERNAL MEDICINE
Payer: MEDICARE

## 2022-10-03 ENCOUNTER — ANESTHESIA (OUTPATIENT)
Dept: GASTROENTEROLOGY | Facility: HOSPITAL | Age: 57
End: 2022-10-03

## 2022-10-03 VITALS
WEIGHT: 206 LBS | SYSTOLIC BLOOD PRESSURE: 116 MMHG | HEIGHT: 72 IN | DIASTOLIC BLOOD PRESSURE: 86 MMHG | TEMPERATURE: 96.5 F | HEART RATE: 66 BPM | BODY MASS INDEX: 27.9 KG/M2 | OXYGEN SATURATION: 96 % | RESPIRATION RATE: 18 BRPM

## 2022-10-03 DIAGNOSIS — K21.9 CHRONIC GERD: ICD-10-CM

## 2022-10-03 DIAGNOSIS — Z12.11 COLON CANCER SCREENING: ICD-10-CM

## 2022-10-03 LAB — GLUCOSE SERPL-MCNC: 170 MG/DL (ref 65–140)

## 2022-10-03 PROCEDURE — 88305 TISSUE EXAM BY PATHOLOGIST: CPT | Performed by: PATHOLOGY

## 2022-10-03 PROCEDURE — 43239 EGD BIOPSY SINGLE/MULTIPLE: CPT | Performed by: INTERNAL MEDICINE

## 2022-10-03 PROCEDURE — 88342 IMHCHEM/IMCYTCHM 1ST ANTB: CPT | Performed by: PATHOLOGY

## 2022-10-03 PROCEDURE — 82948 REAGENT STRIP/BLOOD GLUCOSE: CPT

## 2022-10-03 PROCEDURE — 45385 COLONOSCOPY W/LESION REMOVAL: CPT | Performed by: INTERNAL MEDICINE

## 2022-10-03 RX ORDER — SODIUM CHLORIDE 9 MG/ML
100 INJECTION, SOLUTION INTRAVENOUS CONTINUOUS
Status: DISCONTINUED | OUTPATIENT
Start: 2022-10-03 | End: 2022-10-07 | Stop reason: HOSPADM

## 2022-10-03 RX ORDER — LIDOCAINE HYDROCHLORIDE 10 MG/ML
INJECTION, SOLUTION EPIDURAL; INFILTRATION; INTRACAUDAL; PERINEURAL AS NEEDED
Status: DISCONTINUED | OUTPATIENT
Start: 2022-10-03 | End: 2022-10-03

## 2022-10-03 RX ORDER — PROPOFOL 10 MG/ML
INJECTION, EMULSION INTRAVENOUS AS NEEDED
Status: DISCONTINUED | OUTPATIENT
Start: 2022-10-03 | End: 2022-10-03

## 2022-10-03 RX ADMIN — PROPOFOL 50 MG: 10 INJECTION, EMULSION INTRAVENOUS at 11:31

## 2022-10-03 RX ADMIN — PROPOFOL 40 MG: 10 INJECTION, EMULSION INTRAVENOUS at 11:16

## 2022-10-03 RX ADMIN — PROPOFOL 50 MG: 10 INJECTION, EMULSION INTRAVENOUS at 11:25

## 2022-10-03 RX ADMIN — PROPOFOL 50 MG: 10 INJECTION, EMULSION INTRAVENOUS at 11:28

## 2022-10-03 RX ADMIN — PROPOFOL 50 MG: 10 INJECTION, EMULSION INTRAVENOUS at 11:03

## 2022-10-03 RX ADMIN — PROPOFOL 40 MG: 10 INJECTION, EMULSION INTRAVENOUS at 11:09

## 2022-10-03 RX ADMIN — SODIUM CHLORIDE: 0.9 INJECTION, SOLUTION INTRAVENOUS at 11:51

## 2022-10-03 RX ADMIN — PROPOFOL 30 MG: 10 INJECTION, EMULSION INTRAVENOUS at 11:12

## 2022-10-03 RX ADMIN — PROPOFOL 30 MG: 10 INJECTION, EMULSION INTRAVENOUS at 11:14

## 2022-10-03 RX ADMIN — PROPOFOL 50 MG: 10 INJECTION, EMULSION INTRAVENOUS at 11:23

## 2022-10-03 RX ADMIN — PROPOFOL 50 MG: 10 INJECTION, EMULSION INTRAVENOUS at 11:37

## 2022-10-03 RX ADMIN — PROPOFOL 40 MG: 10 INJECTION, EMULSION INTRAVENOUS at 11:18

## 2022-10-03 RX ADMIN — PROPOFOL 30 MG: 10 INJECTION, EMULSION INTRAVENOUS at 11:07

## 2022-10-03 RX ADMIN — LIDOCAINE HYDROCHLORIDE 50 MG: 10 INJECTION, SOLUTION EPIDURAL; INFILTRATION; INTRACAUDAL; PERINEURAL at 11:01

## 2022-10-03 RX ADMIN — PROPOFOL 100 MG: 10 INJECTION, EMULSION INTRAVENOUS at 11:01

## 2022-10-03 RX ADMIN — PROPOFOL 50 MG: 10 INJECTION, EMULSION INTRAVENOUS at 11:05

## 2022-10-03 RX ADMIN — PROPOFOL 50 MG: 10 INJECTION, EMULSION INTRAVENOUS at 11:34

## 2022-10-03 RX ADMIN — PROPOFOL 40 MG: 10 INJECTION, EMULSION INTRAVENOUS at 11:20

## 2022-10-03 NOTE — ANESTHESIA POSTPROCEDURE EVALUATION
Post-Op Assessment Note    CV Status:  Stable  Pain Score: 0    Pain management: adequate     Mental Status:  Alert and awake   Hydration Status:  Euvolemic   PONV Controlled:  Controlled   Airway Patency:  Patent      Post Op Vitals Reviewed: Yes      Staff: Anesthesiologist, CRNA         No complications documented      BP   123/60   Temp     Pulse  66   Resp   16   SpO2   99

## 2022-10-03 NOTE — ANESTHESIA PREPROCEDURE EVALUATION
Procedure:  EGD  COLONOSCOPY    Relevant Problems   ANESTHESIA (within normal limits)      CARDIO   (+) Hyperlipidemia   (+) Hypertension      ENDO   (+) Type 2 diabetes mellitus without complication, without long-term current use of insulin (HCC)      GI/HEPATIC   (+) Gastroesophageal reflux disease      /RENAL   (+) Benign prostatic hyperplasia      NEURO/PSYCH   (+) Chronic pain syndrome   (+) Depression, recurrent (HCC)   (+) Diabetic polyneuropathy associated with type 2 diabetes mellitus (HCC)   (+) History of diverticulitis   (+) History of renal calculi      PULMONARY   (+) Chronic bronchitis (HCC)        Physical Exam    Airway    Mallampati score: III  TM Distance: >3 FB  Neck ROM: full     Dental   No notable dental hx     Cardiovascular  Rate: normal,     Pulmonary  Pulmonary exam normal     Other Findings        Anesthesia Plan  ASA Score- 2     Anesthesia Type- IV sedation with anesthesia with ASA Monitors  Additional Monitors:   Airway Plan:     Comment: Had coffee with creamer at 7, ok to proceed after 4hours  Plan Factors-Exercise tolerance (METS): >4 METS  Chart reviewed  Patient summary reviewed  Patient is not a current smoker  Induction- intravenous  Postoperative Plan-     Informed Consent- Anesthetic plan and risks discussed with patient  I personally reviewed this patient with the CRNA  Discussed and agreed on the Anesthesia Plan with the CRNA  Josué Centeno

## 2022-10-03 NOTE — H&P
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, hepatic steatosis who presents for EGD and colonoscopy  EGD indication is longstanding GERD and need for Barretts screening  Colonoscopy indication is CRC screening (prior in 2018 with poor prep, recommended for 3 year recall)  Last Hg   Lab Results   Component Value Date    HGB 14 5 05/24/2022     Last INR   Lab Results   Component Value Date    INR 0 95 04/25/2019       Review of Systems    Historical Information   Past Medical History:   Diagnosis Date    Diabetes mellitus ([address] )     Diverticulitis     Hypertension     Neck pain      Past Surgical History:   Procedure Laterality Date    COLON SURGERY      NECK SURGERY       Social History   Social History     Substance and Sexual Activity   Alcohol Use Yes    Comment: drinks only on weekends     Social History     Substance and Sexual Activity   Drug Use Never     Social History     Tobacco Use   Smoking Status Current Every Day Smoker    Packs/day: 0 25    Types: Cigarettes, Cigars   Smokeless Tobacco Never Used   Tobacco Comment    1 cigar/daily     History reviewed  No pertinent family history      Meds/Allergies     (Not in a hospital admission)      No Known Allergies    Objective     /70   Pulse 72   Temp (!) 97 2 °F (36 2 °C) (Temporal)   Resp 18   Ht 6' (1 829 m)   Wt 93 4 kg (206 lb)   SpO2 94%   BMI 27 94 kg/m²     PHYSICAL EXAM    Gen: NAD  CV: RRR  CHEST: Clear  ABD: soft, NT/ND  EXT: no edema  Neuro: AAO    ASSESSMENT/PLAN:  This is a 59y o  year old male here for EGD, colonoscopy    Plan/Procedure: EGD, Colonoscopy

## 2022-10-05 ENCOUNTER — OFFICE VISIT (OUTPATIENT)
Dept: UROLOGY | Facility: AMBULATORY SURGERY CENTER | Age: 57
End: 2022-10-05
Payer: MEDICARE

## 2022-10-05 VITALS
WEIGHT: 209 LBS | DIASTOLIC BLOOD PRESSURE: 60 MMHG | BODY MASS INDEX: 28.35 KG/M2 | SYSTOLIC BLOOD PRESSURE: 96 MMHG | OXYGEN SATURATION: 96 % | HEART RATE: 67 BPM

## 2022-10-05 DIAGNOSIS — Z87.442 HISTORY OF RENAL CALCULI: ICD-10-CM

## 2022-10-05 DIAGNOSIS — R31.0 GROSS HEMATURIA: Primary | ICD-10-CM

## 2022-10-05 PROCEDURE — 99214 OFFICE O/P EST MOD 30 MIN: CPT | Performed by: NURSE PRACTITIONER

## 2022-10-05 RX ORDER — TAMSULOSIN HYDROCHLORIDE 0.4 MG/1
0.4 CAPSULE ORAL
Qty: 90 CAPSULE | Refills: 3 | Status: SHIPPED | OUTPATIENT
Start: 2022-10-05

## 2022-10-05 RX ORDER — GABAPENTIN 800 MG/1
TABLET ORAL
Qty: 90 TABLET | Refills: 2 | Status: SHIPPED | OUTPATIENT
Start: 2022-10-05

## 2022-10-05 NOTE — PROGRESS NOTES
10/5/2022    Assessment and Plan    59 y o  male managed by our office    1  Gross hematuria/bladder lesion/mass  · Urine for cytology unremarkable for malignant cells  · Ultrasound kidney and bladder performed 09/12/2022 reveals a exophytic 1 7 x 1 7 x 1 7 cm simple cyst in the left kidney, 3 4 x 1 7 by 2 4 cm hyperechoic area in the upper pole of the right kidney with internal septation representing mildly complex cyst noted  1 5 x 0 9 x 1 8 cm mildly hypoechoic to isoechoic area along the posterior inferior bladder wall noted  · CT renal protocol study ordered  · Cystoscopy ordered    2  Benign prostatic hyperplasia  · Continue tamsulosin  · Will continue to monitor for worsening/progression of lower urinary tract symptoms  · Bladder scan PVR, AUA at next office visit      3  Prostate cancer screening  · PSA not performed prior to office visit      History of Present Illness  Rose Mary Nur is a 59 y o  male here for follow up evaluation of  gross hematuria  Patient was initially seen in consultation for gross hematuria 08/04/2022  He was sent for ultrasound of scrotum and testicle as well as ultrasound of kidney and bladder secondary to a episode of gross hematuria  He has a history of benign prostatic hyperplasia and has been managed on tamsulosin with good control of lower urinary tract symptoms  He reports smoking upwards to 4 cigarettes per day as well as cigars  Previously, he smoked upwards to 2 packs per day  He denies a family history of  malignancy  He reports drinking moderate amount of water per day  He reports occasional intake of alcoholic beverages  Review of Systems   Constitutional: Negative for chills and fever  Respiratory: Negative for cough and shortness of breath  Cardiovascular: Negative for chest pain  Gastrointestinal: Negative for abdominal distention, abdominal pain, blood in stool, nausea and vomiting     Genitourinary: Negative for difficulty urinating, dysuria, enuresis, flank pain, frequency, hematuria and urgency  Skin: Negative for rash  Past Medical History  Past Medical History:   Diagnosis Date    Diabetes mellitus ([facility] [address] )     Diverticulitis     Hypertension     Neck pain        Past Social History  Past Surgical History:   Procedure Laterality Date    COLON SURGERY      NECK SURGERY       Social History     Tobacco Use   Smoking Status Current Every Day Smoker    Packs/day: 0 25    Types: Cigarettes, Cigars   Smokeless Tobacco Never Used   Tobacco Comment    1 cigar/daily       Past Family History  History reviewed  No pertinent family history  Past Social history  Social History     Socioeconomic History    Marital status: /Civil Union     Spouse name: Not on file    Number of children: Not on file    Years of education: Not on file    Highest education level: Not on file   Occupational History    Not on file   Tobacco Use    Smoking status: Current Every Day Smoker     Packs/day: 0 25     Types: Cigarettes, Cigars    Smokeless tobacco: Never Used    Tobacco comment: 1 cigar/daily   Vaping Use    Vaping Use: Never used   Substance and Sexual Activity    Alcohol use: Yes     Comment: drinks only on weekends    Drug use: Never    Sexual activity: Yes     Partners: Female   Other Topics Concern    Not on file   Social History Narrative    Not on file     Social Determinants of Health     Financial Resource Strain: High Risk    Difficulty of Paying Living Expenses: Hard   Food Insecurity: Food Insecurity Present    Worried About Running Out of Food in the Last Year: Sometimes true    [place] of Food in the Last Year: Sometimes true   Transportation Needs: No Transportation Needs    Lack of Transportation (Medical): No    Lack of Transportation (Non-Medical):  No   Physical Activity: Not on file   Stress: Not on file   Social Connections: Not on file   Intimate Partner Violence: Not on file   Housing Stability: Not on file Current Medications  Current Outpatient Medications   Medication Sig Dispense Refill    acetaminophen (TYLENOL) 500 mg tablet Take 1 tablet (500 mg total) by mouth every 6 (six) hours as needed for mild pain 30 tablet 0    albuterol (PROVENTIL HFA,VENTOLIN HFA) 90 mcg/act inhaler Inhale 1-2 puffs every 6 (six) hours as needed for wheezing 18 g 1    ARIPiprazole (ABILIFY) 10 mg tablet Take 10 mg by mouth daily at bedtime      aspirin (ECOTRIN LOW STRENGTH) 81 mg EC tablet Take 1 tablet (81 mg total) by mouth daily 30 tablet 3    atorvastatin (LIPITOR) 40 mg tablet Take 1 tablet (40 mg total) by mouth daily 90 tablet 1    Blood Glucose Monitoring Suppl (FreeStyle Lite) SHANELL Use daily Use as directed 1 each 0    buPROPion (WELLBUTRIN SR) 200 MG 12 hr tablet Take 200 mg by mouth every morning      cloNIDine (CATAPRES) 0 2 mg tablet Take 0 2 mg by mouth daily at bedtime      diclofenac (VOLTAREN) 75 mg EC tablet Take 1 tablet (75 mg total) by mouth 2 (two) times a day 60 tablet 1    fluticasone (FLONASE) 50 mcg/act nasal spray 1 spray into each nostril daily 16 g 0    glucose blood (FREESTYLE LITE) test strip Testing sugars once a day 100 each 3    hydrocortisone 1 % ointment Apply topically 2 (two) times a day 30 g 0    hydrOXYzine HCL (ATARAX) 50 mg tablet Take 50 mg by mouth daily at bedtime      Lancets (freestyle) lancets Daily 100 each 5    lidocaine (XYLOCAINE) 2 % topical gel Apply topically as needed for mild pain 30 mL 0    lisinopril (ZESTRIL) 10 mg tablet Take 1 tablet (10 mg total) by mouth daily 30 tablet 2    meclizine (ANTIVERT) 25 mg tablet Take 1 tablet (25 mg total) by mouth every 8 (eight) hours 30 tablet 0    metFORMIN (GLUCOPHAGE-XR) 500 mg 24 hr tablet Take 2 tablets (1,000 mg total) by mouth daily with dinner 180 tablet 1    methocarbamol (ROBAXIN) 500 mg tablet Take 1 tablet (500 mg total) by mouth 2 (two) times a day 20 tablet 0    Misc   Devices (Wrist Brace) MISC Use daily 2 each 0    pantoprazole (PROTONIX) 40 mg tablet Take 1 tablet (40 mg total) by mouth daily 90 tablet 0    tamsulosin (FLOMAX) 0 4 mg Take 1 capsule (0 4 mg total) by mouth daily with dinner 90 capsule 3    zolpidem (AMBIEN) 10 mg tablet Take 10 mg by mouth daily at bedtime      gabapentin (NEURONTIN) 800 mg tablet take 1 tablet by mouth three times a day 90 tablet 2    linaGLIPtin (Tradjenta) 5 MG TABS Take 5 mg by mouth daily (Patient not taking: Reported on 10/5/2022) 90 tablet 1    methylPREDNISolone 4 MG tablet therapy pack Use as directed on package (Patient not taking: No sig reported) 21 each 0     No current facility-administered medications for this visit  Facility-Administered Medications Ordered in Other Visits   Medication Dose Route Frequency Provider Last Rate Last Admin    sodium chloride 0 9 % infusion  100 mL/hr Intravenous Continuous True Serrato MD   Stopped at 10/03/22 1223       Allergies  No Known Allergies      The following portions of the patient's history were reviewed and updated as appropriate: allergies, current medications, past medical history, past social history, past surgical history and problem list       Vitals  Vitals:    10/05/22 1114   BP: 96/60   BP Location: Left arm   Patient Position: Sitting   Cuff Size: Large   Pulse: 67   SpO2: 96%   Weight: 94 8 kg (209 lb)           Physical Exam  Physical Exam  Vitals reviewed  Constitutional:       General: He is not in acute distress  Appearance: Normal appearance  He is normal weight  HENT:      Head: Normocephalic  Pulmonary:      Effort: No respiratory distress  Breath sounds: Normal breath sounds  Abdominal:      Tenderness: There is no right CVA tenderness or left CVA tenderness  Skin:     General: Skin is warm and dry  Neurological:      General: No focal deficit present  Mental Status: He is alert and oriented to person, place, and time     Psychiatric:         Mood and Affect: Mood normal          Behavior: Behavior normal            Results  No results found for this or any previous visit (from the past 1 hour(s)) ]  Lab Results   Component Value Date    PSA 0 37 05/15/2018     Lab Results   Component Value Date    GLUCOSE 143 (H) 05/17/2018    CALCIUM 9 1 05/24/2022     05/17/2018    K 3 9 05/24/2022    CO2 25 05/24/2022     05/24/2022    BUN 8 05/24/2022    CREATININE 0 82 05/24/2022     Lab Results   Component Value Date    WBC 8 39 05/24/2022    HGB 14 5 05/24/2022    HCT 43 4 05/24/2022    MCV 93 05/24/2022     05/24/2022     RENAL ULTRASOUND WITH PVR     INDICATION:   R31 0: Gross hematuria      COMPARISON: Limited abdominal ultrasound 12/3/2021 and CT abdomen pelvis without IV contrast on 8/6/2020     TECHNIQUE:   Ultrasound of the retroperitoneum was performed with a curvilinear transducer utilizing volumetric sweeps and still imaging techniques       FINDINGS:     KIDNEYS:  Symmetric and normal size  Right kidney:  11 7 x 6 8 x 6 6 cm  Volume 274 2 mL  Left kidney:  10 4 x 7 6 x 5 8 cm  Volume 239 4 mL     Right kidney  Normal echogenicity and contour  No suspicious solid mass is identified  3 4 x 1 7 x 2 4 cm hypoechoic area in the upper pole of the right kidney is seen with possible internal septation  This could be a mildly complex cyst   Visualization is somewhat limited on this study due to its   posterior location  No hydronephrosis  No shadowing calculi  No perinephric fluid collections      Left kidney  Normal echogenicity and contour  No suspicious solid mass is identified  Exophytic 1 7 x 1 7 x 1 7 cm simple cyst in the lower pole of the left kidney is seen, relatively stable compared to the prior CT exam of 8/6/2020 given differences in measurement technique and modality  No hydronephrosis  No shadowing calculi  No perinephric fluid collections      URETERS:  Nonvisualized      BLADDER:   Mildly distended    No abnormal bladder wall thickening noted  There is a 1 5 x 0 9 x 1 8 cm mildly hypoechoic to isoechoic area along the posterior inferior bladder wall  Consider contrast-enhanced multiphase CT urogram after adequate bladder distention for better   evaluation  Bilateral ureteral jets detected  Prevoid: 54 6   No significant post void volume  Measured post void volume in mL: 6 0        IMPRESSION:     1  Exophytic 1 7 x 1 7 x 1 7 cm simple cyst in the lower pole of the left kidney is seen, relatively stable compared to the prior CT exam of 8/6/2020 given differences in measurement technique and modality  2   No nephrolithiasis or hydronephrosis bilaterally  3   3 4 x 1 7 x 2 4 cm hypoechoic area in the upper pole of the right kidney is seen with possible internal septation  This could be a mildly complex cyst   Visualization is somewhat limited on this study due to its posterior location  This was not   definitively seen on the prior CT exam     4   There is a 1 5 x 0 9 x 1 8 cm mildly hypoechoic to isoechoic area along the posterior inferior bladder wall  Consider contrast-enhanced multiphase CT urogram after adequate bladder distention for better evaluation of the bladder finding as well as   the right upper pole hypoechoic area  Orders  Orders Placed This Encounter   Procedures    Cystoscopy     Bladder mass     Standing Status:   Future     Standing Expiration Date:   10/5/2023    CT renal protocol     Standing Status:   Future     Standing Expiration Date:   10/5/2026     Scheduling Instructions:      Nothing to eat 3 hours prior to your test   Clear liquids are also permitted up until the time of the scan  Clear liquids include water, black coffee or tea, apple juice or clear broth  If possible wear clothing without any metal in the abdomen area  Sweat suit, shorts, sports bra or bra without underwire may eliminate the need to change   Please bring your insurance cards, a form of photo ID and a list of your medications with you  Arrive 15 minutes prior to your appointment time in order to register  On the day of your test, please bring any prior CT or MRI studies of this area with you that were not performed at a [facility]  To schedule this appointment, please contact Central Scheduling at [phone]  Order Specific Question:   What is the patient's sedation requirement? Answer:   No Sedation     Order Specific Question:   Contrast information:     Answer:   IV     Order Specific Question:   Did the patient ever have a reaction to x-ray dye? If yes, please verify the type of allergy and order the contrast allergy prep  Answer:   No     Order Specific Question:   [facility]     Answer:   Immediate     Order Specific Question:   Reason for Exam (FREE TEXT)     Answer:   bladder mass, gross hematuria    Basic metabolic panel     This is a patient instruction: Patient fasting for 8 hours or longer recommended       Standing Status:   Future     Standing Expiration Date:   10/5/2023       RAMO Alvarez

## 2022-10-06 ENCOUNTER — APPOINTMENT (OUTPATIENT)
Dept: LAB | Facility: HOSPITAL | Age: 57
End: 2022-10-06
Payer: MEDICARE

## 2022-10-06 DIAGNOSIS — R31.0 GROSS HEMATURIA: ICD-10-CM

## 2022-10-06 LAB
ANION GAP SERPL CALCULATED.3IONS-SCNC: 8 MMOL/L (ref 5–14)
BUN SERPL-MCNC: 12 MG/DL (ref 5–25)
CALCIUM SERPL-MCNC: 9.3 MG/DL (ref 8.4–10.2)
CHLORIDE SERPL-SCNC: 103 MMOL/L (ref 96–108)
CO2 SERPL-SCNC: 30 MMOL/L (ref 21–32)
CREAT SERPL-MCNC: 0.96 MG/DL (ref 0.7–1.5)
CREAT UR-MCNC: 72.6 MG/DL
GFR SERPL CREATININE-BSD FRML MDRD: 86 ML/MIN/1.73SQ M
GLUCOSE P FAST SERPL-MCNC: 270 MG/DL (ref 70–99)
MICROALBUMIN UR-MCNC: <5 MG/L (ref 0–20)
MICROALBUMIN/CREAT 24H UR: <7 MG/G CREATININE (ref 0–30)
POTASSIUM SERPL-SCNC: 4.1 MMOL/L (ref 3.5–5.3)
SODIUM SERPL-SCNC: 141 MMOL/L (ref 135–147)

## 2022-10-06 PROCEDURE — 80048 BASIC METABOLIC PNL TOTAL CA: CPT

## 2022-10-06 PROCEDURE — 36415 COLL VENOUS BLD VENIPUNCTURE: CPT

## 2022-10-12 ENCOUNTER — PATIENT OUTREACH (OUTPATIENT)
Dept: FAMILY MEDICINE CLINIC | Facility: CLINIC | Age: 57
End: 2022-10-12

## 2022-10-12 ENCOUNTER — TELEPHONE (OUTPATIENT)
Dept: GASTROENTEROLOGY | Facility: CLINIC | Age: 57
End: 2022-10-12

## 2022-10-12 DIAGNOSIS — A04.8 H. PYLORI INFECTION: Primary | ICD-10-CM

## 2022-10-12 PROCEDURE — 88305 TISSUE EXAM BY PATHOLOGIST: CPT | Performed by: PATHOLOGY

## 2022-10-12 PROCEDURE — 88342 IMHCHEM/IMCYTCHM 1ST ANTB: CPT | Performed by: PATHOLOGY

## 2022-10-12 RX ORDER — TETRACYCLINE HYDROCHLORIDE 500 MG/1
500 CAPSULE ORAL EVERY 6 HOURS
Qty: 56 CAPSULE | Refills: 0 | Status: SHIPPED | OUTPATIENT
Start: 2022-10-12 | End: 2022-10-26

## 2022-10-12 RX ORDER — METRONIDAZOLE 250 MG/1
250 TABLET ORAL EVERY 6 HOURS
Qty: 56 TABLET | Refills: 0 | Status: SHIPPED | OUTPATIENT
Start: 2022-10-12 | End: 2022-10-26

## 2022-10-12 RX ORDER — PANTOPRAZOLE SODIUM 40 MG/1
40 TABLET, DELAYED RELEASE ORAL EVERY 12 HOURS
Qty: 28 TABLET | Refills: 0 | Status: SHIPPED | OUTPATIENT
Start: 2022-10-12 | End: 2022-10-22

## 2022-10-12 RX ORDER — BISMUTH SUBSALICYLATE 262 MG/1
524 TABLET, CHEWABLE ORAL EVERY 6 HOURS
Qty: 30 TABLET | Refills: 0 | Status: SHIPPED | OUTPATIENT
Start: 2022-10-12 | End: 2022-10-26

## 2022-10-12 NOTE — RESULT ENCOUNTER NOTE
My medical assistant will call patient with results  Stomach biopsy shows infection with H pylori  This is a bacteria that can live in the stomach and cause abdominal pain and ulcers  Please send him treatment per the H pylori protocol  The  colon polyp removed was called an adenoma  This is a pre-cancerous lesion and was completely removed  There was no evidence of cancer in the polyp       He should have the colonoscopy repeated in 4 years due to a history of colon polyps    Please schedule office follow up with Dr Viridiana Morse in 8 weeks

## 2022-10-12 NOTE — TELEPHONE ENCOUNTER
Spoke with pt, relayed test results  Reviewed H pylori treatment   Pt agreeable and verbalized understanding of all instructions

## 2022-10-12 NOTE — TELEPHONE ENCOUNTER
----- Message from Opal Gutierrez sent at 10/12/2022 12:42 PM EDT -----    ----- Message -----  From: Marilee Munguia MD  Sent: 10/12/2022  12:41 PM EDT  To: Luz Elena Avendano MD, #    My medical assistant will call patient with results  Stomach biopsy shows infection with H pylori  This is a bacteria that can live in the stomach and cause abdominal pain and ulcers  Please send him treatment per the H pylori protocol  The  colon polyp removed was called an adenoma  This is a pre-cancerous lesion and was completely removed  There was no evidence of cancer in the polyp       He should have the colonoscopy repeated in 4 years due to a history of colon polyps    Please schedule office follow up with Dr Donovan Mckeon in 8 weeks

## 2022-10-12 NOTE — PROGRESS NOTES
I called the patient to follow up  He states he has been feeling well  He reports his blood sugars have been stable with all readings <150 which I congratulated him on  The patient states he is now carrying glucose tablets with him in case his sugar drops; I advised him to continue  He states he is checking his feet daily and denies any skin breakdown  He also notes he has been taking his medications as prescribed  The patient states his appetite has decreased  He notes he used to weigh 225 and is now 80  The patient reports left knee and left shoulder pain  He notes he has a "bump" in the area of his scapula; will send note to schedule  The patient also reports symptoms of feeling dizzy for 2 months  He notes this occurs about every other day  He state his hands are also "shaky"  He states it happens at any time with no other associated symptoms  I asked him to get up slowly to avoid falls  I will continue to follow

## 2022-10-13 ENCOUNTER — OFFICE VISIT (OUTPATIENT)
Dept: FAMILY MEDICINE CLINIC | Facility: CLINIC | Age: 57
End: 2022-10-13

## 2022-10-13 ENCOUNTER — HOSPITAL ENCOUNTER (OUTPATIENT)
Dept: CT IMAGING | Facility: HOSPITAL | Age: 57
End: 2022-10-13
Payer: MEDICARE

## 2022-10-13 VITALS
RESPIRATION RATE: 16 BRPM | TEMPERATURE: 97.9 F | HEIGHT: 72 IN | BODY MASS INDEX: 28.47 KG/M2 | OXYGEN SATURATION: 96 % | WEIGHT: 210.22 LBS | DIASTOLIC BLOOD PRESSURE: 70 MMHG | SYSTOLIC BLOOD PRESSURE: 116 MMHG | HEART RATE: 93 BPM

## 2022-10-13 DIAGNOSIS — I10 PRIMARY HYPERTENSION: ICD-10-CM

## 2022-10-13 DIAGNOSIS — M62.838 TRAPEZIUS MUSCLE SPASM: ICD-10-CM

## 2022-10-13 DIAGNOSIS — G89.29 CHRONIC LEFT SHOULDER PAIN: ICD-10-CM

## 2022-10-13 DIAGNOSIS — R42 VERTIGO: ICD-10-CM

## 2022-10-13 DIAGNOSIS — R31.0 GROSS HEMATURIA: ICD-10-CM

## 2022-10-13 DIAGNOSIS — M25.512 CHRONIC LEFT SHOULDER PAIN: ICD-10-CM

## 2022-10-13 DIAGNOSIS — J40 BRONCHITIS: ICD-10-CM

## 2022-10-13 DIAGNOSIS — E11.9 TYPE 2 DIABETES MELLITUS WITHOUT COMPLICATION, WITHOUT LONG-TERM CURRENT USE OF INSULIN (HCC): Primary | ICD-10-CM

## 2022-10-13 LAB — SL AMB POCT GLUCOSE BLD: 175

## 2022-10-13 PROCEDURE — 82948 REAGENT STRIP/BLOOD GLUCOSE: CPT | Performed by: NURSE PRACTITIONER

## 2022-10-13 PROCEDURE — 99214 OFFICE O/P EST MOD 30 MIN: CPT | Performed by: NURSE PRACTITIONER

## 2022-10-13 PROCEDURE — G1004 CDSM NDSC: HCPCS

## 2022-10-13 PROCEDURE — 74178 CT ABD&PLV WO CNTR FLWD CNTR: CPT

## 2022-10-13 RX ORDER — ALBUTEROL SULFATE 90 UG/1
1-2 AEROSOL, METERED RESPIRATORY (INHALATION) EVERY 6 HOURS PRN
Qty: 18 G | Refills: 1 | Status: SHIPPED | OUTPATIENT
Start: 2022-10-13

## 2022-10-13 RX ORDER — METHOCARBAMOL 500 MG/1
500 TABLET, FILM COATED ORAL 2 TIMES DAILY
Qty: 20 TABLET | Refills: 0 | Status: SHIPPED | OUTPATIENT
Start: 2022-10-13

## 2022-10-13 RX ORDER — MECLIZINE HYDROCHLORIDE 25 MG/1
25 TABLET ORAL EVERY 8 HOURS SCHEDULED
Qty: 30 TABLET | Refills: 0 | Status: SHIPPED | OUTPATIENT
Start: 2022-10-13

## 2022-10-13 RX ORDER — LISINOPRIL 10 MG/1
10 TABLET ORAL DAILY
Qty: 30 TABLET | Refills: 2 | Status: SHIPPED | OUTPATIENT
Start: 2022-10-13

## 2022-10-13 RX ADMIN — IOHEXOL 100 ML: 350 INJECTION, SOLUTION INTRAVENOUS at 10:21

## 2022-10-13 NOTE — ASSESSMENT & PLAN NOTE
Currently with left trapezius muscle spasm exacerbating chronic pain   Recommend trial of methocarbamol and PT

## 2022-10-14 NOTE — PROGRESS NOTES
Assessment/Plan:    Left shoulder pain  Currently with left trapezius muscle spasm exacerbating chronic pain   Recommend trial of methocarbamol and PT     Hypertension  Patients BP is within goal  Continue lisinopril 10mg    Vertigo  Ongoing issue, previously recommended to complete vestibular therapy but has not   Referral again placed for vestibular therapy and ENT   Meclizine CRYSTAL     Lala Richards was seen today for dizziness and lump  Diagnoses and all orders for this visit:    Type 2 diabetes mellitus without complication, without long-term current use of insulin ([facility])  -     POCT blood glucose    Trapezius muscle spasm  -     methocarbamol (ROBAXIN) 500 mg tablet; Take 1 tablet (500 mg total) by mouth 2 (two) times a day    Chronic left shoulder pain  -     Ambulatory Referral to Physical Therapy; Future    Primary hypertension  -     lisinopril (ZESTRIL) 10 mg tablet; Take 1 tablet (10 mg total) by mouth daily    Bronchitis  -     albuterol (PROVENTIL HFA,VENTOLIN HFA) 90 mcg/act inhaler; Inhale 1-2 puffs every 6 (six) hours as needed for wheezing    Vertigo  -     meclizine (ANTIVERT) 25 mg tablet; Take 1 tablet (25 mg total) by mouth every 8 (eight) hours  -     Ambulatory Referral to Otolaryngology; Future  -     Basic vestibular eval; Future          Keep previously scheduled follow up    Subjective:     Nikki Garcia is a 59 y o  male who  has a past medical history of Diabetes mellitus ([facility] [address] ), Diverticulitis, Hypertension, and Neck pain  who presented to the office today for follow up           The following portions of the patient's history were reviewed and updated as appropriate: allergies, current medications, past family history, past medical history, past social history, past surgical history and problem list     Current Outpatient Medications on File Prior to Visit   Medication Sig Dispense Refill   • acetaminophen (TYLENOL) 500 mg tablet Take 1 tablet (500 mg total) by mouth every 6 (six) hours as needed for mild pain 30 tablet 0   • ARIPiprazole (ABILIFY) 10 mg tablet Take 10 mg by mouth daily at bedtime     • aspirin (ECOTRIN LOW STRENGTH) 81 mg EC tablet Take 1 tablet (81 mg total) by mouth daily 30 tablet 3   • atorvastatin (LIPITOR) 40 mg tablet Take 1 tablet (40 mg total) by mouth daily 90 tablet 1   • bismuth subsalicylate (PEPTO BISMOL) 262 MG chewable tablet Chew 2 tablets (524 mg total) every 6 (six) hours for 14 days 30 tablet 0   • Blood Glucose Monitoring Suppl (FreeStyle Lite) SHANELL Use daily Use as directed 1 each 0   • buPROPion (WELLBUTRIN SR) 200 MG 12 hr tablet Take 200 mg by mouth every morning     • cloNIDine (CATAPRES) 0 2 mg tablet Take 0 2 mg by mouth daily at bedtime     • diclofenac (VOLTAREN) 75 mg EC tablet Take 1 tablet (75 mg total) by mouth 2 (two) times a day 60 tablet 1   • fluticasone (FLONASE) 50 mcg/act nasal spray 1 spray into each nostril daily 16 g 0   • gabapentin (NEURONTIN) 800 mg tablet take 1 tablet by mouth three times a day 90 tablet 2   • glucose blood (FREESTYLE LITE) test strip Testing sugars once a day 100 each 3   • hydrocortisone 1 % ointment Apply topically 2 (two) times a day 30 g 0   • hydrOXYzine HCL (ATARAX) 50 mg tablet Take 50 mg by mouth daily at bedtime     • Lancets (freestyle) lancets Daily 100 each 5   • lidocaine (XYLOCAINE) 2 % topical gel Apply topically as needed for mild pain 30 mL 0   • linaGLIPtin (Tradjenta) 5 MG TABS Take 5 mg by mouth daily (Patient not taking: Reported on 10/5/2022) 90 tablet 1   • metFORMIN (GLUCOPHAGE-XR) 500 mg 24 hr tablet Take 2 tablets (1,000 mg total) by mouth daily with dinner 180 tablet 1   • methylPREDNISolone 4 MG tablet therapy pack Use as directed on package (Patient not taking: No sig reported) 21 each 0   • metroNIDAZOLE (FLAGYL) 250 mg tablet Take 1 tablet (250 mg total) by mouth every 6 (six) hours for 14 days 56 tablet 0   • Misc   Devices (Wrist Brace) MISC Use daily 2 each 0   • pantoprazole (PROTONIX) 40 mg tablet Take 1 tablet (40 mg total) by mouth every 12 (twelve) hours for 14 days 28 tablet 0   • tamsulosin (FLOMAX) 0 4 mg Take 1 capsule (0 4 mg total) by mouth daily with dinner 90 capsule 3   • tetracycline (ACHROMYCIN,SUMYCIN) 500 MG capsule Take 1 capsule (500 mg total) by mouth every 6 (six) hours for 14 days 56 capsule 0   • zolpidem (AMBIEN) 10 mg tablet Take 10 mg by mouth daily at bedtime       No current facility-administered medications on file prior to visit  Review of Systems   Constitutional: Negative for chills and fever  HENT: Negative for ear pain and sore throat  Eyes: Negative for pain and visual disturbance  Respiratory: Negative for cough and shortness of breath  Cardiovascular: Negative for chest pain and palpitations  Gastrointestinal: Negative for abdominal pain and vomiting  Genitourinary: Negative for dysuria and hematuria  Musculoskeletal: Positive for arthralgias, back pain and myalgias  Skin: Negative for color change and rash  Neurological: Positive for dizziness  Negative for seizures and syncope  All other systems reviewed and are negative  Objective:    /70 (BP Location: Left arm, Patient Position: Sitting, Cuff Size: Standard)   Pulse 93   Temp 97 9 °F (36 6 °C) (Temporal)   Resp 16   Ht 6' (1 829 m)   Wt 95 4 kg (210 lb 3 5 oz)   SpO2 96%   BMI 28 51 kg/m²     Physical Exam  Vitals and nursing note reviewed  Constitutional:       General: He is not in acute distress  Appearance: He is not toxic-appearing  HENT:      Head: Normocephalic and atraumatic  Right Ear: External ear normal       Left Ear: External ear normal    Eyes:      Extraocular Movements: Extraocular movements intact  Conjunctiva/sclera: Conjunctivae normal       Pupils: Pupils are equal, round, and reactive to light  Cardiovascular:      Rate and Rhythm: Normal rate and regular rhythm     Pulmonary:      Effort: Pulmonary effort is normal  No respiratory distress  Breath sounds: Normal breath sounds  Musculoskeletal:         General: Tenderness present  Comments: Left trapezius TTP and spasm    Lymphadenopathy:      Cervical: No cervical adenopathy  Neurological:      General: No focal deficit present  Mental Status: He is alert and oriented to person, place, and time     Psychiatric:         Behavior: Behavior normal          Donny Garibay  10/14/22  12:25 PM

## 2022-10-14 NOTE — ASSESSMENT & PLAN NOTE
Ongoing issue, previously recommended to complete vestibular therapy but has not   Referral again placed for vestibular therapy and ENT   Meclizine PRN

## 2022-10-17 NOTE — ASSESSMENT & PLAN NOTE
D/C atenolol and start lisinopril   RTC in 2 weeks for BP check Full Thickness Lip Wedge Repair (Flap) Text: Given the location of the defect and the proximity to free margins a full thickness wedge repair was deemed most appropriate.  Using a sterile surgical marker, the appropriate repair was drawn incorporating the defect and placing the expected incisions perpendicular to the vermilion border.  The vermilion border was also meticulously outlined to ensure appropriate reapproximation during the repair.  The area thus outlined was incised through and through with a #15 scalpel blade.  The muscularis and dermis were reaproximated with deep sutures following hemostasis. Care was taken to realign the vermilion border before proceeding with the superficial closure.  Once the vermilion was realigned the superfical and mucosal closure was finished.

## 2022-10-22 DIAGNOSIS — A04.8 H. PYLORI INFECTION: ICD-10-CM

## 2022-10-22 RX ORDER — PANTOPRAZOLE SODIUM 40 MG/1
TABLET, DELAYED RELEASE ORAL
Qty: 28 TABLET | Refills: 0 | Status: SHIPPED | OUTPATIENT
Start: 2022-10-22

## 2022-10-26 ENCOUNTER — TELEPHONE (OUTPATIENT)
Dept: GASTROENTEROLOGY | Facility: MEDICAL CENTER | Age: 57
End: 2022-10-26

## 2022-10-26 NOTE — TELEPHONE ENCOUNTER
lvm for pt to call back and schedule a follow up appt post h pylori treatment with either Dr Maryan Kwan at  or at [facility], according to pt preference

## 2022-10-26 NOTE — TELEPHONE ENCOUNTER
----- Message from Lisa Olea, [address] sent at 10/12/2022 12:42 PM EDT -----    ----- Message -----  From: Kj Stockton MD  Sent: 10/12/2022  12:41 PM EDT  To: Ingrid Elizondo MD, #    My medical assistant will call patient with results  Stomach biopsy shows infection with H pylori  This is a bacteria that can live in the stomach and cause abdominal pain and ulcers  Please send him treatment per the H pylori protocol  The  colon polyp removed was called an adenoma  This is a pre-cancerous lesion and was completely removed  There was no evidence of cancer in the polyp       He should have the colonoscopy repeated in 4 years due to a history of colon polyps    Please schedule office follow up with Dr Shruthi Andre in 8 weeks

## 2022-10-27 NOTE — TELEPHONE ENCOUNTER
CV too far  [facility] no openings till next year  Willing to come to [facility] [address]   Schedule 11/16

## 2022-11-08 ENCOUNTER — PATIENT OUTREACH (OUTPATIENT)
Dept: FAMILY MEDICINE CLINIC | Facility: CLINIC | Age: 57
End: 2022-11-08

## 2022-11-09 ENCOUNTER — PROCEDURE VISIT (OUTPATIENT)
Dept: UROLOGY | Facility: AMBULATORY SURGERY CENTER | Age: 57
End: 2022-11-09

## 2022-11-09 VITALS
OXYGEN SATURATION: 96 % | HEART RATE: 82 BPM | DIASTOLIC BLOOD PRESSURE: 62 MMHG | WEIGHT: 213 LBS | BODY MASS INDEX: 28.89 KG/M2 | SYSTOLIC BLOOD PRESSURE: 100 MMHG

## 2022-11-09 DIAGNOSIS — R31.0 GROSS HEMATURIA: Primary | ICD-10-CM

## 2022-11-09 NOTE — PROGRESS NOTES
Patient today with history of lower urinary tract symptoms and gross hematuria  There is concern on ultrasound for a bladder lesion  CT scan renal protocol did not reveal any suspicious abnormality  Patient returns today for cystoscopy  He reports that he continues to smoke daily  Cystoscopy     Date/Time 11/9/2022 3:11 PM     Performed by  Millicent Stewart MD     Authorized by Millicent Stewart MD          Procedure Details:  Procedure type: cystoscopy    Additional Procedure Details: Patient was prepped with chlorhexidine% lidocaine jelly was instilled per urethra  The 16 [place] flexible cystoscope was placed  There is no urethral abnormality  A state is not overly enlarged and is nonobstructive  Evaluation of the bladder after filling reveals normal submucosal without suspicious mass or lesion  Both ureteral orifices are normal     Plan   Continue routine follow-up for urinary symptoms and tamsulosin use  Check PSA periodically in accordance with screening guidelines

## 2022-11-16 ENCOUNTER — OFFICE VISIT (OUTPATIENT)
Dept: GASTROENTEROLOGY | Facility: MEDICAL CENTER | Age: 57
End: 2022-11-16

## 2022-11-16 VITALS
OXYGEN SATURATION: 97 % | HEIGHT: 72 IN | SYSTOLIC BLOOD PRESSURE: 116 MMHG | WEIGHT: 207.6 LBS | BODY MASS INDEX: 28.12 KG/M2 | DIASTOLIC BLOOD PRESSURE: 64 MMHG | HEART RATE: 89 BPM

## 2022-11-16 DIAGNOSIS — K21.9 GASTROESOPHAGEAL REFLUX DISEASE WITHOUT ESOPHAGITIS: ICD-10-CM

## 2022-11-16 DIAGNOSIS — A04.8 H. PYLORI INFECTION: Primary | ICD-10-CM

## 2022-11-16 NOTE — PROGRESS NOTES
[facility] - Outpatient Follow-up Note  Adri Marshall 59 y o  male MRN: [id number]  Encounter: [id number]          ASSESSMENT AND PLAN:  18-year-old male with history of hypertension, diabetes, GERD who presents for follow-up evaluation  1  H  pylori infection  He underwent EGD recently with gastric biopsies positive for H pylori  He is finishing his antibiotic course tomorrow  I discussed obtaining stool antigen 1 month after completing treatment to confirm eradication   - H  pylori antigen, stool; Future    2  Gastroesophageal reflux disease without esophagitis  He has history of chronic GERD  His symptoms have improved on H pylori treatment  Once he submits the stool sample he can resume Protonix 40 mg daily to be taken 30 minutes before breakfast       Follow-up in 3 months      ______________________________________________________________________    SUBJECTIVE:  18-year-old male with history of hypertension, diabetes, GERD who presents for follow-up evaluation  He was last seen in the GI office April 2022 for history of GERD, lower abdominal pain, hepatic steatosis and colon cancer screening  Interval history:  He underwent EGD October 2022 showing irregular Z-line moderate gastritis and mild erythema of the duodenal bulb otherwise normal   Gastric biopsies were positive for H pylori for which she was prescribed antibiotics  Colonoscopy showed 2 subcentimeter polyps, diverticulosis, 1 polyp showing a sessile serrated adenoma he was recommended repeat in 5 years   Interval history:  He has 1 day left of his H pylori treatment  Reports improvement of his abdominal pain and GERD symptoms  He is having intermittent loose stools which have been worsening on the antibiotics  He denies bloody stools      Interval history:  5/22 liver enzymes are normal     2020 CT renal protocol showed fatty liver      2018 colonoscopy showed inadequate prep    REVIEW OF SYSTEMS IS OTHERWISE NEGATIVE  He reports shoulder pain  Ten point review of systems is negative other than stated as per HPI    Historical Information   Past Medical History:   Diagnosis Date   • Diabetes mellitus ([address] )    • Diverticulitis    • Hypertension    • Neck pain      Past Surgical History:   Procedure Laterality Date   • COLON SURGERY     • NECK SURGERY       Social History   Social History     Substance and Sexual Activity   Alcohol Use Yes    Comment: drinks only on weekends     Social History     Substance and Sexual Activity   Drug Use Never     Social History     Tobacco Use   Smoking Status Every Day   • Packs/day: 0 25   • Types: Cigarettes, Cigars   Smokeless Tobacco Never   Tobacco Comments    1 cigar/daily     No family history on file  Meds/Allergies       Current Outpatient Medications:   •  acetaminophen (TYLENOL) 500 mg tablet  •  albuterol (PROVENTIL HFA,VENTOLIN HFA) 90 mcg/act inhaler  •  ARIPiprazole (ABILIFY) 10 mg tablet  •  aspirin (ECOTRIN LOW STRENGTH) 81 mg EC tablet  •  atorvastatin (LIPITOR) 40 mg tablet  •  Blood Glucose Monitoring Suppl (FreeStyle Lite) SHANELL  •  buPROPion (WELLBUTRIN SR) 200 MG 12 hr tablet  •  cloNIDine (CATAPRES) 0 2 mg tablet  •  diclofenac (VOLTAREN) 75 mg EC tablet  •  fluticasone (FLONASE) 50 mcg/act nasal spray  •  gabapentin (NEURONTIN) 800 mg tablet  •  glucose blood (FREESTYLE LITE) test strip  •  hydrocortisone 1 % ointment  •  hydrOXYzine HCL (ATARAX) 50 mg tablet  •  Lancets (freestyle) lancets  •  lidocaine (XYLOCAINE) 2 % topical gel  •  linaGLIPtin (Tradjenta) 5 MG TABS  •  lisinopril (ZESTRIL) 10 mg tablet  •  meclizine (ANTIVERT) 25 mg tablet  •  metFORMIN (GLUCOPHAGE-XR) 500 mg 24 hr tablet  •  methocarbamol (ROBAXIN) 500 mg tablet  •  Misc   Devices (Wrist Brace) MISC  •  pantoprazole (PROTONIX) 40 mg tablet  •  tamsulosin (FLOMAX) 0 4 mg  •  zolpidem (AMBIEN) 10 mg tablet  •  methylPREDNISolone 4 MG tablet therapy pack    No Known Allergies        Objective     Blood pressure 116/64, pulse 89, height 6' (1 829 m), weight 94 2 kg (207 lb 9 6 oz), SpO2 97 %  Body mass index is 28 16 kg/m²  PHYSICAL EXAM:      General Appearance:   Alert, cooperative, no distress   HEENT:   Normocephalic, atraumatic, anicteric  Neck:  Supple, symmetrical, trachea midline   Lungs:   Clear to auscultation bilaterally; no rales, rhonchi or wheezing; respirations unlabored    Heart[de-identified]   Regular rate and rhythm; no murmur, rub, or gallop  Abdomen:   Soft, non-tender, non-distended; normal bowel sounds; no masses, no organomegaly    Genitalia:   Deferred    Rectal:   Deferred    Extremities:  No cyanosis, clubbing or edema    Pulses:  2+ and symmetric    Skin:  No jaundice, rashes, or lesions    Lymph nodes:  No palpable cervical lymphadenopathy        Lab Results:   No visits with results within 1 Day(s) from this visit  Latest known visit with results is:   Office Visit on 10/13/2022   Component Date Value   • GLUCOSE BLD 10/13/2022 175          Radiology Results:   No results found

## 2022-11-17 ENCOUNTER — VBI (OUTPATIENT)
Dept: ADMINISTRATIVE | Facility: OTHER | Age: 57
End: 2022-11-17

## 2022-12-07 ENCOUNTER — PATIENT OUTREACH (OUTPATIENT)
Dept: FAMILY MEDICINE CLINIC | Facility: CLINIC | Age: 57
End: 2022-12-07

## 2022-12-07 DIAGNOSIS — E11.9 TYPE 2 DIABETES MELLITUS WITHOUT COMPLICATION, WITHOUT LONG-TERM CURRENT USE OF INSULIN (HCC): ICD-10-CM

## 2022-12-07 DIAGNOSIS — R42 VERTIGO: ICD-10-CM

## 2022-12-07 RX ORDER — BLOOD-GLUCOSE METER
KIT MISCELLANEOUS
Qty: 100 EACH | Refills: 3 | Status: SHIPPED | OUTPATIENT
Start: 2022-12-07

## 2022-12-07 RX ORDER — MECLIZINE HYDROCHLORIDE 25 MG/1
25 TABLET ORAL EVERY 8 HOURS SCHEDULED
Qty: 30 TABLET | Refills: 0 | Status: SHIPPED | OUTPATIENT
Start: 2022-12-07

## 2022-12-07 NOTE — PROGRESS NOTES
I called the patient to follow up  He states last week he passed out last week after getting up from the couch  He does not believe he hit his head  He injured both elbows, back and has neck and shoulder pain (shoulder pain chronic prior to the fall); note sent to PCP  He did not seek medical attention  The patient reports his blood sugar after awakening was 64  He stated he was sweating prior to the fall  His wife was present who helped him  The patient denies any further hypoglycemic episodes  I encouraged the patient to be sure to have healthy snacks and not to skip meals  The patient notes he has been checking his feet daily and denies skin breakdown but he does state he believes he is losing one of his toenails  The patient has not seen Podiatry recently  The patient spoke at length about a number of issues he has been going through  He states his son is in prison and does not speak to him any longer  He notes neighbors have been throwing away his mail  He states previously he paid $750 for a 1 bedroom apartment and now pays $950 for a 4 bedroom apartment and has no thoughts of relocating  He also states the neighbors dogs defecate around his car so it is difficult to enter his car  The patient is requesting med refills; I called the pharmacy and left a message for meds with refills remaining  I will submit refill to PCP for other meds  The patient will call with any questions or concerns  I will continue to follow

## 2022-12-31 DIAGNOSIS — I10 PRIMARY HYPERTENSION: ICD-10-CM

## 2023-01-02 DIAGNOSIS — G89.29 CHRONIC PAIN OF BOTH SHOULDERS: ICD-10-CM

## 2023-01-02 DIAGNOSIS — M25.512 CHRONIC PAIN OF BOTH SHOULDERS: ICD-10-CM

## 2023-01-02 DIAGNOSIS — M25.511 CHRONIC PAIN OF BOTH SHOULDERS: ICD-10-CM

## 2023-01-03 ENCOUNTER — PATIENT OUTREACH (OUTPATIENT)
Dept: FAMILY MEDICINE CLINIC | Facility: CLINIC | Age: 58
End: 2023-01-03

## 2023-01-03 DIAGNOSIS — A04.8 H. PYLORI INFECTION: ICD-10-CM

## 2023-01-03 RX ORDER — PANTOPRAZOLE SODIUM 40 MG/1
40 TABLET, DELAYED RELEASE ORAL EVERY 12 HOURS
Qty: 30 TABLET | Refills: 1 | OUTPATIENT
Start: 2023-01-03

## 2023-01-03 RX ORDER — LISINOPRIL 10 MG/1
TABLET ORAL
Qty: 30 TABLET | Refills: 2 | Status: SHIPPED | OUTPATIENT
Start: 2023-01-03 | End: 2023-01-05 | Stop reason: SDUPTHER

## 2023-01-03 RX ORDER — GABAPENTIN 800 MG/1
TABLET ORAL
Qty: 90 TABLET | Refills: 2 | Status: SHIPPED | OUTPATIENT
Start: 2023-01-03

## 2023-01-03 NOTE — PROGRESS NOTES
I returned the patient's call  He is requesting a medication refill; will submit to PCP  He reports he has heartburn and no medication to take  I reminded the patient of his PCP appointment 1/5 at 1120; he plans on attending  I will continue to follow

## 2023-01-03 NOTE — PROGRESS NOTES
Note received from PCP deferring Protonix RF to GI  I called the patient to inform him  He is requesting Zantac 300 or a similar med  Note sent to GI RN asking to follow up

## 2023-01-04 ENCOUNTER — TELEPHONE (OUTPATIENT)
Dept: GASTROENTEROLOGY | Facility: MEDICAL CENTER | Age: 58
End: 2023-01-04

## 2023-01-04 DIAGNOSIS — A04.8 H. PYLORI INFECTION: ICD-10-CM

## 2023-01-04 RX ORDER — PANTOPRAZOLE SODIUM 40 MG/1
40 TABLET, DELAYED RELEASE ORAL
Qty: 30 TABLET | Refills: 3 | Status: SHIPPED | OUTPATIENT
Start: 2023-01-04

## 2023-01-04 NOTE — TELEPHONE ENCOUNTER
----- Message from Dameon Tang MD sent at 1/4/2023  7:24 AM EST -----  Thank you for the message  I have refilled pantoprazole 40 mg to his pharmacy  He does require the H  pylori stool test to be submitted prior to reinitiating PPI  Clinical staff: Please call the patient to remind him to submit the H  pylori stool test   He can then start daily PPI be taken 30 minutes before breakfast    Dr K[facility]  [phone]    ----- Message -----  From: Navin Baldwin RN  Sent: 1/3/2023   2:50 PM EST  To: Dameon Tang MD, [place]    Pt is requesting a medication for his heartburn  PCP deferred refilling Protonix to your office  Pt actually requested Zantac 300mg  Can you please follow up with the pt? Thank you!     [employer] Re[employer]

## 2023-01-04 NOTE — TELEPHONE ENCOUNTER
Spoke with pt  Advised to obtain stool specimen prior to starting Protonix (refill sent to pharmacy)  Instructions for completing stool testing reviewed  Pt reporting he is still having the same symptoms as before  Pt agrees to complete stool testing for eradication of h pylori today or tomorrow  FU appt confirmed for 2/16/23 @ 11:30 Am   Pt agreeable and verbalized understanding of all information

## 2023-01-05 ENCOUNTER — OFFICE VISIT (OUTPATIENT)
Dept: FAMILY MEDICINE CLINIC | Facility: CLINIC | Age: 58
End: 2023-01-05

## 2023-01-05 VITALS
OXYGEN SATURATION: 95 % | TEMPERATURE: 98.3 F | RESPIRATION RATE: 18 BRPM | BODY MASS INDEX: 28.7 KG/M2 | DIASTOLIC BLOOD PRESSURE: 74 MMHG | SYSTOLIC BLOOD PRESSURE: 128 MMHG | HEART RATE: 87 BPM | HEIGHT: 72 IN | WEIGHT: 211.9 LBS

## 2023-01-05 DIAGNOSIS — M54.41 CHRONIC BILATERAL LOW BACK PAIN WITH BILATERAL SCIATICA: ICD-10-CM

## 2023-01-05 DIAGNOSIS — E11.9 TYPE 2 DIABETES MELLITUS WITHOUT COMPLICATION, WITHOUT LONG-TERM CURRENT USE OF INSULIN (HCC): Primary | ICD-10-CM

## 2023-01-05 DIAGNOSIS — E78.2 MIXED HYPERLIPIDEMIA: ICD-10-CM

## 2023-01-05 DIAGNOSIS — M50.120 CERVICAL DISC DISORDER WITH RADICULOPATHY OF MID-CERVICAL REGION: ICD-10-CM

## 2023-01-05 DIAGNOSIS — M25.512 CHRONIC PAIN OF BOTH SHOULDERS: ICD-10-CM

## 2023-01-05 DIAGNOSIS — J42 CHRONIC BRONCHITIS, UNSPECIFIED CHRONIC BRONCHITIS TYPE (HCC): ICD-10-CM

## 2023-01-05 DIAGNOSIS — J40 BRONCHITIS: ICD-10-CM

## 2023-01-05 DIAGNOSIS — M62.838 TRAPEZIUS MUSCLE SPASM: ICD-10-CM

## 2023-01-05 DIAGNOSIS — M25.511 CHRONIC PAIN OF BOTH SHOULDERS: ICD-10-CM

## 2023-01-05 DIAGNOSIS — M54.42 CHRONIC BILATERAL LOW BACK PAIN WITH BILATERAL SCIATICA: ICD-10-CM

## 2023-01-05 DIAGNOSIS — R42 VERTIGO: ICD-10-CM

## 2023-01-05 DIAGNOSIS — M19.012 ARTHRITIS OF LEFT ACROMIOCLAVICULAR JOINT: ICD-10-CM

## 2023-01-05 DIAGNOSIS — E11.42 DIABETIC POLYNEUROPATHY ASSOCIATED WITH TYPE 2 DIABETES MELLITUS (HCC): ICD-10-CM

## 2023-01-05 DIAGNOSIS — G89.29 CHRONIC BILATERAL LOW BACK PAIN WITH BILATERAL SCIATICA: ICD-10-CM

## 2023-01-05 DIAGNOSIS — F33.9 DEPRESSION, RECURRENT (HCC): ICD-10-CM

## 2023-01-05 DIAGNOSIS — G89.29 CHRONIC PAIN OF BOTH SHOULDERS: ICD-10-CM

## 2023-01-05 DIAGNOSIS — G89.4 CHRONIC PAIN SYNDROME: ICD-10-CM

## 2023-01-05 DIAGNOSIS — I10 PRIMARY HYPERTENSION: ICD-10-CM

## 2023-01-05 LAB — SL AMB POCT HEMOGLOBIN AIC: 6.9 (ref ?–6.5)

## 2023-01-05 RX ORDER — METFORMIN HYDROCHLORIDE 500 MG/1
1000 TABLET, EXTENDED RELEASE ORAL
Qty: 180 TABLET | Refills: 1 | Status: SHIPPED | OUTPATIENT
Start: 2023-01-05

## 2023-01-05 RX ORDER — DICLOFENAC SODIUM 75 MG/1
75 TABLET, DELAYED RELEASE ORAL 2 TIMES DAILY
Qty: 60 TABLET | Refills: 1 | Status: SHIPPED | OUTPATIENT
Start: 2023-01-05

## 2023-01-05 RX ORDER — MECLIZINE HYDROCHLORIDE 25 MG/1
25 TABLET ORAL EVERY 8 HOURS SCHEDULED
Qty: 30 TABLET | Refills: 2 | Status: SHIPPED | OUTPATIENT
Start: 2023-01-05

## 2023-01-05 RX ORDER — TIZANIDINE 4 MG/1
4 TABLET ORAL EVERY 8 HOURS PRN
Qty: 40 TABLET | Refills: 3 | Status: SHIPPED | OUTPATIENT
Start: 2023-01-05

## 2023-01-05 RX ORDER — ALBUTEROL SULFATE 90 UG/1
1-2 AEROSOL, METERED RESPIRATORY (INHALATION) EVERY 6 HOURS PRN
Qty: 18 G | Refills: 1 | Status: SHIPPED | OUTPATIENT
Start: 2023-01-05

## 2023-01-05 RX ORDER — LIDOCAINE HYDROCHLORIDE 20 MG/ML
JELLY TOPICAL AS NEEDED
Qty: 30 ML | Refills: 2 | Status: SHIPPED | OUTPATIENT
Start: 2023-01-05

## 2023-01-05 RX ORDER — LISINOPRIL 10 MG/1
10 TABLET ORAL DAILY
Qty: 90 TABLET | Refills: 2 | Status: SHIPPED | OUTPATIENT
Start: 2023-01-05

## 2023-01-05 RX ORDER — LINAGLIPTIN 5 MG/1
5 TABLET, FILM COATED ORAL DAILY
Qty: 90 TABLET | Refills: 1 | Status: SHIPPED | OUTPATIENT
Start: 2023-01-05

## 2023-01-05 RX ORDER — ATORVASTATIN CALCIUM 40 MG/1
40 TABLET, FILM COATED ORAL DAILY
Qty: 90 TABLET | Refills: 1 | Status: SHIPPED | OUTPATIENT
Start: 2023-01-05

## 2023-01-05 NOTE — ASSESSMENT & PLAN NOTE
Chronic lumbar pain, no improvement despite multiple treatment regimens  Previously following with pain management but is not interested in injections so stopped going   Patient is agreeable to trial PT and try different medications   On exam he has TTP of midline and right lumbar areas with radiation to the RLE

## 2023-01-05 NOTE — ASSESSMENT & PLAN NOTE
Lab Results   Component Value Date    HGBA1C 6 9 (A) 01/05/2023     Continue current regimen: metformin XR 1,000 mg daily and Tradjenta 5 mg daily

## 2023-01-05 NOTE — PROGRESS NOTES
Name: Estela Cantu      : 1965      MRN: [id number]  Encounter Provider: RAMO Estrella  Encounter Date: 2023   Encounter department: [address] [place] [address]     1  Type 2 diabetes mellitus without complication, without long-term current use of insulin ([facility])  Assessment & Plan:    Lab Results   Component Value Date    HGBA1C 6 9 (A) 2023     Continue current regimen: metformin XR 1,000 mg daily and Tradjenta 5 mg daily     Orders:  -     POCT hemoglobin A1c  -     metFORMIN (GLUCOPHAGE-XR) 500 mg 24 hr tablet; Take 2 tablets (1,000 mg total) by mouth daily with dinner  -     linaGLIPtin (Tradjenta) 5 MG TABS; Take 5 mg by mouth daily    2  Trapezius muscle spasm  -     lidocaine (XYLOCAINE) 2 % topical gel; Apply topically as needed for mild pain    3  Vertigo  -     meclizine (ANTIVERT) 25 mg tablet; Take 1 tablet (25 mg total) by mouth every 8 (eight) hours    4  Primary hypertension  Assessment & Plan:  Patients BP is within goal  Continue lisinopril 10mg    Orders:  -     lisinopril (ZESTRIL) 10 mg tablet; Take 1 tablet (10 mg total) by mouth daily    5  Bronchitis  -     albuterol (PROVENTIL HFA,VENTOLIN HFA) 90 mcg/act inhaler; Inhale 1-2 puffs every 6 (six) hours as needed for wheezing    6  Mixed hyperlipidemia  -     atorvastatin (LIPITOR) 40 mg tablet; Take 1 tablet (40 mg total) by mouth daily    7  Chronic bilateral low back pain with bilateral sciatica  Assessment & Plan:  Chronic lumbar pain, no improvement despite multiple treatment regimens  Previously following with pain management but is not interested in injections so stopped going   Patient is agreeable to trial PT and try different medications   On exam he has TTP of midline and right lumbar areas with radiation to the RLE      Orders:  -     MRI lumbar spine wo contrast; Future; Expected date: 2023  -     tiZANidine (ZANAFLEX) 4 mg tablet;  Take 1 tablet (4 mg total) by mouth every 8 (eight) hours as needed for muscle spasms  -     Ambulatory Referral to Physical Therapy; Future  -     XR spine lumbar minimum 4 views non injury; Future; Expected date: 01/05/2023    8  Chronic bronchitis, unspecified chronic bronchitis type ([place] [address] )    9  Depression, recurrent ([place] [address] )    10  Diabetic polyneuropathy associated with type 2 diabetes mellitus ([place] [address] )    11  Chronic pain of both shoulders  -     diclofenac (VOLTAREN) 75 mg EC tablet; Take 1 tablet (75 mg total) by mouth 2 (two) times a day    12  Arthritis of left acromioclavicular joint  -     diclofenac (VOLTAREN) 75 mg EC tablet; Take 1 tablet (75 mg total) by mouth 2 (two) times a day    13  Cervical disc disorder with radiculopathy of mid-cervical region  -     diclofenac (VOLTAREN) 75 mg EC tablet; Take 1 tablet (75 mg total) by mouth 2 (two) times a day    14  Chronic pain syndrome  -     diclofenac (VOLTAREN) 75 mg EC tablet; Take 1 tablet (75 mg total) by mouth 2 (two) times a day    BMI Counseling: Body mass index is 28 74 kg/m²  The BMI is above normal  Nutrition recommendations include encouraging healthy choices of fruits and vegetables, decreasing fast food intake, consuming healthier snacks and limiting drinks that contain sugar  Rationale for BMI follow-up plan is due to patient being overweight or obese  Subjective     Ena Morgan is a 59 y o  male who  has a past medical history of Diabetes mellitus ([place] [address] ), Diverticulitis, Hypertension, and Neck pain  who presented to the office today for follow up  Review of Systems   Constitutional: Negative for chills and fever  HENT: Negative for ear pain and sore throat  Eyes: Negative for pain and visual disturbance  Respiratory: Negative for cough and shortness of breath  Cardiovascular: Negative for chest pain and palpitations  Gastrointestinal: Negative for abdominal pain and vomiting  Genitourinary: Negative for dysuria and hematuria     Musculoskeletal: Positive for arthralgias, back pain and myalgias  Skin: Negative for color change and rash  Neurological: Negative for dizziness, seizures and syncope  All other systems reviewed and are negative  Past Medical History:   Diagnosis Date   • Diabetes mellitus ([facility] [address] )    • Diverticulitis    • Hypertension    • Neck pain      Past Surgical History:   Procedure Laterality Date   • COLON SURGERY     • NECK SURGERY       No family history on file  Social History     Socioeconomic History   • Marital status: /Civil Union     Spouse name: None   • Number of children: None   • Years of education: None   • Highest education level: None   Occupational History   • None   Tobacco Use   • Smoking status: Every Day     Packs/day: 0 25     Types: Cigarettes, Cigars   • Smokeless tobacco: Never   • Tobacco comments:     1 cigar/daily   Vaping Use   • Vaping Use: Never used   Substance and Sexual Activity   • Alcohol use: Yes     Comment: drinks only on weekends   • Drug use: Never   • Sexual activity: Yes     Partners: Female   Other Topics Concern   • None   Social History Narrative   • None     Social Determinants of Health     Financial Resource Strain: High Risk   • Difficulty of Paying Living Expenses: Hard   Food Insecurity: Food Insecurity Present   • Worried About Running Out of Food in the Last Year: Sometimes true   • Ran Out of Food in the Last Year: Sometimes true   Transportation Needs: No Transportation Needs   • Lack of Transportation (Medical): No   • Lack of Transportation (Non-Medical):  No   Physical Activity: Not on file   Stress: Not on file   Social Connections: Not on file   Intimate Partner Violence: Not on file   Housing Stability: Not on file     Current Outpatient Medications on File Prior to Visit   Medication Sig   • acetaminophen (TYLENOL) 500 mg tablet Take 1 tablet (500 mg total) by mouth every 6 (six) hours as needed for mild pain   • ARIPiprazole (ABILIFY) 10 mg tablet Take 10 mg by mouth daily at bedtime   • aspirin (ECOTRIN LOW STRENGTH) 81 mg EC tablet Take 1 tablet (81 mg total) by mouth daily   • Blood Glucose Monitoring Suppl (FreeStyle Lite) SHANELL Use daily Use as directed   • buPROPion (WELLBUTRIN SR) 200 MG 12 hr tablet Take 200 mg by mouth every morning   • cloNIDine (CATAPRES) 0 2 mg tablet Take 0 2 mg by mouth daily at bedtime   • fluticasone (FLONASE) 50 mcg/act nasal spray 1 spray into each nostril daily   • gabapentin (NEURONTIN) 800 mg tablet take 1 tablet by mouth three times a day   • glucose blood (FREESTYLE LITE) test strip Testing sugars once a day   • hydrocortisone 1 % ointment Apply topically 2 (two) times a day   • hydrOXYzine HCL (ATARAX) 50 mg tablet Take 50 mg by mouth daily at bedtime   • Lancets (freestyle) lancets Daily   • pantoprazole (PROTONIX) 40 mg tablet Take 1 tablet (40 mg total) by mouth daily before breakfast   • tamsulosin (FLOMAX) 0 4 mg Take 1 capsule (0 4 mg total) by mouth daily with dinner   • zolpidem (AMBIEN) 10 mg tablet Take 10 mg by mouth daily at bedtime   • [DISCONTINUED] albuterol (PROVENTIL HFA,VENTOLIN HFA) 90 mcg/act inhaler Inhale 1-2 puffs every 6 (six) hours as needed for wheezing   • [DISCONTINUED] atorvastatin (LIPITOR) 40 mg tablet Take 1 tablet (40 mg total) by mouth daily   • [DISCONTINUED] diclofenac (VOLTAREN) 75 mg EC tablet Take 1 tablet (75 mg total) by mouth 2 (two) times a day   • [DISCONTINUED] lidocaine (XYLOCAINE) 2 % topical gel Apply topically as needed for mild pain   • [DISCONTINUED] linaGLIPtin (Tradjenta) 5 MG TABS Take 5 mg by mouth daily   • [DISCONTINUED] lisinopril (ZESTRIL) 10 mg tablet take 1 tablet by mouth once daily   • [DISCONTINUED] meclizine (ANTIVERT) 25 mg tablet Take 1 tablet (25 mg total) by mouth every 8 (eight) hours   • [DISCONTINUED] metFORMIN (GLUCOPHAGE-XR) 500 mg 24 hr tablet Take 2 tablets (1,000 mg total) by mouth daily with dinner   • [DISCONTINUED] methocarbamol (ROBAXIN) 500 mg tablet Take 1 tablet (500 mg total) by mouth 2 (two) times a day   • [DISCONTINUED] methylPREDNISolone 4 MG tablet therapy pack Use as directed on package (Patient not taking: Reported on 10/5/2022)   • [DISCONTINUED] Misc  Devices (Wrist Brace) MISC Use daily     No Known Allergies  Immunization History   Administered Date(s) Administered   • Pneumococcal Polysaccharide PPV23 10/03/2018   • Tdap 05/14/2018       Objective     /74 (BP Location: Left arm, Patient Position: Sitting, Cuff Size: Adult)   Pulse 87   Temp 98 3 °F (36 8 °C) (Temporal)   Resp 18   Ht 6' (1 829 m)   Wt 96 1 kg (211 lb 14 4 oz)   SpO2 95%   BMI 28 74 kg/m²     Physical Exam  Vitals and nursing note reviewed  Constitutional:       General: He is not in acute distress  Appearance: He is not toxic-appearing  HENT:      Head: Normocephalic and atraumatic  Right Ear: External ear normal       Left Ear: External ear normal    Eyes:      Extraocular Movements: Extraocular movements intact  Conjunctiva/sclera: Conjunctivae normal       Pupils: Pupils are equal, round, and reactive to light  Cardiovascular:      Rate and Rhythm: Normal rate and regular rhythm  Pulmonary:      Effort: Pulmonary effort is normal  No respiratory distress  Breath sounds: Normal breath sounds  Musculoskeletal:         General: Tenderness present  Lumbar back: Tenderness present  Decreased range of motion  Negative right straight leg raise test and negative left straight leg raise test       Comments: Midline lumbar TTP, right lumbar TTP  Antalgic gait    Lymphadenopathy:      Cervical: No cervical adenopathy  Neurological:      General: No focal deficit present  Mental Status: He is alert and oriented to person, place, and time     Psychiatric:         Behavior: Behavior normal        RAMO Heard

## 2023-01-17 ENCOUNTER — LAB (OUTPATIENT)
Dept: LAB | Facility: HOSPITAL | Age: 58
End: 2023-01-17
Attending: INTERNAL MEDICINE

## 2023-01-17 DIAGNOSIS — A04.8 H. PYLORI INFECTION: ICD-10-CM

## 2023-01-18 LAB — H PYLORI AG STL QL IA: NEGATIVE

## 2023-01-18 NOTE — RESULT ENCOUNTER NOTE
Please call the patient with the results    Stool testing for H  pylori is negative  This means he has cleared the infection after treatment with antibiotics    This is great news

## 2023-01-31 ENCOUNTER — OFFICE VISIT (OUTPATIENT)
Dept: FAMILY MEDICINE CLINIC | Facility: CLINIC | Age: 58
End: 2023-01-31

## 2023-01-31 VITALS
TEMPERATURE: 96.7 F | DIASTOLIC BLOOD PRESSURE: 82 MMHG | HEIGHT: 73 IN | WEIGHT: 208 LBS | BODY MASS INDEX: 27.57 KG/M2 | OXYGEN SATURATION: 96 % | RESPIRATION RATE: 17 BRPM | SYSTOLIC BLOOD PRESSURE: 144 MMHG | HEART RATE: 95 BPM

## 2023-01-31 DIAGNOSIS — Z28.21 COVID-19 VACCINE DOSE DECLINED: ICD-10-CM

## 2023-01-31 DIAGNOSIS — Z78.9 NEED FOR FOLLOW-UP BY SOCIAL WORKER: ICD-10-CM

## 2023-01-31 DIAGNOSIS — E16.2 HYPOGLYCEMIA: Primary | ICD-10-CM

## 2023-01-31 DIAGNOSIS — N48.1 BALANITIS: ICD-10-CM

## 2023-01-31 DIAGNOSIS — Z28.21 INFLUENZA VACCINATION DECLINED: ICD-10-CM

## 2023-01-31 DIAGNOSIS — Z28.21 PNEUMOCOCCAL VACCINATION DECLINED: ICD-10-CM

## 2023-01-31 RX ORDER — CLOTRIMAZOLE AND BETAMETHASONE DIPROPIONATE 10; .64 MG/G; MG/G
CREAM TOPICAL 2 TIMES DAILY
Qty: 30 G | Refills: 0 | Status: SHIPPED | OUTPATIENT
Start: 2023-01-31

## 2023-01-31 NOTE — PATIENT INSTRUCTIONS
Diabetes and Nutrition   AMBULATORY CARE:   Nutrition plans  help with healthy eating patterns that improve health  Nutrition plans and regular exercise help keep your blood sugar levels steady  They also help delay or prevent complications of diabetes, such as diabetic kidney disease  Call your local emergency number (911 in the [address]) if:   You have any of the following signs of a heart attack:      Squeezing, pressure, or pain in your chest    You may  also have any of the following:     Discomfort or pain in your back, neck, jaw, stomach, or arm    Shortness of breath    Nausea or vomiting    Lightheadedness or a sudden cold sweat      Seek care immediately if:   You have a low blood sugar level and it does not improve with treatment  Symptoms are trouble thinking, a pounding heartbeat, and sweating  Your blood sugar level is above 240 mg/dL and does not come down within 15 minutes of treatment  You have ketones in your blood or urine  You have nausea or are vomiting and cannot keep any food or liquid down  You have blurred or double vision  Your breath has a fruity, sweet smell, or your breathing is shallow  Call your doctor or diabetes care team if:   Your blood sugar levels are higher than your target goals  You often have low blood sugar levels  You have trouble coping with diabetes, or you feel anxious or depressed  You have questions or concerns about your condition or care  A dietitian will help you create a nutrition plan  to meet your needs and your family's needs  The goal is for you to reach or maintain healthy weight, blood sugar, blood pressure, and lipid levels  You should meet with the dietitian at least 1 time each year  You will learn the following:   How food affects your blood sugar levels    How to create healthy eating habits    How to make food choices based on your activity level, weight, and glucose levels    How your favorite foods may fit into your plan    How to keep track of carbohydrates    Correct portion sizes for each food    Changes you can make to your plan if you get pregnant or are breastfeeding    What you can do before you meet with the dietitian:   Do not skip meals  The goal is to keep your blood sugar level steady  Blood sugar levels may drop too low if you have received insulin and do not eat  Eat more high-fiber foods, such as fresh or frozen fruits and vegetables, whole-grain breads, and beans  Fiber helps control or lower blood sugar and cholesterol levels  Choose whole fruits instead of fruit juice as much as possible  Sugar may be added to juice, and fiber may be removed  Choose heart-healthy fats  Foods high in heart-healthy fats include olive oil, nuts, avocados, and fatty fish, such as salmon and tuna  Foods high in unhealthy fats include red meat, full-fat dairy products, and soft margarine  Unhealthy fats can increase your risk for heart disease, increase bad cholesterol, and lower good cholesterol  Choose complex carbohydrates  Foods with complex carbohydrates include brown rice, whole-grain breads and cereals, and cooked beans  Foods with simple carbohydrates include white bread, white rice, most cold cereals, and snack foods  Your plan will include the amount of carbohydrate to have at one time or in a day  Your blood sugar level can get too high if you eat too much carbohydrate at one time  Blood sugar levels do not spike as high or drop as quickly with complex carbohydrates as with simple carbohydrates  Choose complex carbohydrates whenever possible  Have less sodium (salt)  The risk for high blood pressure (BP) increases with high-sodium foods  Limit high-sodium foods, such as soy sauce, potato chips, and canned soup  Do not add salt to food you cook  Limit your use of table salt  Read labels to have no more than 2,300 milligrams of sodium in one day  Limit artificial sweeteners    These may be found in food or drinks, such as diet soft drinks or other low-calorie beverages  Artificial sweeteners are low in calories  They may help you lower your overall calories and carbohydrates  It is important not to have more calories from other foods to make up for the calories saved  Artificial sweeteners do not have any nutrition  Eat whole foods and drink water as much as possible  Your plan may include beverages with artificial sweeteners for a short time  These can help you transition from high-sugar beverages to water  Use the plate method for each meal   This method can help you eat the right amount of carbohydrates and keep your blood sugar levels under control  Draw an imaginary line down the middle of a 9-inch dinner plate  On one side, draw another line to divide that section in half  Your plate will have one large section and 2 small sections  Fill the largest section with non-starchy vegetables  These include broccoli, spinach, cucumbers, peppers, cauliflower, and tomatoes  Add a starch to one of the small sections  Starches include pasta, rice, whole-grain bread, tortillas, corn, potatoes, and beans  Add meat or another source of protein to the other small section  Examples include chicken or turkey without skin, fish, lean beef or pork, low-fat cheese, tofu, and eggs  Add dairy products or fruit next to your plate if your meal plan allows  Examples of dairy include skim or 1% milk and low-fat yogurt  If you do not drink milk or eat dairy products, you may be able to add another serving of starchy food instead  Have a low-calorie or calorie-free drink with your meal  Examples include water or unsweetened tea or coffee  Know the risks if you choose to drink alcohol:  Alcohol can cause hypoglycemia (low blood sugar level), especially if you use insulin  Alcohol can cause high blood sugar and BP levels, and weight gain if you drink too much   Women 21 years or older and men 72 years or older should limit alcohol to 1 drink a day  Men aged 24 to 59 years should limit alcohol to 2 drinks a day  A drink of alcohol is 12 ounces of beer, 5 ounces of wine, or 1½ ounces of liquor  Hypoglycemia can happen hours after you drink alcohol  Check your blood sugar level for several hours after you drink alcohol  Have a source of fast-acting carbohydrates with you in case your level goes too low  You need immediate care if you have signs or symptoms of hypoglycemia, such as sweating, confusion, or fainting  Maintain a healthy weight:  A healthy weight can help you control your diabetes  You can maintain a healthy weight with a nutrition plan and exercise  Ask your healthcare provider how much you should weigh  Ask him or her to help you create a weight loss plan if you are overweight  Together you can set weight loss and maintenance goals  Follow up with your diabetes team as directed:  Write down your questions so you remember to ask them during your visits  © Copyright [employer] 2022 Information is for End User's use only and may not be sold, redistributed or otherwise used for commercial purposes  All illustrations and images included in CareNotes® are the copyrighted property of [employer]  or [address] [place], [place] [address]khan[address]   The above information is an  only  It is not intended as medical advice for individual conditions or treatments  Talk to your doctor, nurse or pharmacist before following any medical regimen to see if it is safe and effective for you

## 2023-01-31 NOTE — PROGRESS NOTES
Name: Luciano Beard      : 1965      MRN: [id number]  Encounter Provider: RAOM Iyer  Encounter Date: 2023   Encounter department: [address] [place] [address]     1  Hypoglycemia  Assessment & Plan:  Patient reports episode of not eating an entire day then 'passing out' when he stood up, episode lasted a few seconds - checked blood glucose was 53 at the time and improved once he had carbs   Per description episode sounds consistent with hypoglycemia, counseled on importance of eating whole foods every few hours and avoiding fasting too long   Will continue to monitor, asked patient to inform me of any additional episodes       2  Need for follow-up by   -     Ambulatory Referral to Social Work Care Management Program; Future    3  Balanitis  -     clotrimazole-betamethasone (LOTRISONE) 1-0 05 % cream; Apply topically 2 (two) times a day    4  COVID-19 vaccine dose declined    5  Influenza vaccination declined    6  Pneumococcal vaccination declined         Subjective     Luciano Beard is a 59 y o  male who  has a past medical history of Diabetes mellitus ([address] ), Diverticulitis, Hypertension, and Neck pain  who presented to the office today for follow up  He reports that about 1 week ago he had an episode where he passed out, he checked his glucose and it was 53  He hadn't eaten all day  He also reports a groin rash that is pruritic  Denies urinary sx or penile discharge  Review of Systems   Constitutional: Negative for chills and fever  HENT: Negative for ear pain and sore throat  Eyes: Negative for pain and visual disturbance  Respiratory: Negative for cough and shortness of breath  Cardiovascular: Negative for chest pain and palpitations  Gastrointestinal: Negative for abdominal pain and vomiting  Genitourinary: Negative for dysuria and hematuria  Musculoskeletal: Positive for arthralgias, back pain and myalgias  Skin: Positive for rash  Negative for color change  Neurological: Positive for syncope  Negative for seizures  All other systems reviewed and are negative  Past Medical History:   Diagnosis Date   • Diabetes mellitus ([address] )    • Diverticulitis    • Hypertension    • Neck pain      Past Surgical History:   Procedure Laterality Date   • COLON SURGERY     • NECK SURGERY       No family history on file  Social History     Socioeconomic History   • Marital status: /Civil Union     Spouse name: None   • Number of children: None   • Years of education: None   • Highest education level: None   Occupational History   • None   Tobacco Use   • Smoking status: Every Day     Packs/day: 0 25     Types: Cigarettes, Cigars   • Smokeless tobacco: Never   • Tobacco comments:     1 cigar/daily   Vaping Use   • Vaping Use: Never used   Substance and Sexual Activity   • Alcohol use: Yes     Comment: drinks only on weekends   • Drug use: Never   • Sexual activity: Yes     Partners: Female   Other Topics Concern   • None   Social History Narrative   • None     Social Determinants of Health     Financial Resource Strain: Medium Risk   • Difficulty of Paying Living Expenses: Somewhat hard   Food Insecurity: No Food Insecurity   • Worried About Running Out of Food in the Last Year: Never true   • Ran Out of Food in the Last Year: Never true   Transportation Needs: No Transportation Needs   • Lack of Transportation (Medical): No   • Lack of Transportation (Non-Medical):  No   Physical Activity: Not on file   Stress: Not on file   Social Connections: Not on file   Intimate Partner Violence: Not on file   Housing Stability: Not on file     Current Outpatient Medications on File Prior to Visit   Medication Sig   • acetaminophen (TYLENOL) 500 mg tablet Take 1 tablet (500 mg total) by mouth every 6 (six) hours as needed for mild pain   • albuterol (PROVENTIL HFA,VENTOLIN HFA) 90 mcg/act inhaler Inhale 1-2 puffs every 6 (six) hours as needed for wheezing   • ARIPiprazole (ABILIFY) 10 mg tablet Take 10 mg by mouth daily at bedtime   • aspirin (ECOTRIN LOW STRENGTH) 81 mg EC tablet Take 1 tablet (81 mg total) by mouth daily   • atorvastatin (LIPITOR) 40 mg tablet Take 1 tablet (40 mg total) by mouth daily   • Blood Glucose Monitoring Suppl (FreeStyle Lite) SHANELL Use daily Use as directed   • buPROPion (WELLBUTRIN SR) 200 MG 12 hr tablet Take 200 mg by mouth every morning   • cloNIDine (CATAPRES) 0 2 mg tablet Take 0 2 mg by mouth daily at bedtime   • diclofenac (VOLTAREN) 75 mg EC tablet Take 1 tablet (75 mg total) by mouth 2 (two) times a day   • fluticasone (FLONASE) 50 mcg/act nasal spray 1 spray into each nostril daily   • gabapentin (NEURONTIN) 800 mg tablet take 1 tablet by mouth three times a day   • glucose blood (FREESTYLE LITE) test strip Testing sugars once a day   • hydrocortisone 1 % ointment Apply topically 2 (two) times a day   • hydrOXYzine HCL (ATARAX) 50 mg tablet Take 50 mg by mouth daily at bedtime   • Lancets (freestyle) lancets Daily   • lidocaine (XYLOCAINE) 2 % topical gel Apply topically as needed for mild pain   • linaGLIPtin (Tradjenta) 5 MG TABS Take 5 mg by mouth daily   • lisinopril (ZESTRIL) 10 mg tablet Take 1 tablet (10 mg total) by mouth daily   • meclizine (ANTIVERT) 25 mg tablet Take 1 tablet (25 mg total) by mouth every 8 (eight) hours   • metFORMIN (GLUCOPHAGE-XR) 500 mg 24 hr tablet Take 2 tablets (1,000 mg total) by mouth daily with dinner   • pantoprazole (PROTONIX) 40 mg tablet Take 1 tablet (40 mg total) by mouth daily before breakfast   • tamsulosin (FLOMAX) 0 4 mg Take 1 capsule (0 4 mg total) by mouth daily with dinner   • tiZANidine (ZANAFLEX) 4 mg tablet Take 1 tablet (4 mg total) by mouth every 8 (eight) hours as needed for muscle spasms   • zolpidem (AMBIEN) 10 mg tablet Take 10 mg by mouth daily at bedtime     No Known Allergies  Immunization History   Administered Date(s) Administered   • Pneumococcal Polysaccharide PPV23 10/03/2018   • Tdap 05/14/2018       Objective     /82 (BP Location: Left arm, Patient Position: Sitting, Cuff Size: Large)   Pulse 95   Temp (!) 96 7 °F (35 9 °C) (Temporal)   Resp 17   Ht 6' 1" (1 854 m)   Wt 94 3 kg (208 lb)   SpO2 96%   BMI 27 44 kg/m²     Physical Exam  Vitals and nursing note reviewed  Constitutional:       General: He is not in acute distress  Appearance: He is well-developed  He is not diaphoretic  HENT:      Head: Normocephalic and atraumatic  Eyes:      Conjunctiva/sclera: Conjunctivae normal       Pupils: Pupils are equal, round, and reactive to light  Cardiovascular:      Rate and Rhythm: Normal rate and regular rhythm  Pulmonary:      Effort: Pulmonary effort is normal  No respiratory distress  Breath sounds: Normal breath sounds  No wheezing  Abdominal:      General: Bowel sounds are normal  There is no distension  Palpations: Abdomen is soft  Tenderness: There is no abdominal tenderness  Musculoskeletal:      Cervical back: Normal range of motion and neck supple  Lymphadenopathy:      Cervical: No cervical adenopathy  Skin:     General: Skin is warm and dry  Capillary Refill: Capillary refill takes less than 2 seconds  Findings: No rash  Neurological:      Mental Status: He is alert and oriented to person, place, and time     Psychiatric:         Behavior: Behavior normal        RAMO Mahoney

## 2023-02-02 ENCOUNTER — APPOINTMENT (EMERGENCY)
Dept: RADIOLOGY | Facility: HOSPITAL | Age: 58
End: 2023-02-02

## 2023-02-02 ENCOUNTER — HOSPITAL ENCOUNTER (EMERGENCY)
Facility: HOSPITAL | Age: 58
Discharge: HOME | End: 2023-02-02
Attending: EMERGENCY MEDICINE

## 2023-02-02 VITALS
WEIGHT: 212.74 LBS | HEART RATE: 87 BPM | OXYGEN SATURATION: 98 % | RESPIRATION RATE: 20 BRPM | BODY MASS INDEX: 28.07 KG/M2 | SYSTOLIC BLOOD PRESSURE: 122 MMHG | DIASTOLIC BLOOD PRESSURE: 75 MMHG | TEMPERATURE: 97.6 F

## 2023-02-02 DIAGNOSIS — S99.911A INJURY OF RIGHT ANKLE, INITIAL ENCOUNTER: Primary | ICD-10-CM

## 2023-02-02 RX ORDER — NAPROXEN 500 MG/1
500 TABLET ORAL 2 TIMES DAILY WITH MEALS
Qty: 10 TABLET | Refills: 0 | Status: SHIPPED | OUTPATIENT
Start: 2023-02-02 | End: 2024-02-02

## 2023-02-02 NOTE — Clinical Note
Crayton Scheuermann was seen and treated in our emergency department on 2/2/2023             use crutches until cleared by Orthopedics    Diagnosis: ankle injury    Fernando    He may return on this date: 02/05/2023         If you have any questions or concerns, please don't hesitate to call        Zac Womack PA-C    ______________________________           _______________          _______________  Hospital Representative                              Date                                Time

## 2023-02-02 NOTE — DISCHARGE INSTRUCTIONS
Follow up with Orthopedics  Use crutches, aircast until follow up  Return to ED for new or worsening symptoms as discussed

## 2023-02-03 ENCOUNTER — PATIENT OUTREACH (OUTPATIENT)
Dept: FAMILY MEDICINE CLINIC | Facility: CLINIC | Age: 58
End: 2023-02-03

## 2023-02-03 NOTE — PROGRESS NOTES
I called the patient to follow up after his recent ED visit after twisting his ankle  X-rays were negative  The patient continues with pain but did not know meds were ordered for him; he states he will  today  He notes he continues to wear the aircast he was given  I encouraged him to elevate his foot often and he agreed  The patient ended the call abruptly  I will continue to follow  Chart reviewed

## 2023-02-03 NOTE — ED PROVIDER NOTES
History  Chief Complaint   Patient presents with   • Ankle Pain     Pt reports he stepped off the steps wrong last night while taking the trash out  Pt reports right ankle pain, numbness, and tingling  Pt using cane  Pt took gabapentin two hours ago for pain  55-year-old male with past medical history of type 2 diabetes mellitus, hypertension presents to emergency department complaining of right ankle pain  States that last night he was carrying out the garbage when he misjudged the last step and rolled his ankle  He is able to ambulate with a cane  He reports that his knees he took his sock off he noticed some swelling  Today he noticed bruising  He denies any numbness, states that he feels tingling  History provided by:  Patient and relative   used: Yes    Ankle Pain  Location:  Ankle and foot  Time since incident:  1 day  Injury: yes    Ankle location:  R ankle  Prior injury to area:  No  Associated symptoms: swelling and tingling    Associated symptoms: no back pain, no decreased ROM, no fatigue, no fever, no itching, no muscle weakness, no neck pain, no numbness and no stiffness    Risk factors: no frequent fractures and no known bone disorder        Prior to Admission Medications   Prescriptions Last Dose Informant Patient Reported? Taking?    ARIPiprazole (ABILIFY) 10 mg tablet   Yes No   Sig: Take 10 mg by mouth daily at bedtime   Blood Glucose Monitoring Suppl (FreeStyle Lite) SHANELL   No No   Sig: Use daily Use as directed   Lancets (freestyle) lancets   No No   Sig: Daily   acetaminophen (TYLENOL) 500 mg tablet   No No   Sig: Take 1 tablet (500 mg total) by mouth every 6 (six) hours as needed for mild pain   albuterol (PROVENTIL HFA,VENTOLIN HFA) 90 mcg/act inhaler   No No   Sig: Inhale 1-2 puffs every 6 (six) hours as needed for wheezing   aspirin (ECOTRIN LOW STRENGTH) 81 mg EC tablet   No No   Sig: Take 1 tablet (81 mg total) by mouth daily   atorvastatin (LIPITOR) 40 mg tablet   No No   Sig: Take 1 tablet (40 mg total) by mouth daily   buPROPion (WELLBUTRIN SR) 200 MG 12 hr tablet   Yes No   Sig: Take 200 mg by mouth every morning   cloNIDine (CATAPRES) 0 2 mg tablet   Yes No   Sig: Take 0 2 mg by mouth daily at bedtime   clotrimazole-betamethasone (LOTRISONE) 1-0 05 % cream   No No   Sig: Apply topically 2 (two) times a day   diclofenac (VOLTAREN) 75 mg EC tablet   No No   Sig: Take 1 tablet (75 mg total) by mouth 2 (two) times a day   fluticasone (FLONASE) 50 mcg/act nasal spray   No No   Si spray into each nostril daily   gabapentin (NEURONTIN) 800 mg tablet   No No   Sig: take 1 tablet by mouth three times a day   glucose blood (FREESTYLE LITE) test strip   No No   Sig: Testing sugars once a day   hydrOXYzine HCL (ATARAX) 50 mg tablet   Yes No   Sig: Take 50 mg by mouth daily at bedtime   hydrocortisone 1 % ointment   No No   Sig: Apply topically 2 (two) times a day   lidocaine (XYLOCAINE) 2 % topical gel   No No   Sig: Apply topically as needed for mild pain   linaGLIPtin (Tradjenta) 5 MG TABS   No No   Sig: Take 5 mg by mouth daily   lisinopril (ZESTRIL) 10 mg tablet   No No   Sig: Take 1 tablet (10 mg total) by mouth daily   meclizine (ANTIVERT) 25 mg tablet   No No   Sig: Take 1 tablet (25 mg total) by mouth every 8 (eight) hours   metFORMIN (GLUCOPHAGE-XR) 500 mg 24 hr tablet   No No   Sig: Take 2 tablets (1,000 mg total) by mouth daily with dinner   pantoprazole (PROTONIX) 40 mg tablet   No No   Sig: Take 1 tablet (40 mg total) by mouth daily before breakfast   tamsulosin (FLOMAX) 0 4 mg   No No   Sig: Take 1 capsule (0 4 mg total) by mouth daily with dinner   tiZANidine (ZANAFLEX) 4 mg tablet   No No   Sig: Take 1 tablet (4 mg total) by mouth every 8 (eight) hours as needed for muscle spasms   zolpidem (AMBIEN) 10 mg tablet   Yes No   Sig: Take 10 mg by mouth daily at bedtime      Facility-Administered Medications: None       Past Medical History:   Diagnosis Date • Diabetes mellitus ([facility] [address] )    • Diverticulitis    • Hypertension    • Neck pain        Past Surgical History:   Procedure Laterality Date   • COLON SURGERY     • NECK SURGERY         History reviewed  No pertinent family history  I have reviewed and agree with the history as documented  E-Cigarette/Vaping   • E-Cigarette Use Never User      E-Cigarette/Vaping Substances     Social History     Tobacco Use   • Smoking status: Every Day     Packs/day: 0 25     Types: Cigarettes, Cigars   • Smokeless tobacco: Never   • Tobacco comments:     1 cigar/daily   Vaping Use   • Vaping Use: Never used   Substance Use Topics   • Alcohol use: Yes     Comment: drinks only on weekends   • Drug use: Never       Review of Systems   Constitutional: Negative for fatigue and fever  Cardiovascular: Negative for leg swelling  Musculoskeletal: Positive for arthralgias, gait problem and joint swelling  Negative for back pain, neck pain and stiffness  Skin: Positive for color change  Negative for itching, rash and wound  Neurological: Negative for weakness and numbness  All other systems reviewed and are negative  Physical Exam  Physical Exam  Vitals and nursing note reviewed  Constitutional:       General: He is not in acute distress  Appearance: Normal appearance  He is not ill-appearing  HENT:      Head: Normocephalic and atraumatic  Cardiovascular:      Rate and Rhythm: Normal rate and regular rhythm  Pulses:           Dorsalis pedis pulses are 2+ on the right side and 2+ on the left side  Posterior tibial pulses are 2+ on the right side and 2+ on the left side  Pulmonary:      Effort: Pulmonary effort is normal    Musculoskeletal:      Cervical back: Neck supple  Right lower leg: Normal       Right ankle: Swelling and ecchymosis present  Tenderness present over the lateral malleolus and base of 5th metatarsal  Normal range of motion  Normal pulse        Right Achilles Tendon: Normal  Left ankle: Normal       Right foot: Normal range of motion and normal capillary refill  Swelling, tenderness and bony tenderness present  No crepitus  Normal pulse  Left foot: Normal       Comments: No crepitus    Skin:     General: Skin is warm and dry  Capillary Refill: Capillary refill takes less than 2 seconds  Findings: Bruising present  No erythema or rash  Neurological:      Mental Status: He is alert  Gait: Abnormal gait: ambulates with limp, cane       Comments: Lower extremity sensation intact bilaterally  Psychiatric:         Mood and Affect: Mood normal          Behavior: Behavior normal          Vital Signs  ED Triage Vitals [02/02/23 1245]   Temperature Pulse Respirations Blood Pressure SpO2   97 6 °F (36 4 °C) 87 20 122/75 98 %      Temp src Heart Rate Source Patient Position - Orthostatic VS BP Location FiO2 (%)   -- Monitor Sitting Left arm --      Pain Score       8           Vitals:    02/02/23 1245   BP: 122/75   Pulse: 87   Patient Position - Orthostatic VS: Sitting         Visual Acuity      ED Medications  Medications - No data to display    Diagnostic Studies  Results Reviewed     None                 XR ankle 3+ views RIGHT   ED Interpretation by Keya Mccrary PA-C (02/02 1352)   No acute fx       Final Result by Jared Grey MD (02/03 1015)      No acute osseous abnormality  Workstation performed: [id number]         XR foot 3+ views RIGHT   ED Interpretation by Keya Mccrary PA-C (02/02 1352)   No acute fx       Final Result by Jared Grey MD (02/03 1015)      No acute osseous abnormality  Workstation performed: [id number]                    Procedures  Procedures         ED Course  ED Course as of 02/03/23 2226   Thu Feb 02, 2023   1352 Imaging review done by myself and preliminary results were discussed with the patient and family members    Discussion was had with patient and family members that this read is preliminary and therefore discrepancies between this read and the final read by the radiologist are possible  Patient and family members were informed that any discrepancies found by would be relayed by phone call  1413 Inversion injury, missed last step  Lateral pain  Swelling  Ecchymosis  ROM intact  Ambulated in with cane  Sensation intact  Per clarification he denies numbness states he feels pins and needles  SBIRT 22yo+    Flowsheet Row Most Recent Value   SBIRT (23 yo +)    In order to provide better care to our patients, we are screening all of our patients for alcohol and drug use  Would it be okay to ask you these screening questions? No Filed at: 02/02/2023 1316                    Medical Decision Making  Fracture versus sprain versus strain versus contusion  Do not suspect infection  Do not suspect compartment syndrome  Afebrile  No signs of infection  Intact ROM  Distal perfusion intact  Sensation intact  Is able to bear weight, ambulate with pain  Xray ordered  no acute fx visualized on wet read  Will place in aircast, give crutches  Supportive care for likely sprain given  Ortho follow up given  All imaging and/or lab testing discussed with patient, strict return to ED precautions discussed  Patient recommended to follow up promptly with appropriate outpatient provider  Patient and/or family members verbalizes understanding and agrees with plan  Patient and/or family members were given opportunity to ask questions, all questions were answered at this time  Patient is stable for discharge      Portions of the record may have been created with voice recognition software  Occasional wrong word or "sound a like" substitutions may have occurred due to the inherent limitations of voice recognition software  Read the chart carefully and recognize, using context, where substitutions have occurred           Injury of right ankle, initial encounter: acute illness or injury  Amount and/or Complexity of Data Reviewed  Radiology: ordered and independent interpretation performed  Risk  Prescription drug management  Disposition  Final diagnoses:   Injury of right ankle, initial encounter     Time reflects when diagnosis was documented in both MDM as applicable and the Disposition within this note     Time User Action Codes Description Comment    2/2/2023  1:53 PM Orlandoharjinder Velasquez Add [Q68 [id number]] Injury of right ankle, initial encounter       ED Disposition     ED Disposition   Discharge    Condition   Stable    Date/Time   Thu Feb 2, 2023  1:54 PM    [address] [place] [address] discharge to home/self care                 Follow-up Information     Follow up With Specialties Details Why Contact Info Additional [facility] Orthopedic Surgery Schedule an appointment as soon as possible for a visit  For follow up regarding your symptoms [address] [facility] [address] [facility] [address]  [address] [place] [address], [address] [facility], [address] [place] [address], [address]   [phone]          Discharge Medication List as of 2/2/2023  1:56 PM      START taking these medications    Details   naproxen (EC NAPROSYN) 500 MG EC tablet Take 1 tablet (500 mg total) by mouth 2 (two) times a day with meals, Starting Thu 2/2/2023, Until Fri 2/2/2024, Normal         CONTINUE these medications which have NOT CHANGED    Details   acetaminophen (TYLENOL) 500 mg tablet Take 1 tablet (500 mg total) by mouth every 6 (six) hours as needed for mild pain, Starting Wed 7/6/2022, Normal      albuterol (PROVENTIL HFA,VENTOLIN HFA) 90 mcg/act inhaler Inhale 1-2 puffs every 6 (six) hours as needed for wheezing, Starting Thu 1/5/2023, Normal      ARIPiprazole (ABILIFY) 10 mg tablet Take 10 mg by mouth daily at bedtime, Starting Thu 9/30/2021, Historical Med      aspirin (ECOTRIN LOW STRENGTH) 81 mg EC tablet Take 1 tablet (81 mg total) by mouth daily, Starting Thu 6/16/2022, Normal      atorvastatin (LIPITOR) 40 mg tablet Take 1 tablet (40 mg total) by mouth daily, Starting Thu 1/5/2023, Normal      Blood Glucose Monitoring Suppl (FreeStyle Lite) SHANELL Use daily Use as directed, Starting Tue 12/1/2020, Normal      buPROPion (WELLBUTRIN SR) 200 MG 12 hr tablet Take 200 mg by mouth every morning, Starting Wed 8/24/2022, Historical Med      cloNIDine (CATAPRES) 0 2 mg tablet Take 0 2 mg by mouth daily at bedtime, Starting Tue 5/18/2021, Historical Med      clotrimazole-betamethasone (LOTRISONE) 1-0 05 % cream Apply topically 2 (two) times a day, Starting Tue 1/31/2023, Normal      diclofenac (VOLTAREN) 75 mg EC tablet Take 1 tablet (75 mg total) by mouth 2 (two) times a day, Starting Thu 1/5/2023, Normal      fluticasone (FLONASE) 50 mcg/act nasal spray 1 spray into each nostril daily, Starting Fri 9/2/2022, Normal      gabapentin (NEURONTIN) 800 mg tablet take 1 tablet by mouth three times a day, Normal      glucose blood (FREESTYLE LITE) test strip Testing sugars once a day, Normal      hydrocortisone 1 % ointment Apply topically 2 (two) times a day, Starting Thu 6/16/2022, Normal      hydrOXYzine HCL (ATARAX) 50 mg tablet Take 50 mg by mouth daily at bedtime, Starting Thu 9/30/2021, Historical Med      Lancets (freestyle) lancets Daily, Normal      lidocaine (XYLOCAINE) 2 % topical gel Apply topically as needed for mild pain, Starting Thu 1/5/2023, Normal      linaGLIPtin (Tradjenta) 5 MG TABS Take 5 mg by mouth daily, Starting Thu 1/5/2023, Normal      lisinopril (ZESTRIL) 10 mg tablet Take 1 tablet (10 mg total) by mouth daily, Starting Thu 1/5/2023, Normal      meclizine (ANTIVERT) 25 mg tablet Take 1 tablet (25 mg total) by mouth every 8 (eight) hours, Starting Thu 1/5/2023, Normal      metFORMIN (GLUCOPHAGE-XR) 500 mg 24 hr tablet Take 2 tablets (1,000 mg total) by mouth daily with dinner, Starting Thu 1/5/2023, Normal pantoprazole (PROTONIX) 40 mg tablet Take 1 tablet (40 mg total) by mouth daily before breakfast, Starting Wed 1/4/2023, Normal      tamsulosin (FLOMAX) 0 4 mg Take 1 capsule (0 4 mg total) by mouth daily with dinner, Starting Wed 10/5/2022, Normal      tiZANidine (ZANAFLEX) 4 mg tablet Take 1 tablet (4 mg total) by mouth every 8 (eight) hours as needed for muscle spasms, Starting Thu 1/5/2023, Normal      zolpidem (AMBIEN) 10 mg tablet Take 10 mg by mouth daily at bedtime, Starting Fri 11/6/2020, Historical Med                 PDMP Review     None          ED Provider  Electronically Signed by           Pita Crowder PA-C  02/03/23 9935

## 2023-02-06 ENCOUNTER — PATIENT OUTREACH (OUTPATIENT)
Dept: FAMILY MEDICINE CLINIC | Facility: CLINIC | Age: 58
End: 2023-02-06

## 2023-02-06 NOTE — PROGRESS NOTES
ADI ALVARADO had received referral from provider, Nichelle Barba regarding patient requesting HHA services but is not sure if patient would qualify  Patient is working with RN CHRISTIANO Palmer regardinging chronic conditions  ADI ALVARADO placed call to the patient, Zechariah Sanchez utilizing [employer] interpretor #[id number]  Zechariah Sanchez indicated that he was receiving home care services through [facility] but then it stopped  It ended in December and he had it for the 2 months prior  Zechariah Sanchez was told that the services stopped because he was never home  He elaborated that he resides on the 2nd story above a store  The store staff would tell the agency that he was not home but that he was home every single time  Zechariah Sanchez reported that the government was covering the costs  He was unable to verify if it was through the [facility] on [facility] or [facility]  He was receiving 22 hours/week  Zechariah Sanchez has car but it was unclear if he or his girlfriend would drive him to appointments  He was currently out of the home and advised would be home shortly (walking) if needs any contact information  ADI ALVARADO advised will contact [place] [phone] and let him know  Zechariah Sanchez reported that when his blood pressure is low he will fall  He wants his girlfriend to be his paid caregiver  ADI ALVARADO did make Zechariah Sanchez aware he has transportation benefits through his [employer]  He declined the transportation line number as it is not needed at this time  Zechariah Sanchez reported that the HHAs were assisting with cleaning the home, driving to the store, helping with showers and assistance with personal care/bathroom care  Following conversation, ADI ALVARADO did place call to [address] [place] [address] to verify reason for termination of services  ADI ALVARADO spoke with the coordinator, Debi who advised that the last date of service was 11/30  Per their notes he was told had to re-apply for Waiver   The services had ended since the  was unable to get in touch with [facility]  Their  is helping to get services back and will get new services coordination agency  ADI ALVARADO placed additional call to [facility] with [email or web address] interpretor #[id number] and advised that someone will be working with him directly from [address] [place] [address] to re-establish his home care services and to monitor his messages closely  Fernando expressed understanding and gratitude  [facility] denied any other social needs at this time  He is aware he can let RN CHRISTIANO Wilder know if needs social work assistance at a later date  ADI ALVARADO will close referral but remain available for future psychosocial support as needed

## 2023-02-21 ENCOUNTER — OFFICE VISIT (OUTPATIENT)
Dept: OBGYN CLINIC | Facility: MEDICAL CENTER | Age: 58
End: 2023-02-21

## 2023-02-21 ENCOUNTER — PATIENT OUTREACH (OUTPATIENT)
Dept: FAMILY MEDICINE CLINIC | Facility: CLINIC | Age: 58
End: 2023-02-21

## 2023-02-21 VITALS
DIASTOLIC BLOOD PRESSURE: 78 MMHG | SYSTOLIC BLOOD PRESSURE: 122 MMHG | HEIGHT: 73 IN | HEART RATE: 90 BPM | BODY MASS INDEX: 27.7 KG/M2 | WEIGHT: 209 LBS

## 2023-02-21 DIAGNOSIS — S99.911A INJURY OF RIGHT ANKLE, INITIAL ENCOUNTER: ICD-10-CM

## 2023-02-21 DIAGNOSIS — S93.401A SPRAIN OF UNSPECIFIED LIGAMENT OF RIGHT ANKLE, INITIAL ENCOUNTER: Primary | ICD-10-CM

## 2023-02-21 RX ORDER — DOXEPIN HYDROCHLORIDE 50 MG/1
50 CAPSULE ORAL
COMMUNITY
Start: 2023-02-11

## 2023-02-21 RX ORDER — HYDROXYZINE HYDROCHLORIDE 25 MG/1
TABLET, FILM COATED ORAL
COMMUNITY
Start: 2023-02-10

## 2023-02-21 NOTE — PROGRESS NOTES
1  Sprain of unspecified ligament of right ankle, initial encounter      tenderness over ATFL/CFL      2  Injury of right ankle, initial encounter  Ambulatory Referral to Orthopedic Surgery        No orders of the defined types were placed in this encounter  Imaging Studies (I personally reviewed images in PACS and report):    • X-ray right ankle/foot 2/2/2023: No acute osseous abnormalities  Plantar calcaneal spur  Calcifications in the plantar fascia near its insertion on the calcaneus  IMPRESSION:  • Acute right ankle pain/injury after inversion injury after descending stairs to take out the trash  • Symptomatically improving since original injury  Date of Injury: 2/2/2023  Follow up interval: 2 weeks, 5 days    Other factors:  • Type 2 diabetes    PLAN:    • Clinical exam and radiographic imaging reviewed with patient today, with impression as per above  I have discussed with the patient the pathophysiology of this diagnosis and reviewed how the examination correlates with this diagnosis  • Prior imaging reviewed with patient today as noted above  • Reviewed prior imaging with patient as noted above  • I counseled that his clinical exam and history are consistent with an ankle sprain and that he is progressively improving at this time  I counseled he would be at increased risk for recurrent ankle inversion injury/sprains over the next several months and even up to a year  I counseled in order to minimize his risk that he would need to use an ankle brace during strenuous activities as well as work on strengthening his ankle going forward  I prescribed him a lace up ankle brace today to be used as needed  I also offered formal physical therapy to help facilitate recovery but patient prefers us to start with a home exercise program for now which was provided today    • Counseled that if he does not progressively improve over the next 2 months to follow-up as needed for repeat examination  Return if symptoms worsen or fail to improve  Portions of the record may have been created with voice recognition software  Occasional wrong word or "sound a like" substitutions may have occurred due to the inherent limitations of voice recognition software  Read the chart carefully and recognize, using context, where substitutions have occurred  CHIEF COMPLAINT:  Chief Complaint   Patient presents with   • Right Ankle - Pain         HPI:  Sean Mathur is a 59 y o  male  who presents for       Visit 2/21/2030:  Initial evaluation of right ankle injury:  Precipitating injury on 2/1/2023-patient states he was descending stairs in order to take out the garbage when he missed a step and inverted his ankle  He states that he did not feel a popping sensation  He states he had immediate pain, swelling, bruising over his ankle but was able to partially weight-bear  States due to the pain and changes of his ankle, he went to the ER and had imaging done as noted above  Patient was placed in Aircast and given crutches  He reports today that he has progressively improved since his original injury  He reports improvements include significantly decreased intensity of pain and ability to tolerate weightbearing on his right lower extremity  He states the pain has not completely resolved as he still has sharp/aching pains over the lateral aspect that is nonradiating  He states he also has a sense that his ankle may "give out again "  He states the bruising resolved  He states his swelling is mostly resolved and that he does get some mild puffiness over the lateral aspect of his ankle towards the end of the day  He states he is not regularly taking pain medications for this issue  He denies any numbness/tingling of his right lower extremity    Denies prior surgeries of his right ankle in the past       Medical, Surgical, Family, and Social History    Past Medical History:   Diagnosis Date   • Diabetes mellitus ([facility] [address] )    • Diverticulitis    • Hypertension    • Neck pain      Past Surgical History:   Procedure Laterality Date   • COLON SURGERY     • NECK SURGERY       Social History   Social History     Substance and Sexual Activity   Alcohol Use Yes    Comment: drinks only on weekends     Social History     Substance and Sexual Activity   Drug Use Never     Social History     Tobacco Use   Smoking Status Every Day   • Packs/day: 0 25   • Types: Cigarettes, Cigars   Smokeless Tobacco Never   Tobacco Comments    1 cigar/daily     History reviewed  No pertinent family history  No Known Allergies       Physical Exam  /78   Pulse 90   Ht 6' 1" (1 854 m)   Wt 94 8 kg (209 lb)   BMI 27 57 kg/m²     Constitutional:  see vital signs  Gen: well-developed, normocephalic/atraumatic, well-groomed  Pulmonary/Chest: Effort normal  No respiratory distress  Ortho Exam   Ankle Examination (focused):     Gait: Patient is seen today wearing regular footwear  He does not demonstrate any antalgia    He does report a sense of pain aggravation over the lateral aspect of his ankle however with a sense of "giving out"      RIGHT   Inspection Erythema none    Edema +scant and localized around lateral malleolus    Ecchymosis none        ROM:  Plantarflexion 50    Dorsiflexion 20        Strength Pronation 5/5 (aggravates lateral ankle pain)    Supination 5/5    Foot plantarflexion 5/5    Foot dorsflexion 5/5        TTP AiTFL no    ATFL +    CFL +mild    PTFL no    Achilles no    Deltoid no    Peroneal +minimal    Tib Ant no    Tib Post no        TTP (Bony) Prox Fibula no    Lat Malleolus no    Base of 5th MT no    Med Malleolus no    Navicular no    Talar Dome no        Anterior Drawer ATFL positive   Calcaneal Squeeze  negative   Tib-Fib Squeeze Test  negative   Talar Tilt (stab tib,DF foot,invert foot) CFL positive   ER Stress (stab tib,ER foot) High ankle negative   Eversion stress (stab tib, kriss foot) deltoid negative   MT Compression  negative         No calf tenderness to palpation     LE NV Exam: +2 DP/PT pulses   Sensation intact to light touch  Flexion/extension of toes intact            Procedures

## 2023-02-21 NOTE — PROGRESS NOTES
I received a call from the patient needing the address for his Ortho appointment for today at 2pm; information provided  I will continue to follow

## 2023-02-22 PROBLEM — S93.401A SPRAIN OF UNSPECIFIED LIGAMENT OF RIGHT ANKLE, INITIAL ENCOUNTER: Status: ACTIVE | Noted: 2023-02-22

## 2023-02-22 PROBLEM — S99.911A INJURY OF RIGHT ANKLE: Status: ACTIVE | Noted: 2023-02-22

## 2023-03-07 ENCOUNTER — PATIENT OUTREACH (OUTPATIENT)
Dept: FAMILY MEDICINE CLINIC | Facility: CLINIC | Age: 58
End: 2023-03-07

## 2023-03-07 NOTE — PROGRESS NOTES
I called the patient to follow up  He states he is feeling well  He reports his fasting sugar this morning was 94 and states he is taking his medications as prescribed  The patient notes he checks his feet daily and denies any skin breakdown  The patient continues to wear a brace on his knee and reports mild pain  He is aware of his PT appointment 3/9  The patient also reports continued shoulder pain  The patient states he no longer is having abdominal pain and taking his medications as prescribed  The patient does not need any medication refills at this time  I encouraged him to keep up the good work  He will call with any questions or concerns  Note sent to clerical to schedule the patient for follow up PCP appointment  I will continue to follow

## 2023-03-13 ENCOUNTER — TELEPHONE (OUTPATIENT)
Dept: UROLOGY | Facility: AMBULATORY SURGERY CENTER | Age: 58
End: 2023-03-13

## 2023-03-13 NOTE — TELEPHONE ENCOUNTER
Pt called to stated he just wanted to know when he is due for his next appointment       No further action needed

## 2023-03-23 ENCOUNTER — EVALUATION (OUTPATIENT)
Dept: PHYSICAL THERAPY | Facility: CLINIC | Age: 58
End: 2023-03-23

## 2023-03-23 VITALS — SYSTOLIC BLOOD PRESSURE: 130 MMHG | DIASTOLIC BLOOD PRESSURE: 86 MMHG

## 2023-03-23 DIAGNOSIS — M54.42 CHRONIC BILATERAL LOW BACK PAIN WITH BILATERAL SCIATICA: ICD-10-CM

## 2023-03-23 DIAGNOSIS — M54.41 CHRONIC BILATERAL LOW BACK PAIN WITH BILATERAL SCIATICA: ICD-10-CM

## 2023-03-23 DIAGNOSIS — G89.29 CHRONIC BILATERAL LOW BACK PAIN WITH BILATERAL SCIATICA: ICD-10-CM

## 2023-03-23 NOTE — PROGRESS NOTES
PT Evaluation     Today's date: 3/23/2023  Patient name: Ginna Benito  : 1965  MRN: [id number]  Referring provider: Leonel Wright, *  Dx:   Encounter Diagnosis     ICD-10-CM    1  Chronic bilateral low back pain with bilateral sciatica  M54 42 Ambulatory Referral to Physical Therapy    M54 41     G89 29                      Assessment  Assessment details: Ginna Benito  is a pleasant 59 y o  male who presents with low back pain, decreased lumbar mobility and LE weakness   The primary movement problem is lumbar stiffness resulting in increased muscle spasming, decreased hip use and gross LE weakness, which limit his ability to perform functional activities including: walking, standing, lifting and prolonged bending   No referral is necessary at this time based on examination results  Tolerated session without adverse effects  Gross weakness noted throughout the L side- advised to speak to physician regarding ongoing neck issues and LE weakness  Demonstrates slight flexion preference  Provided HEP focused on mobility and flexion positioning  Problem List:  1) Lumbar stiffness   2) LE weakness  3) postural/ strength deficits     Pt  will benefit from skilled PT services that includes manual therapy techniques to enhance tissue extensibility, neuromuscular re-education to facilitate motor control, therapeutic exercise to increase functional mobility, and modalities prn to reduce pain and inflammation  Access Code: [email or web address]  URL: [email or web address]  Date: 2023  Prepared by: Madilyn Boeck    Exercises  - Seated Lumbar Flexion Stretch  - 1 x daily - 7 x weekly - 10 reps - 10 hold  - Seated Piriformis Stretch  - 1 x daily - 7 x weekly - 3 reps - 30 hold    Impairments: impaired balance and pain with function    Symptom irritability: highUnderstanding of Dx/Px/POC: good   Prognosis: good    Goals  Short Term Goals: to be achieved by 4 weeks  1) Patient to be independent with basic HEP  2) Decrease pain to 210 at its worst   3) Increase lumbar spine ROM by 50% in all deficient planes  4) Increase LE strength by 1/2 MMT grade in all deficient planes  5) Patient to report decreased sleep interruption secondary to pain  6) Increase seated/ ambulatory tolerance  to 30 min  Long Term Goals: to be achieved by discharge  1) FOTO equal to or greater than expected outcome   2) Patient to be independent with comprehensive HEP  3) Abolish pain for improved quality of life  4) Lumbar spine ROM WNL all planes to improve a/iadls  5) Increase LE strength to 5/5 MMT grade in all planes to improve a/iadls  6) Patient to report no sleep interruption secondary to pain  7) Increase seated/ ambulatory tolerance  to 60min  Plan  Patient would benefit from: skilled physical therapy  Referral necessary: No  Planned modality interventions: TENS, thermotherapy: hydrocollator packs, unattended electrical stimulation, manual electrical stimulation and cryotherapy  Planned therapy interventions: therapeutic activities, therapeutic exercise, stretching, strengthening, patient education, postural training, neuromuscular re-education, joint mobilization, manual therapy, home exercise program and flexibility  Frequency: 2x week  Duration in weeks: 10  Plan of Care beginning date: 3/23/2023  Plan of Care expiration date: 6/1/2023  Treatment plan discussed with: patient        Subjective Evaluation    History of Present Illness  Mechanism of injury: Halima Hugo presents with c/c of chronic low back pain  Symptoms began years ago with no previous treatment  with no known mechanism of injury  Denies referral pain  Reports tingling/ numbness that is constant on the L lat knee  Notes knee pain has been present since he broke his neck 20 years ago     Aggravating factors: standing up from sitting, walking, bending, sleeping   Relieving factors: none that he knows of    24hr pain pattern: 9/10 (current), 6/10 (best), 10/10 (worst), location:middle of the low back , descriptors: patient is unsure  Imaging: none  Previous treatments: none  Patient goals: " to get rid of the pain"           Recurrent probem    Quality of life: good          Objective     Concurrent Complaints  Positive for night pain and disturbed sleep   Negative for bladder dysfunction and bowel dysfunction    Postural Observations  Seated posture: fair  Standing posture: fair    Additional Postural Observation Details  Sway back     Palpation     Additional Palpation Details  TTP in the B paraspinals low back and medial L4/ L5   No referral pain noted  Flexion preference noted     Neurological Testing     Sensation     Lumbar   Left   Intact: light touch    Right   Intact: light touch    Comments   Left light touch: L3-S1  Right light touch: L3-S1    Active Range of Motion     Lumbar   Flexion:  with pain Restriction level: moderate  Extension:  with pain Restriction level: minimal  Left lateral flexion:  with pain Restriction level: moderate  Right lateral flexion:  with pain Restriction level: moderate  Left rotation:  with pain Restriction level: moderate  Right rotation:  Restriction level: minimal    Additional Active Range of Motion Details  Pain located in the middle back     Joint Play     Hypermobile: L3 and L4     Hypomobile: T8, T9, T10, T11, T12, L1, L2, L5 and S1     Pain: T12, L1, L2, L3, L4, L5 and S1     Strength/Myotome Testing     Left Hip   Planes of Motion   Flexion: 3+  Extension: 4  Abduction: 4-  Adduction: 4+  External rotation: 4-  Internal rotation: 4-    Right Hip   Planes of Motion   Flexion: 4+  Extension: 4  Abduction: 4-  Adduction: 4+  External rotation: 4+  Internal rotation: 4+    Left Knee   Flexion: 3+  Extension: 3+    Right Knee   Flexion: 4+  Extension: 4+    Left Ankle/Foot   Dorsiflexion: 4-  Plantar flexion: 4-    Right Ankle/Foot   Dorsiflexion: 4  Plantar flexion: 4    Additional Strength Details  Notes strength deficits on the L side have been  present since he broke his neck >15 years ago     Tests     Lumbar     Left   Negative quadrant and slump test      Right   Positive slump test    Negative quadrant                Precautions: DM, HTN, depression, diverticulitis       Manuals 3/23            Lumbar PA/TP                                                     Neuro Re-Ed 3/23            LTR             Sciatic nerve glide             TA contraction              SKTC             Quad stability             bridges                          Ther Ex 3/23            piriformis stretch (HEP) seated 3x30"             pball flexion stretch (HEP) 10x10"            Step ups                                                                               Ther Activity 3/23            Bike             Patient education  KR            Sit <> stands              Side steps                           Modalities

## 2023-04-18 NOTE — PROGRESS NOTES
I called the patient to follow up  He states he is "beautiful"  He notes he recently went to the pharmacy for refills but was told he needs new scripts; will send requests to the PCP  The patient stated he started taking gabapentin differently; instead of 800mg tid, he was taking #2  800mg bid  Patient reports his blood sugars are stable with fasting readings 120-130 with an occasional 200  He checks his feet daily and denies any skin breakdown  The patient states he has been having pain in his side  Upon further questioning the patient admitted to having hematuria with related dysuria  I asked if he thinks he passed a stone but he was unsure  He reports he had hematuria 5 times last week and once this week  He stated he did not see Urology in the past because he is afraid they will tell him he needs surgery, which he does not want  I sent an in-basket message to Uro staff to contact the patient to schedule  I reminded the patient he is due for a PCP appointment next month (per 12/1 notes) and he agreed to have the staff call him to schedule  ProBNP 20218  TTE (04/2023) - LVEF of 55-60%; LAE; Sclerotic trileaflet aortic valve, trace AI. Mild to moderate MR and TR. IVC measures 2.0 cm and is non collapsing  Per cardio - hold Lasix and lopressor for now due to hypotension - monitor BPs for resuming  Cardio following, recs appreciated  Mgmt per ICU

## 2023-04-19 PROBLEM — R56.9 SEIZURE-LIKE ACTIVITY (HCC): Status: ACTIVE | Noted: 2023-04-19

## 2023-05-17 ENCOUNTER — OFFICE VISIT (OUTPATIENT)
Dept: UROLOGY | Facility: AMBULATORY SURGERY CENTER | Age: 58
End: 2023-05-17

## 2023-05-17 VITALS
HEART RATE: 83 BPM | BODY MASS INDEX: 27.31 KG/M2 | WEIGHT: 207 LBS | OXYGEN SATURATION: 97 % | SYSTOLIC BLOOD PRESSURE: 92 MMHG | DIASTOLIC BLOOD PRESSURE: 58 MMHG

## 2023-05-17 DIAGNOSIS — N20.0 NEPHROLITHIASIS: ICD-10-CM

## 2023-05-17 DIAGNOSIS — R31.0 GROSS HEMATURIA: ICD-10-CM

## 2023-05-17 DIAGNOSIS — N13.8 BPH WITH OBSTRUCTION/LOWER URINARY TRACT SYMPTOMS: Primary | ICD-10-CM

## 2023-05-17 DIAGNOSIS — N40.1 BPH WITH OBSTRUCTION/LOWER URINARY TRACT SYMPTOMS: Primary | ICD-10-CM

## 2023-05-17 DIAGNOSIS — Z87.442 HISTORY OF RENAL CALCULI: ICD-10-CM

## 2023-05-17 LAB
POST-VOID RESIDUAL VOLUME, ML POC: 13 ML
SL AMB  POCT GLUCOSE, UA: NORMAL
SL AMB LEUKOCYTE ESTERASE,UA: NORMAL
SL AMB POCT BILIRUBIN,UA: NORMAL
SL AMB POCT BLOOD,UA: NORMAL
SL AMB POCT CLARITY,UA: CLEAR
SL AMB POCT COLOR,UA: NORMAL
SL AMB POCT KETONES,UA: NORMAL
SL AMB POCT NITRITE,UA: NORMAL
SL AMB POCT PH,UA: 6.5
SL AMB POCT SPECIFIC GRAVITY,UA: 1
SL AMB POCT URINE PROTEIN: NORMAL
SL AMB POCT UROBILINOGEN: 0.2

## 2023-05-17 NOTE — PROGRESS NOTES
5/17/2023    Assessment and Plan    59 y o  male managed by our office    1  Gross hematuria/bladder lesion/mass  ? Urine for cytology unremarkable for malignant cells  ? Ultrasound kidney and bladder performed 09/12/2022 reveals a exophytic 1 7 x 1 7 x 1 7 cm simple cyst in the left kidney, 3 4 x 1 7 by 2 4 cm hyperechoic area in the upper pole of the right kidney with internal septation representing mildly complex cyst noted  1 5 x 0 9 x 1 8 cm mildly hypoechoic to isoechoic area along the posterior inferior bladder wall noted  ? CT renal protocol study performed 10/13/2022 with findings of stable benign exophytic cyst in the left kidney  ? Cystoscopy performed 11/9/2022 with unremarkable findings     2  Benign prostatic hyperplasia  ? Continue tamsulosin  ? Will continue to monitor for worsening/progression of lower urinary tract symptoms  ? Bladder scan PVR 13 ml  ? Urine dip in office unremarkable        3  Prostate cancer screening  ? PSA not performed prior to office visit as previously ordered x2    History of Present Illness  Cody Strickland is a 59 y o  male here for follow up evaluation of  gross hematuria  Patient was initially seen in consultation for gross hematuria 08/04/2022  He was sent for ultrasound of scrotum and testicle as well as ultrasound of kidney and bladder secondary to a episode of gross hematuria  He has a history of benign prostatic hyperplasia and has been managed on tamsulosin with good control of lower urinary tract symptoms  He reports smoking upwards to 4 cigarettes per day as well as cigars  Previously, he smoked upwards to 2 packs per day  He denies a family history of  malignancy  He reports drinking moderate amount of water per day  He reports occasional intake of alcoholic beverages      Patient reports a previous history of nephrolithiasis and reports since his last office visit passing 1 small stone with a fleeting episode of gross hematuria with complete resolution a day after stone has passed  Review of Systems   Constitutional: Negative for chills and fever  Respiratory: Negative for cough and shortness of breath  Cardiovascular: Negative for chest pain  Gastrointestinal: Negative for abdominal distention, abdominal pain, blood in stool, nausea and vomiting  Genitourinary: Negative for difficulty urinating, dysuria, enuresis, flank pain, frequency, hematuria and urgency  Skin: Negative for rash  Past Medical History  Past Medical History:   Diagnosis Date   • Diabetes mellitus ([facility] [address] )    • Diverticulitis    • Hypertension    • Neck pain    • Renal disorder        Past Social History  Past Surgical History:   Procedure Laterality Date   • COLON SURGERY     • NECK SURGERY       Social History     Tobacco Use   Smoking Status Every Day   • Packs/day: 0 25   • Types: Cigarettes, Cigars   Smokeless Tobacco Never   Tobacco Comments    1 cigar/daily       Past Family History  History reviewed  No pertinent family history      Past Social history  Social History     Socioeconomic History   • Marital status: /Civil Union     Spouse name: Not on file   • Number of children: Not on file   • Years of education: Not on file   • Highest education level: Not on file   Occupational History   • Not on file   Tobacco Use   • Smoking status: Every Day     Packs/day: 0 25     Types: Cigarettes, Cigars   • Smokeless tobacco: Never   • Tobacco comments:     1 cigar/daily   Vaping Use   • Vaping Use: Never used   Substance and Sexual Activity   • Alcohol use: Not Currently     Comment: occ   • Drug use: Never   • Sexual activity: Yes     Partners: Female   Other Topics Concern   • Not on file   Social History Narrative   • Not on file     Social Determinants of Health     Financial Resource Strain: Medium Risk   • Difficulty of Paying Living Expenses: Somewhat hard   Food Insecurity: No Food Insecurity   • Worried About Running Out of Food in the Last Year: Never true   • Ran Out of Food in the Last Year: Never true   Transportation Needs: No Transportation Needs   • Lack of Transportation (Medical): No   • Lack of Transportation (Non-Medical): No   Physical Activity: Not on file   Stress: Stress Concern Present   • Feeling of Stress :  To some extent   Social Connections: Not on file   Intimate Partner Violence: Not on file   Housing Stability: Not on file       Current Medications  Current Outpatient Medications   Medication Sig Dispense Refill   • acetaminophen (TYLENOL) 500 mg tablet Take 1 tablet (500 mg total) by mouth every 6 (six) hours as needed for mild pain 30 tablet 0   • albuterol (PROVENTIL HFA,VENTOLIN HFA) 90 mcg/act inhaler Inhale 1-2 puffs every 6 (six) hours as needed for wheezing 18 g 1   • ARIPiprazole (ABILIFY) 10 mg tablet Take 10 mg by mouth daily at bedtime     • aspirin (ECOTRIN LOW STRENGTH) 81 mg EC tablet Take 1 tablet (81 mg total) by mouth daily 30 tablet 3   • atorvastatin (LIPITOR) 40 mg tablet Take 1 tablet (40 mg total) by mouth daily 90 tablet 1   • Blood Glucose Monitoring Suppl (FreeStyle Lite) SHANELL Use daily Use as directed 1 each 0   • buPROPion (WELLBUTRIN SR) 200 MG 12 hr tablet Take 200 mg by mouth every morning     • cloNIDine (CATAPRES) 0 2 mg tablet Take 0 2 mg by mouth daily at bedtime     • clotrimazole-betamethasone (LOTRISONE) 1-0 05 % cream Apply topically 2 (two) times a day 30 g 0   • diclofenac (VOLTAREN) 75 mg EC tablet Take 1 tablet (75 mg total) by mouth 2 (two) times a day 60 tablet 1   • doxepin (SINEquan) 50 mg capsule Take 50 mg by mouth daily at bedtime     • fluticasone (FLONASE) 50 mcg/act nasal spray 1 spray into each nostril daily 16 g 0   • gabapentin (NEURONTIN) 800 mg tablet take 1 tablet by mouth three times a day 90 tablet 1   • glucose blood (FREESTYLE LITE) test strip Testing sugars once a day 100 each 3   • hydrocortisone 1 % ointment Apply topically 2 (two) times a day 30 g 0   • hydrOXYzine HCL (ATARAX) 25 mg tablet take 2 tablet by mouth at bedtime if needed sleep     • hydrOXYzine HCL (ATARAX) 50 mg tablet Take 50 mg by mouth daily at bedtime     • ketorolac (TORADOL) 10 mg tablet Take 1 tablet (10 mg total) by mouth every 6 (six) hours as needed for moderate pain 15 tablet 0   • Lancets (freestyle) lancets Daily 100 each 5   • lidocaine (XYLOCAINE) 2 % topical gel Apply topically as needed for mild pain 30 mL 2   • linaGLIPtin (Tradjenta) 5 MG TABS Take 5 mg by mouth daily 90 tablet 1   • lisinopril (ZESTRIL) 10 mg tablet Take 1 tablet (10 mg total) by mouth daily 90 tablet 2   • meclizine (ANTIVERT) 25 mg tablet Take 1 tablet (25 mg total) by mouth every 8 (eight) hours 30 tablet 2   • metFORMIN (GLUCOPHAGE-XR) 500 mg 24 hr tablet Take 2 tablets (1,000 mg total) by mouth daily with dinner 180 tablet 1   • pantoprazole (PROTONIX) 40 mg tablet Take 1 tablet (40 mg total) by mouth daily before breakfast 30 tablet 3   • tamsulosin (FLOMAX) 0 4 mg Take 1 capsule (0 4 mg total) by mouth daily with dinner 90 capsule 3   • tiZANidine (ZANAFLEX) 4 mg tablet Take 1 tablet (4 mg total) by mouth every 8 (eight) hours as needed for muscle spasms 40 tablet 3   • zolpidem (AMBIEN) 10 mg tablet Take 10 mg by mouth daily at bedtime       No current facility-administered medications for this visit  Allergies  No Known Allergies      The following portions of the patient's history were reviewed and updated as appropriate: allergies, current medications, past medical history, past social history, past surgical history and problem list       Vitals  Vitals:    05/17/23 0804   BP: 92/58   BP Location: Left arm   Patient Position: Sitting   Cuff Size: Large   Pulse: 83   SpO2: 97%   Weight: 93 9 kg (207 lb)           Physical Exam  Physical Exam  Vitals reviewed  Constitutional:       General: He is not in acute distress  Appearance: Normal appearance  He is normal weight     HENT:      Head: Normocephalic  Pulmonary:      Effort: No respiratory distress  Breath sounds: Normal breath sounds  Abdominal:      Tenderness: There is no right CVA tenderness or left CVA tenderness  Skin:     General: Skin is warm and dry  Neurological:      General: No focal deficit present  Mental Status: He is alert and oriented to person, place, and time     Psychiatric:         Mood and Affect: Mood normal          Behavior: Behavior normal            Results  Recent Results (from the past 1 hour(s))   POCT urine dip    Collection Time: 05/17/23  8:08 AM   Result Value Ref Range    LEUKOCYTE ESTERASE,UA -     NITRITE,UA -     SL AMB POCT UROBILINOGEN 0 2     POCT URINE PROTEIN -      PH,UA 6 5     BLOOD,UA non hemo trace     SPECIFIC GRAVITY,UA 1 005     KETONES,UA -     BILIRUBIN,UA -     GLUCOSE, UA -      COLOR,UA straw     CLARITY,UA clear    POCT Measure PVR    Collection Time: 05/17/23  8:08 AM   Result Value Ref Range    POST-VOID RESIDUAL VOLUME, ML POC 13 mL   ]  Lab Results   Component Value Date    PSA 0 37 05/15/2018     Lab Results   Component Value Date    GLUCOSE 143 (H) 05/17/2018    CALCIUM 9 0 04/18/2023     05/17/2018    K 3 7 04/18/2023    CO2 26 04/18/2023     04/18/2023    BUN 9 04/18/2023    CREATININE 0 83 04/18/2023     Lab Results   Component Value Date    WBC 7 56 04/18/2023    HGB 14 4 04/18/2023    HCT 43 4 04/18/2023    MCV 93 04/18/2023     04/18/2023           Orders  Orders Placed This Encounter   Procedures   • POCT urine dip   • POCT Measure PVR       RAMO Glynn

## 2023-05-19 NOTE — ASSESSMENT & PLAN NOTE
Last HgA1C from 9/2018 was 6 2  Diabetic diet discussed with patient, written instructions given to the patient  Will recheck HgA1C Admission

## 2023-05-23 ENCOUNTER — PATIENT OUTREACH (OUTPATIENT)
Dept: FAMILY MEDICINE CLINIC | Facility: CLINIC | Age: 58
End: 2023-05-23

## 2023-05-24 ENCOUNTER — HOSPITAL ENCOUNTER (OUTPATIENT)
Dept: NEUROLOGY | Facility: CLINIC | Age: 58
Discharge: HOME | End: 2023-05-24

## 2023-05-24 DIAGNOSIS — R56.9 SEIZURE-LIKE ACTIVITY (HCC): ICD-10-CM

## 2023-05-30 ENCOUNTER — HOSPITAL ENCOUNTER (EMERGENCY)
Facility: HOSPITAL | Age: 58
Discharge: HOME | End: 2023-05-30
Attending: EMERGENCY MEDICINE | Admitting: EMERGENCY MEDICINE

## 2023-05-30 ENCOUNTER — APPOINTMENT (EMERGENCY)
Dept: CT IMAGING | Facility: HOSPITAL | Age: 58
End: 2023-05-30

## 2023-05-30 VITALS
DIASTOLIC BLOOD PRESSURE: 80 MMHG | RESPIRATION RATE: 20 BRPM | OXYGEN SATURATION: 98 % | WEIGHT: 205.9 LBS | HEART RATE: 78 BPM | TEMPERATURE: 97.6 F | SYSTOLIC BLOOD PRESSURE: 120 MMHG | BODY MASS INDEX: 27.17 KG/M2

## 2023-05-30 DIAGNOSIS — N20.0 KIDNEY STONE: Primary | ICD-10-CM

## 2023-05-30 LAB
ALBUMIN SERPL BCP-MCNC: 3.9 G/DL (ref 3.5–5)
ALP SERPL-CCNC: 68 U/L (ref 34–104)
ALT SERPL W P-5'-P-CCNC: 22 U/L (ref 7–52)
ANION GAP SERPL CALCULATED.3IONS-SCNC: 7 MMOL/L (ref 4–13)
AST SERPL W P-5'-P-CCNC: 17 U/L (ref 13–39)
ATRIAL RATE: 85 BPM
BASOPHILS # BLD AUTO: 0.06 THOUSANDS/ÂΜL (ref 0–0.1)
BASOPHILS NFR BLD AUTO: 1 % (ref 0–1)
BILIRUB SERPL-MCNC: 0.54 MG/DL (ref 0.2–1)
BUN SERPL-MCNC: 8 MG/DL (ref 5–25)
CALCIUM SERPL-MCNC: 9.3 MG/DL (ref 8.4–10.2)
CARDIAC TROPONIN I PNL SERPL HS: 3 NG/L (ref 8–18)
CHLORIDE SERPL-SCNC: 106 MMOL/L (ref 96–108)
CO2 SERPL-SCNC: 25 MMOL/L (ref 21–32)
CREAT SERPL-MCNC: 0.98 MG/DL (ref 0.6–1.3)
EOSINOPHIL # BLD AUTO: 0.14 THOUSAND/ÂΜL (ref 0–0.61)
EOSINOPHIL NFR BLD AUTO: 2 % (ref 0–6)
ERYTHROCYTE [DISTWIDTH] IN BLOOD BY AUTOMATED COUNT: 14.7 % (ref 11.6–15.1)
GFR SERPL CREATININE-BSD FRML MDRD: 83 ML/MIN/1.73SQ M
GLUCOSE SERPL-MCNC: 153 MG/DL (ref 65–140)
GLUCOSE SERPL-MCNC: 165 MG/DL (ref 65–140)
HCT VFR BLD AUTO: 43.5 % (ref 36.5–49.3)
HGB BLD-MCNC: 14.8 G/DL (ref 12–17)
IMM GRANULOCYTES # BLD AUTO: 0.02 THOUSAND/UL (ref 0–0.2)
IMM GRANULOCYTES NFR BLD AUTO: 0 % (ref 0–2)
LIPASE SERPL-CCNC: 24 U/L (ref 11–82)
LYMPHOCYTES # BLD AUTO: 2.14 THOUSANDS/ÂΜL (ref 0.6–4.47)
LYMPHOCYTES NFR BLD AUTO: 31 % (ref 14–44)
MCH RBC QN AUTO: 30.9 PG (ref 26.8–34.3)
MCHC RBC AUTO-ENTMCNC: 34 G/DL (ref 31.4–37.4)
MCV RBC AUTO: 91 FL (ref 82–98)
MONOCYTES # BLD AUTO: 0.42 THOUSAND/ÂΜL (ref 0.17–1.22)
MONOCYTES NFR BLD AUTO: 6 % (ref 4–12)
NEUTROPHILS # BLD AUTO: 4.18 THOUSANDS/ÂΜL (ref 1.85–7.62)
NEUTS SEG NFR BLD AUTO: 60 % (ref 43–75)
NRBC BLD AUTO-RTO: 0 /100 WBCS
P AXIS: 43 DEGREES
PLATELET # BLD AUTO: 172 THOUSANDS/UL (ref 149–390)
PMV BLD AUTO: 10.5 FL (ref 8.9–12.7)
POTASSIUM SERPL-SCNC: 4.2 MMOL/L (ref 3.5–5.3)
PR INTERVAL: 162 MS
PROT SERPL-MCNC: 7.2 G/DL (ref 6.4–8.4)
QRS AXIS: -7 DEGREES
QRSD INTERVAL: 78 MS
QT INTERVAL: 380 MS
QTC INTERVAL: 452 MS
RBC # BLD AUTO: 4.79 MILLION/UL (ref 3.88–5.62)
SODIUM SERPL-SCNC: 138 MMOL/L (ref 135–147)
T WAVE AXIS: 29 DEGREES
VENTRICULAR RATE: 85 BPM
WBC # BLD AUTO: 6.96 THOUSAND/UL (ref 4.31–10.16)

## 2023-05-30 RX ORDER — ONDANSETRON 2 MG/ML
4 INJECTION INTRAMUSCULAR; INTRAVENOUS ONCE
Status: COMPLETED | OUTPATIENT
Start: 2023-05-30 | End: 2023-05-30

## 2023-05-30 RX ORDER — TAMSULOSIN HYDROCHLORIDE 0.4 MG/1
0.4 CAPSULE ORAL
Qty: 7 CAPSULE | Refills: 0 | Status: SHIPPED | OUTPATIENT
Start: 2023-05-30

## 2023-05-30 RX ORDER — OXYCODONE HYDROCHLORIDE AND ACETAMINOPHEN 5; 325 MG/1; MG/1
1 TABLET ORAL EVERY 6 HOURS PRN
Qty: 12 TABLET | Refills: 0 | Status: SHIPPED | OUTPATIENT
Start: 2023-05-30 | End: 2023-06-02

## 2023-05-30 RX ADMIN — ONDANSETRON 4 MG: 2 INJECTION INTRAMUSCULAR; INTRAVENOUS at 11:53

## 2023-05-30 RX ADMIN — SODIUM CHLORIDE 1000 ML: 0.9 INJECTION, SOLUTION INTRAVENOUS at 11:53

## 2023-05-30 NOTE — ED PROVIDER NOTES
History  Chief Complaint   Patient presents with   • Shaking     States he has felt shaky since he woke up this morning     23-year-old male, presents concern for shaking  States been feeling like he has been shaky since he woke up this morning  Has had it happen so in the past with no known diagnosis  Also complaining of left-sided flank pain, admits to history of kidney stones and this feels similar  No fevers or chills positive for nausea, no vomiting or diarrhea  No dysuria or frequency  Prior to Admission Medications   Prescriptions Last Dose Informant Patient Reported? Taking?    ARIPiprazole (ABILIFY) 10 mg tablet  Self Yes No   Sig: Take 10 mg by mouth daily at bedtime   Blood Glucose Monitoring Suppl (FreeStyle Lite) SHANELL  Self No No   Sig: Use daily Use as directed   Lancets (freestyle) lancets  Self No No   Sig: Daily   acetaminophen (TYLENOL) 500 mg tablet  Self No No   Sig: Take 1 tablet (500 mg total) by mouth every 6 (six) hours as needed for mild pain   albuterol (PROVENTIL HFA,VENTOLIN HFA) 90 mcg/act inhaler  Self No No   Sig: Inhale 1-2 puffs every 6 (six) hours as needed for wheezing   aspirin (ECOTRIN LOW STRENGTH) 81 mg EC tablet  Self No No   Sig: Take 1 tablet (81 mg total) by mouth daily   atorvastatin (LIPITOR) 40 mg tablet  Self No No   Sig: Take 1 tablet (40 mg total) by mouth daily   buPROPion (WELLBUTRIN SR) 200 MG 12 hr tablet  Self Yes No   Sig: Take 200 mg by mouth every morning   cloNIDine (CATAPRES) 0 2 mg tablet  Self Yes No   Sig: Take 0 2 mg by mouth daily at bedtime   clotrimazole-betamethasone (LOTRISONE) 1-0 05 % cream  Self No No   Sig: Apply topically 2 (two) times a day   diclofenac (VOLTAREN) 75 mg EC tablet  Self No No   Sig: Take 1 tablet (75 mg total) by mouth 2 (two) times a day   doxepin (SINEquan) 50 mg capsule  Self Yes No   Sig: Take 50 mg by mouth daily at bedtime   fluticasone (FLONASE) 50 mcg/act nasal spray  Self No No   Si spray into each nostril daily   gabapentin (NEURONTIN) 800 mg tablet  Self No No   Sig: take 1 tablet by mouth three times a day   glucose blood (FREESTYLE LITE) test strip  Self No No   Sig: Testing sugars once a day   hydrOXYzine HCL (ATARAX) 25 mg tablet  Self Yes No   Sig: take 2 tablet by mouth at bedtime if needed sleep   hydrOXYzine HCL (ATARAX) 50 mg tablet  Self Yes No   Sig: Take 50 mg by mouth daily at bedtime   hydrocortisone 1 % ointment  Self No No   Sig: Apply topically 2 (two) times a day   ketorolac (TORADOL) 10 mg tablet  Self No No   Sig: Take 1 tablet (10 mg total) by mouth every 6 (six) hours as needed for moderate pain   lidocaine (XYLOCAINE) 2 % topical gel  Self No No   Sig: Apply topically as needed for mild pain   linaGLIPtin (Tradjenta) 5 MG TABS  Self No No   Sig: Take 5 mg by mouth daily   lisinopril (ZESTRIL) 10 mg tablet  Self No No   Sig: Take 1 tablet (10 mg total) by mouth daily   meclizine (ANTIVERT) 25 mg tablet  Self No No   Sig: Take 1 tablet (25 mg total) by mouth every 8 (eight) hours   metFORMIN (GLUCOPHAGE-XR) 500 mg 24 hr tablet  Self No No   Sig: Take 2 tablets (1,000 mg total) by mouth daily with dinner   pantoprazole (PROTONIX) 40 mg tablet  Self No No   Sig: Take 1 tablet (40 mg total) by mouth daily before breakfast   tamsulosin (FLOMAX) 0 4 mg  Self No No   Sig: Take 1 capsule (0 4 mg total) by mouth daily with dinner   tiZANidine (ZANAFLEX) 4 mg tablet  Self No No   Sig: Take 1 tablet (4 mg total) by mouth every 8 (eight) hours as needed for muscle spasms   zolpidem (AMBIEN) 10 mg tablet  Self Yes No   Sig: Take 10 mg by mouth daily at bedtime      Facility-Administered Medications: None       Past Medical History:   Diagnosis Date   • Diabetes mellitus ([facility] [address] )    • Diverticulitis    • Hypertension    • Neck pain    • Renal disorder        Past Surgical History:   Procedure Laterality Date   • COLON SURGERY     • NECK SURGERY         History reviewed  No pertinent family history    I have reviewed and agree with the history as documented  E-Cigarette/Vaping   • E-Cigarette Use Never User      E-Cigarette/Vaping Substances     Social History     Tobacco Use   • Smoking status: Every Day     Packs/day: 0 25     Types: Cigarettes, Cigars   • Smokeless tobacco: Never   • Tobacco comments:     1 cigar/daily   Vaping Use   • Vaping Use: Never used   Substance Use Topics   • Alcohol use: Not Currently     Comment: occ   • Drug use: Never       Review of Systems   Constitutional: Negative  Negative for chills and fever  HENT: Negative  Negative for rhinorrhea, sore throat, trouble swallowing and voice change  Eyes: Negative  Negative for pain and visual disturbance  Respiratory: Negative  Negative for cough, shortness of breath and wheezing  Cardiovascular: Negative  Negative for chest pain and palpitations  Gastrointestinal: Negative for abdominal pain, diarrhea, nausea and vomiting  Genitourinary: Negative  Negative for dysuria and frequency  Musculoskeletal: Negative  Negative for neck pain and neck stiffness  Skin: Negative  Negative for rash  Neurological: Positive for tremors  Negative for dizziness, facial asymmetry, speech difficulty, weakness, light-headedness, numbness and headaches  Physical Exam  Physical Exam  Vitals and nursing note reviewed  Constitutional:       General: He is not in acute distress  Appearance: He is well-developed  HENT:      Head: Normocephalic and atraumatic  Eyes:      Conjunctiva/sclera: Conjunctivae normal       Pupils: Pupils are equal, round, and reactive to light  Neck:      Trachea: No tracheal deviation  Cardiovascular:      Rate and Rhythm: Normal rate and regular rhythm  Pulmonary:      Effort: Pulmonary effort is normal  No respiratory distress  Breath sounds: Normal breath sounds  No wheezing or rales  Abdominal:      General: Bowel sounds are normal  There is no distension        Palpations: Abdomen is soft  Tenderness: There is abdominal tenderness in the left upper quadrant and left lower quadrant  There is no right CVA tenderness, left CVA tenderness, guarding or rebound  Musculoskeletal:         General: No tenderness or deformity  Normal range of motion  Cervical back: Normal range of motion and neck supple  Skin:     General: Skin is warm and dry  Capillary Refill: Capillary refill takes less than 2 seconds  Findings: No rash  Neurological:      Mental Status: He is alert and oriented to person, place, and time     Psychiatric:         Behavior: Behavior normal          Vital Signs  ED Triage Vitals [05/30/23 1030]   Temperature Pulse Respirations Blood Pressure SpO2   97 6 °F (36 4 °C) 88 18 138/84 97 %      Temp Source Heart Rate Source Patient Position - Orthostatic VS BP Location FiO2 (%)   Oral Monitor Sitting Left arm --      Pain Score       --           Vitals:    05/30/23 1030   BP: 138/84   Pulse: 88   Patient Position - Orthostatic VS: Sitting         Visual Acuity      ED Medications  Medications   sodium chloride 0 9 % bolus 1,000 mL (1,000 mL Intravenous New Bag 5/30/23 1153)   ondansetron (ZOFRAN) injection 4 mg (4 mg Intravenous Given 5/30/23 1153)       Diagnostic Studies  Results Reviewed     Procedure Component Value Units Date/Time    CBC and differential [[id number]] Collected: 05/30/23 1152    Lab Status: Final result Specimen: Blood from Arm, Right Updated: 05/30/23 1200     WBC 6 96 Thousand/uL      RBC 4 79 Million/uL      Hemoglobin 14 8 g/dL      Hematocrit 43 5 %      MCV 91 fL      MCH 30 9 pg      MCHC 34 0 g/dL      RDW 14 7 %      MPV 10 5 fL      Platelets 450 Thousands/uL      nRBC 0 /100 WBCs      Neutrophils Relative 60 %      Immat GRANS % 0 %      Lymphocytes Relative 31 %      Monocytes Relative 6 %      Eosinophils Relative 2 %      Basophils Relative 1 %      Neutrophils Absolute 4 18 Thousands/µL      Immature Grans Absolute 0 02 Thousand/uL      Lymphocytes Absolute 2 14 Thousands/µL      Monocytes Absolute 0 42 Thousand/µL      Eosinophils Absolute 0 14 Thousand/µL      Basophils Absolute 0 06 Thousands/µL     Comprehensive metabolic panel [[id number]] Collected: 05/30/23 1152    Lab Status: In process Specimen: Blood from Arm, Right Updated: 05/30/23 1157    Lipase [[id number]] Collected: 05/30/23 1152    Lab Status: In process Specimen: Blood from Arm, Right Updated: 05/30/23 1157    High Sensitivity Troponin I Random [[id number]] Collected: 05/30/23 1152    Lab Status: In process Specimen: Blood from Arm, Right Updated: 05/30/23 1157    UA (URINE) with reflex to Scope [[id number]]     Lab Status: No result Specimen: Urine     Fingerstick Glucose (POCT) [[id number]]  (Abnormal) Collected: 05/30/23 1038    Lab Status: Final result Updated: 05/30/23 1043     POC Glucose 153 mg/dl                  CT abdomen pelvis wo contrast    (Results Pending)              Procedures  Procedures         ED Course  ED Course as of 05/30/23 1212   Tue May 30, 2023   1201 Procedure Note: EKG  Date/Time: 05/30/23 12:01 PM   Performed by: Earnestine Hilario  Authorized by: Earnestine Hilario  ECG interpreted by me, the ED Provider: yes   The EKG demonstrates:  Rate 85  Rhythm sinus  QTc 452  No ST elevations/depressions                                                 Medical Decision Making     Reviewed past medical records: yes, hx of urology visits for nephrolithiasis in past     History Provided by: Patient     Differential considered: Electrolyte abnormalities, kidney stone, UTI     Consideration of tests: Patient is a well-appearing 59-year-old male, found to have 3 mm kidney stone, nonobstructing kidney injury  Patient declined to provide a urine sample in the ED  Advised need for follow-up with urology and strict return precautions were discussed  I have reviewed the patient's visit and any testing done in the emergency department    They have verbalized their understanding of any testing done today and have no further questions or concerns regarding their care in the emergency room  They will follow up with their primary care physician as well as with any specialist in their discharge instructions  Strict return precautions were discussed  Amount and/or Complexity of Data Reviewed  Labs: ordered  Radiology: ordered  Risk  Prescription drug management  Disposition  Final diagnoses:   None     ED Disposition     None      Follow-up Information    None         Patient's Medications   Discharge Prescriptions    No medications on file       No discharge procedures on file      PDMP Review     None          ED Provider  Electronically Signed by           Teto Sharpe DO  05/30/23 2025

## 2023-05-30 NOTE — Clinical Note
Lizbet Holloway was seen and treated in our emergency department on 5/30/2023  Diagnosis:     Fernando    He may return on this date: 06/02/2023         If you have any questions or concerns, please don't hesitate to call        Faustina Ruelas, DO    ______________________________           _______________          _______________  Hospital Representative                              Date                                Time

## 2023-05-31 ENCOUNTER — PATIENT OUTREACH (OUTPATIENT)
Dept: FAMILY MEDICINE CLINIC | Facility: CLINIC | Age: 58
End: 2023-05-31

## 2023-05-31 DIAGNOSIS — E11.9 TYPE 2 DIABETES MELLITUS WITHOUT COMPLICATION, WITHOUT LONG-TERM CURRENT USE OF INSULIN (HCC): ICD-10-CM

## 2023-05-31 RX ORDER — BLOOD-GLUCOSE METER
KIT MISCELLANEOUS
Qty: 100 EACH | Refills: 3 | Status: SHIPPED | OUTPATIENT
Start: 2023-05-31

## 2023-05-31 NOTE — PROGRESS NOTES
"I called the patient to follow up after his recent visit for kidney stones and shaking  The patient states he is taking pain meds with relief  He does report voiding small amounts and also hematuria  He states this is from the the stones \"scratching\" him  I offered [facility] phone number but he declined  The patient has not been checking his blood sugars because he ran out of strips; refill submitted to PCP  He states he has been checking his feet daily and denies any skin breakdown  The patient states he is taking all his meds as prescribed and is watching his diet  The patient noted he has not been feeling well since October  He reports intermittent dizziness with a recent episode causing him to fall  The patient does not have a BP kit to check his blood pressure  I did advise him to stand up slowly and hold onto something in case he feels dizzy  Of note, recent BP at [facility] appointment was 92/58  Chart reviewed  I will continue to follow        "

## 2023-05-31 NOTE — PROGRESS NOTES
I called the patient to follow up  He states he has been feeling well except for continued shoulder pain  He states none of the medications that have been prescribed have helped  The patient is requesting another med; note sent to PCP  The patient notes his blood sugars have been stable with readings of 154 and 120  I asked him to keep up the good work by watching his diet and taking his medications  I reminded the patient of his [facility] appointment for tomorrow at 3pm; he plans on attending  I will continue to follow 
Quality 130: Documentation Of Current Medications In The Medical Record: Current Medications Documented
Quality 431: Preventive Care And Screening: Unhealthy Alcohol Use - Screening: Patient not identified as an unhealthy alcohol user when screened for unhealthy alcohol use using a systematic screening method
Quality 337: Tuberculosis Prevention For Psoriasis And Psoriatic Arthritis Patients On A Biological Immune Response Modifier: Patient has a documented negative TB screening prior to treatment.
Quality 226: Preventive Care And Screening: Tobacco Use: Screening And Cessation Intervention: Patient screened for tobacco use and is an ex/non-smoker
Detail Level: Detailed

## 2023-06-02 ENCOUNTER — PATIENT OUTREACH (OUTPATIENT)
Dept: FAMILY MEDICINE CLINIC | Facility: CLINIC | Age: 58
End: 2023-06-02

## 2023-06-02 NOTE — PROGRESS NOTES
I called the patient to inform him he may discontinue taking lisinopril, per PCP note  The patient stated he did not know if he was taking this  He was not home at the time of this call  I asked him once he gets home to check his medication bottles and call me back; he agreed

## 2023-06-29 ENCOUNTER — PATIENT OUTREACH (OUTPATIENT)
Dept: FAMILY MEDICINE CLINIC | Facility: CLINIC | Age: 58
End: 2023-06-29

## 2023-06-29 NOTE — PROGRESS NOTES
I called the patient but he stated he could not talk at this time  He did request his PCP appointment be moved up; note sent to clerical   I will continue further outreach

## 2023-07-02 DIAGNOSIS — E11.9 TYPE 2 DIABETES MELLITUS WITHOUT COMPLICATION, WITHOUT LONG-TERM CURRENT USE OF INSULIN (HCC): ICD-10-CM

## 2023-07-03 RX ORDER — METFORMIN HYDROCHLORIDE 500 MG/1
TABLET, EXTENDED RELEASE ORAL
Qty: 180 TABLET | Refills: 1 | Status: SHIPPED | OUTPATIENT
Start: 2023-07-03

## 2023-07-10 ENCOUNTER — PATIENT OUTREACH (OUTPATIENT)
Dept: FAMILY MEDICINE CLINIC | Facility: CLINIC | Age: 58
End: 2023-07-10

## 2023-07-11 ENCOUNTER — OFFICE VISIT (OUTPATIENT)
Dept: FAMILY MEDICINE CLINIC | Facility: CLINIC | Age: 58
End: 2023-07-11

## 2023-07-11 VITALS
BODY MASS INDEX: 27.11 KG/M2 | HEIGHT: 73 IN | HEART RATE: 93 BPM | SYSTOLIC BLOOD PRESSURE: 128 MMHG | WEIGHT: 204.6 LBS | TEMPERATURE: 97.6 F | OXYGEN SATURATION: 97 % | RESPIRATION RATE: 18 BRPM | DIASTOLIC BLOOD PRESSURE: 72 MMHG

## 2023-07-11 DIAGNOSIS — E11.9 TYPE 2 DIABETES MELLITUS WITHOUT COMPLICATION, WITHOUT LONG-TERM CURRENT USE OF INSULIN (HCC): ICD-10-CM

## 2023-07-11 DIAGNOSIS — Z72.0 TOBACCO ABUSE: ICD-10-CM

## 2023-07-11 DIAGNOSIS — R42 POSTURAL DIZZINESS WITH NEAR SYNCOPE: Primary | ICD-10-CM

## 2023-07-11 DIAGNOSIS — R55 POSTURAL DIZZINESS WITH NEAR SYNCOPE: Primary | ICD-10-CM

## 2023-07-11 PROBLEM — S93.401A SPRAIN OF UNSPECIFIED LIGAMENT OF RIGHT ANKLE, INITIAL ENCOUNTER: Status: RESOLVED | Noted: 2023-02-22 | Resolved: 2023-07-11

## 2023-07-11 PROBLEM — E16.2 HYPOGLYCEMIA: Status: RESOLVED | Noted: 2023-01-31 | Resolved: 2023-07-11

## 2023-07-11 LAB — SL AMB POCT GLUCOSE BLD: 165

## 2023-07-11 PROCEDURE — 99214 OFFICE O/P EST MOD 30 MIN: CPT | Performed by: NURSE PRACTITIONER

## 2023-07-11 PROCEDURE — 82948 REAGENT STRIP/BLOOD GLUCOSE: CPT | Performed by: NURSE PRACTITIONER

## 2023-07-11 PROCEDURE — 93000 ELECTROCARDIOGRAM COMPLETE: CPT | Performed by: NURSE PRACTITIONER

## 2023-07-11 RX ORDER — DOXEPIN HYDROCHLORIDE 150 MG/1
150 CAPSULE ORAL
COMMUNITY
Start: 2023-06-03

## 2023-07-11 NOTE — ASSESSMENT & PLAN NOTE
Hx of dizziness and near syncope over the last several months, has had normal EKG's and labs in the past, no significant findings on EKG today, no significant abnormality on exam  will send for labs and referral to cardiology   ED parameters discussed

## 2023-07-11 NOTE — PROGRESS NOTES
Name: Josey Mendez      : 1965      MRN: [id number]  Encounter Provider: RAMO Ma  Encounter Date: 2023   Encounter department: [address]     1. Postural dizziness with near syncope  Assessment & Plan:  Hx of dizziness and near syncope over the last several months, has had normal EKG's and labs in the past, no significant findings on EKG today, no significant abnormality on exam  will send for labs and referral to cardiology   ED parameters discussed      Orders:  -     CBC and differential; Future  -     Comprehensive metabolic panel; Future  -     Lipid panel; Future  -     TSH, 3rd generation with Free T4 reflex; Future  -     Holter monitor; Future; Expected date: 2023  -     Ambulatory Referral to Cardiology; Future  -     POCT ECG    2. Type 2 diabetes mellitus without complication, without long-term current use of insulin ([facility])  Assessment & Plan:    Lab Results   Component Value Date    HGBA1C 6.9 (A) 2023     Repeat A1c in one week at lab   Glucose in office today 180   Continue with current regimen at this time and will adjust regimen once result of A1c is received, educated on diet/ lifestyle changes continue metformin  mg daily and Tradjenta 5 mg daily     Orders:  -     Hemoglobin A1C; Future  -     POCT blood glucose         Subjective     Josey Mendez is a 59 y.o. male who  has a past medical history of Diabetes mellitus ([address]), Diverticulitis, Hypertension, and Neck pain. who presented to the office today for follow up. Patient reports that he has been having episodes of dizziness and near syncope upon standing intermittently for the past several months. Episodes occur several times per month. He has had normal EKG and labs in May for similar complaints in the ED. Review of Systems   Constitutional: Negative for chills and fever. HENT: Negative for ear pain and sore throat.     Eyes: Negative for pain and visual disturbance. Respiratory: Negative for cough and shortness of breath. Cardiovascular: Negative for chest pain and palpitations. Gastrointestinal: Negative for abdominal pain and vomiting. Genitourinary: Negative for dysuria and hematuria. Musculoskeletal: Negative for arthralgias and back pain. Skin: Negative for color change and rash. Neurological: Positive for dizziness and syncope. Negative for seizures. All other systems reviewed and are negative. Past Medical History:   Diagnosis Date   • Diabetes mellitus ([address])    • Diverticulitis    • Hypertension    • Neck pain    • Renal disorder      Past Surgical History:   Procedure Laterality Date   • COLON SURGERY     • NECK SURGERY       No family history on file. Social History     Socioeconomic History   • Marital status: /Civil Union     Spouse name: None   • Number of children: None   • Years of education: None   • Highest education level: None   Occupational History   • None   Tobacco Use   • Smoking status: Every Day     Packs/day: 0.25     Types: Cigarettes, Cigars   • Smokeless tobacco: Never   • Tobacco comments:     1 cigar/daily   Vaping Use   • Vaping Use: Never used   Substance and Sexual Activity   • Alcohol use: Not Currently     Comment: occ   • Drug use: Never   • Sexual activity: Yes     Partners: Female   Other Topics Concern   • None   Social History Narrative   • None     Social Determinants of Health     Financial Resource Strain: Medium Risk (1/31/2023)    Overall Financial Resource Strain (CARDIA)    • Difficulty of Paying Living Expenses: Somewhat hard   Food Insecurity: No Food Insecurity (1/31/2023)    Hunger Vital Sign    • Worried About Running Out of Food in the Last Year: Never true    • Ran Out of Food in the Last Year: Never true   Transportation Needs: No Transportation Needs (2/6/2023)    PRAPARE - Transportation    • Lack of Transportation (Medical):  No    • Lack of Transportation (Non-Medical): No   Physical Activity: Not on file   Stress: Stress Concern Present (2/6/2023)    [address] [facility]    • Feeling of Stress :  To some extent   Social Connections: Not on file   Intimate Partner Violence: Not on file   Housing Stability: Not on file     Current Outpatient Medications on File Prior to Visit   Medication Sig   • acetaminophen (TYLENOL) 500 mg tablet Take 1 tablet (500 mg total) by mouth every 6 (six) hours as needed for mild pain   • albuterol (PROVENTIL HFA,VENTOLIN HFA) 90 mcg/act inhaler Inhale 1-2 puffs every 6 (six) hours as needed for wheezing   • ARIPiprazole (ABILIFY) 10 mg tablet Take 10 mg by mouth daily at bedtime   • aspirin (ECOTRIN LOW STRENGTH) 81 mg EC tablet Take 1 tablet (81 mg total) by mouth daily   • atorvastatin (LIPITOR) 40 mg tablet Take 1 tablet (40 mg total) by mouth daily   • Blood Glucose Monitoring Suppl (FreeStyle Lite) SHANELL Use daily Use as directed   • buPROPion (WELLBUTRIN SR) 200 MG 12 hr tablet Take 200 mg by mouth every morning   • cloNIDine (CATAPRES) 0.2 mg tablet Take 0.2 mg by mouth daily at bedtime   • clotrimazole-betamethasone (LOTRISONE) 1-0.05 % cream Apply topically 2 (two) times a day   • doxepin (SINEquan) 150 MG capsule Take 150 mg by mouth daily at bedtime   • fluticasone (FLONASE) 50 mcg/act nasal spray 1 spray into each nostril daily   • gabapentin (NEURONTIN) 800 mg tablet take 1 tablet by mouth three times a day   • glucose blood (FREESTYLE LITE) test strip Testing sugars once a day   • hydrocortisone 1 % ointment Apply topically 2 (two) times a day   • hydrOXYzine HCL (ATARAX) 50 mg tablet Take 50 mg by mouth daily at bedtime   • Lancets (freestyle) lancets Daily   • lidocaine (XYLOCAINE) 2 % topical gel Apply topically as needed for mild pain   • linaGLIPtin (Tradjenta) 5 MG TABS Take 5 mg by mouth daily   • meclizine (ANTIVERT) 25 mg tablet Take 1 tablet (25 mg total) by mouth every 8 (eight) hours   • metFORMIN (GLUCOPHAGE-XR) 500 mg 24 hr tablet take 2 tablet by mouth once daily with dinner   • pantoprazole (PROTONIX) 40 mg tablet Take 1 tablet (40 mg total) by mouth daily before breakfast   • tamsulosin (FLOMAX) 0.4 mg Take 1 capsule (0.4 mg total) by mouth daily with dinner   • tiZANidine (ZANAFLEX) 4 mg tablet Take 1 tablet (4 mg total) by mouth every 8 (eight) hours as needed for muscle spasms   • zolpidem (AMBIEN) 10 mg tablet Take 10 mg by mouth daily at bedtime   • [DISCONTINUED] diclofenac (VOLTAREN) 75 mg EC tablet Take 1 tablet (75 mg total) by mouth 2 (two) times a day   • [DISCONTINUED] doxepin (SINEquan) 50 mg capsule Take 50 mg by mouth daily at bedtime   • [DISCONTINUED] hydrOXYzine HCL (ATARAX) 25 mg tablet take 2 tablet by mouth at bedtime if needed sleep   • [DISCONTINUED] ketorolac (TORADOL) 10 mg tablet Take 1 tablet (10 mg total) by mouth every 6 (six) hours as needed for moderate pain   • [DISCONTINUED] tamsulosin (FLOMAX) 0.4 mg Take 1 capsule (0.4 mg total) by mouth daily with dinner     No Known Allergies  Immunization History   Administered Date(s) Administered   • Pneumococcal Polysaccharide PPV23 10/03/2018   • Tdap 05/14/2018       Objective     /72 (BP Location: Left arm, Patient Position: Sitting, Cuff Size: Standard)   Pulse 93   Temp 97.6 °F (36.4 °C) (Temporal)   Resp 18   Ht 6' 1" (1.854 m)   Wt 92.8 kg (204 lb 9.6 oz)   SpO2 97%   BMI 26.99 kg/m²     Physical Exam  Vitals and nursing note reviewed. Constitutional:       General: He is not in acute distress. Appearance: He is well-developed. He is not diaphoretic. HENT:      Head: Normocephalic and atraumatic. Eyes:      Conjunctiva/sclera: Conjunctivae normal.      Pupils: Pupils are equal, round, and reactive to light. Cardiovascular:      Rate and Rhythm: Normal rate and regular rhythm.    Pulmonary:      Effort: Pulmonary effort is normal. No respiratory distress. Breath sounds: Normal breath sounds. No wheezing. Abdominal:      General: Bowel sounds are normal. There is no distension. Palpations: Abdomen is soft. Tenderness: There is no abdominal tenderness. Musculoskeletal:      Cervical back: Normal range of motion and neck supple. Lymphadenopathy:      Cervical: No cervical adenopathy. Skin:     General: Skin is warm and dry. Capillary Refill: Capillary refill takes less than 2 seconds. Findings: No rash. Neurological:      Mental Status: He is alert and oriented to person, place, and time.    Psychiatric:         Behavior: Behavior normal.       Althia Kaleb

## 2023-07-17 DIAGNOSIS — E78.2 MIXED HYPERLIPIDEMIA: ICD-10-CM

## 2023-07-17 DIAGNOSIS — E11.9 TYPE 2 DIABETES MELLITUS WITHOUT COMPLICATION, WITHOUT LONG-TERM CURRENT USE OF INSULIN (HCC): ICD-10-CM

## 2023-07-18 RX ORDER — LINAGLIPTIN 5 MG/1
TABLET, FILM COATED ORAL
Qty: 90 TABLET | Refills: 1 | Status: SHIPPED | OUTPATIENT
Start: 2023-07-18

## 2023-07-18 RX ORDER — ATORVASTATIN CALCIUM 40 MG/1
TABLET, FILM COATED ORAL
Qty: 90 TABLET | Refills: 1 | Status: SHIPPED | OUTPATIENT
Start: 2023-07-18

## 2023-08-01 ENCOUNTER — APPOINTMENT (OUTPATIENT)
Dept: LAB | Facility: CLINIC | Age: 58
End: 2023-08-01
Payer: MEDICARE

## 2023-08-01 ENCOUNTER — OFFICE VISIT (OUTPATIENT)
Dept: FAMILY MEDICINE CLINIC | Facility: CLINIC | Age: 58
End: 2023-08-01

## 2023-08-01 VITALS
WEIGHT: 200.2 LBS | HEIGHT: 73 IN | RESPIRATION RATE: 12 BRPM | DIASTOLIC BLOOD PRESSURE: 80 MMHG | BODY MASS INDEX: 26.53 KG/M2 | SYSTOLIC BLOOD PRESSURE: 118 MMHG | TEMPERATURE: 98.1 F | OXYGEN SATURATION: 96 % | HEART RATE: 96 BPM

## 2023-08-01 DIAGNOSIS — M25.512 CHRONIC PAIN OF BOTH SHOULDERS: ICD-10-CM

## 2023-08-01 DIAGNOSIS — M25.511 CHRONIC PAIN OF BOTH SHOULDERS: ICD-10-CM

## 2023-08-01 DIAGNOSIS — F17.210 SMOKING GREATER THAN 30 PACK YEARS: Primary | ICD-10-CM

## 2023-08-01 DIAGNOSIS — Z11.59 ENCOUNTER FOR HEPATITIS C SCREENING TEST FOR LOW RISK PATIENT: ICD-10-CM

## 2023-08-01 DIAGNOSIS — E78.2 MIXED HYPERLIPIDEMIA: ICD-10-CM

## 2023-08-01 DIAGNOSIS — R55 SYNCOPE AND COLLAPSE: ICD-10-CM

## 2023-08-01 DIAGNOSIS — Z12.5 SCREENING FOR PROSTATE CANCER: ICD-10-CM

## 2023-08-01 DIAGNOSIS — I10 PRIMARY HYPERTENSION: ICD-10-CM

## 2023-08-01 DIAGNOSIS — R42 POSTURAL DIZZINESS WITH NEAR SYNCOPE: ICD-10-CM

## 2023-08-01 DIAGNOSIS — R55 POSTURAL DIZZINESS WITH NEAR SYNCOPE: ICD-10-CM

## 2023-08-01 DIAGNOSIS — G89.29 CHRONIC PAIN OF BOTH SHOULDERS: ICD-10-CM

## 2023-08-01 DIAGNOSIS — J42 CHRONIC BRONCHITIS, UNSPECIFIED CHRONIC BRONCHITIS TYPE (HCC): ICD-10-CM

## 2023-08-01 DIAGNOSIS — E11.42 DIABETIC POLYNEUROPATHY ASSOCIATED WITH TYPE 2 DIABETES MELLITUS (HCC): ICD-10-CM

## 2023-08-01 DIAGNOSIS — E11.9 TYPE 2 DIABETES MELLITUS WITHOUT COMPLICATION, WITHOUT LONG-TERM CURRENT USE OF INSULIN (HCC): ICD-10-CM

## 2023-08-01 DIAGNOSIS — Z11.4 ENCOUNTER FOR SCREENING FOR HIV: ICD-10-CM

## 2023-08-01 DIAGNOSIS — Z12.2 ENCOUNTER FOR SCREENING FOR LUNG CANCER: ICD-10-CM

## 2023-08-01 PROBLEM — S99.911A INJURY OF RIGHT ANKLE: Status: RESOLVED | Noted: 2023-02-22 | Resolved: 2023-08-01

## 2023-08-01 LAB
ALBUMIN SERPL BCP-MCNC: 3.8 G/DL (ref 3.5–5)
ALP SERPL-CCNC: 107 U/L (ref 46–116)
ALT SERPL W P-5'-P-CCNC: 40 U/L (ref 12–78)
ANION GAP SERPL CALCULATED.3IONS-SCNC: 8 MMOL/L
AST SERPL W P-5'-P-CCNC: 22 U/L (ref 5–45)
BASOPHILS # BLD AUTO: 0.05 THOUSANDS/ÂΜL (ref 0–0.1)
BASOPHILS NFR BLD AUTO: 1 % (ref 0–1)
BILIRUB SERPL-MCNC: 0.84 MG/DL (ref 0.2–1)
BUN SERPL-MCNC: 8 MG/DL (ref 5–25)
CALCIUM SERPL-MCNC: 9.5 MG/DL (ref 8.3–10.1)
CHLORIDE SERPL-SCNC: 110 MMOL/L (ref 96–108)
CHOLEST SERPL-MCNC: 150 MG/DL
CO2 SERPL-SCNC: 24 MMOL/L (ref 21–32)
CREAT SERPL-MCNC: 1.04 MG/DL (ref 0.6–1.3)
EOSINOPHIL # BLD AUTO: 0.23 THOUSAND/ÂΜL (ref 0–0.61)
EOSINOPHIL NFR BLD AUTO: 3 % (ref 0–6)
ERYTHROCYTE [DISTWIDTH] IN BLOOD BY AUTOMATED COUNT: 16.2 % (ref 11.6–15.1)
EST. AVERAGE GLUCOSE BLD GHB EST-MCNC: 157 MG/DL
GFR SERPL CREATININE-BSD FRML MDRD: 77 ML/MIN/1.73SQ M
GLUCOSE P FAST SERPL-MCNC: 164 MG/DL (ref 65–99)
HBA1C MFR BLD: 7.1 %
HCT VFR BLD AUTO: 48.6 % (ref 36.5–49.3)
HDLC SERPL-MCNC: 47 MG/DL
HGB BLD-MCNC: 16.1 G/DL (ref 12–17)
HIV 1+2 AB+HIV1 P24 AG SERPL QL IA: NORMAL
HIV 2 AB SERPL QL IA: NORMAL
HIV1 AB SERPL QL IA: NORMAL
HIV1 P24 AG SERPL QL IA: NORMAL
IMM GRANULOCYTES # BLD AUTO: 0.03 THOUSAND/UL (ref 0–0.2)
IMM GRANULOCYTES NFR BLD AUTO: 0 % (ref 0–2)
LDLC SERPL CALC-MCNC: 99 MG/DL (ref 0–100)
LYMPHOCYTES # BLD AUTO: 2.99 THOUSANDS/ÂΜL (ref 0.6–4.47)
LYMPHOCYTES NFR BLD AUTO: 32 % (ref 14–44)
MCH RBC QN AUTO: 30.7 PG (ref 26.8–34.3)
MCHC RBC AUTO-ENTMCNC: 33.1 G/DL (ref 31.4–37.4)
MCV RBC AUTO: 93 FL (ref 82–98)
MONOCYTES # BLD AUTO: 0.53 THOUSAND/ÂΜL (ref 0.17–1.22)
MONOCYTES NFR BLD AUTO: 6 % (ref 4–12)
NEUTROPHILS # BLD AUTO: 5.42 THOUSANDS/ÂΜL (ref 1.85–7.62)
NEUTS SEG NFR BLD AUTO: 58 % (ref 43–75)
NONHDLC SERPL-MCNC: 103 MG/DL
NRBC BLD AUTO-RTO: 0 /100 WBCS
PLATELET # BLD AUTO: 199 THOUSANDS/UL (ref 149–390)
PMV BLD AUTO: 12.3 FL (ref 8.9–12.7)
POTASSIUM SERPL-SCNC: 3.9 MMOL/L (ref 3.5–5.3)
PROT SERPL-MCNC: 8.1 G/DL (ref 6.4–8.4)
PSA SERPL-MCNC: 0.55 NG/ML (ref 0–4)
RBC # BLD AUTO: 5.25 MILLION/UL (ref 3.88–5.62)
SODIUM SERPL-SCNC: 142 MMOL/L (ref 135–147)
TRIGL SERPL-MCNC: 21 MG/DL
TSH SERPL DL<=0.05 MIU/L-ACNC: 0.61 UIU/ML (ref 0.45–4.5)
WBC # BLD AUTO: 9.25 THOUSAND/UL (ref 4.31–10.16)

## 2023-08-01 PROCEDURE — 84443 ASSAY THYROID STIM HORMONE: CPT

## 2023-08-01 PROCEDURE — 36415 COLL VENOUS BLD VENIPUNCTURE: CPT

## 2023-08-01 PROCEDURE — 86803 HEPATITIS C AB TEST: CPT

## 2023-08-01 PROCEDURE — 80061 LIPID PANEL: CPT

## 2023-08-01 PROCEDURE — 99214 OFFICE O/P EST MOD 30 MIN: CPT | Performed by: NURSE PRACTITIONER

## 2023-08-01 PROCEDURE — 83036 HEMOGLOBIN GLYCOSYLATED A1C: CPT

## 2023-08-01 PROCEDURE — 85025 COMPLETE CBC W/AUTO DIFF WBC: CPT

## 2023-08-01 PROCEDURE — 80053 COMPREHEN METABOLIC PANEL: CPT

## 2023-08-01 PROCEDURE — G0103 PSA SCREENING: HCPCS

## 2023-08-01 PROCEDURE — 87389 HIV-1 AG W/HIV-1&-2 AB AG IA: CPT

## 2023-08-01 RX ORDER — LINAGLIPTIN 5 MG/1
5 TABLET, FILM COATED ORAL DAILY
Qty: 90 TABLET | Refills: 1 | Status: SHIPPED | OUTPATIENT
Start: 2023-08-01

## 2023-08-01 RX ORDER — ATORVASTATIN CALCIUM 40 MG/1
40 TABLET, FILM COATED ORAL DAILY
Qty: 90 TABLET | Refills: 1 | Status: SHIPPED | OUTPATIENT
Start: 2023-08-01

## 2023-08-01 RX ORDER — GABAPENTIN 800 MG/1
800 TABLET ORAL 3 TIMES DAILY
Qty: 90 TABLET | Refills: 1 | Status: SHIPPED | OUTPATIENT
Start: 2023-08-01

## 2023-08-01 RX ORDER — ASPIRIN 81 MG/1
81 TABLET ORAL DAILY
Qty: 30 TABLET | Refills: 3 | Status: SHIPPED | OUTPATIENT
Start: 2023-08-01

## 2023-08-01 NOTE — ASSESSMENT & PLAN NOTE
Lab Results   Component Value Date    HGBA1C 6.9 (A) 04/19/2023     Continue with gabapentin 800 mg tid

## 2023-08-01 NOTE — PROGRESS NOTES
Name: Jessica Ribera      : 1965      MRN: [id number]  Encounter Provider: RAMO Russell  Encounter Date: 2023   Encounter department: [address] [facility]     1. Smoking greater than 30 pack years  -     CT lung screening program; Future; Expected date: 2023    2. Chronic pain of both shoulders  -     gabapentin (NEURONTIN) 800 mg tablet; Take 1 tablet (800 mg total) by mouth 3 (three) times a day    3. Mixed hyperlipidemia  -     atorvastatin (LIPITOR) 40 mg tablet; Take 1 tablet (40 mg total) by mouth daily  -     aspirin (ECOTRIN LOW STRENGTH) 81 mg EC tablet; Take 1 tablet (81 mg total) by mouth daily    4. Type 2 diabetes mellitus without complication, without long-term current use of insulin ([address])  Assessment & Plan:    Lab Results   Component Value Date    HGBA1C 6.9 (A) 2023     He is going tot he lab after visit today for repeat A1c  Reports that he is only taking Tradjenta, stopped metformin   States that he doesn't like the way he feels when his glucose drops below 180  Discussed goal fasting and post prandial glucose ranges and importance of diabetes control     Orders:  -     linaGLIPtin (Tradjenta) 5 MG TABS; Take 5 mg by mouth daily  -     Ambulatory Referral to Ophthalmology; Future  -     Ambulatory Referral to Podiatry; Future    5. Encounter for screening for lung cancer  -     CT lung screening program; Future; Expected date: 2023    6. Primary hypertension  Assessment & Plan:  Patients BP is within goal. Recommend continue/ restart with lisinopril 10 mg daily, patient reports that he stopped all medications except Tradjenta and atorvastatin       7. Diabetic polyneuropathy associated with type 2 diabetes mellitus (HCC)  Assessment & Plan:    Lab Results   Component Value Date    HGBA1C 6.9 (A) 2023     Continue with gabapentin 800 mg tid       8.  Chronic bronchitis, unspecified chronic bronchitis type [facility])  Assessment & Plan:  Continue with albuterol PRN  Encourage smoking cessation       9. Syncope and collapse  Assessment & Plan:  Since last visit reports one episode of severe dizziness, has not yet completed labs, Holter, or made appt with cardiology   Message to referral team for assistance         Tobacco Cessation Counseling: Tobacco cessation counseling was provided. The patient is sincerely urged to quit consumption of tobacco. He is not ready to quit tobacco.     Lung Cancer Screening Shared Decision Making: I discussed with him that he is a candidate for lung cancer CT screening. The following Shared Decision-Making points were covered:  1. Benefits of screening were discussed, including the rates of reduction in death from lung cancer and other causes. Harms of screening were reviewed, including false positive tests, radiation exposure levels, risks of invasive procedures, risks of complications of screening, and risk of overdiagnosis. 2. I counseled on the importance of adherence to annual lung cancer LDCT screening, impact of co-morbidities, and ability or willingness to undergo diagnosis and treatment. 3. I counseled on the importance of maintaining abstinence as a former smoker or was counseled on the importance of smoking cessation if a current smoker    Review of Eligibility Criteria: He meets all of the criteria for Lung Cancer Screening.   - He is 59 y.o.   - He has 20 pack year tobacco history and is a current smoker or has quit within the past 15 years  - He presents no signs or symptoms of lung cancer    After discussion, the patient decided to elect lung cancer screening. Devaughn Ko is a 59 y.o. male who  has a past medical history of Diabetes mellitus ([address]), Diverticulitis, Hypertension, and Neck pain. who presented to the office today for follow up. Review of Systems   Constitutional: Negative for chills and fever.    HENT: Negative for ear pain and sore throat. Eyes: Negative for pain and visual disturbance. Respiratory: Negative for cough and shortness of breath. Cardiovascular: Negative for chest pain and palpitations. Gastrointestinal: Negative for abdominal pain and vomiting. Genitourinary: Negative for dysuria and hematuria. Musculoskeletal: Positive for arthralgias, back pain, myalgias and neck pain. Skin: Negative for color change and rash. Neurological: Positive for dizziness and syncope. Negative for seizures. All other systems reviewed and are negative. Past Medical History:   Diagnosis Date   • Diabetes mellitus ([address])    • Diverticulitis    • Hypertension    • Neck pain    • Renal disorder      Past Surgical History:   Procedure Laterality Date   • COLON SURGERY     • NECK SURGERY       No family history on file.   Social History     Socioeconomic History   • Marital status: /Civil Union     Spouse name: None   • Number of children: None   • Years of education: None   • Highest education level: None   Occupational History   • None   Tobacco Use   • Smoking status: Every Day     Packs/day: 0.25     Types: Cigarettes, Cigars   • Smokeless tobacco: Never   • Tobacco comments:     1 cigar/daily   Vaping Use   • Vaping Use: Never used   Substance and Sexual Activity   • Alcohol use: Not Currently     Comment: occ   • Drug use: Never   • Sexual activity: Yes     Partners: Female   Other Topics Concern   • None   Social History Narrative   • None     Social Determinants of Health     Financial Resource Strain: Medium Risk (1/31/2023)    Overall Financial Resource Strain (CARDIA)    • Difficulty of Paying Living Expenses: Somewhat hard   Food Insecurity: No Food Insecurity (1/31/2023)    Hunger Vital Sign    • Worried About Running Out of Food in the Last Year: Never true    • Ran Out of Food in the Last Year: Never true   Transportation Needs: No Transportation Needs (2/6/2023)    PRAPARE - Transportation    • Lack of Transportation (Medical): No    • Lack of Transportation (Non-Medical): No   Physical Activity: Not on file   Stress: Stress Concern Present (2/6/2023)    [facility]    • Feeling of Stress :  To some extent   Social Connections: Not on file   Intimate Partner Violence: Not on file   Housing Stability: Not on file     Current Outpatient Medications on File Prior to Visit   Medication Sig   • acetaminophen (TYLENOL) 500 mg tablet Take 1 tablet (500 mg total) by mouth every 6 (six) hours as needed for mild pain   • albuterol (PROVENTIL HFA,VENTOLIN HFA) 90 mcg/act inhaler Inhale 1-2 puffs every 6 (six) hours as needed for wheezing   • ARIPiprazole (ABILIFY) 10 mg tablet Take 10 mg by mouth daily at bedtime   • Blood Glucose Monitoring Suppl (FreeStyle Lite) SHANELL Use daily Use as directed   • buPROPion (WELLBUTRIN SR) 200 MG 12 hr tablet Take 200 mg by mouth every morning   • cloNIDine (CATAPRES) 0.2 mg tablet Take 0.2 mg by mouth daily at bedtime   • clotrimazole-betamethasone (LOTRISONE) 1-0.05 % cream Apply topically 2 (two) times a day   • doxepin (SINEquan) 150 MG capsule Take 150 mg by mouth daily at bedtime   • fluticasone (FLONASE) 50 mcg/act nasal spray 1 spray into each nostril daily   • glucose blood (FREESTYLE LITE) test strip Testing sugars once a day   • hydrocortisone 1 % ointment Apply topically 2 (two) times a day   • hydrOXYzine HCL (ATARAX) 50 mg tablet Take 50 mg by mouth daily at bedtime   • Lancets (freestyle) lancets Daily   • lidocaine (XYLOCAINE) 2 % topical gel Apply topically as needed for mild pain   • meclizine (ANTIVERT) 25 mg tablet Take 1 tablet (25 mg total) by mouth every 8 (eight) hours   • pantoprazole (PROTONIX) 40 mg tablet Take 1 tablet (40 mg total) by mouth daily before breakfast   • tamsulosin (FLOMAX) 0.4 mg Take 1 capsule (0.4 mg total) by mouth daily with dinner   • tiZANidine (ZANAFLEX) 4 mg tablet Take 1 tablet (4 mg total) by mouth every 8 (eight) hours as needed for muscle spasms   • zolpidem (AMBIEN) 10 mg tablet Take 10 mg by mouth daily at bedtime   • [DISCONTINUED] aspirin (ECOTRIN LOW STRENGTH) 81 mg EC tablet Take 1 tablet (81 mg total) by mouth daily   • [DISCONTINUED] atorvastatin (LIPITOR) 40 mg tablet take 1 tablet by mouth once daily   • [DISCONTINUED] gabapentin (NEURONTIN) 800 mg tablet take 1 tablet by mouth three times a day   • [DISCONTINUED] metFORMIN (GLUCOPHAGE-XR) 500 mg 24 hr tablet take 2 tablet by mouth once daily with dinner   • [DISCONTINUED] Tradjenta 5 MG TABS take 1 tablet by mouth once daily     No Known Allergies  Immunization History   Administered Date(s) Administered   • Pneumococcal Polysaccharide PPV23 10/03/2018   • Tdap 05/14/2018       Objective     /80 (BP Location: Left arm, Patient Position: Sitting, Cuff Size: Standard)   Pulse 96   Temp 98.1 °F (36.7 °C) (Temporal)   Resp 12   Ht 6' 1" (1.854 m)   Wt 90.8 kg (200 lb 3.2 oz)   SpO2 96%   BMI 26.41 kg/m²     Physical Exam  Vitals and nursing note reviewed. Constitutional:       General: He is not in acute distress. Appearance: He is well-developed. He is not diaphoretic. HENT:      Head: Normocephalic and atraumatic. Eyes:      Conjunctiva/sclera: Conjunctivae normal.      Pupils: Pupils are equal, round, and reactive to light. Cardiovascular:      Rate and Rhythm: Normal rate and regular rhythm. Pulmonary:      Effort: Pulmonary effort is normal. No respiratory distress. Breath sounds: Normal breath sounds. No wheezing. Abdominal:      General: Bowel sounds are normal. There is no distension. Palpations: Abdomen is soft. Tenderness: There is no abdominal tenderness. Musculoskeletal:      Cervical back: Normal range of motion and neck supple. Lymphadenopathy:      Cervical: No cervical adenopathy. Skin:     General: Skin is warm and dry.       Capillary Refill: Capillary refill takes less than 2 seconds. Findings: No rash. Neurological:      Mental Status: He is alert and oriented to person, place, and time.    Psychiatric:         Behavior: Behavior normal.       Babita Lion

## 2023-08-01 NOTE — ASSESSMENT & PLAN NOTE
Since last visit reports one episode of severe dizziness, has not yet completed labs, Holter, or made appt with cardiology   Message to referral team for assistance

## 2023-08-01 NOTE — ASSESSMENT & PLAN NOTE
Patients BP is within goal. Recommend continue/ restart with lisinopril 10 mg daily, patient reports that he stopped all medications except Tradjenta and atorvastatin

## 2023-08-01 NOTE — ASSESSMENT & PLAN NOTE
Lab Results   Component Value Date    HGBA1C 6.9 (A) 04/19/2023     He is going tot he lab after visit today for repeat A1c  Reports that he is only taking Tradjenta, stopped metformin   States that he doesn't like the way he feels when his glucose drops below 180  Discussed goal fasting and post prandial glucose ranges and importance of diabetes control

## 2023-08-02 LAB — HCV AB SER QL: NORMAL

## 2023-08-26 DIAGNOSIS — A04.8 H. PYLORI INFECTION: ICD-10-CM

## 2023-08-29 RX ORDER — PANTOPRAZOLE SODIUM 40 MG/1
TABLET, DELAYED RELEASE ORAL
Qty: 30 TABLET | Refills: 3 | Status: SHIPPED | OUTPATIENT
Start: 2023-08-29

## 2023-09-01 ENCOUNTER — PATIENT OUTREACH (OUTPATIENT)
Dept: FAMILY MEDICINE CLINIC | Facility: CLINIC | Age: 58
End: 2023-09-01

## 2023-09-01 DIAGNOSIS — E11.9 TYPE 2 DIABETES MELLITUS WITHOUT COMPLICATION, WITHOUT LONG-TERM CURRENT USE OF INSULIN (HCC): ICD-10-CM

## 2023-09-01 DIAGNOSIS — Z59.9 INADEQUATE COMMUNITY RESOURCES: Primary | ICD-10-CM

## 2023-09-01 SDOH — ECONOMIC STABILITY - INCOME SECURITY: PROBLEM RELATED TO HOUSING AND ECONOMIC CIRCUMSTANCES, UNSPECIFIED: Z59.9

## 2023-09-01 NOTE — PROGRESS NOTES
I called the patient to follow up. He states he has been doing well. He believes the reason he has been "falling down" was because of low blood sugars. He admits the times he did fall he was not eating properly. The patient notes he ate quite a bit yesterday and his blood sugar remained stable, 145. I strongly advised the patient not to skip meals to avoid hypoglycemic episodes. The patient notes he is worried that his children will take the car away from him because of him passing out. I again encouraged him to eat regular meals with snacks to avoid low BS. I also suggested if he needs to drive, to check his BS beforehand. The patient states he continues taking tradjenta daily. He also notes he is checking his feet daily and denies any skin breakdown. The patient states he recently had a Podiatry appointment and has a follow up in a few weeks. He is aware to schedule his yearly eye exam.    The patient is requesting that his girlfriend be his paid caregiver. He states he had the program previously but it stopped for an unknown reason; referral placed to [address] [place] [address]. He states his SS check covers rent and his girlfriend's check covers the bills. He notes they do not have much and would like her to be paid for taking care of him. He denies food insecurity. The patient is interested in getting a CGM; referral placed to [place]. The patient states he has a BP kit but needs to learn how to use it. I encouraged him to bring it in at his next appointment. I will continue to follow.

## 2023-09-06 ENCOUNTER — PATIENT OUTREACH (OUTPATIENT)
Dept: FAMILY MEDICINE CLINIC | Facility: CLINIC | Age: 58
End: 2023-09-06

## 2023-09-06 NOTE — PROGRESS NOTES
Outgoing Call:  9/6/2023    Chart reviewed. CMOC received a referral informing pt is interested in waiver services. [employer] called pt, introduced self/role and reason for call. CHW assessment was completed and pt agreed to services. Pt informs he was previously receiving waiver services last year and it suddenly stopped and does not know why. Pt then mentioned that he spoke with someone by the name of Opal Lucas at [phone] in reference to waiver services. [employer] facilitated a three way call to said number and spoke with a CW at [employer]. She informed pt stopped receiving services due to a lapse in coverage. She was able to reinstate services as of yesterday and informed that [facility] is the service coordinating agency and pt will eventually receive a call from the agency to schedule an evaluation to determine how many hours he will be approved for. [employer] then assisted pt in calling [facility] and spoke with [facility] who informed pt has not yet been assigned to a SC. CMOC to f/u. Pt thanked [employer] for her time. Next outreach is scheduled for  9/11/2023.

## 2023-09-08 ENCOUNTER — TELEPHONE (OUTPATIENT)
Dept: FAMILY MEDICINE CLINIC | Facility: CLINIC | Age: 58
End: 2023-09-08

## 2023-09-08 NOTE — TELEPHONE ENCOUNTER
09/08/23    first attempt to contact patient.  no answer    Left Message    If pt contacts office, please pass me Annie Caballero /  the call or to [facility]    Pt needs a Pharmacy Appt     And [facility] & I are assigned to [facility] for Pharmacy    Thank You

## 2023-09-11 ENCOUNTER — PATIENT OUTREACH (OUTPATIENT)
Dept: FAMILY MEDICINE CLINIC | Facility: CLINIC | Age: 58
End: 2023-09-11

## 2023-09-11 NOTE — PROGRESS NOTES
Outgoing / Incoming Calls:  9/11/2023    CMOC called [facility] and spoke with Lalo Serrano to discuss status of pt's referral for Service Coordinating. Pt is to be assigned a new SC to request hours for waiver services. CMOC was informed referral has not been received by [facility] from [address] [place] [address]. [facility] informed she will call back at end of week and seek status. [facility] received a call from pt informing he was at the [facility] agency that will provide his waiver services. [facility] explained to pt that she spoke with Laine this morning and referral has not been received yet. Pt seemed confused and stated that he visited [facility] four times in past two weeks to drop off forms needed. [facility] informed him he has already been approved and does not need to drop off any forms. [facility] reminded we are waiting for a SC to be assigned to him. [facility] then spoke with the supervisor, Nancy Sanchez at [address] [facility]. [facility] explained same to her and she expressed understanding. Pt thanked [facility] for her time. Next outreach is scheduled for 9/15/2023.

## 2023-09-15 ENCOUNTER — PATIENT OUTREACH (OUTPATIENT)
Dept: FAMILY MEDICINE CLINIC | Facility: CLINIC | Age: 58
End: 2023-09-15

## 2023-09-15 NOTE — PROGRESS NOTES
Outgoing Calls:  9/15/2023    CMOC called pt to discuss status of waiver. Pt informs his new  has not called him to schedule a time to meet to determine how many waiver hours pt will be approved for. [address] [place] [address] called [facility] to inquire and was informed they still have not heard from [address] [place] [address]. CMOC called Fco Garcia at [facility] to inquire about waiver status. VM left. Next outreach is scheduled for 9/22/2023.

## 2023-09-18 ENCOUNTER — PATIENT OUTREACH (OUTPATIENT)
Dept: FAMILY MEDICINE CLINIC | Facility: CLINIC | Age: 58
End: 2023-09-18

## 2023-09-18 NOTE — PROGRESS NOTES
Chart review indicates that an order was placed on 4/19/23 to follow up with patient regarding need for home health aide. Review indicates that OP AROLDO Nesbitt spoke to patient in Feb of 2023 and he had waiver services through [address] [place] [address] and his last date of service was 11/30 as he needed to re-apply. They were assisting him with cleaning the home, driving to the store, helping with showers and assistance with personal care/bathroom care. Patient was to work directly with [employer] to re-establish home care needs as SW spoke with [employer] and they were going to reach out to patient. He denied any other social needs at that time. Records indicate that [facility] was later assigned to case by OP RN as waiver services were still not reinstated 9/1/23. CMOC currently working with patient. SWCHRISTIANO outreached patient via [employer] PM#[phone]  for any other social needs at this time. Patient states that he is currently still waiting for waiver services and [facility] confirmed with him [facility] notes. Patient rents an apartment and receives SSD and SNAP. He denies any financial insecurity at this time. He does note some transportation issues as he was getting transportation with waiver. [facility] informed him that he is offered free Elveria Obey rides through his insurance company. Phone number and directions provided to him. Patient grateful. Patient reported no other needs at this time and will reach out to [facility] in the future as needed. Will remain available.

## 2023-09-25 ENCOUNTER — PATIENT OUTREACH (OUTPATIENT)
Dept: FAMILY MEDICINE CLINIC | Facility: CLINIC | Age: 58
End: 2023-09-25

## 2023-09-25 NOTE — PROGRESS NOTES
Outgoing Calls:  9/25/2023    [facility] called pt to inquire about status of waiver services. Pt informed his girlfriend will be his paid caretaker and is in the processing of being hired by [facility]. She is working on getting her direct deposit slip and completing a PPD test. Pt stated that he still has not received a call from a Service Coordinating agency. Said agency will determine how many waiver hours pt will be approved for. Without this pt will not be able to receive services. [facility] did facilitate a three way call to RAQUEL Ceja at [employer]. VM was left requesting call in return. CMOC to f/u. Next outreach is scheduled for 10/2/2023.

## 2023-10-02 ENCOUNTER — PATIENT OUTREACH (OUTPATIENT)
Dept: FAMILY MEDICINE CLINIC | Facility: CLINIC | Age: 58
End: 2023-10-02

## 2023-10-02 NOTE — PROGRESS NOTES
Incoming / Luz Elena Weinstein Calls:  10/2/2023    [address] [place] [address] received a call from pt informing he still has not received a call from his Service Coordinating agency. CMOC agreed to call Eunice Benson at [employer] to discuss waiver issue. Jason Figueredo informed [address] [place] [address] she does not know who will be the [facility] agency and informed [address] [place] [address] to call [facility]. CMOC called pt to assist him in calling [employer] as [address] [place] [address] is not listed as an authorized representative. [address] [place] [address] and pt were informed that pt will need to call [address] as they are his managed care facility. CMOC did facilitate a three way call to [address] to inquire about SC agency assigned to pt. [address] [place] [address] and pt were informed pt has been assigned to [employer] and they can be reached at [phone]. Pt's SC is Guillermina Rodriguez and can be reached at [phone]. CMOC called number and left a detailed VM. [address] [place] [address] did call Opal Guerra, [facility] Supervisor [phone] and left a detailed VM for her as well. CMOC to f/u. Pt was thankful for CMOC's assistance. Next outreach is scheduled for 10/12/2023.

## 2023-10-04 ENCOUNTER — CONSULT (OUTPATIENT)
Dept: CARDIOLOGY CLINIC | Facility: CLINIC | Age: 58
End: 2023-10-04
Payer: MEDICARE

## 2023-10-04 VITALS
HEART RATE: 98 BPM | SYSTOLIC BLOOD PRESSURE: 114 MMHG | DIASTOLIC BLOOD PRESSURE: 71 MMHG | BODY MASS INDEX: 26.12 KG/M2 | WEIGHT: 198 LBS

## 2023-10-04 DIAGNOSIS — I10 PRIMARY HYPERTENSION: ICD-10-CM

## 2023-10-04 DIAGNOSIS — R42 POSTURAL DIZZINESS WITH NEAR SYNCOPE: ICD-10-CM

## 2023-10-04 DIAGNOSIS — R55 SYNCOPE AND COLLAPSE: Primary | ICD-10-CM

## 2023-10-04 DIAGNOSIS — F33.9 DEPRESSION, RECURRENT (HCC): ICD-10-CM

## 2023-10-04 DIAGNOSIS — R56.9 SEIZURE-LIKE ACTIVITY (HCC): ICD-10-CM

## 2023-10-04 DIAGNOSIS — E78.2 MIXED HYPERLIPIDEMIA: ICD-10-CM

## 2023-10-04 DIAGNOSIS — E11.9 TYPE 2 DIABETES MELLITUS WITHOUT COMPLICATION, WITHOUT LONG-TERM CURRENT USE OF INSULIN (HCC): ICD-10-CM

## 2023-10-04 DIAGNOSIS — E11.42 DIABETIC POLYNEUROPATHY ASSOCIATED WITH TYPE 2 DIABETES MELLITUS (HCC): ICD-10-CM

## 2023-10-04 DIAGNOSIS — R55 POSTURAL DIZZINESS WITH NEAR SYNCOPE: ICD-10-CM

## 2023-10-04 PROCEDURE — 93000 ELECTROCARDIOGRAM COMPLETE: CPT

## 2023-10-04 PROCEDURE — 99204 OFFICE O/P NEW MOD 45 MIN: CPT

## 2023-10-04 NOTE — PROGRESS NOTES
Cardiology Consultation   MD Roberto Najera MD, Shantal Alatorre DO, MANUEL Castillo MD Sherron Plumb, DO, Luann Griggs DO, [address] [facility]  -------------------------------------------------------------------  [facility]  [address]  [place], [place] [address]  Phone: [phone]  Fax: [phone]  10/04/23  Olivia Ayala  YOB: 1965   MRN: [id number]      Referring Physician: Amandeep Boston, [facility]  [address]     HPI:  I am seeing this patient in cardiology consultation for:  Syncope    Olivia Ayala is a 59 y.o. male with:   DM2  GERD  COPD Chronic bronchitis  HTN  BPH  HLD  Tobacco abuse    Tobacco: 4-5 cigs /day  Alcohol: occasionally  Drugs: none    Family Hx: no premature CAD or SCD    He presents today for evaluation for syncope. He states he had several episodes where he is walking, will feel like his heart is pounding and then he will pass out. He states that he is out for about 1 to 2 minutes. He states that there is no specific inciting cause or event that sets off his symptoms. These have happened several times in the past several months and are significantly limiting to him. He has never had a cardiac evaluation    Review of Systems   Constitutional:  Negative for chills and fever. HENT:  Negative for facial swelling and sore throat. Eyes:  Negative for visual disturbance. Respiratory:  Negative for cough, chest tightness, shortness of breath and wheezing. Cardiovascular:  Negative for chest pain, palpitations and leg swelling. Gastrointestinal:  Negative for abdominal pain, blood in stool, constipation, diarrhea, nausea and vomiting. Endocrine: Negative for cold intolerance and heat intolerance. Genitourinary:  Negative for decreased urine volume, difficulty urinating, dysuria and hematuria.    Musculoskeletal:  Negative for arthralgias, back pain and myalgias. Skin:  Negative for rash. Neurological:  Positive for syncope. Negative for dizziness, weakness and numbness. Psychiatric/Behavioral:  Negative for agitation, behavioral problems and confusion. The patient is not nervous/anxious. OBJECTIVE  Vitals:    10/04/23 1042   BP: 114/71   Pulse: 98       Physical Exam   Constitutional: awake, alert and oriented, in no acute distress, no obvious deformities  Head: Normocephalic, without obvious abnormality, atraumatic  Eyes: conjunctivae clear and moist. Sclera anicteric. No xanthelasmas. Pupils equal bilaterally. Extraocular motions are full. Ear nose mouth and throat: ears are symmetrical bilaterally, hearing appears to be equal bilaterally, no nasal discharge or epistaxis, oropharynx is clear with moist mucous membranes  Neck:  Trachea is midline, neck is supple, no thyromegaly or significant lymphadenopathy, there is full range of motion. Lungs: clear to auscultation bilaterally, no wheezes, no rales, no rhonchi, no accessory muscle use, breathing is nonlabored  Heart: regular rate and rhythm, S1, S2 normal, no murmur, no click, rub or gallop, no lower extremity edema  Abdomen: soft, non-tender; bowel sounds normal; no masses,  no organomegaly  Psychiatric:  Patient is oriented to time, place, person, mood/affect is negative for depression, anxiety, agitation, appears to have appropriate insight  Skin: Skin is warm, dry, intact. No obvious rashes or lesions on exposed extremities. Nail beds are pink with no cyanosis or clubbing.       EKG:  Results for orders placed or performed in visit on 10/04/23   POCT ECG    Impression    Normal sinus rhythm, normal ECG        The 10-year ASCVD risk score (Premium DK, et al., 2019) is: 19.4%    Values used to calculate the score:      Age: 59 years      Sex: Male      Is Non- : No      Diabetic: Yes      Tobacco smoker: Yes      Systolic Blood Pressure: 370 mmHg      Is BP treated: Yes      HDL Cholesterol: 47 mg/dL      Total Cholesterol: 150 mg/dL    IMPRESSION:  Syncope  DM2  GERD  COPD Chronic bronchitis  HTN  BPH  HLD  Tobacco abuse    DISCUSSION/RECOMMENDATIONS:  He has had syncope now several times  His orthostatics are negative today  ECG is normal, no preexcitation or Brugada pattern  Will check echo to rule out underlying structural heart disease as contributing to the syncope  Check 2 weeks Zio patch for rhythm evaluation to rule out underlying paroxysmal supraventricular or ventricular arrhythmias or heart block as contributing to the syncope, will plan for follow-up after     Anne Mccarthy DO, [facility], [facility]  --------------------------------------------------------------------------------  TREADMILL STRESS  No results found for this or any previous visit.     ----------------------------------------------------------------------------------------------  NUCLEAR STRESS TEST: No results found for this or any previous visit. No results found for this or any previous visit.      --------------------------------------------------------------------------------  CATH:  No results found for this or any previous visit.    --------------------------------------------------------------------------------  ECHO:   No results found for this or any previous visit. No results found for this or any previous visit.    --------------------------------------------------------------------------------  HOLTER  No results found for this or any previous visit.    --------------------------------------------------------------------------------  CAROTIDS  No results found for this or any previous visit. Diagnoses and all orders for this visit:    Syncope and collapse  -     Echo complete w/ contrast if indicated; Future  -     AMB extended holter monitor;  Future    Postural dizziness with near syncope  -     Ambulatory Referral to Cardiology  -     POCT ECG    Type 2 diabetes mellitus without complication, without long-term current use of insulin ([address])    Diabetic polyneuropathy associated with type 2 diabetes mellitus ([address])    Seizure-like activity (HCC)    Depression, recurrent ([address])    Mixed hyperlipidemia    Primary hypertension       ======================================================    Past Medical History:   Diagnosis Date   • Diabetes mellitus ([address])    • Diverticulitis    • Hypertension    • Neck pain    • Renal disorder      Past Surgical History:   Procedure Laterality Date   • COLON SURGERY     • NECK SURGERY           Medications  Current Outpatient Medications   Medication Sig Dispense Refill   • acetaminophen (TYLENOL) 500 mg tablet Take 1 tablet (500 mg total) by mouth every 6 (six) hours as needed for mild pain 30 tablet 0   • albuterol (PROVENTIL HFA,VENTOLIN HFA) 90 mcg/act inhaler Inhale 1-2 puffs every 6 (six) hours as needed for wheezing 18 g 1   • ARIPiprazole (ABILIFY) 10 mg tablet Take 10 mg by mouth daily at bedtime     • aspirin (ECOTRIN LOW STRENGTH) 81 mg EC tablet Take 1 tablet (81 mg total) by mouth daily 30 tablet 3   • atorvastatin (LIPITOR) 40 mg tablet Take 1 tablet (40 mg total) by mouth daily 90 tablet 1   • Blood Glucose Monitoring Suppl (FreeStyle Lite) SHANELL Use daily Use as directed 1 each 0   • buPROPion (WELLBUTRIN SR) 200 MG 12 hr tablet Take 200 mg by mouth every morning     • cloNIDine (CATAPRES) 0.2 mg tablet Take 0.2 mg by mouth daily at bedtime     • clotrimazole-betamethasone (LOTRISONE) 1-0.05 % cream Apply topically 2 (two) times a day 30 g 0   • doxepin (SINEquan) 150 MG capsule Take 150 mg by mouth daily at bedtime     • fluticasone (FLONASE) 50 mcg/act nasal spray 1 spray into each nostril daily 16 g 0   • gabapentin (NEURONTIN) 800 mg tablet Take 1 tablet (800 mg total) by mouth 3 (three) times a day (Patient taking differently: Take 800 mg by mouth 2 (two) times a day) 90 tablet 1   • glucose blood (FREESTYLE LITE) test strip Testing sugars once a day 100 each 3   • hydrocortisone 1 % ointment Apply topically 2 (two) times a day 30 g 0   • hydrOXYzine HCL (ATARAX) 50 mg tablet Take 50 mg by mouth daily at bedtime     • Lancets (freestyle) lancets Daily 100 each 5   • lidocaine (XYLOCAINE) 2 % topical gel Apply topically as needed for mild pain 30 mL 2   • linaGLIPtin (Tradjenta) 5 MG TABS Take 5 mg by mouth daily 90 tablet 1   • meclizine (ANTIVERT) 25 mg tablet Take 1 tablet (25 mg total) by mouth every 8 (eight) hours 30 tablet 2   • pantoprazole (PROTONIX) 40 mg tablet take 1 tablet by mouth once daily before breakfast 30 tablet 3   • tamsulosin (FLOMAX) 0.4 mg Take 1 capsule (0.4 mg total) by mouth daily with dinner 7 capsule 0   • tiZANidine (ZANAFLEX) 4 mg tablet Take 1 tablet (4 mg total) by mouth every 8 (eight) hours as needed for muscle spasms 40 tablet 3   • zolpidem (AMBIEN) 10 mg tablet Take 10 mg by mouth daily at bedtime       No current facility-administered medications for this visit.         No Known Allergies    Social History     Socioeconomic History   • Marital status: /Civil Union     Spouse name: Not on file   • Number of children: Not on file   • Years of education: Not on file   • Highest education level: Not on file   Occupational History   • Not on file   Tobacco Use   • Smoking status: Every Day     Packs/day: 0.25     Types: Cigarettes, Cigars   • Smokeless tobacco: Never   • Tobacco comments:     1 cigar/daily   Vaping Use   • Vaping Use: Never used   Substance and Sexual Activity   • Alcohol use: Not Currently     Comment: occ   • Drug use: Never   • Sexual activity: Yes     Partners: Female   Other Topics Concern   • Not on file   Social History Narrative   • Not on file     Social Determinants of Health     Financial Resource Strain: Medium Risk (1/31/2023)    Overall Financial Resource Strain (CARDIA)    • Difficulty of Paying Living Expenses: Somewhat hard   Food Insecurity: No Food Insecurity (1/31/2023)    Hunger Vital Sign    • Worried About Running Out of Food in the Last Year: Never true    • Ran Out of Food in the Last Year: Never true   Transportation Needs: No Transportation Needs (2/6/2023)    PRAPARE - Transportation    • Lack of Transportation (Medical): No    • Lack of Transportation (Non-Medical): No   Physical Activity: Not on file   Stress: Stress Concern Present (2/6/2023)    [facility]    • Feeling of Stress : To some extent   Social Connections: Not on file   Intimate Partner Violence: Not on file   Housing Stability: Not on file        History reviewed. No pertinent family history.     Lab Results   Component Value Date    WBC 9.25 08/01/2023    HGB 16.1 08/01/2023    HCT 48.6 08/01/2023    MCV 93 08/01/2023     08/01/2023      Lab Results   Component Value Date    SODIUM 142 08/01/2023    K 3.9 08/01/2023     (H) 08/01/2023    CO2 24 08/01/2023    BUN 8 08/01/2023    CREATININE 1.04 08/01/2023    GLUC 165 07/11/2023    CALCIUM 9.5 08/01/2023      Lab Results   Component Value Date    HGBA1C 7.1 (H) 08/01/2023      Lab Results   Component Value Date    CHOL 237 (H) 05/15/2018     Lab Results   Component Value Date    HDL 47 08/01/2023    HDL 28 (L) 05/25/2022    HDL 32 (L) 12/03/2021     Lab Results   Component Value Date    LDLCALC 99 08/01/2023    LDLCALC 61 05/25/2022    LDLCALC 93 12/03/2021     Lab Results   Component Value Date    TRIG 21 08/01/2023    TRIG 226 (H) 05/25/2022    TRIG 294 (H) 12/03/2021     No results found for: "CHOLHDL"   Lab Results   Component Value Date    INR 0.95 04/25/2019    PROTIME 10.1 04/25/2019          Patient Active Problem List    Diagnosis Date Noted   • Syncope and collapse 07/11/2023   • Seizure-like activity ([address]) 04/19/2023   • Balanitis 01/31/2023   • Chronic bilateral low back pain with bilateral sciatica 01/05/2023   • Vertigo 05/24/2022   • Chronic bronchitis ([address]) 01/26/2022   • Tobacco abuse 10/28/2021   • Epidermal cyst 06/02/2021   • Depression, recurrent ([address]) 03/10/2021   • Diabetic polyneuropathy associated with type 2 diabetes mellitus ([address]) 12/01/2020   • Type 2 diabetes mellitus without complication, without long-term current use of insulin ([address]) 09/09/2020   • Chronic pain syndrome 09/09/2020   • Multiple lung nodules on CT 06/10/2020   • Chronic pain of left knee 02/27/2020   • Decreased hearing of left ear 01/17/2020   • Cervicalgia 12/05/2018   • Posttraumatic stiffness of shoulder joint 12/05/2018   • Intrinsic eczema 12/05/2018   • Allergic rhinitis 12/05/2018   • Left shoulder pain 10/03/2018   • Benign prostatic hyperplasia 06/18/2018   • Gastroesophageal reflux disease 05/14/2018   • History of diverticulitis 05/14/2018   • History of renal calculi 05/14/2018   • History of hemicolectomy 05/14/2018   • Hypertension 05/14/2018   • Hyperlipidemia 05/14/2018       Portions of the record may have been created with voice recognition software. Occasional wrong word or "sound a like" substitutions may have occurred due to the inherent limitations of voice recognition software. Read the chart carefully and recognize, using context, where substitutions have occurred.     Marshall Albarado DO, [facility]  10/4/2023 12:09 PM

## 2023-10-06 ENCOUNTER — PATIENT OUTREACH (OUTPATIENT)
Dept: FAMILY MEDICINE CLINIC | Facility: CLINIC | Age: 58
End: 2023-10-06

## 2023-10-06 NOTE — PROGRESS NOTES
Outgoing Calls:  10/6/2023    CMOC received a VM from pt stating he has not received any calls from his [facility]. [facility] once again called both [facility] and her Supervisor Beata Ortiz. Left VM's for both. CMOC to f/u. [facility] called pt and informed of VM's left on behalf of pt. [facility] informed pt he can consider filing a grievance at this point and also request for GANGA FORMAN to assign  new [place] agency if he does not receive a call by next week. Pt agreed and thanked [facility] for her support. Next outreach is scheduled for 10/12/2023.

## 2023-10-09 ENCOUNTER — TELEPHONE (OUTPATIENT)
Dept: CARDIOLOGY CLINIC | Facility: CLINIC | Age: 58
End: 2023-10-09

## 2023-10-09 NOTE — TELEPHONE ENCOUNTER
Patient walked into office to  Zio that was ordered on 10/5/2023. Patient had requested Zio to be delivered to our office due to issues of it being mailed to his home address. Kierra Gandara was attempted to be delivered on 10/7/2023 which is a Saturday and the office is not open on weekends. I placed call to [address] [place] [address] Drive to get an update on the zio status. They will be sending out another zio but it would not be sent today due to the holiday and would most likely be sent tomorrow and hoping it gets to the office before the weekend. We will call patient when the zio arrives to our office.

## 2023-10-10 ENCOUNTER — PATIENT OUTREACH (OUTPATIENT)
Dept: FAMILY MEDICINE CLINIC | Facility: CLINIC | Age: 58
End: 2023-10-10

## 2023-10-12 ENCOUNTER — PATIENT OUTREACH (OUTPATIENT)
Dept: FAMILY MEDICINE CLINIC | Facility: CLINIC | Age: 58
End: 2023-10-12

## 2023-10-12 NOTE — PROGRESS NOTES
Outgoing Calls:  10/12/2023    CMOC called both pt's  and SC's supervisor. Unable to leave VM for TOPHER Zaragoza as her VM is full. CMOC left VM for Melinda Escamilla, supervisor at [employer]. CMOC to assist pt in filing a grievance with GANGA FORMAN tomorrow. Next outreach is scheduled for 10/13/2023.

## 2023-10-13 ENCOUNTER — PATIENT OUTREACH (OUTPATIENT)
Dept: FAMILY MEDICINE CLINIC | Facility: CLINIC | Age: 58
End: 2023-10-13

## 2023-10-13 NOTE — PROGRESS NOTES
I called the patient but received voicemail.   Message was left stating I will call back at another time and I also reminded him of his Echo on Monday, 10/16 at 2pm.

## 2023-10-13 NOTE — PROGRESS NOTES
Outgoing Call:  10/13/2023    [facility] called Jazmin Brewster at [address] and left VM. [facility] then called Pt's , Tan Vaca, and was able to reach her. CMOC explained reason for call. Pt has not received waiver services in almost one year and has been unsuccessful in reaching her. Tan [place] informed she has not only called Pt four times but has gone to his home at least three times and has been unsuccessful in making contact with him. CMOC informed Pt has been seeking assistance in reaching out to her for some time now and provided accurate demographics for Pt. [place] informed she will attempt to call Pt. [facility] offered to facilitate a three way kishore to Pt in order to schedule assessment. She agreed. CMOC called Pt and a phone interview was scheduled for 11/16/2023 at 11 AM via phone. After the call [facility] encouraged Pt to call SC if she did not call, number was provided. CMOC informed Pt of what to expect during the visit. Pt thanked [facility] for her assistance. Next outreach is scheduled for 10/20/2023.

## 2023-10-18 ENCOUNTER — TELEPHONE (OUTPATIENT)
Dept: CARDIOLOGY CLINIC | Facility: CLINIC | Age: 58
End: 2023-10-18

## 2023-10-18 ENCOUNTER — PATIENT OUTREACH (OUTPATIENT)
Dept: FAMILY MEDICINE CLINIC | Facility: CLINIC | Age: 58
End: 2023-10-18

## 2023-10-18 NOTE — PROGRESS NOTES
I received a call from the patient stating last night he was profusely sweating and his Zio patch came off. He stated he received a number to call but was unsure what to do. I offered to call on his behalf. The number was for [email or web address] the company who makes the [facility] patch. I was asked that the patient return the current patch and the provider needs to reorder another, stating the patient is not to be charged. Chart reviewed. I read a note from the MA from the Cards office who was in contact with the patient. I messaged her regarding the above and she started the process of reordering a new patch. I called the patient back to inform him and stated Cards office will call him when the patch arrives so he can pick it up. The patient stated he nearly fell recently when he tripped "for no reason". He denies having any symptoms at that time and his blood sugar was 153. I informed the patient he no-showed for his Echo 10/16  but he did not understand stating he thought he had this done. I offered to reschedule the Echo; now scheduled for 10/23. I called the patient to inform him but will also call him Monday morning with another reminder. The patient was very appreciative of all the help.

## 2023-10-18 NOTE — TELEPHONE ENCOUNTER
Patient walked into office earlier this morning stating that he out his Zio patch on last night and it fell off while he was sleeping due to sweating. Patient was instructed to call Zio to request a new patch to be sent to him and send the one he was wearing back to the company. I got a message on teams from Barron Finnegan who is the patient's RN CM. She got a call from patient about his zio. She called [facility] but is unsure if the request was put in for a new zio monitor. I placed call to [facility] to confirm a new zio will be sent out. Patient needs Zio to be delivered to our office. If zio gets delivered while we are closed, we will need to call again. I did request if zio could be sent overnight. Patient is not to be charged for the first zio when he sends it back.

## 2023-10-23 ENCOUNTER — PATIENT OUTREACH (OUTPATIENT)
Dept: FAMILY MEDICINE CLINIC | Facility: CLINIC | Age: 58
End: 2023-10-23

## 2023-10-23 ENCOUNTER — HOSPITAL ENCOUNTER (OUTPATIENT)
Dept: NON INVASIVE DIAGNOSTICS | Facility: HOSPITAL | Age: 58
Discharge: HOME | End: 2023-10-23
Payer: MEDICARE

## 2023-10-23 VITALS
HEART RATE: 98 BPM | BODY MASS INDEX: 26.24 KG/M2 | WEIGHT: 198 LBS | HEIGHT: 73 IN | SYSTOLIC BLOOD PRESSURE: 114 MMHG | DIASTOLIC BLOOD PRESSURE: 71 MMHG

## 2023-10-23 DIAGNOSIS — R55 SYNCOPE AND COLLAPSE: ICD-10-CM

## 2023-10-23 LAB
AORTIC ROOT: 3.7 CM
AORTIC VALVE MEAN VELOCITY: 6.2 M/S
APICAL FOUR CHAMBER EJECTION FRACTION: 49 %
ASCENDING AORTA: 3.7 CM
AV LVOT MEAN GRADIENT: 2 MMHG
AV LVOT PEAK GRADIENT: 4 MMHG
AV MEAN GRADIENT: 2 MMHG
AV PEAK GRADIENT: 3 MMHG
AV VELOCITY RATIO: 1.05
DOP CALC AO PEAK VEL: 0.92 M/S
DOP CALC AO VTI: 19.39 CM
DOP CALC LVOT PEAK VEL VTI: 18.46 CM
DOP CALC LVOT PEAK VEL: 0.97 M/S
E WAVE DECELERATION TIME: 222 MS
E/A RATIO: 0.8
FRACTIONAL SHORTENING: 38 (ref 28–44)
INTERVENTRICULAR SEPTUM IN DIASTOLE (PARASTERNAL SHORT AXIS VIEW): 1 CM
INTERVENTRICULAR SEPTUM: 1 CM (ref 0.6–1.1)
LAAS-AP2: 19.9 CM2
LAAS-AP4: 18.7 CM2
LEFT ATRIUM SIZE: 4 CM
LEFT ATRIUM VOLUME (MOD BIPLANE): 56 ML
LEFT ATRIUM VOLUME INDEX (MOD BIPLANE): 26.2 ML/M2
LEFT INTERNAL DIMENSION IN SYSTOLE: 3.1 CM (ref 2.1–4)
LEFT VENTRICULAR INTERNAL DIMENSION IN DIASTOLE: 5 CM (ref 3.5–6)
LEFT VENTRICULAR POSTERIOR WALL IN END DIASTOLE: 1 CM
LEFT VENTRICULAR STROKE VOLUME: 77 ML
LVSV (TEICH): 77 ML
MV E'TISSUE VEL-SEP: 6 CM/S
MV PEAK A VEL: 0.69 M/S
MV PEAK E VEL: 55 CM/S
MV STENOSIS PRESSURE HALF TIME: 64 MS
MV VALVE AREA P 1/2 METHOD: 3.44
RA PRESSURE ESTIMATED: 3 MMHG
RIGHT VENTRICLE ID DIMENSION: 3.7 CM
RV PSP: 21 MMHG
SL CV LEFT ATRIUM LENGTH A2C: 5.2 CM
SL CV LV EF: 52
SL CV PED ECHO LEFT VENTRICLE DIASTOLIC VOLUME (MOD BIPLANE) 2D: 116 ML
SL CV PED ECHO LEFT VENTRICLE SYSTOLIC VOLUME (MOD BIPLANE) 2D: 39 ML
TR MAX PG: 18 MMHG
TR PEAK VELOCITY: 2.2 M/S
TRICUSPID ANNULAR PLANE SYSTOLIC EXCURSION: 2.1 CM
TRICUSPID VALVE PEAK REGURGITATION VELOCITY: 2.15 M/S

## 2023-10-23 PROCEDURE — 93306 TTE W/DOPPLER COMPLETE: CPT

## 2023-10-23 NOTE — PROGRESS NOTES
I called the patient to remind him of his Echo today at noon; he plans to be there at 1130. I will continue to follow.

## 2023-10-24 ENCOUNTER — PATIENT OUTREACH (OUTPATIENT)
Dept: FAMILY MEDICINE CLINIC | Facility: CLINIC | Age: 58
End: 2023-10-24

## 2023-10-24 NOTE — PROGRESS NOTES
Outgoing Call:  10/24/2023    [facility] called Pt to discuss status of waiver services. Pt informed his  did go to his home and completed an evaluation for services. Pt states the SC requested 33 hours a week and will let him know by end of week if they were approved. Pt thanked [facility] for providing assistance and states he is very grateful for the services he has received. CMOC to f/u. Next outreach is scheduled for 10/31/2023.

## 2023-10-27 ENCOUNTER — TELEPHONE (OUTPATIENT)
Dept: CARDIOLOGY CLINIC | Facility: CLINIC | Age: 58
End: 2023-10-27

## 2023-10-27 NOTE — TELEPHONE ENCOUNTER
----- Message from Noel Duong DO sent at 10/27/2023  6:50 AM EDT -----  Please call the patient regarding their normal result.

## 2023-10-31 ENCOUNTER — PATIENT OUTREACH (OUTPATIENT)
Dept: FAMILY MEDICINE CLINIC | Facility: CLINIC | Age: 58
End: 2023-10-31

## 2023-10-31 NOTE — PROGRESS NOTES
Outgoing Call:  10/31/2023    [facility] called Pt to check on status of waiver services. Pt informed he was approved for waiver services as of today. Pt will begin receiving services on via [facility] [employer]. S/O, Darline Level, will be Pt's caretaker. Pt has been approved for 33 hours a week. Pt's  is Hodan Coleman, and can be reached at [phone]. [place] agency is [address] [place] [address]. Pt confirmed his SC has ordered a Life Alert device for him and should be receiving this in the mail. [facility] also informed Pt he may be eligible for [employer] Meals. Patient was referred on [employer] to [employer] for food. SC would have to enroll Pt per his request. Pt agreed to look into this. Pt is aware this referral will be closed as needs have been met. Pt stated that he was very thankful to [facility] for her assistance. No further outreach is scheduled.

## 2023-11-13 ENCOUNTER — PATIENT OUTREACH (OUTPATIENT)
Dept: FAMILY MEDICINE CLINIC | Facility: CLINIC | Age: 58
End: 2023-11-13

## 2023-11-14 ENCOUNTER — CLINICAL SUPPORT (OUTPATIENT)
Dept: FAMILY MEDICINE CLINIC | Facility: CLINIC | Age: 58
End: 2023-11-14

## 2023-11-14 VITALS — DIASTOLIC BLOOD PRESSURE: 84 MMHG | SYSTOLIC BLOOD PRESSURE: 137 MMHG | HEART RATE: 79 BPM

## 2023-11-14 DIAGNOSIS — M25.512 CHRONIC PAIN OF BOTH SHOULDERS: ICD-10-CM

## 2023-11-14 DIAGNOSIS — M54.41 CHRONIC BILATERAL LOW BACK PAIN WITH BILATERAL SCIATICA: ICD-10-CM

## 2023-11-14 DIAGNOSIS — G89.29 CHRONIC PAIN OF BOTH SHOULDERS: ICD-10-CM

## 2023-11-14 DIAGNOSIS — M54.42 CHRONIC BILATERAL LOW BACK PAIN WITH BILATERAL SCIATICA: ICD-10-CM

## 2023-11-14 DIAGNOSIS — G89.29 CHRONIC BILATERAL LOW BACK PAIN WITH BILATERAL SCIATICA: ICD-10-CM

## 2023-11-14 DIAGNOSIS — E11.9 TYPE 2 DIABETES MELLITUS WITHOUT COMPLICATION, WITHOUT LONG-TERM CURRENT USE OF INSULIN (HCC): Primary | ICD-10-CM

## 2023-11-14 DIAGNOSIS — A04.8 H. PYLORI INFECTION: ICD-10-CM

## 2023-11-14 DIAGNOSIS — J40 BRONCHITIS: ICD-10-CM

## 2023-11-14 DIAGNOSIS — E11.9 TYPE 2 DIABETES MELLITUS WITHOUT COMPLICATION, WITHOUT LONG-TERM CURRENT USE OF INSULIN (HCC): ICD-10-CM

## 2023-11-14 DIAGNOSIS — E78.2 MIXED HYPERLIPIDEMIA: ICD-10-CM

## 2023-11-14 DIAGNOSIS — M25.511 CHRONIC PAIN OF BOTH SHOULDERS: ICD-10-CM

## 2023-11-14 DIAGNOSIS — N20.0 KIDNEY STONE: ICD-10-CM

## 2023-11-14 DIAGNOSIS — R42 VERTIGO: ICD-10-CM

## 2023-11-14 RX ORDER — PANTOPRAZOLE SODIUM 40 MG/1
40 TABLET, DELAYED RELEASE ORAL DAILY
Qty: 30 TABLET | Refills: 3 | Status: SHIPPED | OUTPATIENT
Start: 2023-11-14

## 2023-11-14 RX ORDER — GABAPENTIN 800 MG/1
800 TABLET ORAL 3 TIMES DAILY
Qty: 90 TABLET | Refills: 1 | Status: SHIPPED | OUTPATIENT
Start: 2023-11-14

## 2023-11-14 RX ORDER — ALBUTEROL SULFATE 90 UG/1
1-2 AEROSOL, METERED RESPIRATORY (INHALATION) EVERY 6 HOURS PRN
Qty: 18 G | Refills: 1 | Status: SHIPPED | OUTPATIENT
Start: 2023-11-14

## 2023-11-14 RX ORDER — BLOOD-GLUCOSE METER
KIT MISCELLANEOUS
Qty: 100 EACH | Refills: 3 | Status: SHIPPED | OUTPATIENT
Start: 2023-11-14

## 2023-11-14 RX ORDER — LINAGLIPTIN 5 MG/1
5 TABLET, FILM COATED ORAL DAILY
Qty: 90 TABLET | Refills: 1 | Status: SHIPPED | OUTPATIENT
Start: 2023-11-14

## 2023-11-14 RX ORDER — MECLIZINE HYDROCHLORIDE 25 MG/1
25 TABLET ORAL EVERY 8 HOURS SCHEDULED
Qty: 30 TABLET | Refills: 2 | Status: SHIPPED | OUTPATIENT
Start: 2023-11-14

## 2023-11-14 RX ORDER — LANCETS 28 GAUGE
EACH MISCELLANEOUS
Qty: 100 EACH | Refills: 5 | Status: SHIPPED | OUTPATIENT
Start: 2023-11-14

## 2023-11-14 RX ORDER — TIZANIDINE 4 MG/1
4 TABLET ORAL EVERY 8 HOURS PRN
Qty: 40 TABLET | Refills: 3 | Status: SHIPPED | OUTPATIENT
Start: 2023-11-14

## 2023-11-14 RX ORDER — ATORVASTATIN CALCIUM 40 MG/1
40 TABLET, FILM COATED ORAL DAILY
Qty: 90 TABLET | Refills: 1 | Status: SHIPPED | OUTPATIENT
Start: 2023-11-14

## 2023-11-14 RX ORDER — GABAPENTIN 800 MG/1
800 TABLET ORAL 2 TIMES DAILY
Qty: 60 TABLET | Refills: 2 | Status: SHIPPED | OUTPATIENT
Start: 2023-11-14

## 2023-11-14 RX ORDER — PANTOPRAZOLE SODIUM 40 MG/1
TABLET, DELAYED RELEASE ORAL
Qty: 30 TABLET | Refills: 3 | Status: SHIPPED | OUTPATIENT
Start: 2023-11-14

## 2023-11-14 RX ORDER — ASPIRIN 81 MG/1
81 TABLET ORAL DAILY
Qty: 30 TABLET | Refills: 3 | Status: SHIPPED | OUTPATIENT
Start: 2023-11-14

## 2023-11-14 NOTE — Clinical Note
Patient needs some refills.   Sending chart this way as a test, I will come find you to discuss "Follow-up"

## 2023-11-14 NOTE — PROGRESS NOTES
[address] [facility]  [address] [facility], [address]  [phone]  Clinical Pharmacy Consultation    Encounter provider: Mervin Cunha, Pharmacist    This patient was referred by PCP for pharmacy consultation. Thank you for the referral. Sending this note to PCP. Assessment/Plan  1. Type 2 diabetes mellitus without complication, without long-term current use of insulin (HCC)      Type 2 diabetes:    Current Diabetes Regimen:  NONE patient admits to not currently taking his medications. -   Visit focused on DM control, pt interested in CGM  Did not tolerate metformin in the past  Blood glucose well controlled   Most recent A1c above goal of <7%. Currently asymptomatic and nonadherant with treatment plan  Recent BG range 160 - 210, usually ~ 160-180  Reports he does not feel well when blood sugar drops to like 120, 140 etc.  Explained pathophysiology of this  Sending this encounter note for PCP review/concurrence. Counseled lifestyle improvement weight loss, follow a diabetic diet, and decrease high carbohydrates snacks. Counseled on medication compliance and exercise as tolerated. BG is at the point that it can be controlled with just lifestyle changes alone:  Counseled patient on diabetes meal planning, the plate method, endorsed by ADA. Fill half your plate with non-starchy vegetables such as green beans, broccoli, cauliflower, asparagus, brussel sprouts, salad, etc. One fourth of the plate with carbohydrate containing foods such as grains, corn, potatoes, rice, pasta, fruit, etc  (no more than 45-50 grams of carbs) and fill the last fourth of the plate with high protein containing foods such as chicken turkey tofu, fish, shrimp, eggs, or chick peas. Discussed which carbohydrate foods are highest in fiber. Discussed which protein foods are lowest in saturated fat. Discussed to avoid drinking beverages high in sugar such as soda or fruit juice. He is also highly interested in CGM. His insurance covers freestyle daren products. Unsure if they will cover since he is not on a GLP-1 receptor agonist or insulin. But it is worth trying. Explained this to him and will send to pharmacy. New DM regimen:   Tradjenta 5 mg daily -restart  FUTURE: SGLT2i instead? GLP1 instead? RTC in in 4 weeks     Hyperlipidemia without clinical ASCVD event:  The 10-year ASCVD risk score (Melissa SOMMER, et al., 2019) is: 19.4%    Values used to calculate the score:      Age: 59 years      Sex: Male      Is Non- : No      Diabetic: Yes      Tobacco smoker: Yes      Systolic Blood Pressure: 555 mmHg      Is BP treated: Yes      HDL Cholesterol: 47 mg/dL      Total Cholesterol: 150 mg/dL   Most recent lipid panel is within an acceptable range  2018 ACC/AHA blood cholesterol guidelines recommends moderate-intensity statin. Patient currently on moderate-intensity statin and tolerating well without side effects. He admits he has not been overly adherent to atorvastatin, but he is restarting it    HTN:   BP goal: <140/90 mmHg   In-office BP below goal, HR acceptable. Hypertension is at goal and not of concern  137/84 - 79    BMI Counseling: There is no height or weight on file to calculate BMI. The BMI is above normal. Nutrition recommendations include reducing portion sizes, decreasing overall calorie intake, and moderation in carbohydrate intake. BMI Categories:  Underweight = <18.5  Normal weight = 18.5-24.9  Overweight = 25-29.9  Obesity = BMI of 30 or greater     FYI to PCP:   Will pend Rx for PCP signature/concurrence  Orders placed under CPA    Medication list was updated and discussed with patient. Primary portion of visits focused on this medication reconciliation. Tried to remove anything from med list the patient was no longer taking.    Discontinued medications: Tamsulosin    Clinically significant drug interactions identified: none   Side effects from medication(s) reported: none    The exact medication regimen patient is taking is unclear. Throughout the visit sometimes he reports that yes he is definitely taking a medication, then he will report he is not drinking any medications at this time. Ideally, if patient is not using his psych regimen, would consider weaning some of these medications, looks like they may be being prescribed elsewhere. Asked patient to please bring all medications to next visit for formal medication reconciliation    Patient's primary concern today that he brought up multiple times with his feeling of fainting/acute dizziness/falling to the floor suddenly. Attempted to determine what was causing this, ruled out blood sugar drops, orthostatic hypotension, looks like he has cardiology follow-up scheduled. Does not sound like vertigo. So unclear. Recommend evaluation by primary care provider, consider neurology. He does note that his sister has the same problem. Of note he does state that he is not taking his sedating bedtime drugs during the day. He needs some refills, pended to PCP. The patient communicates understanding of the treatment plan. [employer]*Modal Dragon software was used to dictate this note. It may contain errors with dictating incorrect words or incorrect spelling. Please contact the provider directly with any questions. Thank you for your understanding. Caitlyn Brambila is a 59 y.o. male who presents in-person for initial clinical pharmacist consult. Reason for visit: diabetes, polypharmacy, and medication review. No particular questions or concerns.   HPI     Social History     Tobacco Use   Smoking Status Every Day    Packs/day: 0.25    Types: Cigarettes, Cigars   Smokeless Tobacco Never   Tobacco Comments    1 cigar/daily        No Known Allergies       Current Outpatient Medications:     acetaminophen (TYLENOL) 500 mg tablet, Take 1 tablet (500 mg total) by mouth every 6 (six) hours as needed for mild pain, Disp: 30 tablet, Rfl: 0    albuterol (PROVENTIL HFA,VENTOLIN HFA) 90 mcg/act inhaler, Inhale 1-2 puffs every 6 (six) hours as needed for wheezing, Disp: 18 g, Rfl: 1    ARIPiprazole (ABILIFY) 10 mg tablet, Take 10 mg by mouth daily at bedtime, Disp: , Rfl:     aspirin (ECOTRIN LOW STRENGTH) 81 mg EC tablet, Take 1 tablet (81 mg total) by mouth daily, Disp: 30 tablet, Rfl: 3    atorvastatin (LIPITOR) 40 mg tablet, Take 1 tablet (40 mg total) by mouth daily, Disp: 90 tablet, Rfl: 1    Blood Glucose Monitoring Suppl (FreeStyle Lite) SHANELL, Use daily Use as directed, Disp: 1 each, Rfl: 0    buPROPion (WELLBUTRIN SR) 200 MG 12 hr tablet, Take 200 mg by mouth every morning, Disp: , Rfl:     cloNIDine (CATAPRES) 0.2 mg tablet, Take 0.2 mg by mouth daily at bedtime, Disp: , Rfl:     clotrimazole-betamethasone (LOTRISONE) 1-0.05 % cream, Apply topically 2 (two) times a day, Disp: 30 g, Rfl: 0    doxepin (SINEquan) 150 MG capsule, Take 150 mg by mouth daily at bedtime, Disp: , Rfl:     fluticasone (FLONASE) 50 mcg/act nasal spray, 1 spray into each nostril daily, Disp: 16 g, Rfl: 0    gabapentin (NEURONTIN) 800 mg tablet, Take 1 tablet (800 mg total) by mouth 3 (three) times a day (Patient taking differently: Take 800 mg by mouth 2 (two) times a day), Disp: 90 tablet, Rfl: 1    glucose blood (FREESTYLE LITE) test strip, Testing sugars once a day, Disp: 100 each, Rfl: 3    hydrocortisone 1 % ointment, Apply topically 2 (two) times a day, Disp: 30 g, Rfl: 0    hydrOXYzine HCL (ATARAX) 50 mg tablet, Take 50 mg by mouth daily at bedtime, Disp: , Rfl:     Lancets (freestyle) lancets, Daily, Disp: 100 each, Rfl: 5    lidocaine (XYLOCAINE) 2 % topical gel, Apply topically as needed for mild pain, Disp: 30 mL, Rfl: 2    linaGLIPtin (Tradjenta) 5 MG TABS, Take 5 mg by mouth daily, Disp: 90 tablet, Rfl: 1    meclizine (ANTIVERT) 25 mg tablet, Take 1 tablet (25 mg total) by mouth every 8 (eight) hours, Disp: 30 tablet, Rfl: 2    pantoprazole (PROTONIX) 40 mg tablet, take 1 tablet by mouth once daily before breakfast, Disp: 30 tablet, Rfl: 3    tamsulosin (FLOMAX) 0.4 mg, Take 1 capsule (0.4 mg total) by mouth daily with dinner, Disp: 7 capsule, Rfl: 0    tiZANidine (ZANAFLEX) 4 mg tablet, Take 1 tablet (4 mg total) by mouth every 8 (eight) hours as needed for muscle spasms, Disp: 40 tablet, Rfl: 3    zolpidem (AMBIEN) 10 mg tablet, Take 10 mg by mouth daily at bedtime, Disp: , Rfl:     Objective  Vitals:    Wt Readings from Last 3 Encounters:   10/23/23 89.8 kg (198 lb)   10/04/23 89.8 kg (198 lb)   08/01/23 90.8 kg (200 lb 3.2 oz)     Temp Readings from Last 3 Encounters:   08/01/23 98.1 °F (36.7 °C) (Temporal)   07/11/23 97.6 °F (36.4 °C) (Temporal)   05/30/23 97.6 °F (36.4 °C) (Oral)     BP Readings from Last 3 Encounters:   10/23/23 114/71   10/04/23 114/71   08/01/23 118/80     Pulse Readings from Last 3 Encounters:   10/23/23 98   10/04/23 98   08/01/23 96       Labs:   Lab Results   Component Value Date/Time    HGBA1C 7.1 (H) 08/01/2023 10:59 AM    HGBA1C 6.9 (A) 04/19/2023 01:10 PM    HGBA1C 6.9 (A) 01/05/2023 11:28 AM    HGBA1C 6.6 (A) 09/02/2022 11:31 AM    HGBA1C 6.4 (H) 05/24/2022 10:13 AM    HGBA1C 6.2 09/20/2018 08:44 AM    HGBA1C 6.2 (H) 05/17/2018 08:15 AM       Lab Results   Component Value Date/Time    BUN 8 08/01/2023 10:59 AM    BUN 8 05/30/2023 11:52 AM    BUN 9 04/18/2023 01:35 PM    BUN 11 05/17/2018 08:15 AM    BUN 12 05/15/2018 07:57 AM    CREATININE 1.04 08/01/2023 10:59 AM    CREATININE 0.98 05/30/2023 11:52 AM    CREATININE 0.83 04/18/2023 01:35 PM    EGFR 77 08/01/2023 10:59 AM    EGFR 83 05/30/2023 11:52 AM    EGFR 95 04/18/2023 01:35 PM       Lab Results   Component Value Date/Time    CREATININEUR 72.6 10/06/2022 10:17 AM    LABMICR <5.0 10/06/2022 10:17 AM    LABMICR <0.6 05/15/2018 07:57 AM    MICROALBCRE <7 10/06/2022 10:17 AM    MICROALBCRE See Note 05/15/2018 07:57 AM       Lab Results   Component Value Date/Time    CHOLESTEROL 150 08/01/2023 10:59 AM    CHOLESTEROL 134 05/25/2022 05:07 AM    TRIG 21 08/01/2023 10:59 AM    TRIG 226 (H) 05/25/2022 05:07 AM    TRIG 217 (H) 05/15/2018 07:57 AM    HDL 47 08/01/2023 10:59 AM    HDL 28 (L) 05/25/2022 05:07 AM    HDL 42 05/15/2018 07:57 AM    LDLCALC 99 08/01/2023 10:59 AM    LDLCALC 61 05/25/2022 05:07 AM    LDLCALC 152 (H) 05/15/2018 07:57 AM       Eye Exam: No results found for: "RIGHTDIABRET", "RIGHTMACEDEM", "RIGHTOTHERRE", "LEFTDIABRET", "LEFTMACEDEM", "LEFTOTHERRE"  Donna Whaley Pharmacist    -------------------------------------------------------------------------------------------------------------    Pharmacist Tracking Tool  Reason For Outreach: Embedded Pharmacist  Demographics:  Intervention Method:  In Person  Type of Intervention: New  Topics Addressed: Diabetes, Polypharmacy, and Psychiatric disorders  Pharmacologic Interventions: Medication Discontinuation  Non-Pharmacologic Interventions: Adherence addressed, Chart update, Disease state education, and Medication Monitoring  Time:  Direct Patient Care:  40  mins  Care Coordination:  10  mins  Recommendation Recipient: Patient/Caregiver and Provider  Outcome: Accepted

## 2023-11-14 NOTE — Clinical Note
2:50 PM This still says the orders aren't signed. So I might have to end up sending you a tele enc. :/ -- please let me know if you can't figure it out and ill come help! !!

## 2023-11-20 ENCOUNTER — CLINICAL SUPPORT (OUTPATIENT)
Dept: FAMILY MEDICINE CLINIC | Facility: CLINIC | Age: 58
End: 2023-11-20

## 2023-11-20 DIAGNOSIS — Z71.89 ENCOUNTER FOR DIABETES EDUCATION: Primary | ICD-10-CM

## 2023-11-27 ENCOUNTER — CLINICAL SUPPORT (OUTPATIENT)
Dept: CARDIOLOGY CLINIC | Facility: CLINIC | Age: 58
End: 2023-11-27
Payer: MEDICARE

## 2023-11-27 DIAGNOSIS — R55 SYNCOPE AND COLLAPSE: ICD-10-CM

## 2023-11-27 PROCEDURE — 93248 EXT ECG>7D<15D REV&INTERPJ: CPT

## 2023-11-28 ENCOUNTER — CLINICAL SUPPORT (OUTPATIENT)
Dept: FAMILY MEDICINE CLINIC | Facility: CLINIC | Age: 58
End: 2023-11-28

## 2023-11-28 DIAGNOSIS — E11.9 TYPE 2 DIABETES MELLITUS WITHOUT COMPLICATION, WITHOUT LONG-TERM CURRENT USE OF INSULIN (HCC): Primary | ICD-10-CM

## 2023-12-01 ENCOUNTER — TELEPHONE (OUTPATIENT)
Dept: FAMILY MEDICINE CLINIC | Facility: CLINIC | Age: 58
End: 2023-12-01

## 2023-12-01 ENCOUNTER — OFFICE VISIT (OUTPATIENT)
Dept: CARDIOLOGY CLINIC | Facility: CLINIC | Age: 58
End: 2023-12-01
Payer: MEDICARE

## 2023-12-01 ENCOUNTER — PATIENT OUTREACH (OUTPATIENT)
Dept: FAMILY MEDICINE CLINIC | Facility: CLINIC | Age: 58
End: 2023-12-01

## 2023-12-01 VITALS
WEIGHT: 201.1 LBS | DIASTOLIC BLOOD PRESSURE: 70 MMHG | HEART RATE: 84 BPM | SYSTOLIC BLOOD PRESSURE: 117 MMHG | BODY MASS INDEX: 26.53 KG/M2

## 2023-12-01 DIAGNOSIS — I10 PRIMARY HYPERTENSION: ICD-10-CM

## 2023-12-01 DIAGNOSIS — E11.42 DIABETIC POLYNEUROPATHY ASSOCIATED WITH TYPE 2 DIABETES MELLITUS (HCC): ICD-10-CM

## 2023-12-01 DIAGNOSIS — R55 SYNCOPE AND COLLAPSE: Primary | ICD-10-CM

## 2023-12-01 DIAGNOSIS — F33.9 DEPRESSION, RECURRENT (HCC): ICD-10-CM

## 2023-12-01 DIAGNOSIS — J42 CHRONIC BRONCHITIS, UNSPECIFIED CHRONIC BRONCHITIS TYPE (HCC): ICD-10-CM

## 2023-12-01 DIAGNOSIS — R56.9 SEIZURE-LIKE ACTIVITY (HCC): ICD-10-CM

## 2023-12-01 DIAGNOSIS — E11.9 TYPE 2 DIABETES MELLITUS WITHOUT COMPLICATION, WITHOUT LONG-TERM CURRENT USE OF INSULIN (HCC): ICD-10-CM

## 2023-12-01 PROCEDURE — 99214 OFFICE O/P EST MOD 30 MIN: CPT

## 2023-12-01 NOTE — TELEPHONE ENCOUNTER
12/01/23    PT CAME INTO THE OFFICE TODAY REQUESTING THE FOLLOWING MED SCRIPT    Continuous Blood Gluc Sensor (FreeStyle Shira 2 Sensor) MISC   CAN BE CANCELED AND RECENT    PT STATED THAT PHARMACY ADVISED PT TO REACH OUT TO PCP FOR THE AUTHORIZATION TO BE SENT TO THE INSURANCE.  (PT STATED)     PT HAVES AN APPT 12/06/23 AND STATED THAT HE WILL DISCUSS WITH PCP AT VISIT TOO      ANY QUESTIONS, PLEASE CONTACT PT

## 2023-12-01 NOTE — PROGRESS NOTES
I called the patient to follow up but he was currently driving to [facility] for a replacement sensor. He states the last one was damaged. I reminded the patient of his Cards appointment for this afternoon at 1pm; he was unaware but plans on attending. I will continue to follow.
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
## 2023-12-01 NOTE — RESULT ENCOUNTER NOTE
I personally reviewed the Zio patch monitoring strips. Patient had a min HR of 55 bpm, max HR of 226 bpm, and avg HR of 92 bpm.  Predominant underlying rhythm was Sinus Rhythm. Slight P wave morphology changes were noted. 7 Supraventricular Tachycardia runs occurred, the run with the  fastest interval lasting 10 beats with a max rate of 226 bpm (avg 207 bpm); the run  with the fastest interval was also the longest. Some episodes of Supraventricular  Tachycardia may be possible Atrial Tachycardia with variable block. True duration of  Supraventricular Tachycardia difficult to ascertain due to artifact. Isolated SVEs  were rare (<1.0%), SVE Couplets were rare (<1.0%), and SVE Triplets were rare  (<1.0%). Isolated VEs were rare (<1.0%), VE Couplets were rare (<1.0%), and no VE  Triplets were present. Ventricular Bigeminy and Trigeminy were present.     Symptomatic triggered episodes were associated with sinus rhythm with PVCs

## 2023-12-05 ENCOUNTER — PATIENT OUTREACH (OUTPATIENT)
Dept: FAMILY MEDICINE CLINIC | Facility: CLINIC | Age: 58
End: 2023-12-05

## 2023-12-05 NOTE — PROGRESS NOTES
I called the patient but received voicemail. Message was left reminding him of his PCP appointment for tomorrow at 1020. I also reminded him to schedule his lung CT. I will continue further outreach. The patient returned my call. I informed him of the info above. He asked if I would schedule his lung CT. I called [facility] and secured an appointment for 12/19 at [facility]. I called the patient to inform him of this but he was driving. He asked me to call him back. I called the patient back to inform him of the CT date. This date was chosen thinking the patient would be due for a new sensor. The patient stated he does not have a sensor on and does not know when he would get another placed. At this point, we will keep the CT scheduled for 12/19 unless the patient has a sensor placed in the meantime. He stated he will call me tomorrow.

## 2023-12-08 LAB
LEFT EYE DIABETIC RETINOPATHY: NORMAL
RIGHT EYE DIABETIC RETINOPATHY: NORMAL

## 2023-12-11 ENCOUNTER — CLINICAL SUPPORT (OUTPATIENT)
Dept: FAMILY MEDICINE CLINIC | Facility: CLINIC | Age: 58
End: 2023-12-11

## 2023-12-11 DIAGNOSIS — E11.9 TYPE 2 DIABETES MELLITUS WITHOUT COMPLICATION, WITHOUT LONG-TERM CURRENT USE OF INSULIN (HCC): Primary | ICD-10-CM

## 2023-12-12 ENCOUNTER — PATIENT OUTREACH (OUTPATIENT)
Dept: FAMILY MEDICINE CLINIC | Facility: CLINIC | Age: 58
End: 2023-12-12

## 2023-12-12 NOTE — PROGRESS NOTES
I called the patient to discuss his sensor placement and CT. The patient stated he had a sensor placed yesterday but it is not working. I asked if he could wait until after 12/19 (date of CT) to replace the sensor; he agreed.   The patient also needs to reschedule his PCP appointment; note sent to clerical.

## 2023-12-18 ENCOUNTER — PATIENT OUTREACH (OUTPATIENT)
Dept: FAMILY MEDICINE CLINIC | Facility: CLINIC | Age: 58
End: 2023-12-18

## 2023-12-18 NOTE — PROGRESS NOTES
I received a call from the patient stating his sensor is now working and he is scheduled to remove it next week.  To avoid wasting this sensor, with the patient's permission, I rescheduled his CT to next week.  The patient was very appreciative.  I will call him next week to remind him.  I also reminded the patient of his PCP appointment 12/20; he plans on attending.

## 2023-12-26 ENCOUNTER — HOSPITAL ENCOUNTER (OUTPATIENT)
Dept: CT IMAGING | Facility: HOSPITAL | Age: 58
Discharge: HOME | End: 2023-12-26
Payer: MEDICARE

## 2023-12-26 ENCOUNTER — PATIENT OUTREACH (OUTPATIENT)
Dept: FAMILY MEDICINE CLINIC | Facility: CLINIC | Age: 58
End: 2023-12-26

## 2023-12-26 DIAGNOSIS — Z12.2 ENCOUNTER FOR SCREENING FOR LUNG CANCER: ICD-10-CM

## 2023-12-26 DIAGNOSIS — F17.210 SMOKING GREATER THAN 30 PACK YEARS: ICD-10-CM

## 2023-12-26 PROCEDURE — 71271 CT THORAX LUNG CANCER SCR C-: CPT

## 2023-12-26 NOTE — PROGRESS NOTES
I called the patient to remind him of his CT appointment at 1130, asking to arrive 1115 and not to wear any metal.  I will continue to follow.

## 2024-01-10 ENCOUNTER — APPOINTMENT (OUTPATIENT)
Dept: LAB | Facility: CLINIC | Age: 59
End: 2024-01-10
Payer: MEDICARE

## 2024-01-10 ENCOUNTER — OFFICE VISIT (OUTPATIENT)
Dept: FAMILY MEDICINE CLINIC | Facility: CLINIC | Age: 59
End: 2024-01-10

## 2024-01-10 VITALS
HEART RATE: 77 BPM | DIASTOLIC BLOOD PRESSURE: 74 MMHG | OXYGEN SATURATION: 97 % | HEIGHT: 73 IN | TEMPERATURE: 97.6 F | SYSTOLIC BLOOD PRESSURE: 130 MMHG | RESPIRATION RATE: 16 BRPM | WEIGHT: 197 LBS | BODY MASS INDEX: 26.11 KG/M2

## 2024-01-10 DIAGNOSIS — J40 BRONCHITIS: ICD-10-CM

## 2024-01-10 DIAGNOSIS — Z11.3 ROUTINE SCREENING FOR STI (SEXUALLY TRANSMITTED INFECTION): ICD-10-CM

## 2024-01-10 DIAGNOSIS — R35.0 URINE FREQUENCY: ICD-10-CM

## 2024-01-10 DIAGNOSIS — I10 PRIMARY HYPERTENSION: ICD-10-CM

## 2024-01-10 DIAGNOSIS — Z11.4 ENCOUNTER FOR SCREENING FOR HUMAN IMMUNODEFICIENCY VIRUS (HIV): ICD-10-CM

## 2024-01-10 DIAGNOSIS — E11.42 DIABETIC POLYNEUROPATHY ASSOCIATED WITH TYPE 2 DIABETES MELLITUS (HCC): ICD-10-CM

## 2024-01-10 DIAGNOSIS — M25.512 CHRONIC PAIN OF BOTH SHOULDERS: ICD-10-CM

## 2024-01-10 DIAGNOSIS — N48.1 BALANITIS: ICD-10-CM

## 2024-01-10 DIAGNOSIS — E11.9 TYPE 2 DIABETES MELLITUS WITHOUT COMPLICATION, WITHOUT LONG-TERM CURRENT USE OF INSULIN (HCC): ICD-10-CM

## 2024-01-10 DIAGNOSIS — Z11.59 ENCOUNTER FOR HEPATITIS C SCREENING TEST FOR LOW RISK PATIENT: ICD-10-CM

## 2024-01-10 DIAGNOSIS — E11.9 TYPE 2 DIABETES MELLITUS WITHOUT COMPLICATION, WITHOUT LONG-TERM CURRENT USE OF INSULIN (HCC): Primary | ICD-10-CM

## 2024-01-10 DIAGNOSIS — Z09 ENCOUNTER FOR FOLLOW-UP: ICD-10-CM

## 2024-01-10 DIAGNOSIS — R09.82 POST-NASAL DRIP: ICD-10-CM

## 2024-01-10 DIAGNOSIS — E78.2 MIXED HYPERLIPIDEMIA: ICD-10-CM

## 2024-01-10 DIAGNOSIS — M25.511 CHRONIC PAIN OF BOTH SHOULDERS: ICD-10-CM

## 2024-01-10 DIAGNOSIS — J30.9 ALLERGIC RHINITIS, UNSPECIFIED SEASONALITY, UNSPECIFIED TRIGGER: ICD-10-CM

## 2024-01-10 DIAGNOSIS — G89.29 CHRONIC PAIN OF BOTH SHOULDERS: ICD-10-CM

## 2024-01-10 LAB
ALBUMIN SERPL BCP-MCNC: 4.3 G/DL (ref 3.5–5)
ALP SERPL-CCNC: 80 U/L (ref 34–104)
ALT SERPL W P-5'-P-CCNC: 16 U/L (ref 7–52)
ANION GAP SERPL CALCULATED.3IONS-SCNC: 10 MMOL/L
AST SERPL W P-5'-P-CCNC: 16 U/L (ref 13–39)
BACTERIA UR QL AUTO: ABNORMAL /HPF
BASOPHILS # BLD AUTO: 0.08 THOUSANDS/ÂΜL (ref 0–0.1)
BASOPHILS NFR BLD AUTO: 1 % (ref 0–1)
BILIRUB SERPL-MCNC: 0.47 MG/DL (ref 0.2–1)
BILIRUB UR QL STRIP: NEGATIVE
BUN SERPL-MCNC: 7 MG/DL (ref 5–25)
CALCIUM SERPL-MCNC: 9.5 MG/DL (ref 8.4–10.2)
CHLORIDE SERPL-SCNC: 106 MMOL/L (ref 96–108)
CHOLEST SERPL-MCNC: 161 MG/DL
CLARITY UR: CLEAR
CO2 SERPL-SCNC: 25 MMOL/L (ref 21–32)
COLOR UR: ABNORMAL
CREAT SERPL-MCNC: 0.8 MG/DL (ref 0.6–1.3)
EOSINOPHIL # BLD AUTO: 0.19 THOUSAND/ÂΜL (ref 0–0.61)
EOSINOPHIL NFR BLD AUTO: 2 % (ref 0–6)
ERYTHROCYTE [DISTWIDTH] IN BLOOD BY AUTOMATED COUNT: 15.9 % (ref 11.6–15.1)
GFR SERPL CREATININE-BSD FRML MDRD: 97 ML/MIN/1.73SQ M
GLUCOSE P FAST SERPL-MCNC: 145 MG/DL (ref 65–99)
GLUCOSE UR STRIP-MCNC: NEGATIVE MG/DL
HCT VFR BLD AUTO: 50.8 % (ref 36.5–49.3)
HDLC SERPL-MCNC: 55 MG/DL
HGB BLD-MCNC: 16.5 G/DL (ref 12–17)
HGB UR QL STRIP.AUTO: NEGATIVE
IMM GRANULOCYTES # BLD AUTO: 0.02 THOUSAND/UL (ref 0–0.2)
IMM GRANULOCYTES NFR BLD AUTO: 0 % (ref 0–2)
KETONES UR STRIP-MCNC: NEGATIVE MG/DL
LDLC SERPL CALC-MCNC: 86 MG/DL (ref 0–100)
LEUKOCYTE ESTERASE UR QL STRIP: NEGATIVE
LYMPHOCYTES # BLD AUTO: 2.89 THOUSANDS/ÂΜL (ref 0.6–4.47)
LYMPHOCYTES NFR BLD AUTO: 30 % (ref 14–44)
MCH RBC QN AUTO: 30.6 PG (ref 26.8–34.3)
MCHC RBC AUTO-ENTMCNC: 32.5 G/DL (ref 31.4–37.4)
MCV RBC AUTO: 94 FL (ref 82–98)
MONOCYTES # BLD AUTO: 0.56 THOUSAND/ÂΜL (ref 0.17–1.22)
MONOCYTES NFR BLD AUTO: 6 % (ref 4–12)
MUCOUS THREADS UR QL AUTO: ABNORMAL
NEUTROPHILS # BLD AUTO: 5.97 THOUSANDS/ÂΜL (ref 1.85–7.62)
NEUTS SEG NFR BLD AUTO: 61 % (ref 43–75)
NITRITE UR QL STRIP: NEGATIVE
NON-SQ EPI CELLS URNS QL MICRO: ABNORMAL /HPF
NONHDLC SERPL-MCNC: 106 MG/DL
NRBC BLD AUTO-RTO: 0 /100 WBCS
PH UR STRIP.AUTO: 6.5 [PH]
PLATELET # BLD AUTO: 181 THOUSANDS/UL (ref 149–390)
PMV BLD AUTO: 12.7 FL (ref 8.9–12.7)
POTASSIUM SERPL-SCNC: 3.7 MMOL/L (ref 3.5–5.3)
PROT SERPL-MCNC: 7.5 G/DL (ref 6.4–8.4)
PROT UR STRIP-MCNC: NEGATIVE MG/DL
RBC # BLD AUTO: 5.39 MILLION/UL (ref 3.88–5.62)
RBC #/AREA URNS AUTO: ABNORMAL /HPF
SL AMB POCT HEMOGLOBIN AIC: 5.9 (ref ?–6.5)
SODIUM SERPL-SCNC: 141 MMOL/L (ref 135–147)
SP GR UR STRIP.AUTO: 1.01 (ref 1–1.03)
TRIGL SERPL-MCNC: 98 MG/DL
UROBILINOGEN UR STRIP-ACNC: <2 MG/DL
WBC # BLD AUTO: 9.71 THOUSAND/UL (ref 4.31–10.16)
WBC #/AREA URNS AUTO: ABNORMAL /HPF

## 2024-01-10 PROCEDURE — 36415 COLL VENOUS BLD VENIPUNCTURE: CPT

## 2024-01-10 PROCEDURE — 81001 URINALYSIS AUTO W/SCOPE: CPT | Performed by: NURSE PRACTITIONER

## 2024-01-10 PROCEDURE — 99214 OFFICE O/P EST MOD 30 MIN: CPT | Performed by: NURSE PRACTITIONER

## 2024-01-10 PROCEDURE — 85025 COMPLETE CBC W/AUTO DIFF WBC: CPT

## 2024-01-10 PROCEDURE — 86803 HEPATITIS C AB TEST: CPT

## 2024-01-10 PROCEDURE — 82043 UR ALBUMIN QUANTITATIVE: CPT | Performed by: NURSE PRACTITIONER

## 2024-01-10 PROCEDURE — 87086 URINE CULTURE/COLONY COUNT: CPT | Performed by: NURSE PRACTITIONER

## 2024-01-10 PROCEDURE — 82570 ASSAY OF URINE CREATININE: CPT | Performed by: NURSE PRACTITIONER

## 2024-01-10 PROCEDURE — 80061 LIPID PANEL: CPT

## 2024-01-10 PROCEDURE — 87491 CHLMYD TRACH DNA AMP PROBE: CPT

## 2024-01-10 PROCEDURE — 87389 HIV-1 AG W/HIV-1&-2 AB AG IA: CPT

## 2024-01-10 PROCEDURE — 83036 HEMOGLOBIN GLYCOSYLATED A1C: CPT | Performed by: NURSE PRACTITIONER

## 2024-01-10 PROCEDURE — 87591 N.GONORRHOEAE DNA AMP PROB: CPT

## 2024-01-10 PROCEDURE — 80053 COMPREHEN METABOLIC PANEL: CPT

## 2024-01-10 RX ORDER — GABAPENTIN 800 MG/1
800 TABLET ORAL 3 TIMES DAILY
Qty: 90 TABLET | Refills: 1 | Status: SHIPPED | OUTPATIENT
Start: 2024-01-10

## 2024-01-10 RX ORDER — ASPIRIN 81 MG/1
81 TABLET ORAL DAILY
Qty: 30 TABLET | Refills: 3 | Status: SHIPPED | OUTPATIENT
Start: 2024-01-10

## 2024-01-10 RX ORDER — ATORVASTATIN CALCIUM 40 MG/1
40 TABLET, FILM COATED ORAL DAILY
Qty: 90 TABLET | Refills: 1 | Status: SHIPPED | OUTPATIENT
Start: 2024-01-10

## 2024-01-10 RX ORDER — CLOTRIMAZOLE AND BETAMETHASONE DIPROPIONATE 10; .64 MG/G; MG/G
CREAM TOPICAL 2 TIMES DAILY
Qty: 30 G | Refills: 0 | Status: SHIPPED | OUTPATIENT
Start: 2024-01-10

## 2024-01-10 RX ORDER — ALBUTEROL SULFATE 90 UG/1
1-2 AEROSOL, METERED RESPIRATORY (INHALATION) EVERY 6 HOURS PRN
Qty: 18 G | Refills: 1 | Status: SHIPPED | OUTPATIENT
Start: 2024-01-10

## 2024-01-10 RX ORDER — FLUTICASONE PROPIONATE 50 MCG
1 SPRAY, SUSPENSION (ML) NASAL DAILY
Qty: 16 G | Refills: 0 | Status: SHIPPED | OUTPATIENT
Start: 2024-01-10

## 2024-01-10 NOTE — PATIENT INSTRUCTIONS
Meal Planning with the Plate Method   AMBULATORY CARE:   Meal planning with the plate method  is a way for people with diabetes to plan meals. The plate method can help you eat the right amount of carbohydrates and keep your blood sugar levels under control. Carbohydrates naturally raise your blood sugar level. Your blood sugar level can rise too high if you eat too much carbohydrate at one time. Carbohydrates are found in starches (bread, cereal, starchy vegetables, and beans), fruit, milk, yogurt, and sweets.  How to use the plate method to plan meals:   Draw an imaginary line down the middle of a 9-inch dinner plate.  On one side, draw another line to divide that section in half. Your plate will have 3 sections.    Fill the largest section of your plate with non-starchy vegetables.  These include broccoli, spinach, cucumbers, peppers, cauliflower, and tomatoes.    Add a carbohydrate to 1 of the small sections of your plate.  Examples are pasta, rice, whole-grain bread, tortillas, corn, potatoes, and beans. Your plan may allow a serving of low-fat dairy or fruit for a carbohydrate.    Add meat or another source of protein to the other small section of your plate.  Examples include chicken or turkey without skin, fish, lean beef or pork, low-fat cheese, tofu, or eggs.         Add a low-calorie or calorie-free drink.  Examples include water or unsweetened tea or coffee.       Serving sizes of foods:   Non-starchy vegetables:      ½ cup of cooked vegetables or 1 cup of raw vegetables    ½ cup of vegetable juice    Starches:      1 ounce of whole-wheat bread or 1 small (6 inch) flour or corn tortilla    1 small (4 inch) pancake (about ¼ inch thick)    ¾ cup of dry, unsweetened, whole-grain ready-to-eat cereal or ¼ cup of low-fat granola    ½ cup of cooked cereal or oatmeal    ? cup of rice or pasta    ½ cup of corn, green peas, sweet potatoes, or mashed potatoes    ½ cup of cooked beans and peas (hussain smiley,  kidney, white, split, black-eyed)    Meat and other protein sources:      3 to 4 ounces of any lean meat, fish, or poultry    ½ cup of tofu or tempeh    1 large egg    1½ ounces (about 2 tablespoons) of nuts or 2 tablespoons of peanut butter    Fruit:      1 small piece of fresh fruit     ½ cup of canned or fresh fruit or unsweetened fruit juice    ¼ cup of dried fruit    Milk and yogurt:      1 cup (8 ounces) of skim or 1% milk    ¾ cup (6 ounces) of plain, non-fat yogurt    Other healthy nutrition tips:   Limit salt and sugar.  Choose and prepare foods and drinks with less salt and added sugars. Use the nutrition information on food labels to help you make healthy choices. The percent daily value listed on the food label tells you whether a food is low or high in certain nutrients. A percent daily value of 5% or less means that the food is low in a nutrient. A percent daily value of 20% or more means that the food is high in a nutrient.         Choose healthy fats.  Choose healthy fats such as polyunsaturated and monounsaturated fats in place of unhealthy fats. Healthy fats are found in vegetable oils such as soybean, corn, canola, olive, and sunflower oil. Unhealthy fats are saturated fats, trans fats, and cholesterol. Unhealthy fats are found in shortening, butter, stick margarine, and animal fat.         Ask your healthcare provider if alcohol is okay for you.  Generally, men 65 or older and women should limit alcohol to 1 drink within 24 hours and 7 within 1 week. Men 21 to 64 years should limit alcohol to 2 drinks a day and 14 within 1 week. Your provider can tell you how many drinks are okay for you within 24 hours or within 1 week. A drink of alcohol is 12 ounces of beer, 5 ounces of wine, or 1½ ounces of liquor. Always have food when you drink alcohol. Your blood sugar may fall to a low level if you drink when your stomach is empty.    © Copyright Merative 2023 Information is for End User's use only and  may not be sold, redistributed or otherwise used for commercial purposes.  The above information is an  only. It is not intended as medical advice for individual conditions or treatments. Talk to your doctor, nurse or pharmacist before following any medical regimen to see if it is safe and effective for you.

## 2024-01-10 NOTE — ASSESSMENT & PLAN NOTE
Lab Results   Component Value Date    HGBA1C 5.9 01/10/2024     Continue with gabapentin 800 mg tid

## 2024-01-10 NOTE — ASSESSMENT & PLAN NOTE
Lab Results   Component Value Date    HGBA1C 5.9 01/10/2024     Reports that he is not taking Tradjenta, stopped metformin   Discussed goal fasting and post prandial glucose ranges and importance of diabetes control   Discussed eating regularly to avoid significant glucose highs/ lows

## 2024-01-10 NOTE — PROGRESS NOTES
Name: Fernando Fox      : 1965      MRN: [id number]  Encounter Provider: RAMO Lazo  Encounter Date: 1/10/2024   Encounter department: [facility]    Assessment & Plan     1. Type 2 diabetes mellitus without complication, without long-term current use of insulin (HCC)  Assessment & Plan:    Lab Results   Component Value Date    HGBA1C 5.9 01/10/2024     Reports that he is not taking Tradjenta, stopped metformin   Discussed goal fasting and post prandial glucose ranges and importance of diabetes control   Discussed eating regularly to avoid significant glucose highs/ lows     Orders:  -     POCT hemoglobin A1c  -     CBC and differential; Future  -     Comprehensive metabolic panel; Future  -     Lipid panel; Future  -     Albumin / creatinine urine ratio    2. Bronchitis  -     albuterol (PROVENTIL HFA,VENTOLIN HFA) 90 mcg/act inhaler; Inhale 1-2 puffs every 6 (six) hours as needed for wheezing    3. Chronic pain of both shoulders  -     gabapentin (NEURONTIN) 800 mg tablet; Take 1 tablet (800 mg total) by mouth 3 (three) times a day    4. Mixed hyperlipidemia  -     Comprehensive metabolic panel; Future  -     Lipid panel; Future  -     aspirin (ECOTRIN LOW STRENGTH) 81 mg EC tablet; Take 1 tablet (81 mg total) by mouth daily  -     atorvastatin (LIPITOR) 40 mg tablet; Take 1 tablet (40 mg total) by mouth daily    5. Primary hypertension  Assessment & Plan:  Patients BP is within goal. Recommend continue lisinopril 10 mg daily    Orders:  -     CBC and differential; Future  -     Comprehensive metabolic panel; Future  -     Lipid panel; Future    6. Diabetic polyneuropathy associated with type 2 diabetes mellitus (HCC)  Assessment & Plan:    Lab Results   Component Value Date    HGBA1C 5.9 01/10/2024     Continue with gabapentin 800 mg tid       7. Encounter for hepatitis C screening test for low risk patient  -     Hepatitis C antibody; Future    8.  Encounter for screening for human immunodeficiency virus (HIV)  -     HIV 1/2 AG/AB w Reflex [facility] for 2 yr old and above; Future    9. Routine screening for STI (sexually transmitted infection)  -     Chlamydia/GC amplified DNA by PCR; Future    10. Urine frequency  -     Urinalysis with microscopic  -     Urine culture    11. Balanitis  -     clotrimazole-betamethasone (LOTRISONE) 1-0.05 % cream; Apply topically 2 (two) times a day    12. Encounter for follow-up  -     fluticasone (FLONASE) 50 mcg/act nasal spray; 1 spray into each nostril daily    13. Allergic rhinitis, unspecified seasonality, unspecified trigger  -     fluticasone (FLONASE) 50 mcg/act nasal spray; 1 spray into each nostril daily    14. Post-nasal drip  -     fluticasone (FLONASE) 50 mcg/act nasal spray; 1 spray into each nostril daily      BMI Counseling: Body mass index is 25.99 kg/m². The BMI is above normal. Nutrition recommendations include decreasing portion sizes, encouraging healthy choices of fruits and vegetables, decreasing fast food intake, consuming healthier snacks and limiting drinks that contain sugar. Rationale for BMI follow-up plan is due to patient being overweight or obese.     Tobacco Cessation Counseling: Tobacco cessation counseling was provided. The patient is sincerely urged to quit consumption of tobacco. He is not ready to quit tobacco. Medication options discussed.         Subjective     Fernando Fox is a 58 y.o. male who  has a past medical history of Diabetes mellitus (HCC), Diverticulitis, Hypertension, and Neck pain. who presented to the office today for follow up.     Reports that he has been having urinary urgency and small amount of discharge in his underwear for the past several weeks. No lesions, pain, or hematuria. He does not wish to be referred to urology. Last PSA done 8/2023 was WNL.     The following portions of the patient's history were reviewed and updated as appropriate: allergies, current  medications, past family history, past medical history, past social history, past surgical history and problem list.        Review of Systems   Constitutional:  Negative for chills and fever.   HENT:  Negative for ear pain and sore throat.    Eyes:  Negative for pain and visual disturbance.   Respiratory:  Negative for cough and shortness of breath.    Cardiovascular:  Negative for chest pain and palpitations.   Gastrointestinal:  Negative for abdominal pain and vomiting.   Genitourinary:  Positive for penile discharge and urgency. Negative for decreased urine volume, dysuria, frequency, hematuria, penile pain, penile swelling, scrotal swelling and testicular pain.   Musculoskeletal:  Negative for arthralgias and back pain.   Skin:  Negative for color change and rash.   Neurological:  Negative for seizures and syncope.   All other systems reviewed and are negative.      Past Medical History:   Diagnosis Date   • Diabetes mellitus (HCC)    • Diverticulitis    • Hypertension    • Neck pain    • Renal disorder      Past Surgical History:   Procedure Laterality Date   • COLON SURGERY     • NECK SURGERY       No family history on file.  Social History     Socioeconomic History   • Marital status: /Civil Union     Spouse name: None   • Number of children: None   • Years of education: None   • Highest education level: None   Occupational History   • None   Tobacco Use   • Smoking status: Every Day     Current packs/day: 0.25     Types: Cigarettes, Cigars   • Smokeless tobacco: Never   • Tobacco comments:     1 cigar/daily   Vaping Use   • Vaping status: Never Used   Substance and Sexual Activity   • Alcohol use: Not Currently     Comment: occ   • Drug use: Never   • Sexual activity: Yes     Partners: Female   Other Topics Concern   • None   Social History Narrative   • None     Social Determinants of Health     Financial Resource Strain: Medium Risk (1/31/2023)    Overall Financial Resource Strain (CARDIA)    •  Difficulty of Paying Living Expenses: Somewhat hard   Food Insecurity: No Food Insecurity (1/31/2023)    Hunger Vital Sign    • Worried About Running Out of Food in the Last Year: Never true    • Ran Out of Food in the Last Year: Never true   Transportation Needs: No Transportation Needs (2/6/2023)    PRAPARE - Transportation    • Lack of Transportation (Medical): No    • Lack of Transportation (Non-Medical): No   Physical Activity: Not on file   Stress: Stress Concern Present (2/6/2023)    [employer] [place] [employer] - Occupational Stress Questionnaire    • Feeling of Stress : To some extent   Social Connections: Not on file   Intimate Partner Violence: Not on file   Housing Stability: Not on file     Current Outpatient Medications on File Prior to Visit   Medication Sig   • acetaminophen (TYLENOL) 500 mg tablet Take 1 tablet (500 mg total) by mouth every 6 (six) hours as needed for mild pain   • ARIPiprazole (ABILIFY) 10 mg tablet Take 10 mg by mouth daily at bedtime   • Blood Glucose Monitoring Suppl (FreeStyle Lite) SHANLEL Use daily Use as directed   • buPROPion (WELLBUTRIN SR) 200 MG 12 hr tablet Take 200 mg by mouth every morning   • cloNIDine (CATAPRES) 0.2 mg tablet Take 0.2 mg by mouth daily at bedtime   • Continuous Blood Gluc  (FreeStyle Shira 2 [place]) SHANELL Scan every 8 hours. E11.9   • Continuous Blood Gluc Sensor (FreeStyle Shira 2 Sensor) MISC Scan every 8 hours. E11.9   • doxepin (SINEquan) 150 MG capsule Take 150 mg by mouth daily at bedtime   • glucose blood (FREESTYLE LITE) test strip Testing sugars once a day   • hydrocortisone 1 % ointment Apply topically 2 (two) times a day   • hydrOXYzine HCL (ATARAX) 50 mg tablet Take 50 mg by mouth daily at bedtime   • Lancets (freestyle) lancets Daily   • lidocaine (XYLOCAINE) 2 % topical gel Apply topically as needed for mild pain   • linaGLIPtin (Tradjenta) 5 MG TABS Take 5 mg by mouth daily   • meclizine (ANTIVERT) 25 mg tablet Take 1  tablet (25 mg total) by mouth every 8 (eight) hours   • pantoprazole (PROTONIX) 40 mg tablet Take 1 tablet (40 mg total) by mouth daily   • tiZANidine (ZANAFLEX) 4 mg tablet Take 1 tablet (4 mg total) by mouth every 8 (eight) hours as needed for muscle spasms   • zolpidem (AMBIEN) 10 mg tablet Take 10 mg by mouth daily at bedtime   • [DISCONTINUED] albuterol (PROVENTIL HFA,VENTOLIN HFA) 90 mcg/act inhaler Inhale 1-2 puffs every 6 (six) hours as needed for wheezing   • [DISCONTINUED] aspirin (ECOTRIN LOW STRENGTH) 81 mg EC tablet Take 1 tablet (81 mg total) by mouth daily   • [DISCONTINUED] atorvastatin (LIPITOR) 40 mg tablet Take 1 tablet (40 mg total) by mouth daily   • [DISCONTINUED] clotrimazole-betamethasone (LOTRISONE) 1-0.05 % cream Apply topically 2 (two) times a day   • [DISCONTINUED] fluticasone (FLONASE) 50 mcg/act nasal spray 1 spray into each nostril daily   • [DISCONTINUED] gabapentin (NEURONTIN) 800 mg tablet Take 1 tablet (800 mg total) by mouth 3 (three) times a day   • [DISCONTINUED] gabapentin (NEURONTIN) 800 mg tablet Take 1 tablet (800 mg total) by mouth 2 (two) times a day (Patient taking differently: Take 800 mg by mouth 2 (two) times a day Taking 2 capsules 2x daily)   • [DISCONTINUED] glucose blood (FREESTYLE LITE) test strip Testing sugars once a day   • [DISCONTINUED] Lancets (freestyle) lancets Daily   • [DISCONTINUED] meclizine (ANTIVERT) 25 mg tablet Take 1 tablet (25 mg total) by mouth every 8 (eight) hours   • [DISCONTINUED] pantoprazole (PROTONIX) 40 mg tablet Take 1 tablet by mouth once daily as needed   • [DISCONTINUED] tiZANidine (ZANAFLEX) 4 mg tablet Take 1 tablet (4 mg total) by mouth every 8 (eight) hours as needed for muscle spasms     No Known Allergies  Immunization History   Administered Date(s) Administered   • Pneumococcal Polysaccharide PPV23 10/03/2018   • Tdap 05/14/2018       Objective     /74 (BP Location: Right arm, Patient Position: Sitting, Cuff Size:  "Standard)   Pulse 77   Temp 97.6 °F (36.4 °C) (Temporal)   Resp 16   Ht 6' 1\" (1.854 m)   Wt 89.4 kg (197 lb)   SpO2 97%   BMI 25.99 kg/m²     Physical Exam  Vitals and nursing note reviewed.   Constitutional:       General: He is not in acute distress.     Appearance: He is well-developed. He is not diaphoretic.   HENT:      Head: Normocephalic and atraumatic.      Right Ear: External ear normal.      Left Ear: External ear normal.   Eyes:      General:         Right eye: No discharge.         Left eye: No discharge.      Conjunctiva/sclera: Conjunctivae normal.   Cardiovascular:      Rate and Rhythm: Normal rate and regular rhythm.   Pulmonary:      Effort: Pulmonary effort is normal. No respiratory distress.      Breath sounds: Normal breath sounds. No wheezing.   Abdominal:      General: Bowel sounds are normal. There is no distension.      Palpations: Abdomen is soft.      Tenderness: There is no abdominal tenderness.   Musculoskeletal:         General: No deformity. Normal range of motion.      Cervical back: Normal range of motion and neck supple.   Lymphadenopathy:      Cervical: No cervical adenopathy.   Skin:     General: Skin is warm and dry.      Capillary Refill: Capillary refill takes less than 2 seconds.      Findings: No rash.   Neurological:      General: No focal deficit present.      Mental Status: He is alert and oriented to person, place, and time.   Psychiatric:         Mood and Affect: Mood normal.         Behavior: Behavior normal.       RAMO Lazo    "

## 2024-01-11 LAB
BACTERIA UR CULT: NORMAL
C TRACH DNA SPEC QL NAA+PROBE: NEGATIVE
CREAT UR-MCNC: 106.9 MG/DL
HCV AB SER QL: NORMAL
HIV 1+2 AB+HIV1 P24 AG SERPL QL IA: NORMAL
HIV 2 AB SERPL QL IA: NORMAL
HIV1 AB SERPL QL IA: NORMAL
HIV1 P24 AG SERPL QL IA: NORMAL
MICROALBUMIN UR-MCNC: <7 MG/L
MICROALBUMIN/CREAT 24H UR: <7 MG/G CREATININE (ref 0–30)
N GONORRHOEA DNA SPEC QL NAA+PROBE: NEGATIVE

## 2024-02-02 ENCOUNTER — PATIENT OUTREACH (OUTPATIENT)
Dept: FAMILY MEDICINE CLINIC | Facility: CLINIC | Age: 59
End: 2024-02-02

## 2024-02-02 DIAGNOSIS — E11.9 TYPE 2 DIABETES MELLITUS WITHOUT COMPLICATION, WITHOUT LONG-TERM CURRENT USE OF INSULIN (HCC): ICD-10-CM

## 2024-02-02 NOTE — PROGRESS NOTES
I called the patient to follow up.  He states he has been feeling well.  He reports his blood sugars have been stable with a few low readings, ~66.  He notes he becomes symptomatic and will eat something sweet.  The patient states he continues taking Tradjenta daily.  He notes he has been checking his feet daily and denies any skin breakdown.  The patient stated he had a recent Podiatry appointment, 1/24/24, and eye exam 12/23.    The patient notes he is smoking 3-4 cigarettes per day and has no thoughts of quitting.      The patient states he is in need of sensor refill; sent to PCP.    The patient will call with any questions or concerns.  I will call him in a few weeks to remind him of his PCP appointment.

## 2024-02-07 ENCOUNTER — TELEPHONE (OUTPATIENT)
Dept: FAMILY MEDICINE CLINIC | Facility: CLINIC | Age: 59
End: 2024-02-07

## 2024-02-07 NOTE — TELEPHONE ENCOUNTER
Hi Miss. Nesbitt, patient came into office and is requesting a medical excuse letter for jury duty stating he isn't eligible to attend. Patient states he has a hard time walking due to his diabetes.     Official jury summons letter is scanned into chart and can be reviewed.       His jury date is scheduled for 3/524      Any questions please contact patient, thank you !

## 2024-02-20 ENCOUNTER — PATIENT OUTREACH (OUTPATIENT)
Dept: FAMILY MEDICINE CLINIC | Facility: CLINIC | Age: 59
End: 2024-02-20

## 2024-02-20 NOTE — PROGRESS NOTES
I called the patient to remind him of his PCP appointment for tomorrow at 1020; he plans on attending.  I will continue to follow.

## 2024-02-21 ENCOUNTER — OFFICE VISIT (OUTPATIENT)
Dept: FAMILY MEDICINE CLINIC | Facility: CLINIC | Age: 59
End: 2024-02-21

## 2024-02-21 VITALS
WEIGHT: 192 LBS | TEMPERATURE: 98 F | HEIGHT: 73 IN | DIASTOLIC BLOOD PRESSURE: 78 MMHG | HEART RATE: 79 BPM | RESPIRATION RATE: 16 BRPM | OXYGEN SATURATION: 95 % | BODY MASS INDEX: 25.45 KG/M2 | SYSTOLIC BLOOD PRESSURE: 110 MMHG

## 2024-02-21 DIAGNOSIS — E78.2 MIXED HYPERLIPIDEMIA: ICD-10-CM

## 2024-02-21 DIAGNOSIS — Z23 ENCOUNTER FOR IMMUNIZATION: Primary | ICD-10-CM

## 2024-02-21 DIAGNOSIS — M25.511 CHRONIC PAIN OF BOTH SHOULDERS: ICD-10-CM

## 2024-02-21 DIAGNOSIS — M25.511 CHRONIC RIGHT SHOULDER PAIN: ICD-10-CM

## 2024-02-21 DIAGNOSIS — M25.512 CHRONIC PAIN OF BOTH SHOULDERS: ICD-10-CM

## 2024-02-21 DIAGNOSIS — F33.9 DEPRESSION, RECURRENT (HCC): ICD-10-CM

## 2024-02-21 DIAGNOSIS — Z09 ENCOUNTER FOR FOLLOW-UP: ICD-10-CM

## 2024-02-21 DIAGNOSIS — K21.9 GASTROESOPHAGEAL REFLUX DISEASE WITHOUT ESOPHAGITIS: ICD-10-CM

## 2024-02-21 DIAGNOSIS — Z00.00 MEDICARE ANNUAL WELLNESS VISIT, SUBSEQUENT: ICD-10-CM

## 2024-02-21 DIAGNOSIS — E11.42 DIABETIC POLYNEUROPATHY ASSOCIATED WITH TYPE 2 DIABETES MELLITUS (HCC): ICD-10-CM

## 2024-02-21 DIAGNOSIS — E11.9 TYPE 2 DIABETES MELLITUS WITHOUT COMPLICATION, WITHOUT LONG-TERM CURRENT USE OF INSULIN (HCC): ICD-10-CM

## 2024-02-21 DIAGNOSIS — M54.42 CHRONIC BILATERAL LOW BACK PAIN WITH BILATERAL SCIATICA: ICD-10-CM

## 2024-02-21 DIAGNOSIS — I10 PRIMARY HYPERTENSION: ICD-10-CM

## 2024-02-21 DIAGNOSIS — N48.1 BALANITIS: ICD-10-CM

## 2024-02-21 DIAGNOSIS — A04.8 H. PYLORI INFECTION: ICD-10-CM

## 2024-02-21 DIAGNOSIS — G89.29 CHRONIC BILATERAL LOW BACK PAIN WITH BILATERAL SCIATICA: ICD-10-CM

## 2024-02-21 DIAGNOSIS — M62.838 TRAPEZIUS MUSCLE SPASM: ICD-10-CM

## 2024-02-21 DIAGNOSIS — J42 CHRONIC BRONCHITIS, UNSPECIFIED CHRONIC BRONCHITIS TYPE (HCC): ICD-10-CM

## 2024-02-21 DIAGNOSIS — J30.9 ALLERGIC RHINITIS, UNSPECIFIED SEASONALITY, UNSPECIFIED TRIGGER: ICD-10-CM

## 2024-02-21 DIAGNOSIS — J40 BRONCHITIS: ICD-10-CM

## 2024-02-21 DIAGNOSIS — R56.9 SEIZURE-LIKE ACTIVITY (HCC): ICD-10-CM

## 2024-02-21 DIAGNOSIS — M54.41 CHRONIC BILATERAL LOW BACK PAIN WITH BILATERAL SCIATICA: ICD-10-CM

## 2024-02-21 DIAGNOSIS — Z72.0 TOBACCO ABUSE: ICD-10-CM

## 2024-02-21 DIAGNOSIS — G89.29 CHRONIC RIGHT SHOULDER PAIN: ICD-10-CM

## 2024-02-21 DIAGNOSIS — G89.29 CHRONIC PAIN OF BOTH SHOULDERS: ICD-10-CM

## 2024-02-21 DIAGNOSIS — R09.82 POST-NASAL DRIP: ICD-10-CM

## 2024-02-21 PROCEDURE — 96372 THER/PROPH/DIAG INJ SC/IM: CPT | Performed by: NURSE PRACTITIONER

## 2024-02-21 PROCEDURE — G0439 PPPS, SUBSEQ VISIT: HCPCS | Performed by: NURSE PRACTITIONER

## 2024-02-21 PROCEDURE — 99214 OFFICE O/P EST MOD 30 MIN: CPT | Performed by: NURSE PRACTITIONER

## 2024-02-21 RX ORDER — LINAGLIPTIN 5 MG/1
5 TABLET, FILM COATED ORAL DAILY
Qty: 90 TABLET | Refills: 1 | Status: SHIPPED | OUTPATIENT
Start: 2024-02-21

## 2024-02-21 RX ORDER — ALBUTEROL SULFATE 90 UG/1
1-2 AEROSOL, METERED RESPIRATORY (INHALATION) EVERY 6 HOURS PRN
Qty: 18 G | Refills: 1 | Status: SHIPPED | OUTPATIENT
Start: 2024-02-21

## 2024-02-21 RX ORDER — ATORVASTATIN CALCIUM 40 MG/1
40 TABLET, FILM COATED ORAL DAILY
Qty: 90 TABLET | Refills: 1 | Status: SHIPPED | OUTPATIENT
Start: 2024-02-21

## 2024-02-21 RX ORDER — PANTOPRAZOLE SODIUM 40 MG/1
40 TABLET, DELAYED RELEASE ORAL DAILY
Qty: 30 TABLET | Refills: 3 | Status: SHIPPED | OUTPATIENT
Start: 2024-02-21

## 2024-02-21 RX ORDER — GABAPENTIN 800 MG/1
800 TABLET ORAL 3 TIMES DAILY
Qty: 90 TABLET | Refills: 1 | Status: SHIPPED | OUTPATIENT
Start: 2024-02-21

## 2024-02-21 RX ORDER — KETOROLAC TROMETHAMINE 30 MG/ML
30 INJECTION, SOLUTION INTRAMUSCULAR; INTRAVENOUS ONCE
Status: COMPLETED | OUTPATIENT
Start: 2024-02-21 | End: 2024-02-21

## 2024-02-21 RX ORDER — TIZANIDINE 4 MG/1
4 TABLET ORAL EVERY 8 HOURS PRN
Qty: 40 TABLET | Refills: 3 | Status: SHIPPED | OUTPATIENT
Start: 2024-02-21

## 2024-02-21 RX ORDER — FLUTICASONE PROPIONATE 50 MCG
1 SPRAY, SUSPENSION (ML) NASAL DAILY
Qty: 16 G | Refills: 0 | Status: SHIPPED | OUTPATIENT
Start: 2024-02-21

## 2024-02-21 RX ORDER — ACETAMINOPHEN 500 MG
500 TABLET ORAL EVERY 6 HOURS PRN
Qty: 30 TABLET | Refills: 0 | Status: SHIPPED | OUTPATIENT
Start: 2024-02-21

## 2024-02-21 RX ORDER — CLOTRIMAZOLE AND BETAMETHASONE DIPROPIONATE 10; .64 MG/G; MG/G
CREAM TOPICAL 2 TIMES DAILY
Qty: 30 G | Refills: 0 | Status: SHIPPED | OUTPATIENT
Start: 2024-02-21

## 2024-02-21 RX ADMIN — KETOROLAC TROMETHAMINE 30 MG: 30 INJECTION, SOLUTION INTRAMUSCULAR; INTRAVENOUS at 11:32

## 2024-02-21 NOTE — ASSESSMENT & PLAN NOTE
Lab Results   Component Value Date    CHOLESTEROL 161 01/10/2024    CHOLESTEROL 150 08/01/2023    CHOLESTEROL 134 05/25/2022     Lab Results   Component Value Date    HDL 55 01/10/2024    HDL 47 08/01/2023    HDL 28 (L) 05/25/2022     Lab Results   Component Value Date    TRIG 98 01/10/2024    TRIG 21 08/01/2023    TRIG 226 (H) 05/25/2022     Lab Results   Component Value Date    NONHDLC 106 01/10/2024    NONHDLC 103 08/01/2023    NONHDLC 106 05/25/2022     Lab Results   Component Value Date    LDLCALC 86 01/10/2024     Continue atorvastatin 40 mg daily

## 2024-02-21 NOTE — ASSESSMENT & PLAN NOTE
Continue with daily omeprazole   -Discussed diet and lifestyle interventions to improve sx including: avoidance of common triggers (chocolate, caffeine, tomatoes, citrus), eat small meals frequently, remain upright after meals      No change

## 2024-02-21 NOTE — PATIENT INSTRUCTIONS
Medicare Preventive Visit Patient Instructions  Thank you for completing your Welcome to Medicare Visit or Medicare Annual Wellness Visit today. Your next wellness visit will be due in one year (2/21/2025).  The screening/preventive services that you may require over the next 5-10 years are detailed below. Some tests may not apply to you based off risk factors and/or age. Screening tests ordered at today's visit but not completed yet may show as past due. Also, please note that scanned in results may not display below.  Preventive Screenings:  Service Recommendations Previous Testing/Comments   Colorectal Cancer Screening  Colonoscopy    Fecal Occult Blood Test (FOBT)/Fecal Immunochemical Test (FIT)  Fecal DNA/Cologuard Test  Flexible Sigmoidoscopy Age: 45-75 years old   Colonoscopy: every 10 years (May be performed more frequently if at higher risk)  OR  FOBT/FIT: every 1 year  OR  Cologuard: every 3 years  OR  Sigmoidoscopy: every 5 years  Screening may be recommended earlier than age 45 if at higher risk for colorectal cancer. Also, an individualized decision between you and your healthcare provider will decide whether screening between the ages of 76-85 would be appropriate. Colonoscopy: 10/03/2022  FOBT/FIT: Not on file  Cologuard: Not on file  Sigmoidoscopy: Not on file    Screening Current     Prostate Cancer Screening Individualized decision between patient and health care provider in men between ages of 55-69   Medicare will cover every 12 months beginning on the day after your 50th birthday PSA: 0.55 ng/mL     Screening Current     Hepatitis C Screening Once for adults born between 1945 and 1965  More frequently in patients at high risk for Hepatitis C Hep C Antibody: 01/10/2024    Screening Current   Diabetes Screening 1-2 times per year if you're at risk for diabetes or have pre-diabetes Fasting glucose: 145 mg/dL (1/10/2024)  A1C: 5.9 (1/10/2024)  Screening Not Indicated  History Diabetes   Cholesterol  Screening Once every 5 years if you don't have a lipid disorder. May order more often based on risk factors. Lipid panel: 01/10/2024  Screening Not Indicated  History Lipid Disorder      Other Preventive Screenings Covered by Medicare:  Abdominal Aortic Aneurysm (AAA) Screening: covered once if your at risk. You're considered to be at risk if you have a family history of AAA or a male between the age of 65-75 who smoking at least 100 cigarettes in your lifetime.  Lung Cancer Screening: covers low dose CT scan once per year if you meet all of the following conditions: (1) Age 55-77; (2) No signs or symptoms of lung cancer; (3) Current smoker or have quit smoking within the last 15 years; (4) You have a tobacco smoking history of at least 20 pack years (packs per day x number of years you smoked); (5) You get a written order from a healthcare provider.  Glaucoma Screening: covered annually if you're considered high risk: (1) You have diabetes OR (2) Family history of glaucoma OR (3)  aged 50 and older OR (4)  American aged 65 and older  Osteoporosis Screening: covered every 2 years if you meet one of the following conditions: (1) Have a vertebral abnormality; (2) On glucocorticoid therapy for more than 3 months; (3) Have primary hyperparathyroidism; (4) On osteoporosis medications and need to assess response to drug therapy.  HIV Screening: covered annually if you're between the age of 15-65. Also covered annually if you are younger than 15 and older than 65 with risk factors for HIV infection. For pregnant patients, it is covered up to 3 times per pregnancy.    Immunizations:  Immunization Recommendations   Influenza Vaccine Annual influenza vaccination during flu season is recommended for all persons aged >= 6 months who do not have contraindications   Pneumococcal Vaccine   * Pneumococcal conjugate vaccine = PCV13 (Prevnar 13), PCV15 (Vaxneuvance), PCV20 (Prevnar 20)  * Pneumococcal  polysaccharide vaccine = PPSV23 (Pneumovax) Adults 19-65 yo with certain risk factors or if 65+ yo  If never received any pneumonia vaccine: recommend Prevnar 20 (PCV20)  Give PCV20 if previously received 1 dose of PCV13 or PPSV23   Hepatitis B Vaccine 3 dose series if at intermediate or high risk (ex: diabetes, end stage renal disease, liver disease)   Respiratory syncytial virus (RSV) Vaccine - COVERED BY MEDICARE PART D  * RSVPreF3 (Arexvy) CDC recommends that adults 60 years of age and older may receive a single dose of RSV vaccine using shared clinical decision-making (SCDM)   Tetanus (Td) Vaccine - COST NOT COVERED BY MEDICARE PART B Following completion of primary series, a booster dose should be given every 10 years to maintain immunity against tetanus. Td may also be given as tetanus wound prophylaxis.   Tdap Vaccine - COST NOT COVERED BY MEDICARE PART B Recommended at least once for all adults. For pregnant patients, recommended with each pregnancy.   Shingles Vaccine (Shingrix) - COST NOT COVERED BY MEDICARE PART B  2 shot series recommended in those 19 years and older who have or will have weakened immune systems or those 50 years and older     Health Maintenance Due:      Topic Date Due   • Colorectal Cancer Screening  10/02/2027   • HIV Screening  Completed   • Hepatitis C Screening  Completed     Immunizations Due:      Topic Date Due   • Pneumococcal Vaccine: Pediatrics (0 to 5 Years) and At-Risk Patients (6 to 64 Years) (2 of 2 - PCV) 10/03/2019   • COVID-19 Vaccine (3 - 2023-24 season) 09/01/2023     Advance Directives   What are advance directives?  Advance directives are legal documents that state your wishes and plans for medical care. These plans are made ahead of time in case you lose your ability to make decisions for yourself. Advance directives can apply to any medical decision, such as the treatments you want, and if you want to donate organs.   What are the types of advance directives?   There are many types of advance directives, and each state has rules about how to use them. You may choose a combination of any of the following:  Living will:  This is a written record of the treatment you want. You can also choose which treatments you do not want, which to limit, and which to stop at a certain time. This includes surgery, medicine, IV fluid, and tube feedings.   Durable power of  for healthcare (DPAHC):  This is a written record that states who you want to make healthcare choices for you when you are unable to make them for yourself. This person, called a proxy, is usually a family member or a friend. You may choose more than 1 proxy.  Do not resuscitate (DNR) order:  A DNR order is used in case your heart stops beating or you stop breathing. It is a request not to have certain forms of treatment, such as CPR. A DNR order may be included in other types of advance directives.  Medical directive:  This covers the care that you want if you are in a coma, near death, or unable to make decisions for yourself. You can list the treatments you want for each condition. Treatment may include pain medicine, surgery, blood transfusions, dialysis, IV or tube feedings, and a ventilator (breathing machine).  Values history:  This document has questions about your views, beliefs, and how you feel and think about life. This information can help others choose the care that you would choose.  Why are advance directives important?  An advance directive helps you control your care. Although spoken wishes may be used, it is better to have your wishes written down. Spoken wishes can be misunderstood, or not followed. Treatments may be given even if you do not want them. An advance directive may make it easier for your family to make difficult choices about your care.   Cigarette Smoking and Your Health   Risks to your health if you smoke:  Nicotine and other chemicals found in tobacco damage every cell in your  body. Even if you are a light smoker, you have an increased risk for cancer, heart disease, and lung disease. If you are pregnant or have diabetes, smoking increases your risk for complications.   Benefits to your health if you stop smoking:   You decrease respiratory symptoms such as coughing, wheezing, and shortness of breath.   You reduce your risk for cancers of the lung, mouth, throat, kidney, bladder, pancreas, stomach, and cervix. If you already have cancer, you increase the benefits of chemotherapy. You also reduce your risk for cancer returning or a second cancer from developing.   You reduce your risk for heart disease, blood clots, heart attack, and stroke.   You reduce your risk for lung infections, and diseases such as pneumonia, asthma, chronic bronchitis, and emphysema.  Your circulation improves. More oxygen can be delivered to your body. If you have diabetes, you lower your risk for complications, such as kidney, artery, and eye diseases. You also lower your risk for nerve damage. Nerve damage can lead to amputations, poor vision, and blindness.  You improve your body's ability to heal and to fight infections.  For more information and support to stop smoking:   [employer]  Phone: 3- 106 - 040-0333  Web Address: www.[employer].org  Weight Management   Why it is important to manage your weight:  Being overweight increases your risk of health conditions such as heart disease, high blood pressure, type 2 diabetes, and certain types of cancer. It can also increase your risk for osteoarthritis, sleep apnea, and other respiratory problems. Aim for a slow, steady weight loss. Even a small amount of weight loss can lower your risk of health problems.  How to lose weight safely:  A safe and healthy way to lose weight is to eat fewer calories and get regular exercise. You can lose up about 1 pound a week by decreasing the number of calories you eat by 500 calories each day.   Healthy meal plan for weight  management:  A healthy meal plan includes a variety of foods, contains fewer calories, and helps you stay healthy. A healthy meal plan includes the following:  Eat whole-grain foods more often.  A healthy meal plan should contain fiber. Fiber is the part of grains, fruits, and vegetables that is not broken down by your body. Whole-grain foods are healthy and provide extra fiber in your diet. Some examples of whole-grain foods are whole-wheat breads and pastas, oatmeal, brown rice, and bulgur.  Eat a variety of vegetables every day.  Include dark, leafy greens such as spinach, kale, jayson greens, and mustard greens. Eat yellow and orange vegetables such as carrots, sweet potatoes, and winter squash.   Eat a variety of fruits every day.  Choose fresh or canned fruit (canned in its own juice or light syrup) instead of juice. Fruit juice has very little or no fiber.  Eat low-fat dairy foods.  Drink fat-free (skim) milk or 1% milk. Eat fat-free yogurt and low-fat cottage cheese. Try low-fat cheeses such as mozzarella and other reduced-fat cheeses.  Choose meat and other protein foods that are low in fat.  Choose beans or other legumes such as split peas or lentils. Choose fish, skinless poultry (chicken or turkey), or lean cuts of red meat (beef or pork). Before you cook meat or poultry, cut off any visible fat.   Use less fat and oil.  Try baking foods instead of frying them. Add less fat, such as margarine, sour cream, regular salad dressing and mayonnaise to foods. Eat fewer high-fat foods. Some examples of high-fat foods include french fries, doughnuts, ice cream, and cakes.  Eat fewer sweets.  Limit foods and drinks that are high in sugar. This includes candy, cookies, regular soda, and sweetened drinks.  Exercise:  Exercise at least 30 minutes per day on most days of the week. Some examples of exercise include walking, biking, dancing, and swimming. You can also fit in more physical activity by taking the stairs  instead of the elevator or parking farther away from stores. Ask your healthcare provider about the best exercise plan for you.      © Copyright [employer] 2018 Information is for End User's use only and may not be sold, redistributed or otherwise used for commercial purposes. All illustrations and images included in CareNotes® are the copyrighted property of [employer] or [employer]

## 2024-02-21 NOTE — PROGRESS NOTES
Assessment and Plan:     Problem List Items Addressed This Visit          Digestive    Gastroesophageal reflux disease     Continue with daily omeprazole   -Discussed diet and lifestyle interventions to improve sx including: avoidance of common triggers (chocolate, caffeine, tomatoes, citrus), eat small meals frequently, remain upright after meals            Relevant Medications    pantoprazole (PROTONIX) 40 mg tablet       Endocrine    Type 2 diabetes mellitus without complication, without long-term current use of insulin (HCC)       Lab Results   Component Value Date    HGBA1C 5.9 01/10/2024     Reports that he is not taking Tradjenta, stopped metformin   Discussed goal fasting and post prandial glucose ranges and importance of diabetes control   Discussed eating regularly to avoid significant glucose highs/ lows          Relevant Medications    linaGLIPtin (Tradjenta) 5 MG TABS    Diabetic polyneuropathy associated with type 2 diabetes mellitus (HCC)       Lab Results   Component Value Date    HGBA1C 5.9 01/10/2024     Continue with gabapentin 800 mg tid            Relevant Medications    linaGLIPtin (Tradjenta) 5 MG TABS       Respiratory    Allergic rhinitis    Relevant Medications    ketorolac (TORADOL) injection 30 mg (Completed)    fluticasone (FLONASE) 50 mcg/act nasal spray    Chronic bronchitis (HCC)    Relevant Medications    albuterol (PROVENTIL HFA,VENTOLIN HFA) 90 mcg/act inhaler    fluticasone (FLONASE) 50 mcg/act nasal spray       Cardiovascular and Mediastinum    Hypertension     Patients BP is within goal. Recommend continue lisinopril 10 mg daily            Nervous and Auditory    Chronic bilateral low back pain with bilateral sciatica    Relevant Medications    tiZANidine (ZANAFLEX) 4 mg tablet       Genitourinary    Balanitis    Relevant Medications    clotrimazole-betamethasone (LOTRISONE) 1-0.05 % cream       Other    Hyperlipidemia     Lab Results   Component Value Date    CHOLESTEROL 161  01/10/2024    CHOLESTEROL 150 08/01/2023    CHOLESTEROL 134 05/25/2022     Lab Results   Component Value Date    HDL 55 01/10/2024    HDL 47 08/01/2023    HDL 28 (L) 05/25/2022     Lab Results   Component Value Date    TRIG 98 01/10/2024    TRIG 21 08/01/2023    TRIG 226 (H) 05/25/2022     Lab Results   Component Value Date    NONHDLC 106 01/10/2024    NONHDLC 103 08/01/2023    NONHDLC 106 05/25/2022     Lab Results   Component Value Date    LDLCALC 86 01/10/2024     Continue atorvastatin 40 mg daily          Relevant Medications    atorvastatin (LIPITOR) 40 mg tablet    Depression, recurrent (HCC)    Tobacco abuse     -continue to encourage cessation  -patient is aware of pharmacotherapy aids to assist with cessation            Relevant Orders    US abdominal aorta screening aaa    Seizure-like activity (HCC)     Other Visit Diagnoses       Encounter for immunization    -  Primary    Chronic right shoulder pain        Relevant Medications    ketorolac (TORADOL) injection 30 mg (Completed)    Other Relevant Orders    XR shoulder 2+ vw right    Ambulatory Referral to Sports Medicine    Trapezius muscle spasm        Relevant Medications    acetaminophen (TYLENOL) 500 mg tablet    Bronchitis        Relevant Medications    albuterol (PROVENTIL HFA,VENTOLIN HFA) 90 mcg/act inhaler    fluticasone (FLONASE) 50 mcg/act nasal spray    Encounter for follow-up        Relevant Medications    fluticasone (FLONASE) 50 mcg/act nasal spray    Post-nasal drip        Relevant Medications    fluticasone (FLONASE) 50 mcg/act nasal spray    Chronic pain of both shoulders        Relevant Medications    gabapentin (NEURONTIN) 800 mg tablet    H. pylori infection        Relevant Medications    pantoprazole (PROTONIX) 40 mg tablet             Preventive health issues were discussed with patient, and age appropriate screening tests were ordered as noted in patient's After Visit Summary.  Personalized health advice and appropriate referrals  for health education or preventive services given if needed, as noted in patient's After Visit Summary.     History of Present Illness:     Patient presents for a Medicare Wellness Visit    Fernando Fox is a 59 y.o. male who  has a past medical history of Diabetes mellitus (HCC), Diverticulitis, Hypertension, and Neck pain. who presented to the office today for follow up.      Reports that he continues with right shoulder pain. No specific injury or trauma. Has not yet completed imaging or PT. Denies weakness, numbness, or tingling.      The following portions of the patient's history were reviewed and updated as appropriate: allergies, current medications, past family history, past medical history, past social history, past surgical history and problem list.         Patient Care Team:  RAMO Lazo as PCP - General (Family Medicine)  Dorie Temple RN as Lead OP Care Mgr     Review of Systems:     Review of Systems   Constitutional:  Negative for activity change, appetite change, chills, fatigue, fever and unexpected weight change.   HENT:  Negative for hearing loss, nosebleeds, sinus pain, sneezing, sore throat and trouble swallowing.    Eyes:  Negative for photophobia and visual disturbance.   Respiratory:  Negative for cough, chest tightness, shortness of breath and wheezing.    Cardiovascular:  Negative for chest pain, palpitations and leg swelling.   Gastrointestinal:  Negative for abdominal pain, constipation, nausea and vomiting.   Genitourinary:  Negative for decreased urine volume, difficulty urinating, dysuria, flank pain, genital sores, hematuria and urgency.   Musculoskeletal:  Positive for arthralgias, joint swelling and myalgias. Negative for back pain and gait problem.   Skin:  Negative for pallor, rash and wound.   Neurological:  Negative for dizziness, seizures, syncope, weakness, numbness and headaches.   Hematological:  Negative for adenopathy. Does not bruise/bleed easily.    Psychiatric/Behavioral:  Negative for confusion, hallucinations, self-injury, sleep disturbance and suicidal ideas. The patient is not nervous/anxious.         Problem List:     Patient Active Problem List   Diagnosis    Benign prostatic hyperplasia    Gastroesophageal reflux disease    History of diverticulitis    History of renal calculi    History of hemicolectomy    Hypertension    Hyperlipidemia    Left shoulder pain    Cervicalgia    Posttraumatic stiffness of shoulder joint    Intrinsic eczema    Allergic rhinitis    Decreased hearing of left ear    Chronic pain of left knee    Multiple lung nodules on CT    Type 2 diabetes mellitus without complication, without long-term current use of insulin (HCC)    Chronic pain syndrome    Diabetic polyneuropathy associated with type 2 diabetes mellitus (HCC)    Depression, recurrent (HCC)    Epidermal cyst    Tobacco abuse    Chronic bronchitis (HCC)    Vertigo    Chronic bilateral low back pain with bilateral sciatica    Balanitis    Seizure-like activity (HCC)    Syncope and collapse      Past Medical and Surgical History:     Past Medical History:   Diagnosis Date    Diabetes mellitus (HCC)     Diverticulitis     Hypertension     Neck pain     Renal disorder      Past Surgical History:   Procedure Laterality Date    COLON SURGERY      NECK SURGERY        Family History:     No family history on file.   Social History:     Social History     Socioeconomic History    Marital status: /Civil Union     Spouse name: None    Number of children: None    Years of education: None    Highest education level: None   Occupational History    None   Tobacco Use    Smoking status: Every Day     Current packs/day: 0.25     Types: Cigarettes, Cigars    Smokeless tobacco: Never    Tobacco comments:     1 cigar/daily   Vaping Use    Vaping status: Never Used   Substance and Sexual Activity    Alcohol use: Not Currently     Comment: occ    Drug use: Never    Sexual activity: Yes      Partners: Female   Other Topics Concern    None   Social History Narrative    None     Social Determinants of Health     Financial Resource Strain: Low Risk  (2/21/2024)    Overall Financial Resource Strain (CARDIA)     Difficulty of Paying Living Expenses: Not hard at all   Food Insecurity: No Food Insecurity (2/21/2024)    Hunger Vital Sign     Worried About Running Out of Food in the Last Year: Never true     Ran Out of Food in the Last Year: Never true   Transportation Needs: Unmet Transportation Needs (2/21/2024)    PRAPARE - Transportation     Lack of Transportation (Medical): Yes     Lack of Transportation (Non-Medical): Yes   Physical Activity: Not on file   Stress: Stress Concern Present (2/6/2023)    [employer] - Occupational Stress Questionnaire     Feeling of Stress : To some extent   Social Connections: Not on file   Intimate Partner Violence: Not on file   Housing Stability: Not on file      Medications and Allergies:     Current Outpatient Medications   Medication Sig Dispense Refill    acetaminophen (TYLENOL) 500 mg tablet Take 1 tablet (500 mg total) by mouth every 6 (six) hours as needed for mild pain 30 tablet 0    albuterol (PROVENTIL HFA,VENTOLIN HFA) 90 mcg/act inhaler Inhale 1-2 puffs every 6 (six) hours as needed for wheezing 18 g 1    atorvastatin (LIPITOR) 40 mg tablet Take 1 tablet (40 mg total) by mouth daily 90 tablet 1    clotrimazole-betamethasone (LOTRISONE) 1-0.05 % cream Apply topically 2 (two) times a day 30 g 0    fluticasone (FLONASE) 50 mcg/act nasal spray 1 spray into each nostril daily 16 g 0    gabapentin (NEURONTIN) 800 mg tablet Take 1 tablet (800 mg total) by mouth 3 (three) times a day 90 tablet 1    linaGLIPtin (Tradjenta) 5 MG TABS Take 5 mg by mouth daily 90 tablet 1    pantoprazole (PROTONIX) 40 mg tablet Take 1 tablet (40 mg total) by mouth daily 30 tablet 3    tiZANidine (ZANAFLEX) 4 mg tablet Take 1 tablet (4 mg total) by mouth  every 8 (eight) hours as needed for muscle spasms 40 tablet 3    ARIPiprazole (ABILIFY) 10 mg tablet Take 10 mg by mouth daily at bedtime      aspirin (ECOTRIN LOW STRENGTH) 81 mg EC tablet Take 1 tablet (81 mg total) by mouth daily 30 tablet 3    Blood Glucose Monitoring Suppl (FreeStyle Lite) SHANELL Use daily Use as directed 1 each 0    buPROPion (WELLBUTRIN SR) 200 MG 12 hr tablet Take 200 mg by mouth every morning      cloNIDine (CATAPRES) 0.2 mg tablet Take 0.2 mg by mouth daily at bedtime      Continuous Blood Gluc  (FreeStyle Shira 2 Burton) SHANELL Scan every 8 hours. E11.9 1 each 0    Continuous Blood Gluc Sensor (FreeStyle Shira 2 Sensor) MISC Scan every 8 hours. E11.9 2 each 5    doxepin (SINEquan) 150 MG capsule Take 150 mg by mouth daily at bedtime      glucose blood (FREESTYLE LITE) test strip Testing sugars once a day 100 each 3    hydrocortisone 1 % ointment Apply topically 2 (two) times a day 30 g 0    hydrOXYzine HCL (ATARAX) 50 mg tablet Take 50 mg by mouth daily at bedtime      Lancets (freestyle) lancets Daily 100 each 5    lidocaine (XYLOCAINE) 2 % topical gel Apply topically as needed for mild pain 30 mL 2    meclizine (ANTIVERT) 25 mg tablet Take 1 tablet (25 mg total) by mouth every 8 (eight) hours 30 tablet 2    zolpidem (AMBIEN) 10 mg tablet Take 10 mg by mouth daily at bedtime       No current facility-administered medications for this visit.     No Known Allergies   Immunizations:     Immunization History   Administered Date(s) Administered    COVID-19 PFIZER VACCINE 0.3 ML IM 09/02/2021, 09/23/2021    Pneumococcal Polysaccharide PPV23 10/03/2018    Tdap 05/14/2018      Health Maintenance:         Topic Date Due    Colorectal Cancer Screening  10/02/2027    HIV Screening  Completed    Hepatitis C Screening  Completed         Topic Date Due    Pneumococcal Vaccine: Pediatrics (0 to 5 Years) and At-Risk Patients (6 to 64 Years) (2 of 2 - PCV) 10/03/2019    COVID-19 Vaccine (3 - 2023-24  season) 09/01/2023      Medicare Screening Tests and Risk Assessments:     Fernando is here for his Subsequent Wellness visit. Last Medicare Wellness visit information reviewed, patient interviewed and updates made to the record today.      Health Risk Assessment:   Patient rates overall health as good. Patient feels that their physical health rating is same. Patient is satisfied with their life. Eyesight was rated as same. Hearing was rated as same. Patient feels that their emotional and mental health rating is same. Patients states they are sometimes angry. Patient states they are always unusually tired/fatigued. Pain experienced in the last 7 days has been a lot. Patient's pain rating has been 8/10. Patient states that he has experienced weight loss or gain in last 6 months.     Fall Risk Screening:   In the past year, patient has experienced: history of falling in past year    Number of falls: 2 or more  Injured during fall?: No    Feels unsteady when standing or walking?: Yes    Worried about falling?: Yes      Home Safety:  Patient does not have trouble with stairs inside or outside of their home. Patient has working smoke alarms and has working carbon monoxide detector. Home safety hazards include: none.     Nutrition:   Current diet is Diabetic.     Medications:   Patient is currently taking over-the-counter supplements. OTC medications include: see medication list. Patient is able to manage medications.     Activities of Daily Living (ADLs)/Instrumental Activities of Daily Living (IADLs):   Walk and transfer into and out of bed and chair?: Yes  Dress and groom yourself?: Yes    Bathe or shower yourself?: Yes    Feed yourself? Yes  Do your laundry/housekeeping?: No  Manage your money, pay your bills and track your expenses?: No  Make your own meals?: No    Do your own shopping?: No    Previous Hospitalizations:   Any hospitalizations or ED visits within the last 12 months?: No      Advance Care Planning:  "  Living will: No    Durable POA for healthcare: No    Advanced directive: No    Advanced directive counseling given: Yes      PREVENTIVE SCREENINGS      Cardiovascular Screening:    General: Screening Not Indicated, History Lipid Disorder and Risks and Benefits Discussed      Diabetes Screening:     General: Screening Not Indicated, History Diabetes and Risks and Benefits Discussed      Colorectal Cancer Screening:     General: Screening Current      Prostate Cancer Screening:    General: Screening Current and Risks and Benefits Discussed      Osteoporosis Screening:    General: Risks and Benefits Discussed and Screening Not Indicated      Abdominal Aortic Aneurysm (AAA) Screening:    Risk factors include: tobacco use        General: Risks and Benefits Discussed    Due for: Screening AAA Ultrasound      Lung Cancer Screening:     General: Screening Not Indicated and Risks and Benefits Discussed      Hepatitis C Screening:    General: Screening Current and Risks and Benefits Discussed    Screening, Brief Intervention, and Referral to Treatment (SBIRT)    Screening  Typical number of drinks in a day: 0  Typical number of drinks in a week: 0  Interpretation: Low risk drinking behavior.    Single Item Drug Screening:  How often have you used an illegal drug (including marijuana) or a prescription medication for non-medical reasons in the past year? never    Single Item Drug Screen Score: 0  Interpretation: Negative screen for possible drug use disorder    Other Counseling Topics:   Car/seat belt/driving safety, skin self-exam, sunscreen and calcium and vitamin D intake and regular weightbearing exercise.     No results found.     Physical Exam:     /78 (BP Location: Right arm, Patient Position: Sitting, Cuff Size: Standard)   Pulse 79   Temp 98 °F (36.7 °C) (Temporal)   Resp 16   Ht 6' 1\" (1.854 m)   Wt 87.1 kg (192 lb)   SpO2 95%   BMI 25.33 kg/m²     Physical Exam  Vitals and nursing note reviewed. "   Constitutional:       General: He is not in acute distress.     Appearance: Normal appearance. He is not diaphoretic.   HENT:      Head: Normocephalic and atraumatic.      Right Ear: Tympanic membrane and external ear normal.      Left Ear: Tympanic membrane and external ear normal.      Nose: Nose normal.      Mouth/Throat:      Mouth: Mucous membranes are moist.   Eyes:      General:         Right eye: No discharge.         Left eye: No discharge.      Extraocular Movements: Extraocular movements intact.      Conjunctiva/sclera: Conjunctivae normal.      Pupils: Pupils are equal, round, and reactive to light.   Cardiovascular:      Rate and Rhythm: Normal rate and regular rhythm.      Pulses: Normal pulses.      Heart sounds: Normal heart sounds.   Pulmonary:      Effort: Pulmonary effort is normal. No respiratory distress.      Breath sounds: Normal breath sounds.   Abdominal:      General: Abdomen is flat. Bowel sounds are normal.      Palpations: Abdomen is soft.      Tenderness: There is no abdominal tenderness.   Musculoskeletal:         General: No swelling. Normal range of motion.      Right shoulder: Tenderness present. Normal strength.      Left shoulder: Tenderness present. Normal strength.      Cervical back: Normal range of motion and neck supple.      Right knee: Tenderness present.      Left knee: Tenderness present.      Right lower leg: No edema.      Left lower leg: No edema.   Skin:     General: Skin is warm and dry.   Neurological:      General: No focal deficit present.      Mental Status: He is alert and oriented to person, place, and time.   Psychiatric:         Mood and Affect: Mood normal.         Behavior: Behavior normal.          RAMO Lazo

## 2024-02-21 NOTE — ASSESSMENT & PLAN NOTE
Lung cancer CT 12/23:  FINDINGS:     LUNGS:  There is no tracheal or endobronchial lesion. Pulmonary nodules as described on series 3:  -Stable 3 mm right upper lobe nodule (image 73)  -Stable 2 mm right middle lobe nodule (image 181)  -Stable 6 mm intrafissural node along the right minor fissure (image 182)  -Stable 5 mm right lower lobe nodule (image 167)     PLEURA:  Unremarkable.     HEART/GREAT VESSELS: Coronary artery calcifications present. No thoracic aortic aneurysm.     MEDIASTINUM AND EDWARD:  Unremarkable.     CHEST WALL AND LOWER NECK:  Unremarkable.     VISUALIZED STRUCTURES IN THE UPPER ABDOMEN: Hypodense lesion of left kidney likely represents a cyst.     OSSEOUS STRUCTURES:  No acute fracture or destructive osseous lesion.     IMPRESSION:  1. Stable sub-5 mm pulmonary nodules.  2.   Lung-RADS2, benign appearance or behavior.  Continue annual screening with LDCT in 12 months.    Repeat 12/2024

## 2024-03-04 ENCOUNTER — TELEPHONE (OUTPATIENT)
Dept: FAMILY MEDICINE CLINIC | Facility: CLINIC | Age: 59
End: 2024-03-04

## 2024-03-04 NOTE — TELEPHONE ENCOUNTER
Patient came into office and is requesting refill on the following medication.       Continuous Blood Gluc Sensor (FreeStyle Shira 2 Sensor)      Any questions please contact patient, thank you

## 2024-03-05 ENCOUNTER — HOSPITAL ENCOUNTER (OUTPATIENT)
Dept: ULTRASOUND IMAGING | Facility: HOSPITAL | Age: 59
Discharge: HOME | End: 2024-03-05
Payer: MEDICARE

## 2024-03-05 DIAGNOSIS — Z72.0 TOBACCO ABUSE: ICD-10-CM

## 2024-03-05 DIAGNOSIS — E11.9 TYPE 2 DIABETES MELLITUS WITHOUT COMPLICATION, WITHOUT LONG-TERM CURRENT USE OF INSULIN (HCC): ICD-10-CM

## 2024-03-05 PROCEDURE — 76706 US ABDL AORTA SCREEN AAA: CPT

## 2024-03-07 ENCOUNTER — TELEPHONE (OUTPATIENT)
Dept: FAMILY MEDICINE CLINIC | Facility: CLINIC | Age: 59
End: 2024-03-07

## 2024-03-07 NOTE — TELEPHONE ENCOUNTER
PCP SIGNATURE NEEDED FOR [facility] RECEIVED VIA FAX AND PLACED IN PCP FOLDER TO BE DELIVERED AT ASSIGNED TIMES.    GERD/PANTOPRAZOLE SODIUM

## 2024-03-12 NOTE — TELEPHONE ENCOUNTER
FAXED ON 03/12/24 TO [facility] at [phone]. FAX CONFIRMATION RECEIVED.

## 2024-03-22 ENCOUNTER — PATIENT OUTREACH (OUTPATIENT)
Dept: FAMILY MEDICINE CLINIC | Facility: CLINIC | Age: 59
End: 2024-03-22

## 2024-03-22 NOTE — PROGRESS NOTES
I called the patient but received voicemail. Message was left stating I will call back at another time.

## 2024-04-04 ENCOUNTER — PATIENT OUTREACH (OUTPATIENT)
Dept: FAMILY MEDICINE CLINIC | Facility: CLINIC | Age: 59
End: 2024-04-04

## 2024-04-04 NOTE — PROGRESS NOTES
I called the patient but he could not talk at the current time. He asked me to call him back later today.        I called the patient again but received voicemail.  Message was left stating I will call him at another time.

## 2024-04-05 ENCOUNTER — TELEPHONE (OUTPATIENT)
Dept: FAMILY MEDICINE CLINIC | Facility: CLINIC | Age: 59
End: 2024-04-05

## 2024-04-09 NOTE — TELEPHONE ENCOUNTER
Patient came into office today requesting status on sensor. Pt stating he has not been able to check his sugar. Pt is very upset and is really in need or refill.    Please advice.

## 2024-04-09 NOTE — TELEPHONE ENCOUNTER
I called pt and informed him that he has 5 refills that he must contact hs pharmacy for a refill.

## 2024-04-10 ENCOUNTER — APPOINTMENT (EMERGENCY)
Dept: RADIOLOGY | Facility: HOSPITAL | Age: 59
End: 2024-04-10
Payer: MEDICARE

## 2024-04-10 ENCOUNTER — HOSPITAL ENCOUNTER (EMERGENCY)
Facility: HOSPITAL | Age: 59
Discharge: HOME | End: 2024-04-10
Attending: EMERGENCY MEDICINE
Payer: MEDICARE

## 2024-04-10 VITALS
OXYGEN SATURATION: 97 % | RESPIRATION RATE: 16 BRPM | SYSTOLIC BLOOD PRESSURE: 100 MMHG | HEART RATE: 68 BPM | WEIGHT: 191.14 LBS | TEMPERATURE: 98.3 F | DIASTOLIC BLOOD PRESSURE: 58 MMHG | BODY MASS INDEX: 25.22 KG/M2

## 2024-04-10 DIAGNOSIS — E16.2 HYPOGLYCEMIA: Primary | ICD-10-CM

## 2024-04-10 DIAGNOSIS — R07.89 ATYPICAL CHEST PAIN: ICD-10-CM

## 2024-04-10 LAB
ALBUMIN SERPL BCP-MCNC: 3.8 G/DL (ref 3.5–5)
ALP SERPL-CCNC: 82 U/L (ref 34–104)
ALT SERPL W P-5'-P-CCNC: 14 U/L (ref 7–52)
ANION GAP SERPL CALCULATED.3IONS-SCNC: 4 MMOL/L (ref 4–13)
AST SERPL W P-5'-P-CCNC: 15 U/L (ref 13–39)
ATRIAL RATE: 76 BPM
BASOPHILS # BLD AUTO: 0.06 THOUSANDS/ÂΜL (ref 0–0.1)
BASOPHILS NFR BLD AUTO: 1 % (ref 0–1)
BILIRUB SERPL-MCNC: 0.65 MG/DL (ref 0.2–1)
BUN SERPL-MCNC: 8 MG/DL (ref 5–25)
CALCIUM SERPL-MCNC: 9 MG/DL (ref 8.4–10.2)
CARDIAC TROPONIN I PNL SERPL HS: 4 NG/L
CHLORIDE SERPL-SCNC: 106 MMOL/L (ref 96–108)
CO2 SERPL-SCNC: 28 MMOL/L (ref 21–32)
CREAT SERPL-MCNC: 0.86 MG/DL (ref 0.6–1.3)
EOSINOPHIL # BLD AUTO: 0.38 THOUSAND/ÂΜL (ref 0–0.61)
EOSINOPHIL NFR BLD AUTO: 5 % (ref 0–6)
ERYTHROCYTE [DISTWIDTH] IN BLOOD BY AUTOMATED COUNT: 15.5 % (ref 11.6–15.1)
GFR SERPL CREATININE-BSD FRML MDRD: 93 ML/MIN/1.73SQ M
GLUCOSE SERPL-MCNC: 117 MG/DL (ref 65–140)
GLUCOSE SERPL-MCNC: 154 MG/DL (ref 65–140)
GLUCOSE SERPL-MCNC: 187 MG/DL (ref 65–140)
HCT VFR BLD AUTO: 46.2 % (ref 36.5–49.3)
HGB BLD-MCNC: 15.6 G/DL (ref 12–17)
IMM GRANULOCYTES # BLD AUTO: 0.03 THOUSAND/UL (ref 0–0.2)
IMM GRANULOCYTES NFR BLD AUTO: 0 % (ref 0–2)
LIPASE SERPL-CCNC: 35 U/L (ref 11–82)
LYMPHOCYTES # BLD AUTO: 2.34 THOUSANDS/ÂΜL (ref 0.6–4.47)
LYMPHOCYTES NFR BLD AUTO: 29 % (ref 14–44)
MCH RBC QN AUTO: 31.5 PG (ref 26.8–34.3)
MCHC RBC AUTO-ENTMCNC: 33.8 G/DL (ref 31.4–37.4)
MCV RBC AUTO: 93 FL (ref 82–98)
MONOCYTES # BLD AUTO: 0.54 THOUSAND/ÂΜL (ref 0.17–1.22)
MONOCYTES NFR BLD AUTO: 7 % (ref 4–12)
NEUTROPHILS # BLD AUTO: 4.68 THOUSANDS/ÂΜL (ref 1.85–7.62)
NEUTS SEG NFR BLD AUTO: 58 % (ref 43–75)
NRBC BLD AUTO-RTO: 0 /100 WBCS
P AXIS: 70 DEGREES
PLATELET # BLD AUTO: 172 THOUSANDS/UL (ref 149–390)
PMV BLD AUTO: 11.2 FL (ref 8.9–12.7)
POTASSIUM SERPL-SCNC: 3.5 MMOL/L (ref 3.5–5.3)
PR INTERVAL: 164 MS
PROT SERPL-MCNC: 6.6 G/DL (ref 6.4–8.4)
QRS AXIS: 38 DEGREES
QRSD INTERVAL: 84 MS
QT INTERVAL: 410 MS
QTC INTERVAL: 461 MS
RBC # BLD AUTO: 4.95 MILLION/UL (ref 3.88–5.62)
SODIUM SERPL-SCNC: 138 MMOL/L (ref 135–147)
T WAVE AXIS: 43 DEGREES
VENTRICULAR RATE: 76 BPM
WBC # BLD AUTO: 8.03 THOUSAND/UL (ref 4.31–10.16)

## 2024-04-10 PROCEDURE — 93005 ELECTROCARDIOGRAM TRACING: CPT

## 2024-04-10 PROCEDURE — 85025 COMPLETE CBC W/AUTO DIFF WBC: CPT | Performed by: EMERGENCY MEDICINE

## 2024-04-10 PROCEDURE — 84484 ASSAY OF TROPONIN QUANT: CPT | Performed by: EMERGENCY MEDICINE

## 2024-04-10 PROCEDURE — 83690 ASSAY OF LIPASE: CPT | Performed by: EMERGENCY MEDICINE

## 2024-04-10 PROCEDURE — 80053 COMPREHEN METABOLIC PANEL: CPT | Performed by: EMERGENCY MEDICINE

## 2024-04-10 PROCEDURE — 36415 COLL VENOUS BLD VENIPUNCTURE: CPT

## 2024-04-10 PROCEDURE — 82948 REAGENT STRIP/BLOOD GLUCOSE: CPT

## 2024-04-10 PROCEDURE — 93010 ELECTROCARDIOGRAM REPORT: CPT | Performed by: INTERNAL MEDICINE

## 2024-04-10 PROCEDURE — 71045 X-RAY EXAM CHEST 1 VIEW: CPT

## 2024-04-10 RX ORDER — ACETAMINOPHEN 325 MG/1
650 TABLET ORAL ONCE
Status: COMPLETED | OUTPATIENT
Start: 2024-04-10 | End: 2024-04-10

## 2024-04-10 RX ORDER — KETOROLAC TROMETHAMINE 30 MG/ML
15 INJECTION, SOLUTION INTRAMUSCULAR; INTRAVENOUS ONCE
Status: COMPLETED | OUTPATIENT
Start: 2024-04-10 | End: 2024-04-10

## 2024-04-10 RX ORDER — FAMOTIDINE 20 MG/1
20 TABLET, FILM COATED ORAL ONCE
Status: COMPLETED | OUTPATIENT
Start: 2024-04-10 | End: 2024-04-10

## 2024-04-10 RX ORDER — SUCRALFATE 1 G/1
1 TABLET ORAL ONCE
Status: COMPLETED | OUTPATIENT
Start: 2024-04-10 | End: 2024-04-10

## 2024-04-10 RX ORDER — ONDANSETRON 4 MG/1
4 TABLET, ORALLY DISINTEGRATING ORAL ONCE
Status: COMPLETED | OUTPATIENT
Start: 2024-04-10 | End: 2024-04-10

## 2024-04-10 RX ORDER — LIDOCAINE 50 MG/G
1 PATCH TOPICAL ONCE
Status: DISCONTINUED | OUTPATIENT
Start: 2024-04-10 | End: 2024-04-10 | Stop reason: HOSPADM

## 2024-04-10 RX ADMIN — SUCRALFATE 1 G: 1 TABLET ORAL at 14:06

## 2024-04-10 RX ADMIN — LIDOCAINE 1 PATCH: 700 PATCH TOPICAL at 14:03

## 2024-04-10 RX ADMIN — FAMOTIDINE 20 MG: 20 TABLET, FILM COATED ORAL at 14:06

## 2024-04-10 RX ADMIN — ACETAMINOPHEN 650 MG: 325 TABLET, FILM COATED ORAL at 14:06

## 2024-04-10 RX ADMIN — ONDANSETRON 4 MG: 4 TABLET, ORALLY DISINTEGRATING ORAL at 14:02

## 2024-04-10 RX ADMIN — KETOROLAC TROMETHAMINE 15 MG: 30 INJECTION, SOLUTION INTRAMUSCULAR; INTRAVENOUS at 14:04

## 2024-04-10 NOTE — ED PROVIDER NOTES
History  Chief Complaint   Patient presents with    Chest Pain     Pt brought by EMS. Pt started with right sided cp and dizziness this am. Pt also diabetic and his blood sugar was 50 on EMS arrival. EMS gave pt oral glucose and sugar was 142.     Hypoglycemia - Symptomatic     HPI    Fernando Fox is a 59 y.o. male presenting with CC dizziness this morning and chest pain. He states that this morning he had some dizziness associated with blood sugar of 65; he drank ice tea  and ate cake and this resolved. Later in the morning he was lying in bed and started to have some right-sided chest pain that he describes both as stabbing and pressure. He has not had chest pain like this in the past and denies any history of heart disease. He says he currently has 6/10 chest pain with some mild nausea.     Prior to Admission Medications   Prescriptions Last Dose Informant Patient Reported? Taking?   ARIPiprazole (ABILIFY) 10 mg tablet  Self Yes Yes   Sig: Take 10 mg by mouth daily at bedtime   Blood Glucose Monitoring Suppl (FreeStyle Lite) SHANELL  Self No Yes   Sig: Use daily Use as directed   Continuous Blood Gluc  (FreeStyle Shira 2 [place]) SHANELL   No Yes   Sig: Scan every 8 hours. E11.9   Continuous Blood Gluc Sensor (FreeStyle Shira 2 Sensor) MISC   No Yes   Sig: Scan every 8 hours. E11.9   Lancets (freestyle) lancets   No Yes   Sig: Daily   acetaminophen (TYLENOL) 500 mg tablet   No Yes   Sig: Take 1 tablet (500 mg total) by mouth every 6 (six) hours as needed for mild pain   albuterol (PROVENTIL HFA,VENTOLIN HFA) 90 mcg/act inhaler   No Yes   Sig: Inhale 1-2 puffs every 6 (six) hours as needed for wheezing   aspirin (ECOTRIN LOW STRENGTH) 81 mg EC tablet   No Yes   Sig: Take 1 tablet (81 mg total) by mouth daily   atorvastatin (LIPITOR) 40 mg tablet   No Yes   Sig: Take 1 tablet (40 mg total) by mouth daily   buPROPion (WELLBUTRIN SR) 200 MG 12 hr tablet Not Taking Self Yes No   Sig: Take 200 mg by mouth every  morning   Patient not taking: Reported on 4/10/2024   cloNIDine (CATAPRES) 0.2 mg tablet  Self Yes Yes   Sig: Take 0.2 mg by mouth daily at bedtime   clotrimazole-betamethasone (LOTRISONE) 1-0.05 % cream   No Yes   Sig: Apply topically 2 (two) times a day   doxepin (SINEquan) 150 MG capsule   Yes Yes   Sig: Take 150 mg by mouth daily at bedtime   fluticasone (FLONASE) 50 mcg/act nasal spray   No Yes   Si spray into each nostril daily   gabapentin (NEURONTIN) 800 mg tablet   No Yes   Sig: Take 1 tablet (800 mg total) by mouth 3 (three) times a day   glucose blood (FREESTYLE LITE) test strip   No Yes   Sig: Testing sugars once a day   hydrOXYzine HCL (ATARAX) 50 mg tablet  Self Yes Yes   Sig: Take 50 mg by mouth daily at bedtime   hydrocortisone 1 % ointment  Self No Yes   Sig: Apply topically 2 (two) times a day   lidocaine (XYLOCAINE) 2 % topical gel  Self No Yes   Sig: Apply topically as needed for mild pain   linaGLIPtin (Tradjenta) 5 MG TABS   No Yes   Sig: Take 5 mg by mouth daily   meclizine (ANTIVERT) 25 mg tablet   No Yes   Sig: Take 1 tablet (25 mg total) by mouth every 8 (eight) hours   pantoprazole (PROTONIX) 40 mg tablet   No Yes   Sig: Take 1 tablet (40 mg total) by mouth daily   tiZANidine (ZANAFLEX) 4 mg tablet Not Taking  No No   Sig: Take 1 tablet (4 mg total) by mouth every 8 (eight) hours as needed for muscle spasms   Patient not taking: Reported on 4/10/2024   zolpidem (AMBIEN) 10 mg tablet  Self Yes Yes   Sig: Take 10 mg by mouth daily at bedtime      Facility-Administered Medications: None       Past Medical History:   Diagnosis Date    Diabetes mellitus (HCC)     Diverticulitis     Hypertension     Neck pain     Renal disorder        Past Surgical History:   Procedure Laterality Date    COLON SURGERY      NECK SURGERY         History reviewed. No pertinent family history.  I have reviewed and agree with the history as documented.    E-Cigarette/Vaping    E-Cigarette Use Never User       E-Cigarette/Vaping Substances    Nicotine No     THC No     CBD No     Flavoring No     Other No     Unknown No      Social History     Tobacco Use    Smoking status: Every Day     Current packs/day: 0.25     Types: Cigarettes, Cigars    Smokeless tobacco: Never    Tobacco comments:     1 cigar/daily   Vaping Use    Vaping status: Never Used   Substance Use Topics    Alcohol use: Not Currently     Comment: occ    Drug use: Yes     Types: Marijuana     Comment: occasional        Review of Systems   Constitutional:  Negative for activity change and appetite change.   Respiratory:  Negative for apnea, cough and shortness of breath.    Cardiovascular:  Positive for chest pain. Negative for palpitations.   Gastrointestinal:  Positive for abdominal pain and nausea. Negative for abdominal distention and vomiting.   Skin:  Negative for color change.   Neurological:  Positive for dizziness and light-headedness. Negative for syncope and weakness.       Physical Exam  ED Triage Vitals   Temperature Pulse Respirations Blood Pressure SpO2   04/10/24 1256 04/10/24 1252 04/10/24 1252 04/10/24 1252 04/10/24 1252   98.3 °F (36.8 °C) 77 16 103/68 95 %      Temp Source Heart Rate Source Patient Position - Orthostatic VS BP Location FiO2 (%)   04/10/24 1256 04/10/24 1252 04/10/24 1252 04/10/24 1252 --   Oral Monitor Sitting Right arm       Pain Score       04/10/24 1404       8             Orthostatic Vital Signs  Vitals:    04/10/24 1252 04/10/24 1300 04/10/24 1501   BP: 103/68 103/68 100/58   Pulse: 77 72 68   Patient Position - Orthostatic VS: Sitting  Sitting       Physical Exam  Vitals and nursing note reviewed.   Constitutional:       General: He is not in acute distress.     Appearance: He is well-developed.   HENT:      Head: Normocephalic and atraumatic.   Eyes:      Conjunctiva/sclera: Conjunctivae normal.      Pupils: Pupils are equal, round, and reactive to light.   Cardiovascular:      Rate and Rhythm: Normal rate and  regular rhythm.      Heart sounds: Normal heart sounds. No murmur heard.  Pulmonary:      Effort: Pulmonary effort is normal. No respiratory distress.      Breath sounds: Normal breath sounds.   Abdominal:      Palpations: Abdomen is soft.      Tenderness: There is no abdominal tenderness.   Musculoskeletal:         General: No swelling.      Cervical back: Neck supple.   Skin:     General: Skin is warm and dry.      Capillary Refill: Capillary refill takes less than 2 seconds.   Neurological:      Mental Status: He is alert.   Psychiatric:         Mood and Affect: Mood normal.         ED Medications  Medications   lidocaine (LIDODERM) 5 % patch 1 patch (1 patch Topical Medication Applied 4/10/24 1403)   ondansetron (ZOFRAN-ODT) dispersible tablet 4 mg (4 mg Oral Given 4/10/24 1402)   ketorolac (TORADOL) injection 15 mg (15 mg Intravenous Given 4/10/24 1404)   sucralfate (CARAFATE) tablet 1 g (1 g Oral Given 4/10/24 1406)   famotidine (PEPCID) tablet 20 mg (20 mg Oral Given 4/10/24 1406)   acetaminophen (TYLENOL) tablet 650 mg (650 mg Oral Given 4/10/24 1406)       Diagnostic Studies  Results Reviewed       Procedure Component Value Units Date/Time    HS Troponin I 4hr [[id number]]     Lab Status: No result Specimen: Blood     Lipase [[id number]]  (Normal) Collected: 04/10/24 1304    Lab Status: Final result Specimen: Blood from Arm, Right Updated: 04/10/24 1417     Lipase 35 u/L     Fingerstick Glucose (POCT) [[id number]]  (Normal) Collected: 04/10/24 1356    Lab Status: Final result Specimen: Blood Updated: 04/10/24 1356     POC Glucose 117 mg/dl     HS Troponin 0hr (reflex protocol) [[id number]]  (Normal) Collected: 04/10/24 1304    Lab Status: Final result Specimen: Blood from Arm, Right Updated: 04/10/24 1336     hs TnI 0hr 4 ng/L     HS Troponin I 2hr [[id number]]     Lab Status: No result Specimen: Blood     Comprehensive metabolic panel [[id number]]  (Abnormal) Collected: 04/10/24 1304    Lab Status: Final result  Specimen: Blood from Arm, Right Updated: 04/10/24 1328     Sodium 138 mmol/L      Potassium 3.5 mmol/L      Chloride 106 mmol/L      CO2 28 mmol/L      ANION GAP 4 mmol/L      BUN 8 mg/dL      Creatinine 0.86 mg/dL      Glucose 154 mg/dL      Calcium 9.0 mg/dL      AST 15 U/L      ALT 14 U/L      Alkaline Phosphatase 82 U/L      Total Protein 6.6 g/dL      Albumin 3.8 g/dL      Total Bilirubin 0.65 mg/dL      eGFR 93 ml/min/1.73sq m     Narrative:      [employer] guidelines for Chronic Kidney Disease (CKD):     Stage 1 with normal or high GFR (GFR > 90 mL/min/1.73 square meters)    Stage 2 Mild CKD (GFR = 60-89 mL/min/1.73 square meters)    Stage 3A Moderate CKD (GFR = 45-59 mL/min/1.73 square meters)    Stage 3B Moderate CKD (GFR = 30-44 mL/min/1.73 square meters)    Stage 4 Severe CKD (GFR = 15-29 mL/min/1.73 square meters)    Stage 5 End Stage CKD (GFR <15 mL/min/1.73 square meters)  Note: GFR calculation is accurate only with a steady state creatinine    CBC and differential [[id number]]  (Abnormal) Collected: 04/10/24 1304    Lab Status: Final result Specimen: Blood from Arm, Right Updated: 04/10/24 1310     WBC 8.03 Thousand/uL      RBC 4.95 Million/uL      Hemoglobin 15.6 g/dL      Hematocrit 46.2 %      MCV 93 fL      MCH 31.5 pg      MCHC 33.8 g/dL      RDW 15.5 %      MPV 11.2 fL      Platelets 172 Thousands/uL      nRBC 0 /100 WBCs      Segmented % 58 %      Immature Grans % 0 %      Lymphocytes % 29 %      Monocytes % 7 %      Eosinophils Relative 5 %      Basophils Relative 1 %      Absolute Neutrophils 4.68 Thousands/µL      Absolute Immature Grans 0.03 Thousand/uL      Absolute Lymphocytes 2.34 Thousands/µL      Absolute Monocytes 0.54 Thousand/µL      Eosinophils Absolute 0.38 Thousand/µL      Basophils Absolute 0.06 Thousands/µL     Fingerstick Glucose (POCT) [[id number]]  (Abnormal) Collected: 04/10/24 1254    Lab Status: Final result Specimen: Blood Updated: 04/10/24 1250      POC Glucose 187 mg/dl                    XR chest 1 view portable   ED Interpretation by Sofia Geiger MD (04/10 1441)   Normal CXR            Procedures  ECG 12 Lead Documentation Only    Date/Time: 4/10/2024 3:28 PM    Performed by: Sofia Geiger MD  Authorized by: Sofia Geiger MD    Patient location:  ED  Previous ECG:     Comparison to cardiac monitor: Yes    Interpretation:     Interpretation: normal    Rate:     ECG rate assessment: normal    Rhythm:     Rhythm: sinus rhythm    Ectopy:     Ectopy: none    QRS:     QRS axis:  Normal  Conduction:     Conduction: normal    ST segments:     ST segments:  Normal  T waves:     T waves: normal          ED Course  ED Course as of 04/10/24 1534   Wed Apr 10, 2024   1311 WBC: 8.03   1311 Hemoglobin: 15.6   1311 POC Glucose(!): 187   1311 Blood Pressure: 103/68   1311 Temperature: 98.3 °F (36.8 °C)   1311 Pulse: 72   1311 Respirations(!): 24   1311 SpO2: 96 %   1339 hs TnI 0hr: 4   1440 LIPASE: 35             HEART Risk Score      Flowsheet Row Most Recent Value   Heart Score Risk Calculator    History 0 Filed at: 04/10/2024 1354   ECG 1 Filed at: 04/10/2024 1354   Age 1 Filed at: 04/10/2024 1354   Risk Factors 1 Filed at: 04/10/2024 1354   Troponin 0 Filed at: 04/10/2024 1354   HEART Score 3 Filed at: 04/10/2024 1354                                  Medical Decision Making  Amount and/or Complexity of Data Reviewed  Labs: ordered. Decision-making details documented in ED Course.  Radiology: ordered and independent interpretation performed.    Risk  OTC drugs.  Prescription drug management.        Patient is a 59 y.o. male  who presents to the ED with dizziness this morning and chest pain.    Vital signs stable. Exam as listed above    Differential diagnosis includes but is not limited to dizziness: symptomatic hypoglycemia as resolved after eating; NSTEMI, stable angina, unstable angina, chondritis, musculoskeletal pain, GERD, pancreatitis    Plan CBC, CMP, lipase,  chest x-ray,  EKG, delta troponin    View ED course above for further discussion on patient workup.     On review of previous records patient sees his PCP for GERD and takes omeprazole daily.  Additionally has history of type 2 diabetes with polyneuropathy and chronic bronchitis..    All labs reviewed and utilized in the medical decision making process  All radiology studies independently viewed by me and interpreted by the radiologist.  I reviewed all testing with the patient.     Upon re-evaluation patient remained stable.  Initial troponin 4, chest pain greater than 3 hours, heart score 3, will not delta at this time. Patient discharged with return precautions and instructions to follow up with PCP       Disposition  Final diagnoses:   Hypoglycemia   Atypical chest pain     Time reflects when diagnosis was documented in both MDM as applicable and the Disposition within this note       Time User Action Codes Description Comment    4/10/2024  2:41 PM Sofia Geiger Add [E16.2] Hypoglycemia     4/10/2024  2:42 PM Sofia Geiger Add [R07.89] Atypical chest pain           ED Disposition       ED Disposition   Discharge    Condition   Stable    Date/Time   Wed Apr 10, 2024 1441    Comment   Fernando Fox discharge to home/self care.                   Follow-up Information       Follow up With Specialties Details Why Contact Info    [facility], Nurse Practitioner   [address] [place] [address]  [phone]              Discharge Medication List as of 4/10/2024  2:44 PM        CONTINUE these medications which have NOT CHANGED    Details   acetaminophen (TYLENOL) 500 mg tablet Take 1 tablet (500 mg total) by mouth every 6 (six) hours as needed for mild pain, Starting Wed 2/21/2024, Normal      albuterol (PROVENTIL HFA,VENTOLIN HFA) 90 mcg/act inhaler Inhale 1-2 puffs every 6 (six) hours as needed for wheezing, Starting Wed 2/21/2024, Normal      ARIPiprazole (ABILIFY) 10  mg tablet Take 10 mg by mouth daily at bedtime, Starting Thu 9/30/2021, Historical Med      aspirin (ECOTRIN LOW STRENGTH) 81 mg EC tablet Take 1 tablet (81 mg total) by mouth daily, Starting Wed 1/10/2024, Normal      atorvastatin (LIPITOR) 40 mg tablet Take 1 tablet (40 mg total) by mouth daily, Starting Wed 2/21/2024, Normal      Blood Glucose Monitoring Suppl (FreeStyle Lite) SHANELL Use daily Use as directed, Starting Tue 12/1/2020, Normal      cloNIDine (CATAPRES) 0.2 mg tablet Take 0.2 mg by mouth daily at bedtime, Starting Tue 5/18/2021, Historical Med      clotrimazole-betamethasone (LOTRISONE) 1-0.05 % cream Apply topically 2 (two) times a day, Starting Wed 2/21/2024, Normal      Continuous Blood Gluc  (FreeStyle Shira 2 [place]) SHANELL Scan every 8 hours. E11.9, Normal      Continuous Blood Gluc Sensor (FreeStyle Shira 2 Sensor) MISC Scan every 8 hours. E11.9, Normal      doxepin (SINEquan) 150 MG capsule Take 150 mg by mouth daily at bedtime, Starting Sat 6/3/2023, Historical Med      fluticasone (FLONASE) 50 mcg/act nasal spray 1 spray into each nostril daily, Starting Wed 2/21/2024, Normal      gabapentin (NEURONTIN) 800 mg tablet Take 1 tablet (800 mg total) by mouth 3 (three) times a day, Starting Wed 2/21/2024, Normal      glucose blood (FREESTYLE LITE) test strip Testing sugars once a day, Normal      hydrocortisone 1 % ointment Apply topically 2 (two) times a day, Starting Thu 6/16/2022, Normal      hydrOXYzine HCL (ATARAX) 50 mg tablet Take 50 mg by mouth daily at bedtime, Starting Thu 9/30/2021, Historical Med      Lancets (freestyle) lancets Daily, Normal      lidocaine (XYLOCAINE) 2 % topical gel Apply topically as needed for mild pain, Starting Thu 1/5/2023, Normal      linaGLIPtin (Tradjenta) 5 MG TABS Take 5 mg by mouth daily, Starting Wed 2/21/2024, Normal      meclizine (ANTIVERT) 25 mg tablet Take 1 tablet (25 mg total) by mouth every 8 (eight) hours, Starting Tue 11/14/2023, Normal       pantoprazole (PROTONIX) 40 mg tablet Take 1 tablet (40 mg total) by mouth daily, Starting Wed 2/21/2024, Normal      zolpidem (AMBIEN) 10 mg tablet Take 10 mg by mouth daily at bedtime, Starting Fri 11/6/2020, Historical Med      buPROPion (WELLBUTRIN SR) 200 MG 12 hr tablet Take 200 mg by mouth every morning, Starting Wed 8/24/2022, Historical Med      tiZANidine (ZANAFLEX) 4 mg tablet Take 1 tablet (4 mg total) by mouth every 8 (eight) hours as needed for muscle spasms, Starting Wed 2/21/2024, Normal           No discharge procedures on file.    PDMP Review         Value Time User    PDMP Reviewed  Yes 5/30/2023  3:30 PM Terell Hopkins DO             ED Provider  Attending physically available and evaluated Fernando Fox. I managed the patient along with the ED Attending.    Electronically Signed by           Sofia Geiger MD  04/10/24 4075

## 2024-04-10 NOTE — DISCHARGE INSTRUCTIONS
Please follow up with your primary care doctor regarding your ER visit today. Please return to the ER if you have chest pain with shortness of breath, sweatiness, nausea, or vomiting.

## 2024-04-10 NOTE — ED ATTENDING ATTESTATION
4/10/2024  I, Samuel Ruiz MD, saw and evaluated the patient. I have discussed the patient with the resident/non-physician practitioner and agree with the resident's/non-physician practitioner's findings, Plan of Care, and MDM as documented in the resident's/non-physician practitioner's note, except where noted. All available labs and Radiology studies were reviewed.  I was present for key portions of any procedure(s) performed by the resident/non-physician practitioner and I was immediately available to provide assistance.       At this point I agree with the current assessment done in the Emergency Department.  I have conducted an independent evaluation of this patient a history and physical is as follows:    60 YO male presents with low blood sugar and dizziness with chest pain. States dizziness improved with sugar but he then developed chest pain, soon afterward, states this has been primarily in the Right chest, stabbing. States he has no known cardiac issues, denies similar pain. Pt denies CP/SOB/F/C/N/V/D/C, no dysuria, burning on urination or blood in urine.     Gen: Pt is in NAD  HEENT: Head is atraumatic, EOM's intact, neck has FROM  Chest: CTAB, non-tender  Heart: RRR  Abdomen: Soft, NT/ND  Musculoskeletal: FROM in all extremities  Skin: No rash, no ecchymosis  Neuro: Awake, alert, oriented x4; Cranial nerves II-XII intact  Psych: Normal affect    MDM -  Will order ECG and troponin to rule out acute MI, CBC for leukocytosis and anemia, metabolic panel for electrolyte abnormalities and dehydration, will recheck glucose.    ED Course         Critical Care Time  Procedures

## 2024-04-12 ENCOUNTER — PATIENT OUTREACH (OUTPATIENT)
Dept: FAMILY MEDICINE CLINIC | Facility: CLINIC | Age: 59
End: 2024-04-12

## 2024-04-12 NOTE — PROGRESS NOTES
I called the patient to follow up after his recent ED visit for hypoglycemia.  The patient stated his blood sugar was 69, he ate candy/cake and it remained low which prompted him to go to the ED.  He states he is eating properly otherwise.  The patient was checking his blood sugar 5 minutes after eating sweets; I informed him to wait 15 minutes before checking.  I asked the patient to obtain glucose tablets to keep on hand.    The patient notes he is taking metformin qd and Tradjenta qd.  Chart reviewed.  Per last PCP notes, the patient was no longer taking metformin or Tradjenta; note sent to PCP.    The patient checked his sugar during this call at 1325 and it was 106.  He states he continues checking his feet daily and denies any skin breakdown.     The patient reports continued knee pain.  He states when he has low blood sugars, to avoid hitting his face and arms, he will fall to his knees.  He noted he received a steroid injection in the past but it only helped the pain for 2 days.  I encouraged the patient to wrap his knee and ice for 20 minutes a few times a day. The patient reported a weight of 191.    Chart reviewed.  I will continue to follow.

## 2024-04-12 NOTE — PROGRESS NOTES
04/12/24    first attempt to contact patient. no answer    Left detailed message      If pt contacts office, please assist pt with an ED F/U     Visit Type: [facility]  Visit Note: ED (04/10/24) f/u Hypoglycemia

## 2024-04-18 DIAGNOSIS — G89.29 CHRONIC PAIN OF BOTH SHOULDERS: ICD-10-CM

## 2024-04-18 DIAGNOSIS — M25.512 CHRONIC PAIN OF BOTH SHOULDERS: ICD-10-CM

## 2024-04-18 DIAGNOSIS — M25.511 CHRONIC PAIN OF BOTH SHOULDERS: ICD-10-CM

## 2024-04-18 NOTE — TELEPHONE ENCOUNTER
Pt came into office and is requesting refill for the following medications.     gabapentin (NEURONTIN) 800 mg tablet   atorvastatin (LIPITOR) 40 mg tablet    linaGLIPtin (Tradjenta) 5 MG TABS     Pt has f/u appt scheduled for 4/29/24.      Any questions please contact pt, thank you !

## 2024-04-24 ENCOUNTER — RA CDI HCC (OUTPATIENT)
Dept: OTHER | Facility: HOSPITAL | Age: 59
End: 2024-04-24

## 2024-04-24 RX ORDER — GABAPENTIN 800 MG/1
800 TABLET ORAL 3 TIMES DAILY
Qty: 90 TABLET | Refills: 1 | Status: SHIPPED | OUTPATIENT
Start: 2024-04-24 | End: 2024-04-29 | Stop reason: SDUPTHER

## 2024-04-29 ENCOUNTER — OFFICE VISIT (OUTPATIENT)
Dept: FAMILY MEDICINE CLINIC | Facility: CLINIC | Age: 59
End: 2024-04-29

## 2024-04-29 ENCOUNTER — PATIENT OUTREACH (OUTPATIENT)
Dept: FAMILY MEDICINE CLINIC | Facility: CLINIC | Age: 59
End: 2024-04-29

## 2024-04-29 VITALS
RESPIRATION RATE: 16 BRPM | HEIGHT: 73 IN | HEART RATE: 88 BPM | WEIGHT: 187 LBS | TEMPERATURE: 98 F | BODY MASS INDEX: 24.78 KG/M2 | OXYGEN SATURATION: 96 % | DIASTOLIC BLOOD PRESSURE: 80 MMHG | SYSTOLIC BLOOD PRESSURE: 130 MMHG

## 2024-04-29 DIAGNOSIS — J42 CHRONIC BRONCHITIS, UNSPECIFIED CHRONIC BRONCHITIS TYPE (HCC): ICD-10-CM

## 2024-04-29 DIAGNOSIS — R10.32 LEFT INGUINAL PAIN: ICD-10-CM

## 2024-04-29 DIAGNOSIS — M54.2 NECK PAIN: ICD-10-CM

## 2024-04-29 DIAGNOSIS — G89.29 CHRONIC BILATERAL LOW BACK PAIN WITH BILATERAL SCIATICA: ICD-10-CM

## 2024-04-29 DIAGNOSIS — E11.9 TYPE 2 DIABETES MELLITUS WITHOUT COMPLICATION, WITHOUT LONG-TERM CURRENT USE OF INSULIN (HCC): Primary | ICD-10-CM

## 2024-04-29 DIAGNOSIS — J30.9 ALLERGIC RHINITIS, UNSPECIFIED SEASONALITY, UNSPECIFIED TRIGGER: ICD-10-CM

## 2024-04-29 DIAGNOSIS — E78.2 MIXED HYPERLIPIDEMIA: ICD-10-CM

## 2024-04-29 DIAGNOSIS — M54.42 CHRONIC BILATERAL LOW BACK PAIN WITH BILATERAL SCIATICA: ICD-10-CM

## 2024-04-29 DIAGNOSIS — Z72.0 TOBACCO ABUSE: ICD-10-CM

## 2024-04-29 DIAGNOSIS — M54.41 CHRONIC BILATERAL LOW BACK PAIN WITH BILATERAL SCIATICA: ICD-10-CM

## 2024-04-29 DIAGNOSIS — K21.9 GASTROESOPHAGEAL REFLUX DISEASE WITHOUT ESOPHAGITIS: ICD-10-CM

## 2024-04-29 DIAGNOSIS — I10 PRIMARY HYPERTENSION: ICD-10-CM

## 2024-04-29 DIAGNOSIS — E11.42 DIABETIC POLYNEUROPATHY ASSOCIATED WITH TYPE 2 DIABETES MELLITUS (HCC): ICD-10-CM

## 2024-04-29 LAB — SL AMB POCT HEMOGLOBIN AIC: 5.4 (ref ?–6.5)

## 2024-04-29 PROCEDURE — 83036 HEMOGLOBIN GLYCOSYLATED A1C: CPT | Performed by: NURSE PRACTITIONER

## 2024-04-29 PROCEDURE — 99214 OFFICE O/P EST MOD 30 MIN: CPT | Performed by: NURSE PRACTITIONER

## 2024-04-29 RX ORDER — PANTOPRAZOLE SODIUM 40 MG/1
40 TABLET, DELAYED RELEASE ORAL DAILY
Qty: 30 TABLET | Refills: 3 | Status: SHIPPED | OUTPATIENT
Start: 2024-04-29

## 2024-04-29 RX ORDER — LIDOCAINE HYDROCHLORIDE 20 MG/ML
JELLY TOPICAL AS NEEDED
Qty: 30 ML | Refills: 2 | Status: SHIPPED | OUTPATIENT
Start: 2024-04-29

## 2024-04-29 RX ORDER — TIZANIDINE 4 MG/1
4 TABLET ORAL EVERY 8 HOURS PRN
Qty: 40 TABLET | Refills: 3 | Status: SHIPPED | OUTPATIENT
Start: 2024-04-29

## 2024-04-29 RX ORDER — LINAGLIPTIN 5 MG/1
5 TABLET, FILM COATED ORAL DAILY
Qty: 90 TABLET | Refills: 1 | Status: SHIPPED | OUTPATIENT
Start: 2024-04-29 | End: 2024-04-30

## 2024-04-29 RX ORDER — ASPIRIN 81 MG/1
81 TABLET ORAL DAILY
Qty: 30 TABLET | Refills: 3 | Status: SHIPPED | OUTPATIENT
Start: 2024-04-29

## 2024-04-29 RX ORDER — GABAPENTIN 800 MG/1
800 TABLET ORAL 3 TIMES DAILY
Qty: 90 TABLET | Refills: 1 | Status: SHIPPED | OUTPATIENT
Start: 2024-04-29

## 2024-04-29 RX ORDER — CLOTRIMAZOLE AND BETAMETHASONE DIPROPIONATE 10; .64 MG/G; MG/G
CREAM TOPICAL 2 TIMES DAILY
Qty: 30 G | Refills: 0 | Status: SHIPPED | OUTPATIENT
Start: 2024-04-29 | End: 2024-04-30

## 2024-04-29 RX ORDER — ATORVASTATIN CALCIUM 40 MG/1
40 TABLET, FILM COATED ORAL DAILY
Qty: 90 TABLET | Refills: 1 | Status: SHIPPED | OUTPATIENT
Start: 2024-04-29

## 2024-04-29 RX ORDER — ALBUTEROL SULFATE 90 UG/1
1-2 AEROSOL, METERED RESPIRATORY (INHALATION) EVERY 6 HOURS PRN
Qty: 18 G | Refills: 1 | Status: SHIPPED | OUTPATIENT
Start: 2024-04-29

## 2024-04-29 RX ORDER — FLUTICASONE PROPIONATE 50 MCG
1 SPRAY, SUSPENSION (ML) NASAL DAILY
Qty: 16 G | Refills: 0 | Status: SHIPPED | OUTPATIENT
Start: 2024-04-29

## 2024-04-29 RX ORDER — MECLIZINE HYDROCHLORIDE 25 MG/1
25 TABLET ORAL EVERY 8 HOURS SCHEDULED
Qty: 30 TABLET | Refills: 2 | Status: SHIPPED | OUTPATIENT
Start: 2024-04-29 | End: 2024-04-30

## 2024-04-29 NOTE — PROGRESS NOTES
Name: Fernando Fox      : 1965      MRN: [id number]  Encounter Provider: RAMO Lazo  Encounter Date: 2024   Encounter department: [facility]    Assessment & Plan     1. Type 2 diabetes mellitus without complication, without long-term current use of insulin (HCC)  Assessment & Plan:    Lab Results   Component Value Date    HGBA1C 5.4 2024     Not taking Tradjenta, stopped metformin   Discussed goal fasting and post prandial glucose ranges and importance of diabetes control   Discussed eating regularly to avoid significant glucose highs/ lows     Orders:  -     Diabetic foot exam; Future  -     POCT hemoglobin A1c    2. Primary hypertension  Assessment & Plan:  Patients BP is within goal. Recommend continue lisinopril 10 mg daily      3. Chronic bronchitis, unspecified chronic bronchitis type (HCC)  Assessment & Plan:  Continue with albuterol PRN  Encourage smoking cessation     Orders:  -     albuterol (PROVENTIL HFA,VENTOLIN HFA) 90 mcg/act inhaler; Inhale 1-2 puffs every 6 (six) hours as needed for wheezing    4. Allergic rhinitis, unspecified seasonality, unspecified trigger  Assessment & Plan:   Reordered cetirizine and Flonase    Orders:  -     fluticasone (FLONASE) 50 mcg/act nasal spray; 1 spray into each nostril daily    5. Gastroesophageal reflux disease without esophagitis  Assessment & Plan:  Continue with daily omeprazole   -Discussed diet and lifestyle interventions to improve sx including: avoidance of common triggers (chocolate, caffeine, tomatoes, citrus), eat small meals frequently, remain upright after meals       Orders:  -     pantoprazole (PROTONIX) 40 mg tablet; Take 1 tablet (40 mg total) by mouth daily    6. Diabetic polyneuropathy associated with type 2 diabetes mellitus (HCC)  Assessment & Plan:    Lab Results   Component Value Date    HGBA1C 5.9 01/10/2024     Continue with gabapentin 800 mg tid         7. Chronic  bilateral low back pain with bilateral sciatica  Assessment & Plan:  Chronic lumbar pain, no improvement despite multiple treatment regimens  Previously following with pain management but is not interested in injections so stopped going   Patient is agreeable to trial PT and try different medications   On exam he has TTP of midline and right lumbar areas with radiation to the RLE      Orders:  -     gabapentin (NEURONTIN) 800 mg tablet; Take 1 tablet (800 mg total) by mouth 3 (three) times a day  -     lidocaine (XYLOCAINE) 2 % topical gel; Apply topically as needed for mild pain  -     tiZANidine (ZANAFLEX) 4 mg tablet; Take 1 tablet (4 mg total) by mouth every 8 (eight) hours as needed for muscle spasms    8. Tobacco abuse  Assessment & Plan:  -continue to encourage cessation  -patient is aware of pharmacotherapy aids to assist with cessation         9. Mixed hyperlipidemia  Assessment & Plan:  Lab Results   Component Value Date    CHOLESTEROL 161 01/10/2024    CHOLESTEROL 150 08/01/2023    CHOLESTEROL 134 05/25/2022     Lab Results   Component Value Date    HDL 55 01/10/2024    HDL 47 08/01/2023    HDL 28 (L) 05/25/2022     Lab Results   Component Value Date    TRIG 98 01/10/2024    TRIG 21 08/01/2023    TRIG 226 (H) 05/25/2022     Lab Results   Component Value Date    NONHDLC 106 01/10/2024    NONHDLC 103 08/01/2023    NONHDLC 106 05/25/2022     Lab Results   Component Value Date    LDLCALC 86 01/10/2024     Continue atorvastatin 40 mg daily     Orders:  -     aspirin (ECOTRIN LOW STRENGTH) 81 mg EC tablet; Take 1 tablet (81 mg total) by mouth daily  -     atorvastatin (LIPITOR) 40 mg tablet; Take 1 tablet (40 mg total) by mouth daily    10. Left inguinal pain  Assessment & Plan:  Has had left groin pain for several weeks, no specific injury   Feels a pulling sensation, worse with exertion, improves with pressure  Will check U/S for hernia     Orders:  -     US groin/inguinal area; Future; Expected date:  04/29/2024    11. Neck pain  Assessment & Plan:  Chronic issue, no change is sx  Patient reports mass to neck, on exam palpable annular, firm, TTP mass mid cervical spine, likely lipoma - will check U/S     Orders:  -     US head neck soft tissue; Future; Expected date: 04/29/2024           Devaughn Fox is a 59 y.o. male who  has a past medical history of Diabetes mellitus (HCC), Diverticulitis, Hypertension, and Neck pain. who presented to the office today for follow up.      Reports that he continues with left groin pain. No specific injury or trauma. Has not yet completed imaging or PT. Denies weakness, numbness, or tingling. Also reports a mass on his neck that causes pain.       The following portions of the patient's history were reviewed and updated as appropriate: allergies, current medications, past family history, past medical history, past social history, past surgical history and problem list.      Groin Pain  This is a new problem. The current episode started 1 to 4 weeks ago. The problem has been waxing and waning. The pain is moderate. Pertinent negatives include no abdominal pain, chest pain, chills, constipation, coughing, dysuria, fever, flank pain, frequency, headaches, hematuria, hesitancy, joint pain, nausea, rash, shortness of breath, sore throat, urgency, urinary retention or vomiting. There is No reported injury. His past medical history is significant for BPH and an inguinal hernia.     Review of Systems   Constitutional:  Negative for activity change, appetite change, chills, fatigue, fever and unexpected weight change.   HENT:  Negative for hearing loss, nosebleeds, sinus pain, sneezing, sore throat and trouble swallowing.    Eyes:  Negative for photophobia and visual disturbance.   Respiratory:  Negative for cough, chest tightness, shortness of breath and wheezing.    Cardiovascular:  Negative for chest pain, palpitations and leg swelling.   Gastrointestinal:  Negative  for abdominal pain, constipation, nausea and vomiting.   Genitourinary:  Negative for decreased urine volume, difficulty urinating, dysuria, flank pain, frequency, genital sores, hematuria, hesitancy and urgency.   Musculoskeletal:  Negative for back pain, gait problem and joint pain.   Skin:  Negative for pallor, rash and wound.   Neurological:  Negative for dizziness, seizures, syncope, weakness and headaches.   Hematological:  Negative for adenopathy. Does not bruise/bleed easily.   Psychiatric/Behavioral:  Negative for confusion, hallucinations, self-injury, sleep disturbance and suicidal ideas. The patient is not nervous/anxious.        Past Medical History:   Diagnosis Date    Diabetes mellitus (HCC)     Diverticulitis     Hypertension     Neck pain     Renal disorder      Past Surgical History:   Procedure Laterality Date    COLON SURGERY      NECK SURGERY       No family history on file.  Social History     Socioeconomic History    Marital status: /Civil Union     Spouse name: None    Number of children: None    Years of education: None    Highest education level: None   Occupational History    None   Tobacco Use    Smoking status: Every Day     Current packs/day: 0.25     Types: Cigarettes, Cigars    Smokeless tobacco: Never    Tobacco comments:     1 cigar/daily   Vaping Use    Vaping status: Never Used   Substance and Sexual Activity    Alcohol use: Not Currently     Comment: occ    Drug use: Yes     Types: Marijuana     Comment: occasional    Sexual activity: Yes     Partners: Female   Other Topics Concern    None   Social History Narrative    None     Social Determinants of Health     Financial Resource Strain: Low Risk  (2/21/2024)    Overall Financial Resource Strain (CARDIA)     Difficulty of Paying Living Expenses: Not hard at all   Food Insecurity: No Food Insecurity (2/21/2024)    Hunger Vital Sign     Worried About Running Out of Food in the Last Year: Never true     Ran Out of Food in the  Last Year: Never true   Transportation Needs: Unmet Transportation Needs (2/21/2024)    PRAPARE - Transportation     Lack of Transportation (Medical): Yes     Lack of Transportation (Non-Medical): Yes   Physical Activity: Not on file   Stress: Stress Concern Present (2/6/2023)    [employer] - Occupational Stress Questionnaire     Feeling of Stress : To some extent   Social Connections: Not on file   Intimate Partner Violence: Not on file   Housing Stability: Low Risk  (4/29/2024)    Housing Stability Vital Sign     Unable to Pay for Housing in the Last Year: No     Number of Places Lived in the Last Year: 1     Unstable Housing in the Last Year: No     Current Outpatient Medications on File Prior to Visit   Medication Sig    acetaminophen (TYLENOL) 500 mg tablet Take 1 tablet (500 mg total) by mouth every 6 (six) hours as needed for mild pain    ARIPiprazole (ABILIFY) 10 mg tablet Take 10 mg by mouth daily at bedtime    cloNIDine (CATAPRES) 0.2 mg tablet Take 0.2 mg by mouth daily at bedtime    Continuous Blood Gluc  (FreeStyle Shira 2 Epps) SHANELL Scan every 8 hours. E11.9    Continuous Blood Gluc Sensor (FreeStyle Shira 2 Sensor) MISC Scan every 8 hours. E11.9    doxepin (SINEquan) 150 MG capsule Take 150 mg by mouth daily at bedtime    glucose blood (FREESTYLE LITE) test strip Testing sugars once a day    hydrocortisone 1 % ointment Apply topically 2 (two) times a day    hydrOXYzine HCL (ATARAX) 50 mg tablet Take 50 mg by mouth daily at bedtime    Lancets (freestyle) lancets Daily    zolpidem (AMBIEN) 10 mg tablet Take 10 mg by mouth daily at bedtime     No Known Allergies  Immunization History   Administered Date(s) Administered    COVID-19 PFIZER VACCINE 0.3 ML IM 09/02/2021, 09/23/2021    Pneumococcal Polysaccharide PPV23 10/03/2018    Tdap 05/14/2018       Objective     /80 (BP Location: Right arm, Patient Position: Sitting, Cuff Size: Standard)   Pulse 88   Temp  "98 °F (36.7 °C) (Temporal)   Resp 16   Ht 6' 1\" (1.854 m)   Wt 84.8 kg (187 lb)   SpO2 96%   BMI 24.67 kg/m²     Physical Exam  Vitals and nursing note reviewed.   Constitutional:       General: He is not in acute distress.     Appearance: He is well-developed. He is not diaphoretic.   HENT:      Head: Normocephalic and atraumatic.      Right Ear: External ear normal.      Left Ear: External ear normal.   Eyes:      General:         Right eye: No discharge.         Left eye: No discharge.      Conjunctiva/sclera: Conjunctivae normal.   Neck:     Cardiovascular:      Rate and Rhythm: Normal rate and regular rhythm.   Pulmonary:      Effort: Pulmonary effort is normal. No respiratory distress.      Breath sounds: Normal breath sounds. No wheezing.   Abdominal:      General: Bowel sounds are normal. There is no distension.      Palpations: Abdomen is soft.      Tenderness: There is no abdominal tenderness.   Musculoskeletal:         General: No deformity. Normal range of motion.      Cervical back: Normal range of motion and neck supple.   Lymphadenopathy:      Cervical: No cervical adenopathy.   Skin:     General: Skin is warm and dry.      Capillary Refill: Capillary refill takes less than 2 seconds.   Neurological:      General: No focal deficit present.      Mental Status: He is alert and oriented to person, place, and time.   Psychiatric:         Mood and Affect: Mood normal.         Behavior: Behavior normal.       RAMO Lazo    "

## 2024-04-29 NOTE — ASSESSMENT & PLAN NOTE
Lab Results   Component Value Date    HGBA1C 5.4 04/29/2024     Not taking Tradjenta, stopped metformin   Discussed goal fasting and post prandial glucose ranges and importance of diabetes control   Discussed eating regularly to avoid significant glucose highs/ lows

## 2024-04-29 NOTE — PROGRESS NOTES
I called the patient but received voicemail.  Message was left reminding him of his PCP appointment this afternoon at 2pm.  I will continue further outreach.

## 2024-04-30 PROBLEM — R10.32 LEFT INGUINAL PAIN: Status: ACTIVE | Noted: 2024-04-30

## 2024-04-30 NOTE — ASSESSMENT & PLAN NOTE
Chronic issue, no change is sx  Patient reports mass to neck, on exam palpable annular, firm, TTP mass mid cervical spine, likely lipoma - will check U/S

## 2024-04-30 NOTE — ASSESSMENT & PLAN NOTE
Has had left groin pain for several weeks, no specific injury   Feels a pulling sensation, worse with exertion, improves with pressure  Will check U/S for hernia

## 2024-05-10 DIAGNOSIS — E11.9 TYPE 2 DIABETES MELLITUS WITHOUT COMPLICATION, WITHOUT LONG-TERM CURRENT USE OF INSULIN (HCC): ICD-10-CM

## 2024-05-10 NOTE — TELEPHONE ENCOUNTER
Pt had appt on 4/29/24 and received all his medications execpt the following medication.     Continuous Blood Gluc  (FreeStyle Shira 2 [place]     Pt is requesting a refill, any questions please contact pt. Thank you !

## 2024-05-21 ENCOUNTER — TELEPHONE (OUTPATIENT)
Dept: FAMILY MEDICINE CLINIC | Facility: CLINIC | Age: 59
End: 2024-05-21

## 2024-05-21 ENCOUNTER — APPOINTMENT (EMERGENCY)
Dept: CT IMAGING | Facility: HOSPITAL | Age: 59
End: 2024-05-21
Payer: MEDICARE

## 2024-05-21 ENCOUNTER — HOSPITAL ENCOUNTER (EMERGENCY)
Facility: HOSPITAL | Age: 59
Discharge: HOME | End: 2024-05-21
Attending: EMERGENCY MEDICINE | Admitting: EMERGENCY MEDICINE
Payer: MEDICARE

## 2024-05-21 VITALS
HEART RATE: 77 BPM | WEIGHT: 181.6 LBS | BODY MASS INDEX: 23.96 KG/M2 | OXYGEN SATURATION: 97 % | DIASTOLIC BLOOD PRESSURE: 93 MMHG | SYSTOLIC BLOOD PRESSURE: 148 MMHG | TEMPERATURE: 97.8 F | RESPIRATION RATE: 16 BRPM

## 2024-05-21 DIAGNOSIS — R10.9 FLANK PAIN: Primary | ICD-10-CM

## 2024-05-21 DIAGNOSIS — R31.9 HEMATURIA: ICD-10-CM

## 2024-05-21 LAB
ALBUMIN SERPL BCP-MCNC: 4.2 G/DL (ref 3.5–5)
ALP SERPL-CCNC: 85 U/L (ref 34–104)
ALT SERPL W P-5'-P-CCNC: 24 U/L (ref 7–52)
ANION GAP SERPL CALCULATED.3IONS-SCNC: 7 MMOL/L (ref 4–13)
AST SERPL W P-5'-P-CCNC: 18 U/L (ref 13–39)
BASOPHILS # BLD AUTO: 0.03 THOUSANDS/ÂΜL (ref 0–0.1)
BASOPHILS NFR BLD AUTO: 0 % (ref 0–1)
BILIRUB SERPL-MCNC: 0.51 MG/DL (ref 0.2–1)
BILIRUB UR QL STRIP: NEGATIVE
BUN SERPL-MCNC: 7 MG/DL (ref 5–25)
CALCIUM SERPL-MCNC: 9.7 MG/DL (ref 8.4–10.2)
CHLORIDE SERPL-SCNC: 104 MMOL/L (ref 96–108)
CLARITY UR: CLEAR
CO2 SERPL-SCNC: 26 MMOL/L (ref 21–32)
COLOR UR: YELLOW
CREAT SERPL-MCNC: 0.8 MG/DL (ref 0.6–1.3)
EOSINOPHIL # BLD AUTO: 0.18 THOUSAND/ÂΜL (ref 0–0.61)
EOSINOPHIL NFR BLD AUTO: 3 % (ref 0–6)
ERYTHROCYTE [DISTWIDTH] IN BLOOD BY AUTOMATED COUNT: 14.9 % (ref 11.6–15.1)
GFR SERPL CREATININE-BSD FRML MDRD: 96 ML/MIN/1.73SQ M
GLUCOSE SERPL-MCNC: 130 MG/DL (ref 65–140)
GLUCOSE UR STRIP-MCNC: NEGATIVE MG/DL
HCT VFR BLD AUTO: 50.3 % (ref 36.5–49.3)
HGB BLD-MCNC: 16.8 G/DL (ref 12–17)
HGB UR QL STRIP.AUTO: NEGATIVE
IMM GRANULOCYTES # BLD AUTO: 0.03 THOUSAND/UL (ref 0–0.2)
IMM GRANULOCYTES NFR BLD AUTO: 0 % (ref 0–2)
KETONES UR STRIP-MCNC: NEGATIVE MG/DL
LEUKOCYTE ESTERASE UR QL STRIP: NEGATIVE
LIPASE SERPL-CCNC: 26 U/L (ref 11–82)
LYMPHOCYTES # BLD AUTO: 2.14 THOUSANDS/ÂΜL (ref 0.6–4.47)
LYMPHOCYTES NFR BLD AUTO: 29 % (ref 14–44)
MCH RBC QN AUTO: 31.7 PG (ref 26.8–34.3)
MCHC RBC AUTO-ENTMCNC: 33.4 G/DL (ref 31.4–37.4)
MCV RBC AUTO: 95 FL (ref 82–98)
MONOCYTES # BLD AUTO: 0.48 THOUSAND/ÂΜL (ref 0.17–1.22)
MONOCYTES NFR BLD AUTO: 7 % (ref 4–12)
NEUTROPHILS # BLD AUTO: 4.43 THOUSANDS/ÂΜL (ref 1.85–7.62)
NEUTS SEG NFR BLD AUTO: 61 % (ref 43–75)
NITRITE UR QL STRIP: NEGATIVE
NRBC BLD AUTO-RTO: 0 /100 WBCS
PH UR STRIP.AUTO: 7 [PH]
PLATELET # BLD AUTO: 174 THOUSANDS/UL (ref 149–390)
PMV BLD AUTO: 10.9 FL (ref 8.9–12.7)
POTASSIUM SERPL-SCNC: 4 MMOL/L (ref 3.5–5.3)
PROT SERPL-MCNC: 7.6 G/DL (ref 6.4–8.4)
PROT UR STRIP-MCNC: NEGATIVE MG/DL
RBC # BLD AUTO: 5.3 MILLION/UL (ref 3.88–5.62)
SODIUM SERPL-SCNC: 137 MMOL/L (ref 135–147)
SP GR UR STRIP.AUTO: 1.01 (ref 1–1.04)
UROBILINOGEN UA: NEGATIVE MG/DL
WBC # BLD AUTO: 7.29 THOUSAND/UL (ref 4.31–10.16)

## 2024-05-21 PROCEDURE — 96361 HYDRATE IV INFUSION ADD-ON: CPT

## 2024-05-21 PROCEDURE — 80053 COMPREHEN METABOLIC PANEL: CPT | Performed by: EMERGENCY MEDICINE

## 2024-05-21 PROCEDURE — 81003 URINALYSIS AUTO W/O SCOPE: CPT

## 2024-05-21 PROCEDURE — 36415 COLL VENOUS BLD VENIPUNCTURE: CPT | Performed by: EMERGENCY MEDICINE

## 2024-05-21 PROCEDURE — 96374 THER/PROPH/DIAG INJ IV PUSH: CPT

## 2024-05-21 PROCEDURE — 99284 EMERGENCY DEPT VISIT MOD MDM: CPT

## 2024-05-21 PROCEDURE — 99285 EMERGENCY DEPT VISIT HI MDM: CPT | Performed by: EMERGENCY MEDICINE

## 2024-05-21 PROCEDURE — 83690 ASSAY OF LIPASE: CPT | Performed by: EMERGENCY MEDICINE

## 2024-05-21 PROCEDURE — 74176 CT ABD & PELVIS W/O CONTRAST: CPT

## 2024-05-21 PROCEDURE — 85025 COMPLETE CBC W/AUTO DIFF WBC: CPT | Performed by: EMERGENCY MEDICINE

## 2024-05-21 RX ORDER — NAPROXEN 500 MG/1
500 TABLET ORAL 2 TIMES DAILY WITH MEALS
Qty: 30 TABLET | Refills: 0 | Status: SHIPPED | OUTPATIENT
Start: 2024-05-21

## 2024-05-21 RX ORDER — KETOROLAC TROMETHAMINE 30 MG/ML
15 INJECTION, SOLUTION INTRAMUSCULAR; INTRAVENOUS ONCE
Status: COMPLETED | OUTPATIENT
Start: 2024-05-21 | End: 2024-05-21

## 2024-05-21 RX ADMIN — KETOROLAC TROMETHAMINE 15 MG: 30 INJECTION, SOLUTION INTRAMUSCULAR; INTRAVENOUS at 13:41

## 2024-05-21 RX ADMIN — SODIUM CHLORIDE 1000 ML: 0.9 INJECTION, SOLUTION INTRAVENOUS at 13:41

## 2024-05-21 NOTE — TELEPHONE ENCOUNTER
Patient came in requesting to speak with Carin. It's been noted that case with her has been closed. Reached out to Carin and she gave me phone number to give patient. She did ask that you place a new referral so she can assist him. Thank you.

## 2024-05-21 NOTE — Clinical Note
Fernando Fox was seen and treated in our emergency department on 5/21/2024.                Diagnosis:     Fernando  .    He may return on this date: 05/24/2024         If you have any questions or concerns, please don't hesitate to call.      Terell Hopkins, DO    ______________________________           _______________          _______________  Hospital Representative                              Date                                Time

## 2024-05-22 NOTE — ED PROVIDER NOTES
"History  Chief Complaint   Patient presents with    Blood in Urine     Pt c/o blood in urine x 2 weeks and right sided neck and back pains x 2 weeks. Pt denies trauma.     Patient is a 59-year-old male here with his family.  Complaining of right flank pain and concerns for hematuria.  He notes that he has a history of kidney stones.  He states in [place] he has had to have ureteral stents placed.  He does note some frequency but no dysuria.  He also notes that occasionally he has difficulty starting urine stream.  At that point he takes a Q-tip with Vaseline and inserted into his urethra.  He states that sometimes he thinks that this is because he has urethral stones that are obstructing as when he does this he noticed the \"crystals come out\".  After such procedure he is able to then pee without difficulty.  He states has been several days since he did this.      Blood in Urine  Irritative symptoms do not include frequency. Pertinent negatives include no abdominal pain, chills, dysuria, fever, nausea or vomiting.       Prior to Admission Medications   Prescriptions Last Dose Informant Patient Reported? Taking?   ARIPiprazole (ABILIFY) 10 mg tablet  Self Yes No   Sig: Take 10 mg by mouth daily at bedtime   Continuous Blood Gluc Sensor (FreeStyle Shira 2 Sensor) MISC   No No   Sig: Scan every 8 hours. E11.9   Continuous Glucose  (FreeStyle Shira 2 [place]) SHANELL   No No   Sig: Scan every 8 hours. E11.9   Lancets (freestyle) lancets   No No   Sig: Daily   acetaminophen (TYLENOL) 500 mg tablet   No No   Sig: Take 1 tablet (500 mg total) by mouth every 6 (six) hours as needed for mild pain   albuterol (PROVENTIL HFA,VENTOLIN HFA) 90 mcg/act inhaler   No No   Sig: Inhale 1-2 puffs every 6 (six) hours as needed for wheezing   aspirin (ECOTRIN LOW STRENGTH) 81 mg EC tablet   No No   Sig: Take 1 tablet (81 mg total) by mouth daily   atorvastatin (LIPITOR) 40 mg tablet   No No   Sig: Take 1 tablet (40 mg total) by " mouth daily   cloNIDine (CATAPRES) 0.2 mg tablet  Self Yes No   Sig: Take 0.2 mg by mouth daily at bedtime   doxepin (SINEquan) 150 MG capsule   Yes No   Sig: Take 150 mg by mouth daily at bedtime   fluticasone (FLONASE) 50 mcg/act nasal spray   No No   Si spray into each nostril daily   gabapentin (NEURONTIN) 800 mg tablet   No No   Sig: Take 1 tablet (800 mg total) by mouth 3 (three) times a day   glucose blood (FREESTYLE LITE) test strip   No No   Sig: Testing sugars once a day   hydrOXYzine HCL (ATARAX) 50 mg tablet  Self Yes No   Sig: Take 50 mg by mouth daily at bedtime   hydrocortisone 1 % ointment  Self No No   Sig: Apply topically 2 (two) times a day   lidocaine (XYLOCAINE) 2 % topical gel   No No   Sig: Apply topically as needed for mild pain   pantoprazole (PROTONIX) 40 mg tablet   No No   Sig: Take 1 tablet (40 mg total) by mouth daily   tiZANidine (ZANAFLEX) 4 mg tablet   No No   Sig: Take 1 tablet (4 mg total) by mouth every 8 (eight) hours as needed for muscle spasms   zolpidem (AMBIEN) 10 mg tablet  Self Yes No   Sig: Take 10 mg by mouth daily at bedtime      Facility-Administered Medications: None       Past Medical History:   Diagnosis Date    Diabetes mellitus (HCC)     Diverticulitis     Hypertension     Neck pain     Renal disorder        Past Surgical History:   Procedure Laterality Date    COLON SURGERY      NECK SURGERY         History reviewed. No pertinent family history.  I have reviewed and agree with the history as documented.    E-Cigarette/Vaping    E-Cigarette Use Never User      E-Cigarette/Vaping Substances    Nicotine No     THC No     CBD No     Flavoring No     Other No     Unknown No      Social History     Tobacco Use    Smoking status: Every Day     Current packs/day: 0.25     Types: Cigarettes, Cigars    Smokeless tobacco: Never    Tobacco comments:     1 cigar/daily   Vaping Use    Vaping status: Never Used   Substance Use Topics    Alcohol use: Not Currently      Comment: occ    Drug use: Yes     Types: Marijuana     Comment: occasional       Review of Systems   Constitutional: Negative.  Negative for chills and fever.   HENT: Negative.  Negative for rhinorrhea, sore throat, trouble swallowing and voice change.    Eyes: Negative.  Negative for pain and visual disturbance.   Respiratory: Negative.  Negative for cough, shortness of breath and wheezing.    Cardiovascular: Negative.  Negative for chest pain and palpitations.   Gastrointestinal:  Negative for abdominal pain, diarrhea, nausea and vomiting.   Genitourinary:  Positive for hematuria. Negative for dysuria and frequency.   Musculoskeletal: Negative.  Negative for neck pain and neck stiffness.   Skin: Negative.  Negative for rash.   Neurological: Negative.  Negative for dizziness, speech difficulty, weakness, light-headedness and numbness.       Physical Exam  Physical Exam  Vitals and nursing note reviewed.   Constitutional:       General: He is not in acute distress.     Appearance: He is well-developed.   HENT:      Head: Normocephalic and atraumatic.   Eyes:      Conjunctiva/sclera: Conjunctivae normal.      Pupils: Pupils are equal, round, and reactive to light.   Neck:      Trachea: No tracheal deviation.   Cardiovascular:      Rate and Rhythm: Normal rate and regular rhythm.   Pulmonary:      Effort: Pulmonary effort is normal. No respiratory distress.      Breath sounds: Normal breath sounds. No wheezing or rales.   Abdominal:      General: Bowel sounds are normal. There is no distension.      Palpations: Abdomen is soft.      Tenderness: There is abdominal tenderness in the right upper quadrant and right lower quadrant. There is right CVA tenderness. There is no guarding or rebound.   Musculoskeletal:         General: No tenderness or deformity. Normal range of motion.      Cervical back: Normal range of motion and neck supple.   Skin:     General: Skin is warm and dry.      Capillary Refill: Capillary refill  takes less than 2 seconds.      Findings: No rash.   Neurological:      Mental Status: He is alert and oriented to person, place, and time.   Psychiatric:         Behavior: Behavior normal.         Vital Signs  ED Triage Vitals   Temperature Pulse Respirations Blood Pressure SpO2   05/21/24 1257 05/21/24 1257 05/21/24 1257 05/21/24 1257 05/21/24 1257   97.8 °F (36.6 °C) 77 16 148/93 97 %      Temp Source Heart Rate Source Patient Position - Orthostatic VS BP Location FiO2 (%)   05/21/24 1257 05/21/24 1257 05/21/24 1257 05/21/24 1257 --   Tympanic Monitor Sitting Left arm       Pain Score       05/21/24 1341       9           Vitals:    05/21/24 1257   BP: 148/93   Pulse: 77   Patient Position - Orthostatic VS: Sitting         Visual Acuity      ED Medications  Medications   sodium chloride 0.9 % bolus 1,000 mL (0 mL Intravenous Stopped 5/21/24 1459)   ketorolac (TORADOL) injection 15 mg (15 mg Intravenous Given 5/21/24 1341)       Diagnostic Studies  Results Reviewed       Procedure Component Value Units Date/Time    Comprehensive metabolic panel [[id number]] Collected: 05/21/24 1340    Lab Status: Final result Specimen: Blood from Arm, Right Updated: 05/21/24 1402     Sodium 137 mmol/L      Potassium 4.0 mmol/L      Chloride 104 mmol/L      CO2 26 mmol/L      ANION GAP 7 mmol/L      BUN 7 mg/dL      Creatinine 0.80 mg/dL      Glucose 130 mg/dL      Calcium 9.7 mg/dL      AST 18 U/L      ALT 24 U/L      Alkaline Phosphatase 85 U/L      Total Protein 7.6 g/dL      Albumin 4.2 g/dL      Total Bilirubin 0.51 mg/dL      eGFR 96 ml/min/1.73sq m     Narrative:      National Kid[employer] guidelines for Chronic Kidney Disease (CKD):     Stage 1 with normal or high GFR (GFR > 90 mL/min/1.73 square meters)    Stage 2 Mild CKD (GFR = 60-89 mL/min/1.73 square meters)    Stage 3A Moderate CKD (GFR = 45-59 mL/min/1.73 square meters)    Stage 3B Moderate CKD (GFR = 30-44 mL/min/1.73 square meters)    Stage 4 Severe CKD  (GFR = 15-29 mL/min/1.73 square meters)    Stage 5 End Stage CKD (GFR <15 mL/min/1.73 square meters)  Note: GFR calculation is accurate only with a steady state creatinine    Lipase [[id number]]  (Normal) Collected: 05/21/24 1340    Lab Status: Final result Specimen: Blood from Arm, Right Updated: 05/21/24 1402     Lipase 26 u/L     CBC and differential [[id number]]  (Abnormal) Collected: 05/21/24 1340    Lab Status: Final result Specimen: Blood from Arm, Right Updated: 05/21/24 1359     WBC 7.29 Thousand/uL      RBC 5.30 Million/uL      Hemoglobin 16.8 g/dL      Hematocrit 50.3 %      MCV 95 fL      MCH 31.7 pg      MCHC 33.4 g/dL      RDW 14.9 %      MPV 10.9 fL      Platelets 174 Thousands/uL      nRBC 0 /100 WBCs      Segmented % 61 %      Immature Grans % 0 %      Lymphocytes % 29 %      Monocytes % 7 %      Eosinophils Relative 3 %      Basophils Relative 0 %      Absolute Neutrophils 4.43 Thousands/µL      Absolute Immature Grans 0.03 Thousand/uL      Absolute Lymphocytes 2.14 Thousands/µL      Absolute Monocytes 0.48 Thousand/µL      Eosinophils Absolute 0.18 Thousand/µL      Basophils Absolute 0.03 Thousands/µL     UA w Reflex to Microscopic w Reflex to Culture [[id number]]  (Normal) Collected: 05/21/24 1304    Lab Status: Final result Specimen: Urine, Clean Catch Updated: 05/21/24 1314     Color, UA Yellow     Clarity, UA Clear     Specific Gravity, UA 1.010     pH, UA 7.0     Leukocytes, UA Negative     Nitrite, UA Negative     Protein, UA Negative mg/dl      Glucose, UA Negative mg/dl      Ketones, UA Negative mg/dl      Bilirubin, UA Negative     Occult Blood, UA Negative     UROBILINOGEN UA Negative mg/dL                    CT abdomen pelvis wo contrast   Final Result by Ryan Gamble MD (05/21 1452)      No acute intra-abdominal pathology within the limits of this unenhanced examination.      3 mm nonobstructing left intrarenal calculus.      Uncomplicated colonic diverticulosis.            Workstation  performed: [id number]                    Procedures  Procedures         ED Course  ED Course as of 05/22/24 1146   Tue May 21, 2024   1500 CT abdomen pelvis wo contrast   1500 Blood, UA: Negative   1500 WBC: 7.29   1500 Creatinine: 0.80                                             Medical Decision Making     Reviewed past medical records: Yes no recent hospitalizations noted     History Provided by: The patient, family     Differential considered: Nephrolithiasis, pyelonephritis, musculoskeletal strain, UTI     Consideration of tests: Patient's labs did not reveal any evidence of kidney failure, no right-sided nephrolithiasis noted.  He does have Ensure renal stones on the left.  He was updated about this test results.  CT reviewed with no obvious stone or evidence of recent passing.  No infection in the UA.  Suspicion for pyelonephritis or recent kidney stone is low.  More likely to be musculoskeletal pain.  Patient was advised to stop using Q-tip in his urethra.  Needs follow-up with PCP and neurology.  He verbalizes understanding and agreement with plan.       I have reviewed the patient's visit and any testing done in the emergency department.  They have verbalized their understanding of any testing done today and have no further questions or concerns regarding their care in the emergency room.  They will follow up with their primary care physician as well as with any specialist in their discharge instructions.  Strict return precautions were discussed.    Amount and/or Complexity of Data Reviewed  Labs: ordered. Decision-making details documented in ED Course.  Radiology: ordered. Decision-making details documented in ED Course.    Risk  Prescription drug management.             Disposition  Final diagnoses:   Flank pain   Hematuria     Time reflects when diagnosis was documented in both MDM as applicable and the Disposition within this note       Time User Action Codes Description Comment    5/21/2024  3:01 PM  Terell Hopkins Add [R10.9] Flank pain     5/21/2024  3:01 PM Terell Hopkins Add [R31.9] Hematuria           ED Disposition       ED Disposition   Discharge    Condition   Stable    Date/Time   Tue May 21, 2024  3:01 PM    Comment   Fernando Fox discharge to home/self care.                   Follow-up Information    None         Discharge Medication List as of 5/21/2024  3:02 PM        START taking these medications    Details   naproxen (Naprosyn) 500 mg tablet Take 1 tablet (500 mg total) by mouth 2 (two) times a day with meals, Starting Tue 5/21/2024, Normal           CONTINUE these medications which have NOT CHANGED    Details   acetaminophen (TYLENOL) 500 mg tablet Take 1 tablet (500 mg total) by mouth every 6 (six) hours as needed for mild pain, Starting Wed 2/21/2024, Normal      albuterol (PROVENTIL HFA,VENTOLIN HFA) 90 mcg/act inhaler Inhale 1-2 puffs every 6 (six) hours as needed for wheezing, Starting Mon 4/29/2024, Normal      ARIPiprazole (ABILIFY) 10 mg tablet Take 10 mg by mouth daily at bedtime, Starting Thu 9/30/2021, Historical Med      aspirin (ECOTRIN LOW STRENGTH) 81 mg EC tablet Take 1 tablet (81 mg total) by mouth daily, Starting Mon 4/29/2024, Normal      atorvastatin (LIPITOR) 40 mg tablet Take 1 tablet (40 mg total) by mouth daily, Starting Mon 4/29/2024, Normal      cloNIDine (CATAPRES) 0.2 mg tablet Take 0.2 mg by mouth daily at bedtime, Starting Tue 5/18/2021, Historical Med      Continuous Blood Gluc Sensor (FreeStyle Shira 2 Sensor) MISC Scan every 8 hours. E11.9, Normal      Continuous Glucose  (FreeStyle Shira 2 [place]) SHANELL Scan every 8 hours. E11.9, Normal      doxepin (SINEquan) 150 MG capsule Take 150 mg by mouth daily at bedtime, Starting Sat 6/3/2023, Historical Med      fluticasone (FLONASE) 50 mcg/act nasal spray 1 spray into each nostril daily, Starting Mon 4/29/2024, Normal      gabapentin (NEURONTIN) 800 mg tablet Take 1 tablet (800 mg total) by mouth 3  (three) times a day, Starting Mon 4/29/2024, Normal      glucose blood (FREESTYLE LITE) test strip Testing sugars once a day, Normal      hydrocortisone 1 % ointment Apply topically 2 (two) times a day, Starting Thu 6/16/2022, Normal      hydrOXYzine HCL (ATARAX) 50 mg tablet Take 50 mg by mouth daily at bedtime, Starting Thu 9/30/2021, Historical Med      Lancets (freestyle) lancets Daily, Normal      lidocaine (XYLOCAINE) 2 % topical gel Apply topically as needed for mild pain, Starting Mon 4/29/2024, Normal      pantoprazole (PROTONIX) 40 mg tablet Take 1 tablet (40 mg total) by mouth daily, Starting Mon 4/29/2024, Normal      tiZANidine (ZANAFLEX) 4 mg tablet Take 1 tablet (4 mg total) by mouth every 8 (eight) hours as needed for muscle spasms, Starting Mon 4/29/2024, Normal      zolpidem (AMBIEN) 10 mg tablet Take 10 mg by mouth daily at bedtime, Starting Fri 11/6/2020, Historical Med             No discharge procedures on file.    PDMP Review         Value Time User    PDMP Reviewed  Yes 5/30/2023  3:30 PM Terell Hopkins DO            ED Provider  Electronically Signed by             Terell Hopkins DO  05/22/24 1829

## 2024-05-24 ENCOUNTER — PATIENT OUTREACH (OUTPATIENT)
Dept: FAMILY MEDICINE CLINIC | Facility: CLINIC | Age: 59
End: 2024-05-24

## 2024-05-24 NOTE — PROGRESS NOTES
ADT alert received.    I called the patient but received voicemail.  Message was left asking for a return call.  I will continue further outreach.    Chart reviewed.

## 2024-05-29 ENCOUNTER — PATIENT OUTREACH (OUTPATIENT)
Dept: FAMILY MEDICINE CLINIC | Facility: CLINIC | Age: 59
End: 2024-05-29

## 2024-05-29 DIAGNOSIS — R10.9 FLANK PAIN: ICD-10-CM

## 2024-05-29 RX ORDER — NAPROXEN 500 MG/1
500 TABLET ORAL 2 TIMES DAILY WITH MEALS
Qty: 60 TABLET | Refills: 0 | OUTPATIENT
Start: 2024-05-29

## 2024-05-29 NOTE — PROGRESS NOTES
I called the patient to follow up after a recent ED visit for hematuria.  The patient states he continues with hematuria, worse in the morning then dissipates.  He reports continued right flank pain.  He denies fever or burning with urination but does report pain.  The patient states the pain medication he was prescribed is nearly gone; refill sent to PCP.    The patient would like to follow up with Urology; note sent.  He last saw Uro 5/17/23.  The patient no showed for u/s of groin and asked me to assist in rescheduling.  I called Central Scheduling but a new orders are needed since the patient no-showed; note sent to PCP.     The patient questioned about gabapentin refill; I informed him to contact the pharmacy as a refill is remaining.    I will continue to follow.

## 2024-05-30 ENCOUNTER — PATIENT OUTREACH (OUTPATIENT)
Dept: FAMILY MEDICINE CLINIC | Facility: CLINIC | Age: 59
End: 2024-05-30

## 2024-05-30 DIAGNOSIS — M54.2 NECK PAIN: ICD-10-CM

## 2024-05-30 DIAGNOSIS — R10.32 LEFT INGUINAL PAIN: Primary | ICD-10-CM

## 2024-05-30 NOTE — PROGRESS NOTES
I called the patient; it was picked up but no one responded.  I will continue further outreach.        I called the patient but received voicemail.  Message was left informing him of u/s date and time.  I will call the patient again Monday with a reminder.

## 2024-06-03 ENCOUNTER — PATIENT OUTREACH (OUTPATIENT)
Dept: FAMILY MEDICINE CLINIC | Facility: CLINIC | Age: 59
End: 2024-06-03

## 2024-06-03 NOTE — PROGRESS NOTES
I called the patient but received voicemail.  Message was left reminding him of his 2 ultrasound appointments for tomorrow.  I asked the patient to call me back so I know he is aware of the appointments.  If I do not hear back from the patient, I will make another attempt later today.      I called the patient who stated he will be attending the ultrasound appointments tomorrow at 0900.    I will continue to follow.

## 2024-06-04 ENCOUNTER — HOSPITAL ENCOUNTER (OUTPATIENT)
Dept: ULTRASOUND IMAGING | Facility: HOSPITAL | Age: 59
Discharge: HOME | End: 2024-06-04
Payer: MEDICARE

## 2024-06-04 DIAGNOSIS — R10.32 LEFT INGUINAL PAIN: ICD-10-CM

## 2024-06-04 DIAGNOSIS — M54.2 NECK PAIN: ICD-10-CM

## 2024-06-04 PROCEDURE — 76536 US EXAM OF HEAD AND NECK: CPT

## 2024-06-04 PROCEDURE — 76705 ECHO EXAM OF ABDOMEN: CPT

## 2024-06-26 ENCOUNTER — OFFICE VISIT (OUTPATIENT)
Dept: FAMILY MEDICINE CLINIC | Facility: CLINIC | Age: 59
End: 2024-06-26

## 2024-06-26 VITALS
BODY MASS INDEX: 24.03 KG/M2 | RESPIRATION RATE: 18 BRPM | WEIGHT: 181.3 LBS | DIASTOLIC BLOOD PRESSURE: 86 MMHG | TEMPERATURE: 98.4 F | HEART RATE: 95 BPM | OXYGEN SATURATION: 98 % | SYSTOLIC BLOOD PRESSURE: 128 MMHG | HEIGHT: 73 IN

## 2024-06-26 DIAGNOSIS — E78.2 MIXED HYPERLIPIDEMIA: ICD-10-CM

## 2024-06-26 DIAGNOSIS — Z72.0 TOBACCO ABUSE: ICD-10-CM

## 2024-06-26 DIAGNOSIS — E16.2 HYPOGLYCEMIA: ICD-10-CM

## 2024-06-26 DIAGNOSIS — M54.41 CHRONIC BILATERAL LOW BACK PAIN WITH BILATERAL SCIATICA: ICD-10-CM

## 2024-06-26 DIAGNOSIS — M54.42 CHRONIC BILATERAL LOW BACK PAIN WITH BILATERAL SCIATICA: ICD-10-CM

## 2024-06-26 DIAGNOSIS — G89.29 CHRONIC BILATERAL LOW BACK PAIN WITH BILATERAL SCIATICA: ICD-10-CM

## 2024-06-26 DIAGNOSIS — E11.9 TYPE 2 DIABETES MELLITUS WITHOUT COMPLICATION, WITHOUT LONG-TERM CURRENT USE OF INSULIN (HCC): ICD-10-CM

## 2024-06-26 DIAGNOSIS — I10 PRIMARY HYPERTENSION: Primary | ICD-10-CM

## 2024-06-26 DIAGNOSIS — R10.32 LEFT INGUINAL PAIN: ICD-10-CM

## 2024-06-26 PROCEDURE — G2211 COMPLEX E/M VISIT ADD ON: HCPCS | Performed by: NURSE PRACTITIONER

## 2024-06-26 PROCEDURE — 99214 OFFICE O/P EST MOD 30 MIN: CPT | Performed by: NURSE PRACTITIONER

## 2024-06-26 RX ORDER — GABAPENTIN 800 MG/1
800 TABLET ORAL 3 TIMES DAILY
Qty: 90 TABLET | Refills: 1 | Status: SHIPPED | OUTPATIENT
Start: 2024-06-26

## 2024-06-26 NOTE — ASSESSMENT & PLAN NOTE
Lab Results   Component Value Date    HGBA1C 5.4 04/29/2024     Currently on no medications   On review of Freestyle data he is having frequent hypoglycemia with glucose in the 60's, he states that he does not skip meals  States that he has episodes where he feels sweaty, dizzy, and has had syncope   Discussed eating regularly to avoid significant glucose highs/ lows and always having emergency fast acting glucose supply  Appreciate endocrinology recommendations   ED parameters reviewed

## 2024-06-26 NOTE — PROGRESS NOTES
Ambulatory Visit  Name: Fernando Fox      : 1965      MRN: [id number]  Encounter Provider: RAMO Lazo  Encounter Date: 2024   Encounter department: [facility]    Assessment & Plan   1. Primary hypertension  Assessment & Plan:  Patients BP is within goal. Recommend continue lisinopril 10 mg daily  2. Type 2 diabetes mellitus without complication, without long-term current use of insulin ([facility])  Assessment & Plan:    Lab Results   Component Value Date    HGBA1C 5.4 2024     Currently on no medications   On review of Freestyle data he is having frequent hypoglycemia with glucose in the 60's, he states that he does not skip meals  States that he has episodes where he feels sweaty, dizzy, and has had syncope   Discussed eating regularly to avoid significant glucose highs/ lows and always having emergency fast acting glucose supply  Appreciate endocrinology recommendations   ED parameters reviewed     Orders:  -     Continuous Glucose Sensor (FreeStyle Shira 2 Sensor) MISC; Scan every 8 hours. E11.9  3. Hypoglycemia  -     Ambulatory Referral to Endocrinology; Future  4. Tobacco abuse  Assessment & Plan:  -continue to encourage cessation  -patient is aware of pharmacotherapy aids to assist with cessation     5. Left inguinal pain  Assessment & Plan:  24 Inguinal US showed:  FINDINGS:  There are bilateral fat-containing inguinal hernias. No fluid collection or mass. No peristalsing bowel identified in the hernias.    He is symptomatic and has groin pain with exertion L>R  Referral to general surgery   Orders:  -     Ambulatory Referral to General Surgery; Future  6. Chronic bilateral low back pain with bilateral sciatica  Assessment & Plan:  Chronic lumbar pain, no new injury   Continue with gabapentin tid   Orders:  -     gabapentin (NEURONTIN) 800 mg tablet; Take 1 tablet (800 mg total) by mouth 3 (three) times a day  7. Mixed  hyperlipidemia  Assessment & Plan:  Lab Results   Component Value Date    CHOLESTEROL 161 01/10/2024    CHOLESTEROL 150 08/01/2023    CHOLESTEROL 134 05/25/2022     Lab Results   Component Value Date    HDL 55 01/10/2024    HDL 47 08/01/2023    HDL 28 (L) 05/25/2022     Lab Results   Component Value Date    TRIG 98 01/10/2024    TRIG 21 08/01/2023    TRIG 226 (H) 05/25/2022     Lab Results   Component Value Date    NONHDLC 106 01/10/2024    NONHDLC 103 08/01/2023    NONHDLC 106 05/25/2022     Lab Results   Component Value Date    LDLCALC 86 01/10/2024     Continue atorvastatin 40 mg daily          History of Present Illness     Fernando Fox is a 59 y.o. male who  has a past medical history of Diabetes mellitus (HCC), Diverticulitis, Hypertension, and Neck pain. who presented to the office today for follow up.      Reports that he continues with left > right groin pain.  Recent US showed bilateral fat containing inguinal hernias.      The following portions of the patient's history were reviewed and updated as appropriate: allergies, current medications, past family history, past medical history, past social history, past surgical history and problem list.        Review of Systems   Constitutional:  Negative for activity change, appetite change, fatigue and unexpected weight change.   HENT:  Negative for hearing loss, nosebleeds, sinus pain, sneezing and trouble swallowing.    Eyes:  Negative for photophobia and visual disturbance.   Respiratory:  Negative for chest tightness and wheezing.    Cardiovascular:  Negative for palpitations and leg swelling.   Gastrointestinal:  Positive for abdominal pain.   Genitourinary:  Negative for decreased urine volume, difficulty urinating, genital sores and hematuria.   Musculoskeletal:  Negative for back pain and gait problem.   Skin:  Negative for pallor and wound.   Neurological:  Negative for dizziness, seizures, syncope and weakness.   Hematological:  Negative for  adenopathy. Does not bruise/bleed easily.   Psychiatric/Behavioral:  Negative for confusion, hallucinations, self-injury, sleep disturbance and suicidal ideas. The patient is not nervous/anxious.      Past Medical History:   Diagnosis Date    Diabetes mellitus (HCC)     Diverticulitis     Hypertension     Neck pain     Renal disorder      Past Surgical History:   Procedure Laterality Date    COLON SURGERY      NECK SURGERY       No family history on file.  Social History     Tobacco Use    Smoking status: Every Day     Current packs/day: 0.25     Types: Cigarettes, Cigars    Smokeless tobacco: Never    Tobacco comments:     1 cigar/daily   Vaping Use    Vaping status: Never Used   Substance and Sexual Activity    Alcohol use: Yes     Comment: occ    Drug use: Yes     Types: Marijuana     Comment: occasional    Sexual activity: Yes     Partners: Female     Current Outpatient Medications on File Prior to Visit   Medication Sig    acetaminophen (TYLENOL) 500 mg tablet Take 1 tablet (500 mg total) by mouth every 6 (six) hours as needed for mild pain    albuterol (PROVENTIL HFA,VENTOLIN HFA) 90 mcg/act inhaler Inhale 1-2 puffs every 6 (six) hours as needed for wheezing    ARIPiprazole (ABILIFY) 10 mg tablet Take 10 mg by mouth daily at bedtime    aspirin (ECOTRIN LOW STRENGTH) 81 mg EC tablet Take 1 tablet (81 mg total) by mouth daily    atorvastatin (LIPITOR) 40 mg tablet Take 1 tablet (40 mg total) by mouth daily    cloNIDine (CATAPRES) 0.2 mg tablet Take 0.2 mg by mouth daily at bedtime    Continuous Glucose  (FreeStyle Shira 2 [place]) SHANELL Scan every 8 hours. E11.9    doxepin (SINEquan) 150 MG capsule Take 150 mg by mouth daily at bedtime    fluticasone (FLONASE) 50 mcg/act nasal spray 1 spray into each nostril daily    glucose blood (FREESTYLE LITE) test strip Testing sugars once a day    hydrocortisone 1 % ointment Apply topically 2 (two) times a day    hydrOXYzine HCL (ATARAX) 50 mg tablet Take 50 mg by  "mouth daily at bedtime    Lancets (freestyle) lancets Daily    lidocaine (XYLOCAINE) 2 % topical gel Apply topically as needed for mild pain    naproxen (Naprosyn) 500 mg tablet Take 1 tablet (500 mg total) by mouth 2 (two) times a day with meals    pantoprazole (PROTONIX) 40 mg tablet Take 1 tablet (40 mg total) by mouth daily    tiZANidine (ZANAFLEX) 4 mg tablet Take 1 tablet (4 mg total) by mouth every 8 (eight) hours as needed for muscle spasms    zolpidem (AMBIEN) 10 mg tablet Take 10 mg by mouth daily at bedtime    [DISCONTINUED] Continuous Blood Gluc Sensor (FreeStyle Shira 2 Sensor) MISC Scan every 8 hours. E11.9    [DISCONTINUED] gabapentin (NEURONTIN) 800 mg tablet Take 1 tablet (800 mg total) by mouth 3 (three) times a day     No Known Allergies  Immunization History   Administered Date(s) Administered    COVID-19 PFIZER VACCINE 0.3 ML IM 09/02/2021, 09/23/2021    Pneumococcal Polysaccharide PPV23 10/03/2018    Tdap 05/14/2018     Objective     /86 (BP Location: Right arm, Patient Position: Sitting, Cuff Size: Standard)   Pulse 95   Temp 98.4 °F (36.9 °C) (Temporal)   Resp 18   Ht 6' 1\" (1.854 m)   Wt 82.2 kg (181 lb 4.8 oz)   SpO2 98%   BMI 23.92 kg/m²     Physical Exam  Vitals and nursing note reviewed.   Constitutional:       General: He is not in acute distress.     Appearance: Normal appearance. He is not diaphoretic.   HENT:      Head: Normocephalic and atraumatic.      Right Ear: External ear normal.      Left Ear: External ear normal.   Eyes:      General:         Right eye: No discharge.         Left eye: No discharge.      Conjunctiva/sclera: Conjunctivae normal.   Cardiovascular:      Rate and Rhythm: Normal rate and regular rhythm.   Pulmonary:      Effort: Pulmonary effort is normal. No respiratory distress.      Breath sounds: Normal breath sounds.   Musculoskeletal:      Right shoulder: Tenderness present. Normal strength.      Left shoulder: Tenderness present. Normal " strength.      Cervical back: Normal range of motion and neck supple.      Right knee: Tenderness present.      Left knee: Tenderness present.      Right lower leg: No edema.      Left lower leg: No edema.   Skin:     General: Skin is warm and dry.   Neurological:      Mental Status: He is alert and oriented to person, place, and time.   Psychiatric:         Mood and Affect: Mood normal.         Behavior: Behavior normal.             Administrative Statements

## 2024-06-26 NOTE — ASSESSMENT & PLAN NOTE
6/4/24 Inguinal US showed:  FINDINGS:  There are bilateral fat-containing inguinal hernias. No fluid collection or mass. No peristalsing bowel identified in the hernias.    He is symptomatic and has groin pain with exertion L>R  Referral to general surgery

## 2024-07-25 ENCOUNTER — PATIENT OUTREACH (OUTPATIENT)
Dept: FAMILY MEDICINE CLINIC | Facility: CLINIC | Age: 59
End: 2024-07-25

## 2024-07-25 NOTE — PROGRESS NOTES
I called the patient but received voicemail.  Message was left stating since he has been stable for quite some time, I will be closing his case.  I asked the patient to call with any questions or concerns.

## 2024-08-01 ENCOUNTER — CONSULT (OUTPATIENT)
Dept: SURGERY | Facility: CLINIC | Age: 59
End: 2024-08-01

## 2024-08-01 VITALS
OXYGEN SATURATION: 97 % | HEIGHT: 73 IN | RESPIRATION RATE: 16 BRPM | DIASTOLIC BLOOD PRESSURE: 64 MMHG | BODY MASS INDEX: 24.25 KG/M2 | TEMPERATURE: 97.6 F | WEIGHT: 183 LBS | HEART RATE: 85 BPM | SYSTOLIC BLOOD PRESSURE: 118 MMHG

## 2024-08-01 DIAGNOSIS — R10.32 LEFT INGUINAL PAIN: ICD-10-CM

## 2024-08-01 DIAGNOSIS — K40.21 BILATERAL INGUINAL HERNIA, WITHOUT OBSTRUCTION OR GANGRENE, RECURRENT: Primary | ICD-10-CM

## 2024-08-01 DIAGNOSIS — E11.9 TYPE 2 DIABETES MELLITUS WITHOUT COMPLICATION, WITHOUT LONG-TERM CURRENT USE OF INSULIN (HCC): ICD-10-CM

## 2024-08-01 RX ORDER — CYCLOSPORINE 0.5 MG/ML
EMULSION OPHTHALMIC
COMMUNITY
Start: 2024-07-31

## 2024-08-01 RX ORDER — BUPROPION HYDROCHLORIDE 200 MG/1
200 TABLET, EXTENDED RELEASE ORAL EVERY MORNING
COMMUNITY
Start: 2024-05-31

## 2024-08-01 NOTE — PROGRESS NOTES
Assessment/Plan:   Fernando Fox is a 59 y.o.male who is here for:   Chief Complaint   Patient presents with    Inguinal Hernia     Bilateral Inguinal Hernia ongoing a few years started to have intense pain ongoing 2 days unable to eat. No issues having bowel movements. Feels Pressure          Plan:  Bilateral inguinal hernia seen on US but not felt on examination today. Pain bilateral groin with lifting or coughing left worse than right. CT abdomen and pelvis to rule out bilateral  inguinal hernia. After CT I will have him follow up with Dr. Kearney for possible inguinal hernia repair. CT in May does not show any inguinal hernias.   Ultrasound kidney and bladder performed 09/12/2022 reveals a exophytic 1.7 x 1.7 x 1.7 cm simple cyst in the left kidney, 3.4 x 1.7 by 2.4 cm hyperechoic area in the upper pole of the right kidney with internal septation representing mildly complex cyst noted.  1.5 x 0.9 x 1.8 cm mildly hypoechoic to isoechoic area along the posterior inferior bladder wall noted. Patient states he has no want to have the cyst removed as he is afraid he wont be able to have intercourse after surgery. I encouraged urology follow up.    US 6/28/24:  IMPRESSION:  Bilateral fat-containing inguinal hernias.    Preoperative Clearance: None      Patient understands hernia occurrence or re-occurrence risk is higher in a diabetic, tobacco user, with elevated BMIs.     In preparation for this visit all relevant and known prior office notes, prior consultations, emergency room visits, blood work results, and imaging studies were personally reviewed.  A total of  30 minutes was spent reviewing all of this information,caring for this patient, providing differential diagnosis, instructions for management, counseling and coordination of care.  This also includes planning surgical intervention where indicated.    - Patient has been instructed to avoid herbs or non-directed vitamins the week prior to surgery to ensure  no drug interactions with perioperative surgical and anesthetic medications.  - Patient should continue beta-blocker medication up through and including the day of surgery but hold any other hypertensive medications, including diuretics, unless instructed by PCP or anesthesia  - Patient should continue his statin medication up through and including the day of surgery.  - Hold metformin , If on this medication, the morning of surgery and do not resume until 48 hours AFTER surgery to avoid risk of lactic acidosis. Do not resume if eGFR is < 30  - Insulin Management:If on Insulin, patient advised to call PCP for explicit instructions. In general, will need to take one-half normal dose am of surgery but pt advised to consult PCP before making any changes.   - Patient has been instructed to avoid aspirin containing medications or non-steroidal anti-inflammatory drugs for SEVEN days preceding surgery.  ______________________________________________________  CC:  Chief Complaint   Patient presents with    Inguinal Hernia     Bilateral Inguinal Hernia ongoing a few years started to have intense pain ongoing 2 days unable to eat. No issues having bowel movements. Feels Pressure      HPI:  Fernando Fox is a 59 y.o.male who was referred for evaluation of Inguinal Hernia (Bilateral Inguinal Hernia ongoing a few years started to have intense pain ongoing 2 days unable to eat. No issues having bowel movements. Feels Pressure )  .      Currently complaining of initial     bilateral inguinal hernia pain worse with lifting, coughing. No bulging that he has noticed.   no nausea and no vomiting,     Duration of pain: Intermittent  and over 3 months    Last A1c 4/29/24: 5.4    regular bowel movement.     Prior abdominal surgery: Yes:  partial colectomy for diverticulitis about 8 years ago in [place].     Smoking Status: Current Smoker    Colonoscopy UTD    ROS:  Review of Systems   Constitutional:  Negative for chills,  diaphoresis, fever and unexpected weight change.   Respiratory:  Negative for chest tightness and shortness of breath.    Cardiovascular:  Negative for chest pain, palpitations and leg swelling.   Gastrointestinal:  Negative for abdominal pain, anal bleeding, blood in stool, constipation, diarrhea, nausea, rectal pain and vomiting.   Genitourinary:  Negative for difficulty urinating and frequency.   Skin:  Negative for color change, rash and wound.   Neurological:  Negative for weakness and numbness.   Hematological:  Does not bruise/bleed easily.   Psychiatric/Behavioral:  Negative for confusion. The patient is not nervous/anxious.             Patient Active Problem List   Diagnosis    Benign prostatic hyperplasia    Gastroesophageal reflux disease    History of diverticulitis    History of renal calculi    History of hemicolectomy    Hypertension    Hyperlipidemia    Left shoulder pain    Neck pain    Posttraumatic stiffness of shoulder joint    Intrinsic eczema    Allergic rhinitis    Decreased hearing of left ear    Chronic pain of left knee    Multiple lung nodules on CT    Type 2 diabetes mellitus without complication, without long-term current use of insulin (HCC)    Chronic pain syndrome    Diabetic polyneuropathy associated with type 2 diabetes mellitus (HCC)    Depression, recurrent (HCC)    Epidermal cyst    Tobacco abuse    Chronic bronchitis ([facility])    Vertigo    Chronic bilateral low back pain with bilateral sciatica    Balanitis    Seizure-like activity ([facility])    Syncope and collapse    Left inguinal pain       Allergies:  Patient has no known allergies.      Current Outpatient Medications:     acetaminophen (TYLENOL) 500 mg tablet, Take 1 tablet (500 mg total) by mouth every 6 (six) hours as needed for mild pain, Disp: 30 tablet, Rfl: 0    albuterol (PROVENTIL HFA,VENTOLIN HFA) 90 mcg/act inhaler, Inhale 1-2 puffs every 6 (six) hours as needed for wheezing, Disp: 18 g, Rfl: 1    ARIPiprazole (ABILIFY)  10 mg tablet, Take 10 mg by mouth daily at bedtime, Disp: , Rfl:     aspirin (ECOTRIN LOW STRENGTH) 81 mg EC tablet, Take 1 tablet (81 mg total) by mouth daily, Disp: 30 tablet, Rfl: 3    atorvastatin (LIPITOR) 40 mg tablet, Take 1 tablet (40 mg total) by mouth daily, Disp: 90 tablet, Rfl: 1    buPROPion (WELLBUTRIN SR) 200 MG 12 hr tablet, Take 200 mg by mouth every morning, Disp: , Rfl:     cloNIDine (CATAPRES) 0.2 mg tablet, Take 0.2 mg by mouth daily at bedtime, Disp: , Rfl:     Continuous Glucose  (FreeStyle Shira 2 [place]) SHANELL, Scan every 8 hours. E11.9, Disp: 1 each, Rfl: 0    Continuous Glucose Sensor (FreeStyle Shira 2 Sensor) MISC, Scan every 8 hours. E11.9, Disp: 2 each, Rfl: 5    doxepin (SINEquan) 150 MG capsule, Take 150 mg by mouth daily at bedtime, Disp: , Rfl:     fluticasone (FLONASE) 50 mcg/act nasal spray, 1 spray into each nostril daily, Disp: 16 g, Rfl: 0    gabapentin (NEURONTIN) 800 mg tablet, Take 1 tablet (800 mg total) by mouth 3 (three) times a day, Disp: 90 tablet, Rfl: 1    glucose blood (FREESTYLE LITE) test strip, Testing sugars once a day, Disp: 100 each, Rfl: 3    hydrocortisone 1 % ointment, Apply topically 2 (two) times a day, Disp: 30 g, Rfl: 0    hydrOXYzine HCL (ATARAX) 50 mg tablet, Take 50 mg by mouth daily at bedtime, Disp: , Rfl:     Lancets (freestyle) lancets, Daily, Disp: 100 each, Rfl: 5    lidocaine (XYLOCAINE) 2 % topical gel, Apply topically as needed for mild pain, Disp: 30 mL, Rfl: 2    naproxen (Naprosyn) 500 mg tablet, Take 1 tablet (500 mg total) by mouth 2 (two) times a day with meals, Disp: 30 tablet, Rfl: 0    pantoprazole (PROTONIX) 40 mg tablet, Take 1 tablet (40 mg total) by mouth daily, Disp: 30 tablet, Rfl: 3    Restasis 0.05 % ophthalmic emulsion, , Disp: , Rfl:     tiZANidine (ZANAFLEX) 4 mg tablet, Take 1 tablet (4 mg total) by mouth every 8 (eight) hours as needed for muscle spasms, Disp: 40 tablet, Rfl: 3    zolpidem (AMBIEN) 10 mg tablet,  Take 10 mg by mouth daily at bedtime, Disp: , Rfl:     Past Medical History:   Diagnosis Date    Diabetes mellitus (HCC)     Diverticulitis     Hypertension     Neck pain     Renal disorder        Past Surgical History:   Procedure Laterality Date    COLON SURGERY      NECK SURGERY         History reviewed. No pertinent family history.     reports that he has been smoking cigarettes and cigars. He has never been exposed to tobacco smoke. He has never used smokeless tobacco. He reports current alcohol use. He reports current drug use. Drug: Marijuana.    Vitals:    08/01/24 1023   BP: 118/64   Pulse: 85   Resp: 16   Temp: 97.6 °F (36.4 °C)   SpO2: 97%        PHYSICAL EXAM  General Appearance:    Alert, cooperative, no distress.    Head:    Normocephalic without obvious abnormality   Eyes:    PERRL, conjunctiva/corneas clear    Neck:   Supple, no adenopathy, no JVD   Back:     Symmetric, no spinal or CVA tenderness   Lungs:     Clear to auscultation bilaterally, no wheezing or rhonchi   Heart:    Regular  rate and rhythm, S1 and S2 normal, no murmur     Abdomen:     abdomen is soft without significant tenderness, masses, organomegaly or guarding    no inguinal or femoral hernias noted       Extremities:   Extremities normal. No clubbing, cyanosis or edema   Psych:   Normal Affect, AOx3.    Neurologic:  Skin:   Strength symmetric, speech intact    Warm, dry, intact, no visible rashes or lesions             Informed consent for procedure was personally discussed, reviewed, and signed by Dr. Kearney. Discussion by Dr. Kearney was carried out regarding risks, benefits, and alternatives with the patient. Risks include but are not limited to:  bleeding, infection, and delayed wound healing or an open wound, pulmonary embolus, leaks from bowel or bile ducts or other viscus, transfusions, death.  Discussed in further detail the more common complications and their rates of occurrence.   was used if necessary.   Patient expressed understanding of the issues discussed and wished/consented to proceed.  All questions were answered by Dr. Kearney.    Discussion performed between patient and the provider signing below.     Signature:   Nabila Goodwin PA-C    Date: 8/1/2024 Time: 10:44 AM

## 2024-08-21 ENCOUNTER — APPOINTMENT (EMERGENCY)
Dept: CT IMAGING | Facility: HOSPITAL | Age: 59
End: 2024-08-21
Payer: MEDICARE

## 2024-08-21 ENCOUNTER — HOSPITAL ENCOUNTER (EMERGENCY)
Facility: HOSPITAL | Age: 59
Discharge: HOME | End: 2024-08-21
Attending: EMERGENCY MEDICINE
Payer: MEDICARE

## 2024-08-21 ENCOUNTER — APPOINTMENT (EMERGENCY)
Dept: RADIOLOGY | Facility: HOSPITAL | Age: 59
End: 2024-08-21
Payer: MEDICARE

## 2024-08-21 ENCOUNTER — HOSPITAL ENCOUNTER (OUTPATIENT)
Dept: CT IMAGING | Facility: HOSPITAL | Age: 59
Discharge: HOME | End: 2024-08-21

## 2024-08-21 VITALS
HEART RATE: 81 BPM | RESPIRATION RATE: 18 BRPM | BODY MASS INDEX: 23.82 KG/M2 | OXYGEN SATURATION: 100 % | WEIGHT: 180.56 LBS | DIASTOLIC BLOOD PRESSURE: 89 MMHG | SYSTOLIC BLOOD PRESSURE: 112 MMHG | TEMPERATURE: 98.1 F

## 2024-08-21 DIAGNOSIS — K40.21 BILATERAL INGUINAL HERNIA, WITHOUT OBSTRUCTION OR GANGRENE, RECURRENT: ICD-10-CM

## 2024-08-21 DIAGNOSIS — R55 NEAR SYNCOPE: Primary | ICD-10-CM

## 2024-08-21 LAB
2HR DELTA HS TROPONIN: -1 NG/L
ALBUMIN SERPL BCG-MCNC: 4.3 G/DL (ref 3.5–5)
ALP SERPL-CCNC: 100 U/L (ref 34–104)
ALT SERPL W P-5'-P-CCNC: 28 U/L (ref 7–52)
ANION GAP SERPL CALCULATED.3IONS-SCNC: 9 MMOL/L (ref 4–13)
AST SERPL W P-5'-P-CCNC: 22 U/L (ref 13–39)
ATRIAL RATE: 73 BPM
ATRIAL RATE: 87 BPM
BASOPHILS # BLD AUTO: 0.05 THOUSANDS/ÂΜL (ref 0–0.1)
BASOPHILS NFR BLD AUTO: 1 % (ref 0–1)
BILIRUB SERPL-MCNC: 1 MG/DL (ref 0.2–1)
BUN SERPL-MCNC: 11 MG/DL (ref 5–25)
CALCIUM SERPL-MCNC: 9.7 MG/DL (ref 8.4–10.2)
CARDIAC TROPONIN I PNL SERPL HS: 3 NG/L
CARDIAC TROPONIN I PNL SERPL HS: 4 NG/L
CHLORIDE SERPL-SCNC: 104 MMOL/L (ref 96–108)
CO2 SERPL-SCNC: 25 MMOL/L (ref 21–32)
CREAT SERPL-MCNC: 0.86 MG/DL (ref 0.6–1.3)
EOSINOPHIL # BLD AUTO: 0.04 THOUSAND/ÂΜL (ref 0–0.61)
EOSINOPHIL NFR BLD AUTO: 0 % (ref 0–6)
ERYTHROCYTE [DISTWIDTH] IN BLOOD BY AUTOMATED COUNT: 15.9 % (ref 11.6–15.1)
GFR SERPL CREATININE-BSD FRML MDRD: 93 ML/MIN/1.73SQ M
GLUCOSE SERPL-MCNC: 202 MG/DL (ref 65–140)
GLUCOSE SERPL-MCNC: 202 MG/DL (ref 65–140)
HCT VFR BLD AUTO: 51.6 % (ref 36.5–49.3)
HGB BLD-MCNC: 17.4 G/DL (ref 12–17)
IMM GRANULOCYTES # BLD AUTO: 0.05 THOUSAND/UL (ref 0–0.2)
IMM GRANULOCYTES NFR BLD AUTO: 1 % (ref 0–2)
LYMPHOCYTES # BLD AUTO: 2.7 THOUSANDS/ÂΜL (ref 0.6–4.47)
LYMPHOCYTES NFR BLD AUTO: 28 % (ref 14–44)
MCH RBC QN AUTO: 31.1 PG (ref 26.8–34.3)
MCHC RBC AUTO-ENTMCNC: 33.7 G/DL (ref 31.4–37.4)
MCV RBC AUTO: 92 FL (ref 82–98)
MONOCYTES # BLD AUTO: 0.57 THOUSAND/ÂΜL (ref 0.17–1.22)
MONOCYTES NFR BLD AUTO: 6 % (ref 4–12)
NEUTROPHILS # BLD AUTO: 6.35 THOUSANDS/ÂΜL (ref 1.85–7.62)
NEUTS SEG NFR BLD AUTO: 64 % (ref 43–75)
NRBC BLD AUTO-RTO: 0 /100 WBCS
P AXIS: 56 DEGREES
P AXIS: 60 DEGREES
PLATELET # BLD AUTO: 201 THOUSANDS/UL (ref 149–390)
PMV BLD AUTO: 10.5 FL (ref 8.9–12.7)
POTASSIUM SERPL-SCNC: 3.4 MMOL/L (ref 3.5–5.3)
PR INTERVAL: 160 MS
PR INTERVAL: 166 MS
PROT SERPL-MCNC: 7.9 G/DL (ref 6.4–8.4)
QRS AXIS: 12 DEGREES
QRS AXIS: 3 DEGREES
QRSD INTERVAL: 78 MS
QRSD INTERVAL: 78 MS
QT INTERVAL: 374 MS
QT INTERVAL: 396 MS
QTC INTERVAL: 436 MS
QTC INTERVAL: 450 MS
RBC # BLD AUTO: 5.59 MILLION/UL (ref 3.88–5.62)
SODIUM SERPL-SCNC: 138 MMOL/L (ref 135–147)
T WAVE AXIS: 38 DEGREES
T WAVE AXIS: 6 DEGREES
VENTRICULAR RATE: 73 BPM
VENTRICULAR RATE: 87 BPM
WBC # BLD AUTO: 9.76 THOUSAND/UL (ref 4.31–10.16)

## 2024-08-21 PROCEDURE — 93005 ELECTROCARDIOGRAM TRACING: CPT

## 2024-08-21 PROCEDURE — 85025 COMPLETE CBC W/AUTO DIFF WBC: CPT | Performed by: EMERGENCY MEDICINE

## 2024-08-21 PROCEDURE — 99285 EMERGENCY DEPT VISIT HI MDM: CPT | Performed by: EMERGENCY MEDICINE

## 2024-08-21 PROCEDURE — 80053 COMPREHEN METABOLIC PANEL: CPT | Performed by: EMERGENCY MEDICINE

## 2024-08-21 PROCEDURE — 84484 ASSAY OF TROPONIN QUANT: CPT | Performed by: EMERGENCY MEDICINE

## 2024-08-21 PROCEDURE — 36415 COLL VENOUS BLD VENIPUNCTURE: CPT

## 2024-08-21 PROCEDURE — 71045 X-RAY EXAM CHEST 1 VIEW: CPT

## 2024-08-21 PROCEDURE — 82948 REAGENT STRIP/BLOOD GLUCOSE: CPT

## 2024-08-21 PROCEDURE — 93010 ELECTROCARDIOGRAM REPORT: CPT | Performed by: STUDENT IN AN ORGANIZED HEALTH CARE EDUCATION/TRAINING PROGRAM

## 2024-08-21 PROCEDURE — 99285 EMERGENCY DEPT VISIT HI MDM: CPT

## 2024-08-21 PROCEDURE — 74177 CT ABD & PELVIS W/CONTRAST: CPT

## 2024-08-21 RX ORDER — ONDANSETRON 2 MG/ML
4 INJECTION INTRAMUSCULAR; INTRAVENOUS ONCE
Status: DISCONTINUED | OUTPATIENT
Start: 2024-08-21 | End: 2024-08-21 | Stop reason: HOSPADM

## 2024-08-21 RX ADMIN — IOHEXOL 100 ML: 350 INJECTION, SOLUTION INTRAVENOUS at 13:41

## 2024-08-21 NOTE — Clinical Note
Fernando Fox was seen and treated in our emergency department on 8/21/2024.                Diagnosis:     Fernando  .    He may return on this date:          If you have any questions or concerns, please don't hesitate to call.      Jamel Hein MD    ______________________________           _______________          _______________  Hospital Representative                              Date                                Time

## 2024-08-21 NOTE — Clinical Note
Fernando Fox was seen and treated in our emergency department on 8/21/2024.                Diagnosis:     Fernando  may return to work on return date.    He may return on this date: 08/23/2024         If you have any questions or concerns, please don't hesitate to call.      Rony Gambino    ______________________________           _______________          _______________  Hospital Representative                              Date                                Time

## 2024-08-21 NOTE — Clinical Note
Fernando Fox was seen and treated in our emergency department on 8/21/2024.                Diagnosis:     Fernando  may return to work on return date.    He may return on this date: 08/23/2024         If you have any questions or concerns, please don't hesitate to call.      Jamel Hein MD    ______________________________           _______________          _______________  Hospital Representative                              Date                                Time

## 2024-08-21 NOTE — ED PROVIDER NOTES
History  Chief Complaint   Patient presents with    Syncope     Near syncopy while drinking barium for CT study of hernia. Pt med Emergency to ED. Diaphoretic in room. Hx of DM. BS on dexcom 207     Fernando is a very pleasant 58 yo male here for evaluation of near syncope.  He was here at the hospital for an outpatient CT of the abdomen due to bilateral inguinal hernias.  He drank 1 measure of barium last night and was drinking a second in preparation for his imaging when he felt lightheaded like he might pass out.  His vision blurred and he leaned against a wall.  He did not fully lose consciousness.  Because this incident occurred in the waiting room he was sent to the emergency room for evaluation.  He denies chest pain or shortness of breath.  Notes that he has been having similar episodes intermittently, perhaps 3-4 times over the past 1 to 2 months.  He has never actually lost consciousness.  He reports that he has a history of diabetes and hypertension.  No history of coronary artery disease.  Notes that over the past few days he is not had very much solid food due to lack of appetite.  He has been drinking water and urinating normally.  Denies fevers or illnesses.  No rashes.  No nausea or vomiting.  States his symptoms resolved quickly after the incident and he currently feels in his normal state of health.          Prior to Admission Medications   Prescriptions Last Dose Informant Patient Reported? Taking?   ARIPiprazole (ABILIFY) 10 mg tablet  Self Yes Yes   Sig: Take 10 mg by mouth daily at bedtime   Continuous Glucose  (FreeStyle Shira 2 [place]) SHANELL  Self No No   Sig: Scan every 8 hours. E11.9   Continuous Glucose Sensor (FreeStyle Shira 2 Sensor) MISC  Self No No   Sig: Scan every 8 hours. E11.9   Lancets (freestyle) lancets  Self No No   Sig: Daily   Restasis 0.05 % ophthalmic emulsion  Self Yes Yes   acetaminophen (TYLENOL) 500 mg tablet  Self No No   Sig: Take 1 tablet (500 mg total) by mouth  every 6 (six) hours as needed for mild pain   albuterol (PROVENTIL HFA,VENTOLIN HFA) 90 mcg/act inhaler  Self No No   Sig: Inhale 1-2 puffs every 6 (six) hours as needed for wheezing   aspirin (ECOTRIN LOW STRENGTH) 81 mg EC tablet  Self No Yes   Sig: Take 1 tablet (81 mg total) by mouth daily   atorvastatin (LIPITOR) 40 mg tablet  Self No Yes   Sig: Take 1 tablet (40 mg total) by mouth daily   buPROPion (WELLBUTRIN SR) 200 MG 12 hr tablet  Self Yes Yes   Sig: Take 200 mg by mouth every morning   cloNIDine (CATAPRES) 0.2 mg tablet  Self Yes Yes   Sig: Take 0.2 mg by mouth daily at bedtime   doxepin (SINEquan) 150 MG capsule  Self Yes Yes   Sig: Take 150 mg by mouth daily at bedtime   fluticasone (FLONASE) 50 mcg/act nasal spray  Self No Yes   Si spray into each nostril daily   gabapentin (NEURONTIN) 800 mg tablet  Self No Yes   Sig: Take 1 tablet (800 mg total) by mouth 3 (three) times a day   glucose blood (FREESTYLE LITE) test strip  Self No No   Sig: Testing sugars once a day   hydrOXYzine HCL (ATARAX) 50 mg tablet  Self Yes Yes   Sig: Take 50 mg by mouth daily at bedtime   hydrocortisone 1 % ointment  Self No No   Sig: Apply topically 2 (two) times a day   lidocaine (XYLOCAINE) 2 % topical gel  Self No No   Sig: Apply topically as needed for mild pain   naproxen (Naprosyn) 500 mg tablet  Self No No   Sig: Take 1 tablet (500 mg total) by mouth 2 (two) times a day with meals   pantoprazole (PROTONIX) 40 mg tablet  Self No Yes   Sig: Take 1 tablet (40 mg total) by mouth daily   tiZANidine (ZANAFLEX) 4 mg tablet  Self No Yes   Sig: Take 1 tablet (4 mg total) by mouth every 8 (eight) hours as needed for muscle spasms   zolpidem (AMBIEN) 10 mg tablet  Self Yes Yes   Sig: Take 10 mg by mouth daily at bedtime      Facility-Administered Medications: None       Past Medical History:   Diagnosis Date    Diabetes mellitus (HCC)     Diverticulitis     Hypertension     Neck pain     Renal disorder        Past Surgical  History:   Procedure Laterality Date    COLON SURGERY      NECK SURGERY         History reviewed. No pertinent family history.  I have reviewed and agree with the history as documented.    E-Cigarette/Vaping    E-Cigarette Use Never User      E-Cigarette/Vaping Substances    Nicotine No     THC No     CBD No     Flavoring No     Other No     Unknown No      Social History     Tobacco Use    Smoking status: Every Day     Current packs/day: 0.25     Types: Cigarettes, Cigars     Passive exposure: Never    Smokeless tobacco: Never    Tobacco comments:     1 cigar/daily   Vaping Use    Vaping status: Never Used   Substance Use Topics    Alcohol use: Yes     Comment: occ    Drug use: Yes     Types: Marijuana     Comment: occasional       Review of Systems   Constitutional:  Negative for chills and fever.   HENT:  Negative for ear pain and sore throat.    Eyes:  Negative for visual disturbance.   Respiratory:  Negative for cough and shortness of breath.    Cardiovascular:  Negative for chest pain and palpitations.   Gastrointestinal:  Negative for abdominal pain and vomiting.   Genitourinary:  Negative for dysuria and hematuria.   Musculoskeletal:  Negative for arthralgias and back pain.   Skin:  Negative for color change and rash.   Neurological:  Positive for weakness and light-headedness. Negative for seizures and syncope.        Generalized weakness, no unilateral or focal complaints.   All other systems reviewed and are negative.      Physical Exam  Physical Exam  Vitals and nursing note reviewed.   Constitutional:       General: He is not in acute distress.     Appearance: He is well-developed.   HENT:      Head: Normocephalic and atraumatic.      Mouth/Throat:      Mouth: Mucous membranes are moist.   Eyes:      Conjunctiva/sclera: Conjunctivae normal.   Cardiovascular:      Rate and Rhythm: Normal rate and regular rhythm.      Heart sounds: No murmur heard.  Pulmonary:      Effort: Pulmonary effort is normal. No  respiratory distress.      Breath sounds: Normal breath sounds.   Abdominal:      Palpations: Abdomen is soft.      Tenderness: There is no abdominal tenderness.   Musculoskeletal:         General: No swelling.      Cervical back: Neck supple.   Skin:     General: Skin is warm and dry.      Capillary Refill: Capillary refill takes less than 2 seconds.   Neurological:      General: No focal deficit present.      Mental Status: He is alert and oriented to person, place, and time.   Psychiatric:         Mood and Affect: Mood normal.         Vital Signs  ED Triage Vitals [08/21/24 1202]   Temperature Pulse Respirations Blood Pressure SpO2   98.1 °F (36.7 °C) 93 20 (!) 173/95 100 %      Temp Source Heart Rate Source Patient Position - Orthostatic VS BP Location FiO2 (%)   Oral Monitor Lying Left arm --      Pain Score       --           Vitals:    08/21/24 1202 08/21/24 1408   BP: (!) 173/95 112/89   Pulse: 93 81   Patient Position - Orthostatic VS: Lying Lying         Visual Acuity      ED Medications  Medications   ondansetron (ZOFRAN) injection 4 mg (4 mg Intravenous Not Given 8/21/24 1325)   iohexol (OMNIPAQUE) 350 MG/ML injection (MULTI-DOSE) 100 mL (100 mL Intravenous Given 8/21/24 1341)   barium (READI-CAT 2) suspension 900 mL (900 mL Oral Given 8/21/24 1341)       Diagnostic Studies  Results Reviewed       Procedure Component Value Units Date/Time    HS Troponin I 2hr [[id number]]  (Normal) Collected: 08/21/24 1408    Lab Status: Final result Specimen: Blood from Arm, Right Updated: 08/21/24 1511     hs TnI 2hr 3 ng/L      Delta 2hr hsTnI -1 ng/L     HS Troponin I 4hr [[id number]]     Lab Status: No result Specimen: Blood     HS Troponin 0hr (reflex protocol) [[id number]]  (Normal) Collected: 08/21/24 1206    Lab Status: Final result Specimen: Blood from Arm, Right Updated: 08/21/24 1236     hs TnI 0hr 4 ng/L     Comprehensive metabolic panel [[id number]]  (Abnormal) Collected: 08/21/24 1206    Lab Status: Final  result Specimen: Blood from Arm, Right Updated: 08/21/24 1230     Sodium 138 mmol/L      Potassium 3.4 mmol/L      Chloride 104 mmol/L      CO2 25 mmol/L      ANION GAP 9 mmol/L      BUN 11 mg/dL      Creatinine 0.86 mg/dL      Glucose 202 mg/dL      Calcium 9.7 mg/dL      AST 22 U/L      ALT 28 U/L      Alkaline Phosphatase 100 U/L      Total Protein 7.9 g/dL      Albumin 4.3 g/dL      Total Bilirubin 1.00 mg/dL      eGFR 93 ml/min/1.73sq m     Narrative:      National Kid[employer] Disease Foundation guidelines for Chronic Kidney Disease (CKD):     Stage 1 with normal or high GFR (GFR > 90 mL/min/1.73 square meters)    Stage 2 Mild CKD (GFR = 60-89 mL/min/1.73 square meters)    Stage 3A Moderate CKD (GFR = 45-59 mL/min/1.73 square meters)    Stage 3B Moderate CKD (GFR = 30-44 mL/min/1.73 square meters)    Stage 4 Severe CKD (GFR = 15-29 mL/min/1.73 square meters)    Stage 5 End Stage CKD (GFR <15 mL/min/1.73 square meters)  Note: GFR calculation is accurate only with a steady state creatinine    CBC and differential [[id number]]  (Abnormal) Collected: 08/21/24 1206    Lab Status: Final result Specimen: Blood from Arm, Right Updated: 08/21/24 1213     WBC 9.76 Thousand/uL      RBC 5.59 Million/uL      Hemoglobin 17.4 g/dL      Hematocrit 51.6 %      MCV 92 fL      MCH 31.1 pg      MCHC 33.7 g/dL      RDW 15.9 %      MPV 10.5 fL      Platelets 201 Thousands/uL      nRBC 0 /100 WBCs      Segmented % 64 %      Immature Grans % 1 %      Lymphocytes % 28 %      Monocytes % 6 %      Eosinophils Relative 0 %      Basophils Relative 1 %      Absolute Neutrophils 6.35 Thousands/µL      Absolute Immature Grans 0.05 Thousand/uL      Absolute Lymphocytes 2.70 Thousands/µL      Absolute Monocytes 0.57 Thousand/µL      Eosinophils Absolute 0.04 Thousand/µL      Basophils Absolute 0.05 Thousands/µL     Fingerstick Glucose (POCT) [[id number]]  (Abnormal) Collected: 08/21/24 1205    Lab Status: Final result Specimen: Blood Updated: 08/21/24  1205     POC Glucose 202 mg/dl                    CT abdomen pelvis with contrast   Final Result by Yuly Goins MD (08/21 1433)      1) No acute abdominal or pelvic pathology.      2) Small fat-containing bilateral inguinal hernias, left greater than right, with no stranding of the fat in or around the hernia sacs.      3) No bowel obstruction or inflammation.      4) Several faint groundglass nodules in the right lower lobe, new from May 2024, likely infectious/inflammatory.      5) Additional findings as above.                  Workstation performed: [id number]         XR chest 1 view portable   Final Result by Daisy Arias MD (08/21 1408)      No acute cardiopulmonary disease.            Workstation performed: [id number]                    Procedures  Procedures         ED Course               HEART Risk Score      Flowsheet Row Most Recent Value   Heart Score Risk Calculator    History 0 Filed at: 08/21/2024 1544   ECG 0 Filed at: 08/21/2024 1544   Age 1 Filed at: 08/21/2024 1544   Risk Factors 1 Filed at: 08/21/2024 1544   Troponin 0 Filed at: 08/21/2024 1544   HEART Score 2 Filed at: 08/21/2024 1544                          SBIRT 22yo+      Flowsheet Row Most Recent Value   Initial Alcohol Screen: US AUDIT-C     1. How often do you have a drink containing alcohol? 0 Filed at: 08/21/2024 1219   2. How many drinks containing alcohol do you have on a typical day you are drinking?  0 Filed at: 08/21/2024 1219   3a. Male UNDER 65: How often do you have five or more drinks on one occasion? 0 Filed at: 08/21/2024 1219   Audit-C Score 0 Filed at: 08/21/2024 1219   VICK: How many times in the past year have you...    Used an illegal drug or used a prescription medication for non-medical reasons? Never Filed at: 08/21/2024 1219                      Medical Decision Making  59-year-old male with presyncopal event in the CT waiting room today while waiting to get outpatient CT.  EKG and troponin negative for  evidence of cardiac ischemia.  Low risk for ACS by heart score.  Chest x-ray negative for pneumonia or pneumothorax.  No significant leukocytosis or electrolyte abnormalities.  Will plan for discharge with follow-up with cardiology given patient has had several presyncopal events over the past month.  Can follow-up with PCP and general surgery regarding bilateral inguinal hernia.  No acute surgical pathology on CT today.  Plan for discharge with outpatient follow-up and return precautions.    Amount and/or Complexity of Data Reviewed  Labs: ordered.  Radiology: ordered. Decision-making details documented in ED Course.     Details: Chest x-ray negative for pneumonia or pneumothorax.  ECG/medicine tests: ordered and independent interpretation performed. Decision-making details documented in ED Course.     Details: Sinus rhythm with some sinus arrhythmia at rate of 87, normal axis, normal intervals, no significant ST elevations or depressions to suggest cardiac ischemia.    Risk  Prescription drug management.                 Disposition  Final diagnoses:   Near syncope     Time reflects when diagnosis was documented in both MDM as applicable and the Disposition within this note       Time User Action Codes Description Comment    8/21/2024  3:25 PM Jamel Hein Add [R55] Near syncope           ED Disposition       ED Disposition   Discharge    Condition   Stable    Date/Time   Wed Aug 21, 2024 1525    Comment   Fernando Fox discharge to home/self care.                   Follow-up Information       Follow up With Specialties Details Why Contact Info Additional Information    [facility], Nurse Practitioner   [address] [place] [id number]  [phone]       [facility]   [address] [place] [id number]  [phone] [facility], [address] [place] [address], [id number]    [phone]             Discharge Medication List as of 8/21/2024  3:32 PM        CONTINUE these medications which have NOT CHANGED    Details   ARIPiprazole (ABILIFY) 10 mg tablet Take 10 mg by mouth daily at bedtime, Starting Thu 9/30/2021, Historical Med      aspirin (ECOTRIN LOW STRENGTH) 81 mg EC tablet Take 1 tablet (81 mg total) by mouth daily, Starting Mon 4/29/2024, Normal      atorvastatin (LIPITOR) 40 mg tablet Take 1 tablet (40 mg total) by mouth daily, Starting Mon 4/29/2024, Normal      buPROPion (WELLBUTRIN SR) 200 MG 12 hr tablet Take 200 mg by mouth every morning, Starting Fri 5/31/2024, Historical Med      cloNIDine (CATAPRES) 0.2 mg tablet Take 0.2 mg by mouth daily at bedtime, Starting Tue 5/18/2021, Historical Med      doxepin (SINEquan) 150 MG capsule Take 150 mg by mouth daily at bedtime, Starting Sat 6/3/2023, Historical Med      fluticasone (FLONASE) 50 mcg/act nasal spray 1 spray into each nostril daily, Starting Mon 4/29/2024, Normal      gabapentin (NEURONTIN) 800 mg tablet Take 1 tablet (800 mg total) by mouth 3 (three) times a day, Starting Wed 6/26/2024, Normal      hydrOXYzine HCL (ATARAX) 50 mg tablet Take 50 mg by mouth daily at bedtime, Starting Thu 9/30/2021, Historical Med      pantoprazole (PROTONIX) 40 mg tablet Take 1 tablet (40 mg total) by mouth daily, Starting Mon 4/29/2024, Normal      Restasis 0.05 % ophthalmic emulsion Historical Med      tiZANidine (ZANAFLEX) 4 mg tablet Take 1 tablet (4 mg total) by mouth every 8 (eight) hours as needed for muscle spasms, Starting Mon 4/29/2024, Normal      zolpidem (AMBIEN) 10 mg tablet Take 10 mg by mouth daily at bedtime, Starting Fri 11/6/2020, Historical Med      acetaminophen (TYLENOL) 500 mg tablet Take 1 tablet (500 mg total) by mouth every 6 (six) hours as needed for mild pain, Starting Wed 2/21/2024, Normal      albuterol (PROVENTIL HFA,VENTOLIN HFA) 90 mcg/act inhaler Inhale 1-2 puffs every 6 (six) hours as needed for wheezing, Starting  Mon 4/29/2024, Normal      Continuous Glucose  (FreeStyle Shira 2 [place]) SHANELL Scan every 8 hours. E11.9, Normal      Continuous Glucose Sensor (FreeStyle Shira 2 Sensor) MISC Scan every 8 hours. E11.9, Normal      glucose blood (FREESTYLE LITE) test strip Testing sugars once a day, Normal      hydrocortisone 1 % ointment Apply topically 2 (two) times a day, Starting u 6/16/2022, Normal      Lancets (freestyle) lancets Daily, Normal      lidocaine (XYLOCAINE) 2 % topical gel Apply topically as needed for mild pain, Starting Mon 4/29/2024, Normal      naproxen (Naprosyn) 500 mg tablet Take 1 tablet (500 mg total) by mouth 2 (two) times a day with meals, Starting Tue 5/21/2024, Normal                 PDMP Review         Value Time User    PDMP Reviewed  Yes 5/30/2023  3:30 PM Terell Hopkins DO            ED Provider  Electronically Signed by             Jamel Hein MD  08/21/24 [id number]

## 2024-08-27 ENCOUNTER — TELEPHONE (OUTPATIENT)
Dept: FAMILY MEDICINE CLINIC | Facility: CLINIC | Age: 59
End: 2024-08-27

## 2024-08-28 ENCOUNTER — OFFICE VISIT (OUTPATIENT)
Dept: FAMILY MEDICINE CLINIC | Facility: CLINIC | Age: 59
End: 2024-08-28

## 2024-08-28 VITALS
WEIGHT: 185.2 LBS | BODY MASS INDEX: 24.55 KG/M2 | SYSTOLIC BLOOD PRESSURE: 122 MMHG | RESPIRATION RATE: 18 BRPM | DIASTOLIC BLOOD PRESSURE: 84 MMHG | HEART RATE: 84 BPM | HEIGHT: 73 IN | OXYGEN SATURATION: 99 % | TEMPERATURE: 97 F

## 2024-08-28 DIAGNOSIS — J42 CHRONIC BRONCHITIS, UNSPECIFIED CHRONIC BRONCHITIS TYPE (HCC): ICD-10-CM

## 2024-08-28 DIAGNOSIS — K40.20 NON-RECURRENT BILATERAL INGUINAL HERNIA WITHOUT OBSTRUCTION OR GANGRENE: ICD-10-CM

## 2024-08-28 DIAGNOSIS — R55 SYNCOPE AND COLLAPSE: ICD-10-CM

## 2024-08-28 DIAGNOSIS — G89.29 CHRONIC BILATERAL LOW BACK PAIN WITH BILATERAL SCIATICA: ICD-10-CM

## 2024-08-28 DIAGNOSIS — F33.9 DEPRESSION, RECURRENT (HCC): ICD-10-CM

## 2024-08-28 DIAGNOSIS — J30.9 ALLERGIC RHINITIS, UNSPECIFIED SEASONALITY, UNSPECIFIED TRIGGER: ICD-10-CM

## 2024-08-28 DIAGNOSIS — M25.511 ACUTE PAIN OF RIGHT SHOULDER: Primary | ICD-10-CM

## 2024-08-28 DIAGNOSIS — M54.42 CHRONIC BILATERAL LOW BACK PAIN WITH BILATERAL SCIATICA: ICD-10-CM

## 2024-08-28 DIAGNOSIS — E78.2 MIXED HYPERLIPIDEMIA: ICD-10-CM

## 2024-08-28 DIAGNOSIS — K21.9 GASTROESOPHAGEAL REFLUX DISEASE WITHOUT ESOPHAGITIS: ICD-10-CM

## 2024-08-28 DIAGNOSIS — M54.41 CHRONIC BILATERAL LOW BACK PAIN WITH BILATERAL SCIATICA: ICD-10-CM

## 2024-08-28 PROBLEM — I10 HYPERTENSION: Chronic | Status: ACTIVE | Noted: 2018-05-14

## 2024-08-28 PROBLEM — E78.5 HYPERLIPIDEMIA: Chronic | Status: ACTIVE | Noted: 2018-05-14

## 2024-08-28 PROCEDURE — 99215 OFFICE O/P EST HI 40 MIN: CPT | Performed by: PHYSICIAN ASSISTANT

## 2024-08-28 PROCEDURE — G2211 COMPLEX E/M VISIT ADD ON: HCPCS | Performed by: PHYSICIAN ASSISTANT

## 2024-08-28 RX ORDER — ALBUTEROL SULFATE 90 UG/1
1-2 AEROSOL, METERED RESPIRATORY (INHALATION) EVERY 6 HOURS PRN
Qty: 18 G | Refills: 1 | Status: SHIPPED | OUTPATIENT
Start: 2024-08-28

## 2024-08-28 RX ORDER — FLUTICASONE PROPIONATE 50 MCG
1 SPRAY, SUSPENSION (ML) NASAL DAILY
Qty: 16 G | Refills: 2 | Status: SHIPPED | OUTPATIENT
Start: 2024-08-28

## 2024-08-28 RX ORDER — ASPIRIN 81 MG/1
81 TABLET ORAL DAILY
Qty: 90 TABLET | Refills: 1 | Status: SHIPPED | OUTPATIENT
Start: 2024-08-28

## 2024-08-28 RX ORDER — GABAPENTIN 800 MG/1
800 TABLET ORAL 3 TIMES DAILY
Qty: 90 TABLET | Refills: 2 | Status: SHIPPED | OUTPATIENT
Start: 2024-08-28

## 2024-08-28 RX ORDER — LIDOCAINE HYDROCHLORIDE 20 MG/ML
JELLY TOPICAL AS NEEDED
Qty: 30 ML | Refills: 2 | Status: SHIPPED | OUTPATIENT
Start: 2024-08-28

## 2024-08-28 RX ORDER — PANTOPRAZOLE SODIUM 40 MG/1
40 TABLET, DELAYED RELEASE ORAL DAILY
Qty: 90 TABLET | Refills: 1 | Status: SHIPPED | OUTPATIENT
Start: 2024-08-28

## 2024-08-28 RX ORDER — ATORVASTATIN CALCIUM 40 MG/1
40 TABLET, FILM COATED ORAL DAILY
Qty: 90 TABLET | Refills: 1 | Status: SHIPPED | OUTPATIENT
Start: 2024-08-28

## 2024-08-28 NOTE — ASSESSMENT & PLAN NOTE
- Will obtain right shoulder x-ray for further evaluation.  -Advised to apply ice/heating pad/topical therapies as needed to affected area.  - Provided patient with list of exercises to perform daily.  - If symptoms persist/worsen, would recommend referral to physical therapy.

## 2024-08-28 NOTE — ASSESSMENT & PLAN NOTE
-Reviewed CT abdomen pelvis with contrast from 8/21/2024.  Results show bilateral inguinal hernias.  - Patient is scheduled with general surgery to review possible surgical repair.

## 2024-08-28 NOTE — PROGRESS NOTES
Assessment/Plan:    Acute pain of right shoulder  - Will obtain right shoulder x-ray for further evaluation.  -Advised to apply ice/heating pad/topical therapies as needed to affected area.  - Provided patient with list of exercises to perform daily.  - If symptoms persist/worsen, would recommend referral to physical therapy.    Chronic bilateral low back pain with bilateral sciatica  - Continue gabapentin 800 mg, 3 times daily.  - Will increase tizanidine to 4 mg, 1-2 tablets 3 times daily as needed.  -Advised to apply ice/heating pad/topical therapies as needed to affected area.  - Provided patient with list of exercises to perform daily.  - If symptoms persist/worsen, would recommend referral to physical therapy.    Allergic rhinitis  - Stable.  Continue Zyrtec and Flonase.    Chronic bronchitis (HCC)  - Stable.  Continue albuterol inhaler as needed.  - Encouraged smoking cessation.    Gastroesophageal reflux disease  - Stable.  Continue Protonix 40 mg daily.    Depression, recurrent (HCC)  - Stable.  Continue follow-up with outpatient psychiatry for therapy and medication management.    Hyperlipidemia  - Continue atorvastatin 40 mg daily.    Syncope and collapse  -Reviewed ED visit from 8/21/2024.  Patient experienced near syncope while drinking barium for CT study.  - Patient is scheduled to establish with cardiology in October.  - Advised to contact the office or report to ED if symptoms persist, worsen, or new symptoms arise.    Non-recurrent bilateral inguinal hernia without obstruction or gangrene  -Reviewed CT abdomen pelvis with contrast from 8/21/2024.  Results show bilateral inguinal hernias.  - Patient is scheduled with general surgery to review possible surgical repair.      Diagnoses and all orders for this visit:    Acute pain of right shoulder  -     XR shoulder 2+ vw right; Future    Chronic bilateral low back pain with bilateral sciatica  -     tiZANidine (ZANAFLEX) 4 mg tablet; Take 2 tablets (8  "mg total) by mouth every 8 (eight) hours as needed for muscle spasms  -     gabapentin (NEURONTIN) 800 mg tablet; Take 1 tablet (800 mg total) by mouth 3 (three) times a day  -     lidocaine (XYLOCAINE) 2 % topical gel; Apply topically as needed for mild pain    Allergic rhinitis, unspecified seasonality, unspecified trigger  -     fluticasone (FLONASE) 50 mcg/act nasal spray; 1 spray into each nostril daily    Chronic bronchitis, unspecified chronic bronchitis type (HCC)  -     albuterol (PROVENTIL HFA,VENTOLIN HFA) 90 mcg/act inhaler; Inhale 1-2 puffs every 6 (six) hours as needed for wheezing    Gastroesophageal reflux disease without esophagitis  -     pantoprazole (PROTONIX) 40 mg tablet; Take 1 tablet (40 mg total) by mouth daily    Mixed hyperlipidemia  -     atorvastatin (LIPITOR) 40 mg tablet; Take 1 tablet (40 mg total) by mouth daily  -     aspirin (ECOTRIN LOW STRENGTH) 81 mg EC tablet; Take 1 tablet (81 mg total) by mouth daily    Depression, recurrent (HCC)    Syncope and collapse    Non-recurrent bilateral inguinal hernia without obstruction or gangrene          All of patients questions were answered. Patient understands and agrees with the above plan.     Return in about 2 months (around 10/28/2024) for Next scheduled follow up hernias.    Juliana Martino PA-C  08/28/24  [facility]          Subjective:     Patient ID: Fernando Fox  is a 59 y.o. male with known PMH of allergic rhinitis, chronic back pain, chronic bronchitis, depression, GERD, hyperlipidemia, hypertension, BPH, type 2 diabetes mellitus, tobacco abuse who presents today in office for hernia follow-up.     - Patient is a 59 y.o. male who presents today for hernia follow-up.    -Patient notes he has been experiencing right shoulder pain for \" a long time\".  Patient denies any acute injury or trauma to the area.  Patient notes the pain is present with movement of the arm.  Patient reports he did see a specialist for her shoulder in " "the past and they told him to exercise.  He denies any associated numbness, tingling, weakness of bilateral upper extremities.    -Patient notes sometimes he is taking an extra dose of gabapentin at night due to his back pain.  Patient notes he had experienced another near syncopal episode when he was drinking the barium for his CT scan.  Patient was seen in the ED.        The following portions of the patient's history were reviewed and updated as appropriate: allergies, current medications, past family history, past medical history, past social history, past surgical history, and problem list.        Review of Systems   Constitutional:  Negative for activity change, appetite change and fatigue.   HENT:  Negative for congestion and trouble swallowing.    Eyes:  Negative for photophobia and visual disturbance.   Respiratory:  Negative for chest tightness and wheezing.    Cardiovascular:  Negative for palpitations and leg swelling.   Gastrointestinal:  Positive for abdominal pain.   Genitourinary:  Negative for difficulty urinating and dysuria.   Musculoskeletal:  Positive for arthralgias and back pain.   Skin:  Negative for pallor and wound.   Neurological:  Negative for dizziness, seizures, syncope and weakness.   Psychiatric/Behavioral:  Negative for self-injury, sleep disturbance and suicidal ideas. The patient is not nervous/anxious.             Depression Screening and Follow-up Plan: Patient's depression screening was positive with a PHQ-9 score of 9. Continue regular follow-up with their mental health provider who is managing their mental health condition(s).           Objective:   Vitals:    08/28/24 1114   BP: 122/84   BP Location: Right arm   Patient Position: Sitting   Cuff Size: Standard   Pulse: 84   Resp: 18   Temp: (!) 97 °F (36.1 °C)   TempSrc: Temporal   SpO2: 99%   Weight: 84 kg (185 lb 3.2 oz)   Height: 6' 1\" (1.854 m)         Physical Exam  Vitals and nursing note reviewed.   Constitutional:      "  General: He is not in acute distress.     Appearance: He is well-developed.   HENT:      Head: Normocephalic and atraumatic.      Right Ear: External ear normal.      Left Ear: External ear normal.      Nose: Nose normal.   Eyes:      Conjunctiva/sclera: Conjunctivae normal.   Cardiovascular:      Rate and Rhythm: Normal rate and regular rhythm.      Pulses: Normal pulses.      Heart sounds: Normal heart sounds.   Pulmonary:      Effort: Pulmonary effort is normal. No respiratory distress.      Breath sounds: Normal breath sounds. No wheezing.   Musculoskeletal:      Right shoulder: Tenderness present. No swelling. Decreased range of motion.      Cervical back: Normal range of motion and neck supple.   Skin:     General: Skin is warm and dry.   Neurological:      Mental Status: He is alert and oriented to person, place, and time.   Psychiatric:         Behavior: Behavior normal.           PHQ-2/9 Depression Screening    Little interest or pleasure in doing things: 0 - not at all  Feeling down, depressed, or hopeless: 0 - not at all  Trouble falling or staying asleep, or sleeping too much: 0 - not at all  Feeling tired or having little energy: 3 - nearly every day  Poor appetite or overeating: 3 - nearly every day  Feeling bad about yourself - or that you are a failure or have let yourself or your family down: 1 - several days  Trouble concentrating on things, such as reading the newspaper or watching television: 1 - several days  Moving or speaking so slowly that other people could have noticed. Or the opposite - being so fidgety or restless that you have been moving around a lot more than usual: 1 - several days  Thoughts that you would be better off dead, or of hurting yourself in some way: 0 - not at all  PHQ-9 Score: 9  PHQ-9 Interpretation: Mild depression       I have spent a total time of 40 minutes in caring for this patient on the day of the visit/encounter including Risks and benefits of tx options,  Instructions for management, Patient and family education, Documenting in the medical record, Reviewing / ordering tests, medicine, procedures  , and Obtaining or reviewing history  .

## 2024-08-28 NOTE — ASSESSMENT & PLAN NOTE
-Reviewed ED visit from 8/21/2024.  Patient experienced near syncope while drinking barium for CT study.  - Patient is scheduled to establish with cardiology in October.  - Advised to contact the office or report to ED if symptoms persist, worsen, or new symptoms arise.

## 2024-08-28 NOTE — ASSESSMENT & PLAN NOTE
- Continue gabapentin 800 mg, 3 times daily.  - Will increase tizanidine to 4 mg, 1-2 tablets 3 times daily as needed.  -Advised to apply ice/heating pad/topical therapies as needed to affected area.  - Provided patient with list of exercises to perform daily.  - If symptoms persist/worsen, would recommend referral to physical therapy.

## 2024-09-10 ENCOUNTER — HOSPITAL ENCOUNTER (EMERGENCY)
Facility: HOSPITAL | Age: 59
Discharge: HOME | End: 2024-09-10
Attending: EMERGENCY MEDICINE | Admitting: EMERGENCY MEDICINE
Payer: MEDICARE

## 2024-09-10 ENCOUNTER — APPOINTMENT (EMERGENCY)
Dept: RADIOLOGY | Facility: HOSPITAL | Age: 59
End: 2024-09-10
Payer: MEDICARE

## 2024-09-10 VITALS
HEART RATE: 72 BPM | TEMPERATURE: 98.4 F | RESPIRATION RATE: 20 BRPM | DIASTOLIC BLOOD PRESSURE: 91 MMHG | OXYGEN SATURATION: 98 % | SYSTOLIC BLOOD PRESSURE: 169 MMHG

## 2024-09-10 DIAGNOSIS — E87.6 HYPOKALEMIA: ICD-10-CM

## 2024-09-10 DIAGNOSIS — M25.511 RIGHT SHOULDER PAIN: Primary | ICD-10-CM

## 2024-09-10 LAB
ANION GAP SERPL CALCULATED.3IONS-SCNC: 10 MMOL/L (ref 4–13)
ATRIAL RATE: 57 BPM
BASOPHILS # BLD AUTO: 0.04 THOUSANDS/ÂΜL (ref 0–0.1)
BASOPHILS NFR BLD AUTO: 0 % (ref 0–1)
BUN SERPL-MCNC: 6 MG/DL (ref 5–25)
CALCIUM SERPL-MCNC: 9.5 MG/DL (ref 8.4–10.2)
CARDIAC TROPONIN I PNL SERPL HS: 3 NG/L
CHLORIDE SERPL-SCNC: 104 MMOL/L (ref 96–108)
CO2 SERPL-SCNC: 23 MMOL/L (ref 21–32)
CREAT SERPL-MCNC: 0.77 MG/DL (ref 0.6–1.3)
EOSINOPHIL # BLD AUTO: 0.02 THOUSAND/ÂΜL (ref 0–0.61)
EOSINOPHIL NFR BLD AUTO: 0 % (ref 0–6)
ERYTHROCYTE [DISTWIDTH] IN BLOOD BY AUTOMATED COUNT: 14.9 % (ref 11.6–15.1)
GFR SERPL CREATININE-BSD FRML MDRD: 97 ML/MIN/1.73SQ M
GLUCOSE SERPL-MCNC: 128 MG/DL (ref 65–140)
HCT VFR BLD AUTO: 47.8 % (ref 36.5–49.3)
HGB BLD-MCNC: 16.4 G/DL (ref 12–17)
IMM GRANULOCYTES # BLD AUTO: 0.04 THOUSAND/UL (ref 0–0.2)
IMM GRANULOCYTES NFR BLD AUTO: 0 % (ref 0–2)
LYMPHOCYTES # BLD AUTO: 2.56 THOUSANDS/ÂΜL (ref 0.6–4.47)
LYMPHOCYTES NFR BLD AUTO: 24 % (ref 14–44)
MCH RBC QN AUTO: 32 PG (ref 26.8–34.3)
MCHC RBC AUTO-ENTMCNC: 34.3 G/DL (ref 31.4–37.4)
MCV RBC AUTO: 93 FL (ref 82–98)
MONOCYTES # BLD AUTO: 0.66 THOUSAND/ÂΜL (ref 0.17–1.22)
MONOCYTES NFR BLD AUTO: 6 % (ref 4–12)
NEUTROPHILS # BLD AUTO: 7.21 THOUSANDS/ÂΜL (ref 1.85–7.62)
NEUTS SEG NFR BLD AUTO: 70 % (ref 43–75)
NRBC BLD AUTO-RTO: 0 /100 WBCS
P AXIS: 29 DEGREES
PLATELET # BLD AUTO: 181 THOUSANDS/UL (ref 149–390)
PMV BLD AUTO: 11.3 FL (ref 8.9–12.7)
POTASSIUM SERPL-SCNC: 3.4 MMOL/L (ref 3.5–5.3)
PR INTERVAL: 144 MS
QRS AXIS: 6 DEGREES
QRSD INTERVAL: 82 MS
QT INTERVAL: 432 MS
QTC INTERVAL: 420 MS
RBC # BLD AUTO: 5.12 MILLION/UL (ref 3.88–5.62)
SODIUM SERPL-SCNC: 137 MMOL/L (ref 135–147)
T WAVE AXIS: 23 DEGREES
VENTRICULAR RATE: 57 BPM
WBC # BLD AUTO: 10.53 THOUSAND/UL (ref 4.31–10.16)

## 2024-09-10 PROCEDURE — 99285 EMERGENCY DEPT VISIT HI MDM: CPT

## 2024-09-10 PROCEDURE — 93010 ELECTROCARDIOGRAM REPORT: CPT

## 2024-09-10 PROCEDURE — 85025 COMPLETE CBC W/AUTO DIFF WBC: CPT

## 2024-09-10 PROCEDURE — 80048 BASIC METABOLIC PNL TOTAL CA: CPT

## 2024-09-10 PROCEDURE — 96374 THER/PROPH/DIAG INJ IV PUSH: CPT

## 2024-09-10 PROCEDURE — 84484 ASSAY OF TROPONIN QUANT: CPT

## 2024-09-10 PROCEDURE — 93005 ELECTROCARDIOGRAM TRACING: CPT

## 2024-09-10 PROCEDURE — 71045 X-RAY EXAM CHEST 1 VIEW: CPT

## 2024-09-10 PROCEDURE — 36415 COLL VENOUS BLD VENIPUNCTURE: CPT

## 2024-09-10 PROCEDURE — 99284 EMERGENCY DEPT VISIT MOD MDM: CPT

## 2024-09-10 PROCEDURE — 73030 X-RAY EXAM OF SHOULDER: CPT

## 2024-09-10 RX ORDER — KETOROLAC TROMETHAMINE 30 MG/ML
15 INJECTION, SOLUTION INTRAMUSCULAR; INTRAVENOUS ONCE
Status: COMPLETED | OUTPATIENT
Start: 2024-09-10 | End: 2024-09-10

## 2024-09-10 RX ORDER — POTASSIUM CHLORIDE 1500 MG/1
20 TABLET, EXTENDED RELEASE ORAL ONCE
Status: COMPLETED | OUTPATIENT
Start: 2024-09-10 | End: 2024-09-10

## 2024-09-10 RX ORDER — CELECOXIB 100 MG/1
100 CAPSULE ORAL 2 TIMES DAILY
Qty: 20 CAPSULE | Refills: 0 | Status: SHIPPED | OUTPATIENT
Start: 2024-09-10

## 2024-09-10 RX ORDER — ASPIRIN 325 MG
325 TABLET ORAL ONCE
Status: COMPLETED | OUTPATIENT
Start: 2024-09-10 | End: 2024-09-10

## 2024-09-10 RX ADMIN — ASPIRIN 325 MG ORAL TABLET 325 MG: 325 PILL ORAL at 14:41

## 2024-09-10 RX ADMIN — KETOROLAC TROMETHAMINE 15 MG: 30 INJECTION, SOLUTION INTRAMUSCULAR; INTRAVENOUS at 15:25

## 2024-09-10 RX ADMIN — POTASSIUM CHLORIDE 20 MEQ: 1500 TABLET, EXTENDED RELEASE ORAL at 15:25

## 2024-09-10 NOTE — DISCHARGE INSTRUCTIONS
Please use sling over the next 2-3 days. Please take celebrex for pain every 12 hours. Please follow up with orthopedics for further evaluation of this pain.

## 2024-09-10 NOTE — ED PROVIDER NOTES
"1. Right shoulder pain    2. Hypokalemia      ED Disposition       ED Disposition   Discharge    Condition   Stable    Date/Time   Tue Sep 10, 2024  3:21 PM    Comment   Fernando EZIO Fox discharge to home/self care.                   Assessment & Plan       Medical Decision Making  59 year old male, hx tobacco abuse / DMII / HLD presenting with right shoulder pain and nausea.   ACS ruleout.  EKG was reviewed by me, please see above documentation.  Plain radiograph(s) were reviewed by me, please see above documentation.  Right shoulder and PA chest.  Aspirin.  Lab workup reassuring. Troponin negative.  ------------------------------------------------------------  Strict return precautions discussed. Patient at time of discharge well-appearing in no acute distress, all questions answered. Patient agreeable to plan.  Patient's vitals, lab/imaging results, diagnosis, and treatment plan were discussed with the patient. All new/changed medications were discussed with patient, specifically, route of administration, how often and when to take, and where they can be picked up. Strict return precautions as well as close follow up with PCP was discussed with the patient and the patient was agreeable to my recommendations.  Patient verbally acknowledged understanding of the above communications. All labs reviewed and utilized in the medical decision making process (if labs were ordered). Portions of the record may have been created with voice recognition software.  Occasional wrong word or \"sound a like\" substitutions may have occurred due to the inherent limitations of voice recognition software.  Read the chart carefully and recognize, using context, where substitutions have occurred.      Amount and/or Complexity of Data Reviewed  Labs: ordered. Decision-making details documented in ED Course.  Radiology: ordered and independent interpretation performed.    Risk  OTC drugs.  Prescription drug management.          HEART Risk " Score      Flowsheet Row Most Recent Value   Heart Score Risk Calculator    History 0 Filed at: 09/10/2024 1558   ECG 0 Filed at: 09/10/2024 1558   Age 1 Filed at: 09/10/2024 1558   Risk Factors 2 Filed at: 09/10/2024 1558   Troponin 0 Filed at: 09/10/2024 1558   HEART Score 3 Filed at: 09/10/2024 1558               ED Course as of 09/10/24 1558   Tue Sep 10, 2024   1448 WBC(!): 10.53   1520 hs TnI 0hr: 3       Medications   aspirin tablet 325 mg (325 mg Oral Given 9/10/24 1441)   potassium chloride (Klor-Con M20) CR tablet 20 mEq (20 mEq Oral Given 9/10/24 1525)   ketorolac (TORADOL) injection 15 mg (15 mg Intravenous Given 9/10/24 1525)       History of Present Illness       59-year-old male presenting today with concerns of right shoulder pain which does not radiate for the last 5 to 7 days.  Patient denies any injuries to the area.  States he has never had anything like this before.  Does not work and does not lift heavy objects.  States he does have some nausea as well.  States that he is hungry stomach is gurgling but does not think his abdomen hurts.  No chest pain.  No shortness of breath.  States that the pain started randomly out of nowhere.  It was sharp and stabbing.         Fernando Fox is a 59 y.o. male who identifies as a male presenting to the Emergency Department for right shoulder pain and nausea    Review of Systems   Constitutional:  Negative for chills and fever.   HENT:  Negative for ear pain, sore throat and trouble swallowing.    Eyes:  Negative for pain and visual disturbance.   Respiratory:  Negative for cough and shortness of breath.    Cardiovascular:  Negative for chest pain and palpitations.   Gastrointestinal:  Positive for nausea. Negative for abdominal pain, blood in stool, constipation, diarrhea and vomiting.   Genitourinary:  Negative for dysuria and hematuria.   Musculoskeletal:  Positive for arthralgias. Negative for back pain, gait problem, joint swelling, myalgias, neck pain  and neck stiffness.   Skin:  Negative for color change and rash.   Neurological:  Negative for seizures and syncope.   All other systems reviewed and are negative.          Objective     ED Triage Vitals [09/10/24 1430]   Temperature Pulse Blood Pressure Respirations SpO2 Patient Position - Orthostatic VS   98.4 °F (36.9 °C) 72 169/91 20 98 % Sitting      Temp Source Heart Rate Source BP Location FiO2 (%) Pain Score    Oral Monitor Right arm -- 10 - Worst Possible Pain        Physical Exam  Constitutional:       General: He is not in acute distress.     Appearance: He is ill-appearing.   HENT:      Head: Normocephalic and atraumatic.   Eyes:      General: No scleral icterus.     Extraocular Movements: Extraocular movements intact.      Pupils: Pupils are equal, round, and reactive to light.   Cardiovascular:      Rate and Rhythm: Normal rate and regular rhythm.      Pulses: Normal pulses.      Heart sounds: Normal heart sounds. No murmur heard.     No friction rub. No gallop.   Pulmonary:      Effort: Pulmonary effort is normal. No respiratory distress.      Breath sounds: Normal breath sounds. No stridor. No wheezing, rhonchi or rales.   Chest:      Chest wall: No tenderness.   Abdominal:      Tenderness: There is no abdominal tenderness.   Musculoskeletal:         General: Tenderness present. Normal range of motion.      Cervical back: Normal range of motion.      Right lower leg: No edema.      Left lower leg: No edema.   Skin:     General: Skin is warm and dry.      Capillary Refill: Capillary refill takes less than 2 seconds.      Coloration: Skin is not jaundiced or pale.   Neurological:      General: No focal deficit present.      Mental Status: He is alert.         Labs Reviewed   CBC AND DIFFERENTIAL - Abnormal; Notable for the following components:       Result Value    WBC 10.53 (*)     All other components within normal limits   BASIC METABOLIC PANEL - Abnormal; Notable for the following components:     Potassium 3.4 (*)     All other components within normal limits    Narrative:     National Kidney Disease Foundation guidelines for Chronic Kidney Disease (CKD):     Stage 1 with normal or high GFR (GFR > 90 mL/min/1.73 square meters)    Stage 2 Mild CKD (GFR = 60-89 mL/min/1.73 square meters)    Stage 3A Moderate CKD (GFR = 45-59 mL/min/1.73 square meters)    Stage 3B Moderate CKD (GFR = 30-44 mL/min/1.73 square meters)    Stage 4 Severe CKD (GFR = 15-29 mL/min/1.73 square meters)    Stage 5 End Stage CKD (GFR <15 mL/min/1.73 square meters)  Note: GFR calculation is accurate only with a steady state creatinine   HS TROPONIN I 0HR - Normal   HS TROPONIN I 2HR     XR shoulder 2+ views RIGHT   ED Interpretation by Estiven Duff PA-C (09/10 1511)   No acute fracture or dislocation.      XR chest 1 view portable   ED Interpretation by Estiven Duff PA-C (09/10 1511)   No acute cardiopulmonary process.          ECG 12 Lead Documentation Only    Date/Time: 9/10/2024 2:49 PM    Performed by: Estiven Duff PA-C  Authorized by: Estiven Duff PA-C    Indications / Diagnosis:  Shoulder pain  ECG reviewed by me, the ED Provider: yes    Patient location:  ED  Previous ECG:     Previous ECG:  Compared to current    Similarity:  No change    Comparison to cardiac monitor: No    Interpretation:     Interpretation: normal    Rate:     ECG rate assessment: normal    Rhythm:     Rhythm: sinus rhythm    Ectopy:     Ectopy: none    QRS:     QRS axis:  Normal    QRS intervals:  Normal  Conduction:     Conduction: normal    ST segments:     ST segments:  Normal  T waves:     T waves: normal           Estiven Duff PA-C  09/10/24 1451       Estiven Duff PA-C  09/10/24 3328

## 2024-09-10 NOTE — Clinical Note
Fernando Fox was seen and treated in our emergency department on 9/10/2024.    No restrictions            Diagnosis:     Fernando  may return to work on return date, is off the rest of the shift today.    He may return on this date:          If you have any questions or concerns, please don't hesitate to call.      Estiven Duff PA-C    ______________________________           _______________          _______________  Hospital Representative                              Date                                Time

## 2024-09-17 ENCOUNTER — OFFICE VISIT (OUTPATIENT)
Dept: SURGERY | Facility: CLINIC | Age: 59
End: 2024-09-17
Payer: MEDICARE

## 2024-09-17 VITALS
BODY MASS INDEX: 24.63 KG/M2 | SYSTOLIC BLOOD PRESSURE: 135 MMHG | HEIGHT: 73 IN | HEART RATE: 83 BPM | DIASTOLIC BLOOD PRESSURE: 73 MMHG | WEIGHT: 185.8 LBS

## 2024-09-17 DIAGNOSIS — E89.1 POSTPROCEDURAL HYPOINSULINEMIA: ICD-10-CM

## 2024-09-17 DIAGNOSIS — K40.20 NON-RECURRENT BILATERAL INGUINAL HERNIA WITHOUT OBSTRUCTION OR GANGRENE: Primary | ICD-10-CM

## 2024-09-17 PROCEDURE — 99213 OFFICE O/P EST LOW 20 MIN: CPT | Performed by: SURGERY

## 2024-09-17 RX ORDER — SODIUM CHLORIDE, SODIUM LACTATE, POTASSIUM CHLORIDE, CALCIUM CHLORIDE 600; 310; 30; 20 MG/100ML; MG/100ML; MG/100ML; MG/100ML
125 INJECTION, SOLUTION INTRAVENOUS CONTINUOUS
OUTPATIENT
Start: 2024-11-14

## 2024-09-17 NOTE — PROGRESS NOTES
Ambulatory Visit  Name: Fernando oFx      : 1965      MRN: [id number]  Encounter Provider: Jamel Vila MD  Encounter Date: 2024   Encounter department: [facility] [place]    Assessment & Plan  Non-recurrent bilateral inguinal hernia without obstruction or gangrene  I reviewed the CT scan with him and showed him the images.  He does have evidence of bilateral fat-containing inguinal hernias on CT scan.  On exam he has small fat-containing inguinal hernias and cord lipomas.  I explained to him that some of his symptoms can be related to his hernias on the left side.  Specific with his activity and straining.  However I believe there might be a component of underlying constipation as a source of his discomfort.  I explained this to him he is understands.  Will plan for robotic repair of bilateral inguinal hernias at [facility].  The risks benefits alternative explained to him is agreeable to proceed.    Orders:    Case request operating room: REPAIR HERNIA INGUINAL LAPAROSCOPIC W/ ROBOTICS; Standing    CBC and differential; Future    Basic metabolic panel; Future    HEMOGLOBIN A1C W/ EAG ESTIMATION; Future    Case request operating room: REPAIR HERNIA INGUINAL LAPAROSCOPIC W/ ROBOTICS    Postprocedural hypoinsulinemia    Orders:    HEMOGLOBIN A1C W/ EAG ESTIMATION; Future      History of Present Illness     Fernando Fox is a 59 y.o. male who presents for follow-up and reevaluation.  He was previous for evaluation and sent for a CT scan is now here for follow-up.  He still reports primarily left lower quadrant pain may be left groin pain.  It worse with straining and with sometimes sitting up.  He does not feel lump in the groin.  When asked about his bowel movements and constipation he said sometimes he pushes and does get pain.      Review of Systems   Constitutional:  Negative for appetite change, chills and fever.   HENT:  Negative for congestion and ear  "pain.    Eyes:  Negative for discharge and itching.   Respiratory:  Negative for chest tightness and shortness of breath.    Cardiovascular:  Negative for chest pain and palpitations.   Gastrointestinal:  Positive for abdominal pain.   Genitourinary:  Positive for difficulty urinating.   Musculoskeletal:  Positive for arthralgias, back pain and gait problem.   Skin:  Negative for color change and rash.   Neurological:  Negative for dizziness and numbness.   Psychiatric/Behavioral:  Negative for agitation and confusion.            Objective     /73 (BP Location: Left arm, Patient Position: Sitting, Cuff Size: Standard)   Pulse 83   Ht 6' 1\" (1.854 m)   Wt 84.3 kg (185 lb 12.8 oz)   BMI 24.51 kg/m²     Physical Exam  Vitals and nursing note reviewed.   Constitutional:       General: He is not in acute distress.     Appearance: He is well-developed. He is not diaphoretic.   HENT:      Head: Normocephalic and atraumatic.   Eyes:      Pupils: Pupils are equal, round, and reactive to light.   Cardiovascular:      Rate and Rhythm: Normal rate and regular rhythm.   Pulmonary:      Effort: Pulmonary effort is normal. No respiratory distress.   Abdominal:      General: Bowel sounds are normal.      Palpations: Abdomen is soft.      Comments: Small fat-containing bilateral inguinal hernias   Musculoskeletal:         General: Normal range of motion.      Cervical back: Normal range of motion and neck supple.   Skin:     General: Skin is warm and dry.   Neurological:      Mental Status: He is alert and oriented to person, place, and time.   Psychiatric:         Behavior: Behavior normal.         "

## 2024-09-17 NOTE — ASSESSMENT & PLAN NOTE
I reviewed the CT scan with him and showed him the images.  He does have evidence of bilateral fat-containing inguinal hernias on CT scan.  On exam he has small fat-containing inguinal hernias and cord lipomas.  I explained to him that some of his symptoms can be related to his hernias on the left side.  Specific with his activity and straining.  However I believe there might be a component of underlying constipation as a source of his discomfort.  I explained this to him he is understands.  Will plan for robotic repair of bilateral inguinal hernias at [facility].  The risks benefits alternative explained to him is agreeable to proceed.    Orders:    Case request operating room: REPAIR HERNIA INGUINAL LAPAROSCOPIC W/ ROBOTICS; Standing    CBC and differential; Future    Basic metabolic panel; Future    HEMOGLOBIN A1C W/ EAG ESTIMATION; Future    Case request operating room: REPAIR HERNIA INGUINAL LAPAROSCOPIC W/ ROBOTICS

## 2024-10-16 DIAGNOSIS — E11.9 TYPE 2 DIABETES MELLITUS WITHOUT COMPLICATION, WITHOUT LONG-TERM CURRENT USE OF INSULIN (HCC): ICD-10-CM

## 2024-10-16 NOTE — TELEPHONE ENCOUNTER
Pt came into office requesting medication refill for     Continuous Glucose Sensor (FreeStyle Shira 2 Sensor)     Please send to pharmacy on file

## 2024-10-25 ENCOUNTER — RA CDI HCC (OUTPATIENT)
Dept: OTHER | Facility: HOSPITAL | Age: 59
End: 2024-10-25

## 2024-10-28 ENCOUNTER — APPOINTMENT (OUTPATIENT)
Dept: LAB | Facility: CLINIC | Age: 59
End: 2024-10-28
Payer: MEDICARE

## 2024-10-28 DIAGNOSIS — E89.1 POSTPROCEDURAL HYPOINSULINEMIA: ICD-10-CM

## 2024-10-28 DIAGNOSIS — K40.20 NON-RECURRENT BILATERAL INGUINAL HERNIA WITHOUT OBSTRUCTION OR GANGRENE: ICD-10-CM

## 2024-10-28 LAB
ANION GAP SERPL CALCULATED.3IONS-SCNC: 8 MMOL/L (ref 4–13)
BASOPHILS # BLD AUTO: 0.05 THOUSANDS/ΜL (ref 0–0.1)
BASOPHILS NFR BLD AUTO: 1 % (ref 0–1)
BUN SERPL-MCNC: 13 MG/DL (ref 5–25)
CALCIUM SERPL-MCNC: 9 MG/DL (ref 8.4–10.2)
CHLORIDE SERPL-SCNC: 105 MMOL/L (ref 96–108)
CO2 SERPL-SCNC: 28 MMOL/L (ref 21–32)
CREAT SERPL-MCNC: 0.87 MG/DL (ref 0.6–1.3)
EOSINOPHIL # BLD AUTO: 0.25 THOUSAND/ΜL (ref 0–0.61)
EOSINOPHIL NFR BLD AUTO: 3 % (ref 0–6)
ERYTHROCYTE [DISTWIDTH] IN BLOOD BY AUTOMATED COUNT: 15 % (ref 11.6–15.1)
GFR SERPL CREATININE-BSD FRML MDRD: 93 ML/MIN/1.73SQ M
GLUCOSE P FAST SERPL-MCNC: 111 MG/DL (ref 65–99)
HCT VFR BLD AUTO: 47.7 % (ref 36.5–49.3)
HGB BLD-MCNC: 15.8 G/DL (ref 12–17)
IMM GRANULOCYTES # BLD AUTO: 0.03 THOUSAND/UL (ref 0–0.2)
IMM GRANULOCYTES NFR BLD AUTO: 0 % (ref 0–2)
LYMPHOCYTES # BLD AUTO: 3.41 THOUSANDS/ΜL (ref 0.6–4.47)
LYMPHOCYTES NFR BLD AUTO: 40 % (ref 14–44)
MCH RBC QN AUTO: 31.3 PG (ref 26.8–34.3)
MCHC RBC AUTO-ENTMCNC: 33.1 G/DL (ref 31.4–37.4)
MCV RBC AUTO: 95 FL (ref 82–98)
MONOCYTES # BLD AUTO: 0.69 THOUSAND/ΜL (ref 0.17–1.22)
MONOCYTES NFR BLD AUTO: 8 % (ref 4–12)
NEUTROPHILS # BLD AUTO: 4.1 THOUSANDS/ΜL (ref 1.85–7.62)
NEUTS SEG NFR BLD AUTO: 48 % (ref 43–75)
NRBC BLD AUTO-RTO: 0 /100 WBCS
PLATELET # BLD AUTO: 173 THOUSANDS/UL (ref 149–390)
PMV BLD AUTO: 12.3 FL (ref 8.9–12.7)
POTASSIUM SERPL-SCNC: 3.6 MMOL/L (ref 3.5–5.3)
RBC # BLD AUTO: 5.05 MILLION/UL (ref 3.88–5.62)
SODIUM SERPL-SCNC: 141 MMOL/L (ref 135–147)
WBC # BLD AUTO: 8.53 THOUSAND/UL (ref 4.31–10.16)

## 2024-10-28 PROCEDURE — 36415 COLL VENOUS BLD VENIPUNCTURE: CPT

## 2024-10-28 PROCEDURE — 85025 COMPLETE CBC W/AUTO DIFF WBC: CPT

## 2024-10-28 PROCEDURE — 80048 BASIC METABOLIC PNL TOTAL CA: CPT

## 2024-10-29 ENCOUNTER — OFFICE VISIT (OUTPATIENT)
Dept: CARDIOLOGY CLINIC | Facility: CLINIC | Age: 59
End: 2024-10-29
Payer: MEDICARE

## 2024-10-29 VITALS
OXYGEN SATURATION: 97 % | BODY MASS INDEX: 25.79 KG/M2 | WEIGHT: 194.6 LBS | DIASTOLIC BLOOD PRESSURE: 66 MMHG | HEIGHT: 73 IN | HEART RATE: 88 BPM | SYSTOLIC BLOOD PRESSURE: 118 MMHG

## 2024-10-29 DIAGNOSIS — I10 PRIMARY HYPERTENSION: Primary | Chronic | ICD-10-CM

## 2024-10-29 DIAGNOSIS — R55 NEAR SYNCOPE: ICD-10-CM

## 2024-10-29 DIAGNOSIS — E11.9 TYPE 2 DIABETES MELLITUS WITHOUT COMPLICATION, WITHOUT LONG-TERM CURRENT USE OF INSULIN (HCC): ICD-10-CM

## 2024-10-29 LAB
EST. AVERAGE GLUCOSE BLD GHB EST-MCNC: 128 MG/DL
HBA1C MFR BLD: 6.1 %

## 2024-10-29 PROCEDURE — 99214 OFFICE O/P EST MOD 30 MIN: CPT

## 2024-10-29 PROCEDURE — 93000 ELECTROCARDIOGRAM COMPLETE: CPT

## 2024-10-29 NOTE — PROGRESS NOTES
Cardiology   MD Markie Martinez MD, FACC  Tyrell Becker DO, [facility] FACP, FASNC Ather Mansoor, MD Rujul Patel, DO, [facility]  Reagan Rosario DO, [facility]  -------------------------------------------------------------------  [facility]  [address] [place] [address]  Phone: [phone]  Fax: [phone]  10/29/24  Fernando Fox  YOB: 1965   MRN: [id number]      Referring Physician: Jamel Hein MD  [address] [place] [id number]     HPI: Fernando Fox is a 59 y.o. male with:   Syncope  DM2  GERD  COPD Chronic bronchitis  HTN  BPH  HLD  Tobacco abuse    He presents today for follow-up.  He states from heart standpoint he is doing well, denies any acute complaints at this time, does have bilateral inguinal hernias which is going to require surgery.  Denies any palpitations chest pain or shortness of breath.  No recurrent syncope    Review of Systems   Constitutional:  Negative for chills and fever.   HENT:  Negative for facial swelling and sore throat.    Eyes:  Negative for visual disturbance.   Respiratory:  Negative for cough, chest tightness, shortness of breath and wheezing.    Cardiovascular:  Negative for chest pain, palpitations and leg swelling.   Gastrointestinal:  Negative for abdominal pain, blood in stool, constipation, diarrhea, nausea and vomiting.   Endocrine: Negative for cold intolerance and heat intolerance.   Genitourinary:  Negative for decreased urine volume, difficulty urinating, dysuria and hematuria.   Musculoskeletal:  Negative for arthralgias, back pain and myalgias.   Skin:  Negative for rash.   Neurological:  Negative for dizziness, syncope, weakness and numbness.   Psychiatric/Behavioral:  Negative for agitation, behavioral problems and confusion. The patient is not nervous/anxious.           OBJECTIVE  Vitals:    10/29/24 1130   BP: 118/66   Pulse: 88   SpO2: 97%       Physical  Exam  Constitutional: awake, alert and oriented, in no acute distress, no obvious deformities  Head: Normocephalic, without obvious abnormality, atraumatic  Eyes: conjunctivae clear and moist. Sclera anicteric.  No xanthelasmas. Pupils equal bilaterally.  Extraocular motions are full.  Ear nose mouth and throat: ears are symmetrical bilaterally, hearing appears to be equal bilaterally, no nasal discharge or epistaxis, oropharynx is clear with moist mucous membranes  Neck:  Trachea is midline, neck is supple, no thyromegaly or significant lymphadenopathy, there is full range of motion.  Lungs: clear to auscultation bilaterally, no wheezes, no rales, no rhonchi, no accessory muscle use, breathing is nonlabored  Heart: Regular rhythm with a Normal heart rate, S1, S2 normal, No Murmur, no click, rub or gallop, No lower extremity edema  Abdomen: soft, non-tender; bowel sounds normal; no masses,  no organomegaly  Psychiatric:  Patient is oriented to time, place, person, mood/affect is negative for depression, anxiety, agitation, appears to have appropriate insight  Skin: Skin is warm, dry, intact. No obvious rashes or lesions on exposed extremities.  Nail beds are pink with no cyanosis or clubbing.    EKG:  Results for orders placed or performed in visit on 10/29/24   POCT ECG    Impression    error        IMPRESSION:  Syncope  DM2  GERD  COPD Chronic bronchitis  HTN  BPH  HLD  Tobacco abuse    DISCUSSION/RECOMMENDATIONS:  He presents today for follow-up  Has not had no recurrent episodes of syncope  Is asymptomatic today  Echo is without significant underlying structural heart disease  Suspect his episode of syncope in the past was secondary to hypoglycemia from his diabetes.  Does have bilateral inguinal hernias going to require robotic surgery with Dr. Vila, will be low to moderate risk for this based on his risk factors alone with diabetes COPD hypertension and tobacco use history, but definitely not a prohibitive  risk such that surgery could not be performed.  May proceed as planned without further testing beforehand      Reagan Rosario DO, FACC, FASNC  --------------------------------------------------------------------------------  TREADMILL STRESS  No results found for this or any previous visit.     ----------------------------------------------------------------------------------------------  NUCLEAR STRESS TEST: No results found for this or any previous visit.    No results found for this or any previous visit.      --------------------------------------------------------------------------------  CATH:  No results found for this or any previous visit.    --------------------------------------------------------------------------------  ECHO:   No results found for this or any previous visit.    No results found for this or any previous visit.    --------------------------------------------------------------------------------  HOLTER  No results found for this or any previous visit.    No results found for this or any previous visit.    --------------------------------------------------------------------------------  CAROTIDS  No results found for this or any previous visit.     --------------------------------------------------------------------------------  Diagnoses and all orders for this visit:    Primary hypertension    Near syncope  -     Ambulatory Referral to Cardiology  -     POCT ECG    Type 2 diabetes mellitus without complication, without long-term current use of insulin ([facility])       ======================================================    Past Medical History:   Diagnosis Date    Diabetes mellitus (HCC)     Diverticulitis     Hypertension     Neck pain     Renal disorder      Past Surgical History:   Procedure Laterality Date    COLON SURGERY      NECK SURGERY           Medications  Current Outpatient Medications   Medication Sig Dispense Refill    acetaminophen (TYLENOL) 500 mg tablet Take 1 tablet (500  mg total) by mouth every 6 (six) hours as needed for mild pain 30 tablet 0    albuterol (PROVENTIL HFA,VENTOLIN HFA) 90 mcg/act inhaler Inhale 1-2 puffs every 6 (six) hours as needed for wheezing 18 g 1    ARIPiprazole (ABILIFY) 10 mg tablet Take 10 mg by mouth daily at bedtime      aspirin (ECOTRIN LOW STRENGTH) 81 mg EC tablet Take 1 tablet (81 mg total) by mouth daily 90 tablet 1    atorvastatin (LIPITOR) 40 mg tablet Take 1 tablet (40 mg total) by mouth daily 90 tablet 1    buPROPion (WELLBUTRIN SR) 200 MG 12 hr tablet Take 200 mg by mouth every morning      celecoxib (CeleBREX) 100 mg capsule Take 1 capsule (100 mg total) by mouth 2 (two) times a day 20 capsule 0    cloNIDine (CATAPRES) 0.2 mg tablet Take 0.2 mg by mouth daily at bedtime      Continuous Glucose  (FreeStyle Shira 2 [place]) SHANELL Scan every 8 hours. E11.9 1 each 0    Continuous Glucose Sensor (FreeStyle Shira 2 Sensor) MISC Scan every 8 hours. E11.9 2 each 5    doxepin (SINEquan) 150 MG capsule Take 150 mg by mouth daily at bedtime      fluticasone (FLONASE) 50 mcg/act nasal spray 1 spray into each nostril daily 16 g 2    gabapentin (NEURONTIN) 800 mg tablet Take 1 tablet (800 mg total) by mouth 3 (three) times a day 90 tablet 2    glucose blood (FREESTYLE LITE) test strip Testing sugars once a day 100 each 3    hydrocortisone 1 % ointment Apply topically 2 (two) times a day 30 g 0    hydrOXYzine HCL (ATARAX) 50 mg tablet Take 50 mg by mouth daily at bedtime      Lancets (freestyle) lancets Daily 100 each 5    lidocaine (XYLOCAINE) 2 % topical gel Apply topically as needed for mild pain 30 mL 2    naproxen (Naprosyn) 500 mg tablet Take 1 tablet (500 mg total) by mouth 2 (two) times a day with meals 30 tablet 0    pantoprazole (PROTONIX) 40 mg tablet Take 1 tablet (40 mg total) by mouth daily 90 tablet 1    Restasis 0.05 % ophthalmic emulsion       tiZANidine (ZANAFLEX) 4 mg tablet Take 2 tablets (8 mg total) by mouth every 8 (eight) hours  as needed for muscle spasms 60 tablet 3    zolpidem (AMBIEN) 10 mg tablet Take 10 mg by mouth daily at bedtime       No current facility-administered medications for this visit.        No Known Allergies    Social History     Socioeconomic History    Marital status: /Civil Union     Spouse name: Not on file    Number of children: Not on file    Years of education: Not on file    Highest education level: Not on file   Occupational History    Not on file   Tobacco Use    Smoking status: Every Day     Current packs/day: 0.25     Types: Cigarettes, Cigars     Passive exposure: Never    Smokeless tobacco: Never    Tobacco comments:     1 cigar/daily   Vaping Use    Vaping status: Never Used   Substance and Sexual Activity    Alcohol use: Yes     Comment: occ    Drug use: Yes     Types: Marijuana     Comment: occasional    Sexual activity: Yes     Partners: Female   Other Topics Concern    Not on file   Social History Narrative    Not on file     Social Determinants of Health     Financial Resource Strain: Low Risk  (2/21/2024)    Overall Financial Resource Strain (CARDIA)     Difficulty of Paying Living Expenses: Not hard at all   Food Insecurity: No Food Insecurity (2/21/2024)    Nursing - Inadequate Food Risk Classification     Worried About Running Out of Food in the Last Year: Never true     Ran Out of Food in the Last Year: Never true     Ran Out of Food in the Last Year: Not on file   Transportation Needs: Unmet Transportation Needs (2/21/2024)    PRAPARE - Transportation     Lack of Transportation (Medical): Yes     Lack of Transportation (Non-Medical): Yes   Physical Activity: Not on file   Stress: Stress Concern Present (2/6/2023)    [employer] - Occupational Stress Questionnaire     Feeling of Stress : To some extent   Social Connections: Not on file   Intimate Partner Violence: Not on file   Housing Stability: Low Risk  (4/29/2024)    Housing Stability Vital Sign     Unable  "to Pay for Housing in the Last Year: No     Number of Times Moved in the Last Year: 1     Homeless in the Last Year: No        History reviewed. No pertinent family history.    Lab Results   Component Value Date    WBC 8.53 10/28/2024    HGB 15.8 10/28/2024    HCT 47.7 10/28/2024    MCV 95 10/28/2024     10/28/2024      Lab Results   Component Value Date    SODIUM 141 10/28/2024    K 3.6 10/28/2024     10/28/2024    CO2 28 10/28/2024    BUN 13 10/28/2024    CREATININE 0.87 10/28/2024    GLUC 128 09/10/2024    CALCIUM 9.0 10/28/2024      Lab Results   Component Value Date    HGBA1C 6.1 (H) 10/28/2024      Lab Results   Component Value Date    CHOL 237 (H) 05/15/2018     Lab Results   Component Value Date    HDL 55 01/10/2024    HDL 47 08/01/2023    HDL 28 (L) 05/25/2022     Lab Results   Component Value Date    LDLCALC 86 01/10/2024    LDLCALC 99 08/01/2023    LDLCALC 61 05/25/2022     Lab Results   Component Value Date    TRIG 98 01/10/2024    TRIG 21 08/01/2023    TRIG 226 (H) 05/25/2022     No results found for: \"CHOLHDL\"   Lab Results   Component Value Date    INR 1.0 01/26/2020    INR 0.95 04/25/2019    PROTIME 10.1 04/25/2019          Patient Active Problem List    Diagnosis Date Noted    Acute pain of right shoulder 08/28/2024    Non-recurrent bilateral inguinal hernia without obstruction or gangrene 08/28/2024    Left inguinal pain 04/30/2024    Syncope and collapse 07/11/2023    Seizure-like activity (HCC) 04/19/2023    Balanitis 01/31/2023    Chronic bilateral low back pain with bilateral sciatica 01/05/2023    Vertigo 05/24/2022    Chronic bronchitis (HCC) 01/26/2022    Tobacco abuse 10/28/2021    Epidermal cyst 06/02/2021    Depression, recurrent ([facility]) 03/10/2021    Diabetic polyneuropathy associated with type 2 diabetes mellitus (HCC) 12/01/2020    Type 2 diabetes mellitus without complication, without long-term current use of insulin (HCC) 09/09/2020    Chronic pain syndrome 09/09/2020    " "Multiple lung nodules on CT 06/10/2020    Chronic pain of left knee 02/27/2020    Decreased hearing of left ear 01/17/2020    Neck pain 12/05/2018    Posttraumatic stiffness of shoulder joint 12/05/2018    Intrinsic eczema 12/05/2018    Allergic rhinitis 12/05/2018    Left shoulder pain 10/03/2018    Benign prostatic hyperplasia 06/18/2018    Gastroesophageal reflux disease 05/14/2018    History of diverticulitis 05/14/2018    History of renal calculi 05/14/2018    History of hemicolectomy 05/14/2018    Hypertension 05/14/2018    Hyperlipidemia 05/14/2018       Portions of the record may have been created with voice recognition software. Occasional wrong word or \"sound a like\" substitutions may have occurred due to the inherent limitations of voice recognition software. Read the chart carefully and recognize, using context, where substitutions have occurred.    Reagan Rosario DO, [facility]  10/29/2024 12:09 PM          "

## 2024-10-31 ENCOUNTER — TELEPHONE (OUTPATIENT)
Dept: SURGERY | Facility: CLINIC | Age: 59
End: 2024-10-31

## 2024-10-31 NOTE — TELEPHONE ENCOUNTER
----- Message from Nabila Armas PA-C sent at 10/31/2024  7:46 AM EDT -----  Please let him know his A1c is 6.1, Patient's cardiologist states he is low to mod risk. Ok for surgery.

## 2024-11-05 NOTE — PRE-PROCEDURE INSTRUCTIONS
Pre-Surgery Instructions:   Medication Instructions    acetaminophen (TYLENOL) 500 mg tablet Uses PRN- OK to take day of surgery    albuterol (PROVENTIL HFA,VENTOLIN HFA) 90 mcg/act inhaler Uses PRN- OK to take day of surgery    ARIPiprazole (ABILIFY) 10 mg tablet Take day of surgery.    aspirin (ECOTRIN LOW STRENGTH) 81 mg EC tablet Stop taking 7 days prior to surgery.    atorvastatin (LIPITOR) 40 mg tablet Take day of surgery.    buPROPion (WELLBUTRIN SR) 200 MG 12 hr tablet Take day of surgery.    cloNIDine (CATAPRES) 0.2 mg tablet Take night before surgery    Continuous Glucose  (FreeStyle Shira 2 [place]) SHANELL Take day of surgery.    Continuous Glucose Sensor (FreeStyle Shira 2 Sensor) MISC Take day of surgery.    Cyanocobalamin (VITAMIN B 12 PO) Stop taking 7 days prior to surgery.    doxepin (SINEquan) 150 MG capsule Take night before surgery    fluticasone (FLONASE) 50 mcg/act nasal spray Take day of surgery.    gabapentin (NEURONTIN) 800 mg tablet Take day of surgery.    glucose blood (FREESTYLE LITE) test strip Take day of surgery.    hydrocortisone 1 % ointment Uses PRN- DO NOT take day of surgery    hydrOXYzine HCL (ATARAX) 50 mg tablet Take night before surgery    Lancets (freestyle) lancets Take day of surgery.    lidocaine (XYLOCAINE) 2 % topical gel Uses PRN- DO NOT take day of surgery    pantoprazole (PROTONIX) 40 mg tablet Take day of surgery.    Restasis 0.05 % ophthalmic emulsion Uses PRN- OK to take day of surgery    tiZANidine (ZANAFLEX) 4 mg tablet Uses PRN- OK to take day of surgery    zolpidem (AMBIEN) 10 mg tablet Take night before surgery   Advised to bring extra supplies for freestyle shira 2 on DOS.    Medication instructions for day surgery reviewed. Please use only a sip of water to take your instructed medications. Avoid all over the counter vitamins, supplements and NSAIDS for one week prior to surgery per anesthesia guidelines. Tylenol is ok to take as needed.     You will  receive a call one business day prior to surgery with an arrival time and hospital directions. If your surgery is scheduled on a Monday, the hospital will be calling you on the Friday prior to your surgery. If you have not heard from anyone by 8pm, please call the hospital supervisor through the hospital  at [phone]. ([place] [phone] or [place] [phone]).    Do not eat or drink anything after midnight the night before your surgery, including candy, mints, lifesavers, or chewing gum. Do not drink alcohol 24hrs before your surgery. Try not to smoke at least 24hrs before your surgery.       Follow the pre surgery showering instructions as listed in the “My Surgical Experience Booklet” or otherwise provided by your surgeon's office. Do not use a blade to shave the surgical area 1 week before surgery. It is okay to use a clean electric clippers up to 24 hours before surgery. Do not apply any lotions, creams, including makeup, cologne, deodorant, or perfumes after showering on the day of your surgery. Do not use dry shampoo, hair spray, hair gel, or any type of hair products.     No contact lenses, eye make-up, or artificial eyelashes. Remove nail polish, including gel polish, and any artificial, gel, or acrylic nails if possible. Remove all jewelry including rings and body piercing jewelry.     Wear causal clothing that is easy to take on and off. Consider your type of surgery.    Keep any valuables, jewelry, piercings at home. Please bring any specially ordered equipment (sling, braces) if indicated.    Arrange for a responsible person to drive you to and from the hospital on the day of your surgery. Please confirm the visitor policy for the day of your procedure when you receive your phone call with an arrival time.     Call the surgeon's office with any new illnesses, exposures, or additional questions prior to surgery.    Please reference your “My Surgical Experience Booklet” for additional  information to prepare for your upcoming surgery.

## 2024-11-11 ENCOUNTER — CONSULT (OUTPATIENT)
Dept: FAMILY MEDICINE CLINIC | Facility: CLINIC | Age: 59
End: 2024-11-11

## 2024-11-11 VITALS
DIASTOLIC BLOOD PRESSURE: 60 MMHG | HEART RATE: 75 BPM | SYSTOLIC BLOOD PRESSURE: 120 MMHG | TEMPERATURE: 96.6 F | BODY MASS INDEX: 25.6 KG/M2 | RESPIRATION RATE: 16 BRPM | HEIGHT: 73 IN | WEIGHT: 193.2 LBS | OXYGEN SATURATION: 95 %

## 2024-11-11 DIAGNOSIS — Z01.810 PRE-OPERATIVE CARDIOVASCULAR EXAMINATION: Primary | ICD-10-CM

## 2024-11-11 DIAGNOSIS — K21.9 GASTROESOPHAGEAL REFLUX DISEASE WITHOUT ESOPHAGITIS: ICD-10-CM

## 2024-11-11 DIAGNOSIS — M54.41 CHRONIC BILATERAL LOW BACK PAIN WITH BILATERAL SCIATICA: ICD-10-CM

## 2024-11-11 DIAGNOSIS — E78.2 MIXED HYPERLIPIDEMIA: ICD-10-CM

## 2024-11-11 DIAGNOSIS — M54.42 CHRONIC BILATERAL LOW BACK PAIN WITH BILATERAL SCIATICA: ICD-10-CM

## 2024-11-11 DIAGNOSIS — J30.9 ALLERGIC RHINITIS, UNSPECIFIED SEASONALITY, UNSPECIFIED TRIGGER: ICD-10-CM

## 2024-11-11 DIAGNOSIS — G89.29 CHRONIC BILATERAL LOW BACK PAIN WITH BILATERAL SCIATICA: ICD-10-CM

## 2024-11-11 DIAGNOSIS — J42 CHRONIC BRONCHITIS, UNSPECIFIED CHRONIC BRONCHITIS TYPE (HCC): ICD-10-CM

## 2024-11-11 DIAGNOSIS — K40.20 NON-RECURRENT BILATERAL INGUINAL HERNIA WITHOUT OBSTRUCTION OR GANGRENE: ICD-10-CM

## 2024-11-11 PROCEDURE — 99212 OFFICE O/P EST SF 10 MIN: CPT | Performed by: PHYSICIAN ASSISTANT

## 2024-11-11 PROCEDURE — 93000 ELECTROCARDIOGRAM COMPLETE: CPT | Performed by: PHYSICIAN ASSISTANT

## 2024-11-11 RX ORDER — LIDOCAINE HYDROCHLORIDE 20 MG/ML
JELLY TOPICAL AS NEEDED
Qty: 30 ML | Refills: 2 | Status: SHIPPED | OUTPATIENT
Start: 2024-11-11

## 2024-11-11 RX ORDER — GABAPENTIN 800 MG/1
800 TABLET ORAL 3 TIMES DAILY
Qty: 90 TABLET | Refills: 2 | Status: SHIPPED | OUTPATIENT
Start: 2024-11-11

## 2024-11-11 RX ORDER — FLUTICASONE PROPIONATE 50 MCG
1 SPRAY, SUSPENSION (ML) NASAL DAILY
Qty: 16 G | Refills: 2 | Status: SHIPPED | OUTPATIENT
Start: 2024-11-11

## 2024-11-11 RX ORDER — ATORVASTATIN CALCIUM 40 MG/1
40 TABLET, FILM COATED ORAL DAILY
Qty: 90 TABLET | Refills: 1 | Status: SHIPPED | OUTPATIENT
Start: 2024-11-11

## 2024-11-11 RX ORDER — PANTOPRAZOLE SODIUM 40 MG/1
40 TABLET, DELAYED RELEASE ORAL DAILY
Qty: 90 TABLET | Refills: 1 | Status: SHIPPED | OUTPATIENT
Start: 2024-11-11

## 2024-11-11 RX ORDER — ALBUTEROL SULFATE 90 UG/1
1-2 INHALANT RESPIRATORY (INHALATION) EVERY 6 HOURS PRN
Qty: 18 G | Refills: 1 | Status: SHIPPED | OUTPATIENT
Start: 2024-11-11

## 2024-11-11 NOTE — ASSESSMENT & PLAN NOTE
Orders:    gabapentin (NEURONTIN) 800 mg tablet; Take 1 tablet (800 mg total) by mouth 3 (three) times a day    tiZANidine (ZANAFLEX) 4 mg tablet; Take 2 tablets (8 mg total) by mouth every 8 (eight) hours as needed for muscle spasms    lidocaine (XYLOCAINE) 2 % topical gel; Apply topically as needed for mild pain

## 2024-11-11 NOTE — ASSESSMENT & PLAN NOTE
Orders:    albuterol (PROVENTIL HFA,VENTOLIN HFA) 90 mcg/act inhaler; Inhale 1-2 puffs every 6 (six) hours as needed for wheezing

## 2024-11-11 NOTE — PROGRESS NOTES
Pre-operative Clearance  Name: Fernando Fox      : 1965      MRN: [id number]  Encounter Provider: Juliana Martino PA-C  Encounter Date: 2024   Encounter department: [facility]    Assessment & Plan  Pre-operative cardiovascular examination    Orders:    POCT ECG    Non-recurrent bilateral inguinal hernia without obstruction or gangrene         Chronic bilateral low back pain with bilateral sciatica    Orders:    gabapentin (NEURONTIN) 800 mg tablet; Take 1 tablet (800 mg total) by mouth 3 (three) times a day    tiZANidine (ZANAFLEX) 4 mg tablet; Take 2 tablets (8 mg total) by mouth every 8 (eight) hours as needed for muscle spasms    lidocaine (XYLOCAINE) 2 % topical gel; Apply topically as needed for mild pain    Allergic rhinitis, unspecified seasonality, unspecified trigger    Orders:    fluticasone (FLONASE) 50 mcg/act nasal spray; 1 spray into each nostril daily    Chronic bronchitis, unspecified chronic bronchitis type (HCC)    Orders:    albuterol (PROVENTIL HFA,VENTOLIN HFA) 90 mcg/act inhaler; Inhale 1-2 puffs every 6 (six) hours as needed for wheezing    Gastroesophageal reflux disease without esophagitis    Orders:    pantoprazole (PROTONIX) 40 mg tablet; Take 1 tablet (40 mg total) by mouth daily    Mixed hyperlipidemia    Orders:    atorvastatin (LIPITOR) 40 mg tablet; Take 1 tablet (40 mg total) by mouth daily    Pre-operative Clearance:     Clearance:  Patient is medically optimized (CLEARED) for proposed surgery without any additional cardiac testing.       - Reviewed Cardiology note from 10/29/24. Patient cleared for upcoming surgery with low to moderate risk.   - Reviewed CBC, CMP, HgbA1c from 10/28/24.     Medication Instructions:   - Alpha-2 Adrenergic Agonist (ie, clonidine, tizanidine, methyldopa): Continue to take this medication on your normal schedule.  - Antidepressants: Continue to take this medication on your normal schedule.  -  Antiepileptic meds: Continue to take this medication on your normal schedule.  - Antipsychotic meds: Continue to take this medication on your normal schedule.  - Hyperlipidemia meds: Continue to take this medication on your normal schedule.  - Sleep meds (zolpidem, zaleplon, eszopiclone): Continue taking medication up to the evening before procedure/surgery, but do not take this medication on the morning of procedure/surgery.       History of Present Illness         -Patient scheduled for bilateral inguinal hernia repair with Dr. Shell at [facility] on 11/14/2024.      Review of Systems   Constitutional:  Negative for activity change, appetite change and fatigue.   HENT:  Negative for congestion and trouble swallowing.    Eyes:  Negative for photophobia and visual disturbance.   Respiratory:  Negative for chest tightness and wheezing.    Cardiovascular:  Negative for palpitations and leg swelling.   Gastrointestinal:  Positive for abdominal pain.   Genitourinary:  Negative for difficulty urinating and dysuria.   Musculoskeletal:  Positive for arthralgias and back pain.   Skin:  Negative for pallor and wound.   Neurological:  Negative for dizziness, seizures, syncope and weakness.   Psychiatric/Behavioral:  Negative for self-injury, sleep disturbance and suicidal ideas. The patient is not nervous/anxious.      Past Medical History   Past Medical History:   Diagnosis Date    Ambulates with cane     Diabetes mellitus (HCC)     Diverticulitis     Fall     10/2024    Hypertension     Kidney stone     Neck pain     Renal disorder      Past Surgical History:   Procedure Laterality Date    COLON SURGERY      COLONOSCOPY      NECK SURGERY       History reviewed. No pertinent family history.  Social History     Tobacco Use    Smoking status: Every Day     Current packs/day: 0.25     Types: Cigarettes, Cigars     Passive exposure: Never    Smokeless tobacco: Never    Tobacco comments:     1 cigar/daily    Vaping Use    Vaping status: Never Used   Substance and Sexual Activity    Alcohol use: Yes     Comment: occ    Drug use: Yes     Types: Marijuana     Comment: occasional- smoke    Sexual activity: Yes     Partners: Female     Current Outpatient Medications on File Prior to Visit   Medication Sig    acetaminophen (TYLENOL) 500 mg tablet Take 1 tablet (500 mg total) by mouth every 6 (six) hours as needed for mild pain    ARIPiprazole (ABILIFY) 10 mg tablet Take 10 mg by mouth daily at bedtime    aspirin (ECOTRIN LOW STRENGTH) 81 mg EC tablet Take 1 tablet (81 mg total) by mouth daily    buPROPion (WELLBUTRIN SR) 200 MG 12 hr tablet Take 200 mg by mouth every morning    celecoxib (CeleBREX) 100 mg capsule Take 1 capsule (100 mg total) by mouth 2 (two) times a day (Patient not taking: Reported on 11/5/2024)    cloNIDine (CATAPRES) 0.2 mg tablet Take 0.2 mg by mouth daily at bedtime    Continuous Glucose  (FreeStyle Shira 2 [place]) SHANELL Scan every 8 hours. E11.9    Continuous Glucose Sensor (FreeStyle Shira 2 Sensor) MISC Scan every 8 hours. E11.9    Cyanocobalamin (VITAMIN B 12 PO) Take by mouth    doxepin (SINEquan) 150 MG capsule Take 150 mg by mouth daily at bedtime    glucose blood (FREESTYLE LITE) test strip Testing sugars once a day    hydrocortisone 1 % ointment Apply topically 2 (two) times a day    hydrOXYzine HCL (ATARAX) 50 mg tablet Take 50 mg by mouth daily at bedtime    Lancets (freestyle) lancets Daily    naproxen (Naprosyn) 500 mg tablet Take 1 tablet (500 mg total) by mouth 2 (two) times a day with meals (Patient not taking: Reported on 11/5/2024)    Restasis 0.05 % ophthalmic emulsion Administer 1 drop to both eyes daily as needed    zolpidem (AMBIEN) 10 mg tablet Take 10 mg by mouth daily at bedtime    [DISCONTINUED] albuterol (PROVENTIL HFA,VENTOLIN HFA) 90 mcg/act inhaler Inhale 1-2 puffs every 6 (six) hours as needed for wheezing    [DISCONTINUED] atorvastatin (LIPITOR) 40 mg tablet Take  "1 tablet (40 mg total) by mouth daily    [DISCONTINUED] fluticasone (FLONASE) 50 mcg/act nasal spray 1 spray into each nostril daily    [DISCONTINUED] gabapentin (NEURONTIN) 800 mg tablet Take 1 tablet (800 mg total) by mouth 3 (three) times a day    [DISCONTINUED] lidocaine (XYLOCAINE) 2 % topical gel Apply topically as needed for mild pain    [DISCONTINUED] pantoprazole (PROTONIX) 40 mg tablet Take 1 tablet (40 mg total) by mouth daily    [DISCONTINUED] tiZANidine (ZANAFLEX) 4 mg tablet Take 2 tablets (8 mg total) by mouth every 8 (eight) hours as needed for muscle spasms     No Known Allergies  Objective     /60 (BP Location: Left arm, Patient Position: Sitting, Cuff Size: Standard)   Pulse 75   Temp (!) 96.6 °F (35.9 °C) (Temporal)   Resp 16   Ht 6' 1\" (1.854 m)   Wt 87.6 kg (193 lb 3.2 oz)   SpO2 95%   BMI 25.49 kg/m²     Physical Exam  Vitals and nursing note reviewed.   Constitutional:       General: He is not in acute distress.     Appearance: Normal appearance. He is well-developed.   HENT:      Head: Normocephalic and atraumatic.      Right Ear: External ear normal.      Left Ear: External ear normal.      Nose: Nose normal.      Mouth/Throat:      Pharynx: Uvula midline.   Eyes:      Conjunctiva/sclera: Conjunctivae normal.   Cardiovascular:      Rate and Rhythm: Normal rate and regular rhythm.      Pulses: Normal pulses.      Heart sounds: Normal heart sounds. No murmur heard.  Pulmonary:      Effort: Pulmonary effort is normal. No respiratory distress.      Breath sounds: Normal breath sounds. No wheezing.   Abdominal:      General: Bowel sounds are normal.      Palpations: Abdomen is soft.      Comments: Small bilateral inguinal hernias.   Musculoskeletal:      Cervical back: Normal range of motion and neck supple.   Skin:     General: Skin is warm and dry.      Capillary Refill: Capillary refill takes less than 2 seconds.   Neurological:      Mental Status: He is alert and oriented to " person, place, and time.   Psychiatric:         Speech: Speech normal.         Behavior: Behavior normal.           Juliana Martino PA-C

## 2024-11-13 ENCOUNTER — ANESTHESIA EVENT (OUTPATIENT)
Dept: PERIOP | Facility: HOSPITAL | Age: 59
End: 2024-11-13
Payer: MEDICARE

## 2024-11-14 ENCOUNTER — ANESTHESIA (OUTPATIENT)
Dept: PERIOP | Facility: HOSPITAL | Age: 59
End: 2024-11-14
Payer: MEDICARE

## 2024-11-14 ENCOUNTER — HOSPITAL ENCOUNTER (OUTPATIENT)
Facility: HOSPITAL | Age: 59
Setting detail: OUTPATIENT SURGERY
Discharge: HOME | End: 2024-11-14
Attending: SURGERY | Admitting: SURGERY
Payer: MEDICARE

## 2024-11-14 VITALS
DIASTOLIC BLOOD PRESSURE: 58 MMHG | OXYGEN SATURATION: 95 % | HEART RATE: 90 BPM | BODY MASS INDEX: 24.22 KG/M2 | WEIGHT: 182.76 LBS | RESPIRATION RATE: 16 BRPM | SYSTOLIC BLOOD PRESSURE: 117 MMHG | TEMPERATURE: 97.5 F | HEIGHT: 73 IN

## 2024-11-14 DIAGNOSIS — R10.32 LEFT INGUINAL PAIN: Primary | ICD-10-CM

## 2024-11-14 DIAGNOSIS — K40.20 NON-RECURRENT BILATERAL INGUINAL HERNIA WITHOUT OBSTRUCTION OR GANGRENE: ICD-10-CM

## 2024-11-14 LAB
GLUCOSE SERPL-MCNC: 151 MG/DL (ref 65–140)
GLUCOSE SERPL-MCNC: 186 MG/DL (ref 65–140)

## 2024-11-14 PROCEDURE — S2900 ROBOTIC SURGICAL SYSTEM: HCPCS | Performed by: SURGERY

## 2024-11-14 PROCEDURE — NC001 PR NO CHARGE: Performed by: SURGERY

## 2024-11-14 PROCEDURE — 88304 TISSUE EXAM BY PATHOLOGIST: CPT | Performed by: STUDENT IN AN ORGANIZED HEALTH CARE EDUCATION/TRAINING PROGRAM

## 2024-11-14 PROCEDURE — 88302 TISSUE EXAM BY PATHOLOGIST: CPT | Performed by: STUDENT IN AN ORGANIZED HEALTH CARE EDUCATION/TRAINING PROGRAM

## 2024-11-14 PROCEDURE — 49650 LAP ING HERNIA REPAIR INIT: CPT | Performed by: PHYSICIAN ASSISTANT

## 2024-11-14 PROCEDURE — 82948 REAGENT STRIP/BLOOD GLUCOSE: CPT

## 2024-11-14 PROCEDURE — C1781 MESH (IMPLANTABLE): HCPCS | Performed by: SURGERY

## 2024-11-14 PROCEDURE — 49650 LAP ING HERNIA REPAIR INIT: CPT | Performed by: SURGERY

## 2024-11-14 DEVICE — 3DMAX MID ANATOMICAL MESH, 10 CM X 16 CM (4" X 6"), LARGE, RIGHT
Type: IMPLANTABLE DEVICE | Site: INGUINAL | Status: FUNCTIONAL
Brand: 3DMAX

## 2024-11-14 DEVICE — 3DMAX MID ANATOMICAL MESH, 10 CM X 16 CM (4" X 6"), LARGE, LEFT
Type: IMPLANTABLE DEVICE | Site: INGUINAL | Status: FUNCTIONAL
Brand: 3DMAX

## 2024-11-14 RX ORDER — HYDROMORPHONE HCL/PF 1 MG/ML
0.5 SYRINGE (ML) INJECTION
Status: DISCONTINUED | OUTPATIENT
Start: 2024-11-14 | End: 2024-11-14 | Stop reason: HOSPADM

## 2024-11-14 RX ORDER — ACETAMINOPHEN 325 MG/1
650 TABLET ORAL EVERY 6 HOURS PRN
Status: DISCONTINUED | OUTPATIENT
Start: 2024-11-14 | End: 2024-11-14 | Stop reason: HOSPADM

## 2024-11-14 RX ORDER — PROPOFOL 10 MG/ML
INJECTION, EMULSION INTRAVENOUS AS NEEDED
Status: DISCONTINUED | OUTPATIENT
Start: 2024-11-14 | End: 2024-11-14

## 2024-11-14 RX ORDER — EPHEDRINE SULFATE 50 MG/ML
INJECTION INTRAVENOUS AS NEEDED
Status: DISCONTINUED | OUTPATIENT
Start: 2024-11-14 | End: 2024-11-14

## 2024-11-14 RX ORDER — FENTANYL CITRATE/PF 50 MCG/ML
50 SYRINGE (ML) INJECTION
Status: DISCONTINUED | OUTPATIENT
Start: 2024-11-14 | End: 2024-11-14 | Stop reason: HOSPADM

## 2024-11-14 RX ORDER — ONDANSETRON 2 MG/ML
INJECTION INTRAMUSCULAR; INTRAVENOUS AS NEEDED
Status: DISCONTINUED | OUTPATIENT
Start: 2024-11-14 | End: 2024-11-14

## 2024-11-14 RX ORDER — CEFAZOLIN SODIUM 2 G/50ML
2000 SOLUTION INTRAVENOUS ONCE
Status: COMPLETED | OUTPATIENT
Start: 2024-11-14 | End: 2024-11-14

## 2024-11-14 RX ORDER — SODIUM CHLORIDE, SODIUM LACTATE, POTASSIUM CHLORIDE, CALCIUM CHLORIDE 600; 310; 30; 20 MG/100ML; MG/100ML; MG/100ML; MG/100ML
INJECTION, SOLUTION INTRAVENOUS CONTINUOUS PRN
Status: DISCONTINUED | OUTPATIENT
Start: 2024-11-14 | End: 2024-11-14

## 2024-11-14 RX ORDER — ROCURONIUM BROMIDE 10 MG/ML
INJECTION, SOLUTION INTRAVENOUS AS NEEDED
Status: DISCONTINUED | OUTPATIENT
Start: 2024-11-14 | End: 2024-11-14

## 2024-11-14 RX ORDER — OXYCODONE HYDROCHLORIDE 5 MG/1
5 TABLET ORAL EVERY 4 HOURS PRN
Qty: 12 TABLET | Refills: 0 | Status: SHIPPED | OUTPATIENT
Start: 2024-11-14 | End: 2024-11-24

## 2024-11-14 RX ORDER — SODIUM CHLORIDE, SODIUM LACTATE, POTASSIUM CHLORIDE, CALCIUM CHLORIDE 600; 310; 30; 20 MG/100ML; MG/100ML; MG/100ML; MG/100ML
125 INJECTION, SOLUTION INTRAVENOUS CONTINUOUS
Status: DISCONTINUED | OUTPATIENT
Start: 2024-11-14 | End: 2024-11-14 | Stop reason: HOSPADM

## 2024-11-14 RX ORDER — BUPIVACAINE HYDROCHLORIDE AND EPINEPHRINE 2.5; 5 MG/ML; UG/ML
INJECTION, SOLUTION EPIDURAL; INFILTRATION; INTRACAUDAL; PERINEURAL AS NEEDED
Status: DISCONTINUED | OUTPATIENT
Start: 2024-11-14 | End: 2024-11-14 | Stop reason: HOSPADM

## 2024-11-14 RX ORDER — PHENYLEPHRINE HCL IN 0.9% NACL 1 MG/10 ML
SYRINGE (ML) INTRAVENOUS AS NEEDED
Status: DISCONTINUED | OUTPATIENT
Start: 2024-11-14 | End: 2024-11-14

## 2024-11-14 RX ORDER — ESMOLOL HYDROCHLORIDE 10 MG/ML
INJECTION INTRAVENOUS AS NEEDED
Status: DISCONTINUED | OUTPATIENT
Start: 2024-11-14 | End: 2024-11-14

## 2024-11-14 RX ORDER — MEPERIDINE HYDROCHLORIDE 25 MG/ML
12.5 INJECTION INTRAMUSCULAR; INTRAVENOUS; SUBCUTANEOUS ONCE AS NEEDED
Status: DISCONTINUED | OUTPATIENT
Start: 2024-11-14 | End: 2024-11-14 | Stop reason: HOSPADM

## 2024-11-14 RX ORDER — LIDOCAINE HYDROCHLORIDE 20 MG/ML
INJECTION, SOLUTION EPIDURAL; INFILTRATION; INTRACAUDAL; PERINEURAL AS NEEDED
Status: DISCONTINUED | OUTPATIENT
Start: 2024-11-14 | End: 2024-11-14

## 2024-11-14 RX ORDER — IBUPROFEN 200 MG
TABLET ORAL EVERY 6 HOURS PRN
COMMUNITY

## 2024-11-14 RX ORDER — DEXAMETHASONE SODIUM PHOSPHATE 10 MG/ML
INJECTION, SOLUTION INTRAMUSCULAR; INTRAVENOUS AS NEEDED
Status: DISCONTINUED | OUTPATIENT
Start: 2024-11-14 | End: 2024-11-14

## 2024-11-14 RX ORDER — OXYCODONE HYDROCHLORIDE 5 MG/1
5 TABLET ORAL EVERY 4 HOURS PRN
Status: DISCONTINUED | OUTPATIENT
Start: 2024-11-14 | End: 2024-11-14 | Stop reason: HOSPADM

## 2024-11-14 RX ORDER — FENTANYL CITRATE 50 UG/ML
INJECTION, SOLUTION INTRAMUSCULAR; INTRAVENOUS AS NEEDED
Status: DISCONTINUED | OUTPATIENT
Start: 2024-11-14 | End: 2024-11-14

## 2024-11-14 RX ORDER — KETOROLAC TROMETHAMINE 30 MG/ML
INJECTION, SOLUTION INTRAMUSCULAR; INTRAVENOUS AS NEEDED
Status: DISCONTINUED | OUTPATIENT
Start: 2024-11-14 | End: 2024-11-14

## 2024-11-14 RX ORDER — ONDANSETRON 2 MG/ML
4 INJECTION INTRAMUSCULAR; INTRAVENOUS ONCE AS NEEDED
Status: DISCONTINUED | OUTPATIENT
Start: 2024-11-14 | End: 2024-11-14 | Stop reason: HOSPADM

## 2024-11-14 RX ORDER — PROMETHAZINE HYDROCHLORIDE 25 MG/ML
6.25 INJECTION, SOLUTION INTRAMUSCULAR; INTRAVENOUS ONCE AS NEEDED
Status: DISCONTINUED | OUTPATIENT
Start: 2024-11-14 | End: 2024-11-14 | Stop reason: HOSPADM

## 2024-11-14 RX ORDER — SODIUM CHLORIDE 9 MG/ML
125 INJECTION, SOLUTION INTRAVENOUS CONTINUOUS
Status: DISCONTINUED | OUTPATIENT
Start: 2024-11-14 | End: 2024-11-14 | Stop reason: HOSPADM

## 2024-11-14 RX ORDER — MIDAZOLAM HYDROCHLORIDE 2 MG/2ML
INJECTION, SOLUTION INTRAMUSCULAR; INTRAVENOUS AS NEEDED
Status: DISCONTINUED | OUTPATIENT
Start: 2024-11-14 | End: 2024-11-14

## 2024-11-14 RX ADMIN — ROCURONIUM 10 MG: 50 INJECTION, SOLUTION INTRAVENOUS at 10:53

## 2024-11-14 RX ADMIN — Medication 200 MCG: at 09:56

## 2024-11-14 RX ADMIN — LIDOCAINE HYDROCHLORIDE 100 MG: 20 INJECTION, SOLUTION EPIDURAL; INFILTRATION; INTRACAUDAL at 09:46

## 2024-11-14 RX ADMIN — ROCURONIUM 50 MG: 50 INJECTION, SOLUTION INTRAVENOUS at 09:47

## 2024-11-14 RX ADMIN — PROPOFOL 200 MG: 10 INJECTION, EMULSION INTRAVENOUS at 09:47

## 2024-11-14 RX ADMIN — FENTANYL CITRATE 100 MCG: 50 INJECTION INTRAMUSCULAR; INTRAVENOUS at 09:46

## 2024-11-14 RX ADMIN — KETOROLAC TROMETHAMINE 15 MG: 30 INJECTION, SOLUTION INTRAMUSCULAR; INTRAVENOUS at 11:18

## 2024-11-14 RX ADMIN — ESMOLOL HYDROCHLORIDE 10 MG: 10 INJECTION, SOLUTION INTRAVENOUS at 10:14

## 2024-11-14 RX ADMIN — CEFAZOLIN SODIUM 2000 MG: 2 SOLUTION INTRAVENOUS at 09:40

## 2024-11-14 RX ADMIN — FENTANYL CITRATE 50 MCG: 50 INJECTION INTRAMUSCULAR; INTRAVENOUS at 10:53

## 2024-11-14 RX ADMIN — ACETAMINOPHEN 650 MG: 325 TABLET, FILM COATED ORAL at 13:19

## 2024-11-14 RX ADMIN — SODIUM CHLORIDE, SODIUM LACTATE, POTASSIUM CHLORIDE, AND CALCIUM CHLORIDE: .6; .31; .03; .02 INJECTION, SOLUTION INTRAVENOUS at 09:01

## 2024-11-14 RX ADMIN — Medication 200 MCG: at 09:58

## 2024-11-14 RX ADMIN — Medication 200 MCG: at 09:54

## 2024-11-14 RX ADMIN — ROCURONIUM 10 MG: 50 INJECTION, SOLUTION INTRAVENOUS at 11:11

## 2024-11-14 RX ADMIN — ONDANSETRON 4 MG: 2 INJECTION INTRAMUSCULAR; INTRAVENOUS at 11:18

## 2024-11-14 RX ADMIN — SUGAMMADEX 200 MG: 100 INJECTION, SOLUTION INTRAVENOUS at 11:28

## 2024-11-14 RX ADMIN — FENTANYL CITRATE 50 MCG: 50 INJECTION, SOLUTION INTRAMUSCULAR; INTRAVENOUS at 11:44

## 2024-11-14 RX ADMIN — EPHEDRINE SULFATE 10 MG: 50 INJECTION INTRAVENOUS at 09:58

## 2024-11-14 RX ADMIN — SODIUM CHLORIDE 125 ML/HR: 0.9 INJECTION, SOLUTION INTRAVENOUS at 08:45

## 2024-11-14 RX ADMIN — MIDAZOLAM 2 MG: 1 INJECTION INTRAMUSCULAR; INTRAVENOUS at 09:40

## 2024-11-14 RX ADMIN — DEXAMETHASONE SODIUM PHOSPHATE 10 MG: 10 INJECTION INTRAMUSCULAR; INTRAVENOUS at 09:51

## 2024-11-14 RX ADMIN — Medication 200 MCG: at 09:51

## 2024-11-14 RX ADMIN — FENTANYL CITRATE 50 MCG: 50 INJECTION, SOLUTION INTRAMUSCULAR; INTRAVENOUS at 11:55

## 2024-11-14 RX ADMIN — SODIUM CHLORIDE, SODIUM LACTATE, POTASSIUM CHLORIDE, AND CALCIUM CHLORIDE: .6; .31; .03; .02 INJECTION, SOLUTION INTRAVENOUS at 11:18

## 2024-11-14 RX ADMIN — FENTANYL CITRATE 50 MCG: 50 INJECTION INTRAMUSCULAR; INTRAVENOUS at 10:24

## 2024-11-14 RX ADMIN — EPHEDRINE SULFATE 10 MG: 50 INJECTION INTRAVENOUS at 10:00

## 2024-11-14 NOTE — DISCHARGE INSTR - AVS FIRST PAGE
[facility]’s General Surgery [facility]     Post-Operative Care Instructions     Dr. Jamel Vila MD, [facility]     [phone]          1. General: You will feel pulling sensations around the wound or funny aches and pains around the incisions. This is normal. Even minor surgery is a change in your body and this is your body’s way of reacting to it. If you have had abdominal surgery, it may help to support the incision with a small pillow or blanket for comfort when moving or coughing.     2. Wound care:  Okay to shower.  The glue will fall off over the next week or 2.   Use ice for at least the 1st 48 hours.  Do not use for longer than 20 minutes at a time. Use 3 times per day.     3. Water: You may shower over the wounds. Do not bathe or use a pool or hot tub until cleared by the physician.   If you were discharged with a drain, make sure drain site is covered with plastic wrap before showering.      4. Activity: You may go up and down stairs, walk as much as you are comfortable, but walk at least 3 times each day. If you have had abdominal surgery, do not lift anything heavier than 20 pounds for at least 4 weeks.      5. Diet: You may resume a regular diet. If you had a same-day surgery or overnight stay surgery, you may wish to eat lightly for a few days: soups, crackers, and sandwiches. You may resume a regular diet when ready.      6. Medications: Resume all of your previous medications, unless told otherwise by the doctor. Avoid aspirin products for 2-3 days after the date of surgery. You may, at that time, began to take them again. Use Tylenol and Ibuprofen for pain control.  You may alternate these medications every 3 hours.  For example: you may take Tylenol at noon, Ibuprofen at 3:00 p.m., and Tylenol again at 6:00 p.m., etc. You should use ice to assist with pain control as above.  You do not need to take narcotic pain medication unless you are having significant pain.   If you were prescribed a  narcotic pain medication containing Tylenol, such as Percocet or Norco, do not use supplemental Tylenol.      7. Driving: You will need someone to drive you home on the day of surgery or discharge. Do not drive or make any important decisions while on narcotic pain medication or 24 hours and after anesthesia or sedation for surgery. Generally, you may drive when your off all narcotic pain medications and you are comfortable.      8. Upset Stomach: You may take Maalox, Tums, or similar items for an upset stomach. If your narcotic pain medication causes an upset stomach, do not take it on an empty stomach. Try taking it with at least some crackers or toast.      9. Constipation: Patients often experience constipation after surgery. You may take over-the-counter medication for this, such as Metamucil, Senokot, Dulcolax, milk of magnesia, etc. You may take a suppository unless you have had anorectal surgery such as a procedure on your hemorrhoids. If you experience significant nausea or vomiting after abdominal surgery, call the office before trying any of these medications.     10. Call the office: If you are experiencing any of the following: fevers above 101.5°, significant nausea or vomiting, if the wound develops drainage and/or there is excessive redness around the wound, or if you have significant diarrhea or other worsening symptoms.     11. Pain: You may be given a prescription for pain medication.  This will be sent to your pharmacy prior to discharge.     12. Sexual Activity: You may resume sexual activity when you feel ready and comfortable and your incision is sealed and healed without apparent infection risk.     13. Urination: If you have not urinated in 6 hours, go directly to the ER for evaluation for urinary retention.      14. Follow-up in 2 weeks.          **READ ONLY IF YOU HAVE BEEN DISCHARGED WITH A URINARY CATHETER**    Koo Insertion for Post-Op Urinary Retention        - A prescription for  Flomax will be sent to your pharmacy.  This should be taken daily while the urinary catheter remains in place.    You will not be given a prescription for Flomax if your prostate has been removed.  If you are already taking Flomax, continue the medication as prescribed.     - We will send a message to the urology group who will contact you within the next 48 hours with further instructions and to schedule an appointment for voiding trial and catheter removal.  The urinary catheter will remain in place for approximately 1 week.  If you are not contacted within the next 48 hours please call our office to assist with scheduling your follow-up.     - If you have your own urologist, you should contact your physician the day after discharge for instructions and to schedule a voiding trial and catheter removal.

## 2024-11-14 NOTE — ANESTHESIA POSTPROCEDURE EVALUATION
Post-Op Assessment Note    CV Status:  Stable  Pain Score: 3    Pain management: adequate       Mental Status:  Alert and awake   Hydration Status:  Euvolemic   PONV Controlled:  Controlled   Airway Patency:  Patent  Two or more mitigation strategies used for obstructive sleep apnea   Post Op Vitals Reviewed: Yes    No anethesia notable event occurred.    Staff: Anesthesiologist           Last Filed PACU Vitals:  Vitals Value Taken Time   Temp 98 °F (36.7 °C) 11/14/24 1210   Pulse 92 11/14/24 1220   /74 11/14/24 1210   Resp 16 11/14/24 1210   SpO2 93 % 11/14/24 1220   Vitals shown include unfiled device data.    Modified Diane:  Activity: 2 (11/14/2024 12:10 PM)  Respiration: 2 (11/14/2024 12:10 PM)  Circulation: 2 (11/14/2024 12:10 PM)  Consciousness: 2 (11/14/2024 12:10 PM)  Oxygen Saturation: 2 (11/14/2024 12:10 PM)  Modified Diane Score: 10 (11/14/2024 12:10 PM)

## 2024-11-14 NOTE — ANESTHESIA PREPROCEDURE EVALUATION
Procedure:  ROBOTIC REPAIR HERNIA INGUINAL (Bilateral: Groin)    Relevant Problems   ANESTHESIA (within normal limits)      CARDIO   (+) Hyperlipidemia   (+) Hypertension      ENDO   (+) Type 2 diabetes mellitus without complication, without long-term current use of insulin (HCC)      GI/HEPATIC   (+) Gastroesophageal reflux disease      /RENAL   (+) Benign prostatic hyperplasia      MUSCULOSKELETAL   (+) Chronic bilateral low back pain with bilateral sciatica      NEURO/PSYCH   (+) Chronic bilateral low back pain with bilateral sciatica   (+) Chronic pain syndrome   (+) Depression, recurrent (HCC)   (+) Diabetic polyneuropathy associated with type 2 diabetes mellitus (HCC)      PULMONARY   (+) Chronic bronchitis (HCC)      Behavioral Health   (+) Tobacco abuse      Respiratory/Allergy   (+) Multiple lung nodules on CT        Physical Exam    Airway    Mallampati score: III  TM Distance: >3 FB  Neck ROM: full     Dental   No notable dental hx     Cardiovascular  Rhythm: regular, Rate: normal, Cardiovascular exam normal    Pulmonary   Wheezes    Other Findings        Anesthesia Plan  ASA Score- 3     Anesthesia Type- general with ASA Monitors.         Additional Monitors:     Airway Plan: ETT.           Plan Factors-Exercise tolerance (METS): >4 METS.    Chart reviewed. EKG reviewed.  Existing labs reviewed. Patient summary reviewed.    Patient is a current smoker.  Patient instructed to abstain from smoking on day of procedure. Patient smoked on day of surgery.            Induction- intravenous.    Postoperative Plan-         Informed Consent- Anesthetic plan and risks discussed with patient.

## 2024-11-14 NOTE — OP NOTE
OPERATIVE REPORT  PATIENT NAME: Fernando Fox    :  1965  MRN: [id number]  Pt Location: [facility] OR ROOM 08    SURGERY DATE: 2024    Surgeons and Role:     * Jamel Vila MD - Primary     * Greta Sinclair PA-C - Assisting    Preop Diagnosis:  Non-recurrent bilateral inguinal hernia without obstruction or gangrene [K40.20]    Post-Op Diagnosis Codes:     * Non-recurrent bilateral inguinal hernia without obstruction or gangrene [K40.20]    Procedure(s):  Bilateral - ROBOTIC REPAIR HERNIA INGUINAL    Specimen(s):  ID Type Source Tests Collected by Time Destination   1 : Cord lipoma Left Tissue Soft Tissue, Lipoma TISSUE EXAM Jamel Vila MD 2024 1113        Estimated Blood Loss:   Minimal    Drains:  Urethral Catheter Non-latex 16 Fr. (Active)   Number of days: 0       Anesthesia Type:   General    Operative Indications:  Non-recurrent bilateral inguinal hernia without obstruction or gangrene [K40.20]      Operative Findings:  Bilateral indirect inguinal hernias with large cord lipomas.      Complications:   None    Procedure and Technique:  The patient was seen again in the Holding Room. The risks, benefits, complications, treatment options, and expected outcomes were discussed with the patient. The possibilities of reaction to medication, pulmonary aspiration, perforation of viscus, bleeding, postoperative short or long term nerve entrapment causing pain,recurrent infection, the need for additional procedures, and development of a complication requiring transfusion or further operation were discussed with the patient and/or family. There was concurrence with the proposed plan, and informed consent was obtained. The site of surgery was properly noted/marked. The patient was taken to the Operating Room, identified as Fernando Fox and the procedure verified as hernia repair. A Time Out was held and the above information confirmed.    The patient was prepped and draped in a sterile fashion.  A  timeout was again performed.  Local anesthesia was used in the incision. An periumbilical incision was made.  Dissection carried out to the fascia which was grasped with Kocher's and elevated. The fascia was incised an 8 mm trocar was placed in under direct visualization. The abdomen was inflated and the camera was placed in.. Two8 mm trocars were placed lateral to rectus muscle approximately at the level of the umbilicus.  At this point the patient was placed into Trendelenburg position and the robot was docked and the instruments were placed in.  The patient was noted to have bilateral inguinal hernia .   The peritoneum was incised from the medial umbilical fold out laterally past the internal ring on the left.  Next the direct space was mobilized by exposing Charles's ligament all the way along its length to the pubic tubercle. If a direct hernia defect was seen this was dissected and reduced. If there was indirect hernia sac this was carefully mobilized off the cord structures with care to avoid injury to the gonadal vessels or spermatic cord. The remainder of the inferior flap was created. At this point  Bard 3D max mesh was selected and placed into the preperitoneal space. The mesh was deployed and placed in the appropriate position.  The edge of the peritoneum was well below the edge of the mesh.  The mesh secured with a 2-0 Vicryl suture at Charles's.  The peritoneum was then sutured closed with the V lock suture.      The peritoneum was incised from the medial umbilical fold out laterally past the internal ring on the right. Next the direct space was mobilized by exposing Charles's ligament all the way along its length to the pubic tubercle. If a direct hernia defect was seen this was dissected and reduced. If there was indirect hernia sac this was carefully mobilized off the cord structures with care to avoid injury to the gonadal vessels or spermatic cord. The remainder of the inferior flap was created. At  this point  Bard 3D max mesh was selected and placed into the preperitoneal space. The mesh was deployed and placed in the appropriate position.  The edge of the peritoneum was well below the edge of the mesh.  The mesh was secured with 2-0 Vicryl suture at Charles's.  The peritoneum was then sutured closed with the V lock suture.     Now the repair was complete the robot was undocked. The umbilical trocor site was closed with an  0-vicryl using a suture Passer  The wound was closed in multiple layers using 3-0 Vicryl sutures and the skin closed using a 4-0 Monocryl subcuticular stitch. The wound was dressed.  The patient was anatomically correct at the end of the procedure.  The patient tolerated the procedure in good condition.    Instrument, sponge, and needle counts were correct prior to closure and at the conclusion of the case.    The PA was essential for assistance with abdominal access and docking the robot as well as retraction and suturing.      This text is generated with voice recognition software. There may be translation, syntax,  or grammatical errors. If you have any questions, please contact the dictating provider.     I was present for the entire procedure., A qualified resident physician was not available., and A physician assistant was required during the procedure for retraction, tissue handling, dissection and suturing.    Patient Disposition:  PACU              SIGNATURE: Jamel Vila MD  DATE: November 14, 2024  TIME: 11:16 AM

## 2024-11-14 NOTE — H&P
History & Physical    Fernando Fox    59 y.o.  male  [id number]  Surgeon:Jamel Vila MD  Date: November 14, 2024    Assessment:  Patient Active Problem List   Diagnosis    Benign prostatic hyperplasia    Gastroesophageal reflux disease    History of diverticulitis    History of renal calculi    History of hemicolectomy    Hypertension    Hyperlipidemia    Left shoulder pain    Neck pain    Posttraumatic stiffness of shoulder joint    Intrinsic eczema    Allergic rhinitis    Decreased hearing of left ear    Chronic pain of left knee    Multiple lung nodules on CT    Type 2 diabetes mellitus without complication, without long-term current use of insulin (HCC)    Chronic pain syndrome    Diabetic polyneuropathy associated with type 2 diabetes mellitus (HCC)    Depression, recurrent (HCC)    Epidermal cyst    Tobacco abuse    Chronic bronchitis (HCC)    Vertigo    Chronic bilateral low back pain with bilateral sciatica    Balanitis    Seizure-like activity (HCC)    Syncope and collapse    Left inguinal pain    Acute pain of right shoulder    Non-recurrent bilateral inguinal hernia without obstruction or gangrene     Plan:  Fernando Fox is scheduled for robotic bilateral inguinal hernia repair with mesh.    HPI    Historical Information   Past Medical History:   Diagnosis Date    Ambulates with cane     Diabetes mellitus (HCC)     Diverticulitis     Fall     10/2024    Hypertension     Kidney stone     Neck pain     Renal disorder      Past Surgical History:   Procedure Laterality Date    COLON SURGERY      COLONOSCOPY      NECK SURGERY      denies     Social History   Social History     Substance and Sexual Activity   Alcohol Use Yes    Comment: occ     Social History     Substance and Sexual Activity   Drug Use Yes    Types: Marijuana    Comment: occasional- smoke. last used 11/11     Social History     Tobacco Use   Smoking Status Every Day    Current packs/day: 0.25    Average packs/day: 0.3 packs/day for  50.0 years (12.5 ttl pk-yrs)    Types: Cigarettes, Cigars    Start date: 11/14/1974    Passive exposure: Never   Smokeless Tobacco Never   Tobacco Comments    1 cigar/daily stopped few days    Last smoked 11/14 half cig..     History reviewed. No pertinent family history.     Meds/Allergies   No Known Allergies    Current Facility-Administered Medications:     ceFAZolin (ANCEF) IVPB (premix in dextrose) 2,000 mg 50 mL, 2,000 mg, Intravenous, Once, Jmael Vila MD    lactated ringers infusion, 125 mL/hr, Intravenous, Continuous, Jamel Vila MD    sodium chloride 0.9 % infusion, 125 mL/hr, Intravenous, Continuous, Tha Louie DO, Last Rate: 125 mL/hr at 11/14/24 0845, 125 mL/hr at 11/14/24 0845    Facility-Administered Medications Ordered in Other Encounters:     lactated ringers infusion, , Intravenous, Continuous PRN, Tonya Nunez, CRNA, New Bag at 11/14/24 0901    Review of Systems    Vitals:    11/14/24 0834   BP: 134/85   Pulse: 101   Resp: 16   Temp: (!) 97.2 °F (36.2 °C)   SpO2: 97%     Physical Exam  GEN: NAD, A+OX3   HEENT: Normocephalic, atraumatic,   NECK: Supple, trachea midline,   CARDIAC: regular rate & rhythm, S1 & S2 normal.   LUNGS: Clear to auscultation, No Wheeze, Rales, or Rhonchi  ABDOMEN: Bilateral inguinal hernia  EXTREMITIES: No evidence of cyanosis, clubbing or edema. Pulses +2 B/L LE  NEURO: CN II-XII intact grossly, No sensory or motor deficits    Lab Results:   Imaging:   EKG, Pathology, and Other Studies:   Lab Results   Component Value Date    GLUCOSE 143 (H) 05/17/2018    CALCIUM 9.0 10/28/2024     05/17/2018    K 3.6 10/28/2024    CO2 28 10/28/2024     10/28/2024    BUN 13 10/28/2024    CREATININE 0.87 10/28/2024     Lab Results   Component Value Date    WBC 8.53 10/28/2024    HGB 15.8 10/28/2024    HCT 47.7 10/28/2024    MCV 95 10/28/2024     10/28/2024     Lab Results   Component Value Date    ALT 28 08/21/2024    AST 22 08/21/2024    ALKPHOS  100 08/21/2024    BILITOT 0.7 05/15/2018

## 2024-11-14 NOTE — ANESTHESIA POSTPROCEDURE EVALUATION
Post-Op Assessment Note    CV Status:  Stable  Pain Score: 5    Pain management: adequate       Mental Status:  Alert, awake and agitated   Hydration Status:  Euvolemic   PONV Controlled:  Controlled   Airway Patency:  Patent  Two or more mitigation strategies used for obstructive sleep apnea   Post Op Vitals Reviewed: Yes    No anethesia notable event occurred.    Staff: CRNA, Anesthesiologist           Last Filed PACU Vitals:  Vitals Value Taken Time   Temp 97    Pulse 100    BP 98/68    Resp 50    SpO2 94 % 11/14/24 1140   Vitals shown include unfiled device data.    Modified Diane:  No data recorded

## 2024-11-20 PROCEDURE — 88304 TISSUE EXAM BY PATHOLOGIST: CPT | Performed by: STUDENT IN AN ORGANIZED HEALTH CARE EDUCATION/TRAINING PROGRAM

## 2024-12-06 ENCOUNTER — OFFICE VISIT (OUTPATIENT)
Dept: FAMILY MEDICINE CLINIC | Facility: CLINIC | Age: 59
End: 2024-12-06

## 2024-12-06 VITALS
BODY MASS INDEX: 24.12 KG/M2 | DIASTOLIC BLOOD PRESSURE: 70 MMHG | SYSTOLIC BLOOD PRESSURE: 140 MMHG | OXYGEN SATURATION: 97 % | TEMPERATURE: 98.6 F | HEIGHT: 73 IN | HEART RATE: 81 BPM | WEIGHT: 182 LBS | RESPIRATION RATE: 18 BRPM

## 2024-12-06 DIAGNOSIS — K40.20 NON-RECURRENT BILATERAL INGUINAL HERNIA WITHOUT OBSTRUCTION OR GANGRENE: Primary | Chronic | ICD-10-CM

## 2024-12-06 PROCEDURE — G2211 COMPLEX E/M VISIT ADD ON: HCPCS | Performed by: PHYSICIAN ASSISTANT

## 2024-12-06 PROCEDURE — 99213 OFFICE O/P EST LOW 20 MIN: CPT | Performed by: PHYSICIAN ASSISTANT

## 2024-12-06 NOTE — PROGRESS NOTES
Name: Fernando Fox      : 1965      MRN: [id number]  Encounter Provider: Juliana Martino PA-C  Encounter Date: 2024   Encounter department: [facility]  :  Assessment & Plan  Non-recurrent bilateral inguinal hernia without obstruction or gangrene  - S/p robotic bilateral inguinal hernia repair on 2024.  - Doing well.  Continue with lifting restrictions as directed by general surgery.  - Continue follow-up with general surgery as scheduled.                History of Present Illness     Patient is a 59 y.o. male whom  has a past medical history of Ambulates with cane, Diabetes mellitus (HCC), Diverticulitis, Fall, Hypertension, Kidney stone, Neck pain, and Renal disorder. who is seen today in office for post surgery follow up.    -Patient underwent robotic bilateral inguinal hernia repair on 2024.  Patient reports he has been doing well since surgery.  Patient notes he still has some left-sided groin pain, but it has been manageable.  Patient notes he has been taking the pain medication at night which helps him to sleep.  Patient denies any constipation, nausea, vomiting, diarrhea.    Review of Systems   Constitutional:  Negative for activity change, appetite change and fatigue.   HENT:  Negative for congestion and trouble swallowing.    Eyes:  Negative for photophobia and visual disturbance.   Respiratory:  Negative for chest tightness and wheezing.    Cardiovascular:  Negative for palpitations and leg swelling.   Gastrointestinal:  Negative for abdominal pain.   Genitourinary:  Negative for difficulty urinating and dysuria.   Musculoskeletal:  Positive for arthralgias.   Skin:  Negative for pallor and wound.   Neurological:  Negative for dizziness, seizures, syncope and weakness.   Psychiatric/Behavioral:  Negative for self-injury, sleep disturbance and suicidal ideas. The patient is not nervous/anxious.      Pertinent Medical History     Medical History  Reviewed by provider this encounter:  Tobacco  Allergies  Meds  Problems  Med Hx  Surg Hx  Fam Hx     .  Past Medical History   Past Medical History:   Diagnosis Date    Ambulates with cane     Diabetes mellitus (HCC)     Diverticulitis     Fall     10/2024    Hypertension     Kidney stone     Neck pain     Renal disorder      Past Surgical History:   Procedure Laterality Date    COLON SURGERY      COLONOSCOPY      NECK SURGERY      denies    [place] LAPAROSCOPY SURG RPR INITIAL INGUINAL HERNIA Bilateral 11/14/2024    Procedure: ROBOTIC REPAIR HERNIA INGUINAL;  Surgeon: Jamel Vila MD;  Location: [facility];  Service: General     History reviewed. No pertinent family history.   reports that he has been smoking cigarettes and cigars. He started smoking about 50 years ago. He has a 12.5 pack-year smoking history. He has never been exposed to tobacco smoke. He has never used smokeless tobacco. He reports current alcohol use. He reports current drug use. Drug: Marijuana.  Current Outpatient Medications on File Prior to Visit   Medication Sig Dispense Refill    acetaminophen (TYLENOL) 500 mg tablet Take 1 tablet (500 mg total) by mouth every 6 (six) hours as needed for mild pain 30 tablet 0    albuterol (PROVENTIL HFA,VENTOLIN HFA) 90 mcg/act inhaler Inhale 1-2 puffs every 6 (six) hours as needed for wheezing 18 g 1    ARIPiprazole (ABILIFY) 10 mg tablet Take 10 mg by mouth daily at bedtime      aspirin (ECOTRIN LOW STRENGTH) 81 mg EC tablet Take 1 tablet (81 mg total) by mouth daily 90 tablet 1    atorvastatin (LIPITOR) 40 mg tablet Take 1 tablet (40 mg total) by mouth daily 90 tablet 1    buPROPion (WELLBUTRIN SR) 200 MG 12 hr tablet Take 200 mg by mouth every morning      cloNIDine (CATAPRES) 0.2 mg tablet Take 0.2 mg by mouth daily at bedtime      Continuous Glucose  (FreeStyle Shira 2 [place]) SHANELL Scan every 8 hours. E11.9 1 each 0    Continuous Glucose Sensor (FreeStyle Shira 2 Sensor) MISC Scan every  8 hours. E11.9 2 each 5    Cyanocobalamin (VITAMIN B 12 PO) Take by mouth      doxepin (SINEquan) 150 MG capsule Take 150 mg by mouth daily at bedtime      fluticasone (FLONASE) 50 mcg/act nasal spray 1 spray into each nostril daily 16 g 2    gabapentin (NEURONTIN) 800 mg tablet Take 1 tablet (800 mg total) by mouth 3 (three) times a day 90 tablet 2    glucose blood (FREESTYLE LITE) test strip Testing sugars once a day 100 each 3    hydrocortisone 1 % ointment Apply topically 2 (two) times a day 30 g 0    hydrOXYzine HCL (ATARAX) 50 mg tablet Take 50 mg by mouth daily at bedtime      ibuprofen (MOTRIN) 200 mg tablet Take by mouth every 6 (six) hours as needed for mild pain      Lancets (freestyle) lancets Daily 100 each 5    lidocaine (XYLOCAINE) 2 % topical gel Apply topically as needed for mild pain 30 mL 2    pantoprazole (PROTONIX) 40 mg tablet Take 1 tablet (40 mg total) by mouth daily 90 tablet 1    Restasis 0.05 % ophthalmic emulsion Administer 1 drop to both eyes daily as needed      tiZANidine (ZANAFLEX) 4 mg tablet Take 2 tablets (8 mg total) by mouth every 8 (eight) hours as needed for muscle spasms 60 tablet 3    zolpidem (AMBIEN) 10 mg tablet Take 10 mg by mouth daily at bedtime       No current facility-administered medications on file prior to visit.   No Known Allergies   Current Outpatient Medications on File Prior to Visit   Medication Sig Dispense Refill    acetaminophen (TYLENOL) 500 mg tablet Take 1 tablet (500 mg total) by mouth every 6 (six) hours as needed for mild pain 30 tablet 0    albuterol (PROVENTIL HFA,VENTOLIN HFA) 90 mcg/act inhaler Inhale 1-2 puffs every 6 (six) hours as needed for wheezing 18 g 1    ARIPiprazole (ABILIFY) 10 mg tablet Take 10 mg by mouth daily at bedtime      aspirin (ECOTRIN LOW STRENGTH) 81 mg EC tablet Take 1 tablet (81 mg total) by mouth daily 90 tablet 1    atorvastatin (LIPITOR) 40 mg tablet Take 1 tablet (40 mg total) by mouth daily 90 tablet 1    buPROPion  (WELLBUTRIN SR) 200 MG 12 hr tablet Take 200 mg by mouth every morning      cloNIDine (CATAPRES) 0.2 mg tablet Take 0.2 mg by mouth daily at bedtime      Continuous Glucose  (FreeStyle Shira 2 [place]) SHANELL Scan every 8 hours. E11.9 1 each 0    Continuous Glucose Sensor (FreeStyle Shira 2 Sensor) MISC Scan every 8 hours. E11.9 2 each 5    Cyanocobalamin (VITAMIN B 12 PO) Take by mouth      doxepin (SINEquan) 150 MG capsule Take 150 mg by mouth daily at bedtime      fluticasone (FLONASE) 50 mcg/act nasal spray 1 spray into each nostril daily 16 g 2    gabapentin (NEURONTIN) 800 mg tablet Take 1 tablet (800 mg total) by mouth 3 (three) times a day 90 tablet 2    glucose blood (FREESTYLE LITE) test strip Testing sugars once a day 100 each 3    hydrocortisone 1 % ointment Apply topically 2 (two) times a day 30 g 0    hydrOXYzine HCL (ATARAX) 50 mg tablet Take 50 mg by mouth daily at bedtime      ibuprofen (MOTRIN) 200 mg tablet Take by mouth every 6 (six) hours as needed for mild pain      Lancets (freestyle) lancets Daily 100 each 5    lidocaine (XYLOCAINE) 2 % topical gel Apply topically as needed for mild pain 30 mL 2    pantoprazole (PROTONIX) 40 mg tablet Take 1 tablet (40 mg total) by mouth daily 90 tablet 1    Restasis 0.05 % ophthalmic emulsion Administer 1 drop to both eyes daily as needed      tiZANidine (ZANAFLEX) 4 mg tablet Take 2 tablets (8 mg total) by mouth every 8 (eight) hours as needed for muscle spasms 60 tablet 3    zolpidem (AMBIEN) 10 mg tablet Take 10 mg by mouth daily at bedtime       No current facility-administered medications on file prior to visit.      Social History     Tobacco Use    Smoking status: Every Day     Current packs/day: 0.25     Average packs/day: 0.3 packs/day for 50.1 years (12.5 ttl pk-yrs)     Types: Cigarettes, Cigars     Start date: 11/14/1974     Passive exposure: Never    Smokeless tobacco: Never    Tobacco comments:     1 cigar/daily stopped few days     Last  "smoked 11/14 half cig..   Vaping Use    Vaping status: Never Used   Substance and Sexual Activity    Alcohol use: Yes     Comment: occ    Drug use: Yes     Types: Marijuana     Comment: occasional- smoke. last used 11/11    Sexual activity: Yes     Partners: Female        Objective   /70 (BP Location: Right arm, Patient Position: Sitting, Cuff Size: Large)   Pulse 81   Temp 98.6 °F (37 °C) (Temporal)   Resp 18   Ht 6' 1\" (1.854 m)   Wt 82.6 kg (182 lb)   SpO2 97%   BMI 24.01 kg/m²      Physical Exam  Vitals and nursing note reviewed.   Constitutional:       General: He is not in acute distress.     Appearance: Normal appearance. He is well-developed.   HENT:      Head: Normocephalic and atraumatic.      Right Ear: External ear normal.      Left Ear: External ear normal.      Nose: Nose normal.      Mouth/Throat:      Pharynx: Uvula midline.   Eyes:      Conjunctiva/sclera: Conjunctivae normal.   Cardiovascular:      Rate and Rhythm: Normal rate and regular rhythm.      Pulses: Normal pulses.      Heart sounds: Normal heart sounds. No murmur heard.  Pulmonary:      Effort: Pulmonary effort is normal. No respiratory distress.      Breath sounds: Normal breath sounds. No wheezing.   Abdominal:      General: Bowel sounds are normal.      Palpations: Abdomen is soft.      Tenderness: There is no abdominal tenderness.      Comments: Abdominal incisions clean, dry, intact.   Musculoskeletal:      Cervical back: Normal range of motion and neck supple.   Skin:     General: Skin is warm and dry.      Capillary Refill: Capillary refill takes less than 2 seconds.   Neurological:      Mental Status: He is alert and oriented to person, place, and time.   Psychiatric:         Speech: Speech normal.         Behavior: Behavior normal.         "

## 2024-12-09 ENCOUNTER — OFFICE VISIT (OUTPATIENT)
Dept: SURGERY | Facility: CLINIC | Age: 59
End: 2024-12-09

## 2024-12-09 VITALS
SYSTOLIC BLOOD PRESSURE: 126 MMHG | WEIGHT: 177 LBS | RESPIRATION RATE: 16 BRPM | DIASTOLIC BLOOD PRESSURE: 74 MMHG | TEMPERATURE: 98 F | BODY MASS INDEX: 23.46 KG/M2 | HEART RATE: 81 BPM | OXYGEN SATURATION: 98 % | HEIGHT: 73 IN

## 2024-12-09 DIAGNOSIS — Z98.890 S/P BILATERAL INGUINAL HERNIA REPAIR: Primary | ICD-10-CM

## 2024-12-09 DIAGNOSIS — Z87.19 S/P BILATERAL INGUINAL HERNIA REPAIR: Primary | ICD-10-CM

## 2024-12-09 PROCEDURE — 99024 POSTOP FOLLOW-UP VISIT: CPT | Performed by: PHYSICIAN ASSISTANT

## 2024-12-09 NOTE — PROGRESS NOTES
Assessment/Plan:   Fernando Fox is a 59 y.o.male who comes in today for postoperative check after recent B/L robotic inguinal hernia repair with Dr. Vila on 11/14/2024. Overall patient is doing well. No constipation or diarrhea. Complains of left sided groin pain when only leaning forward while sitting and standing. Stated he did have some pain on the left side prior to surgery. Slightly tender on palpation of left groin. No noted hernia reoccurrence bilaterally. Explained to patient pain could stem from normal hernia recovery to hip pain and if he continues to have pain in that area within the next few months to give us a call back for further evaluation.     Patient is pleased with surgical results.     Pathology: Reviewed with patient, all questions answered.     Postoperative restrictions reviewed, including specific lifting and exercise restrictions. All questions answered.     ___________________________________________________  HPI:  Fernando Fox is a 59 y.o.male who comes in today for postoperative check after recent B/L robotic inguinal hernia repair with Dr. Vila on 11/14/2024.     Currently doing well, no fever or chills,no nausea and no vomiting. Denies chest pain and troubles breathing. Normal elimination.    ROS:  General ROS: negative for - chills, fatigue, fever or night sweats, weight loss  Respiratory ROS: no cough, shortness of breath, or wheezing  Cardiovascular ROS: no chest pain or dyspnea on exertion  Genito-Urinary ROS: no dysuria, trouble voiding, or hematuria  Musculoskeletal ROS: negative for - gait disturbance, joint pain or muscle pain  Neurological ROS: no TIA or stroke symptoms    GI ROS: see HPI  Skin ROS: no new rashes or lesions   Lymphatic ROS: no new adenopathy noted by pt.   GYN ROS: see HPI, no new GYN history or bleeding noted  Psy ROS: no new mental or behavioral disturbances     Patient Active Problem List   Diagnosis    Benign prostatic hyperplasia     Gastroesophageal reflux disease    History of diverticulitis    History of renal calculi    History of hemicolectomy    Hypertension    Hyperlipidemia    Left shoulder pain    Neck pain    Posttraumatic stiffness of shoulder joint    Intrinsic eczema    Allergic rhinitis    Decreased hearing of left ear    Chronic pain of left knee    Multiple lung nodules on CT    Type 2 diabetes mellitus without complication, without long-term current use of insulin (HCC)    Chronic pain syndrome    Diabetic polyneuropathy associated with type 2 diabetes mellitus (HCC)    Depression, recurrent (HCC)    Epidermal cyst    Tobacco abuse    Chronic bronchitis (HCC)    Vertigo    Chronic bilateral low back pain with bilateral sciatica    Balanitis    Seizure-like activity (HCC)    Syncope and collapse    Left inguinal pain    Acute pain of right shoulder    Non-recurrent bilateral inguinal hernia without obstruction or gangrene         Allergies:   Patient has no known allergies.      Current Outpatient Medications:     acetaminophen (TYLENOL) 500 mg tablet, Take 1 tablet (500 mg total) by mouth every 6 (six) hours as needed for mild pain, Disp: 30 tablet, Rfl: 0    albuterol (PROVENTIL HFA,VENTOLIN HFA) 90 mcg/act inhaler, Inhale 1-2 puffs every 6 (six) hours as needed for wheezing, Disp: 18 g, Rfl: 1    ARIPiprazole (ABILIFY) 10 mg tablet, Take 10 mg by mouth daily at bedtime, Disp: , Rfl:     aspirin (ECOTRIN LOW STRENGTH) 81 mg EC tablet, Take 1 tablet (81 mg total) by mouth daily, Disp: 90 tablet, Rfl: 1    atorvastatin (LIPITOR) 40 mg tablet, Take 1 tablet (40 mg total) by mouth daily, Disp: 90 tablet, Rfl: 1    buPROPion (WELLBUTRIN SR) 200 MG 12 hr tablet, Take 200 mg by mouth every morning, Disp: , Rfl:     cloNIDine (CATAPRES) 0.2 mg tablet, Take 0.2 mg by mouth daily at bedtime, Disp: , Rfl:     Continuous Glucose  (FreeStyle Shira 2 [place]) SHANELL, Scan every 8 hours. E11.9, Disp: 1 each, Rfl: 0    Continuous Glucose  Sensor (FreeStyle Shira 2 Sensor) MISC, Scan every 8 hours. E11.9, Disp: 2 each, Rfl: 5    Cyanocobalamin (VITAMIN B 12 PO), Take by mouth, Disp: , Rfl:     doxepin (SINEquan) 150 MG capsule, Take 150 mg by mouth daily at bedtime, Disp: , Rfl:     fluticasone (FLONASE) 50 mcg/act nasal spray, 1 spray into each nostril daily, Disp: 16 g, Rfl: 2    gabapentin (NEURONTIN) 800 mg tablet, Take 1 tablet (800 mg total) by mouth 3 (three) times a day, Disp: 90 tablet, Rfl: 2    glucose blood (FREESTYLE LITE) test strip, Testing sugars once a day, Disp: 100 each, Rfl: 3    hydrocortisone 1 % ointment, Apply topically 2 (two) times a day, Disp: 30 g, Rfl: 0    hydrOXYzine HCL (ATARAX) 50 mg tablet, Take 50 mg by mouth daily at bedtime, Disp: , Rfl:     ibuprofen (MOTRIN) 200 mg tablet, Take by mouth every 6 (six) hours as needed for mild pain, Disp: , Rfl:     Lancets (freestyle) lancets, Daily, Disp: 100 each, Rfl: 5    lidocaine (XYLOCAINE) 2 % topical gel, Apply topically as needed for mild pain, Disp: 30 mL, Rfl: 2    pantoprazole (PROTONIX) 40 mg tablet, Take 1 tablet (40 mg total) by mouth daily, Disp: 90 tablet, Rfl: 1    Restasis 0.05 % ophthalmic emulsion, Administer 1 drop to both eyes daily as needed, Disp: , Rfl:     tiZANidine (ZANAFLEX) 4 mg tablet, Take 2 tablets (8 mg total) by mouth every 8 (eight) hours as needed for muscle spasms, Disp: 60 tablet, Rfl: 3    zolpidem (AMBIEN) 10 mg tablet, Take 10 mg by mouth daily at bedtime, Disp: , Rfl:     Past Medical History:   Diagnosis Date    Ambulates with cane     Diabetes mellitus (HCC)     Diverticulitis     Fall     10/2024    Hypertension     Kidney stone     Neck pain     Renal disorder        Past Surgical History:   Procedure Laterality Date    COLON SURGERY      COLONOSCOPY      NECK SURGERY      denies    [place] LAPAROSCOPY SURG RPR INITIAL INGUINAL HERNIA Bilateral 11/14/2024    Procedure: ROBOTIC REPAIR HERNIA INGUINAL;  Surgeon: Jamel Vila MD;   Location: [facility] OR;  Service: General       History reviewed. No pertinent family history.     reports that he has been smoking cigarettes and cigars. He started smoking about 50 years ago. He has a 12.5 pack-year smoking history. He has never been exposed to tobacco smoke. He has never used smokeless tobacco. He reports current alcohol use. He reports current drug use. Drug: Marijuana.    Vitals:    12/09/24 1120   BP: 126/74   Pulse: 81   Resp: 16   Temp: 98 °F (36.7 °C)   SpO2: 98%        PHYSICAL EXAM  General: normal, cooperative, no distress  Abdominal: soft, nondistended. Slight tenderness to palpation of left groin. No hernia reoccurrence noted bilaterally.   Incision: clean, dry, and intact and healing well    Nabila Armas PA-C    Date: 12/9/2024 Time: 11:59 AM

## 2024-12-09 NOTE — ASSESSMENT & PLAN NOTE
- S/p robotic bilateral inguinal hernia repair on 11/14/2024.  - Doing well.  Continue with lifting restrictions as directed by general surgery.  - Continue follow-up with general surgery as scheduled.

## 2025-01-09 ENCOUNTER — VBI (OUTPATIENT)
Dept: ADMINISTRATIVE | Facility: OTHER | Age: 60
End: 2025-01-09

## 2025-01-09 NOTE — TELEPHONE ENCOUNTER
01/09/25 7:29 AM     Chart reviewed for Hemoglobin A1c was/were submitted to the patient's insurance.     Anselom Collins MA   [facility]

## 2025-01-17 DIAGNOSIS — M54.41 CHRONIC BILATERAL LOW BACK PAIN WITH BILATERAL SCIATICA: ICD-10-CM

## 2025-01-17 DIAGNOSIS — J30.9 ALLERGIC RHINITIS, UNSPECIFIED SEASONALITY, UNSPECIFIED TRIGGER: ICD-10-CM

## 2025-01-17 DIAGNOSIS — E78.2 MIXED HYPERLIPIDEMIA: ICD-10-CM

## 2025-01-17 DIAGNOSIS — L20.84 INTRINSIC ECZEMA: ICD-10-CM

## 2025-01-17 DIAGNOSIS — J42 CHRONIC BRONCHITIS, UNSPECIFIED CHRONIC BRONCHITIS TYPE (HCC): ICD-10-CM

## 2025-01-17 DIAGNOSIS — M54.42 CHRONIC BILATERAL LOW BACK PAIN WITH BILATERAL SCIATICA: ICD-10-CM

## 2025-01-17 DIAGNOSIS — M62.838 TRAPEZIUS MUSCLE SPASM: ICD-10-CM

## 2025-01-17 DIAGNOSIS — K21.9 GASTROESOPHAGEAL REFLUX DISEASE WITHOUT ESOPHAGITIS: ICD-10-CM

## 2025-01-17 DIAGNOSIS — G89.29 CHRONIC BILATERAL LOW BACK PAIN WITH BILATERAL SCIATICA: ICD-10-CM

## 2025-01-17 DIAGNOSIS — E11.9 TYPE 2 DIABETES MELLITUS WITHOUT COMPLICATION, WITHOUT LONG-TERM CURRENT USE OF INSULIN (HCC): ICD-10-CM

## 2025-01-17 RX ORDER — DIAPER,BRIEF,INFANT-TODD,DISP
EACH MISCELLANEOUS 2 TIMES DAILY
Qty: 30 G | Refills: 0 | Status: SHIPPED | OUTPATIENT
Start: 2025-01-17

## 2025-01-17 RX ORDER — ACETAMINOPHEN 500 MG
500 TABLET ORAL EVERY 6 HOURS PRN
Qty: 30 TABLET | Refills: 0 | Status: SHIPPED | OUTPATIENT
Start: 2025-01-17

## 2025-01-17 RX ORDER — PANTOPRAZOLE SODIUM 40 MG/1
40 TABLET, DELAYED RELEASE ORAL DAILY
Qty: 90 TABLET | Refills: 1 | Status: SHIPPED | OUTPATIENT
Start: 2025-01-17

## 2025-01-17 RX ORDER — ALBUTEROL SULFATE 90 UG/1
1-2 INHALANT RESPIRATORY (INHALATION) EVERY 6 HOURS PRN
Qty: 18 G | Refills: 1 | Status: SHIPPED | OUTPATIENT
Start: 2025-01-17

## 2025-01-17 RX ORDER — BLOOD-GLUCOSE METER
KIT MISCELLANEOUS
Qty: 100 EACH | Refills: 3 | Status: SHIPPED | OUTPATIENT
Start: 2025-01-17

## 2025-01-17 RX ORDER — ASPIRIN 81 MG/1
81 TABLET ORAL DAILY
Qty: 90 TABLET | Refills: 1 | Status: SHIPPED | OUTPATIENT
Start: 2025-01-17

## 2025-01-17 RX ORDER — LANCETS 28 GAUGE
EACH MISCELLANEOUS
Qty: 100 EACH | Refills: 5 | Status: SHIPPED | OUTPATIENT
Start: 2025-01-17

## 2025-01-17 RX ORDER — GABAPENTIN 800 MG/1
800 TABLET ORAL 3 TIMES DAILY
Qty: 90 TABLET | Refills: 2 | Status: SHIPPED | OUTPATIENT
Start: 2025-01-17

## 2025-01-17 RX ORDER — LIDOCAINE HYDROCHLORIDE 20 MG/ML
JELLY TOPICAL AS NEEDED
Qty: 30 ML | Refills: 2 | Status: SHIPPED | OUTPATIENT
Start: 2025-01-17

## 2025-01-17 RX ORDER — ATORVASTATIN CALCIUM 40 MG/1
40 TABLET, FILM COATED ORAL DAILY
Qty: 90 TABLET | Refills: 1 | Status: SHIPPED | OUTPATIENT
Start: 2025-01-17

## 2025-01-17 RX ORDER — FLUTICASONE PROPIONATE 50 MCG
1 SPRAY, SUSPENSION (ML) NASAL DAILY
Qty: 16 G | Refills: 2 | Status: SHIPPED | OUTPATIENT
Start: 2025-01-17

## 2025-01-24 ENCOUNTER — OFFICE VISIT (OUTPATIENT)
Dept: FAMILY MEDICINE CLINIC | Facility: CLINIC | Age: 60
End: 2025-01-24

## 2025-01-24 VITALS
OXYGEN SATURATION: 94 % | HEART RATE: 80 BPM | WEIGHT: 180.8 LBS | HEIGHT: 73 IN | DIASTOLIC BLOOD PRESSURE: 60 MMHG | SYSTOLIC BLOOD PRESSURE: 120 MMHG | RESPIRATION RATE: 16 BRPM | BODY MASS INDEX: 23.96 KG/M2 | TEMPERATURE: 96.7 F

## 2025-01-24 DIAGNOSIS — E11.9 TYPE 2 DIABETES MELLITUS WITHOUT COMPLICATION, WITHOUT LONG-TERM CURRENT USE OF INSULIN (HCC): Primary | ICD-10-CM

## 2025-01-24 DIAGNOSIS — F33.9 DEPRESSION, RECURRENT (HCC): Chronic | ICD-10-CM

## 2025-01-24 DIAGNOSIS — K21.9 GASTROESOPHAGEAL REFLUX DISEASE WITHOUT ESOPHAGITIS: Chronic | ICD-10-CM

## 2025-01-24 DIAGNOSIS — J42 CHRONIC BRONCHITIS, UNSPECIFIED CHRONIC BRONCHITIS TYPE (HCC): Chronic | ICD-10-CM

## 2025-01-24 DIAGNOSIS — J06.9 VIRAL URI: ICD-10-CM

## 2025-01-24 LAB — SL AMB POCT HEMOGLOBIN AIC: 5.9 (ref ?–6.5)

## 2025-01-24 PROCEDURE — 83036 HEMOGLOBIN GLYCOSYLATED A1C: CPT | Performed by: PHYSICIAN ASSISTANT

## 2025-01-24 PROCEDURE — G2211 COMPLEX E/M VISIT ADD ON: HCPCS | Performed by: PHYSICIAN ASSISTANT

## 2025-01-24 PROCEDURE — 99214 OFFICE O/P EST MOD 30 MIN: CPT | Performed by: PHYSICIAN ASSISTANT

## 2025-01-24 RX ORDER — BENZONATATE 100 MG/1
100 CAPSULE ORAL 3 TIMES DAILY PRN
Qty: 30 CAPSULE | Refills: 1 | Status: SHIPPED | OUTPATIENT
Start: 2025-01-24

## 2025-01-24 NOTE — PROGRESS NOTES
Name: Fernando Fox      : 1965      MRN: [id number]  Encounter Provider: Juliana Martino PA-C  Encounter Date: 2025   Encounter department: [facility]  :  Assessment & Plan  Type 2 diabetes mellitus without complication, without long-term current use of insulin (HCC)  -POCT hemoglobin A1c 5.9.  This is decreased from 6.1 in 2024.  - Patient previously taking Tradjenta and he would like to restart this.  Will restart Tradjenta 5 mg daily.  - Continue following diabetic diet with focus on low intake of carbohydrates and sugars.  - Reviewed signs and symptoms and treatment of hypoglycemia.    Lab Results   Component Value Date    HGBA1C 5.9 2025    HGBA1C 6.1 (H) 10/28/2024    HGBA1C 5.4 2024       Orders:    POCT hemoglobin A1c    linaGLIPtin 5 MG TABS; Take 5 mg by mouth daily with or without food    Viral URI    Orders:    benzonatate (TESSALON PERLES) 100 mg capsule; Take 1 capsule (100 mg total) by mouth 3 (three) times a day as needed for cough    dextromethorphan-guaifenesin (MUCINEX DM)  MG per 12 hr tablet; Take 1 tablet by mouth 2 (two) times a day as needed for cough    Gastroesophageal reflux disease without esophagitis  - Stable.  Continue Protonix 40 mg daily.  - Continue to avoid trigger foods.         Depression, recurrent (HCC)  - Stable.  [facility], however, they are no longer taking patient's insurance.  - Explained to patient we could prescribe refills of his psychiatric medications until he is able to establish with a new psychiatrist.  Advised patient to bring his medications into the office for medication reconciliation.  - Discussed coping mechanisms.  - Provided patient with list of outpatient mental health resources and advised to call to schedule appointment.         Chronic bronchitis, unspecified chronic bronchitis type (HCC)  - Stable.  Continue albuterol inhaler as  needed.             BMI Counseling: Body mass index is 23.85 kg/m². The BMI is above normal. Nutrition recommendations include decreasing portion sizes, encouraging healthy choices of fruits and vegetables, consuming healthier snacks, limiting drinks that contain sugar, moderation in carbohydrate intake, increasing intake of lean protein and reducing intake of cholesterol. Exercise recommendations include moderate physical activity 150 minutes/week. No pharmacotherapy was ordered. Rationale for BMI follow-up plan is due to patient being overweight or obese.       History of Present Illness     Patient is a 60 y.o. male whom  has a past medical history of Ambulates with cane, Diabetes mellitus (HCC), Diverticulitis, Fall, Hypertension, Kidney stone, Neck pain, and Renal disorder. who is seen today in office for diabetes follow-up.    -Patient notes he would like to restart Tradjenta because he believes it was very helpful in controlling his blood sugars.    -Patient notes he had been following with outpatient psychiatry, [facility], however they are no longer taking his insurance.  Patient notes he last saw him about 5-6 months ago.  Patient notes he has been without his psychiatric medications since then.  Patient notes he would like to restart his medications, if possible.  However, he does not remember the names and doses.      Review of Systems   Constitutional:  Negative for activity change, appetite change and fatigue.   HENT:  Negative for congestion and trouble swallowing.    Eyes:  Negative for photophobia and visual disturbance.   Respiratory:  Negative for chest tightness and wheezing.    Cardiovascular:  Negative for palpitations and leg swelling.   Gastrointestinal:  Negative for abdominal pain.   Genitourinary:  Negative for difficulty urinating and dysuria.   Musculoskeletal:  Positive for arthralgias.   Skin:  Negative for pallor and wound.   Neurological:  Negative for dizziness, seizures, syncope and  "weakness.   Psychiatric/Behavioral:  Negative for self-injury, sleep disturbance and suicidal ideas. The patient is not nervous/anxious.        Objective   /60 (BP Location: Left arm, Patient Position: Sitting, Cuff Size: Standard)   Pulse 80   Temp (!) 96.7 °F (35.9 °C) (Temporal)   Resp 16   Ht 6' 1\" (1.854 m)   Wt 82 kg (180 lb 12.8 oz)   SpO2 94%   BMI 23.85 kg/m²      Physical Exam  Vitals and nursing note reviewed.   Constitutional:       General: He is not in acute distress.     Appearance: Normal appearance. He is well-developed.   HENT:      Head: Normocephalic and atraumatic.      Right Ear: External ear normal.      Left Ear: External ear normal.      Nose: Nose normal.      Mouth/Throat:      Pharynx: Uvula midline.   Eyes:      Conjunctiva/sclera: Conjunctivae normal.   Cardiovascular:      Rate and Rhythm: Normal rate and regular rhythm.      Pulses: Normal pulses. no weak pulses.           Dorsalis pedis pulses are 2+ on the right side and 2+ on the left side.        Posterior tibial pulses are 2+ on the right side and 2+ on the left side.      Heart sounds: Normal heart sounds. No murmur heard.  Pulmonary:      Effort: Pulmonary effort is normal. No respiratory distress.      Breath sounds: Normal breath sounds. No wheezing.   Musculoskeletal:      Cervical back: Normal range of motion and neck supple.   Feet:      Right foot:      Skin integrity: No ulcer, skin breakdown, erythema, warmth, callus or dry skin.      Left foot:      Skin integrity: No ulcer, skin breakdown, erythema, warmth, callus or dry skin.   Skin:     General: Skin is warm and dry.      Capillary Refill: Capillary refill takes less than 2 seconds.   Neurological:      Mental Status: He is alert and oriented to person, place, and time.   Psychiatric:         Mood and Affect: Mood normal.         Speech: Speech normal.         Behavior: Behavior normal.         Thought Content: Thought content does not include homicidal " or suicidal ideation.       Diabetic Foot Exam    Patient's shoes and socks removed.    Right Foot/Ankle   Right Foot Inspection  Skin Exam: skin normal and skin intact. No dry skin, no warmth, no callus, no erythema, no maceration, no abnormal color, no pre-ulcer, no ulcer and no callus.     Toe Exam: ROM and strength within normal limits. No swelling, no tenderness and erythema    Sensory   Monofilament testing: intact    Vascular  Capillary refills: < 3 seconds  The right DP pulse is 2+. The right PT pulse is 2+.     Left Foot/Ankle  Left Foot Inspection  Skin Exam: skin normal and skin intact. No dry skin, no warmth, no erythema, no maceration, normal color, no pre-ulcer, no ulcer and no callus.     Toe Exam: ROM and strength within normal limits. No swelling, no tenderness and no erythema.     Sensory   Monofilament testing: intact    Vascular  Capillary refills: < 3 seconds  The left DP pulse is 2+. The left PT pulse is 2+.     Assign Risk Category  No deformity present  No loss of protective sensation  No weak pulses  Risk: 0

## 2025-01-24 NOTE — PATIENT INSTRUCTIONS
1.) Please call us or come into the office with the names and doses of the psychiatric medications.

## 2025-01-24 NOTE — ASSESSMENT & PLAN NOTE
-POCT hemoglobin A1c 5.9.  This is decreased from 6.1 in October 2024.  - Patient previously taking Tradjenta and he would like to restart this.  Will restart Tradjenta 5 mg daily.  - Continue following diabetic diet with focus on low intake of carbohydrates and sugars.  - Reviewed signs and symptoms and treatment of hypoglycemia.    Lab Results   Component Value Date    HGBA1C 5.9 01/24/2025    HGBA1C 6.1 (H) 10/28/2024    HGBA1C 5.4 04/29/2024       Orders:    POCT hemoglobin A1c    linaGLIPtin 5 MG TABS; Take 5 mg by mouth daily with or without food

## 2025-01-26 PROBLEM — E11.9 TYPE 2 DIABETES MELLITUS WITHOUT COMPLICATION, WITHOUT LONG-TERM CURRENT USE OF INSULIN (HCC): Chronic | Status: ACTIVE | Noted: 2020-09-09

## 2025-01-27 NOTE — ASSESSMENT & PLAN NOTE
- Stable.  Previously following with outpatient psychiatry, [facility], however, they are no longer taking patient's insurance.  - Explained to patient we could prescribe refills of his psychiatric medications until he is able to establish with a new psychiatrist.  Advised patient to bring his medications into the office for medication reconciliation.  - Discussed coping mechanisms.  - Provided patient with list of outpatient mental health resources and advised to call to schedule appointment.

## 2025-02-11 ENCOUNTER — TELEPHONE (OUTPATIENT)
Dept: FAMILY MEDICINE CLINIC | Facility: CLINIC | Age: 60
End: 2025-02-11

## 2025-02-11 NOTE — TELEPHONE ENCOUNTER
Pt came in stating that he was told to bring in priscription bottles with the names of meds that he needs for you to prescribe for him      Metformin HCL ER 500mg tab   Take 2 tabs by mouth with dinner    Bupropion HCL SR 200mg  Take by mouth every morning      Please send to Pharmacy om his chart

## 2025-02-23 DIAGNOSIS — E11.9 TYPE 2 DIABETES MELLITUS WITHOUT COMPLICATION, WITHOUT LONG-TERM CURRENT USE OF INSULIN (HCC): Chronic | ICD-10-CM

## 2025-02-23 DIAGNOSIS — F33.9 DEPRESSION, RECURRENT (HCC): Primary | Chronic | ICD-10-CM

## 2025-02-23 RX ORDER — BUPROPION HYDROCHLORIDE 200 MG/1
200 TABLET, EXTENDED RELEASE ORAL EVERY MORNING
Qty: 30 TABLET | Refills: 1 | Status: SHIPPED | OUTPATIENT
Start: 2025-02-23

## 2025-02-23 RX ORDER — METFORMIN HYDROCHLORIDE 500 MG/1
1000 TABLET, EXTENDED RELEASE ORAL
Qty: 60 TABLET | Refills: 1 | Status: SHIPPED | OUTPATIENT
Start: 2025-02-23

## 2025-03-14 ENCOUNTER — RA CDI HCC (OUTPATIENT)
Dept: OTHER | Facility: HOSPITAL | Age: 60
End: 2025-03-14

## 2025-04-01 ENCOUNTER — OFFICE VISIT (OUTPATIENT)
Dept: FAMILY MEDICINE CLINIC | Facility: CLINIC | Age: 60
End: 2025-04-01

## 2025-04-01 VITALS
WEIGHT: 190.2 LBS | TEMPERATURE: 96.9 F | RESPIRATION RATE: 16 BRPM | SYSTOLIC BLOOD PRESSURE: 118 MMHG | HEART RATE: 74 BPM | BODY MASS INDEX: 25.21 KG/M2 | OXYGEN SATURATION: 95 % | DIASTOLIC BLOOD PRESSURE: 72 MMHG | HEIGHT: 73 IN

## 2025-04-01 DIAGNOSIS — F33.9 DEPRESSION, RECURRENT (HCC): Chronic | ICD-10-CM

## 2025-04-01 DIAGNOSIS — E78.2 MIXED HYPERLIPIDEMIA: Chronic | ICD-10-CM

## 2025-04-01 DIAGNOSIS — I10 PRIMARY HYPERTENSION: Chronic | ICD-10-CM

## 2025-04-01 DIAGNOSIS — E11.9 TYPE 2 DIABETES MELLITUS WITHOUT COMPLICATION, WITHOUT LONG-TERM CURRENT USE OF INSULIN (HCC): ICD-10-CM

## 2025-04-01 DIAGNOSIS — M25.511 ACUTE PAIN OF RIGHT SHOULDER: ICD-10-CM

## 2025-04-01 DIAGNOSIS — J42 CHRONIC BRONCHITIS, UNSPECIFIED CHRONIC BRONCHITIS TYPE (HCC): Chronic | ICD-10-CM

## 2025-04-01 DIAGNOSIS — M54.2 NECK PAIN: ICD-10-CM

## 2025-04-01 DIAGNOSIS — G89.29 CHRONIC BILATERAL LOW BACK PAIN WITH BILATERAL SCIATICA: Chronic | ICD-10-CM

## 2025-04-01 DIAGNOSIS — K21.9 GASTROESOPHAGEAL REFLUX DISEASE WITHOUT ESOPHAGITIS: Chronic | ICD-10-CM

## 2025-04-01 DIAGNOSIS — M54.42 CHRONIC BILATERAL LOW BACK PAIN WITH BILATERAL SCIATICA: Chronic | ICD-10-CM

## 2025-04-01 DIAGNOSIS — Z00.00 MEDICARE ANNUAL WELLNESS VISIT, SUBSEQUENT: Primary | ICD-10-CM

## 2025-04-01 DIAGNOSIS — M54.41 CHRONIC BILATERAL LOW BACK PAIN WITH BILATERAL SCIATICA: Chronic | ICD-10-CM

## 2025-04-01 PROCEDURE — 99214 OFFICE O/P EST MOD 30 MIN: CPT | Performed by: PHYSICIAN ASSISTANT

## 2025-04-01 PROCEDURE — G0439 PPPS, SUBSEQ VISIT: HCPCS | Performed by: PHYSICIAN ASSISTANT

## 2025-04-01 PROCEDURE — G2211 COMPLEX E/M VISIT ADD ON: HCPCS | Performed by: PHYSICIAN ASSISTANT

## 2025-04-01 NOTE — PROGRESS NOTES
Name: Fernando Fox      : 1965      MRN: [id number]  Encounter Provider: Juliana Martino PA-C  Encounter Date: 2025   Encounter department: [facility]    Assessment & Plan  Medicare annual wellness visit, subsequent         Neck pain  -Will obtain cervical spine x-ray for further evaluation.  -Advised to apply ice/heating pad/topical therapies as needed to affected area.  - Provided patient with list of exercises to perform daily.  -Continue tizanidine 4 mg, every 8 hours as needed.  - Continue gabapentin 800 mg, 3 times daily.  - If symptoms persist/worsen, would recommend referral to physical therapy.    Orders:    XR spine cervical 2 or 3 vw injury; Future    Type 2 diabetes mellitus without complication, without long-term current use of insulin ([facility])  - Continue Tradjenta 5 mg daily.  - Continue following diabetic diet with focus on low intake of carbohydrates and sugars.  - Reviewed signs and symptoms and treatment of hypoglycemia.     Lab Results   Component Value Date    HGBA1C 5.9 2025    HGBA1C 6.1 (H) 10/28/2024    HGBA1C 5.4 2024       Orders:    Continuous Glucose Sensor (FreeStyle Shira 2 Sensor) MISC; Scan every 8 hours. E11.9    Gastroesophageal reflux disease without esophagitis  - Stable.  Continue Protonix 40 mg daily.  - Continue to avoid trigger foods.           Mixed hyperlipidemia  - Continue atorvastatin 40 mg daily.         Primary hypertension  - Stable without medication.   - Reviewed BP target goal with patient.    - Continue to maintain healthy balanced diet with focus on low salt intake. Limit alcohol intake.   - Advised to exercise at least 30 minutes a day for at least 5 days out of the week.          Acute pain of right shoulder  - Patient reports injury over 30 years ago where he threw a shovel and injured his right shoulder in the process.   - Reviewed right shoulder x-ray from 9/10/24. Results show mild AC joint  arthrosis.   -Advised to apply ice/heating pad/topical therapies as needed to affected area.  - Provided patient with list of exercises to perform daily.  - If symptoms persist/worsen, would recommend referral to physical therapy.         Depression, recurrent (HCC)  - Stable.  [facility], however, they are no longer taking patient's insurance.  - Explained to patient we could prescribe refills of his psychiatric medications until he is able to establish with a new psychiatrist.  Advised patient to bring his medications into the office for medication reconciliation.  - Discussed coping mechanisms.  - Provided patient with list of outpatient mental health resources and advised to call to schedule appointment.           Chronic bronchitis, unspecified chronic bronchitis type (HCC)  - Stable.  Continue albuterol inhaler as needed.           Chronic bilateral low back pain with bilateral sciatica  - Continue gabapentin 800 mg, 3 times daily.  - Continue tizanidine 4 mg, 1-2 tablets 3 times daily as needed.  -Advised to apply ice/heating pad/topical therapies as needed to affected area.  - Provided patient with list of exercises to perform daily.  - If symptoms persist/worsen, would recommend referral to physical therapy.            Preventive health issues were discussed with patient, and age appropriate screening tests were ordered as noted in patient's After Visit Summary. Personalized health advice and appropriate referrals for health education or preventive services given if needed, as noted in patient's After Visit Summary.    History of Present Illness       Patient is a 60 y.o. male whom  has a past medical history of Ambulates with cane, Diabetes mellitus (HCC), Diverticulitis, Fall, Hypertension, Kidney stone, Neck pain, and Renal disorder. who is seen today in office for AWV.    -Patient notes for the past 2 months he has been experiencing right-sided neck pain.  He  denies any specific injury or trauma to the area recently, but notes over 30 years ago he had injured his right shoulder when he threw a shovel.  Patient notes the right-sided neck pain does radiate into his right shoulder area.    -Patient notes he believes one of his teeth broke last night and he is in need of a dentist.  Patient notes his current dentist, [facility], no longer takes his insurance.    Patient Care Team:  Juliana Martino PA-C as PCP - General (Family Medicine)    Review of Systems   Constitutional:  Negative for activity change, appetite change and fatigue.   HENT:  Negative for congestion and trouble swallowing.    Eyes:  Negative for photophobia and visual disturbance.   Respiratory:  Negative for chest tightness and wheezing.    Cardiovascular:  Negative for palpitations and leg swelling.   Gastrointestinal:  Negative for abdominal pain.   Genitourinary:  Negative for difficulty urinating and dysuria.   Musculoskeletal:  Positive for arthralgias and neck pain.   Skin:  Negative for pallor and wound.   Neurological:  Negative for dizziness, seizures, syncope and weakness.   Psychiatric/Behavioral:  Negative for self-injury, sleep disturbance and suicidal ideas. The patient is not nervous/anxious.      Medical History Reviewed by provider this encounter:  Tobacco  Allergies  Meds  Problems  Med Hx  Surg Hx  Fam Hx       Annual Wellness Visit Questionnaire   Fernando is here for his Subsequent Wellness visit. Last Medicare Wellness visit information reviewed, patient interviewed and updates made to the record today.      Health Risk Assessment:   Patient rates overall health as good. Patient feels that their physical health rating is same. Patient is satisfied with their life. Eyesight was rated as same. Hearing was rated as same. Patient feels that their emotional and mental health rating is same. Patients states they are sometimes angry. Patient states they are always unusually  tired/fatigued. Pain experienced in the last 7 days has been a lot. Patient's pain rating has been 8/10. Patient states that he has experienced weight loss or gain in last 6 months.     Fall Risk Screening:   In the past year, patient has experienced: history of falling in past year    Number of falls: 2 or more  Injured during fall?: No    Feels unsteady when standing or walking?: Yes    Worried about falling?: Yes      Home Safety:  Patient does not have trouble with stairs inside or outside of their home. Patient has working smoke alarms and has working carbon monoxide detector. Home safety hazards include: none.     Nutrition:   Current diet is Diabetic.     Medications:   Patient is currently taking over-the-counter supplements. OTC medications include: see medication list. Patient is able to manage medications.     Activities of Daily Living (ADLs)/Instrumental Activities of Daily Living (IADLs):   Walk and transfer into and out of bed and chair?: Yes  Dress and groom yourself?: Yes    Bathe or shower yourself?: Yes    Feed yourself? Yes  Do your laundry/housekeeping?: Yes  Manage your money, pay your bills and track your expenses?: Yes  Make your own meals?: Yes    Do your own shopping?: Yes    Previous Hospitalizations:   Any hospitalizations or ED visits within the last 12 months?: No      Advance Care Planning:   Living will: No    Durable POA for healthcare: No    Advanced directive: No    ACP document given: Yes      Cognitive Screening:   Provider or family/friend/caregiver concerned regarding cognition?: No    PREVENTIVE SCREENINGS      Cardiovascular Screening:    General: Screening Not Indicated and History Lipid Disorder      Diabetes Screening:     General: Screening Not Indicated and History Diabetes      Colorectal Cancer Screening:     General: Screening Current      Prostate Cancer Screening:    General: Screening Current and Risks and Benefits Discussed      Osteoporosis Screening:    General:  Risks and Benefits Discussed and Screening Not Indicated      Abdominal Aortic Aneurysm (AAA) Screening:    Risk factors include: tobacco use        General: Risks and Benefits Discussed    Due for: Screening AAA Ultrasound      Lung Cancer Screening:     General: Screening Not Indicated      Hepatitis C Screening:    General: Screening Current    Screening, Brief Intervention, and Referral to Treatment (SBIRT)     Screening  Typical number of drinks in a day: 0  Typical number of drinks in a week: 0  Interpretation: Low risk drinking behavior.    Single Item Drug Screening:  How often have you used an illegal drug (including marijuana) or a prescription medication for non-medical reasons in the past year? never    Single Item Drug Screen Score: 0  Interpretation: Negative screen for possible drug use disorder    Brief Intervention  Alcohol & drug use screenings were reviewed. No concerns regarding substance use disorder identified.     Other Counseling Topics:   Car/seat belt/driving safety, skin self-exam, sunscreen and calcium and vitamin D intake and regular weightbearing exercise.     Social Drivers of Health     Financial Resource Strain: Low Risk  (2/21/2024)    Overall Financial Resource Strain (CARDIA)     Difficulty of Paying Living Expenses: Not hard at all   Food Insecurity: No Food Insecurity (2/21/2024)    Nursing - Inadequate Food Risk Classification     Worried About Running Out of Food in the Last Year: Never true     Ran Out of Food in the Last Year: Never true   Transportation Needs: Unmet Transportation Needs (2/21/2024)    PRAPARE - Transportation     Lack of Transportation (Medical): Yes     Lack of Transportation (Non-Medical): Yes   Housing Stability: Low Risk  (4/29/2024)    Housing Stability Vital Sign     Unable to Pay for Housing in the Last Year: No     Number of Times Moved in the Last Year: 1     Homeless in the Last Year: No   Utilities: Not At Risk (4/29/2024)    [facility] Utilities      "Threatened with loss of utilities: No     No results found.    Objective   /72 (BP Location: Left arm, Patient Position: Sitting, Cuff Size: Standard)   Pulse 74   Temp (!) 96.9 °F (36.1 °C) (Temporal)   Resp 16   Ht 6' 1\" (1.854 m)   Wt 86.3 kg (190 lb 3.2 oz)   SpO2 95%   BMI 25.09 kg/m²     Physical Exam  Vitals and nursing note reviewed.   Constitutional:       General: He is not in acute distress.     Appearance: Normal appearance. He is well-developed.   HENT:      Head: Normocephalic and atraumatic.      Right Ear: Tympanic membrane and external ear normal.      Left Ear: Tympanic membrane and external ear normal.      Nose: Nose normal.      Mouth/Throat:      Pharynx: Uvula midline. No posterior oropharyngeal erythema.   Eyes:      Extraocular Movements: Extraocular movements intact.      Conjunctiva/sclera: Conjunctivae normal.      Pupils: Pupils are equal, round, and reactive to light.   Cardiovascular:      Rate and Rhythm: Normal rate and regular rhythm.      Pulses: Normal pulses.      Heart sounds: Normal heart sounds. No murmur heard.  Pulmonary:      Effort: Pulmonary effort is normal. No respiratory distress.      Breath sounds: Normal breath sounds. No wheezing.   Abdominal:      General: Bowel sounds are normal.      Palpations: Abdomen is soft.      Tenderness: There is no abdominal tenderness.   Musculoskeletal:      Right shoulder: Tenderness present. No swelling. Decreased range of motion.      Cervical back: Normal range of motion and neck supple. Muscular tenderness present. Normal range of motion.   Skin:     General: Skin is warm and dry.      Capillary Refill: Capillary refill takes less than 2 seconds.   Neurological:      Mental Status: He is alert and oriented to person, place, and time.   Psychiatric:         Speech: Speech normal.         Behavior: Behavior normal.         "

## 2025-04-01 NOTE — ASSESSMENT & PLAN NOTE
-Will obtain cervical spine x-ray for further evaluation.  -Advised to apply ice/heating pad/topical therapies as needed to affected area.  - Provided patient with list of exercises to perform daily.  -Continue tizanidine 4 mg, every 8 hours as needed.  - Continue gabapentin 800 mg, 3 times daily.  - If symptoms persist/worsen, would recommend referral to physical therapy.    Orders:    XR spine cervical 2 or 3 vw injury; Future

## 2025-04-01 NOTE — ASSESSMENT & PLAN NOTE
- Continue Tradjenta 5 mg daily.  - Continue following diabetic diet with focus on low intake of carbohydrates and sugars.  - Reviewed signs and symptoms and treatment of hypoglycemia.     Lab Results   Component Value Date    HGBA1C 5.9 01/24/2025    HGBA1C 6.1 (H) 10/28/2024    HGBA1C 5.4 04/29/2024       Orders:    Continuous Glucose Sensor (FreeStyle Shira 2 Sensor) MISC; Scan every 8 hours. E11.9

## 2025-04-02 NOTE — ASSESSMENT & PLAN NOTE
- Continue gabapentin 800 mg, 3 times daily.  - Continue tizanidine 4 mg, 1-2 tablets 3 times daily as needed.  -Advised to apply ice/heating pad/topical therapies as needed to affected area.  - Provided patient with list of exercises to perform daily.  - If symptoms persist/worsen, would recommend referral to physical therapy.

## 2025-04-02 NOTE — ASSESSMENT & PLAN NOTE
- Stable without medication.   - Reviewed BP target goal with patient.    - Continue to maintain healthy balanced diet with focus on low salt intake. Limit alcohol intake.   - Advised to exercise at least 30 minutes a day for at least 5 days out of the week.

## 2025-04-02 NOTE — ASSESSMENT & PLAN NOTE
- Patient reports injury over 30 years ago where he threw a shovel and injured his right shoulder in the process.   - Reviewed right shoulder x-ray from 9/10/24. Results show mild AC joint arthrosis.   -Advised to apply ice/heating pad/topical therapies as needed to affected area.  - Provided patient with list of exercises to perform daily.  - If symptoms persist/worsen, would recommend referral to physical therapy.

## 2025-04-04 DIAGNOSIS — E11.9 TYPE 2 DIABETES MELLITUS WITHOUT COMPLICATION, WITHOUT LONG-TERM CURRENT USE OF INSULIN (HCC): ICD-10-CM

## 2025-04-07 ENCOUNTER — TELEPHONE (OUTPATIENT)
Dept: FAMILY MEDICINE CLINIC | Facility: CLINIC | Age: 60
End: 2025-04-07

## 2025-04-07 NOTE — TELEPHONE ENCOUNTER
Patient came to bring medication names mention in last visit    Hydroxyzine 25 mg    Clonidine 0.2 mg    Doxepin 150 mg    Patient also request refill on following mediation       tiZANidine (ZANAFLEX) 4 mg

## 2025-04-07 NOTE — TELEPHONE ENCOUNTER
Continuous Glucose Sensor (FreeStyle Shira 2 Sensor) MISC last sent on 4/1/25 by Gunner.     Duplicate request

## 2025-04-11 DIAGNOSIS — L20.84 INTRINSIC ECZEMA: ICD-10-CM

## 2025-04-11 DIAGNOSIS — M62.838 TRAPEZIUS MUSCLE SPASM: ICD-10-CM

## 2025-04-11 RX ORDER — ACETAMINOPHEN 500 MG
500 TABLET ORAL EVERY 6 HOURS PRN
Qty: 30 TABLET | Refills: 0 | Status: SHIPPED | OUTPATIENT
Start: 2025-04-11

## 2025-04-11 RX ORDER — DIAPER,BRIEF,INFANT-TODD,DISP
EACH MISCELLANEOUS 2 TIMES DAILY
Qty: 30 G | Refills: 0 | Status: SHIPPED | OUTPATIENT
Start: 2025-04-11

## 2025-04-14 ENCOUNTER — HOSPITAL ENCOUNTER (EMERGENCY)
Facility: HOSPITAL | Age: 60
Discharge: HOME | End: 2025-04-14
Attending: EMERGENCY MEDICINE
Payer: MEDICARE

## 2025-04-14 VITALS
HEART RATE: 86 BPM | RESPIRATION RATE: 18 BRPM | WEIGHT: 182.98 LBS | SYSTOLIC BLOOD PRESSURE: 150 MMHG | OXYGEN SATURATION: 99 % | BODY MASS INDEX: 24.14 KG/M2 | TEMPERATURE: 98.1 F | DIASTOLIC BLOOD PRESSURE: 94 MMHG

## 2025-04-14 DIAGNOSIS — S39.012A STRAIN OF LUMBAR REGION, INITIAL ENCOUNTER: ICD-10-CM

## 2025-04-14 DIAGNOSIS — M50.90 CERVICAL DISC DISEASE: Primary | ICD-10-CM

## 2025-04-14 PROCEDURE — 99284 EMERGENCY DEPT VISIT MOD MDM: CPT | Performed by: EMERGENCY MEDICINE

## 2025-04-14 PROCEDURE — 99283 EMERGENCY DEPT VISIT LOW MDM: CPT

## 2025-04-14 RX ORDER — CYCLOBENZAPRINE HCL 10 MG
10 TABLET ORAL ONCE
Status: COMPLETED | OUTPATIENT
Start: 2025-04-14 | End: 2025-04-14

## 2025-04-14 RX ORDER — PREDNISONE 50 MG/1
50 TABLET ORAL DAILY
Qty: 4 TABLET | Refills: 0 | Status: SHIPPED | OUTPATIENT
Start: 2025-04-14 | End: 2025-04-18

## 2025-04-14 RX ADMIN — CYCLOBENZAPRINE 10 MG: 10 TABLET, FILM COATED ORAL at 16:15

## 2025-04-14 RX ADMIN — PREDNISONE 50 MG: 20 TABLET ORAL at 16:15

## 2025-04-15 NOTE — ED PROVIDER NOTES
Time reflects when diagnosis was documented in both MDM as applicable and the Disposition within this note       Time User Action Codes Description Comment    4/14/2025  4:10 PM Ange Marie Add [M50.90] Cervical disc disease     4/14/2025  4:14 PM Ange Marie Add [S39.012A] Strain of lumbar region, initial encounter           ED Disposition       ED Disposition   Discharge    Condition   Stable    Date/Time   Mon Apr 14, 2025  4:08 PM    Comment   Fernando EZIO Fox discharge to home/self care.                   Assessment & Plan       Medical Decision Making  60-year-old male presents emergency department for chronic neck and back pain.  Discussed with patient that he needs to follow-up with spinal surgeon as he has tried many different medications without relief.  Appropriate referrals placed.  No signs of neurologic deficits.  No numbness or tingling.  No weakness.    Risk  Prescription drug management.             Medications   cyclobenzaprine (FLEXERIL) tablet 10 mg (10 mg Oral Given 4/14/25 1615)   predniSONE tablet 50 mg (50 mg Oral Given 4/14/25 1615)       ED Risk Strat Scores                    No data recorded        SBIRT 22yo+      Flowsheet Row Most Recent Value   Initial Alcohol Screen: US AUDIT-C     1. How often do you have a drink containing alcohol? 0 Filed at: 04/14/2025 1602   2. How many drinks containing alcohol do you have on a typical day you are drinking?  0 Filed at: 04/14/2025 1602   3a. Male UNDER 65: How often do you have five or more drinks on one occasion? 0 Filed at: 04/14/2025 1602   Audit-C Score 0 Filed at: 04/14/2025 1602   VICK: How many times in the past year have you...    Used an illegal drug or used a prescription medication for non-medical reasons? Never Filed at: 04/14/2025 1602                            History of Present Illness       Chief Complaint   Patient presents with    Hip Pain     Pt having R hip and neck pain for over a month. No meds pta. No previous  injuries/surgeries.       Past Medical History:   Diagnosis Date    Ambulates with cane     Diabetes mellitus (HCC)     Diverticulitis     Fall     10/2024    Hypertension     Kidney stone     Neck pain     Renal disorder       Past Surgical History:   Procedure Laterality Date    COLON SURGERY      COLONOSCOPY      NECK SURGERY      denies    [place] LAPAROSCOPY SURG RPR INITIAL INGUINAL HERNIA Bilateral 11/14/2024    Procedure: ROBOTIC REPAIR HERNIA INGUINAL;  Surgeon: Jamel Vila MD;  Location: [facility];  Service: General      History reviewed. No pertinent family history.   Social History     Tobacco Use    Smoking status: Every Day     Current packs/day: 0.25     Average packs/day: 0.3 packs/day for 50.4 years (12.6 ttl pk-yrs)     Types: Cigarettes, Cigars     Start date: 11/14/1974     Passive exposure: Never    Smokeless tobacco: Never    Tobacco comments:     1 cigar/daily stopped few days     Last smoked 11/14 half cig..   Vaping Use    Vaping status: Never Used   Substance Use Topics    Alcohol use: Yes     Comment: occ    Drug use: Yes     Types: Marijuana     Comment: occasional- smoke. last used 11/11      E-Cigarette/Vaping    E-Cigarette Use Never User       E-Cigarette/Vaping Substances    Nicotine No     THC No     CBD No     Flavoring No     Other No     Unknown No       I have reviewed and agree with the history as documented.     60-year-old male presenting to the emerged department with chronic neck and low back pain.  Notes that has been going on for months.  No new pain.  No trauma.  No falls.  No injury.        Review of Systems   Constitutional:  Negative for chills and fever.   HENT:  Negative for ear pain and sore throat.    Eyes:  Negative for pain and visual disturbance.   Respiratory:  Negative for cough and shortness of breath.    Cardiovascular:  Negative for chest pain and palpitations.   Gastrointestinal:  Negative for abdominal pain and vomiting.   Genitourinary:  Negative for  dysuria and hematuria.   Musculoskeletal:  Positive for back pain and neck pain. Negative for arthralgias.   Skin:  Negative for color change and rash.   Neurological:  Negative for seizures and syncope.   All other systems reviewed and are negative.          Objective       ED Triage Vitals [04/14/25 1600]   Temperature Pulse Blood Pressure Respirations SpO2 Patient Position - Orthostatic VS   98.1 °F (36.7 °C) 86 150/94 18 99 % Sitting      Temp Source Heart Rate Source BP Location FiO2 (%) Pain Score    Oral Monitor Left arm -- 6      Vitals      Date and Time Temp Pulse SpO2 Resp BP Pain Score FACES Pain Rating User   04/14/25 1600 98.1 °F (36.7 °C) 86 99 % 18 150/94 6 -- VD            Physical Exam  Vitals and nursing note reviewed.   Constitutional:       General: He is not in acute distress.     Appearance: He is well-developed.   HENT:      Head: Normocephalic and atraumatic.   Eyes:      Conjunctiva/sclera: Conjunctivae normal.   Cardiovascular:      Rate and Rhythm: Normal rate and regular rhythm.      Heart sounds: No murmur heard.  Pulmonary:      Effort: Pulmonary effort is normal. No respiratory distress.      Breath sounds: Normal breath sounds.   Abdominal:      Palpations: Abdomen is soft.      Tenderness: There is no abdominal tenderness.   Musculoskeletal:         General: No swelling.      Cervical back: Neck supple.      Comments: Tenderness to palpation of the right cervical paraspinal region.  Right low back pain tenderness to palpation.   Skin:     General: Skin is warm and dry.      Capillary Refill: Capillary refill takes less than 2 seconds.   Neurological:      Mental Status: He is alert.   Psychiatric:         Mood and Affect: Mood normal.         Results Reviewed       None            No orders to display       Procedures    ED Medication and Procedure Management   Prior to Admission Medications   Prescriptions Last Dose Informant Patient Reported? Taking?   ARIPiprazole (ABILIFY) 10 mg  tablet  Self Yes No   Sig: Take 10 mg by mouth daily at bedtime   Continuous Glucose  (FreeStyle Shira 2 [place]) SHANELL  Self No No   Sig: Scan every 8 hours. E11.9   Continuous Glucose Sensor (FreeStyle Shira 2 Sensor) MISC   No No   Sig: Scan every 8 hours. E11.9   Cyanocobalamin (VITAMIN B 12 PO)  Self Yes No   Sig: Take by mouth   Lancets (freestyle) lancets   No No   Sig: Daily   Restasis 0.05 % ophthalmic emulsion  Self Yes No   Sig: Administer 1 drop to both eyes daily as needed   acetaminophen (TYLENOL) 500 mg tablet   No No   Sig: Take 1 tablet (500 mg total) by mouth every 6 (six) hours as needed for mild pain   albuterol (PROVENTIL HFA,VENTOLIN HFA) 90 mcg/act inhaler   No No   Sig: Inhale 1-2 puffs every 6 (six) hours as needed for wheezing   aspirin (ECOTRIN LOW STRENGTH) 81 mg EC tablet   No No   Sig: Take 1 tablet (81 mg total) by mouth daily   atorvastatin (LIPITOR) 40 mg tablet   No No   Sig: Take 1 tablet (40 mg total) by mouth daily   benzonatate (TESSALON PERLES) 100 mg capsule   No No   Sig: Take 1 capsule (100 mg total) by mouth 3 (three) times a day as needed for cough   buPROPion (WELLBUTRIN SR) 200 MG 12 hr tablet   No No   Sig: Take 1 tablet (200 mg total) by mouth every morning   cloNIDine (CATAPRES) 0.2 mg tablet  Self Yes No   Sig: Take 0.2 mg by mouth daily at bedtime   dextromethorphan-guaifenesin (MUCINEX DM)  MG per 12 hr tablet   No No   Sig: Take 1 tablet by mouth 2 (two) times a day as needed for cough   doxepin (SINEquan) 150 MG capsule  Self Yes No   Sig: Take 150 mg by mouth daily at bedtime   fluticasone (FLONASE) 50 mcg/act nasal spray   No No   Si spray into each nostril daily   gabapentin (NEURONTIN) 800 mg tablet   No No   Sig: Take 1 tablet (800 mg total) by mouth 3 (three) times a day   glucose blood (FREESTYLE LITE) test strip   No No   Sig: Testing sugars once a day   hydrOXYzine HCL (ATARAX) 50 mg tablet  Self Yes No   Sig: Take 50 mg by mouth daily at  bedtime   hydrocortisone 1 % ointment   No No   Sig: Apply topically 2 (two) times a day   ibuprofen (MOTRIN) 200 mg tablet  Self Yes No   Sig: Take by mouth every 6 (six) hours as needed for mild pain   lidocaine (XYLOCAINE) 2 % topical gel   No No   Sig: Apply topically as needed for mild pain   linaGLIPtin 5 MG TABS   No No   Sig: Take 5 mg by mouth daily with or without food   metFORMIN (GLUCOPHAGE-XR) 500 mg 24 hr tablet   No No   Sig: Take 2 tablets (1,000 mg total) by mouth daily with dinner   pantoprazole (PROTONIX) 40 mg tablet   No No   Sig: Take 1 tablet (40 mg total) by mouth daily   tiZANidine (ZANAFLEX) 4 mg tablet   No No   Sig: Take 2 tablets (8 mg total) by mouth every 8 (eight) hours as needed for muscle spasms   zolpidem (AMBIEN) 10 mg tablet  Self Yes No   Sig: Take 10 mg by mouth daily at bedtime      Facility-Administered Medications: None     Discharge Medication List as of 4/14/2025  4:14 PM        START taking these medications    Details   predniSONE 50 mg tablet Take 1 tablet (50 mg total) by mouth daily for 4 days, Starting Mon 4/14/2025, Until Fri 4/18/2025, Normal           CONTINUE these medications which have NOT CHANGED    Details   acetaminophen (TYLENOL) 500 mg tablet Take 1 tablet (500 mg total) by mouth every 6 (six) hours as needed for mild pain, Starting Fri 4/11/2025, Normal      albuterol (PROVENTIL HFA,VENTOLIN HFA) 90 mcg/act inhaler Inhale 1-2 puffs every 6 (six) hours as needed for wheezing, Starting Fri 1/17/2025, Normal      ARIPiprazole (ABILIFY) 10 mg tablet Take 10 mg by mouth daily at bedtime, Starting Thu 9/30/2021, Historical Med      aspirin (ECOTRIN LOW STRENGTH) 81 mg EC tablet Take 1 tablet (81 mg total) by mouth daily, Starting Fri 1/17/2025, Normal      atorvastatin (LIPITOR) 40 mg tablet Take 1 tablet (40 mg total) by mouth daily, Starting Fri 1/17/2025, Normal      benzonatate (TESSALON PERLES) 100 mg capsule Take 1 capsule (100 mg total) by mouth 3  (three) times a day as needed for cough, Starting Fri 1/24/2025, Normal      buPROPion (WELLBUTRIN SR) 200 MG 12 hr tablet Take 1 tablet (200 mg total) by mouth every morning, Starting Sun 2/23/2025, Normal      cloNIDine (CATAPRES) 0.2 mg tablet Take 0.2 mg by mouth daily at bedtime, Starting Tue 5/18/2021, Historical Med      Continuous Glucose  (FreeStyle Shira 2 [place]) SHANELL Scan every 8 hours. E11.9, Normal      Continuous Glucose Sensor (FreeStyle Shira 2 Sensor) MISC Scan every 8 hours. E11.9, Normal      Cyanocobalamin (VITAMIN B 12 PO) Take by mouth, Historical Med      dextromethorphan-guaifenesin (MUCINEX DM)  MG per 12 hr tablet Take 1 tablet by mouth 2 (two) times a day as needed for cough, Starting Fri 1/24/2025, Normal      doxepin (SINEquan) 150 MG capsule Take 150 mg by mouth daily at bedtime, Starting Sat 6/3/2023, Historical Med      fluticasone (FLONASE) 50 mcg/act nasal spray 1 spray into each nostril daily, Starting Fri 1/17/2025, Normal      gabapentin (NEURONTIN) 800 mg tablet Take 1 tablet (800 mg total) by mouth 3 (three) times a day, Starting Fri 1/17/2025, Normal      glucose blood (FREESTYLE LITE) test strip Testing sugars once a day, Normal      hydrocortisone 1 % ointment Apply topically 2 (two) times a day, Starting Fri 4/11/2025, Normal      hydrOXYzine HCL (ATARAX) 50 mg tablet Take 50 mg by mouth daily at bedtime, Starting Thu 9/30/2021, Historical Med      ibuprofen (MOTRIN) 200 mg tablet Take by mouth every 6 (six) hours as needed for mild pain, Historical Med      Lancets (freestyle) lancets Daily, Normal      lidocaine (XYLOCAINE) 2 % topical gel Apply topically as needed for mild pain, Starting Fri 1/17/2025, Normal      linaGLIPtin 5 MG TABS Take 5 mg by mouth daily with or without food, Starting Fri 1/24/2025, Normal      metFORMIN (GLUCOPHAGE-XR) 500 mg 24 hr tablet Take 2 tablets (1,000 mg total) by mouth daily with dinner, Starting Sun 2/23/2025, Normal       pantoprazole (PROTONIX) 40 mg tablet Take 1 tablet (40 mg total) by mouth daily, Starting Fri 1/17/2025, Normal      Restasis 0.05 % ophthalmic emulsion Administer 1 drop to both eyes daily as needed, Starting Wed 7/31/2024, Historical Med      tiZANidine (ZANAFLEX) 4 mg tablet Take 2 tablets (8 mg total) by mouth every 8 (eight) hours as needed for muscle spasms, Starting Fri 1/17/2025, Normal      zolpidem (AMBIEN) 10 mg tablet Take 10 mg by mouth daily at bedtime, Starting Fri 11/6/2020, Historical Med             ED SEPSIS DOCUMENTATION   Time reflects when diagnosis was documented in both MDM as applicable and the Disposition within this note       Time User Action Codes Description Comment    4/14/2025  4:10 PM Ange Marie [M50.90] Cervical disc disease     4/14/2025  4:14 PM Ange Marie [S39.012A] Strain of lumbar region, initial encounter                  Ange Marie MD  04/15/25 2017

## 2025-04-21 ENCOUNTER — APPOINTMENT (OUTPATIENT)
Dept: RADIOLOGY | Facility: HOSPITAL | Age: 60
End: 2025-04-21
Payer: MEDICARE

## 2025-04-21 ENCOUNTER — OFFICE VISIT (OUTPATIENT)
Dept: OBGYN CLINIC | Facility: MEDICAL CENTER | Age: 60
End: 2025-04-21
Payer: MEDICARE

## 2025-04-21 VITALS — HEIGHT: 73 IN | WEIGHT: 182 LBS | BODY MASS INDEX: 24.12 KG/M2

## 2025-04-21 DIAGNOSIS — R42 DIZZINESS: ICD-10-CM

## 2025-04-21 DIAGNOSIS — G89.29 CHRONIC NECK PAIN: Primary | ICD-10-CM

## 2025-04-21 DIAGNOSIS — G89.29 CHRONIC RIGHT SHOULDER PAIN: ICD-10-CM

## 2025-04-21 DIAGNOSIS — M25.511 CHRONIC RIGHT SHOULDER PAIN: ICD-10-CM

## 2025-04-21 DIAGNOSIS — M47.812 CERVICAL SPONDYLOSIS: ICD-10-CM

## 2025-04-21 DIAGNOSIS — R26.89 BALANCE DISORDER: ICD-10-CM

## 2025-04-21 DIAGNOSIS — M54.2 CHRONIC NECK PAIN: Primary | ICD-10-CM

## 2025-04-21 DIAGNOSIS — E11.42 DIABETIC POLYNEUROPATHY ASSOCIATED WITH TYPE 2 DIABETES MELLITUS (HCC): ICD-10-CM

## 2025-04-21 PROCEDURE — 72050 X-RAY EXAM NECK SPINE 4/5VWS: CPT

## 2025-04-21 PROCEDURE — 99214 OFFICE O/P EST MOD 30 MIN: CPT | Performed by: EMERGENCY MEDICINE

## 2025-04-21 NOTE — PROGRESS NOTES
Assessment/Plan:    Diagnoses and all orders for this visit:    Chronic neck pain  -     Ambulatory referral to Orthopedic Surgery  -     Transfer to other facility    Cervical spondylosis  -     Transfer to other facility    Dizziness  -     Transfer to other facility  -     [facility]    Balance disorder  -     Transfer to other facility  -     [facility]    Chronic right shoulder pain  -     Transfer to other facility    Diabetic polyneuropathy associated with type 2 diabetes mellitus (HCC)      Patient presents with 3 months of balance issues and lightheadedness/dizziness he states he has fallen multiple times over the last several months this seems to be his biggest complaint.  In the office he is off balance we have provided him a walker due to his symptoms and the timeline I recommend he go immediately to the emergency department at [facility] which he agrees to do for further evaluation including possible CT head.  In addition to this he does have a painful lump on the posterior aspect of the neck and prior outside x-ray of the cervical spine shows a separate condition on the left submandibular gland area possible stone versus metal for which they recommended CT neck soft tissue with IV contrast.  Should f/u with PCP as discussed    Xrays C spine:  Cannot exclude a submandibular gland stone versus metallic foreign body  projecting just lateral to the angle of the left mandible. Uncertain if there  are some faint calcifications in the deep right neck superiorly or even carotid  artery calcification.  CT with intravenous contrast would best evaluate neck soft tissue abnormalities      No follow-ups on file.      Subjective:   Patient ID: Fernando Fox is a 60 y.o. male.    New patient presents for 3 months of Right posterior neck pain and lump which is painful to the touch.  However he also describes issues with his balance over the last 3 months where he has been falling he feels dizzy and lightheaded  and this seems to be his biggest complaint.  Recently evaluated in the emergency department twice with imaging and prescription medications including oral PREDNISONE  History of evaluation with pain management for which they discussed left AC joint injection under ultrasound guidance and also ZEB under fluoro guidance.           Review of Systems    The following portions of the patient's chart were reviewed and updated as appropriate:   Allergy:  No Known Allergies    Medications:    Current Outpatient Medications:   •  acetaminophen (TYLENOL) 500 mg tablet, Take 1 tablet (500 mg total) by mouth every 6 (six) hours as needed for mild pain, Disp: 30 tablet, Rfl: 0  •  albuterol (PROVENTIL HFA,VENTOLIN HFA) 90 mcg/act inhaler, Inhale 1-2 puffs every 6 (six) hours as needed for wheezing, Disp: 18 g, Rfl: 1  •  ARIPiprazole (ABILIFY) 10 mg tablet, Take 10 mg by mouth daily at bedtime, Disp: , Rfl:   •  aspirin (ECOTRIN LOW STRENGTH) 81 mg EC tablet, Take 1 tablet (81 mg total) by mouth daily, Disp: 90 tablet, Rfl: 1  •  atorvastatin (LIPITOR) 40 mg tablet, Take 1 tablet (40 mg total) by mouth daily, Disp: 90 tablet, Rfl: 1  •  benzonatate (TESSALON PERLES) 100 mg capsule, Take 1 capsule (100 mg total) by mouth 3 (three) times a day as needed for cough, Disp: 30 capsule, Rfl: 1  •  buPROPion (WELLBUTRIN SR) 200 MG 12 hr tablet, Take 1 tablet (200 mg total) by mouth every morning, Disp: 30 tablet, Rfl: 1  •  cloNIDine (CATAPRES) 0.2 mg tablet, Take 0.2 mg by mouth daily at bedtime, Disp: , Rfl:   •  Continuous Glucose  (FreeStyle Shira 2 [place]) SHANELL, Scan every 8 hours. E11.9, Disp: 1 each, Rfl: 0  •  Continuous Glucose Sensor (FreeStyle Shira 2 Sensor) MISC, Scan every 8 hours. E11.9, Disp: 2 each, Rfl: 5  •  Cyanocobalamin (VITAMIN B 12 PO), Take by mouth, Disp: , Rfl:   •  dextromethorphan-guaifenesin (MUCINEX DM)  MG per 12 hr tablet, Take 1 tablet by mouth 2 (two) times a day as needed for cough,  Disp: 30 tablet, Rfl: 0  •  doxepin (SINEquan) 150 MG capsule, Take 150 mg by mouth daily at bedtime, Disp: , Rfl:   •  fluticasone (FLONASE) 50 mcg/act nasal spray, 1 spray into each nostril daily, Disp: 16 g, Rfl: 2  •  gabapentin (NEURONTIN) 800 mg tablet, Take 1 tablet (800 mg total) by mouth 3 (three) times a day, Disp: 90 tablet, Rfl: 2  •  glucose blood (FREESTYLE LITE) test strip, Testing sugars once a day, Disp: 100 each, Rfl: 3  •  hydrocortisone 1 % ointment, Apply topically 2 (two) times a day, Disp: 30 g, Rfl: 0  •  hydrOXYzine HCL (ATARAX) 50 mg tablet, Take 50 mg by mouth daily at bedtime, Disp: , Rfl:   •  ibuprofen (MOTRIN) 200 mg tablet, Take by mouth every 6 (six) hours as needed for mild pain, Disp: , Rfl:   •  lidocaine (XYLOCAINE) 2 % topical gel, Apply topically as needed for mild pain, Disp: 30 mL, Rfl: 2  •  linaGLIPtin 5 MG TABS, Take 5 mg by mouth daily with or without food, Disp: 90 tablet, Rfl: 1  •  metFORMIN (GLUCOPHAGE-XR) 500 mg 24 hr tablet, Take 2 tablets (1,000 mg total) by mouth daily with dinner, Disp: 60 tablet, Rfl: 1  •  pantoprazole (PROTONIX) 40 mg tablet, Take 1 tablet (40 mg total) by mouth daily, Disp: 90 tablet, Rfl: 1  •  Restasis 0.05 % ophthalmic emulsion, Administer 1 drop to both eyes daily as needed, Disp: , Rfl:   •  tiZANidine (ZANAFLEX) 4 mg tablet, Take 2 tablets (8 mg total) by mouth every 8 (eight) hours as needed for muscle spasms, Disp: 60 tablet, Rfl: 3  •  zolpidem (AMBIEN) 10 mg tablet, Take 10 mg by mouth daily at bedtime, Disp: , Rfl:   •  Lancets (freestyle) lancets, Daily, Disp: 100 each, Rfl: 5    Patient Active Problem List   Diagnosis   • Benign prostatic hyperplasia   • Gastroesophageal reflux disease   • History of diverticulitis   • History of renal calculi   • History of hemicolectomy   • Hypertension   • Hyperlipidemia   • Left shoulder pain   • Neck pain   • Posttraumatic stiffness of shoulder joint   • Intrinsic eczema   • Allergic  "rhinitis   • Decreased hearing of left ear   • Chronic pain of left knee   • Multiple lung nodules on CT   • Type 2 diabetes mellitus without complication, without long-term current use of insulin (HCC)   • Chronic pain syndrome   • Diabetic polyneuropathy associated with type 2 diabetes mellitus (HCC)   • Depression, recurrent (HCC)   • Epidermal cyst   • Tobacco abuse   • Chronic bronchitis (HCC)   • Vertigo   • Chronic bilateral low back pain with bilateral sciatica   • Balanitis   • Seizure-like activity ([facility])   • Syncope and collapse   • Left inguinal pain   • Acute pain of right shoulder   • Non-recurrent bilateral inguinal hernia without obstruction or gangrene   • Dizziness   • Balance disorder       Objective:  Ht 6' 1\" (1.854 m)   Wt 82.6 kg (182 lb)   BMI 24.01 kg/m²     Ortho Exam    Physical Exam  Constitutional:       Appearance: He is well-developed.   HENT:      Head: Normocephalic and atraumatic.   Eyes:      Conjunctiva/sclera: Conjunctivae normal.   Pulmonary:      Effort: Pulmonary effort is normal.   Musculoskeletal:      Cervical back: Neck supple.   Skin:     General: Skin is warm and dry.   Neurological:      Mental Status: He is alert and oriented to person, place, and time.   Psychiatric:         Behavior: Behavior normal.         Neurologic Exam     Mental Status   Oriented to person, place, and time.   Patient's with imbalance and lightheadedness       Procedures    I have personally reviewed the written report of the pertinent studies.   [facility]  Outside Information  Results  XR spine cervical complete 4 or 5 vw non injury (Order [id number])     XR spine cervical complete 4 or 5 vw non injury  Order: [id number]  Impression    IMPRESSION:      Prominent cervical degenerative disc change with multilevel foraminal  compromise, degenerative disc change and multilevel malalignment.    No acute bony cervical abnormality. There may been a remote C7 spinous tip  fracture " versus enthesopathic change.    Cannot exclude a submandibular gland stone versus metallic foreign body  projecting just lateral to the angle of the left mandible. Uncertain if there  are some faint calcifications in the deep right neck superiorly or even carotid  artery calcification.    CT with intravenous contrast would best evaluate neck soft tissue abnormalities.      XR SHOULDER 2+ VW RIGHT     INDICATION: Pain.     COMPARISON: None     FINDINGS:     No acute fracture or dislocation.     There is mild AC joint arthrosis.     No lytic or blastic osseous lesion.     Unremarkable soft tissues.     IMPRESSION:     No acute osseous abnormality.        Past Medical History:   Diagnosis Date   • Ambulates with cane    • Diabetes mellitus (HCC)    • Diverticulitis    • Fall     10/2024   • Hypertension    • Kidney stone    • Neck pain    • Renal disorder        Past Surgical History:   Procedure Laterality Date   • COLON SURGERY     • COLONOSCOPY     • NECK SURGERY      denies   • [place] LAPAROSCOPY SURG RPR INITIAL INGUINAL HERNIA Bilateral 11/14/2024    Procedure: ROBOTIC REPAIR HERNIA INGUINAL;  Surgeon: Jamel Vila MD;  Location: [facility];  Service: General       Social History     Socioeconomic History   • Marital status: /Civil Union     Spouse name: Not on file   • Number of children: Not on file   • Years of education: Not on file   • Highest education level: Not on file   Occupational History   • Not on file   Tobacco Use   • Smoking status: Every Day     Current packs/day: 0.25     Average packs/day: 0.3 packs/day for 50.4 years (12.6 ttl pk-yrs)     Types: Cigarettes, Cigars     Start date: 11/14/1974     Passive exposure: Never   • Smokeless tobacco: Never   • Tobacco comments:     1 cigar/daily stopped few days     Last smoked 11/14 half cig..   Vaping Use   • Vaping status: Never Used   Substance and Sexual Activity   • Alcohol use: Yes     Comment: occ   • Drug use: Yes     Types: Marijuana      Comment: occasional- smoke. last used 11/11   • Sexual activity: Yes     Partners: Female   Other Topics Concern   • Not on file   Social History Narrative   • Not on file     Social Drivers of Health     Financial Resource Strain: Low Risk  (2/21/2024)    Overall Financial Resource Strain (CARDIA)    • Difficulty of Paying Living Expenses: Not hard at all   Food Insecurity: No Food Insecurity (2/21/2024)    Nursing - Inadequate Food Risk Classification    • Worried About Running Out of Food in the Last Year: Never true    • Ran Out of Food in the Last Year: Never true    • Ran Out of Food in the Last Year: Not on file   Transportation Needs: Unmet Transportation Needs (2/21/2024)    PRAPARE - Transportation    • Lack of Transportation (Medical): Yes    • Lack of Transportation (Non-Medical): Yes   Physical Activity: Not on file   Stress: Stress Concern Present (2/6/2023)    [employer] [place] [employer] - Occupational Stress Questionnaire    • Feeling of Stress : To some extent   Social Connections: Not on file   Intimate Partner Violence: Not on file   Housing Stability: Low Risk  (4/29/2024)    Housing Stability Vital Sign    • Unable to Pay for Housing in the Last Year: No    • Number of Times Moved in the Last Year: 1    • Homeless in the Last Year: No       History reviewed. No pertinent family history.

## 2025-04-22 ENCOUNTER — OFFICE VISIT (OUTPATIENT)
Dept: FAMILY MEDICINE CLINIC | Facility: CLINIC | Age: 60
End: 2025-04-22

## 2025-04-22 ENCOUNTER — APPOINTMENT (OUTPATIENT)
Dept: LAB | Facility: CLINIC | Age: 60
End: 2025-04-22
Payer: MEDICARE

## 2025-04-22 VITALS
TEMPERATURE: 98.1 F | HEART RATE: 81 BPM | SYSTOLIC BLOOD PRESSURE: 124 MMHG | HEIGHT: 73 IN | BODY MASS INDEX: 25.18 KG/M2 | RESPIRATION RATE: 18 BRPM | OXYGEN SATURATION: 97 % | WEIGHT: 190 LBS | DIASTOLIC BLOOD PRESSURE: 70 MMHG

## 2025-04-22 DIAGNOSIS — K11.8 SUBMANDIBULAR GLAND MASS: ICD-10-CM

## 2025-04-22 DIAGNOSIS — R93.7 ABNORMAL X-RAY OF CERVICAL SPINE: Primary | ICD-10-CM

## 2025-04-22 DIAGNOSIS — I10 PRIMARY HYPERTENSION: Chronic | ICD-10-CM

## 2025-04-22 DIAGNOSIS — M54.2 NECK PAIN: ICD-10-CM

## 2025-04-22 DIAGNOSIS — R26.89 BALANCE PROBLEM: ICD-10-CM

## 2025-04-22 DIAGNOSIS — R42 DIZZINESS: ICD-10-CM

## 2025-04-22 DIAGNOSIS — R22.1 LOCALIZED SWELLING, MASS OR LUMP OF NECK: ICD-10-CM

## 2025-04-22 LAB
ANION GAP SERPL CALCULATED.3IONS-SCNC: 5 MMOL/L (ref 4–13)
BUN SERPL-MCNC: 17 MG/DL (ref 5–25)
CALCIUM SERPL-MCNC: 9 MG/DL (ref 8.4–10.2)
CHLORIDE SERPL-SCNC: 107 MMOL/L (ref 96–108)
CO2 SERPL-SCNC: 29 MMOL/L (ref 21–32)
CREAT SERPL-MCNC: 0.81 MG/DL (ref 0.6–1.3)
GFR SERPL CREATININE-BSD FRML MDRD: 95 ML/MIN/1.73SQ M
GLUCOSE P FAST SERPL-MCNC: 101 MG/DL (ref 65–99)
POTASSIUM SERPL-SCNC: 4.4 MMOL/L (ref 3.5–5.3)
SODIUM SERPL-SCNC: 141 MMOL/L (ref 135–147)

## 2025-04-22 PROCEDURE — 36415 COLL VENOUS BLD VENIPUNCTURE: CPT

## 2025-04-22 PROCEDURE — G2211 COMPLEX E/M VISIT ADD ON: HCPCS | Performed by: PHYSICIAN ASSISTANT

## 2025-04-22 PROCEDURE — 99214 OFFICE O/P EST MOD 30 MIN: CPT | Performed by: PHYSICIAN ASSISTANT

## 2025-04-22 PROCEDURE — 80048 BASIC METABOLIC PNL TOTAL CA: CPT

## 2025-04-22 RX ORDER — IBUPROFEN 800 MG/1
800 TABLET, FILM COATED ORAL EVERY 8 HOURS PRN
Qty: 30 TABLET | Refills: 1 | Status: SHIPPED | OUTPATIENT
Start: 2025-04-22

## 2025-04-22 RX ORDER — OXYCODONE AND ACETAMINOPHEN 5; 325 MG/1; MG/1
1 TABLET ORAL 2 TIMES DAILY PRN
Qty: 10 TABLET | Refills: 0 | Status: SHIPPED | OUTPATIENT
Start: 2025-04-22

## 2025-04-22 NOTE — PROGRESS NOTES
"Name: Fernando Fox      : 1965      MRN: [id number]  Encounter Provider: Juliana Martino PA-C  Encounter Date: 2025   Encounter department: [facility]  :  Assessment & Plan  Abnormal x-ray of cervical spine  -Reviewed ED visit from 2025, 2025.  -Reviewed cervical spine x-ray from 2025. Results showed: \"Prominent cervical degenerative disc change with multilevel foraminal   compromise, degenerative disc change and multilevel malalignment.   No acute bony cervical abnormality. There may been a remote C7 spinous tip fracture versus enthesopathic change. Cannot exclude a submandibular gland stone versus metallic foreign body projecting just lateral to the angle of the left mandible. Uncertain if there are some faint calcifications in the deep right neck superiorly or even carotid artery calcification. CT with intravenous contrast would best evaluate neck soft tissue abnormalities.\"  -Will order STAT CT soft tissue neck with contrast for further evaluation.   - Will follow up with patient once test is resulted.   - Advised to contact the office or report to ED if symptoms persist, worsen, or new symptoms arise.  Orders:    CT soft tissue neck w contrast; Future    ibuprofen (MOTRIN) 800 mg tablet; Take 1 tablet (800 mg total) by mouth every 8 (eight) hours as needed for moderate pain    Submandibular gland mass  -Reviewed ED visit from 2025, 2025.  -Reviewed cervical spine x-ray from 2025. Results showed: \"Prominent cervical degenerative disc change with multilevel foraminal   compromise, degenerative disc change and multilevel malalignment.   No acute bony cervical abnormality. There may been a remote C7 spinous tip fracture versus enthesopathic change. Cannot exclude a submandibular gland stone versus metallic foreign body projecting just lateral to the angle of the left mandible. Uncertain if there are some faint calcifications in the deep " "right neck superiorly or even carotid artery calcification. CT with intravenous contrast would best evaluate neck soft tissue abnormalities.\"  -Will order STAT CT soft tissue neck with contrast for further evaluation.   - Will follow up with patient once test is resulted.   - Advised to contact the office or report to ED if symptoms persist, worsen, or new symptoms arise.  Orders:    CT soft tissue neck w contrast; Future    ibuprofen (MOTRIN) 800 mg tablet; Take 1 tablet (800 mg total) by mouth every 8 (eight) hours as needed for moderate pain    Localized swelling, mass or lump of neck    Orders:    CT soft tissue neck w contrast; Future    ibuprofen (MOTRIN) 800 mg tablet; Take 1 tablet (800 mg total) by mouth every 8 (eight) hours as needed for moderate pain    Neck pain  -Reviewed ED visit from 4/5/2025, 4/14/2025.  -Reviewed cervical spine x-ray from 4/5/2025. Results showed: \"Prominent cervical degenerative disc change with multilevel foraminal   compromise, degenerative disc change and multilevel malalignment.   No acute bony cervical abnormality. There may been a remote C7 spinous tip fracture versus enthesopathic change. Cannot exclude a submandibular gland stone versus metallic foreign body projecting just lateral to the angle of the left mandible. Uncertain if there are some faint calcifications in the deep right neck superiorly or even carotid artery calcification. CT with intravenous contrast would best evaluate neck soft tissue abnormalities.\"  -Will order STAT CT soft tissue neck with contrast for further evaluation.   - Will follow up with patient once test is resulted.   -Will prescribe short-term refill of Percocet 5-325 mg, twice daily as needed.  - Assessed possible risk for misuse, abuse, or addiction based on patient's family and social history.  - Educated patient on possible side effects and risks with taking this medication including risks of abuse, misuse, and addiction.  - PDMP reviewed.  " "No red flags noted.  -Continue tizanidine 4 mg, every 8 hours as needed.  - Continue gabapentin 800 mg, 3 times daily.  -Continue ibuprofen 800 mg, every 8 hours as needed.  - Continue follow-up with orthopedics as scheduled.  - Advised to contact the office or report to ED if symptoms persist, worsen, or new symptoms arise.  Orders:    oxyCODONE-acetaminophen (Percocet) 5-325 mg per tablet; Take 1 tablet by mouth 2 (two) times a day as needed for moderate pain Max Daily Amount: 2 tablets    Primary hypertension    Orders:    Basic metabolic panel; Future    Balance problem  -Patient presents with 3 months of balance issues and lightheadedness/dizziness.  Per patient, he has fallen multiple times over the last several months due to these concerns.  - Will order CT head without contrast to rule out mass/CVA.  - Advised to contact the office or report to ED if symptoms persist, worsen, or new symptoms arise.  Orders:    CT head wo contrast; Future    Dizziness  -Patient presents with 3 months of balance issues and lightheadedness/dizziness.  Per patient, he has fallen multiple times over the last several months due to these concerns.  - Will order CT head without contrast to rule out mass/CVA.  - Advised to contact the office or report to ED if symptoms persist, worsen, or new symptoms arise.  Orders:    CT head wo contrast; Future           History of Present Illness     Patient is a 60 y.o. male whom  has a past medical history of Ambulates with cane, Diabetes mellitus (HCC), Diverticulitis, Fall, Hypertension, Kidney stone, Neck pain, and Renal disorder. who is seen today in office for ED visit [facility].    -Patient presented to [address] ED on 4/5/25 due to right-sided neck pain and right hip pain. Cervical spine x-ray completed and results showed: \"Prominent cervical degenerative disc change with multilevel foraminal compromise, degenerative disc change and multilevel malalignment.   No acute bony " "cervical abnormality. There may been a remote C7 spinous tip fracture versus enthesopathic change. Cannot exclude a submandibular gland stone versus metallic foreign body projecting just lateral to the angle of the left mandible. Uncertain if there are some faint calcifications in the deep right neck superiorly or even carotid artery calcification. CT with intravenous contrast would best evaluate neck soft tissue abnormalities.\"  Patient discharged home with Percocet and lidocaine patches.    -Patient then presented to [facility] ED on 4/14/25 due to chronic neck and low back pain, right hip pain.  Patient prescribed prednisone 50 mg daily x 5 days.  Patient referred to orthopedics for further evaluation.    -Today, patient notes he continues to have a lot of pain throughout the right side of his neck which radiates to his right shoulder.  Patient notes he is having a lot of pain of his right hip as well.  Patient notes he has difficulty sleeping due to this.  Patient notes the Percocet did provide some relief.  Patient notes he does have a lump located to the right of his spine in the neck area.  Patient notes at times he does experience of numbness and tingling down the right upper extremity.  He denies any associated weakness.    -Also, patient notes for the past 3 months he has been experiencing issues with his balance along with lightheadedness and dizziness.  Patient notes he has fallen multiple times over the last several months due to feeling off balance.      Review of Systems   Constitutional:  Negative for activity change, appetite change and fatigue.   HENT:  Negative for congestion and trouble swallowing.    Eyes:  Negative for photophobia and visual disturbance.   Respiratory:  Negative for chest tightness and wheezing.    Cardiovascular:  Negative for palpitations and leg swelling.   Gastrointestinal:  Negative for abdominal pain.   Genitourinary:  Negative for difficulty urinating and dysuria. " "  Musculoskeletal:  Positive for arthralgias (rt hip pain), gait problem and neck pain.   Skin:  Negative for pallor and wound.   Neurological:  Positive for dizziness, light-headedness and numbness. Negative for seizures, syncope and weakness.        Balance issues   Psychiatric/Behavioral:  Negative for self-injury, sleep disturbance and suicidal ideas. The patient is not nervous/anxious.        Objective   /70 (BP Location: Right arm, Patient Position: Sitting, Cuff Size: Standard)   Pulse 81   Temp 98.1 °F (36.7 °C) (Temporal)   Resp 18   Ht 6' 1\" (1.854 m)   Wt 86.2 kg (190 lb)   SpO2 97%   BMI 25.07 kg/m²      Physical Exam  Vitals and nursing note reviewed.   Constitutional:       General: He is not in acute distress.     Appearance: Normal appearance. He is well-developed.   HENT:      Head: Normocephalic and atraumatic.      Right Ear: External ear normal.      Left Ear: External ear normal.      Nose: Nose normal.      Mouth/Throat:      Pharynx: Uvula midline. No posterior oropharyngeal erythema.   Eyes:      Conjunctiva/sclera: Conjunctivae normal.   Neck:     Cardiovascular:      Rate and Rhythm: Normal rate and regular rhythm.      Pulses: Normal pulses.      Heart sounds: Normal heart sounds. No murmur heard.  Pulmonary:      Effort: Pulmonary effort is normal. No respiratory distress.      Breath sounds: Normal breath sounds. No wheezing.   Abdominal:      General: Bowel sounds are normal.      Palpations: Abdomen is soft.      Tenderness: There is no abdominal tenderness.   Musculoskeletal:      Right shoulder: Tenderness present. No swelling. Decreased range of motion.      Cervical back: Neck supple. Muscular tenderness present. Decreased range of motion.      Right hip: Tenderness present. Decreased range of motion.   Skin:     General: Skin is warm and dry.      Capillary Refill: Capillary refill takes less than 2 seconds.   Neurological:      General: No focal deficit present.      " Mental Status: He is alert and oriented to person, place, and time.      Sensory: Sensation is intact.      Motor: Motor function is intact. No weakness or pronator drift.      Coordination: Coordination is intact. Romberg sign negative. Coordination normal. Heel to Shin Test normal.      Deep Tendon Reflexes: Reflexes are normal and symmetric.   Psychiatric:         Speech: Speech normal.         Behavior: Behavior normal.

## 2025-04-22 NOTE — ASSESSMENT & PLAN NOTE
"-Reviewed ED visit from 4/5/2025, 4/14/2025.  -Reviewed cervical spine x-ray from 4/5/2025. Results showed: \"Prominent cervical degenerative disc change with multilevel foraminal   compromise, degenerative disc change and multilevel malalignment.   No acute bony cervical abnormality. There may been a remote C7 spinous tip fracture versus enthesopathic change. Cannot exclude a submandibular gland stone versus metallic foreign body projecting just lateral to the angle of the left mandible. Uncertain if there are some faint calcifications in the deep right neck superiorly or even carotid artery calcification. CT with intravenous contrast would best evaluate neck soft tissue abnormalities.\"  -Will order STAT CT soft tissue neck with contrast for further evaluation.   - Will follow up with patient once test is resulted.   -Will prescribe short-term refill of Percocet 5-325 mg, twice daily as needed.  - Assessed possible risk for misuse, abuse, or addiction based on patient's family and social history.  - Educated patient on possible side effects and risks with taking this medication including risks of abuse, misuse, and addiction.  - PDMP reviewed.  No red flags noted.  -Continue tizanidine 4 mg, every 8 hours as needed.  - Continue gabapentin 800 mg, 3 times daily.  -Continue ibuprofen 800 mg, every 8 hours as needed.  - Continue follow-up with orthopedics as scheduled.  - Advised to contact the office or report to ED if symptoms persist, worsen, or new symptoms arise.  Orders:    oxyCODONE-acetaminophen (Percocet) 5-325 mg per tablet; Take 1 tablet by mouth 2 (two) times a day as needed for moderate pain Max Daily Amount: 2 tablets    "

## 2025-04-23 ENCOUNTER — RESULTS FOLLOW-UP (OUTPATIENT)
Dept: FAMILY MEDICINE CLINIC | Facility: CLINIC | Age: 60
End: 2025-04-23

## 2025-04-23 DIAGNOSIS — F33.9 DEPRESSION, RECURRENT (HCC): Chronic | ICD-10-CM

## 2025-04-23 RX ORDER — BUPROPION HYDROCHLORIDE 200 MG/1
200 TABLET, EXTENDED RELEASE ORAL EVERY MORNING
Qty: 30 TABLET | Refills: 1 | Status: SHIPPED | OUTPATIENT
Start: 2025-04-23

## 2025-04-23 NOTE — ADDENDUM NOTE
Addended by: GERALDINE AMADOR on: 4/23/2025 02:28 PM     Modules accepted: Orders, Level of Service

## 2025-04-23 NOTE — ASSESSMENT & PLAN NOTE
-Patient presents with 3 months of balance issues and lightheadedness/dizziness.  Per patient, he has fallen multiple times over the last several months due to these concerns.  - Will order CT head without contrast to rule out mass/CVA.  - Advised to contact the office or report to ED if symptoms persist, worsen, or new symptoms arise.  Orders:    CT head wo contrast; Future

## 2025-04-24 ENCOUNTER — HOSPITAL ENCOUNTER (OUTPATIENT)
Dept: CT IMAGING | Facility: HOSPITAL | Age: 60
End: 2025-04-24
Attending: PHYSICIAN ASSISTANT
Payer: MEDICARE

## 2025-04-24 DIAGNOSIS — K11.8 SUBMANDIBULAR GLAND MASS: ICD-10-CM

## 2025-04-24 DIAGNOSIS — R22.1 LOCALIZED SWELLING, MASS OR LUMP OF NECK: ICD-10-CM

## 2025-04-24 DIAGNOSIS — R93.7 ABNORMAL X-RAY OF CERVICAL SPINE: ICD-10-CM

## 2025-04-24 PROCEDURE — 70491 CT SOFT TISSUE NECK W/DYE: CPT

## 2025-04-24 RX ADMIN — IOHEXOL 90 ML: 350 INJECTION, SOLUTION INTRAVENOUS at 14:55

## 2025-04-25 ENCOUNTER — OFFICE VISIT (OUTPATIENT)
Dept: OBGYN CLINIC | Facility: MEDICAL CENTER | Age: 60
End: 2025-04-25
Payer: MEDICARE

## 2025-04-25 ENCOUNTER — APPOINTMENT (OUTPATIENT)
Dept: RADIOLOGY | Facility: MEDICAL CENTER | Age: 60
End: 2025-04-25
Attending: EMERGENCY MEDICINE
Payer: MEDICARE

## 2025-04-25 VITALS — WEIGHT: 190 LBS | HEIGHT: 73 IN | BODY MASS INDEX: 25.18 KG/M2

## 2025-04-25 DIAGNOSIS — M25.551 PAIN IN RIGHT HIP: Primary | ICD-10-CM

## 2025-04-25 DIAGNOSIS — M47.812 CERVICAL SPONDYLOSIS: ICD-10-CM

## 2025-04-25 DIAGNOSIS — M54.2 CHRONIC NECK PAIN: ICD-10-CM

## 2025-04-25 DIAGNOSIS — G89.29 CHRONIC RIGHT-SIDED LOW BACK PAIN WITHOUT SCIATICA: ICD-10-CM

## 2025-04-25 DIAGNOSIS — G89.29 CHRONIC NECK PAIN: ICD-10-CM

## 2025-04-25 DIAGNOSIS — M54.50 CHRONIC RIGHT-SIDED LOW BACK PAIN WITHOUT SCIATICA: ICD-10-CM

## 2025-04-25 DIAGNOSIS — M47.816 LUMBAR SPONDYLOSIS: ICD-10-CM

## 2025-04-25 DIAGNOSIS — M25.551 PAIN IN RIGHT HIP: ICD-10-CM

## 2025-04-25 DIAGNOSIS — E11.42 DIABETIC POLYNEUROPATHY ASSOCIATED WITH TYPE 2 DIABETES MELLITUS (HCC): ICD-10-CM

## 2025-04-25 PROCEDURE — 73502 X-RAY EXAM HIP UNI 2-3 VIEWS: CPT

## 2025-04-25 PROCEDURE — 99214 OFFICE O/P EST MOD 30 MIN: CPT | Performed by: EMERGENCY MEDICINE

## 2025-04-25 RX ORDER — METHOCARBAMOL 750 MG/1
750 TABLET, FILM COATED ORAL EVERY 6 HOURS PRN
COMMUNITY
Start: 2025-04-06

## 2025-04-25 NOTE — PROGRESS NOTES
Assessment/Plan:    Diagnoses and all orders for this visit:    Pain in right hip  -     XR hip/pelv 2-3 vws right if performed; Future  -     Ambulatory Referral to Physical Therapy; Future    Lumbar spondylosis  -     Ambulatory Referral to Physical Therapy; Future    Chronic right-sided low back pain without sciatica  -     Ambulatory Referral to Physical Therapy; Future    Diabetic polyneuropathy associated with type 2 diabetes mellitus (HCC)    Chronic neck pain  -     Ambulatory Referral to Physical Therapy; Future    Cervical spondylosis  -     Ambulatory Referral to Physical Therapy; Future    Other orders  -     methocarbamol (ROBAXIN) 750 mg tablet; Take 750 mg by mouth every 6 (six) hours as needed for muscle spasms    He is not interested in injections, he prefers surgery if needed.  Hx CESIs.  Reviewed Xrays C spine showing diffuse degenerative changes  Right LBP reviewed CT A/P showing degenerative changes L spine  Obtained x-ray of the right hip today in office.  Refer to PT for neck and back, s/p ORAL STEROIDS  Instructed to schedule CT Head as previously discussed  T/c MRIs referral to spine surgeon    Return in about 6 weeks (around 6/6/2025).      Subjective:   Patient ID: Fernando Fox is a 60 y.o. male.    Fernando presents for right LBP typically with walking, notes right leg rotates outward and will experience falls.    States he had a prior work injury and broke his neck in the past.  Notes prior neck injections 22 years ago.   After last evaluation when we discussed going immediately to the ED for imaging particularly CT Neck soft tissue and CT head, he did not go.  However PCP CARMITA BRANCH did order imaging and Fernando obtained CT Neck soft tissue.       Initial note:  New patient presents for 3 months of Right posterior neck pain and lump which is painful to the touch.  However he also describes issues with his balance over the last 3 months where he has been falling he feels dizzy and  lightheaded and this seems to be his biggest complaint.  Recently evaluated in the emergency department twice with imaging and prescription medications including oral PREDNISONE  History of evaluation with pain management for which they discussed left AC joint injection under ultrasound guidance and also ZEB under fluoro guidance.           Review of Systems    The following portions of the patient's chart were reviewed and updated as appropriate:   Allergy:  No Known Allergies    Medications:    Current Outpatient Medications:     acetaminophen (TYLENOL) 500 mg tablet, Take 1 tablet (500 mg total) by mouth every 6 (six) hours as needed for mild pain, Disp: 30 tablet, Rfl: 0    albuterol (PROVENTIL HFA,VENTOLIN HFA) 90 mcg/act inhaler, Inhale 1-2 puffs every 6 (six) hours as needed for wheezing, Disp: 18 g, Rfl: 1    ARIPiprazole (ABILIFY) 10 mg tablet, Take 10 mg by mouth daily at bedtime, Disp: , Rfl:     aspirin (ECOTRIN LOW STRENGTH) 81 mg EC tablet, Take 1 tablet (81 mg total) by mouth daily, Disp: 90 tablet, Rfl: 1    atorvastatin (LIPITOR) 40 mg tablet, Take 1 tablet (40 mg total) by mouth daily, Disp: 90 tablet, Rfl: 1    benzonatate (TESSALON PERLES) 100 mg capsule, Take 1 capsule (100 mg total) by mouth 3 (three) times a day as needed for cough, Disp: 30 capsule, Rfl: 1    buPROPion (WELLBUTRIN SR) 200 MG 12 hr tablet, Take 1 tablet (200 mg total) by mouth every morning, Disp: 30 tablet, Rfl: 1    cloNIDine (CATAPRES) 0.2 mg tablet, Take 0.2 mg by mouth daily at bedtime, Disp: , Rfl:     Continuous Glucose  (FreeStyle Shira 2 [place]) SHANELL, Scan every 8 hours. E11.9, Disp: 1 each, Rfl: 0    Continuous Glucose Sensor (FreeStyle Shira 2 Sensor) MISC, Scan every 8 hours. E11.9, Disp: 2 each, Rfl: 5    Cyanocobalamin (VITAMIN B 12 PO), Take by mouth, Disp: , Rfl:     dextromethorphan-guaifenesin (MUCINEX DM)  MG per 12 hr tablet, Take 1 tablet by mouth 2 (two) times a day as needed for cough,  Disp: 30 tablet, Rfl: 0    doxepin (SINEquan) 150 MG capsule, Take 150 mg by mouth daily at bedtime, Disp: , Rfl:     fluticasone (FLONASE) 50 mcg/act nasal spray, 1 spray into each nostril daily, Disp: 16 g, Rfl: 2    gabapentin (NEURONTIN) 800 mg tablet, Take 1 tablet (800 mg total) by mouth 3 (three) times a day, Disp: 90 tablet, Rfl: 2    glucose blood (FREESTYLE LITE) test strip, Testing sugars once a day, Disp: 100 each, Rfl: 3    hydrocortisone 1 % ointment, Apply topically 2 (two) times a day, Disp: 30 g, Rfl: 0    hydrOXYzine HCL (ATARAX) 50 mg tablet, Take 50 mg by mouth daily at bedtime, Disp: , Rfl:     ibuprofen (MOTRIN) 800 mg tablet, Take 1 tablet (800 mg total) by mouth every 8 (eight) hours as needed for moderate pain, Disp: 30 tablet, Rfl: 1    Lancets (freestyle) lancets, Daily, Disp: 100 each, Rfl: 5    lidocaine (XYLOCAINE) 2 % topical gel, Apply topically as needed for mild pain, Disp: 30 mL, Rfl: 2    linaGLIPtin 5 MG TABS, Take 5 mg by mouth daily with or without food, Disp: 90 tablet, Rfl: 1    metFORMIN (GLUCOPHAGE-XR) 500 mg 24 hr tablet, Take 2 tablets (1,000 mg total) by mouth daily with dinner, Disp: 60 tablet, Rfl: 1    methocarbamol (ROBAXIN) 750 mg tablet, Take 750 mg by mouth every 6 (six) hours as needed for muscle spasms, Disp: , Rfl:     oxyCODONE-acetaminophen (Percocet) 5-325 mg per tablet, Take 1 tablet by mouth 2 (two) times a day as needed for moderate pain Max Daily Amount: 2 tablets, Disp: 10 tablet, Rfl: 0    pantoprazole (PROTONIX) 40 mg tablet, Take 1 tablet (40 mg total) by mouth daily, Disp: 90 tablet, Rfl: 1    Restasis 0.05 % ophthalmic emulsion, Administer 1 drop to both eyes daily as needed, Disp: , Rfl:     tiZANidine (ZANAFLEX) 4 mg tablet, Take 2 tablets (8 mg total) by mouth every 8 (eight) hours as needed for muscle spasms, Disp: 60 tablet, Rfl: 3    zolpidem (AMBIEN) 10 mg tablet, Take 10 mg by mouth daily at bedtime, Disp: , Rfl:     Patient Active Problem  "List   Diagnosis    Benign prostatic hyperplasia    Gastroesophageal reflux disease    History of diverticulitis    History of renal calculi    History of hemicolectomy    Hypertension    Hyperlipidemia    Left shoulder pain    Neck pain    Posttraumatic stiffness of shoulder joint    Intrinsic eczema    Allergic rhinitis    Decreased hearing of left ear    Chronic pain of left knee    Multiple lung nodules on CT    Type 2 diabetes mellitus without complication, without long-term current use of insulin (HCC)    Chronic pain syndrome    Diabetic polyneuropathy associated with type 2 diabetes mellitus (HCC)    Depression, recurrent (HCC)    Epidermal cyst    Tobacco abuse    Chronic bronchitis (HCC)    Vertigo    Chronic bilateral low back pain with bilateral sciatica    Balanitis    Seizure-like activity (HCC)    Syncope and collapse    Left inguinal pain    Acute pain of right shoulder    Non-recurrent bilateral inguinal hernia without obstruction or gangrene    Dizziness    Balance disorder       Objective:  Ht 6' 1\" (1.854 m)   Wt 86.2 kg (190 lb)   BMI 25.07 kg/m²     Right Hip Exam     Range of Motion   The patient has normal right hip ROM.      Left Hip Exam     Range of Motion   The patient has normal left hip ROM.      Back Exam     Muscle Strength   The patient has normal back strength.    Tests   Straight leg raise right: negative    Reflexes   Patellar:  normal    Other   Toe walk: normal  Heel walk: normal            Physical Exam  Musculoskeletal:      Lumbar back: Negative right straight leg raise test.           Neurologic Exam    Procedures    I have personally reviewed pertinent films in PACS and my interpretation is Xrays Right Hip:  No acute findings such as fracture or dislocation, no significant degenerative changes or osseous lesions  .      CT A/P images reviewed        Past Medical History:   Diagnosis Date    Ambulates with cane     Diabetes mellitus (HCC)     Diverticulitis     Fall     " 10/2024    Hypertension     Kidney stone     Neck pain     Renal disorder        Past Surgical History:   Procedure Laterality Date    COLON SURGERY      COLONOSCOPY      NECK SURGERY      denies    LA LAPAROSCOPY SURG RPR INITIAL INGUINAL HERNIA Bilateral 11/14/2024    Procedure: ROBOTIC REPAIR HERNIA INGUINAL;  Surgeon: Jamel Vila MD;  Location: [facility];  Service: General       Social History     Socioeconomic History    Marital status: /Civil Union     Spouse name: Not on file    Number of children: Not on file    Years of education: Not on file    Highest education level: Not on file   Occupational History    Not on file   Tobacco Use    Smoking status: Every Day     Current packs/day: 0.25     Average packs/day: 0.3 packs/day for 50.4 years (12.6 ttl pk-yrs)     Types: Cigarettes, Cigars     Start date: 11/14/1974     Passive exposure: Never    Smokeless tobacco: Never    Tobacco comments:     1 cigar/daily stopped few days     Last smoked 11/14 half cig..   Vaping Use    Vaping status: Never Used   Substance and Sexual Activity    Alcohol use: Yes     Comment: occ    Drug use: Yes     Types: Marijuana     Comment: occasional- smoke. last used 11/11    Sexual activity: Yes     Partners: Female   Other Topics Concern    Not on file   Social History Narrative    Not on file     Social Drivers of Health     Financial Resource Strain: Low Risk  (2/21/2024)    Overall Financial Resource Strain (CARDIA)     Difficulty of Paying Living Expenses: Not hard at all   Food Insecurity: No Food Insecurity (2/21/2024)    Nursing - Inadequate Food Risk Classification     Worried About Running Out of Food in the Last Year: Never true     Ran Out of Food in the Last Year: Never true     Ran Out of Food in the Last Year: Not on file   Transportation Needs: Unmet Transportation Needs (2/21/2024)    PRAPARE - Transportation     Lack of Transportation (Medical): Yes     Lack of Transportation (Non-Medical): Yes    Physical Activity: Not on file   Stress: Stress Concern Present (2/6/2023)    [employer] [place] [employer] - Occupational Stress Questionnaire     Feeling of Stress : To some extent   Social Connections: Not on file   Intimate Partner Violence: Not on file   Housing Stability: Low Risk  (4/29/2024)    Housing Stability Vital Sign     Unable to Pay for Housing in the Last Year: No     Number of Times Moved in the Last Year: 1     Homeless in the Last Year: No       History reviewed. No pertinent family history.

## 2025-04-30 ENCOUNTER — OFFICE VISIT (OUTPATIENT)
Dept: FAMILY MEDICINE CLINIC | Facility: CLINIC | Age: 60
End: 2025-04-30

## 2025-04-30 VITALS
WEIGHT: 192 LBS | OXYGEN SATURATION: 98 % | RESPIRATION RATE: 16 BRPM | HEIGHT: 73 IN | DIASTOLIC BLOOD PRESSURE: 62 MMHG | TEMPERATURE: 97.8 F | BODY MASS INDEX: 25.45 KG/M2 | SYSTOLIC BLOOD PRESSURE: 118 MMHG | HEART RATE: 79 BPM

## 2025-04-30 DIAGNOSIS — G89.29 CHRONIC NECK PAIN: ICD-10-CM

## 2025-04-30 DIAGNOSIS — E11.9 TYPE 2 DIABETES MELLITUS WITHOUT COMPLICATION, WITHOUT LONG-TERM CURRENT USE OF INSULIN (HCC): ICD-10-CM

## 2025-04-30 DIAGNOSIS — M54.2 CHRONIC NECK PAIN: ICD-10-CM

## 2025-04-30 DIAGNOSIS — M47.812 CERVICAL SPONDYLOSIS: Primary | ICD-10-CM

## 2025-04-30 PROCEDURE — 99213 OFFICE O/P EST LOW 20 MIN: CPT | Performed by: PHYSICIAN ASSISTANT

## 2025-04-30 PROCEDURE — G2211 COMPLEX E/M VISIT ADD ON: HCPCS | Performed by: PHYSICIAN ASSISTANT

## 2025-04-30 NOTE — PROGRESS NOTES
"Name: Fernando Fox      : 1965      MRN: [id number]  Encounter Provider: Juliana Martino PA-C  Encounter Date: 2025   Encounter department: [facility]  :  Assessment & Plan  Type 2 diabetes mellitus without complication, without long-term current use of insulin ([facility])    Lab Results   Component Value Date    HGBA1C 5.9 2025       Orders:    Continuous Glucose Sensor (FreeStyle Shira 2 Sensor) MISC; Scan every 8 hours. E11.9    Cervical spondylosis  -Reviewed ED visit from 2025, 2025.  -Reviewed cervical spine x-ray from 2025. Results showed: \"Prominent cervical degenerative disc change with multilevel foraminal compromise, degenerative disc change and multilevel malalignment. No acute bony cervical abnormality. There may been a remote C7 spinous tip fracture versus enthesopathic change. Cannot exclude a submandibular gland stone versus metallic foreign body projecting just lateral to the angle of the left mandible. Uncertain if there are some faint calcifications in the deep right neck superiorly or even carotid artery calcification. CT with intravenous contrast would best evaluate neck soft tissue abnormalities.\"  -Reviewed CT soft tissue neck with contrast from 2025. Results show: \"Multilevel cervical degenerative disc disease. Grade 1 degenerative anterolisthesis is noted at the C2-3 and C3-4 levels. There is degenerative disc disease with disc space narrowing at the C4-5 and C5-6 levels. Multilevel facet arthrosis.  Punctate punctate densely calcified lesion within the deep subcutaneous region overlying the inferolateral aspect of the left masseter muscle corresponding to the radiographic abnormality. There is no surrounding soft tissue density to suggest a salivary duct stone. There is no suspicious mucosal lesion.\"  -Reviewed orthopedic note from 2025.  Patient referred to physical therapy.  - Recommend patient schedule appointment " for physical therapy, however patient declines.  Patient reports history of neck injections and physical therapy and reports no relief.  He prefers to establish with neurosurgeon.  Referral placed today.  Orders:    Ambulatory Referral to Neurosurgery; Future    Chronic neck pain    Orders:    Ambulatory Referral to Neurosurgery; Future           History of Present Illness     Patient is a 60 y.o. male whom  has a past medical history of Ambulates with cane, Diabetes mellitus (HCC), Diverticulitis, Fall, Hypertension, Kidney stone, Neck pain, and Renal disorder. who is seen today in office for neck pain follow up.    -Patient notes he did have follow-up with orthopedics for his neck pain and has recommended to establish with physical therapy.  Patient notes he does not wish to establish with physical therapy because he has tried physical therapy in the past for his neck and it only caused more pain.  Patient notes he also had injections of his neck in [place] about 15 years ago and they were not helpful.  He is not interested in any further injections.  He is requesting to be evaluated by a surgeon.    Review of Systems   Constitutional:  Negative for activity change, appetite change and fatigue.   HENT:  Negative for congestion and trouble swallowing.    Eyes:  Negative for photophobia and visual disturbance.   Respiratory:  Negative for chest tightness and wheezing.    Cardiovascular:  Negative for palpitations and leg swelling.   Gastrointestinal:  Negative for abdominal pain.   Genitourinary:  Negative for difficulty urinating and dysuria.   Musculoskeletal:  Positive for arthralgias (rt hip pain), gait problem and neck pain.   Skin:  Negative for pallor and wound.   Neurological:  Positive for dizziness, light-headedness and numbness. Negative for seizures, syncope and weakness.        Balance issues   Psychiatric/Behavioral:  Negative for self-injury, sleep disturbance and suicidal ideas. The patient is  "not nervous/anxious.        Objective   /62 (BP Location: Right arm, Patient Position: Sitting, Cuff Size: Standard)   Pulse 79   Temp 97.8 °F (36.6 °C) (Temporal)   Resp 16   Ht 6' 1\" (1.854 m)   Wt 87.1 kg (192 lb)   SpO2 98%   BMI 25.33 kg/m²      Physical Exam  Vitals and nursing note reviewed.   Constitutional:       General: He is not in acute distress.     Appearance: Normal appearance. He is well-developed.   HENT:      Head: Normocephalic and atraumatic.      Right Ear: External ear normal.      Left Ear: External ear normal.      Nose: Nose normal.      Mouth/Throat:      Pharynx: Uvula midline. No posterior oropharyngeal erythema.   Eyes:      Conjunctiva/sclera: Conjunctivae normal.   Neck:     Cardiovascular:      Rate and Rhythm: Normal rate and regular rhythm.      Pulses: Normal pulses.      Heart sounds: Normal heart sounds. No murmur heard.  Pulmonary:      Effort: Pulmonary effort is normal. No respiratory distress.      Breath sounds: Normal breath sounds. No wheezing.   Abdominal:      General: Bowel sounds are normal.      Palpations: Abdomen is soft.      Tenderness: There is no abdominal tenderness.   Musculoskeletal:      Right shoulder: Tenderness present. No swelling. Decreased range of motion.      Cervical back: Neck supple. Muscular tenderness present. Decreased range of motion.      Right hip: Tenderness present. Decreased range of motion.   Skin:     General: Skin is warm and dry.      Capillary Refill: Capillary refill takes less than 2 seconds.   Neurological:      General: No focal deficit present.      Mental Status: He is alert and oriented to person, place, and time.      Sensory: Sensation is intact.      Motor: Motor function is intact. No weakness or pronator drift.      Coordination: Coordination is intact. Romberg sign negative. Coordination normal. Heel to Shin Test normal.      Deep Tendon Reflexes: Reflexes are normal and symmetric.   Psychiatric:         " Speech: Speech normal.         Behavior: Behavior normal.

## 2025-04-30 NOTE — ASSESSMENT & PLAN NOTE
Lab Results   Component Value Date    HGBA1C 5.9 01/24/2025       Orders:    Continuous Glucose Sensor (FreeStyle Shira 2 Sensor) MISC; Scan every 8 hours. E11.9

## 2025-05-04 PROBLEM — M47.812 CERVICAL SPONDYLOSIS: Status: ACTIVE | Noted: 2025-05-04

## 2025-05-04 NOTE — ASSESSMENT & PLAN NOTE
"-Reviewed ED visit from 4/5/2025, 4/14/2025.  -Reviewed cervical spine x-ray from 4/5/2025. Results showed: \"Prominent cervical degenerative disc change with multilevel foraminal compromise, degenerative disc change and multilevel malalignment. No acute bony cervical abnormality. There may been a remote C7 spinous tip fracture versus enthesopathic change. Cannot exclude a submandibular gland stone versus metallic foreign body projecting just lateral to the angle of the left mandible. Uncertain if there are some faint calcifications in the deep right neck superiorly or even carotid artery calcification. CT with intravenous contrast would best evaluate neck soft tissue abnormalities.\"  -Reviewed CT soft tissue neck with contrast from 4/24/2025. Results show: \"Multilevel cervical degenerative disc disease. Grade 1 degenerative anterolisthesis is noted at the C2-3 and C3-4 levels. There is degenerative disc disease with disc space narrowing at the C4-5 and C5-6 levels. Multilevel facet arthrosis.  Punctate punctate densely calcified lesion within the deep subcutaneous region overlying the inferolateral aspect of the left masseter muscle corresponding to the radiographic abnormality. There is no surrounding soft tissue density to suggest a salivary duct stone. There is no suspicious mucosal lesion.\"  -Reviewed orthopedic note from 4/25/2025.  Patient referred to physical therapy.  - Recommend patient schedule appointment for physical therapy, however patient declines.  Patient reports history of neck injections and physical therapy and reports no relief.  He prefers to establish with neurosurgeon.  Referral placed today.  Orders:    Ambulatory Referral to Neurosurgery; Future    "

## 2025-05-05 ENCOUNTER — OFFICE VISIT (OUTPATIENT)
Dept: NEUROSURGERY | Facility: CLINIC | Age: 60
End: 2025-05-05
Attending: PHYSICIAN ASSISTANT
Payer: MEDICARE

## 2025-05-05 VITALS
OXYGEN SATURATION: 99 % | WEIGHT: 192 LBS | DIASTOLIC BLOOD PRESSURE: 76 MMHG | TEMPERATURE: 97.7 F | BODY MASS INDEX: 25.45 KG/M2 | SYSTOLIC BLOOD PRESSURE: 128 MMHG | HEIGHT: 73 IN | HEART RATE: 69 BPM

## 2025-05-05 DIAGNOSIS — M47.812 CERVICAL SPONDYLOSIS: ICD-10-CM

## 2025-05-05 DIAGNOSIS — R26.81 GAIT INSTABILITY: Primary | ICD-10-CM

## 2025-05-05 DIAGNOSIS — G89.29 CHRONIC NECK PAIN: ICD-10-CM

## 2025-05-05 DIAGNOSIS — M54.2 CHRONIC NECK PAIN: ICD-10-CM

## 2025-05-05 PROCEDURE — 99205 OFFICE O/P NEW HI 60 MIN: CPT | Performed by: PHYSICIAN ASSISTANT

## 2025-05-05 NOTE — ASSESSMENT & PLAN NOTE
Patient presents for evaluation of worsening chronic neck pain over the past few weeks, now associated with multiple falls  Pain primary right sided radiating to right shoulder and occasionally into right  hand  H/o neck fracture 25 years ago; per patient surgery was recommended but he did not proceed and chose conservative management instead  + PT and injections in the past in [place] but nothing recent    Imaging:  CT soft tissue neck with contrast, 4/24/25: Multilevel cervical degenerative disc disease. Grade 1 degenerative anterolisthesis is noted at the C2-3 and C3-4 levels. There is degenerative disc disease with disc space narrowing at the C4-5 and C5-6 levels. Multilevel facet arthrosis.    Plan:  Reviewed CT with the patient and his wife, showing anterolisthesis at C3 3 4 as well as disc space narrowing at C4-5 and C5-6.  We discussed that his neck pain is most likely coming from these degenerative findings, especially considering the chronicity of his symptoms.  We did discuss a retrial of physical therapy and injections.  The patient is not willing to undergo these conservative therapies as he has done them in the past with no improvement in his pain.  Specifically, he states that PT significantly worsened his neck pain  Given history of neck fracture and recent falls, will obtain MRI cervical spine to evaluate for foraminal stenosis as well as canal stenosis  Follow-up after MRI.  Patient is adamant that he would like surgery.  We discussed that this will be discussed further based off of MRI findings.  If MRI shows surgical pathology, will refer to a surgeon at that time.    Orders:    Ambulatory Referral to Neurosurgery    MRI cervical spine without contrast; Future

## 2025-05-05 NOTE — PROGRESS NOTES
Name: Fernando Fox      : 1965      MRN: [id number]  Encounter Provider: Julio César Smith PA-C  Encounter Date: 2025   Encounter department: [facility]  :  Assessment & Plan  Cervical spondylosis  Patient presents for evaluation of worsening chronic neck pain over the past few weeks, now associated with multiple falls  Pain primary right sided radiating to right shoulder and occasionally into right  hand  H/o neck fracture 25 years ago; per patient surgery was recommended but he did not proceed and chose conservative management instead  + PT and injections in the past in [place] but nothing recent    Imaging:  CT soft tissue neck with contrast, 25: Multilevel cervical degenerative disc disease. Grade 1 degenerative anterolisthesis is noted at the C2-3 and C3-4 levels. There is degenerative disc disease with disc space narrowing at the C4-5 and C5-6 levels. Multilevel facet arthrosis.    Plan:  Reviewed CT with the patient and his wife, showing anterolisthesis at C3 3 4 as well as disc space narrowing at C4-5 and C5-6.  We discussed that his neck pain is most likely coming from these degenerative findings, especially considering the chronicity of his symptoms.  We did discuss a retrial of physical therapy and injections.  The patient is not willing to undergo these conservative therapies as he has done them in the past with no improvement in his pain.  Specifically, he states that PT significantly worsened his neck pain  Given history of neck fracture and recent falls, will obtain MRI cervical spine to evaluate for foraminal stenosis as well as canal stenosis  Follow-up after MRI.  Patient is adamant that he would like surgery.  We discussed that this will be discussed further based off of MRI findings.  If MRI shows surgical pathology, will refer to a surgeon at that time.    Orders:    Ambulatory Referral to Neurosurgery    MRI cervical spine without  "contrast; Future    Chronic neck pain    Orders:    Ambulatory Referral to Neurosurgery    MRI cervical spine without contrast; Future    Gait instability    Orders:    MRI cervical spine without contrast; Future        History of Present Illness       This is a 60-year-old male with a past medical history significant for cervical fracture 25 years ago after trauma, diabetes with A1c less than 6, hypertension who presents today for evaluation of worsening of chronic neck pain.  He states that the pain began approximately 25 years ago after a trauma in [place].  At that time, he was recommended to undergo surgery for his neck fracture.  He elected to pursue conservative treatment instead as there was no guarantee surgery would give him significant improvement.  He has had neck pain radiating into his right shoulder for many years.  This is moderately controlled with medication including opioids, muscle relaxers, gabapentin.  He did do physical therapy and multiple injections in [place] with no improvement.  He states that the pain starts at the base of his head and radiates down the right side of his neck and into his right shoulder.  He does have deltoid weakness on exam.  He denies any difficulty with fine motor skills, dropping objects, bowel or bladder dysfunction.  He does have multiple falls recently and feels unsteady on his feet at times.         Review of Systems   Musculoskeletal:  Positive for gait problem and neck pain.        Right sided neck pain into right arm -- stiffness  NT in right leg  Weakness in right hand  Right shoulder, hip and leg locks up - causes falls  Unsteadiness  Right ear sounds muffled, \"airplane ears\"    No PT - does not want   Hx of STEFANY in [place] - does not want anymore injections  Pt wants surgery.   Neurological:  Positive for dizziness (diabetic), weakness and numbness.   All other systems reviewed and are negative.    I have personally reviewed the MA's review of systems and " made changes as necessary.    Past Medical History   Past Medical History:   Diagnosis Date    Ambulates with cane     Diabetes mellitus (HCC)     Diverticulitis     Fall     10/2024    Hypertension     Kidney stone     Neck pain     Renal disorder      Past Surgical History:   Procedure Laterality Date    COLON SURGERY      COLONOSCOPY      NECK SURGERY      denies    MS LAPAROSCOPY SURG RPR INITIAL INGUINAL HERNIA Bilateral 11/14/2024    Procedure: ROBOTIC REPAIR HERNIA INGUINAL;  Surgeon: Jamel Vila MD;  Location: [facility];  Service: General     History reviewed. No pertinent family history.  he reports that he has been smoking cigarettes and cigars. He started smoking about 50 years ago. He has a 12.6 pack-year smoking history. He has never been exposed to tobacco smoke. He has never used smokeless tobacco. He reports current alcohol use. He reports current drug use. Drug: Marijuana.  Current Outpatient Medications   Medication Instructions    acetaminophen (TYLENOL) 500 mg, Oral, Every 6 hours PRN    albuterol (PROVENTIL HFA,VENTOLIN HFA) 90 mcg/act inhaler 1-2 puffs, Inhalation, Every 6 hours PRN    ARIPiprazole (ABILIFY) 10 mg, Daily at bedtime    aspirin (ECOTRIN LOW STRENGTH) 81 mg, Oral, Daily    atorvastatin (LIPITOR) 40 mg, Oral, Daily    benzonatate (TESSALON PERLES) 100 mg, Oral, 3 times daily PRN    buPROPion (WELLBUTRIN SR) 200 mg, Oral, Every morning    cloNIDine (CATAPRES) 0.2 mg, Daily at bedtime    Continuous Glucose  (FreeStyle Shira 2 [place]) SHANELL Scan every 8 hours. E11.9    Continuous Glucose Sensor (FreeStyle Shira 2 Sensor) MISC Scan every 8 hours. E11.9    Cyanocobalamin (VITAMIN B 12 PO) Take by mouth    dextromethorphan-guaifenesin (MUCINEX DM)  MG per 12 hr tablet 1 tablet, Oral, 2 times daily PRN    doxepin (SINEQUAN) 150 mg, Daily at bedtime    fluticasone (FLONASE) 50 mcg/act nasal spray 1 spray, Nasal, Daily    gabapentin (NEURONTIN) 800 mg, Oral, 3 times daily  "   glucose blood (FREESTYLE LITE) test strip Testing sugars once a day    hydrocortisone 1 % ointment Topical, 2 times daily    hydrOXYzine HCL (ATARAX) 50 mg, Daily at bedtime    ibuprofen (MOTRIN) 800 mg, Oral, Every 8 hours PRN    Lancets (freestyle) lancets Daily    lidocaine (XYLOCAINE) 2 % topical gel Topical, As needed    linaGLIPtin 5 mg, Oral, Daily, with or without food    metFORMIN (GLUCOPHAGE-XR) 1,000 mg, Oral, Daily with dinner    methocarbamol (ROBAXIN) 750 mg, Every 6 hours PRN    oxyCODONE-acetaminophen (Percocet) 5-325 mg per tablet 1 tablet, Oral, 2 times daily PRN    pantoprazole (PROTONIX) 40 mg, Oral, Daily    Restasis 0.05 % ophthalmic emulsion 1 drop, Daily PRN    tiZANidine (ZANAFLEX) 8 mg, Oral, Every 8 hours PRN    zolpidem (AMBIEN) 10 mg, Daily at bedtime   No Known Allergies   Objective   /76 (BP Location: Left arm, Patient Position: Sitting, Cuff Size: Adult)   Pulse 69   Temp 97.7 °F (36.5 °C) (Temporal)   Ht 6' 1\" (1.854 m)   Wt 87.1 kg (192 lb)   SpO2 99%   BMI 25.33 kg/m²     Physical Exam  Neurological Exam    General appearance: alert, appears stated age, cooperative and no distress  Head: Normocephalic, without obvious abnormality, atraumatic  Eyes: EOMI, PERRL, conjugate gaze  Neck: TTP R paraspinal muscles. Limited ROM looking up and to the right.  Back: no kyphosis present, no tenderness to percussion or palpation  Lungs: non labored breathing  Heart: regular heart rate  Neurologic:   Mental status: Alert, oriented x3, thought content appropriate, speech clear and fluent  Cranial nerves: grossly intact (Cranial nerves II-XII)  Sensory: normal to LT x4  Motor: 4/5 R deltoid. Otherwise, 5/5 throughout  Reflexes: 2+ and symmetric. No hoffmans   Gait normal.     Radiology Results Review: I personally reviewed the following image studies in PACS and associated radiology reports: CT soft tissue neck. My interpretation of the radiology images/reports is: as " above.    Administrative Statements   I have spent a total time of 60 minutes in caring for this patient on the day of the visit/encounter including Diagnostic results, Prognosis, Risks and benefits of tx options, Instructions for management, Patient and family education, Importance of tx compliance, Risk factor reductions, Impressions, Counseling / Coordination of care, Documenting in the medical record, Reviewing/placing orders in the medical record (including tests, medications, and/or procedures), Obtaining or reviewing history  , and Communicating with other healthcare professionals .

## 2025-05-07 ENCOUNTER — TELEPHONE (OUTPATIENT)
Dept: FAMILY MEDICINE CLINIC | Facility: CLINIC | Age: 60
End: 2025-05-07

## 2025-05-07 NOTE — TELEPHONE ENCOUNTER
Pt reached out to their  ([facility]) to request an extension of their caregiver hours.   [facility] is requesting clarification on the Pt's neck condition and how it affects his ambulation.   They want to know if this is a temporary request or more long-term. Requests to speak regarding diagnosis, and any medication changes.     Their call back number is [phone]    Please Advise,   Thank you!       Vm:   Hello, my name is Radha. I am the  for mutual participant Fernando Fox. The birth date is [birth year]. Mela gave me a call yesterday. He was trying to explain to me whatever this new condition is, something he's calling it fat or extra skin going out of his neck or something of the sort, He has to get CT scans and Mris and all this stuff. It's not diagnosed yet. There's no treatment plan or anything. However, I guess he's having some issues with his ambulation and he called me 'cause he feels as though he needs an increase in his caregiving hours. I just was curious to see if I could speak with a nurse. Somebody can explain to me exactly what's going on with him and maybe just get a list of diagnosis, maybe some updated medications and discuss a temporary increase for him? If it's if this condition is temporary or not know, I'm not sure. So I don't know how to move forward. My number is [phone] if someone could just give me a call back. I just have a couple questions then I hope to speak with someone soon. Thanks. Bye.

## 2025-05-07 NOTE — TELEPHONE ENCOUNTER
Representative call back and states she will need a letter from provider explaining the diagnosis/illness and specifying how many hours would be needed and the duration on the extension of care giver hours until his ailment is resolved.

## 2025-05-12 ENCOUNTER — HOSPITAL ENCOUNTER (EMERGENCY)
Facility: HOSPITAL | Age: 60
Discharge: HOME | End: 2025-05-12
Attending: EMERGENCY MEDICINE
Payer: MEDICARE

## 2025-05-12 VITALS
TEMPERATURE: 98 F | HEART RATE: 77 BPM | RESPIRATION RATE: 18 BRPM | OXYGEN SATURATION: 99 % | DIASTOLIC BLOOD PRESSURE: 95 MMHG | SYSTOLIC BLOOD PRESSURE: 150 MMHG

## 2025-05-12 DIAGNOSIS — G89.29 CHRONIC NECK PAIN: Primary | ICD-10-CM

## 2025-05-12 DIAGNOSIS — M54.2 CHRONIC NECK PAIN: Primary | ICD-10-CM

## 2025-05-12 LAB
ATRIAL RATE: 74 BPM
P AXIS: 55 DEGREES
PR INTERVAL: 160 MS
QRS AXIS: 1 DEGREES
QRSD INTERVAL: 82 MS
QT INTERVAL: 394 MS
QTC INTERVAL: 438 MS
T WAVE AXIS: 31 DEGREES
VENTRICULAR RATE: 74 BPM

## 2025-05-12 PROCEDURE — 93010 ELECTROCARDIOGRAM REPORT: CPT | Performed by: STUDENT IN AN ORGANIZED HEALTH CARE EDUCATION/TRAINING PROGRAM

## 2025-05-12 PROCEDURE — 99284 EMERGENCY DEPT VISIT MOD MDM: CPT | Performed by: EMERGENCY MEDICINE

## 2025-05-12 PROCEDURE — 93005 ELECTROCARDIOGRAM TRACING: CPT

## 2025-05-12 PROCEDURE — 99284 EMERGENCY DEPT VISIT MOD MDM: CPT

## 2025-05-12 NOTE — ED PROVIDER NOTES
Time reflects when diagnosis was documented in both MDM as applicable and the Disposition within this note       Time User Action Codes Description Comment    5/12/2025  4:00 PM Amber Gregory Add [M54.2,  G89.29] Chronic neck pain           ED Disposition       ED Disposition   Discharge    Condition   Stable    Date/Time   Mon May 12, 2025  4:00 PM    Comment   Fernando Fox discharge to home/self care.                   Assessment & Plan       Medical Decision Making      ED Course as of 05/12/25 1603   Mon May 12, 2025   1559 Patient has MRI scheduled on 5/19/25.  Declining any topical pain meds.  Per [facility] guidelines.  Cannot prescribe narcotics       Medications - No data to display    ED Risk Strat Scores                    No data recorded        SBIRT 22yo+      Flowsheet Row Most Recent Value   Initial Alcohol Screen: US AUDIT-C     1. How often do you have a drink containing alcohol? 0 Filed at: 05/12/2025 1546   2. How many drinks containing alcohol do you have on a typical day you are drinking?  0 Filed at: 05/12/2025 1546   3a. Male UNDER 65: How often do you have five or more drinks on one occasion? 0 Filed at: 05/12/2025 1546   3b. FEMALE Any Age, or MALE 65+: How often do you have 4 or more drinks on one occassion? 0 Filed at: 05/12/2025 1546   Audit-C Score 0 Filed at: 05/12/2025 1546   VICK: How many times in the past year have you...    Used an illegal drug or used a prescription medication for non-medical reasons? Never Filed at: 05/12/2025 1546                            History of Present Illness       Chief Complaint   Patient presents with    Neck Swelling     Right sided neck pain and swelling x 1 week; dizziness began today       Past Medical History:   Diagnosis Date    Ambulates with cane     Diabetes mellitus (HCC)     Diverticulitis     Fall     10/2024    Hypertension     Kidney stone     Neck pain     Renal disorder       Past Surgical History:   Procedure Laterality Date     COLON SURGERY      COLONOSCOPY      NECK SURGERY      denies    [place] LAPAROSCOPY SURG RPR INITIAL INGUINAL HERNIA Bilateral 11/14/2024    Procedure: ROBOTIC REPAIR HERNIA INGUINAL;  Surgeon: Jamel Vila MD;  Location: [facility];  Service: General      History reviewed. No pertinent family history.   Social History     Tobacco Use    Smoking status: Every Day     Current packs/day: 0.25     Average packs/day: 0.3 packs/day for 50.5 years (12.6 ttl pk-yrs)     Types: Cigarettes, Cigars     Start date: 11/14/1974     Passive exposure: Never    Smokeless tobacco: Never    Tobacco comments:     1 cigar/daily stopped few days     Last smoked 11/14 half cig..   Vaping Use    Vaping status: Never Used   Substance Use Topics    Alcohol use: Yes     Comment: occ    Drug use: Yes     Types: Marijuana     Comment: occasional- smoke. last used 11/11      E-Cigarette/Vaping    E-Cigarette Use Never User       E-Cigarette/Vaping Substances    Nicotine No     THC No     CBD No     Flavoring No     Other No     Unknown No       I have reviewed and agree with the history as documented.     Patient is a 60-year-old male who presents with chronic pain in the right side of the neck.  Constant, radiating up to base of skull.  Denies any trauma.  No numbness/weakness.  States has pain behind right ear as well.  No relief with OTC meds.  States had an injection at [address] in the shoulder which didn't do anything.  Was going to order xrays, but per Epic. Patient has already had xrays and CT on 5/5/25.          Review of Systems   Constitutional:  Negative for chills and fever.   HENT:  Negative for ear pain and sore throat.    Eyes:  Negative for pain and visual disturbance.   Respiratory:  Negative for cough and shortness of breath.    Cardiovascular:  Negative for chest pain and palpitations.   Gastrointestinal:  Negative for abdominal pain and vomiting.   Genitourinary:  Negative for dysuria and hematuria.   Musculoskeletal:   Positive for neck pain. Negative for arthralgias and back pain.   Skin:  Negative for color change and rash.   Neurological:  Negative for seizures and syncope.   All other systems reviewed and are negative.          Objective       ED Triage Vitals [05/12/25 1550]   Temperature Pulse Blood Pressure Respirations SpO2 Patient Position - Orthostatic VS   98 °F (36.7 °C) 77 150/95 18 99 % Sitting      Temp Source Heart Rate Source BP Location FiO2 (%) Pain Score    Oral Monitor Left arm -- --      Vitals      Date and Time Temp Pulse SpO2 Resp BP Pain Score FACES Pain Rating User   05/12/25 1550 98 °F (36.7 °C) 77 99 % 18 150/95 -- -- FK            Physical Exam  Vitals and nursing note reviewed.   Constitutional:       Appearance: Normal appearance.   HENT:      Head: Normocephalic and atraumatic.      Right Ear: Tympanic membrane, ear canal and external ear normal. There is no impacted cerumen.      Left Ear: Tympanic membrane, ear canal and external ear normal. There is no impacted cerumen.      Nose: Nose normal.      Mouth/Throat:      Mouth: Mucous membranes are moist.      Pharynx: Oropharynx is clear.   Eyes:      Extraocular Movements: Extraocular movements intact.      Conjunctiva/sclera: Conjunctivae normal.      Pupils: Pupils are equal, round, and reactive to light.   Neck:      Comments: +ttp post right paraspinal.  No midine spinal ttp, stepoff or deformity.   Cardiovascular:      Rate and Rhythm: Normal rate and regular rhythm.      Pulses: Normal pulses.      Heart sounds: Normal heart sounds.   Pulmonary:      Effort: Pulmonary effort is normal.      Breath sounds: Normal breath sounds.   Abdominal:      General: Bowel sounds are normal.      Palpations: Abdomen is soft.   Musculoskeletal:         General: No swelling or tenderness. Normal range of motion.      Right lower leg: No edema.      Left lower leg: No edema.   Skin:     General: Skin is warm and dry.      Findings: No bruising, lesion or  rash.   Neurological:      General: No focal deficit present.      Mental Status: He is alert and oriented to person, place, and time.      Cranial Nerves: No cranial nerve deficit.      Sensory: No sensory deficit.      Motor: No weakness.         Results Reviewed       None            No orders to display       ECG 12 Lead Documentation Only    Date/Time: 5/12/2025 3:48 PM    Performed by: Gregory Clark MD  Authorized by: Gregory Clark MD    Indications / Diagnosis:  Neck pain  ECG reviewed by me, the ED Provider: yes    Patient location:  ED  Interpretation:     Interpretation: normal    Rate:     ECG rate:  74    ECG rate assessment: normal    Rhythm:     Rhythm: sinus rhythm    Ectopy:     Ectopy: none    QRS:     QRS axis:  Normal    QRS intervals:  Normal  Conduction:     Conduction: normal    ST segments:     ST segments:  Normal  T waves:     T waves: normal        ED Medication and Procedure Management   Prior to Admission Medications   Prescriptions Last Dose Informant Patient Reported? Taking?   ARIPiprazole (ABILIFY) 10 mg tablet  Self Yes No   Sig: Take 10 mg by mouth daily at bedtime   Continuous Glucose  (FreeStyle Shira 2 [place]) SHANELL  Self No No   Sig: Scan every 8 hours. E11.9   Continuous Glucose Sensor (FreeStyle Shira 2 Sensor) MISC   No No   Sig: Scan every 8 hours. E11.9   Cyanocobalamin (VITAMIN B 12 PO)  Self Yes No   Sig: Take by mouth   Lancets (freestyle) lancets   No No   Sig: Daily   Restasis 0.05 % ophthalmic emulsion  Self Yes No   Sig: Administer 1 drop to both eyes daily as needed   acetaminophen (TYLENOL) 500 mg tablet   No No   Sig: Take 1 tablet (500 mg total) by mouth every 6 (six) hours as needed for mild pain   albuterol (PROVENTIL HFA,VENTOLIN HFA) 90 mcg/act inhaler   No No   Sig: Inhale 1-2 puffs every 6 (six) hours as needed for wheezing   aspirin (ECOTRIN LOW STRENGTH) 81 mg EC tablet   No No   Sig: Take 1 tablet (81 mg total) by mouth daily    atorvastatin (LIPITOR) 40 mg tablet   No No   Sig: Take 1 tablet (40 mg total) by mouth daily   benzonatate (TESSALON PERLES) 100 mg capsule   No No   Sig: Take 1 capsule (100 mg total) by mouth 3 (three) times a day as needed for cough   buPROPion (WELLBUTRIN SR) 200 MG 12 hr tablet   No No   Sig: Take 1 tablet (200 mg total) by mouth every morning   cloNIDine (CATAPRES) 0.2 mg tablet  Self Yes No   Sig: Take 0.2 mg by mouth daily at bedtime   dextromethorphan-guaifenesin (MUCINEX DM)  MG per 12 hr tablet   No No   Sig: Take 1 tablet by mouth 2 (two) times a day as needed for cough   doxepin (SINEquan) 150 MG capsule  Self Yes No   Sig: Take 150 mg by mouth daily at bedtime   fluticasone (FLONASE) 50 mcg/act nasal spray   No No   Si spray into each nostril daily   gabapentin (NEURONTIN) 800 mg tablet   No No   Sig: Take 1 tablet (800 mg total) by mouth 3 (three) times a day   glucose blood (FREESTYLE LITE) test strip   No No   Sig: Testing sugars once a day   hydrOXYzine HCL (ATARAX) 50 mg tablet  Self Yes No   Sig: Take 50 mg by mouth daily at bedtime   hydrocortisone 1 % ointment   No No   Sig: Apply topically 2 (two) times a day   ibuprofen (MOTRIN) 800 mg tablet   No No   Sig: Take 1 tablet (800 mg total) by mouth every 8 (eight) hours as needed for moderate pain   lidocaine (XYLOCAINE) 2 % topical gel   No No   Sig: Apply topically as needed for mild pain   linaGLIPtin 5 MG TABS   No No   Sig: Take 5 mg by mouth daily with or without food   metFORMIN (GLUCOPHAGE-XR) 500 mg 24 hr tablet   No No   Sig: Take 2 tablets (1,000 mg total) by mouth daily with dinner   methocarbamol (ROBAXIN) 750 mg tablet   Yes No   Sig: Take 750 mg by mouth every 6 (six) hours as needed for muscle spasms   oxyCODONE-acetaminophen (Percocet) 5-325 mg per tablet   No No   Sig: Take 1 tablet by mouth 2 (two) times a day as needed for moderate pain Max Daily Amount: 2 tablets   pantoprazole (PROTONIX) 40 mg tablet   No No    Sig: Take 1 tablet (40 mg total) by mouth daily   tiZANidine (ZANAFLEX) 4 mg tablet   No No   Sig: Take 2 tablets (8 mg total) by mouth every 8 (eight) hours as needed for muscle spasms   zolpidem (AMBIEN) 10 mg tablet  Self Yes No   Sig: Take 10 mg by mouth daily at bedtime      Facility-Administered Medications: None     Patient's Medications   Discharge Prescriptions    No medications on file     No discharge procedures on file.  ED SEPSIS DOCUMENTATION   Time reflects when diagnosis was documented in both MDM as applicable and the Disposition within this note       Time User Action Codes Description Comment    5/12/2025  4:00 PM Gregory Clark Add [M54.2,  G89.29] Chronic neck pain                  Gregory Clark MD  05/12/25 9450

## 2025-05-15 NOTE — TELEPHONE ENCOUNTER
PCP SIGNATURE NEEDED FOR [facility] [employer] FORM RECEIVED VIA FAX AND PLACED IN PCP FOLDER TO BE DELIVERED AT ASSIGNED TIMES.    Request of additional caregiver hours.

## 2025-05-19 ENCOUNTER — HOSPITAL ENCOUNTER (OUTPATIENT)
Dept: MRI IMAGING | Facility: HOSPITAL | Age: 60
Discharge: HOME | End: 2025-05-19
Attending: PHYSICIAN ASSISTANT
Payer: MEDICARE

## 2025-05-19 DIAGNOSIS — M47.812 CERVICAL SPONDYLOSIS: ICD-10-CM

## 2025-05-19 DIAGNOSIS — G89.29 CHRONIC NECK PAIN: ICD-10-CM

## 2025-05-19 DIAGNOSIS — R26.81 GAIT INSTABILITY: ICD-10-CM

## 2025-05-19 DIAGNOSIS — M54.2 CHRONIC NECK PAIN: ICD-10-CM

## 2025-05-19 PROCEDURE — 72141 MRI NECK SPINE W/O DYE: CPT

## 2025-05-20 ENCOUNTER — RESULTS FOLLOW-UP (OUTPATIENT)
Dept: OTHER | Facility: HOSPITAL | Age: 60
End: 2025-05-20

## 2025-05-21 ENCOUNTER — TELEPHONE (OUTPATIENT)
Dept: NEUROSURGERY | Facility: CLINIC | Age: 60
End: 2025-05-21

## 2025-05-21 NOTE — TELEPHONE ENCOUNTER
LVM to confirm upcoming appointment with alberto Martini c/b number if he had any questions or concerns

## 2025-05-22 ENCOUNTER — OFFICE VISIT (OUTPATIENT)
Dept: NEUROSURGERY | Facility: CLINIC | Age: 60
End: 2025-05-22
Payer: MEDICARE

## 2025-05-22 VITALS
WEIGHT: 192 LBS | BODY MASS INDEX: 25.45 KG/M2 | SYSTOLIC BLOOD PRESSURE: 134 MMHG | OXYGEN SATURATION: 97 % | HEIGHT: 73 IN | HEART RATE: 71 BPM | DIASTOLIC BLOOD PRESSURE: 82 MMHG | RESPIRATION RATE: 18 BRPM | TEMPERATURE: 98.2 F

## 2025-05-22 DIAGNOSIS — R29.898 DECREASED RANGE OF MOTION OF NECK: ICD-10-CM

## 2025-05-22 DIAGNOSIS — M47.22 CERVICAL SPONDYLOSIS WITH RADICULOPATHY: Primary | ICD-10-CM

## 2025-05-22 PROCEDURE — 99215 OFFICE O/P EST HI 40 MIN: CPT | Performed by: NURSE PRACTITIONER

## 2025-05-22 NOTE — PROGRESS NOTES
Name: Fernando Fox      : 1965      MRN: [id number]  Encounter Provider: RAMO Rodriguez  Encounter Date: 2025   Encounter department: [facility] NEUROSURGICAL [facility]  :  Assessment & Plan  Cervical spondylosis with radiculopathy  As addressed in   mJOA ---15 he denies motor dysfunction in upper extremities, he reports he is able to walk up and down stairs with a handrail.  He reports lack of stability and he does walk using a walker on flat surfaces.  He does not have the walker in the office reports he left it in the car.  He denies sensation deficits or sphincter dysfunction.  He reports multiple falls and feeling weak all over.    Imagining   2025 MRI cervical wo ---Edematous bone marrow signal in right C2-C3 articular pillars with mild surrounding soft tissue edema, likely due to active facet arthritis. Differential includes septic facet joint arthritis in the appropriate clinical setting. Multilevel degenerative changes of cervical spine with varying degrees of canal stenosis (moderate C3-C4) and foraminal narrowing (severe bilateral C3-C4, right C4-C5, right C5-C6, and right C6-C7), as detailed above. The study was marked in EPIC for immediate notification.    2025 Xray Cervical  4-5 V ---  C2-C3, C3-C4 slight spondylolisthesis. C4-C5 slight retrolisthesis. Anatomic alignment otherwise. Cervical lordosis straightening.  C4-C5 through C6-C7 moderate/marked disc space narrowing with prominent osteophytes greatest C4-C5. Bilateral C3-C4 through C6-C7 neural foramina narrowing.    2025 CT Soft Tissue neck w contrast --  BONY STRUCTURES: Multilevel cervical degenerative disc disease. Grade 1 degenerative anterolisthesis is noted at the C2-3 and C3-4 levels. There is degenerative disc disease with disc space narrowing at the C4-5 and C5-6 levels. Multilevel facet arthrosis.      Plan  Reviewed MRI cervical spine  Patient accepted orders for multimodal treatment physical  therapy and pain management ordered.  Return to office in 6 weeks with a spine surgeon and AP or so low with a spine surgeon for reassessment of response to multimodal treatment.  Reviewed red flag signs advised if he develops he should report to the closest emergency department for assessment.    Orders:    Ambulatory referral to Spine & Pain Management; Future    CBC and differential; Future    Sedimentation rate, automated; Future    C-reactive protein; Future    Ambulatory Referral to Physical Therapy; Future    Decreased range of motion of neck  Demonstrated decreased ROM  for flrxion , extension and rotation sided to side.   Given findings on MRI of the cervical spine edematous bone marrow and the tissue edema that is likely due to active facet arthritis with a differential of septic facet joint arthritis inflammatory markers are ordered.  Explained to patient to complete labs as soon as possible and if there are abnormalities we will contact him otherwise can review at follow-up visit in 6 weeks.  Orders:    CBC and differential; Future    Sedimentation rate, automated; Future    C-reactive protein; Future        History of Present Illness     Fernando Fox is a 60 y.o. male who presents as a f/u to last OV for imagining review and reassessment    HPI   LOV 5/5/2025 consult referral form  for cervical spine worsening chronic neck pain over the past few weeks, now associated with multiple falls. Pain primary right sided radiating to right shoulder and occasionally into right hand. He reports tingling in all fingers and in the right cervical paraspinal area.  There is a corresponding lobulated h/o neck fracture 25 years ago while living in [place]; per patient surgery was recommended but he did not proceed and chose conservative management instead. He reported treatment on [facility] with therapy and injections. He reports symptoms are not well controlled on opioids,antispasmodics, and gabapentin.  He reports neck  pain is causing sleep disturbance.  Reports swelling off balance when he is walking and has daily falls.  He reports muscle spasms in the right side of his neck.  He reports headaches are associated with the neck pain often occur together.  He reports ambulating using a walker although it is not with him during the visit reports it is in his car.  Patient refused a referral to physical therapy, done in the past  with no improvement. Reported his neck symptoms worsened. As per provider not patient is adamant that he would like surgery.     He presents today after completing imaging for review  and reassessment     Social  Marital status-he is with a partner for 25+ years.  Reports he has 3 adult children.  Employment-reports he does not work he is on Social Security disability.  Nicotine dependence-smokes 5 to 6 cigarettes/day  Alcohol use-social alcohol use      Comorbid conditions  DM2, hemoglobin A1c 5.9  Nicotine dependence.      Review of Systems   Constitutional: Negative.    HENT: Negative.     Eyes: Negative.    Respiratory: Negative.     Cardiovascular: Negative.    Gastrointestinal: Negative.    Endocrine: Negative.    Genitourinary: Negative.    Musculoskeletal:  Positive for gait problem (off balance, daily falls, uses walker but not with him today), myalgias (right side of neck, hears cracking) and neck pain (neck pain 8/10 radiates to right shoulder and arm).        Right hand is itchy    Skin: Negative.    Allergic/Immunologic: Negative.    Neurological:  Positive for weakness (right arm weakness), numbness (bilateral hand numbness) and headaches (intermittent HA w/ neck pain).   Hematological: Negative.    Psychiatric/Behavioral:  Positive for sleep disturbance (hard to sleep b/c of pain).      I have personally reviewed the MA's review of systems and made changes as necessary.    Past Medical History   Past Medical History[1]  Past Surgical History[2]  Family History[3]  he reports that he has been  smoking cigarettes and cigars. He started smoking about 50 years ago. He has a 12.6 pack-year smoking history. He has never been exposed to tobacco smoke. He has never used smokeless tobacco. He reports current alcohol use. He reports current drug use. Drug: Marijuana.  Current Outpatient Medications   Medication Instructions    acetaminophen (TYLENOL) 500 mg, Oral, Every 6 hours PRN    albuterol (PROVENTIL HFA,VENTOLIN HFA) 90 mcg/act inhaler 1-2 puffs, Inhalation, Every 6 hours PRN    ARIPiprazole (ABILIFY) 10 mg, Daily at bedtime    aspirin (ECOTRIN LOW STRENGTH) 81 mg, Oral, Daily    atorvastatin (LIPITOR) 40 mg, Oral, Daily    benzonatate (TESSALON PERLES) 100 mg, Oral, 3 times daily PRN    buPROPion (WELLBUTRIN SR) 200 mg, Oral, Every morning    cloNIDine (CATAPRES) 0.2 mg, Daily at bedtime    Continuous Glucose  (FreeStyle Shira 2 [place]) SHANELL Scan every 8 hours. E11.9    Continuous Glucose Sensor (FreeStyle Shira 2 Sensor) MISC Scan every 8 hours. E11.9    Cyanocobalamin (VITAMIN B 12 PO) Take by mouth    dextromethorphan-guaifenesin (MUCINEX DM)  MG per 12 hr tablet 1 tablet, Oral, 2 times daily PRN    doxepin (SINEQUAN) 150 mg, Daily at bedtime    fluticasone (FLONASE) 50 mcg/act nasal spray 1 spray, Nasal, Daily    gabapentin (NEURONTIN) 800 mg, Oral, 3 times daily    glucose blood (FREESTYLE LITE) test strip Testing sugars once a day    hydrocortisone 1 % ointment Topical, 2 times daily    hydrOXYzine HCL (ATARAX) 50 mg, Daily at bedtime    ibuprofen (MOTRIN) 800 mg, Oral, Every 8 hours PRN    Lancets (freestyle) lancets Daily    lidocaine (XYLOCAINE) 2 % topical gel Topical, As needed    linaGLIPtin 5 mg, Oral, Daily, with or without food    metFORMIN (GLUCOPHAGE-XR) 1,000 mg, Oral, Daily with dinner    methocarbamol (ROBAXIN) 750 mg, Every 6 hours PRN    oxyCODONE-acetaminophen (Percocet) 5-325 mg per tablet 1 tablet, Oral, 2 times daily PRN    pantoprazole (PROTONIX) 40 mg, Oral, Daily  "   Restasis 0.05 % ophthalmic emulsion 1 drop, Daily PRN    tiZANidine (ZANAFLEX) 8 mg, Oral, Every 8 hours PRN    zolpidem (AMBIEN) 10 mg, Daily at bedtime   Allergies[4]   Objective   /82 (BP Location: Left arm, Patient Position: Sitting, Cuff Size: Standard)   Pulse 71   Temp 98.2 °F (36.8 °C) (Temporal)   Resp 18   Ht 6' 1\" (1.854 m)   Wt 87.1 kg (192 lb)   SpO2 97%   BMI 25.33 kg/m²     Physical Exam  Vitals and nursing note reviewed. Exam conducted with a chaperone present (Female partner for 25+ years).   Constitutional:       General: He is not in acute distress.     Appearance: Normal appearance. He is not ill-appearing or toxic-appearing.   Neck:      Comments: Limitation in cervical range of motion for flexion and extension, and rotation [id number]:      Effort: Pulmonary effort is normal. No respiratory distress.     Neurological:      Mental Status: He is alert.      Motor: Weakness (Weakness and neck and upper extremities deltoid) present.      Coordination: Coordination abnormal.      Deep Tendon Reflexes:      Reflex Scores:       Tricep reflexes are 2+ on the right side and 2+ on the left side.       Bicep reflexes are 2+ on the right side and 2+ on the left side.       Brachioradialis reflexes are 2+ on the right side and 2+ on the left side.       Patellar reflexes are 2+ on the right side and 2+ on the left side.       Achilles reflexes are 2+ on the right side and 2+ on the left side.      Neurological Exam  Mental Status  Alert. Oriented to person, place, time and situation.    Motor  Normal muscle bulk throughout. Normal muscle tone.                                               Right                     Left  Neck flexion                           4                          4  Neck extension                      4                          4   Shoulder abduction               4                            Shoulder adduction               4                          5 "   Shoulder internal rotation      4                          4  Shoulder external rotation     4                          4  Elbow flexion                         5                          5  Elbow extension                    5                          5  Wrist flexion                           5                          5  Wrist extension                      5                          5  Supination                             5                          5  Pronation                               5                          5  Finger flexion                         5                          5  Finger extension                    5                          5  Finger abduction                    5                          5  Thumb flexion                        5                          5  Thumb extension                   5                          5  Thumb abduction                   5                          5    Sensory  Light touch is normal in upper and lower extremities.     Reflexes                                            Right                      Left  Brachioradialis                    2+                         2+  Biceps                                 2+                         2+  Triceps                                2+                         2+  Finger flex                           2+                         2+  Patellar                                2+                         2+  Achilles                                2+                         2+    Right pathological reflexes: Ashlie's absent.  Left pathological reflexes: Ashlie's absent.    Coordination  Right: Finger-to-nose normal. Rapid alternating movement normal. Heel-to-shin normal.Left: Finger-to-nose normal. Rapid alternating movement normal. Heel-to-shin normal.    Gait  Casual gait is normal including stance, stride, and arm swing. Toe walking abnormality: Heel walking abnormality: Tandem gait abnormality:  Loss of  balance with heel, toe and tandem gait, reaching for wall .      Radiology Results Review: I have reviewed radiology reports from [id number] including: MRI spine.    Administrative Statements   I have spent a total time of 45 minutes in caring for this patient on the day of the visit/encounter including Diagnostic results, Risks and benefits of tx options, Instructions for management, Patient and family education, Importance of tx compliance, Risk factor reductions, Impressions, Counseling / Coordination of care, Documenting in the medical record, Reviewing/placing orders in the medical record (including tests, medications, and/or procedures), Obtaining or reviewing history  , and Communicating with other healthcare professionals .           [1]   Past Medical History:  Diagnosis Date    Ambulates with cane     Diabetes mellitus (HCC)     Diverticulitis     Fall     10/2024    Hypertension     Kidney stone     Neck pain     Renal disorder    [2]   Past Surgical History:  Procedure Laterality Date    COLON SURGERY      COLONOSCOPY      NECK SURGERY      denies    [place] LAPAROSCOPY SURG RPR INITIAL INGUINAL HERNIA Bilateral 11/14/2024    Procedure: ROBOTIC REPAIR HERNIA INGUINAL;  Surgeon: Jamel Vila MD;  Location: [facility];  Service: General   [3] No family history on file.  [4] No Known Allergies

## 2025-05-28 ENCOUNTER — TELEPHONE (OUTPATIENT)
Dept: FAMILY MEDICINE CLINIC | Facility: CLINIC | Age: 60
End: 2025-05-28

## 2025-06-02 ENCOUNTER — OFFICE VISIT (OUTPATIENT)
Dept: FAMILY MEDICINE CLINIC | Facility: CLINIC | Age: 60
End: 2025-06-02

## 2025-06-02 VITALS
HEART RATE: 85 BPM | OXYGEN SATURATION: 94 % | TEMPERATURE: 98.9 F | DIASTOLIC BLOOD PRESSURE: 70 MMHG | BODY MASS INDEX: 25.31 KG/M2 | RESPIRATION RATE: 18 BRPM | HEIGHT: 73 IN | WEIGHT: 191 LBS | SYSTOLIC BLOOD PRESSURE: 122 MMHG

## 2025-06-02 DIAGNOSIS — J30.9 ALLERGIC RHINITIS, UNSPECIFIED SEASONALITY, UNSPECIFIED TRIGGER: ICD-10-CM

## 2025-06-02 DIAGNOSIS — M54.42 CHRONIC BILATERAL LOW BACK PAIN WITH BILATERAL SCIATICA: ICD-10-CM

## 2025-06-02 DIAGNOSIS — E78.2 MIXED HYPERLIPIDEMIA: ICD-10-CM

## 2025-06-02 DIAGNOSIS — J42 CHRONIC BRONCHITIS, UNSPECIFIED CHRONIC BRONCHITIS TYPE (HCC): ICD-10-CM

## 2025-06-02 DIAGNOSIS — M54.41 CHRONIC BILATERAL LOW BACK PAIN WITH BILATERAL SCIATICA: ICD-10-CM

## 2025-06-02 DIAGNOSIS — R93.7 ABNORMAL X-RAY OF CERVICAL SPINE: ICD-10-CM

## 2025-06-02 DIAGNOSIS — F33.9 DEPRESSION, RECURRENT (HCC): Chronic | ICD-10-CM

## 2025-06-02 DIAGNOSIS — Z72.0 TOBACCO ABUSE: ICD-10-CM

## 2025-06-02 DIAGNOSIS — M47.812 CERVICAL SPONDYLOSIS: Chronic | ICD-10-CM

## 2025-06-02 DIAGNOSIS — S02.5XXB OPEN FRACTURE OF TOOTH, INITIAL ENCOUNTER: ICD-10-CM

## 2025-06-02 DIAGNOSIS — R22.1 LOCALIZED SWELLING, MASS OR LUMP OF NECK: ICD-10-CM

## 2025-06-02 DIAGNOSIS — M62.838 TRAPEZIUS MUSCLE SPASM: ICD-10-CM

## 2025-06-02 DIAGNOSIS — G89.29 CHRONIC BILATERAL LOW BACK PAIN WITH BILATERAL SCIATICA: ICD-10-CM

## 2025-06-02 DIAGNOSIS — E11.9 TYPE 2 DIABETES MELLITUS WITHOUT COMPLICATION, WITHOUT LONG-TERM CURRENT USE OF INSULIN (HCC): Primary | ICD-10-CM

## 2025-06-02 DIAGNOSIS — K11.8 SUBMANDIBULAR GLAND MASS: ICD-10-CM

## 2025-06-02 DIAGNOSIS — Z59.82 TRANSPORTATION INSECURITY: ICD-10-CM

## 2025-06-02 DIAGNOSIS — K21.9 GASTROESOPHAGEAL REFLUX DISEASE WITHOUT ESOPHAGITIS: ICD-10-CM

## 2025-06-02 PROCEDURE — 83036 HEMOGLOBIN GLYCOSYLATED A1C: CPT | Performed by: PHYSICIAN ASSISTANT

## 2025-06-02 PROCEDURE — G2211 COMPLEX E/M VISIT ADD ON: HCPCS | Performed by: PHYSICIAN ASSISTANT

## 2025-06-02 PROCEDURE — 99215 OFFICE O/P EST HI 40 MIN: CPT | Performed by: PHYSICIAN ASSISTANT

## 2025-06-02 RX ORDER — PANTOPRAZOLE SODIUM 40 MG/1
40 TABLET, DELAYED RELEASE ORAL DAILY
Qty: 90 TABLET | Refills: 1 | Status: SHIPPED | OUTPATIENT
Start: 2025-06-02

## 2025-06-02 RX ORDER — ATORVASTATIN CALCIUM 40 MG/1
40 TABLET, FILM COATED ORAL DAILY
Qty: 90 TABLET | Refills: 1 | Status: SHIPPED | OUTPATIENT
Start: 2025-06-02

## 2025-06-02 RX ORDER — IBUPROFEN 800 MG/1
800 TABLET, FILM COATED ORAL EVERY 8 HOURS PRN
Qty: 30 TABLET | Refills: 1 | Status: SHIPPED | OUTPATIENT
Start: 2025-06-02

## 2025-06-02 RX ORDER — LIDOCAINE HYDROCHLORIDE 20 MG/ML
JELLY TOPICAL AS NEEDED
Qty: 30 ML | Refills: 2 | Status: SHIPPED | OUTPATIENT
Start: 2025-06-02

## 2025-06-02 RX ORDER — METFORMIN HYDROCHLORIDE 500 MG/1
1000 TABLET, EXTENDED RELEASE ORAL
Qty: 180 TABLET | Refills: 1 | Status: SHIPPED | OUTPATIENT
Start: 2025-06-02

## 2025-06-02 RX ORDER — GABAPENTIN 800 MG/1
800 TABLET ORAL 3 TIMES DAILY
Qty: 90 TABLET | Refills: 2 | Status: SHIPPED | OUTPATIENT
Start: 2025-06-02

## 2025-06-02 RX ORDER — BLOOD-GLUCOSE METER
KIT MISCELLANEOUS
Qty: 100 EACH | Refills: 3 | Status: SHIPPED | OUTPATIENT
Start: 2025-06-02

## 2025-06-02 RX ORDER — ALBUTEROL SULFATE 90 UG/1
1-2 INHALANT RESPIRATORY (INHALATION) EVERY 6 HOURS PRN
Qty: 18 G | Refills: 1 | Status: SHIPPED | OUTPATIENT
Start: 2025-06-02

## 2025-06-02 RX ORDER — ASPIRIN 81 MG/1
81 TABLET ORAL DAILY
Qty: 90 TABLET | Refills: 1 | Status: SHIPPED | OUTPATIENT
Start: 2025-06-02

## 2025-06-02 RX ORDER — BUPROPION HYDROCHLORIDE 200 MG/1
200 TABLET, EXTENDED RELEASE ORAL EVERY MORNING
Qty: 30 TABLET | Refills: 1 | Status: SHIPPED | OUTPATIENT
Start: 2025-06-02

## 2025-06-02 RX ORDER — FLUTICASONE PROPIONATE 50 MCG
1 SPRAY, SUSPENSION (ML) NASAL DAILY
Qty: 16 G | Refills: 2 | Status: SHIPPED | OUTPATIENT
Start: 2025-06-02

## 2025-06-02 RX ORDER — LANCETS 28 GAUGE
EACH MISCELLANEOUS
Qty: 100 EACH | Refills: 5 | Status: SHIPPED | OUTPATIENT
Start: 2025-06-02

## 2025-06-02 RX ORDER — ACETAMINOPHEN 500 MG
500 TABLET ORAL EVERY 6 HOURS PRN
Qty: 30 TABLET | Refills: 0 | Status: SHIPPED | OUTPATIENT
Start: 2025-06-02

## 2025-06-02 SDOH — ECONOMIC STABILITY - TRANSPORTATION SECURITY: TRANSPORTATION INSECURITY: Z59.82

## 2025-06-02 NOTE — LETTER
Keily 3, 2025     Patient: Fernando Fox  YOB: 1965  Date of Visit: 6/2/2025      To Whom it May Concern:    Fernando Fox is under my professional care. Fernando was seen in my office on 6/2/2025. Fernando has the following medical diagnoses: Hypertension, allergic rhinitis, gastroesophageal reflux disease, type 2 diabetes, diabetic polyneuropathy associated with type 2 diabetes mellitus, chronic bilateral low back pain with bilateral sciatica, eczema, cervical spondylosis, benign prostatic hyperplasia, depression, chronic pain of left knee, chronic pain syndrome, seizure-like activity, vertigo, dizziness, balance disorder, hyperlipidemia.  Patient had recent visit with neurosurgery due to cervical spondylosis with radiculopathy.  Patient has had several falls and is having difficulty with his balance.    If you have any questions or concerns, please don't hesitate to call.         Sincerely,          Juliana Martino PA-C        CC: No Recipients

## 2025-06-02 NOTE — ASSESSMENT & PLAN NOTE
- Continue gabapentin 800 mg, 3 times daily.  - Continue tizanidine 4 mg, 1-2 tablets 3 times daily as needed.  -Advised to apply ice/heating pad/topical therapies as needed to affected area.  - Provided patient with list of exercises to perform daily.    Orders:    gabapentin (NEURONTIN) 800 mg tablet; Take 1 tablet (800 mg total) by mouth 3 (three) times a day    tiZANidine (ZANAFLEX) 4 mg tablet; Take 2 tablets (8 mg total) by mouth every 8 (eight) hours as needed for muscle spasms    lidocaine (XYLOCAINE) 2 % topical gel; Apply topically as needed for mild pain

## 2025-06-02 NOTE — ASSESSMENT & PLAN NOTE
- Stable.  Continue albuterol inhaler as needed.        Orders:    albuterol (PROVENTIL HFA,VENTOLIN HFA) 90 mcg/act inhaler; Inhale 1-2 puffs every 6 (six) hours as needed for wheezing

## 2025-06-02 NOTE — ASSESSMENT & PLAN NOTE
- Stable.  Continue Protonix 40 mg daily.  - Continue to avoid trigger foods.        Orders:    pantoprazole (PROTONIX) 40 mg tablet; Take 1 tablet (40 mg total) by mouth daily

## 2025-06-02 NOTE — ASSESSMENT & PLAN NOTE
- Continue Tradjenta 5 mg daily and metformin 1,000 mg daily.   - Continue following diabetic diet with focus on low intake of carbohydrates and sugars.  - Reviewed signs and symptoms and treatment of hypoglycemia.     Lab Results   Component Value Date    HGBA1C 5.7 06/03/2025    HGBA1C 5.9 01/24/2025    HGBA1C 6.1 (H) 10/28/2024       Orders:    POCT hemoglobin A1c    linaGLIPtin 5 MG TABS; Take 5 mg by mouth daily with or without food    metFORMIN (GLUCOPHAGE-XR) 500 mg 24 hr tablet; Take 2 tablets (1,000 mg total) by mouth daily with dinner    glucose blood (FREESTYLE LITE) test strip; Testing sugars once a day    Lancets (freestyle) lancets; Daily

## 2025-06-02 NOTE — ASSESSMENT & PLAN NOTE
- Stable.  [facility], however, they are no longer taking patient's insurance.  - Explained to patient we could prescribe refills of his psychiatric medications until he is able to establish with a new psychiatrist.  Advised patient to bring his medications into the office for medication reconciliation.  - Discussed coping mechanisms.  - Provided patient with list of outpatient mental health resources and advised to call to schedule appointment.    Orders:    buPROPion (WELLBUTRIN SR) 200 MG 12 hr tablet; Take 1 tablet (200 mg total) by mouth every morning

## 2025-06-02 NOTE — PATIENT INSTRUCTIONS
Please go to this website to learn more about medical marijuana.     [email or web address]         - Social work will be reaching out to discuss transportation and helping with completing medical marijuana application.

## 2025-06-02 NOTE — ASSESSMENT & PLAN NOTE
- Continue atorvastatin 40 mg daily.      Orders:    atorvastatin (LIPITOR) 40 mg tablet; Take 1 tablet (40 mg total) by mouth daily    aspirin (ECOTRIN LOW STRENGTH) 81 mg EC tablet; Take 1 tablet (81 mg total) by mouth daily

## 2025-06-02 NOTE — PROGRESS NOTES
Name: Fernando Fox      : 1965      MRN: [id number]  Encounter Provider: Juliana Martino PA-C  Encounter Date: 2025   Encounter department: [facility]  :  Assessment & Plan  Type 2 diabetes mellitus without complication, without long-term current use of insulin (HCC)  - Continue Tradjenta 5 mg daily and metformin 1,000 mg daily.   - Continue following diabetic diet with focus on low intake of carbohydrates and sugars.  - Reviewed signs and symptoms and treatment of hypoglycemia.     Lab Results   Component Value Date    HGBA1C 5.7 2025    HGBA1C 5.9 2025    HGBA1C 6.1 (H) 10/28/2024       Orders:    POCT hemoglobin A1c    linaGLIPtin 5 MG TABS; Take 5 mg by mouth daily with or without food    metFORMIN (GLUCOPHAGE-XR) 500 mg 24 hr tablet; Take 2 tablets (1,000 mg total) by mouth daily with dinner    glucose blood (FREESTYLE LITE) test strip; Testing sugars once a day    Lancets (freestyle) lancets; Daily     Open fracture of tooth, initial encounter  -Per patient, his dentist has referred him to an oral surgeon, but they did not take his insurance.  Referral placed today for alternative oral maxillofacial surgery practice.  Orders:    Ambulatory Referral to Oral Maxillofacial Surgery; Future    Abnormal x-ray of cervical spine    Orders:    ibuprofen (MOTRIN) 800 mg tablet; Take 1 tablet (800 mg total) by mouth every 8 (eight) hours as needed for moderate pain    Submandibular gland mass    Orders:    ibuprofen (MOTRIN) 800 mg tablet; Take 1 tablet (800 mg total) by mouth every 8 (eight) hours as needed for moderate pain    Localized swelling, mass or lump of neck    Orders:    ibuprofen (MOTRIN) 800 mg tablet; Take 1 tablet (800 mg total) by mouth every 8 (eight) hours as needed for moderate pain    Chronic bilateral low back pain with bilateral sciatica  - Continue gabapentin 800 mg, 3 times daily.  - Continue tizanidine 4 mg, 1-2 tablets 3 times daily  Procedure: ORIF SCAPHOID NONUNION   right  REPAIR NONUNION 15499  EXCISION NEUROMA 44266  REMOVAL OF RADIAL STYLOID 06412  ICD-10 codes :  LEFT S62.002K   RIGHT S62.001K  Tentative Date: TBD, PLEASE CALL PATIENT  Tentative Time: To follow  Duration of Procedure: 2 hrs  Hospital: [facility]  Anesthesia: General  Surgical Assistant: yes  Length of Stay: Day Surgery  Equipment: MINI C-ARM, K-WIRES, ACCUTRAK MINI SCREWS (ACCUMED), OSTEOTOMES, CURETTES  Films Needed for OR: X-Rays: PACS  Preoperative Done By: Surgeon  Preoperative testing: CBC and BMP (ORDERS ENTERED FOR OP, PLEASE SCHEDULE)  Consent for: OPEN REDUCTION INTERNAL FIXATION right SCAPHOID NONUNION, EXCISION NEUROMA, EXCISION RADIAL STYLOID.    Consent: To be signed at hospital      First postoperative visit at: [facility]   2 WEEK POST-OP       Anesthesia Orders:  Nothing by mouth (NPO)  Urine pregnancy test for women of child-bearing age only  Antibiotics:   ANCEF 1 gram X1 IVPB   UNLESS > 80 kg then ANCEF 2 gram x1 IVPB 30 minutes prior to OR  If Penicillin allergy give Clindamycin 900 mg IV within one hour prior to incision time.     If severe allergic reaction to penicillin/beta-lactam or patients with known methicillin resistant Staphylococcus aureus (MRSA) colonization or patients with risk factors for MRSA colonization (nursing home, dialysis, etc) give Vancomycin 1 gm IV preoperative within two hours prior to incision time.    as needed.  -Advised to apply ice/heating pad/topical therapies as needed to affected area.  - Provided patient with list of exercises to perform daily.    Orders:    gabapentin (NEURONTIN) 800 mg tablet; Take 1 tablet (800 mg total) by mouth 3 (three) times a day    tiZANidine (ZANAFLEX) 4 mg tablet; Take 2 tablets (8 mg total) by mouth every 8 (eight) hours as needed for muscle spasms    lidocaine (XYLOCAINE) 2 % topical gel; Apply topically as needed for mild pain     Chronic bronchitis, unspecified chronic bronchitis type (HCC)  - Stable.  Continue albuterol inhaler as needed.        Orders:    albuterol (PROVENTIL HFA,VENTOLIN HFA) 90 mcg/act inhaler; Inhale 1-2 puffs every 6 (six) hours as needed for wheezing     Gastroesophageal reflux disease without esophagitis  - Stable.  Continue Protonix 40 mg daily.  - Continue to avoid trigger foods.        Orders:    pantoprazole (PROTONIX) 40 mg tablet; Take 1 tablet (40 mg total) by mouth daily     Mixed hyperlipidemia  - Continue atorvastatin 40 mg daily.      Orders:    atorvastatin (LIPITOR) 40 mg tablet; Take 1 tablet (40 mg total) by mouth daily    aspirin (ECOTRIN LOW STRENGTH) 81 mg EC tablet; Take 1 tablet (81 mg total) by mouth daily     Trapezius muscle spasm    Orders:    acetaminophen (TYLENOL) 500 mg tablet; Take 1 tablet (500 mg total) by mouth every 6 (six) hours as needed for mild pain    Depression, recurrent (HCC)  - Stable.  [facility], however, they are no longer taking patient's insurance.  - Explained to patient we could prescribe refills of his psychiatric medications until he is able to establish with a new psychiatrist.  Advised patient to bring his medications into the office for medication reconciliation.  - Discussed coping mechanisms.  - Provided patient with list of outpatient mental health resources and advised to call to schedule appointment.    Orders:    buPROPion (WELLBUTRIN SR) 200 MG 12  "hr tablet; Take 1 tablet (200 mg total) by mouth every morning     Allergic rhinitis, unspecified seasonality, unspecified trigger      Orders:    fluticasone (FLONASE) 50 mcg/act nasal spray; 1 spray into each nostril daily     Transportation insecurity  -Patient currently does not have transportation available for his medical appointments.  Referral placed to social work to further assist patient.  Orders:    Ambulatory Referral to Social Work Care Management Program; Future    Tobacco abuse  - Currently smoking about 5 cigarettes  per day.    - Counseled patient on smoking cessation. Patient denies nicotine replacement therapy.         Cervical spondylosis  -Reviewed ED visit from 4/5/2025, 4/14/2025.  -Reviewed cervical spine x-ray from 4/5/2025. Results showed: \"Prominent cervical degenerative disc change with multilevel foraminal compromise, degenerative disc change and multilevel malalignment. No acute bony cervical abnormality. There may been a remote C7 spinous tip fracture versus enthesopathic change. Cannot exclude a submandibular gland stone versus metallic foreign body projecting just lateral to the angle of the left mandible. Uncertain if there are some faint calcifications in the deep right neck superiorly or even carotid artery calcification. CT with intravenous contrast would best evaluate neck soft tissue abnormalities.\"  -Reviewed CT soft tissue neck with contrast from 4/24/2025. Results show: \"Multilevel cervical degenerative disc disease. Grade 1 degenerative anterolisthesis is noted at the C2-3 and C3-4 levels. There is degenerative disc disease with disc space narrowing at the C4-5 and C5-6 levels. Multilevel facet arthrosis. Punctate punctate densely calcified lesion within the deep subcutaneous region overlying the inferolateral aspect of the left masseter muscle corresponding to the radiographic abnormality. There is no surrounding soft tissue density to suggest a salivary duct stone. There " "is no suspicious mucosal lesion.\"  -Reviewed MRI cervical spine without contrast from 5/19/2025. Results show: \"No cord compression, as clinically questioned. Edematous bone marrow signal in right C2-C3 articular pillars with mild surrounding soft tissue edema, likely due to active facet arthritis. Differential includes septic facet joint arthritis in the appropriate clinical setting. Multilevel degenerative changes of cervical spine with varying degrees of canal stenosis (moderate C3-C4) and foraminal narrowing (severe bilateral C3-C4, right C4-C5, right C5-C6, and right C6-C7), as detailed above.\"  -Reviewed neurosurgery note from 5/5/2025 and 5/22/2025.  Patient referred to physical therapy and pain management.  -Continue follow-up with neurosurgery as scheduled.                History of Present Illness     Patient is a 60 y.o. male whom  has a past medical history of Ambulates with cane, Diabetes mellitus (HCC), Diverticulitis, Fall, Hypertension, Kidney stone, Neck pain, and Renal disorder. who is seen today in office for neck pain.    -Patient notes he did see neurosurgery and had updated MRI of his cervical spine.  Patient notes he has been referred to physical therapy and pain management.  He is planning on scheduling these.  However, patient notes he is having issues with transportation currently.  Patient notes he does continue with neck pain and also leg pain.  Patient notes he is continuing to have difficulty with his balance and continues to have frequent falls.    -Patient notes the other day he was eating pork chops and a part of his tooth broke off.  Patient notes it is a tooth located in the left lower part of his mouth.  Patient notes he did see his dentist and they referred him to an oral surgeon, but his insurance did not cover that particular office.    -Patient notes he is currently smoking marijuana to help with his pain.  Patient notes he is interested in obtaining his medical marijuana " "license.  Patient notes he would require help with completing the application online.      Review of Systems   Constitutional:  Negative for activity change, appetite change and fatigue.   HENT:  Negative for congestion and trouble swallowing.    Eyes:  Negative for photophobia and visual disturbance.   Respiratory:  Negative for chest tightness and wheezing.    Cardiovascular:  Negative for palpitations and leg swelling.   Gastrointestinal:  Negative for abdominal pain.   Genitourinary:  Negative for difficulty urinating and dysuria.   Musculoskeletal:  Positive for arthralgias (rt hip pain), gait problem and neck pain.   Skin:  Negative for pallor and wound.   Neurological:  Positive for dizziness, light-headedness and numbness. Negative for seizures, syncope and weakness.        Balance issues   Psychiatric/Behavioral:  Negative for self-injury, sleep disturbance and suicidal ideas. The patient is not nervous/anxious.        Objective   /70 (BP Location: Right arm, Patient Position: Sitting, Cuff Size: Standard)   Pulse 85   Temp 98.9 °F (37.2 °C) (Temporal)   Resp 18   Ht 6' 1\" (1.854 m)   Wt 86.6 kg (191 lb)   SpO2 94%   BMI 25.20 kg/m²      Physical Exam  Vitals and nursing note reviewed.   Constitutional:       General: He is not in acute distress.     Appearance: Normal appearance. He is well-developed.   HENT:      Head: Normocephalic and atraumatic.      Right Ear: External ear normal.      Left Ear: External ear normal.      Nose: Nose normal.      Mouth/Throat:      Pharynx: Uvula midline. No posterior oropharyngeal erythema.       Eyes:      Conjunctiva/sclera: Conjunctivae normal.       Cardiovascular:      Rate and Rhythm: Normal rate and regular rhythm.      Pulses: Normal pulses.      Heart sounds: Normal heart sounds. No murmur heard.  Pulmonary:      Effort: Pulmonary effort is normal. No respiratory distress.      Breath sounds: Normal breath sounds. No wheezing.   Abdominal:      " General: Bowel sounds are normal.      Palpations: Abdomen is soft.      Tenderness: There is no abdominal tenderness.     Musculoskeletal:      Right shoulder: Tenderness present. No swelling. Decreased range of motion.      Cervical back: Neck supple. Muscular tenderness present. Decreased range of motion.      Right hip: Tenderness present. Decreased range of motion.     Skin:     General: Skin is warm and dry.      Capillary Refill: Capillary refill takes less than 2 seconds.     Neurological:      General: No focal deficit present.      Mental Status: He is alert and oriented to person, place, and time.     Psychiatric:         Speech: Speech normal.         Behavior: Behavior normal.       Administrative Statements   I have spent a total time of 40 minutes in caring for this patient on the day of the visit/encounter including Diagnostic results, Prognosis, Risks and benefits of tx options, Instructions for management, Patient and family education, Importance of tx compliance, Risk factor reductions, Impressions, Counseling / Coordination of care, and Documenting in the medical record.

## 2025-06-03 ENCOUNTER — PATIENT OUTREACH (OUTPATIENT)
Dept: FAMILY MEDICINE CLINIC | Facility: CLINIC | Age: 60
End: 2025-06-03

## 2025-06-03 DIAGNOSIS — E11.9 TYPE 2 DIABETES MELLITUS WITHOUT COMPLICATION, WITHOUT LONG-TERM CURRENT USE OF INSULIN (HCC): Primary | Chronic | ICD-10-CM

## 2025-06-03 DIAGNOSIS — I10 PRIMARY HYPERTENSION: Chronic | ICD-10-CM

## 2025-06-03 PROBLEM — M47.812 CERVICAL SPONDYLOSIS: Chronic | Status: ACTIVE | Noted: 2025-05-04

## 2025-06-03 LAB — SL AMB POCT HEMOGLOBIN AIC: 5.7 (ref ?–6.5)

## 2025-06-03 NOTE — ASSESSMENT & PLAN NOTE
"-Reviewed ED visit from 4/5/2025, 4/14/2025.  -Reviewed cervical spine x-ray from 4/5/2025. Results showed: \"Prominent cervical degenerative disc change with multilevel foraminal compromise, degenerative disc change and multilevel malalignment. No acute bony cervical abnormality. There may been a remote C7 spinous tip fracture versus enthesopathic change. Cannot exclude a submandibular gland stone versus metallic foreign body projecting just lateral to the angle of the left mandible. Uncertain if there are some faint calcifications in the deep right neck superiorly or even carotid artery calcification. CT with intravenous contrast would best evaluate neck soft tissue abnormalities.\"  -Reviewed CT soft tissue neck with contrast from 4/24/2025. Results show: \"Multilevel cervical degenerative disc disease. Grade 1 degenerative anterolisthesis is noted at the C2-3 and C3-4 levels. There is degenerative disc disease with disc space narrowing at the C4-5 and C5-6 levels. Multilevel facet arthrosis. Punctate punctate densely calcified lesion within the deep subcutaneous region overlying the inferolateral aspect of the left masseter muscle corresponding to the radiographic abnormality. There is no surrounding soft tissue density to suggest a salivary duct stone. There is no suspicious mucosal lesion.\"  -Reviewed MRI cervical spine without contrast from 5/19/2025. Results show: \"No cord compression, as clinically questioned. Edematous bone marrow signal in right C2-C3 articular pillars with mild surrounding soft tissue edema, likely due to active facet arthritis. Differential includes septic facet joint arthritis in the appropriate clinical setting. Multilevel degenerative changes of cervical spine with varying degrees of canal stenosis (moderate C3-C4) and foraminal narrowing (severe bilateral C3-C4, right C4-C5, right C5-C6, and right C6-C7), as detailed above.\"  -Reviewed neurosurgery note from 5/5/2025 and 5/22/2025.  " Patient referred to physical therapy and pain management.  -Continue follow-up with neurosurgery as scheduled.

## 2025-06-03 NOTE — PROGRESS NOTES
"OP ADI had received a referral from Juliana Martino PA-C r/t transportation insecurity. OP ADI had completed a chart review. Per chart, patient has [employer] as primary insurance. Per chart, patient has [employer] & [employer] as secondary insurance. OP SW noted patient has access to transportation services through both insurance plans.    Patient has approved waiver services. Patient's SO, Janae is paid caregiver through [employer]. Patient has an assigned , Radha [phone]. OP ADI notes patient can also request additional transportation through [employer].    OP ADI had contacted the patient via phone. OP SW left a voicemail in [place] on mobile number. OP SW unable to leave a voicemail on home number as \"it is not in service\". OP SW will attempt to call again at a later date and time. OP SW will continue to be available.  "

## 2025-06-03 NOTE — ASSESSMENT & PLAN NOTE
- Currently smoking about 5 cigarettes  per day.    - Counseled patient on smoking cessation. Patient denies nicotine replacement therapy.

## 2025-06-04 ENCOUNTER — PATIENT OUTREACH (OUTPATIENT)
Dept: FAMILY MEDICINE CLINIC | Facility: CLINIC | Age: 60
End: 2025-06-04

## 2025-06-06 ENCOUNTER — TELEPHONE (OUTPATIENT)
Dept: FAMILY MEDICINE CLINIC | Facility: CLINIC | Age: 60
End: 2025-06-06

## 2025-06-06 NOTE — TELEPHONE ENCOUNTER
lEaine from service access and management call requesting list of medication and problem list.    Fax :[phone]  Any question call @[phone]

## 2025-06-09 ENCOUNTER — HOSPITAL ENCOUNTER (EMERGENCY)
Facility: HOSPITAL | Age: 60
Discharge: HOME | End: 2025-06-09
Attending: EMERGENCY MEDICINE | Admitting: EMERGENCY MEDICINE
Payer: MEDICARE

## 2025-06-09 VITALS
SYSTOLIC BLOOD PRESSURE: 121 MMHG | DIASTOLIC BLOOD PRESSURE: 70 MMHG | HEART RATE: 76 BPM | TEMPERATURE: 97.6 F | RESPIRATION RATE: 20 BRPM | OXYGEN SATURATION: 97 % | WEIGHT: 191.8 LBS | BODY MASS INDEX: 25.3 KG/M2

## 2025-06-09 DIAGNOSIS — R07.0 THROAT DISCOMFORT: Primary | ICD-10-CM

## 2025-06-09 DIAGNOSIS — R21 RASH: ICD-10-CM

## 2025-06-09 PROCEDURE — 99284 EMERGENCY DEPT VISIT MOD MDM: CPT | Performed by: EMERGENCY MEDICINE

## 2025-06-09 PROCEDURE — 99282 EMERGENCY DEPT VISIT SF MDM: CPT

## 2025-06-09 PROCEDURE — 96372 THER/PROPH/DIAG INJ SC/IM: CPT

## 2025-06-09 RX ORDER — PREDNISONE 20 MG/1
40 TABLET ORAL DAILY
Qty: 8 TABLET | Refills: 0 | Status: SHIPPED | OUTPATIENT
Start: 2025-06-09 | End: 2025-06-13

## 2025-06-09 RX ORDER — DIPHENHYDRAMINE HCL 25 MG
50 TABLET ORAL ONCE
Status: DISCONTINUED | OUTPATIENT
Start: 2025-06-09 | End: 2025-06-09

## 2025-06-09 RX ORDER — PREDNISONE 20 MG/1
40 TABLET ORAL ONCE
Status: COMPLETED | OUTPATIENT
Start: 2025-06-09 | End: 2025-06-09

## 2025-06-09 RX ORDER — FAMOTIDINE 20 MG/1
20 TABLET, FILM COATED ORAL ONCE
Status: COMPLETED | OUTPATIENT
Start: 2025-06-09 | End: 2025-06-09

## 2025-06-09 RX ORDER — EPINEPHRINE 1 MG/ML
0.3 INJECTION, SOLUTION, CONCENTRATE INTRAVENOUS ONCE
Status: COMPLETED | OUTPATIENT
Start: 2025-06-09 | End: 2025-06-09

## 2025-06-09 RX ADMIN — PREDNISONE 40 MG: 20 TABLET ORAL at 07:37

## 2025-06-09 RX ADMIN — FAMOTIDINE 20 MG: 20 TABLET, FILM COATED ORAL at 07:37

## 2025-06-09 RX ADMIN — EPINEPHRINE 0.3 MG: 1 INJECTION, SOLUTION, CONCENTRATE INTRAVENOUS at 08:21

## 2025-06-09 NOTE — ED NOTES
MD Green made aware patient c/o increasing throat swelling/tightness. Speaking full sentences w/o distress. Voice quality not raspy. Generalized redness appears to have decreased since ED arrival      Cassi Bates RN  06/09/25 [phone]

## 2025-06-09 NOTE — ED PROVIDER NOTES
Time reflects when diagnosis was documented in both MDM as applicable and the Disposition within this note       Time User Action Codes Description Comment    6/9/2025 11:11 AM Jose Green Add [R07.0] Throat discomfort     6/9/2025 11:11 AM Jose Green Add [R21] Rash           ED Disposition       ED Disposition   Discharge    Condition   Stable    Date/Time   Mon Jun 9, 2025 11:11 AM    Comment   Fernando Fox discharge to home/self care.                   Assessment & Plan       Medical Decision Making  60-year-old male presents with pruritic rash as well as sensation of throat swelling  Throat exam is normal without any signs of swelling  Lungs also without any wheezing  Due to diffuse rash as well as patient's complaint of throat swelling and sore throat we will give medications for allergic reaction  Patient endorses significant relief of symptoms  Patient remained stable during observation.  Patient discharged with return precautions provided    Problems Addressed:  Rash: acute illness or injury  Throat discomfort: acute illness or injury    Risk  OTC drugs.  Prescription drug management.             Medications   diphenhydrAMINE (FOR EMS ONLY) (BENADRYL) injection 50 mg (0 mg Does not apply Given to EMS 6/9/25 0723)   predniSONE tablet 40 mg (40 mg Oral Given 6/9/25 0737)   famotidine (PEPCID) tablet 20 mg (20 mg Oral Given 6/9/25 0737)   EPINEPHrine PF (ADRENALIN) 1 mg/mL injection 0.3 mg (0.3 mg Intramuscular Given 6/9/25 0821)       ED Risk Strat Scores                    No data recorded        SBIRT 22yo+      Flowsheet Row Most Recent Value   Initial Alcohol Screen: US AUDIT-C     1. How often do you have a drink containing alcohol? 2 Filed at: 06/09/2025 0720   2. How many drinks containing alcohol do you have on a typical day you are drinking?  1 Filed at: 06/09/2025 0720   3a. Male UNDER 65: How often do you have five or more drinks on one occasion? 0 Filed at: 06/09/2025 0720   Audit-C Score 3 Filed  "at: 06/09/2025 0720   VICK: How many times in the past year have you...    Used an illegal drug or used a prescription medication for non-medical reasons? Never Filed at: 06/09/2025 0720                            History of Present Illness       Chief Complaint   Patient presents with    Allergic Reaction     Patient reports waking up w/ facial swelling/generalized redness/itching. No known allergies, ate  & \"telly fruit\" yesterday. Denies difficulty breathing        Past Medical History[1]   Past Surgical History[2]   Family History[3]   Social History[4]   E-Cigarette/Vaping    E-Cigarette Use Never User       E-Cigarette/Vaping Substances    Nicotine No     THC No     CBD No     Flavoring No     Other No     Unknown No       I have reviewed and agree with the history as documented.     HPI  60-year-old male presents with diffuse rash as well as sensation of throat swelling.  Patient is able to tolerate p.o. but states that he feels like it is uncomfortable.  Reports no shortness of breath.  Denies any wheezing.  Patient without any known allergies.  Reports no new food items.  Also denies any new clothing or household items.  Symptoms started since yesterday evening.  States that the rash is itchy.  Reports no fever, chills, nausea, vomiting, diarrhea.    Review of Systems   Constitutional:  Negative for chills, diaphoresis, fever and unexpected weight change.   HENT:  Positive for sore throat. Negative for ear pain.    Eyes:  Negative for visual disturbance.   Respiratory:  Negative for cough, chest tightness and shortness of breath.    Cardiovascular:  Negative for chest pain and leg swelling.   Gastrointestinal:  Negative for abdominal distention, abdominal pain, constipation, diarrhea, nausea and vomiting.   Endocrine: Negative.    Genitourinary:  Negative for difficulty urinating and dysuria.   Musculoskeletal: Negative.    Skin:  Positive for rash.   Allergic/Immunologic: Negative.    Neurological: " Negative.    Hematological: Negative.    Psychiatric/Behavioral: Negative.     All other systems reviewed and are negative.          Objective       ED Triage Vitals [06/09/25 0718]   Temperature Pulse Blood Pressure Respirations SpO2 Patient Position - Orthostatic VS   97.6 °F (36.4 °C) 92 120/86 20 97 % Lying      Temp Source Heart Rate Source BP Location FiO2 (%) Pain Score    Oral Monitor Right arm -- No Pain      Vitals      Date and Time Temp Pulse SpO2 Resp BP Pain Score FACES Pain Rating User   06/09/25 1020 -- 76 97 % 20 121/70 -- -- KO   06/09/25 0820 -- 82 98 % 20 108/74 -- -- KO   06/09/25 0718 97.6 °F (36.4 °C) 92 97 % 20 120/86 No Pain -- KO            Physical Exam  Vitals and nursing note reviewed.   Constitutional:       General: He is not in acute distress.     Appearance: Normal appearance. He is not ill-appearing.   HENT:      Head: Normocephalic and atraumatic.      Right Ear: External ear normal.      Left Ear: External ear normal.      Nose: Nose normal.      Mouth/Throat:      Mouth: Mucous membranes are moist.      Pharynx: Oropharynx is clear.     Eyes:      General: No scleral icterus.        Right eye: No discharge.         Left eye: No discharge.      Extraocular Movements: Extraocular movements intact.      Conjunctiva/sclera: Conjunctivae normal.      Pupils: Pupils are equal, round, and reactive to light.       Cardiovascular:      Rate and Rhythm: Normal rate and regular rhythm.      Pulses: Normal pulses.      Heart sounds: Normal heart sounds.   Pulmonary:      Effort: Pulmonary effort is normal.      Breath sounds: Normal breath sounds.   Abdominal:      General: Abdomen is flat. Bowel sounds are normal. There is no distension.      Palpations: Abdomen is soft.      Tenderness: There is no abdominal tenderness. There is no guarding or rebound.     Musculoskeletal:         General: Normal range of motion.      Cervical back: Normal range of motion and neck supple.     Skin:      General: Skin is warm and dry.      Capillary Refill: Capillary refill takes less than 2 seconds.      Findings: Rash (Raised erythematous rash around his torso, upper arms) present.     Neurological:      General: No focal deficit present.      Mental Status: He is alert and oriented to person, place, and time. Mental status is at baseline.     Psychiatric:         Mood and Affect: Mood normal.         Behavior: Behavior normal.         Thought Content: Thought content normal.         Judgment: Judgment normal.         Results Reviewed       None            No orders to display       Procedures    ED Medication and Procedure Management   Prior to Admission Medications   Prescriptions Last Dose Informant Patient Reported? Taking?   ARIPiprazole (ABILIFY) 10 mg tablet  Self Yes No   Sig: Take 10 mg by mouth daily at bedtime   Continuous Glucose  (FreeStyle Shira 2 Harmon) SHANELL  Self No No   Sig: Scan every 8 hours. E11.9   Continuous Glucose Sensor (FreeStyle Shira 2 Sensor) MISC   No No   Sig: Scan every 8 hours. E11.9   Cyanocobalamin (VITAMIN B 12 PO)  Self Yes No   Sig: Take by mouth   Lancets (freestyle) lancets   No No   Sig: Daily   Restasis 0.05 % ophthalmic emulsion  Self Yes No   Sig: Administer 1 drop to both eyes daily as needed   acetaminophen (TYLENOL) 500 mg tablet   No No   Sig: Take 1 tablet (500 mg total) by mouth every 6 (six) hours as needed for mild pain   albuterol (PROVENTIL HFA,VENTOLIN HFA) 90 mcg/act inhaler   No No   Sig: Inhale 1-2 puffs every 6 (six) hours as needed for wheezing   aspirin (ECOTRIN LOW STRENGTH) 81 mg EC tablet   No No   Sig: Take 1 tablet (81 mg total) by mouth daily   atorvastatin (LIPITOR) 40 mg tablet   No No   Sig: Take 1 tablet (40 mg total) by mouth daily   benzonatate (TESSALON PERLES) 100 mg capsule   No No   Sig: Take 1 capsule (100 mg total) by mouth 3 (three) times a day as needed for cough   buPROPion (WELLBUTRIN SR) 200 MG 12 hr tablet   No No   Sig:  Take 1 tablet (200 mg total) by mouth every morning   cloNIDine (CATAPRES) 0.2 mg tablet  Self Yes No   Sig: Take 0.2 mg by mouth daily at bedtime   dextromethorphan-guaifenesin (MUCINEX DM)  MG per 12 hr tablet   No No   Sig: Take 1 tablet by mouth 2 (two) times a day as needed for cough   doxepin (SINEquan) 150 MG capsule  Self Yes No   Sig: Take 150 mg by mouth daily at bedtime   fluticasone (FLONASE) 50 mcg/act nasal spray   No No   Si spray into each nostril daily   gabapentin (NEURONTIN) 800 mg tablet   No No   Sig: Take 1 tablet (800 mg total) by mouth 3 (three) times a day   glucose blood (FREESTYLE LITE) test strip   No No   Sig: Testing sugars once a day   hydrOXYzine HCL (ATARAX) 50 mg tablet  Self Yes No   Sig: Take 50 mg by mouth daily at bedtime   hydrocortisone 1 % ointment   No No   Sig: Apply topically 2 (two) times a day   ibuprofen (MOTRIN) 800 mg tablet   No No   Sig: Take 1 tablet (800 mg total) by mouth every 8 (eight) hours as needed for moderate pain   lidocaine (XYLOCAINE) 2 % topical gel   No No   Sig: Apply topically as needed for mild pain   linaGLIPtin 5 MG TABS   No No   Sig: Take 5 mg by mouth daily with or without food   metFORMIN (GLUCOPHAGE-XR) 500 mg 24 hr tablet   No No   Sig: Take 2 tablets (1,000 mg total) by mouth daily with dinner   methocarbamol (ROBAXIN) 750 mg tablet   Yes No   Sig: Take 750 mg by mouth every 6 (six) hours as needed for muscle spasms   oxyCODONE-acetaminophen (Percocet) 5-325 mg per tablet   No No   Sig: Take 1 tablet by mouth 2 (two) times a day as needed for moderate pain Max Daily Amount: 2 tablets   pantoprazole (PROTONIX) 40 mg tablet   No No   Sig: Take 1 tablet (40 mg total) by mouth daily   tiZANidine (ZANAFLEX) 4 mg tablet   No No   Sig: Take 2 tablets (8 mg total) by mouth every 8 (eight) hours as needed for muscle spasms   zolpidem (AMBIEN) 10 mg tablet  Self Yes No   Sig: Take 10 mg by mouth daily at bedtime      Facility-Administered  Medications: None     Discharge Medication List as of 6/9/2025 11:11 AM        START taking these medications    Details   predniSONE 20 mg tablet Take 2 tablets (40 mg total) by mouth daily for 4 days, Starting Mon 6/9/2025, Until Fri 6/13/2025, Normal           CONTINUE these medications which have NOT CHANGED    Details   acetaminophen (TYLENOL) 500 mg tablet Take 1 tablet (500 mg total) by mouth every 6 (six) hours as needed for mild pain, Starting Mon 6/2/2025, Normal      albuterol (PROVENTIL HFA,VENTOLIN HFA) 90 mcg/act inhaler Inhale 1-2 puffs every 6 (six) hours as needed for wheezing, Starting Mon 6/2/2025, Normal      ARIPiprazole (ABILIFY) 10 mg tablet Take 10 mg by mouth daily at bedtime, Starting Thu 9/30/2021, Historical Med      aspirin (ECOTRIN LOW STRENGTH) 81 mg EC tablet Take 1 tablet (81 mg total) by mouth daily, Starting Mon 6/2/2025, Normal      atorvastatin (LIPITOR) 40 mg tablet Take 1 tablet (40 mg total) by mouth daily, Starting Mon 6/2/2025, Normal      benzonatate (TESSALON PERLES) 100 mg capsule Take 1 capsule (100 mg total) by mouth 3 (three) times a day as needed for cough, Starting Fri 1/24/2025, Normal      buPROPion (WELLBUTRIN SR) 200 MG 12 hr tablet Take 1 tablet (200 mg total) by mouth every morning, Starting Mon 6/2/2025, Normal      cloNIDine (CATAPRES) 0.2 mg tablet Take 0.2 mg by mouth daily at bedtime, Starting Tue 5/18/2021, Historical Med      Continuous Glucose  (FreeStyle Shira 2 Edison) SHANELL Scan every 8 hours. E11.9, Normal      Continuous Glucose Sensor (FreeStyle Shira 2 Sensor) MISC Scan every 8 hours. E11.9, Normal      Cyanocobalamin (VITAMIN B 12 PO) Take by mouth, Historical Med      dextromethorphan-guaifenesin (MUCINEX DM)  MG per 12 hr tablet Take 1 tablet by mouth 2 (two) times a day as needed for cough, Starting Fri 1/24/2025, Normal      doxepin (SINEquan) 150 MG capsule Take 150 mg by mouth daily at bedtime, Starting Sat 6/3/2023,  Historical Med      fluticasone (FLONASE) 50 mcg/act nasal spray 1 spray into each nostril daily, Starting Mon 6/2/2025, Normal      gabapentin (NEURONTIN) 800 mg tablet Take 1 tablet (800 mg total) by mouth 3 (three) times a day, Starting Mon 6/2/2025, Normal      glucose blood (FREESTYLE LITE) test strip Testing sugars once a day, Normal      hydrocortisone 1 % ointment Apply topically 2 (two) times a day, Starting Fri 4/11/2025, Normal      hydrOXYzine HCL (ATARAX) 50 mg tablet Take 50 mg by mouth daily at bedtime, Starting Thu 9/30/2021, Historical Med      ibuprofen (MOTRIN) 800 mg tablet Take 1 tablet (800 mg total) by mouth every 8 (eight) hours as needed for moderate pain, Starting Mon 6/2/2025, Normal      Lancets (freestyle) lancets Daily, Normal      lidocaine (XYLOCAINE) 2 % topical gel Apply topically as needed for mild pain, Starting Mon 6/2/2025, Normal      linaGLIPtin 5 MG TABS Take 5 mg by mouth daily with or without food, Starting Mon 6/2/2025, Normal      metFORMIN (GLUCOPHAGE-XR) 500 mg 24 hr tablet Take 2 tablets (1,000 mg total) by mouth daily with dinner, Starting Mon 6/2/2025, Normal      methocarbamol (ROBAXIN) 750 mg tablet Take 750 mg by mouth every 6 (six) hours as needed for muscle spasms, Starting Sun 4/6/2025, Historical Med      oxyCODONE-acetaminophen (Percocet) 5-325 mg per tablet Take 1 tablet by mouth 2 (two) times a day as needed for moderate pain Max Daily Amount: 2 tablets, Starting Tue 4/22/2025, Normal      pantoprazole (PROTONIX) 40 mg tablet Take 1 tablet (40 mg total) by mouth daily, Starting Mon 6/2/2025, Normal      Restasis 0.05 % ophthalmic emulsion Administer 1 drop to both eyes daily as needed, Starting Wed 7/31/2024, Historical Med      tiZANidine (ZANAFLEX) 4 mg tablet Take 2 tablets (8 mg total) by mouth every 8 (eight) hours as needed for muscle spasms, Starting Mon 6/2/2025, Normal      zolpidem (AMBIEN) 10 mg tablet Take 10 mg by mouth daily at bedtime,  Starting Fri 11/6/2020, Historical Med           No discharge procedures on file.  ED SEPSIS DOCUMENTATION   Time reflects when diagnosis was documented in both MDM as applicable and the Disposition within this note       Time User Action Codes Description Comment    6/9/2025 11:11 AM Jose Green [R07.0] Throat discomfort     6/9/2025 11:11 AM Jose Green [R21] Rash                      [1]   Past Medical History:  Diagnosis Date    Ambulates with cane     Diabetes mellitus (HCC)     Diverticulitis     Fall     10/2024    Hypertension     Kidney stone     Neck pain     Renal disorder    [2]   Past Surgical History:  Procedure Laterality Date    COLON SURGERY      COLONOSCOPY      NECK SURGERY      denies    AR LAPAROSCOPY SURG RPR INITIAL INGUINAL HERNIA Bilateral 11/14/2024    Procedure: ROBOTIC REPAIR HERNIA INGUINAL;  Surgeon: Jamel Vila MD;  Location: [facility];  Service: General   [3] No family history on file.  [4]   Social History  Tobacco Use    Smoking status: Every Day     Current packs/day: 0.25     Average packs/day: 0.3 packs/day for 50.6 years (12.6 ttl pk-yrs)     Types: Cigarettes, Cigars     Start date: 11/14/1974     Passive exposure: Never    Smokeless tobacco: Never    Tobacco comments:     1 cigar/daily stopped few days     Last smoked 11/14 half cig..   Vaping Use    Vaping status: Never Used   Substance Use Topics    Alcohol use: Yes     Comment: occ    Drug use: Yes     Types: Marijuana     Comment: occasional- smoke. last used 11/11        Jose Green MD  06/09/25 5737

## 2025-06-10 ENCOUNTER — PATIENT OUTREACH (OUTPATIENT)
Dept: FAMILY MEDICINE CLINIC | Facility: CLINIC | Age: 60
End: 2025-06-10

## 2025-06-10 NOTE — LETTER
06/10/25    Estimado/a Fernando EZIO Fox,    Intenté comunicarme por teléfono, con usted por teléfono y desafortunadamente no pude comunicarme con usted. Soy la trabajadora social de [facility]. Estoy dando seguimiento a colton referencia de cheney médico de cabecera. Por favor, llámame al [phone].    Atentamente.         SIERRA Stroud

## 2025-06-10 NOTE — PROGRESS NOTES
OP ADI had contacted the patient via phone. OP ADI left a voicemail in [place]ankan. OP ADI notes this was the second phone call attempt. OP ADI sent unable to reach letter via mail. OP ADI closed referral. Please reconsult as needed.

## 2025-06-11 ENCOUNTER — PATIENT OUTREACH (OUTPATIENT)
Dept: FAMILY MEDICINE CLINIC | Facility: CLINIC | Age: 60
End: 2025-06-11

## 2025-06-11 NOTE — PROGRESS NOTES
Chronic Care Management Program Consent:    Patient informed of availability of Chronic Care Management services. The services will billed monthly by their Primary Care Provider only. Patient is informed there may be a monthly cost sharing associated with the Chronic Care Management services. Patient is aware that financial counseling is available to assist with any co-pay questions or concerns.    Chronic Care Management services include:    24/7 access to care.  Comprehensive plan of care created by the provider.  Individualized care planning by the care manager(s).  Transitional care support.    The patient is informed that they have the right to stop Chronic Care Management services at anytime.       Patient consents to Chronic Care Management services? no     Patient informed that consent is needed only once unless the patient switches qualifying providers.      This patient is well known to me.  He noted he is having trouble with his neck/spine which causes him to drop to the floor as his leg gives out.  He has seen Neurosurg in the past and went to PT.  He has an Ortho appointment tomorrow and follow up with Neurosurg 7/3.      I commended the patient on controlling his diabetes with an A1C of 5.7.  I encouraged him to keep up the good work and stay on track.    I informed him ADI ALVARADO has been trying to contact him. He stated his phone loses reception frequently depending where he is in the home.  He asked that she text first and he will return the call; note sent to ADI.

## 2025-06-16 ENCOUNTER — TELEPHONE (OUTPATIENT)
Dept: FAMILY MEDICINE CLINIC | Facility: CLINIC | Age: 60
End: 2025-06-16

## 2025-06-16 DIAGNOSIS — K08.89 PAIN, DENTAL: Primary | ICD-10-CM

## 2025-06-16 NOTE — TELEPHONE ENCOUNTER
Hello,    Can you please place an order for Dentistry so pt can be established for care? Once the pt is established, the Dentist can provide the information needed to schedule for Oral Surgery.    Thank you.  Rosetta Gonzalez  Referral Tracking Coordinator

## 2025-06-18 ENCOUNTER — PATIENT OUTREACH (OUTPATIENT)
Dept: FAMILY MEDICINE CLINIC | Facility: CLINIC | Age: 60
End: 2025-06-18

## 2025-06-18 ENCOUNTER — TELEPHONE (OUTPATIENT)
Dept: FAMILY MEDICINE CLINIC | Facility: CLINIC | Age: 60
End: 2025-06-18

## 2025-06-18 NOTE — PROGRESS NOTES
I received a call from the patient stating he is having dental issues.  He notes he is scheduled in [place] next week but has transportation issues and prefers an appointment at [facility]. I called [facility] who will call the patient directly to schedule an appointment.    The patient states he called ADI ALVARADO but has not received a call back. I encouraged him to call again.

## 2025-06-18 NOTE — PROGRESS NOTES
ZACHERY JOSHI had received a call from the patient twice today. ZACHERY JOSHI had received an in basket from MR regarding patient requesting a call back. ZACHERY JOSHI responded to MR. ZACHERY JOSHI also received an in basket from CCM RN regarding same.    ZACHERY JOSHI had contacted the patient. ZACHERY JOSHI had spoke in the preferred language, Portuguese. ZACHERY JOSHI introduced herself and reviewed the reason for the consultation. ZACHERY JOSHI also explained her role in the process. Patient understood the information provided.    Patient reported he lives with family. Patient reported girlfriend, Janae is main support. Patient reported he has SSI for income. Patient did not report any food, housing or utility issues at this time.    Patient requesting a dental appointment at [facility]. Per chart, CCM RN was able to contact dental for him. Per chart, patient scheduled for 6/23 at 8:30am. ZACHERY JOSHI made patient aware of this appointment. Patient can walk to appointment.    Patient concerned about transportation. Patient has transportation through [employer] and can contact [employer] at [phone] to set up a ride. Patient also has transportation through [employer] and can contact [facility] at [phone] to arrange a ride. ZACHERY JOSHI informed patient of these options.     Patient has approved waiver services. Janae is paid caregiver through [employer]. Janae schedules patient's appointments. Patient has an assigned , Radha [phone]. ZACHERY JOSHI notes patient can also request additional transportation through [employer]. Patient understood.    Per chart, there are no h/o MH. Per chart, there are no h/o ANDRA. Patient did not report any additional needs at this time.    ZACHERY JOSHI had provided her contact information. ZACHERY JOSHI advised patient reach out as needed. Patient agreed. ZACHERY JOSHI routed note to CCM RN. ZACHERY JOSHI noted this a one-time outreach. ZACHERY JOSHI closed referral. Please reconsult as needed.

## 2025-06-18 NOTE — TELEPHONE ENCOUNTER
Patient came into office today stating that he has been trying to get in contact with  (returning call) but no luck. Please reach out to pt. Thanks!

## 2025-06-18 NOTE — TELEPHONE ENCOUNTER
Patient came into office today requesting to be contacted by pcp in regards of having tooth pain. Advise of previous message but pt stated he need to speak with pcp about this. Offered appt but pt declined stating that it will only take a minute. Please advise/contact pt. Thank you!

## 2025-06-23 DIAGNOSIS — R22.1 LOCALIZED SWELLING, MASS OR LUMP OF NECK: ICD-10-CM

## 2025-06-23 DIAGNOSIS — R93.7 ABNORMAL X-RAY OF CERVICAL SPINE: ICD-10-CM

## 2025-06-23 DIAGNOSIS — K11.8 SUBMANDIBULAR GLAND MASS: ICD-10-CM

## 2025-06-23 DIAGNOSIS — J42 CHRONIC BRONCHITIS, UNSPECIFIED CHRONIC BRONCHITIS TYPE (HCC): ICD-10-CM

## 2025-06-23 RX ORDER — IBUPROFEN 800 MG/1
TABLET, FILM COATED ORAL
Qty: 30 TABLET | Refills: 1 | Status: SHIPPED | OUTPATIENT
Start: 2025-06-23

## 2025-06-23 RX ORDER — ALBUTEROL SULFATE 90 UG/1
INHALANT RESPIRATORY (INHALATION)
Qty: 8.5 G | Refills: 0 | Status: SHIPPED | OUTPATIENT
Start: 2025-06-23

## 2025-06-24 ENCOUNTER — OFFICE VISIT (OUTPATIENT)
Dept: DENTISTRY | Facility: CLINIC | Age: 60
End: 2025-06-24

## 2025-06-24 VITALS — HEART RATE: 67 BPM | SYSTOLIC BLOOD PRESSURE: 126 MMHG | DIASTOLIC BLOOD PRESSURE: 80 MMHG

## 2025-06-24 DIAGNOSIS — Z01.20 ENCOUNTER FOR DENTAL EXAMINATION: Primary | ICD-10-CM

## 2025-06-24 PROCEDURE — D0140 LIMITED ORAL EVALUATION - PROBLEM FOCUSED: HCPCS

## 2025-06-24 NOTE — PROGRESS NOTES
Procedure Details   - LIMITED ORAL EVALUATION - PROBLEM FOCUSED  Dental procedures in this visit     - LIMITED ORAL EVALUATION - PROBLEM FOCUSED (Completed)     Service provider: Jose Christiansen DMD     Billing provider: Jose Christiansen DMD     Subjective   Patient ID: Fernando Fox is a 60 y.o. male.  No chief complaint on file.    HPI  The following portions of the chart were reviewed this encounter and updated as appropriate:    Tobacco  Allergies  Meds  Problems  Med Hx  Surg Hx  Fam Hx           Objective   Soft Tissue Exam  No findings documented this visit      Dental Exam    Hard Tissue Exam:  Decay noted - see charting and treatment plan and Fractured restorations/teeth  Reference tooth chart for additional findings.    Assessment & Plan   Problem List Items Addressed This Visit    None  Visit Diagnoses         Encounter for dental examination    -  Primary    Relevant Orders    Ambulatory referral to Oral Maxillofacial Surgery    LIMITED ORAL EVALUATION - PROBLEM FOCUSED (Completed)        59 yo male presents with carious #19, 8/10 pain extraction warranted. An ASAP OMS referral was provided along with x-ray and provider sheet. Patient understood and agreed to the plan.    NV: ASAP OMS referral

## 2025-06-24 NOTE — DENTAL PROCEDURE DETAILS
Dental procedures in this visit     - LIMITED ORAL EVALUATION - PROBLEM FOCUSED (Completed)     Service provider: Jose Christiansen DMD     Billing provider: Jose Christiansen DMD     Subjective   Patient ID: Fernando Fox is a 60 y.o. male.  No chief complaint on file.    HPI  The following portions of the chart were reviewed this encounter and updated as appropriate:    Tobacco  Allergies  Meds  Problems  Med Hx  Surg Hx  Fam Hx           Objective   Soft Tissue Exam  No findings documented this visit      Dental Exam    Hard Tissue Exam:  Decay noted - see charting and treatment plan and Fractured restorations/teeth  Reference tooth chart for additional findings.    Assessment & Plan   Problem List Items Addressed This Visit    None  Visit Diagnoses         Encounter for dental examination    -  Primary    Relevant Orders    Ambulatory referral to Oral Maxillofacial Surgery    LIMITED ORAL EVALUATION - PROBLEM FOCUSED (Completed)        61 yo male presents with carious #19, 8/10 pain extraction warranted. An ASAP OMS referral was provided along with x-ray and provider sheet. Patient understood and agreed to the plan.    NV: ASAP OMS referral

## 2025-06-30 DIAGNOSIS — R93.7 ABNORMAL X-RAY OF CERVICAL SPINE: ICD-10-CM

## 2025-06-30 DIAGNOSIS — K11.8 SUBMANDIBULAR GLAND MASS: ICD-10-CM

## 2025-06-30 DIAGNOSIS — M54.42 CHRONIC BILATERAL LOW BACK PAIN WITH BILATERAL SCIATICA: ICD-10-CM

## 2025-06-30 DIAGNOSIS — M54.41 CHRONIC BILATERAL LOW BACK PAIN WITH BILATERAL SCIATICA: ICD-10-CM

## 2025-06-30 DIAGNOSIS — R22.1 LOCALIZED SWELLING, MASS OR LUMP OF NECK: ICD-10-CM

## 2025-06-30 DIAGNOSIS — G89.29 CHRONIC BILATERAL LOW BACK PAIN WITH BILATERAL SCIATICA: ICD-10-CM

## 2025-06-30 RX ORDER — GABAPENTIN 800 MG/1
800 TABLET ORAL 3 TIMES DAILY
Qty: 90 TABLET | Refills: 2 | OUTPATIENT
Start: 2025-06-30

## 2025-06-30 RX ORDER — IBUPROFEN 800 MG/1
TABLET, FILM COATED ORAL
Qty: 30 TABLET | Refills: 1 | OUTPATIENT
Start: 2025-06-30

## 2025-07-02 ENCOUNTER — TELEPHONE (OUTPATIENT)
Dept: NEUROSURGERY | Facility: CLINIC | Age: 60
End: 2025-07-02

## 2025-07-02 NOTE — TELEPHONE ENCOUNTER
Lmom for patient to return our call and inform us if he completed pt and pain med. Requested call back . Provided office number.

## 2025-07-02 NOTE — TELEPHONE ENCOUNTER
Patient Icelandic speaking .  Beth called patient left vm requesting call back to reschedule   Patient needs physical therapy and pain management for our follow up appointment tereza lemons/tri

## 2025-07-08 ENCOUNTER — PATIENT OUTREACH (OUTPATIENT)
Dept: FAMILY MEDICINE CLINIC | Facility: CLINIC | Age: 60
End: 2025-07-08

## 2025-07-08 NOTE — PROGRESS NOTES
I received a call from the patient stating he needs a PCP appointment because he has a tooth infection and his face is swollen.  Chart reviewed.  The patient had a dental appointment 6/24 which he initially could not recall.  I advised him to call them and see if they would order an abx or they may need to schedule an appointment.  Their phone number was provided.    The patient requested a PCP appointment; note sent to clerical.  He no-showed to the 7/1 appointment.

## 2025-07-14 ENCOUNTER — OFFICE VISIT (OUTPATIENT)
Dept: FAMILY MEDICINE CLINIC | Facility: CLINIC | Age: 60
End: 2025-07-14

## 2025-07-14 VITALS
TEMPERATURE: 97 F | BODY MASS INDEX: 25.05 KG/M2 | WEIGHT: 189 LBS | SYSTOLIC BLOOD PRESSURE: 132 MMHG | OXYGEN SATURATION: 98 % | HEIGHT: 73 IN | DIASTOLIC BLOOD PRESSURE: 72 MMHG | HEART RATE: 80 BPM | RESPIRATION RATE: 14 BRPM

## 2025-07-14 DIAGNOSIS — E11.9 TYPE 2 DIABETES MELLITUS WITHOUT COMPLICATION, WITHOUT LONG-TERM CURRENT USE OF INSULIN (HCC): Primary | Chronic | ICD-10-CM

## 2025-07-14 DIAGNOSIS — Z78.9 NEED FOR FOLLOW-UP BY SOCIAL WORKER: ICD-10-CM

## 2025-07-14 DIAGNOSIS — K08.89 PAIN, DENTAL: ICD-10-CM

## 2025-07-14 DIAGNOSIS — F33.9 DEPRESSION, RECURRENT (HCC): Chronic | ICD-10-CM

## 2025-07-14 DIAGNOSIS — M47.812 CERVICAL SPONDYLOSIS: Chronic | ICD-10-CM

## 2025-07-14 PROCEDURE — G2211 COMPLEX E/M VISIT ADD ON: HCPCS | Performed by: PHYSICIAN ASSISTANT

## 2025-07-14 PROCEDURE — 99214 OFFICE O/P EST MOD 30 MIN: CPT | Performed by: PHYSICIAN ASSISTANT

## 2025-07-14 RX ORDER — METFORMIN HYDROCHLORIDE 500 MG/1
500 TABLET, EXTENDED RELEASE ORAL EVERY OTHER DAY
Start: 2025-07-14

## 2025-07-14 NOTE — PATIENT INSTRUCTIONS
TRANSPORTATION OPTIONS:  Patient concerned about transportation. Patient has transportation through [employer] and can contact [email or web address]) at [phone] to set up a ride. Patient also has transportation through [employer] and can contact [facility] at [phone] to arrange a ride.     --------------------------------------------------------      Please go to this website to learn more about medical marijuana.     http://www.[place].gov

## 2025-07-14 NOTE — PROGRESS NOTES
"Name: Fernando Fox      : 1965      MRN: [id number]  Encounter Provider: Juliana Martino PA-C  Encounter Date: 2025   Encounter department: [facility]  :  Assessment & Plan  Type 2 diabetes mellitus without complication, without long-term current use of insulin (HCC)  - Currently prescribed metformin 1000 mg daily, however patient reports he has been taking metformin 500 mg every other day due to GI issues when taking metformin daily.  Continue with current regimen.  - Continue Tradjenta 5 mg daily and metformin 500 mg every other day.   - Continue following diabetic diet with focus on low intake of carbohydrates and sugars.  - Reviewed signs and symptoms and treatment of hypoglycemia.     Lab Results   Component Value Date    HGBA1C 5.7 2025    HGBA1C 5.9 2025    HGBA1C 6.1 (H) 10/28/2024       Orders:    metFORMIN (GLUCOPHAGE-XR) 500 mg 24 hr tablet; Take 1 tablet (500 mg total) by mouth every other day       Need for follow-up by   -Patient notes he would like to receive his medical marijuana card.  Patient notes he would need help with filling out the application online.  - Referral placed to social work to further assist patient.  Orders:    Ambulatory Referral to Social Work Care Management Program; Future    Pain, dental  -Reviewed dental appointment from 2025.  Patient referred to [facility] for ASAP appointment.  Patient is having difficulty with finding an oral surgeon that takes his insurance.  Message sent to referral team to further assist patient with scheduling an appointment.  Orders:    Ambulatory Referral to Oral Maxillofacial Surgery; Future    Cervical spondylosis  -Reviewed ED visit from 2025, 2025.  -Reviewed cervical spine x-ray from 2025. Results showed: \"Prominent cervical degenerative disc change with multilevel foraminal compromise, degenerative disc change and multilevel malalignment. No acute bony " "cervical abnormality. There may been a remote C7 spinous tip fracture versus enthesopathic change. Cannot exclude a submandibular gland stone versus metallic foreign body projecting just lateral to the angle of the left mandible. Uncertain if there are some faint calcifications in the deep right neck superiorly or even carotid artery calcification. CT with intravenous contrast would best evaluate neck soft tissue abnormalities.\"  -Reviewed CT soft tissue neck with contrast from 4/24/2025. Results show: \"Multilevel cervical degenerative disc disease. Grade 1 degenerative anterolisthesis is noted at the C2-3 and C3-4 levels. There is degenerative disc disease with disc space narrowing at the C4-5 and C5-6 levels. Multilevel facet arthrosis. Punctate punctate densely calcified lesion within the deep subcutaneous region overlying the inferolateral aspect of the left masseter muscle corresponding to the radiographic abnormality. There is no surrounding soft tissue density to suggest a salivary duct stone. There is no suspicious mucosal lesion.\"  -Reviewed MRI cervical spine without contrast from 5/19/2025. Results show: \"No cord compression, as clinically questioned. Edematous bone marrow signal in right C2-C3 articular pillars with mild surrounding soft tissue edema, likely due to active facet arthritis. Differential includes septic facet joint arthritis in the appropriate clinical setting. Multilevel degenerative changes of cervical spine with varying degrees of canal stenosis (moderate C3-C4) and foraminal narrowing (severe bilateral C3-C4, right C4-C5, right C5-C6, and right C6-C7), as detailed above.\"  -Reviewed neurosurgery note from 5/5/2025 and 5/22/2025.  Patient referred to physical therapy and pain management.  - Today, patient reports he will establish with physical therapy and pain management once he has his tooth fixed.  -Continue follow-up with neurosurgery as scheduled.           Depression, recurrent " (HCC)  - Stable.  Previously following with outpatient psychiatry, [facility], however, they are no longer taking patient's insurance.  - Explained to patient we could prescribe refills of his psychiatric medications until he is able to establish with a new psychiatrist.  Advised patient to bring his medications into the office for medication reconciliation.  - Discussed coping mechanisms.  - Provided patient with list of outpatient mental health resources and advised to call to schedule appointment.                   History of Present Illness     Patient is a 60 y.o. male whom  has a past medical history of Ambulates with cane, Diabetes mellitus (HCC), Diverticulitis, Fall, Hypertension, Kidney stone, Neck pain, and Renal disorder. who is seen today in office for neck pain follow-up.    -Patient notes he was seen by the dentist and was referred to oral surgeon for ASAP appointment.  Patient notes he is having difficulty finding an oral surgeon that takes his insurance.  Patient notes he is planning on attending physical therapy once he has his tooth fixed.    -Patient notes instead of taking the metformin 1000 mg daily, he has been taking metformin 500 mg every other day due to GI side effects.    -Patient notes he would like to receive his medical marijuana card.  Patient notes he would need help with filling out the application online.      Review of Systems   Constitutional:  Negative for activity change, appetite change and fatigue.   HENT:  Positive for dental problem. Negative for congestion and trouble swallowing.    Eyes:  Negative for photophobia and visual disturbance.   Respiratory:  Negative for chest tightness and wheezing.    Cardiovascular:  Negative for palpitations and leg swelling.   Gastrointestinal:  Negative for abdominal pain.   Genitourinary:  Negative for difficulty urinating and dysuria.   Musculoskeletal:  Positive for arthralgias (rt hip pain), gait problem and neck pain.   Skin:  Negative for  "pallor and wound.   Neurological:  Positive for dizziness, light-headedness and numbness. Negative for seizures, syncope and weakness.        Balance issues   Psychiatric/Behavioral:  Negative for self-injury, sleep disturbance and suicidal ideas. The patient is not nervous/anxious.        Objective   /72 (BP Location: Left arm, Patient Position: Sitting, Cuff Size: Large)   Pulse 80   Temp (!) 97 °F (36.1 °C) (Temporal)   Resp 14   Ht 6' 1\" (1.854 m)   Wt 85.7 kg (189 lb)   SpO2 98%   BMI 24.94 kg/m²      Physical Exam  Vitals and nursing note reviewed.   Constitutional:       General: He is not in acute distress.     Appearance: Normal appearance. He is well-developed.   HENT:      Head: Normocephalic and atraumatic.      Right Ear: External ear normal.      Left Ear: External ear normal.      Nose: Nose normal.      Mouth/Throat:      Pharynx: Uvula midline. No posterior oropharyngeal erythema.       Eyes:      Conjunctiva/sclera: Conjunctivae normal.       Cardiovascular:      Rate and Rhythm: Normal rate and regular rhythm.      Pulses: Normal pulses.      Heart sounds: Normal heart sounds. No murmur heard.  Pulmonary:      Effort: Pulmonary effort is normal. No respiratory distress.      Breath sounds: Normal breath sounds. No wheezing.   Abdominal:      General: Bowel sounds are normal.      Palpations: Abdomen is soft.      Tenderness: There is no abdominal tenderness.     Musculoskeletal:      Right shoulder: Tenderness present. No swelling. Decreased range of motion.      Cervical back: Neck supple. Muscular tenderness present. Decreased range of motion.      Right hip: Tenderness present. Decreased range of motion.     Skin:     General: Skin is warm and dry.      Capillary Refill: Capillary refill takes less than 2 seconds.     Neurological:      General: No focal deficit present.      Mental Status: He is alert and oriented to person, place, and time.     Psychiatric:         Speech: Speech " normal.         Behavior: Behavior normal.       Administrative Statements   I have spent a total time of 30 minutes in caring for this patient on the day of the visit/encounter including Diagnostic results, Prognosis, Risks and benefits of tx options, Patient and family education, Importance of tx compliance, Risk factor reductions, Obtaining or reviewing history  , and Communicating with other healthcare professionals .

## 2025-07-14 NOTE — ASSESSMENT & PLAN NOTE
- Currently prescribed metformin 1000 mg daily, however patient reports he has been taking metformin 500 mg every other day due to GI issues when taking metformin daily.  Continue with current regimen.  - Continue Tradjenta 5 mg daily and metformin 500 mg every other day.   - Continue following diabetic diet with focus on low intake of carbohydrates and sugars.  - Reviewed signs and symptoms and treatment of hypoglycemia.     Lab Results   Component Value Date    HGBA1C 5.7 06/03/2025    HGBA1C 5.9 01/24/2025    HGBA1C 6.1 (H) 10/28/2024       Orders:    metFORMIN (GLUCOPHAGE-XR) 500 mg 24 hr tablet; Take 1 tablet (500 mg total) by mouth every other day

## 2025-07-14 NOTE — ASSESSMENT & PLAN NOTE
"-Reviewed ED visit from 4/5/2025, 4/14/2025.  -Reviewed cervical spine x-ray from 4/5/2025. Results showed: \"Prominent cervical degenerative disc change with multilevel foraminal compromise, degenerative disc change and multilevel malalignment. No acute bony cervical abnormality. There may been a remote C7 spinous tip fracture versus enthesopathic change. Cannot exclude a submandibular gland stone versus metallic foreign body projecting just lateral to the angle of the left mandible. Uncertain if there are some faint calcifications in the deep right neck superiorly or even carotid artery calcification. CT with intravenous contrast would best evaluate neck soft tissue abnormalities.\"  -Reviewed CT soft tissue neck with contrast from 4/24/2025. Results show: \"Multilevel cervical degenerative disc disease. Grade 1 degenerative anterolisthesis is noted at the C2-3 and C3-4 levels. There is degenerative disc disease with disc space narrowing at the C4-5 and C5-6 levels. Multilevel facet arthrosis. Punctate punctate densely calcified lesion within the deep subcutaneous region overlying the inferolateral aspect of the left masseter muscle corresponding to the radiographic abnormality. There is no surrounding soft tissue density to suggest a salivary duct stone. There is no suspicious mucosal lesion.\"  -Reviewed MRI cervical spine without contrast from 5/19/2025. Results show: \"No cord compression, as clinically questioned. Edematous bone marrow signal in right C2-C3 articular pillars with mild surrounding soft tissue edema, likely due to active facet arthritis. Differential includes septic facet joint arthritis in the appropriate clinical setting. Multilevel degenerative changes of cervical spine with varying degrees of canal stenosis (moderate C3-C4) and foraminal narrowing (severe bilateral C3-C4, right C4-C5, right C5-C6, and right C6-C7), as detailed above.\"  -Reviewed neurosurgery note from 5/5/2025 and 5/22/2025.  " Patient referred to physical therapy and pain management.  - Today, patient reports he will establish with physical therapy and pain management once he has his tooth fixed.  -Continue follow-up with neurosurgery as scheduled.

## 2025-07-15 ENCOUNTER — PATIENT OUTREACH (OUTPATIENT)
Dept: FAMILY MEDICINE CLINIC | Facility: CLINIC | Age: 60
End: 2025-07-15

## 2025-07-25 ENCOUNTER — RA CDI HCC (OUTPATIENT)
Dept: OTHER | Facility: HOSPITAL | Age: 60
End: 2025-07-25

## 2025-08-05 DIAGNOSIS — E11.9 TYPE 2 DIABETES MELLITUS WITHOUT COMPLICATION, WITHOUT LONG-TERM CURRENT USE OF INSULIN (HCC): Chronic | ICD-10-CM

## 2025-08-05 RX ORDER — METFORMIN HYDROCHLORIDE 500 MG/1
TABLET, EXTENDED RELEASE ORAL
Qty: 180 TABLET | Refills: 0 | Status: SHIPPED | OUTPATIENT
Start: 2025-08-05

## 2025-08-07 DIAGNOSIS — M54.42 CHRONIC BILATERAL LOW BACK PAIN WITH BILATERAL SCIATICA: ICD-10-CM

## 2025-08-07 DIAGNOSIS — M54.41 CHRONIC BILATERAL LOW BACK PAIN WITH BILATERAL SCIATICA: ICD-10-CM

## 2025-08-07 DIAGNOSIS — G89.29 CHRONIC BILATERAL LOW BACK PAIN WITH BILATERAL SCIATICA: ICD-10-CM

## 2025-08-07 DIAGNOSIS — J42 CHRONIC BRONCHITIS, UNSPECIFIED CHRONIC BRONCHITIS TYPE (HCC): ICD-10-CM

## 2025-08-07 RX ORDER — ALBUTEROL SULFATE 90 UG/1
INHALANT RESPIRATORY (INHALATION)
Qty: 8.5 G | Refills: 0 | Status: SHIPPED | OUTPATIENT
Start: 2025-08-07

## 2025-08-11 ENCOUNTER — OFFICE VISIT (OUTPATIENT)
Dept: FAMILY MEDICINE CLINIC | Facility: CLINIC | Age: 60
End: 2025-08-11

## 2025-08-12 ENCOUNTER — TELEPHONE (OUTPATIENT)
Dept: FAMILY MEDICINE CLINIC | Facility: CLINIC | Age: 60
End: 2025-08-12

## (undated) DEVICE — TIBURON LAPAROSCOPIC ABDOMINAL DRAPE: Brand: CONVERTORS

## (undated) DEVICE — PENCIL ELECTROSURG E-Z CLEAN -0035H

## (undated) DEVICE — MEGA SUTURECUT ND: Brand: ENDOWRIST

## (undated) DEVICE — ARM DRAPE

## (undated) DEVICE — SUT VICRYL 0 UR-6 27 IN J603H

## (undated) DEVICE — ELECTRO LUBE IS A SINGLE PATIENT USE DEVICE THAT IS INTENDED TO BE USED ON ELECTROSURGICAL ELECTRODES TO REDUCE STICKING.: Brand: KEY SURGICAL ELECTRO LUBE

## (undated) DEVICE — BLADELESS OBTURATOR: Brand: WECK VISTA

## (undated) DEVICE — CANNULA SEAL

## (undated) DEVICE — EXOFIN PRECISION PEN HIGH VISCOSITY TOPICAL SKIN ADHESIVE: Brand: EXOFIN PRECISION PEN, 1G

## (undated) DEVICE — PROGRASP FORCEPS: Brand: ENDOWRIST

## (undated) DEVICE — MONOPOLAR CURVED SCISSORS: Brand: ENDOWRIST

## (undated) DEVICE — GLOVE INDICATOR PI UNDERGLOVE SZ 8 BLUE

## (undated) DEVICE — [HIGH FLOW INSUFFLATOR,  DO NOT USE IF PACKAGE IS DAMAGED,  KEEP DRY,  KEEP AWAY FROM SUNLIGHT,  PROTECT FROM HEAT AND RADIOACTIVE SOURCES.]: Brand: PNEUMOSURE

## (undated) DEVICE — GLOVE SRG BIOGEL ECLIPSE 7.5

## (undated) DEVICE — TIP COVER ACCESSORY

## (undated) DEVICE — ALLENTOWN LAP CHOLE APP PACK: Brand: CARDINAL HEALTH

## (undated) DEVICE — DRAPE EQUIPMENT RF WAND

## (undated) DEVICE — SUT VLOC 90 3-0 V-20 9IN VLOCM0644

## (undated) DEVICE — ASTOUND STANDARD SURGICAL GOWN, XL: Brand: CONVERTORS

## (undated) DEVICE — SUT MONOCRYL 4-0 PS-2 27 IN Y426H

## (undated) DEVICE — GLOVE SRG BIOGEL 6.5

## (undated) DEVICE — MAYO STAND COVER: Brand: CONVERTORS

## (undated) DEVICE — INTENDED FOR TISSUE SEPARATION, AND OTHER PROCEDURES THAT REQUIRE A SHARP SURGICAL BLADE TO PUNCTURE OR CUT.: Brand: BARD-PARKER SAFETY BLADES SIZE 11, STERILE

## (undated) DEVICE — DISPOSABLE OR TOWEL: Brand: CARDINAL HEALTH

## (undated) DEVICE — COLUMN DRAPE

## (undated) DEVICE — NEEDLE HYPO 23G X 1-1/2 IN

## (undated) DEVICE — TRAY FOLEY 16FR URIMETER SURESTEP

## (undated) DEVICE — SCD SEQUENTIAL COMPRESSION COMFORT SLEEVE MEDIUM KNEE LENGTH: Brand: KENDALL SCD

## (undated) DEVICE — GLOVE INDICATOR PI UNDERGLOVE SZ 6.5 BLUE

## (undated) DEVICE — PMI DISPOSABLE PUNCTURE CLOSURE DEVICE / SUTURE GRASPER: Brand: PMI

## (undated) DEVICE — SUT VICRYL 2-0 SH 27 IN UNDYED J417H